# Patient Record
Sex: FEMALE | Race: WHITE | Employment: OTHER | URBAN - METROPOLITAN AREA
[De-identification: names, ages, dates, MRNs, and addresses within clinical notes are randomized per-mention and may not be internally consistent; named-entity substitution may affect disease eponyms.]

---

## 2017-05-16 ENCOUNTER — OFFICE VISIT (OUTPATIENT)
Dept: NEUROLOGY | Age: 62
End: 2017-05-16

## 2017-05-16 VITALS
DIASTOLIC BLOOD PRESSURE: 58 MMHG | BODY MASS INDEX: 35 KG/M2 | HEART RATE: 90 BPM | SYSTOLIC BLOOD PRESSURE: 102 MMHG | WEIGHT: 205 LBS | HEIGHT: 64 IN | RESPIRATION RATE: 16 BRPM

## 2017-05-16 DIAGNOSIS — M48.061 DEGENERATIVE LUMBAR SPINAL STENOSIS: ICD-10-CM

## 2017-05-16 DIAGNOSIS — I65.23 STENOSIS OF BOTH INTERNAL CAROTID ARTERIES: ICD-10-CM

## 2017-05-16 DIAGNOSIS — R55 SYNCOPE AND COLLAPSE: ICD-10-CM

## 2017-05-16 DIAGNOSIS — E53.8 B12 DEFICIENCY: ICD-10-CM

## 2017-05-16 DIAGNOSIS — E55.9 VITAMIN D DEFICIENCY: ICD-10-CM

## 2017-05-16 DIAGNOSIS — I67.89 CEREBRAL MICROVASCULAR DISEASE: ICD-10-CM

## 2017-05-16 DIAGNOSIS — G25.0 BENIGN FAMILIAL TREMOR: Primary | ICD-10-CM

## 2017-05-16 DIAGNOSIS — M96.1 CERVICAL POST-LAMINECTOMY SYNDROME: ICD-10-CM

## 2017-05-16 DIAGNOSIS — E11.42 DIABETIC PERIPHERAL NEUROPATHY ASSOCIATED WITH TYPE 2 DIABETES MELLITUS (HCC): ICD-10-CM

## 2017-05-16 DIAGNOSIS — I65.23 STENOSIS OF BOTH INTERNAL CAROTID ARTERIES: Primary | ICD-10-CM

## 2017-05-16 DIAGNOSIS — G25.0 ESSENTIAL TREMOR: ICD-10-CM

## 2017-05-16 DIAGNOSIS — M47.22 CERVICAL RADICULOPATHY DUE TO DEGENERATIVE JOINT DISEASE OF SPINE: ICD-10-CM

## 2017-05-16 DIAGNOSIS — R27.0 ATAXIA: ICD-10-CM

## 2017-05-16 RX ORDER — PRIMIDONE 50 MG/1
100 TABLET ORAL
Qty: 100 TAB | Refills: 11 | Status: SHIPPED | OUTPATIENT
Start: 2017-05-16 | End: 2018-02-21 | Stop reason: SDUPTHER

## 2017-05-16 RX ORDER — PRIMIDONE 50 MG/1
50 TABLET ORAL
COMMUNITY
Start: 2017-05-12 | End: 2017-05-16 | Stop reason: SDUPTHER

## 2017-05-16 RX ORDER — HYDROCODONE BITARTRATE AND ACETAMINOPHEN 7.5; 325 MG/1; MG/1
TABLET ORAL AS NEEDED
COMMUNITY
Start: 2017-04-21 | End: 2017-05-27

## 2017-05-16 RX ORDER — PHENOBARBITAL 60 MG/1
60 TABLET ORAL 2 TIMES DAILY
COMMUNITY
Start: 2017-05-01 | End: 2017-05-27

## 2017-05-16 RX ORDER — RISPERIDONE 0.25 MG/1
0.25 TABLET, FILM COATED ORAL 2 TIMES DAILY
COMMUNITY
Start: 2017-05-01 | End: 2017-05-27

## 2017-05-16 RX ORDER — LISINOPRIL 20 MG/1
20 TABLET ORAL DAILY
COMMUNITY
Start: 2017-04-21 | End: 2017-05-27

## 2017-05-16 RX ORDER — GABAPENTIN 600 MG/1
600 TABLET ORAL 2 TIMES DAILY
COMMUNITY
Start: 2017-04-21 | End: 2018-07-31

## 2017-05-16 RX ORDER — IBUPROFEN 800 MG/1
TABLET ORAL 3 TIMES DAILY
COMMUNITY
Start: 2017-04-21 | End: 2017-05-27

## 2017-05-16 RX ORDER — VENLAFAXINE HYDROCHLORIDE 75 MG/1
CAPSULE, EXTENDED RELEASE ORAL
Status: ON HOLD | COMMUNITY
Start: 2017-03-24 | End: 2017-05-22

## 2017-05-16 NOTE — PATIENT INSTRUCTIONS
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
## 2017-05-16 NOTE — MR AVS SNAPSHOT
Visit Information Date & Time Provider Department Dept. Phone Encounter #  
 5/16/2017 10:00 AM Maria Luisa Vasquez MD [facility] [phone] [id number] Follow-up Instructions Return in about 3 months (around 8/16/2017). Your Appointments 5/25/2017 10:00 AM  
New Patient with Maggie Wiggins, NP  
[address]) Appt Note: last seen 11/2015  
 [address] [phone]  
[address] [address] [address] [facility] Upcoming Health Maintenance Date Due Hepatitis C Screening 1955 FOOT EXAM Q1 12/23/1965 MICROALBUMIN Q1 12/23/1965 EYE EXAM RETINAL OR DILATED Q1 12/23/1965 DTaP/Tdap/Td series (1 - Tdap) 12/23/1976 PAP AKA CERVICAL CYTOLOGY 12/23/1976 FOBT Q 1 YEAR AGE 50-75 12/23/2005 LIPID PANEL Q1 1/19/2015 HEMOGLOBIN A1C Q6M 5/25/2015 ZOSTER VACCINE AGE 60> 12/23/2015 INFLUENZA AGE 9 TO ADULT 8/1/2017 BREAST CANCER SCRN MAMMOGRAM 11/20/2017 Allergies as of 5/16/2017  Review Complete On: 5/16/2017 By: Maria Luisa Vasquez MD  
  
 Severity Noted Reaction Type Reactions Latex  12/13/2010    Contact Dermatitis Dilaudid [Hydromorphone (Pf)]  10/29/2014   Systemic Other (comments) Feels like bugs are crawling all over her Lipitor [Atorvastatin]  06/21/2013    Other (comments) LIVER TROUBLE ON THIS MEDICATION Sulfa (Sulfonamide Antibiotics)  12/13/2010    Itching Current Immunizations  Reviewed on 3/19/2015 Name Date Influenza Vaccine 9/1/2014, 9/1/2013 Pneumococcal Vaccine (Unspecified Type) 1/1/2010 Not reviewed this visit You Were Diagnosed With   
  
 Codes Comments Benign familial tremor    -  Primary ICD-10-CM: G25.0 ICD-9-CM: 333.1  Diabetic peripheral neuropathy associated with type 2 diabetes mellitus ([facility]     ICD-10-CM: E11.42 
 ICD-9-CM: 250.60, 357.2 Cervical post-laminectomy syndrome     ICD-10-CM: M96.1 ICD-9-CM: 722.81 Syncope and collapse     ICD-10-CM: R55 
ICD-9-CM: 780.2 Stenosis of both internal carotid arteries     ICD-10-CM: I65.23 ICD-9-CM: 433.10, 433.30 Cervical radiculopathy due to degenerative joint disease of spine     ICD-10-CM: M47.22 
ICD-9-CM: 721.0 Degenerative lumbar spinal stenosis     ICD-10-CM: M48.06 
ICD-9-CM: 724.02 Ataxia     ICD-10-CM: R27.0 ICD-9-CM: 781.3 B12 deficiency     ICD-10-CM: E53.8 ICD-9-CM: 266.2 Vitamin D deficiency     ICD-10-CM: E55.9 ICD-9-CM: 268.9 Vitals BP Pulse Resp Height(growth percentile) Weight(growth percentile) BMI  
 102/58 90 16 5' 4\" (1.626 m) 205 lb (93 kg) 35.19 kg/m2 OB Status Smoking Status Menopause Current Every Day Smoker Vitals History BMI and BSA Data Body Mass Index Body Surface Area  
 35.19 kg/m 2 2.05 m 2 Preferred Pharmacy Pharmacy Name Phone [address] [phone] Your Updated Medication List  
  
   
This list is accurate as of: 5/16/17 11:15 AM.  Always use your most recent med list.  
  
  
  
  
 acetaminophen-codeine 300-30 mg per tablet Commonly known as:  TYLENOL #3 Take 1 Tab by mouth every eight (8) hours as needed (gets 30 per month). albuterol 90 mcg/actuation inhaler Commonly known as:  PROVENTIL HFA, VENTOLIN HFA, PROAIR HFA Take 2 Puffs by inhalation daily. Uses daily to QID ALPRAZolam 1 mg tablet Commonly known as:  Ted Felipe Take 1 Tab by mouth two (2) times daily as needed for Anxiety. aspirin 81 mg chewable tablet Take 81 mg by mouth daily. benzonatate 200 mg capsule Commonly known as:  TESSALON Take 1 Cap by mouth two (2) times daily as needed. cetirizine 10 mg tablet Commonly known as:  ZYRTEC Take 1 Tab by mouth daily. dicyclomine 10 mg capsule Commonly known as:  BENTYL DUONEB 2.5 mg-0.5 mg/3 ml Nebu Generic drug:  albuterol-ipratropium 3 mL by Nebulization route every six (6) hours as needed for Wheezing. furosemide 20 mg tablet Commonly known as:  LASIX Take 40 mg by mouth daily as needed (swelling; taking twice daily). gabapentin 600 mg tablet Commonly known as:  NEURONTIN  
600 mg two (2) times a day. glipiZIDE SR 5 mg CR tablet Commonly known as:  GLUCOTROL XL Take 5 mg by mouth two (2) times daily as needed (1-2 per day). HYDROcodone-acetaminophen 7.5-325 mg per tablet Commonly known as:  Manuelito Riddle  
as needed (Gets 15 per month). ibuprofen 800 mg tablet Commonly known as:  MOTRIN  
three (3) times daily. levothyroxine 200 mcg tablet Commonly known as:  SYNTHROID Take 200 mcg by mouth daily (before breakfast). LEVSIN/SL 0.125 mg SL tablet Generic drug:  hyoscyamine SL  
0.125 mg by SubLINGual route three (3) times daily as needed for Cramping. lisinopril 20 mg tablet Commonly known as:  PRINIVIL, ZESTRIL  
20 mg daily. MAGOX 400 mg tablet Generic drug:  magnesium oxide TAKE ONE TABLET TWICE A DAY  
  
 methocarbamol 750 mg tablet Commonly known as:  ROBAXIN Take 750-1,500 mg by mouth four (4) times daily as needed. metoprolol tartrate 25 mg tablet Commonly known as:  LOPRESSOR Take 1 Tab by mouth two (2) times a day. montelukast 10 mg tablet Commonly known as:  SINGULAIR Take 10 mg by mouth daily. NexIUM 40 mg capsule Generic drug:  esomeprazole Take 40 mg by mouth daily. niacin  mg CR capsule Take 250 mg by mouth daily. nystatin 100,000 unit/mL suspension Commonly known as:  MYCOSTATIN Take 5 mL by mouth four (4) times daily. swish and spit PHENobarbital 60 mg tablet Commonly known as:  LUMINAL  
60 mg two (2) times a day. prazosin 2 mg capsule Commonly known as:  MINIPRESS Take 1 Cap by mouth two (2) times a day. primidone 50 mg tablet Commonly known as: MYSOLINE Take 2 Tabs by mouth nightly. risperiDONE 0.25 mg tablet Commonly known as:  RisperDAL  
0.25 mg two (2) times a day. traMADol 50 mg tablet Commonly known as:  ULTRAM  
Take 50 mg by mouth every six (6) hours as needed for Pain. venlafaxine-SR 75 mg capsule Commonly known as:  EFFEXOR-XR  
  
 vit B Cmplx 3-FA-Vit C-Biotin 1- mg-mg-mcg tablet Commonly known as:  NEPHRO SHREYA RX Take 1 Tab by mouth daily. Vytorin 10/40 10-40 mg per tablet Generic drug:  ezetimibe-simvastatin Take 1 tablet by mouth nightly. Prescriptions Sent to Pharmacy Refills  
 primidone (MYSOLINE) 50 mg tablet 11 Sig: Take 2 Tabs by mouth nightly. Class: Normal  
 Pharmacy: [address] [place] [address] Ph #: [phone] Route: Oral  
  
We Performed the Following OTYA COMPREHENSIVE PLUS PANEL [[id number] Custom] ANTINEURONAL CELL AB [id number] CPT(R)] CBC WITH AUTOMATED DIFF [89075 CPT(R)] CK [id number] CPT(R)] HEMOGLOBIN A1C WITH EAG [73986 CPT(R)] SED RATE (ESR) [id number] CPT(R)]   
 T3 TOTAL [66505 CPT(R)] T4 [id number] CPT(R)] TSH 3RD GENERATION [97124 CPT(R)] VITAMIN B12 & FOLATE [04027 CPT(R)] VITAMIN D, 25 HYROXY PANEL [[id number] Custom] Follow-up Instructions Return in about 3 months (around 8/16/2017). To-Do List   
 05/16/2017 Imaging:  DUPLEX CAROTID BILATERAL AMB NEURO   
  
 05/16/2017 Imaging:  XR SPINE CERV W OBL/FLEX/EXT MIN 6 V COMP   
  
 05/16/2017 Imaging:  XR SPINE LUMB MIN 4 V   
  
 05/22/2017 Neurology:  EEG Patient Instructions A Healthy Lifestyle: Care Instructions Your Care Instructions A healthy lifestyle can help you feel good, stay at a healthy weight, and have plenty of energy for both work and play. A healthy lifestyle is something you can share with your whole family. A healthy lifestyle also can lower your risk for serious health problems, such as high blood pressure, heart disease, and diabetes. You can follow a few steps listed below to improve your health and the health of your family. Follow-up care is a key part of your treatment and safety. Be sure to make and go to all appointments, and call your doctor if you are having problems. Its also a good idea to know your test results and keep a list of the medicines you take. How can you care for yourself at home? · Do not eat too much sugar, fat, or fast foods. You can still have dessert and treats now and then. The goal is moderation. · Start small to improve your eating habits. Pay attention to portion sizes, drink less juice and soda pop, and eat more fruits and vegetables. ¨ Eat a healthy amount of food. A 3-ounce serving of meat, for example, is about the size of a deck of cards. Fill the rest of your plate with vegetables and whole grains. ¨ Limit the amount of soda and sports drinks you have every day. Drink more water when you are thirsty. ¨ Eat at least 5 servings of fruits and vegetables every day. It may seem like a lot, but it is not hard to reach this goal. A serving or helping is 1 piece of fruit, 1 cup of vegetables, or 2 cups of leafy, raw vegetables. Have an apple or some carrot sticks as an afternoon snack instead of a candy bar. Try to have fruits and/or vegetables at every meal. 
· Make exercise part of your daily routine. You may want to start with simple activities, such as walking, bicycling, or slow swimming. Try to be active 30 to 60 minutes every day. You do not need to do all 30 to 60 minutes all at once. For example, you can exercise 3 times a day for 10 or 20 minutes.  Moderate exercise is safe for most people, but it is always a good idea to talk to your doctor before starting an exercise program. 
· Keep moving. Bernice Carrasco the lawn, work in the garden, or [place]. Take the stairs instead of the elevator at work. · If you smoke, quit. People who smoke have an increased risk for heart attack, stroke, cancer, and other lung illnesses. Quitting is hard, but there are ways to boost your chance of quitting tobacco for good. ¨ Use nicotine gum, patches, or lozenges. ¨ Ask your doctor about stop-smoking programs and medicines. ¨ Keep trying. In addition to reducing your risk of diseases in the future, you will notice some benefits soon after you stop using tobacco. If you have shortness of breath or asthma symptoms, they will likely get better within a few weeks after you quit. · Limit how much alcohol you drink. Moderate amounts of alcohol (up to 2 drinks a day for men, 1 drink a day for women) are okay. But drinking too much can lead to liver problems, high blood pressure, and other health problems. Family health If you have a family, there are many things you can do together to improve your health. · Eat meals together as a family as often as possible. · Eat healthy foods. This includes fruits, vegetables, lean meats and dairy, and whole grains. · Include your family in your fitness plan. Most people think of activities such as jogging or tennis as the way to fitness, but there are many ways you and your family can be more active. Anything that makes you breathe hard and gets your heart pumping is exercise. Here are some tips: 
¨ Walk to do errands or to take your child to school or the bus. ¨ Go for a family bike ride after dinner instead of watching TV. Where can you learn more? Go to http://bhavya-omar.info/. Enter Z093 in the search box to learn more about \"A Healthy Lifestyle: Care Instructions. \" Current as of: July 26, 2016 Content Version: 11.2 © 5897-8782 [employer]. Care instructions adapted under license by Payment plugin (which disclaims liability or warranty for this information). If you have questions about a medical condition or this instruction, always ask your healthcare professional. [address] any warranty or liability for your use of this information. Introducing [facility]! Maureen Annaversushma introduces triptap patient portal. Now you can access parts of your medical record, email your doctor's office, and request medication refills online. 1. In your internet browser, go to [email or web address] 2. Click on the First Time User? Click Here link in the Sign In box. You will see the New Member Sign Up page. 3. Enter your triptap Access Code exactly as it appears below. You will not need to use this code after youve completed the sign-up process. If you do not sign up before the expiration date, you must request a new code. · triptap Access Code: [email or web address] Expires: 8/14/2017  9:48 AM 
 
4. Enter the last four digits of your Social Security Number (xxxx) and Date of Birth (mm/dd/yyyy) as indicated and click Submit. You will be taken to the next sign-up page. 5. Create a triptap ID. This will be your triptap login ID and cannot be changed, so think of one that is secure and easy to remember. 6. Create a triptap password. You can change your password at any time. 7. Enter your Password Reset Question and Answer. This can be used at a later time if you forget your password. 8. Enter your e-mail address. You will receive e-mail notification when new information is available in [address] 9. Click Sign Up. You can now view and download portions of your medical record. 10. Click the Download Summary menu link to download a portable copy of your medical information.  
 
If you have questions, please visit the Frequently Asked Questions section of the [employer]. Remember, BAUNA[employer] is NOT to be used for urgent needs. For medical emergencies, dial 911. Now available from your iPhone and Android! Please provide this summary of care documentation to your next provider. Your primary care clinician is listed as Alison Arciniega. If you have any questions after today's visit, please call [phone].

## 2017-05-17 PROBLEM — R41.82 ALTERED MENTAL STATUS, UNSPECIFIED: Status: ACTIVE | Noted: 2017-05-17

## 2017-05-17 PROBLEM — R56.9 CONVULSION DISORDER (HCC): Status: ACTIVE | Noted: 2017-05-17

## 2017-05-17 PROBLEM — I67.89 CEREBRAL MICROVASCULAR DISEASE: Status: ACTIVE | Noted: 2017-05-17
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
## 2017-05-21 ENCOUNTER — APPOINTMENT (OUTPATIENT)
Dept: GENERAL RADIOLOGY | Age: 62
DRG: 189 | End: 2017-05-21
Attending: EMERGENCY MEDICINE
Payer: MEDICARE

## 2017-05-21 ENCOUNTER — APPOINTMENT (OUTPATIENT)
Dept: CT IMAGING | Age: 62
DRG: 189 | End: 2017-05-21
Attending: INTERNAL MEDICINE
Payer: MEDICARE

## 2017-05-21 ENCOUNTER — HOSPITAL ENCOUNTER (INPATIENT)
Age: 62
LOS: 6 days | Discharge: NURSING, REHAB OR LONG-TERM CARE | DRG: 189 | End: 2017-05-27
Attending: EMERGENCY MEDICINE | Admitting: INTERNAL MEDICINE
Payer: MEDICARE

## 2017-05-21 DIAGNOSIS — M96.1 CERVICAL POST-LAMINECTOMY SYNDROME: ICD-10-CM

## 2017-05-21 DIAGNOSIS — G25.0 BENIGN FAMILIAL TREMOR: ICD-10-CM

## 2017-05-21 DIAGNOSIS — E11.42 DIABETIC PERIPHERAL NEUROPATHY ASSOCIATED WITH TYPE 2 DIABETES MELLITUS (HCC): ICD-10-CM

## 2017-05-21 DIAGNOSIS — E53.8 B12 DEFICIENCY: ICD-10-CM

## 2017-05-21 DIAGNOSIS — J96.01 ACUTE RESPIRATORY FAILURE WITH HYPOXIA (HCC): Primary | ICD-10-CM

## 2017-05-21 DIAGNOSIS — M48.061 DEGENERATIVE LUMBAR SPINAL STENOSIS: ICD-10-CM

## 2017-05-21 DIAGNOSIS — R55 SYNCOPE AND COLLAPSE: ICD-10-CM

## 2017-05-21 DIAGNOSIS — R27.0 ATAXIA: ICD-10-CM

## 2017-05-21 DIAGNOSIS — M47.22 CERVICAL RADICULOPATHY DUE TO DEGENERATIVE JOINT DISEASE OF SPINE: ICD-10-CM

## 2017-05-21 DIAGNOSIS — I65.23 STENOSIS OF BOTH INTERNAL CAROTID ARTERIES: ICD-10-CM

## 2017-05-21 DIAGNOSIS — E55.9 VITAMIN D DEFICIENCY: ICD-10-CM

## 2017-05-21 DIAGNOSIS — J44.1 COPD EXACERBATION (HCC): ICD-10-CM

## 2017-05-21 LAB
ALBUMIN SERPL BCP-MCNC: 3.9 G/DL (ref 3.5–5)
ALBUMIN/GLOB SERPL: 1 {RATIO} (ref 1.1–2.2)
ALP SERPL-CCNC: 106 U/L (ref 45–117)
ALT SERPL-CCNC: 29 U/L (ref 12–78)
ANION GAP BLD CALC-SCNC: 9 MMOL/L (ref 5–15)
ARTERIAL PATENCY WRIST A: ABNORMAL
AST SERPL W P-5'-P-CCNC: 16 U/L (ref 15–37)
BASE EXCESS BLDA CALC-SCNC: 2.6 MMOL/L
BASOPHILS # BLD AUTO: 0 K/UL (ref 0–0.1)
BASOPHILS # BLD: 0 % (ref 0–1)
BDY SITE: ABNORMAL
BILIRUB SERPL-MCNC: 0.4 MG/DL (ref 0.2–1)
BUN SERPL-MCNC: 27 MG/DL (ref 6–20)
BUN/CREAT SERPL: 27 (ref 12–20)
CALCIUM SERPL-MCNC: 9.8 MG/DL (ref 8.5–10.1)
CHLORIDE SERPL-SCNC: 87 MMOL/L (ref 97–108)
CK SERPL-CCNC: 75 U/L (ref 26–192)
CO2 SERPL-SCNC: 33 MMOL/L (ref 21–32)
CREAT SERPL-MCNC: 0.99 MG/DL (ref 0.55–1.02)
EOSINOPHIL # BLD: 0 K/UL (ref 0–0.4)
EOSINOPHIL NFR BLD: 0 % (ref 0–7)
EPAP/CPAP/PEEP, PAPEEP: 5
ERYTHROCYTE [DISTWIDTH] IN BLOOD BY AUTOMATED COUNT: 14.2 % (ref 11.5–14.5)
FIO2 ON VENT: 35 %
GAS FLOW.O2 SETTING OXYMISER: 4 L/MIN
GLOBULIN SER CALC-MCNC: 4 G/DL (ref 2–4)
GLUCOSE BLD STRIP.AUTO-MCNC: 165 MG/DL (ref 65–100)
GLUCOSE SERPL-MCNC: 148 MG/DL (ref 65–100)
HCO3 BLDA-SCNC: 30 MMOL/L (ref 22–26)
HCT VFR BLD AUTO: 47 % (ref 35–47)
HGB BLD-MCNC: 16 G/DL (ref 11.5–16)
IPAP/PIP, IPAPIP: 12
LYMPHOCYTES # BLD AUTO: 15 % (ref 12–49)
LYMPHOCYTES # BLD: 1.8 K/UL (ref 0.8–3.5)
MCH RBC QN AUTO: 33 PG (ref 26–34)
MCHC RBC AUTO-ENTMCNC: 34 G/DL (ref 30–36.5)
MCV RBC AUTO: 96.9 FL (ref 80–99)
MONOCYTES # BLD: 0.6 K/UL (ref 0–1)
MONOCYTES NFR BLD AUTO: 5 % (ref 5–13)
NEUTS SEG # BLD: 9.1 K/UL (ref 1.8–8)
NEUTS SEG NFR BLD AUTO: 80 % (ref 32–75)
PCO2 BLDA: 57 MMHG (ref 35–45)
PH BLDA: 7.34 [PH] (ref 7.35–7.45)
PLATELET # BLD AUTO: 243 K/UL (ref 150–400)
PO2 BLDA: 79 MMHG (ref 80–100)
POTASSIUM SERPL-SCNC: 4.2 MMOL/L (ref 3.5–5.1)
PROT SERPL-MCNC: 7.9 G/DL (ref 6.4–8.2)
RBC # BLD AUTO: 4.85 M/UL (ref 3.8–5.2)
SAO2 % BLD: 95 % (ref 92–97)
SAO2% DEVICE SAO2% SENSOR NAME: ABNORMAL
SERVICE CMNT-IMP: ABNORMAL
SODIUM SERPL-SCNC: 129 MMOL/L (ref 136–145)
SPECIMEN SITE: ABNORMAL
TROPONIN I SERPL-MCNC: <0.04 NG/ML
VENTILATION MODE VENT: ABNORMAL
WBC # BLD AUTO: 11.5 K/UL (ref 3.6–11)

## 2017-05-21 PROCEDURE — 93005 ELECTROCARDIOGRAM TRACING: CPT

## 2017-05-21 PROCEDURE — 84484 ASSAY OF TROPONIN QUANT: CPT | Performed by: EMERGENCY MEDICINE

## 2017-05-21 PROCEDURE — 74011000250 HC RX REV CODE- 250: Performed by: EMERGENCY MEDICINE

## 2017-05-21 PROCEDURE — 74011250636 HC RX REV CODE- 250/636: Performed by: INTERNAL MEDICINE

## 2017-05-21 PROCEDURE — 80053 COMPREHEN METABOLIC PANEL: CPT | Performed by: EMERGENCY MEDICINE

## 2017-05-21 PROCEDURE — 65660000000 HC RM CCU STEPDOWN

## 2017-05-21 PROCEDURE — 74011000258 HC RX REV CODE- 258: Performed by: INTERNAL MEDICINE

## 2017-05-21 PROCEDURE — 82803 BLOOD GASES ANY COMBINATION: CPT | Performed by: INTERNAL MEDICINE

## 2017-05-21 PROCEDURE — 36415 COLL VENOUS BLD VENIPUNCTURE: CPT | Performed by: EMERGENCY MEDICINE

## 2017-05-21 PROCEDURE — 71275 CT ANGIOGRAPHY CHEST: CPT

## 2017-05-21 PROCEDURE — 71010 XR CHEST PORT: CPT

## 2017-05-21 PROCEDURE — 94660 CPAP INITIATION&MGMT: CPT

## 2017-05-21 PROCEDURE — 74011250636 HC RX REV CODE- 250/636: Performed by: EMERGENCY MEDICINE

## 2017-05-21 PROCEDURE — 74011636320 HC RX REV CODE- 636/320: Performed by: EMERGENCY MEDICINE

## 2017-05-21 PROCEDURE — 99285 EMERGENCY DEPT VISIT HI MDM: CPT

## 2017-05-21 PROCEDURE — 36600 WITHDRAWAL OF ARTERIAL BLOOD: CPT | Performed by: INTERNAL MEDICINE

## 2017-05-21 PROCEDURE — 96374 THER/PROPH/DIAG INJ IV PUSH: CPT

## 2017-05-21 PROCEDURE — 74011250637 HC RX REV CODE- 250/637: Performed by: INTERNAL MEDICINE

## 2017-05-21 PROCEDURE — 85025 COMPLETE CBC W/AUTO DIFF WBC: CPT | Performed by: EMERGENCY MEDICINE

## 2017-05-21 PROCEDURE — 77030013140 HC MSK NEB VYRM -A

## 2017-05-21 PROCEDURE — 74011250637 HC RX REV CODE- 250/637: Performed by: HOSPITALIST

## 2017-05-21 PROCEDURE — 70450 CT HEAD/BRAIN W/O DYE: CPT

## 2017-05-21 PROCEDURE — 77030012879 HC MSK CPAP FLL FAC PHIL -B

## 2017-05-21 PROCEDURE — 94761 N-INVAS EAR/PLS OXIMETRY MLT: CPT

## 2017-05-21 PROCEDURE — 82962 GLUCOSE BLOOD TEST: CPT

## 2017-05-21 PROCEDURE — 82550 ASSAY OF CK (CPK): CPT | Performed by: EMERGENCY MEDICINE

## 2017-05-21 PROCEDURE — 74011000250 HC RX REV CODE- 250: Performed by: INTERNAL MEDICINE

## 2017-05-21 RX ORDER — SODIUM CHLORIDE 0.9 % (FLUSH) 0.9 %
5-10 SYRINGE (ML) INJECTION EVERY 8 HOURS
Status: DISCONTINUED | OUTPATIENT
Start: 2017-05-21 | End: 2017-05-27 | Stop reason: HOSPADM

## 2017-05-21 RX ORDER — GABAPENTIN 300 MG/1
600 CAPSULE ORAL 2 TIMES DAILY
Status: DISCONTINUED | OUTPATIENT
Start: 2017-05-21 | End: 2017-05-27 | Stop reason: HOSPADM

## 2017-05-21 RX ORDER — ONDANSETRON 2 MG/ML
4 INJECTION INTRAMUSCULAR; INTRAVENOUS
Status: DISCONTINUED | OUTPATIENT
Start: 2017-05-21 | End: 2017-05-27 | Stop reason: HOSPADM

## 2017-05-21 RX ORDER — LEVOTHYROXINE SODIUM 100 UG/1
200 TABLET ORAL
Status: DISCONTINUED | OUTPATIENT
Start: 2017-05-22 | End: 2017-05-27 | Stop reason: HOSPADM

## 2017-05-21 RX ORDER — BISACODYL 5 MG
5 TABLET, DELAYED RELEASE (ENTERIC COATED) ORAL DAILY PRN
Status: DISCONTINUED | OUTPATIENT
Start: 2017-05-21 | End: 2017-05-27 | Stop reason: HOSPADM

## 2017-05-21 RX ORDER — GUAIFENESIN 100 MG/5ML
81 LIQUID (ML) ORAL DAILY
Status: DISCONTINUED | OUTPATIENT
Start: 2017-05-22 | End: 2017-05-27 | Stop reason: HOSPADM

## 2017-05-21 RX ORDER — LORAZEPAM 2 MG/ML
1 INJECTION INTRAMUSCULAR
Status: COMPLETED | OUTPATIENT
Start: 2017-05-21 | End: 2017-05-21

## 2017-05-21 RX ORDER — AZITHROMYCIN 250 MG/1
250 TABLET, FILM COATED ORAL DAILY
Status: COMPLETED | OUTPATIENT
Start: 2017-05-22 | End: 2017-05-26

## 2017-05-21 RX ORDER — CETIRIZINE HCL 10 MG
10 TABLET ORAL DAILY
Status: DISCONTINUED | OUTPATIENT
Start: 2017-05-22 | End: 2017-05-27 | Stop reason: HOSPADM

## 2017-05-21 RX ORDER — MAGNESIUM SULFATE 100 %
4 CRYSTALS MISCELLANEOUS AS NEEDED
Status: DISCONTINUED | OUTPATIENT
Start: 2017-05-21 | End: 2017-05-27 | Stop reason: HOSPADM

## 2017-05-21 RX ORDER — SODIUM CHLORIDE 0.9 % (FLUSH) 0.9 %
10 SYRINGE (ML) INJECTION
Status: COMPLETED | OUTPATIENT
Start: 2017-05-21 | End: 2017-05-21

## 2017-05-21 RX ORDER — IPRATROPIUM BROMIDE AND ALBUTEROL SULFATE 2.5; .5 MG/3ML; MG/3ML
3 SOLUTION RESPIRATORY (INHALATION)
Status: DISCONTINUED | OUTPATIENT
Start: 2017-05-21 | End: 2017-05-27 | Stop reason: HOSPADM

## 2017-05-21 RX ORDER — PRIMIDONE 50 MG/1
100 TABLET ORAL
Status: DISCONTINUED | OUTPATIENT
Start: 2017-05-21 | End: 2017-05-27 | Stop reason: HOSPADM

## 2017-05-21 RX ORDER — ACETAMINOPHEN AND CODEINE PHOSPHATE 300; 30 MG/1; MG/1
1 TABLET ORAL
Status: DISCONTINUED | OUTPATIENT
Start: 2017-05-21 | End: 2017-05-27 | Stop reason: HOSPADM

## 2017-05-21 RX ORDER — DEXTROSE 50 % IN WATER (D50W) INTRAVENOUS SYRINGE
12.5-25 AS NEEDED
Status: DISCONTINUED | OUTPATIENT
Start: 2017-05-21 | End: 2017-05-21 | Stop reason: CLARIF

## 2017-05-21 RX ORDER — ENOXAPARIN SODIUM 100 MG/ML
40 INJECTION SUBCUTANEOUS DAILY
Status: DISCONTINUED | OUTPATIENT
Start: 2017-05-22 | End: 2017-05-27 | Stop reason: HOSPADM

## 2017-05-21 RX ORDER — PHENOBARBITAL 32.4 MG/1
60 TABLET ORAL 2 TIMES DAILY
Status: DISCONTINUED | OUTPATIENT
Start: 2017-05-21 | End: 2017-05-27 | Stop reason: HOSPADM

## 2017-05-21 RX ORDER — PROMETHAZINE HYDROCHLORIDE 25 MG/1
25 TABLET ORAL
Status: DISCONTINUED | OUTPATIENT
Start: 2017-05-21 | End: 2017-05-27 | Stop reason: HOSPADM

## 2017-05-21 RX ORDER — DICYCLOMINE HYDROCHLORIDE 10 MG/1
10 CAPSULE ORAL
Status: DISCONTINUED | OUTPATIENT
Start: 2017-05-21 | End: 2017-05-27 | Stop reason: HOSPADM

## 2017-05-21 RX ORDER — VENLAFAXINE HYDROCHLORIDE 37.5 MG/1
75 CAPSULE, EXTENDED RELEASE ORAL
Status: DISCONTINUED | OUTPATIENT
Start: 2017-05-22 | End: 2017-05-27 | Stop reason: HOSPADM

## 2017-05-21 RX ORDER — GUAIFENESIN 600 MG/1
600 TABLET, EXTENDED RELEASE ORAL 2 TIMES DAILY
Status: DISCONTINUED | OUTPATIENT
Start: 2017-05-21 | End: 2017-05-22

## 2017-05-21 RX ORDER — MONTELUKAST SODIUM 10 MG/1
10 TABLET ORAL DAILY
Status: DISCONTINUED | OUTPATIENT
Start: 2017-05-22 | End: 2017-05-27 | Stop reason: HOSPADM

## 2017-05-21 RX ORDER — HYOSCYAMINE SULFATE 0.12 MG/1
0.12 TABLET SUBLINGUAL
Status: DISCONTINUED | OUTPATIENT
Start: 2017-05-21 | End: 2017-05-27 | Stop reason: HOSPADM

## 2017-05-21 RX ORDER — IBUPROFEN 200 MG
1 TABLET ORAL
Status: DISCONTINUED | OUTPATIENT
Start: 2017-05-21 | End: 2017-05-27 | Stop reason: HOSPADM

## 2017-05-21 RX ORDER — SODIUM CHLORIDE 9 MG/ML
50 INJECTION, SOLUTION INTRAVENOUS
Status: COMPLETED | OUTPATIENT
Start: 2017-05-21 | End: 2017-05-22

## 2017-05-21 RX ORDER — ACETAMINOPHEN 325 MG/1
650 TABLET ORAL
Status: DISCONTINUED | OUTPATIENT
Start: 2017-05-21 | End: 2017-05-27 | Stop reason: HOSPADM

## 2017-05-21 RX ORDER — SODIUM CHLORIDE 0.9 % (FLUSH) 0.9 %
5-10 SYRINGE (ML) INJECTION AS NEEDED
Status: DISCONTINUED | OUTPATIENT
Start: 2017-05-21 | End: 2017-05-27 | Stop reason: HOSPADM

## 2017-05-21 RX ORDER — ALPRAZOLAM 0.5 MG/1
1 TABLET ORAL
Status: DISCONTINUED | OUTPATIENT
Start: 2017-05-21 | End: 2017-05-27 | Stop reason: HOSPADM

## 2017-05-21 RX ORDER — LANOLIN ALCOHOL/MO/W.PET/CERES
250 CREAM (GRAM) TOPICAL DAILY
Status: DISCONTINUED | OUTPATIENT
Start: 2017-05-22 | End: 2017-05-27 | Stop reason: HOSPADM

## 2017-05-21 RX ORDER — METOPROLOL TARTRATE 25 MG/1
12.5 TABLET, FILM COATED ORAL 2 TIMES DAILY
Status: DISCONTINUED | OUTPATIENT
Start: 2017-05-21 | End: 2017-05-27 | Stop reason: HOSPADM

## 2017-05-21 RX ORDER — SODIUM CHLORIDE 9 MG/ML
100 INJECTION, SOLUTION INTRAVENOUS CONTINUOUS
Status: DISPENSED | OUTPATIENT
Start: 2017-05-21 | End: 2017-05-22

## 2017-05-21 RX ORDER — PRAVASTATIN SODIUM 40 MG/1
40 TABLET ORAL
Status: DISCONTINUED | OUTPATIENT
Start: 2017-05-21 | End: 2017-05-27 | Stop reason: HOSPADM

## 2017-05-21 RX ORDER — HYDROCODONE BITARTRATE AND ACETAMINOPHEN 7.5; 325 MG/1; MG/1
1 TABLET ORAL
Status: DISCONTINUED | OUTPATIENT
Start: 2017-05-21 | End: 2017-05-27 | Stop reason: HOSPADM

## 2017-05-21 RX ORDER — BENZONATATE 100 MG/1
200 CAPSULE ORAL
Status: DISCONTINUED | OUTPATIENT
Start: 2017-05-21 | End: 2017-05-27 | Stop reason: HOSPADM

## 2017-05-21 RX ORDER — INSULIN LISPRO 100 [IU]/ML
INJECTION, SOLUTION INTRAVENOUS; SUBCUTANEOUS
Status: DISCONTINUED | OUTPATIENT
Start: 2017-05-21 | End: 2017-05-27 | Stop reason: HOSPADM

## 2017-05-21 RX ORDER — EZETIMIBE 10 MG/1
10 TABLET ORAL
Status: DISCONTINUED | OUTPATIENT
Start: 2017-05-21 | End: 2017-05-27 | Stop reason: HOSPADM

## 2017-05-21 RX ORDER — PANTOPRAZOLE SODIUM 40 MG/1
40 TABLET, DELAYED RELEASE ORAL
Status: DISCONTINUED | OUTPATIENT
Start: 2017-05-22 | End: 2017-05-27 | Stop reason: HOSPADM

## 2017-05-21 RX ORDER — LANOLIN ALCOHOL/MO/W.PET/CERES
400 CREAM (GRAM) TOPICAL DAILY
Status: DISCONTINUED | OUTPATIENT
Start: 2017-05-22 | End: 2017-05-27 | Stop reason: HOSPADM

## 2017-05-21 RX ORDER — ALBUTEROL SULFATE 0.83 MG/ML
2.5 SOLUTION RESPIRATORY (INHALATION)
Status: DISCONTINUED | OUTPATIENT
Start: 2017-05-21 | End: 2017-05-27 | Stop reason: HOSPADM

## 2017-05-21 RX ORDER — IPRATROPIUM BROMIDE AND ALBUTEROL SULFATE 2.5; .5 MG/3ML; MG/3ML
3 SOLUTION RESPIRATORY (INHALATION)
Status: COMPLETED | OUTPATIENT
Start: 2017-05-21 | End: 2017-05-21

## 2017-05-21 RX ORDER — RISPERIDONE 0.25 MG/1
0.25 TABLET, FILM COATED ORAL 2 TIMES DAILY
Status: DISCONTINUED | OUTPATIENT
Start: 2017-05-21 | End: 2017-05-27 | Stop reason: HOSPADM

## 2017-05-21 RX ADMIN — PRAVASTATIN SODIUM 40 MG: 40 TABLET ORAL at 22:41

## 2017-05-21 RX ADMIN — DOXYCYCLINE 100 MG: 100 INJECTION, POWDER, LYOPHILIZED, FOR SOLUTION INTRAVENOUS at 19:53

## 2017-05-21 RX ADMIN — ONDANSETRON HYDROCHLORIDE 4 MG: 2 INJECTION, SOLUTION INTRAMUSCULAR; INTRAVENOUS at 20:31

## 2017-05-21 RX ADMIN — METHYLPREDNISOLONE SODIUM SUCCINATE 125 MG: 125 INJECTION, POWDER, FOR SOLUTION INTRAMUSCULAR; INTRAVENOUS at 14:30

## 2017-05-21 RX ADMIN — SODIUM CHLORIDE 50 ML/HR: 900 INJECTION, SOLUTION INTRAVENOUS at 18:18

## 2017-05-21 RX ADMIN — LORAZEPAM 1 MG: 2 INJECTION INTRAMUSCULAR; INTRAVENOUS at 15:34

## 2017-05-21 RX ADMIN — IOPAMIDOL 100 ML: 755 INJECTION, SOLUTION INTRAVENOUS at 18:18

## 2017-05-21 RX ADMIN — GABAPENTIN 600 MG: 300 CAPSULE ORAL at 20:34

## 2017-05-21 RX ADMIN — PRIMIDONE 100 MG: 50 TABLET ORAL at 22:41

## 2017-05-21 RX ADMIN — ALPRAZOLAM 1 MG: 0.5 TABLET ORAL at 23:57

## 2017-05-21 RX ADMIN — SODIUM CHLORIDE 100 ML/HR: 900 INJECTION, SOLUTION INTRAVENOUS at 19:43

## 2017-05-21 RX ADMIN — Medication 10 ML: at 18:18

## 2017-05-21 RX ADMIN — Medication 10 ML: at 22:00

## 2017-05-21 RX ADMIN — METHYLPREDNISOLONE SODIUM SUCCINATE 40 MG: 40 INJECTION, POWDER, FOR SOLUTION INTRAMUSCULAR; INTRAVENOUS at 20:32

## 2017-05-21 RX ADMIN — IPRATROPIUM BROMIDE AND ALBUTEROL SULFATE 3 ML: .5; 3 SOLUTION RESPIRATORY (INHALATION) at 20:38

## 2017-05-21 RX ADMIN — IPRATROPIUM BROMIDE AND ALBUTEROL SULFATE 3 ML: .5; 3 SOLUTION RESPIRATORY (INHALATION) at 15:38

## 2017-05-21 RX ADMIN — ACETAMINOPHEN AND CODEINE PHOSPHATE 1 TABLET: 300; 30 TABLET ORAL at 20:34

## 2017-05-21 NOTE — ED PROVIDER NOTES
HPI Comments: Golden Chau is a 64 y.o. female, pmhx significant for COPD, tobacco abuse, PUD, CAD, GERD, fibromyalgia, hypothyroidism, and depression/anxiety, who presents via EMS to the ED c/o intermittent SOB x 1 week with worsening constant SOB x this AM. Pt also reports associated bilateral feet swelling, blurry vision, and fatigue with her SOB. Pt states that this morning she noticed her HR was elevated, and states that she felt near-syncope after sitting up. She also notes intermittent chest pain under her R breast x \"a while. \" Pt called her PCP today who referred her to the ED. Per EMS, pt was 80% on RA, and does not wear supplemental oxygen at home. Pt was given albuterol treatments en route, and was placed on BiPAP. Pt denies abdominal pain, cough, fever, chills, nausea, and vomiting. PCP: Bacilio Hayes MD    PSHx: Significant for cardiac catheterization, endoscopies, ruptured discs  Social Hx: + tobacco (1ppd), + EtOH (occasionally), - Illicit Drugs     There are no other complaints, changes, or physical findings at this time. The history is provided by the patient and the EMS personnel. No  was used.         Past Medical History:   Diagnosis Date    Anxiety     Arthritis     osteoarthritis    Bone spur     NECK    Bronchitis     CAD (coronary artery disease)     hyperlipidemia    Cellulitis     COPD     Depression     Endocrine disease     hypothyroidism    Fibromyalgia     Fusion of spine of cervical region     Gastrointestinal disorder     gerd    Hypothyroid     Other ill-defined conditions     Fibromyalgia    Psychiatric disorder     anxiety    PUD (peptic ulcer disease)        Past Surgical History:   Procedure Laterality Date    BRONCHOSCOPY-FIBER/THERAPY  4/3/2015         CARDIAC CATHETERIZATION  2013         COLONOSCOPY,DIAGNOSTIC  10/30/2014         HX CATARACT REMOVAL      bilateral    HX GYN          HX ORTHOPAEDIC Ruptured disc, knuckles on right hand    UPPER GI ENDOSCOPY,BIOPSY  10/30/2014         UPPER GI ENDOSCOPY,DILATN W GUIDE  10/30/2014              Family History:   Problem Relation Age of Onset    Heart Disease Mother     Dementia Mother     Thyroid Disease Mother     Heart Disease Father     Alcohol abuse Father     Psychiatric Disorder Father     Dementia Father     Bipolar Disorder Father     Psychiatric Disorder Sister     Suicide Sister     Psychiatric Disorder Brother     Suicide Brother     Psychiatric Disorder Other     Psychiatric Disorder Daughter     Bipolar Disorder Daughter     Coronary Artery Disease Other      multiple family members in 52's       Social History     Social History    Marital status:      Spouse name: N/A    Number of children: N/A    Years of education: N/A     Occupational History    Not on file. Social History Main Topics    Smoking status: Current Every Day Smoker     Packs/day: 1.00     Years: 30.00    Smokeless tobacco: Not on file    Alcohol use Yes      Comment: occasional    Drug use: No    Sexual activity: Not on file     Other Topics Concern    Not on file     Social History Narrative         ALLERGIES: Latex; Dilaudid [hydromorphone (pf)]; Lipitor [atorvastatin]; and Sulfa (sulfonamide antibiotics)    Review of Systems   Constitutional: Positive for fatigue. Negative for chills and fever. Eyes: Positive for visual disturbance (blurry). Respiratory: Positive for shortness of breath. Negative for cough. Cardiovascular: Positive for chest pain (intermittent) and leg swelling. Gastrointestinal: Negative for abdominal pain, constipation, diarrhea, nausea and vomiting. Neurological: Negative for syncope, weakness and numbness. All other systems reviewed and are negative.       Patient Vitals for the past 12 hrs:   Temp Pulse Resp BP SpO2   05/21/17 1730 - (!) 105 15 - 93 %   05/21/17 1713 - - - - 94 %   05/21/17 1705 - (!) 103 14 - 94 %   05/21/17 1645 - (!) 101 15 157/90 94 %   05/21/17 1530 - (!) 112 21 122/83 93 %   05/21/17 1521 - (!) 115 24 114/64 93 %   05/21/17 1433 - (!) 106 21 (!) 126/92 97 %   05/21/17 1358 98 °F (36.7 °C) - - 140/70 98 %   05/21/17 1355 - - - - 98 %   05/21/17 1351 - (!) 104 28 - 100 %        Physical Exam   Constitutional: She is oriented to person, place, and time. She appears well-developed and well-nourished. HENT:   Head: Normocephalic and atraumatic. Eyes: Conjunctivae and EOM are normal.   Neck: Normal range of motion. Neck supple. Cardiovascular: Regular rhythm. Tachycardia present. Pulmonary/Chest: Effort normal and breath sounds normal. Tachypnea noted. No respiratory distress. Coarse breath sounds diffusely  On BiPAP   Abdominal: Soft. She exhibits no distension. There is no tenderness. Musculoskeletal: Normal range of motion. Trace pitting edema bilaterally   Neurological: She is alert and oriented to person, place, and time. Skin: Skin is warm and dry. Psychiatric: She has a normal mood and affect. Nursing note and vitals reviewed. MDM  Number of Diagnoses or Management Options  Acute respiratory failure with hypoxia [facility]):   COPD exacerbation [facility]):   Diagnosis management comments: Patient presents with SOB. DDx: most likely COPD exacerbation. Asthma exacerbation, CHF exacerbation, ACS, PE, PNA, PTX, Anxiety. Will get labs and cxr and ekg.      Most likely needs admission to hospital for hypoxic respiratory failure and needing Bipap       Amount and/or Complexity of Data Reviewed  Clinical lab tests: ordered and reviewed  Tests in the radiology section of CPT®: ordered and reviewed  Tests in the medicine section of CPT®: ordered and reviewed  Review and summarize past medical records: yes  Discuss the patient with other providers: yes (Hospitalist )  Independent visualization of images, tracings, or specimens: yes    Critical Care  Total time providing critical care: 30-74 minutes    Patient Progress  Patient progress: improved    ED Course       Procedures    TOBACCO COUNSELING:  Upon evaluation, pt expressed that they are a current tobacco user. Discussed the risks of smoking and the benefits of smoking cessation as well as the long term sequelae of smoking. Pt was encouraged to quit as soon as possible in order to decrease further risks to their health. Pt has conveyed their understanding of the risks involved should they continue to use tobacco products. Written by Gallito Arellano ED Scribe, as dictated by Mac Ballard M.D    HOSPITALIST CONSULT NOTE:   3:17 PM  Mac Ballard M.D spoke with aWng Guerrero MD,   Specialty: Hospitalist  Discussed pt's hx, disposition, and available diagnostic and imaging results. Reviewed care plans. Consultant will evaluate pt for admission, and advises getting an ABG. Written by Gallito Arellano ED Scribe, as dictated by Mac Ballard M.D. CRITICAL CARE NOTE:    3:44 PM    IMPENDING DETERIORATION -Respiratory    ASSOCIATED RISK FACTORS - Shock and Hypoxia    MANAGEMENT- Bedside Assessment and Supervision of Care    INTERPRETATION -  Xrays, Blood Gases, ECG and Blood Pressure    INTERVENTIONS - vent mngmt    CASE REVIEW - Hospitalist, Nursing and Family    TREATMENT RESPONSE -Stable    PERFORMED BY - Self    NOTES   :    I have spent 35 minutes of critical care time involved in lab review, consultations with specialist, family decision- making, bedside attention and documentation. During this entire length of time I was immediately available to the patient .     Mac Ballard MD      LABORATORY TESTS:  Recent Results (from the past 12 hour(s))   EKG, 12 LEAD, INITIAL    Collection Time: 05/21/17  1:56 PM   Result Value Ref Range    Ventricular Rate 103 BPM    Atrial Rate 103 BPM    P-R Interval 134 ms    QRS Duration 92 ms    Q-T Interval 330 ms    QTC Calculation (Bezet) 432 ms    Calculated P Axis 79 degrees    Calculated R Axis 94 degrees    Calculated T Axis 40 degrees    Diagnosis       Sinus tachycardia  Incomplete right bundle branch block  Right ventricular hypertrophy with repolarization abnormality  Anterior infarct , age undetermined  Abnormal ECG  When compared with ECG of 31-MAR-2015 21:33,  Anterior infarct is now present     CBC WITH AUTOMATED DIFF    Collection Time: 05/21/17  2:25 PM   Result Value Ref Range    WBC 11.5 (H) 3.6 - 11.0 K/uL    RBC 4.85 3.80 - 5.20 M/uL    HGB 16.0 11.5 - 16.0 g/dL    HCT 47.0 35.0 - 47.0 %    MCV 96.9 80.0 - 99.0 FL    MCH 33.0 26.0 - 34.0 PG    MCHC 34.0 30.0 - 36.5 g/dL    RDW 14.2 11.5 - 14.5 %    PLATELET 396 119 - 465 K/uL    NEUTROPHILS 80 (H) 32 - 75 %    LYMPHOCYTES 15 12 - 49 %    MONOCYTES 5 5 - 13 %    EOSINOPHILS 0 0 - 7 %    BASOPHILS 0 0 - 1 %    ABS. NEUTROPHILS 9.1 (H) 1.8 - 8.0 K/UL    ABS. LYMPHOCYTES 1.8 0.8 - 3.5 K/UL    ABS. MONOCYTES 0.6 0.0 - 1.0 K/UL    ABS. EOSINOPHILS 0.0 0.0 - 0.4 K/UL    ABS. BASOPHILS 0.0 0.0 - 0.1 K/UL   METABOLIC PANEL, COMPREHENSIVE    Collection Time: 05/21/17  2:25 PM   Result Value Ref Range    Sodium 129 (L) 136 - 145 mmol/L    Potassium 4.2 3.5 - 5.1 mmol/L    Chloride 87 (L) 97 - 108 mmol/L    CO2 33 (H) 21 - 32 mmol/L    Anion gap 9 5 - 15 mmol/L    Glucose 148 (H) 65 - 100 mg/dL    BUN 27 (H) 6 - 20 MG/DL    Creatinine 0.99 0.55 - 1.02 MG/DL    BUN/Creatinine ratio 27 (H) 12 - 20      GFR est AA >60 >60 ml/min/1.73m2    GFR est non-AA 57 (L) >60 ml/min/1.73m2    Calcium 9.8 8.5 - 10.1 MG/DL    Bilirubin, total 0.4 0.2 - 1.0 MG/DL    ALT (SGPT) 29 12 - 78 U/L    AST (SGOT) 16 15 - 37 U/L    Alk.  phosphatase 106 45 - 117 U/L    Protein, total 7.9 6.4 - 8.2 g/dL    Albumin 3.9 3.5 - 5.0 g/dL    Globulin 4.0 2.0 - 4.0 g/dL    A-G Ratio 1.0 (L) 1.1 - 2.2     CK W/ REFLX CKMB    Collection Time: 05/21/17  2:25 PM   Result Value Ref Range    CK 75 26 - 192 U/L   TROPONIN I    Collection Time: 05/21/17  2:25 PM   Result Value Ref Range Troponin-I, Qt. <0.04 <0.05 ng/mL   BLOOD GAS, ARTERIAL    Collection Time: 05/21/17  4:35 PM   Result Value Ref Range    pH 7.34 (L) 7.35 - 7.45      PCO2 57 (H) 35.0 - 45.0 mmHg    PO2 79 (L) 80 - 100 mmHg    O2 SAT 95 92 - 97 %    BICARBONATE 30 (H) 22 - 26 mmol/L    BASE EXCESS 2.6 mmol/L    O2 METHOD BIPAP      FIO2 35 %    MODE BIPAP      SET RATE 4      IPAP/PIP 12      EPAP/CPAP/PEEP 5      Sample source ARTERIAL      SITE RR      JAS'S TEST YEAST         IMAGING RESULTS:  CXR Results  (Last 48 hours)               05/21/17 1406  XR CHEST PORT Final result    Impression:  IMPRESSION:       No acute process. Narrative:  EXAM:  XR CHEST PORT       INDICATION:  Shortness of breath for one week. COMPARISON: 4/3/2015       TECHNIQUE: Portable AP semierect upright chest view at 1359 hours       FINDINGS: Cardiac monitoring wires overlie the thorax. The cardiomediastinal   contours are stable. The pulmonary vasculature is within normal limits. The lungs and pleural spaces are clear. The bones and upper abdomen are stable. MEDICATIONS GIVEN:  Medications   0.9% sodium chloride infusion (not administered)   acetaminophen-codeine (TYLENOL #3) per tablet 1 Tab (not administered)   albuterol-ipratropium (DUO-NEB) 2.5 MG-0.5 MG/3 ML (not administered)   ALPRAZolam (XANAX) tablet 1 mg (not administered)   aspirin chewable tablet 81 mg (not administered)   benzonatate (TESSALON) capsule 200 mg (not administered)   cetirizine (ZYRTEC) tablet 10 mg (not administered)   dicyclomine (BENTYL) capsule 10 mg (not administered)   . PHARMACY TO SUBSTITUTE PER PROTOCOL (not administered)   . PHARMACY TO SUBSTITUTE PER PROTOCOL (not administered)   . PHARMACY TO SUBSTITUTE PER PROTOCOL (not administered)   HYDROcodone-acetaminophen (NORCO) 7.5-325 mg per tablet 1 Tab (not administered)   hyoscyamine SL (LEVSIN/SL) tablet 0.125 mg (not administered)   levothyroxine (SYNTHROID) tablet 200 mcg (not administered)   magnesium oxide (MAG-OX) tablet 400 mg (not administered)   montelukast (SINGULAIR) tablet 10 mg (not administered)   niacin SR capsule 250 mg (not administered)   PHENobarbital (LUMINAL) tablet 64.8 mg (not administered)   primidone (MYSOLINE) tablet 100 mg (not administered)   risperiDONE (RisperDAL) tablet 0.25 mg (not administered)   venlafaxine-SR (EFFEXOR-XR) capsule 75 mg (not administered)   metoprolol tartrate (LOPRESSOR) tablet 12.5 mg (not administered)   sodium chloride (NS) flush 5-10 mL (not administered)   sodium chloride (NS) flush 5-10 mL (not administered)   acetaminophen (TYLENOL) tablet 650 mg (not administered)   ondansetron (ZOFRAN) injection 4 mg (not administered)   bisacodyl (DULCOLAX) tablet 5 mg (not administered)   enoxaparin (LOVENOX) injection 40 mg (not administered)   methylPREDNISolone (PF) (SOLU-MEDROL) injection 40 mg (not administered)   albuterol (PROVENTIL VENTOLIN) nebulizer solution 2.5 mg (not administered)   guaiFENesin ER (MUCINEX) tablet 600 mg (not administered)   doxycycline (VIBRAMYCIN) 100 mg in 0.9% sodium chloride (MBP/ADV) 100 mL MBP (not administered)   insulin lispro (HUMALOG) injection (not administered)   glucose chewable tablet 16 g (not administered)   dextrose (D50W) injection syrg 12.5-25 g (not administered)   glucagon (GLUCAGEN) injection 1 mg (not administered)   0.9% sodium chloride infusion (not administered)   iopamidol (ISOVUE-370) 76 % injection 100 mL (not administered)   sodium chloride (NS) flush 10 mL (not administered)   nicotine (NICODERM CQ) 21 mg/24 hr patch 1 Patch (not administered)   methylPREDNISolone (PF) (SOLU-MEDROL) injection 125 mg (125 mg IntraVENous Given 5/21/17 1430)   albuterol-ipratropium (DUO-NEB) 2.5 MG-0.5 MG/3 ML (3 mL Nebulization Given 5/21/17 1538)   LORazepam (ATIVAN) injection 1 mg (1 mg IntraVENous Given 5/21/17 1534)       IMPRESSION:  1. Acute respiratory failure with hypoxia ([address]    2.  COPD exacerbation ([facility] [place] [address]        PLAN: Admit to hospitalist  Admission Note:  3:40 PM  Patient is being admitted to the hospital by Dr. Shanique Griffin. The results of their tests and reasons for their admission have been discussed with them and/or available family. They convey agreement and understanding for the need to be admitted and for their admission diagnosis. Written by Jory Martínez, ED Scribe, as dictated by Kellen Duong M.D. Attestation: This note is prepared by Jory Martínez, acting as Scribe for Kellen Duong M.D. Kellen Duong M.D: The scribe's documentation has been prepared under my direction and personally reviewed by me in its entirety. I confirm that the note above accurately reflects all work, treatment, procedures, and medical decision making performed by me.

## 2017-05-21 NOTE — ED NOTES
Bedside report received from Marylu Valentine, [place]. Patient sitting up in bed, BiPap on, respirations even and unlabored and vital signs stable. Respiratory call for ABG. Call bell within reach.

## 2017-05-21 NOTE — ED NOTES
Although pt. Had 2 qualifying vital signs for [employer]. Dr. Meryl Mobley did not want to treat for sepsis.

## 2017-05-21 NOTE — IP AVS SNAPSHOT
[address] [facility] 
[phone] Patient: Daniela Bridges MRN: [id number] :1955 You are allergic to the following Allergen Reactions Latex Contact Dermatitis Dilaudid (Hydromorphone (Pf)) Other (comments) Feels like bugs are crawling all over her Lipitor (Atorvastatin) Other (comments) LIVER TROUBLE ON THIS MEDICATION Sulfa (Sulfonamide Antibiotics) Itching Recent Documentation Height Weight BMI OB Status Smoking Status 1.626 m 95.6 kg 36.18 kg/m2 Menopause Current Every Day Smoker Emergency Contacts Name Discharge Info Relation Home Work Mobile [address]  Spouse [3]   [phone] [address]  Spouse [3] [phone] About your hospitalization You were admitted on:  May 21, 2017 You last received care in the:  [address] [facility] You were discharged on:  May 27, 2017 Unit phone number:  [phone] Why you were hospitalized Your primary diagnosis was:  Acute Respiratory Failure (Hcc) Your diagnoses also included:  Copd (Chronic Obstructive Pulmonary Disease) (Hcc), Copd With Acute Exacerbation (Hcc), Chronic Respiratory Failure With Hypoxia (Hcc), Diabetic Peripheral Neuropathy Associated With Type 2 Diabetes Mellitus (Hcc), Depression, Major, Recurrent (Hcc), Hypothyroidism, Htn (Hypertension), Hyperlipidemia, Ptsd (Post-Traumatic Stress Disorder), Smoking, Tobacco Use Disorder, Obesity (Bmi 35.0-39.9 Without Comorbidity) (Hcc), Fibromyalgia Providers Seen During Your Hospitalizations Provider Role Specialty Primary office phone Nia Kearns MD Attending Provider Emergency Medicine [phone] Nila Dumont MD Attending Provider Internal Medicine [phone]  
 Selvin Mata MD Attending Provider Hospitalist [phone] Your Primary Care Physician (PCP) Primary Care Physician Office Phone Office Fax Phyllis Spencer [phone] Follow-up Information Follow up With Details Comments Contact Info [address] [facility]   [address] [place] [address] 
[phone] Minna Dawkins MD  please call to schedule appointment within 1-2 weeks after you are discharged from [facility] 16 [facility] 
[phone] Mayra Freeman MD  follow up as outpatient. please schedule appointment within one week 6026 [facility] [address] [facility] 
[phone] Judy Zepeda NP Schedule an appointment as soon as possible for a visit to be seen within one week 1500 [facility] 313 [facility] 
[phone] Your Appointments Tuesday July 11, 2017  1:30 PM EDT New Patient with Judy Zepeda NP  
7509 [facility] [address]  
[phone] Current Discharge Medication List  
  
START taking these medications Dose & Instructions Dispensing Information Comments Morning Noon Evening Bedtime  
 fluticasone-vilanterol 100-25 mcg/dose inhaler Commonly known as:  BREO ELLIPTA Dose:  1 Puff Take 1 Puff by inhalation daily. Quantity:  1 Inhaler Refills:  0  
     
  
   
   
   
  
 guaiFENesin 1,200 mg Ta12 ER tablet Commonly known as:  Edinson & Edinson Replaces:  guaiFENesin 400 mg tablet Dose:  1200 mg Take 1 Tab by mouth two (2) times a day. Quantity:  14 Tab Refills:  0  
     
  
   
   
  
   
  
 nicotine 21 mg/24 hr  
Commonly known as:  Qamar Kirk Dose:  1 Patch 1 Patch by TransDERmal route daily as needed for Other (nicotine withdrawal) for up to 30 days. Quantity:  14 Patch Refills:  0  
     
  
   
   
   
  
 predniSONE 5 mg tablet Commonly known as:  Ander Goncalves  
   
 Dose:  40 mg Take 8 Tabs by mouth daily (with breakfast). 8 tab once a day x 2 days 7 tab once a day x 2 days 6 tab once a day x 2 days 5 tab once a day x 2 days 4 tab once a day x 2 days 3 tab once a day x 2 days 2 tab once a day x 2 days Then 1 tab once a day x 2 days  Indications: COPD exacerbation Quantity:  72 Tab Refills:  0  
     
  
   
   
   
  
 promethazine 25 mg tablet Commonly known as:  PHENERGAN Dose:  25 mg Take 1 Tab by mouth every six (6) hours as needed. Quantity:  4 Tab Refills:  0 CONTINUE these medications which have CHANGED Dose & Instructions Dispensing Information Comments Morning Noon Evening Bedtime  
 acetaminophen-codeine 300-30 mg per tablet Commonly known as:  TYLENOL #3 What changed:  reasons to take this Your last dose was:  5/26/2017 @ 0700 Dose:  1 Tab Take 1 Tab by mouth every eight (8) hours as needed. Max Daily Amount: 3 Tabs. Quantity:  5 Tab Refills:  0 ALPRAZolam 1 mg tablet Commonly known as:  Lawyer Alcantar What changed:   
- when to take this - Another medication with the same name was removed. Continue taking this medication, and follow the directions you see here. Your last dose was:  5/27/2017 @ 0800 Dose:  1 mg Take 1 Tab by mouth three (3) times daily as needed for Anxiety. Max Daily Amount: 3 mg. Quantity:  3 Tab Refills:  0  
     
   
   
   
  
 cetirizine 10 mg tablet Commonly known as:  ZYRTEC What changed:  Another medication with the same name was removed. Continue taking this medication, and follow the directions you see here. Dose:  10 mg Take 1 Tab by mouth daily. Quantity:  10 Tab Refills:  0  
     
  
   
   
   
  
 furosemide 40 mg tablet Commonly known as:  LASIX What changed:  when to take this Dose:  40 mg Take 1 Tab by mouth daily. Quantity:  20 Tab Refills:  0 ibuprofen 800 mg tablet Commonly known as:  MOTRIN What changed:   
- how much to take 
- how to take this - when to take this 
- reasons to take this Dose:  800 mg Take 1 Tab by mouth every eight (8) hours as needed. Quantity:  20 Tab Refills:  0  
     
   
   
   
  
 nystatin 100,000 unit/mL suspension Commonly known as:  MYCOSTATIN What changed:   
- when to take this 
- reasons to take this 
- additional instructions Dose:  [id number] Units Take 5 mL by mouth four (4) times daily. swish and spit Quantity:  180 mL Refills:  0  
     
   
   
   
  
 risperiDONE 0.25 mg tablet Commonly known as:  RisperDAL What changed:  how to take this Dose:  0.25 mg Take 1 Tab by mouth two (2) times a day. Quantity:  14 Tab Refills:  0  
     
  
   
   
  
   
  
 venlafaxine-SR 75 mg capsule Commonly known as:  EFFEXOR-XR What changed:   
- how much to take 
- how to take this - when to take this - Another medication with the same name was removed. Continue taking this medication, and follow the directions you see here. Dose:  75 mg Take 1 Cap by mouth daily (with breakfast). Quantity:  20 Cap Refills:  0 CONTINUE these medications which have NOT CHANGED Dose & Instructions Dispensing Information Comments Morning Noon Evening Bedtime  
 aspirin 81 mg chewable tablet Dose:  81 mg Take 81 mg by mouth daily. Refills:  0  
     
  
   
   
   
  
 benzonatate 200 mg capsule Commonly known as:  TESSALON Dose:  200 mg Take 1 Cap by mouth two (2) times daily as needed. Quantity:  30 Cap Refills:  0  
     
   
   
   
  
 dicyclomine 10 mg capsule Commonly known as:  BENTYL Refills:  0 DUONEB 2.5 mg-0.5 mg/3 ml Nebu Generic drug:  albuterol-ipratropium Your last dose was: She has been taking q4hrs scheduled, last dose 1130 Dose:  3 mL 3 mL by Nebulization route every six (6) hours as needed for Wheezing. Refills:  0  
     
   
   
   
  
 gabapentin 600 mg tablet Commonly known as:  NEURONTIN Dose:  600 mg  
600 mg two (2) times a day. Refills:  0  
     
  
   
   
  
   
  
 glipiZIDE SR 5 mg CR tablet Commonly known as:  GLUCOTROL XL Dose:  5 mg Take 5 mg by mouth two (2) times daily as needed (Patient monitors her BG levels and takes when she is also taking a steroid. ). Refills:  0  
     
   
   
   
  
 levothyroxine 200 mcg tablet Commonly known as:  SYNTHROID Your last dose was:  Please give 30 minutes prior to any other meds or food Dose:  200 mcg Take 200 mcg by mouth daily (before breakfast). Refills:  0 LEVSIN/SL 0.125 mg SL tablet Generic drug:  hyoscyamine SL Dose:  0.125 mg  
0.125 mg by SubLINGual route three (3) times daily as needed for Cramping. Refills:  0  
     
   
   
   
  
 MAGOX 400 mg tablet Generic drug:  magnesium oxide TAKE ONE TABLET TWICE A DAY Quantity:  60 Tab Refills:  3  
     
  
   
   
   
  
 methocarbamol 750 mg tablet Commonly known as:  ROBAXIN Dose:  750-1500 mg Take 750-1,500 mg by mouth four (4) times daily as needed. Refills:  0  
     
   
   
   
  
 metoprolol tartrate 25 mg tablet Commonly known as:  LOPRESSOR Dose:  25 mg Take 1 Tab by mouth two (2) times a day. Quantity:  60 Tab Refills:  6  
     
  
   
   
  
   
  
 montelukast 10 mg tablet Commonly known as:  SINGULAIR Dose:  10 mg Take 10 mg by mouth daily. Refills:  0 NexIUM 40 mg capsule Generic drug:  esomeprazole Dose:  40 mg Take 40 mg by mouth daily. Refills:  0  
     
  
   
   
   
  
 niacin  mg CR capsule Dose:  250 mg Take 250 mg by mouth daily. Refills:  0 PHENobarbital 60 mg tablet Commonly known as:  LUMINAL Dose:  60 mg  
60 mg two (2) times a day. Refills:  0  
     
  
   
   
  
   
  
 primidone 50 mg tablet Commonly known as: MYSOLINE Dose:  100 mg Take 2 Tabs by mouth nightly. Quantity:  100 Tab Refills:  11 VENTOLIN HFA 90 mcg/actuation inhaler Generic drug:  albuterol Dose:  2 Puff Take 2 Puffs by inhalation every four (4) hours as needed for Wheezing. Refills:  0  
     
   
   
   
  
 vit B Cmplx 3-FA-Vit C-Biotin 1- mg-mg-mcg tablet Commonly known as:  NEPHRO SHREYA RX Dose:  1 Tab Take 1 Tab by mouth daily. Refills:  0 Vytorin 10/40 10-40 mg per tablet Generic drug:  ezetimibe-simvastatin Dose:  1 Tab Take 1 tablet by mouth nightly. Refills:  0 STOP taking these medications   
 guaiFENesin 400 mg tablet Commonly known as:  Gayleen Williams Replaced by:  guaiFENesin 1,200 mg Ta12 ER tablet HYDROcodone-acetaminophen 7.5-325 mg per tablet Commonly known as:  NORCO  
   
  
 lisinopril 20 mg tablet Commonly known as:  PRINIVIL, ZESTRIL  
   
  
 prazosin 2 mg capsule Commonly known as:  MINIPRESS  
   
  
 traMADol 50 mg tablet Commonly known as:  ULTRAM  
   
  
  
  
Where to Get Your Medications Information on where to get these meds will be given to you by the nurse or doctor. ! Ask your nurse or doctor about these medications  
  acetaminophen-codeine 300-30 mg per tablet ALPRAZolam 1 mg tablet  
 cetirizine 10 mg tablet  
 fluticasone-vilanterol 100-25 mcg/dose inhaler  
 furosemide 40 mg tablet  
 guaiFENesin 1,200 mg Ta12 ER tablet  
 ibuprofen 800 mg tablet  
 nicotine 21 mg/24 hr  
 predniSONE 5 mg tablet  
 promethazine 25 mg tablet  
 risperiDONE 0.25 mg tablet  
 venlafaxine-SR 75 mg capsule Discharge Instructions Patient Discharge Instructions Pt Name  Vivienne Wallace Date of Birth 1955 Age  64 y.o. Medical Record Number  [id number] PCP Theresa Newell MD   
Admit date:  5/21/2017 @    [place] [address] Room Number  2275/01 Date of Discharge 5/27/2017 Admission Diagnoses:     Acute respiratory failure ([facility] Allergies Allergen Reactions  Latex Contact Dermatitis  Dilaudid [Hydromorphone (Pf)] Other (comments) Feels like bugs are crawling all over her  Lipitor [Atorvastatin] Other (comments) LIVER TROUBLE ON THIS MEDICATION  
 Sulfa (Sulfonamide Antibiotics) Itching You were admitted to [address] for  Acute respiratory failure ([facility] YOUR OTHER MEDICAL DIAGNOSES INCLUDE (BUT NOT LIMITED TO ): 
Present on Admission:  Acute respiratory failure ([facility]  COPD (chronic obstructive pulmonary disease) (HCC)  COPD with acute exacerbation ([facility]  Chronic respiratory failure with hypoxia ([facility]  Diabetic peripheral neuropathy associated with type 2 diabetes mellitus ([facility]  Depression, major, recurrent ([facility]  Hypothyroidism  HTN (hypertension)  Hyperlipidemia  PTSD (post-traumatic stress disorder)  Smoking  Tobacco use disorder  Obesity (BMI 35.0-39.9 without comorbidity) ([facility]  Fibromyalgia DIET:  Cardiac Diet and Clear liquids, advance as tolerated Recommended activity: Activity as tolerated Follow up : Follow-up Information Follow up With Details Comments Contact Info [address] [facility]   [address] [place] [address] 
[phone] Theresa Newell MD  please call to schedule appointment within 1-2 weeks after you are discharged from [address] 
[phone] Yogesh Ding MD  follow up as outpatient. please schedule appointment within one week [address] [facility] [address] [facility] 
[phone] Brinda Bassett NP Schedule an appointment as soon as possible for a visit to be seen within one week [place] [address] [place] 
[phone] ** Patient will be discharged with long steroid taper due to her chronic heavy smoking issue for her COPD exacerbation. ** Please check her BMP next Wednesday Rehab facility MD responsible for above upon discharge. · It is important that you take the medication exactly as they are prescribed. · Keep your medication in the bottles provided by the pharmacist and keep a list of the medication names, dosages, and times to be taken in your wallet. · Do not take other medications without consulting your doctor. ADDITIONAL INFORMATION: If you experience any of the following symptoms or have any health problem not listed below, then please call your primary care physician or return to the emergency room if you cannot get hold of your doctor: Fever, chills, nausea, vomiting, diarrhea, change in mentation, falling, bleeding, shortness of breath. I understand that if any problems occur once I am discharged, I am supposed to call my Primary care physician for further care or seek help in the Emergency Department at the nearest Healthcare facility. I have had an opportunity to discuss my clinical issues with my doctor and nursing staff. I understand and acknowledge receipt of the above instructions. Physician's or R.N.'s Signature                                                            Date/Time Patient or Representative Signature                                                 Date/Time Discharge Orders None [facility] Announcement We are excited to announce that we are making your provider's discharge notes available to you in [facility]. You will see these notes when they are completed and signed by the physician that discharged you from your recent hospital stay. If you have any questions or concerns about any information you see in [facility], please call the Health Information Department where you were seen or reach out to your Primary Care Provider for more information about your plan of care. Introducing [facility]! Sabi Warner introduces [facility] patient portal. Now you can access parts of your medical record, email your doctor's office, and request medication refills online. 1. In your internet browser, go to [email or web address]. Powered by [email or web address] 2. Click on the First Time User? Click Here link in the Sign In box. You will see the New Member Sign Up page. 3. Enter your [facility] Access Code exactly as it appears below. You will not need to use this code after youve completed the sign-up process. If you do not sign up before the expiration date, you must request a new code. · [facility] Access Code: [email or web address] Expires: 8/14/2017  9:48 AM 
 
4. Enter the last four digits of your Social Security Number (xxxx) and Date of Birth (mm/dd/yyyy) as indicated and click Submit. You will be taken to the next sign-up page. 5. Create a [facility] ID. This will be your [facility] login ID and cannot be changed, so think of one that is secure and easy to remember. 6. Create a [facility] password. You can change your password at any time. 7. Enter your Password Reset Question and Answer. This can be used at a later time if you forget your password. 8. Enter your e-mail address. You will receive e-mail notification when new information is available in [address] 9. Click Sign Up. You can now view and download portions of your medical record. 10. Click the Download Summary menu link to download a portable copy of your medical information. If you have questions, please visit the Frequently Asked Questions section of the [employer] website. Remember, MyChart is NOT to be used for urgent needs. For medical emergencies, dial 911. Now available from your iPhone and Android! General Information Please provide this summary of care documentation to your next provider. Patient Signature:  ____________________________________________________________ Date:  ____________________________________________________________  
  
[facility] Provider Signature:  ____________________________________________________________ Date:  ____________________________________________________________

## 2017-05-21 NOTE — ED TRIAGE NOTES
Pt. Was on bipap upon arrival and was placed on Ventimask.  Pt. Continues to work hard and respiratory placed back on bipap

## 2017-05-21 NOTE — H&P
Hospitalist Admission Note    NAME: David Blas   :  1955   MRN:  [id number]     Date/Time:  2017 3:19 PM    Patient PCP: Abdifatah Lozada MD  ________________________________________________________________________    My assessment of this patient's clinical condition and my plan of care is as follows. Assessment / Plan:  SIRS and acute hypoxic respiratory failure due to acute COPD exacerbation with new peripheral edema in setting of ongoing tobacco use: no h/o CHF, only \"palpitations. \"  Currently smoking close to 1ppd (which is less than prior to feeling sick)  - CXR with cardiomediastinal contours stable. The pulmonary vasculature is within normal limits. The lungs and pleural spaces are clear. - will get CT chest now - significant mucus plugging on last CT   - will get sputum culture and blood cultures  - scheduled duonebs with albuterol prn  - mucinex scheduled. Pt just completed 9 days of levaquin - will use doxycycline unless other process on CT chest  - mucinex with prn tessalon perles  - nicotine patch  Hypotension:    - holding home lisinopril, prazosin  - con't metoprolol at reduced dose with hold parameters due to h/o palpitations  Non insulin dependent DM2 controlled without complication:  - hold glipizide with poor po intake - may be able to restart as glucoses will likely increase with steroids  - sliding scale insulin ordered  Visual changes:  C/o intermittent diplopia for the last month  - CT head now  - consider more imaging when more stable. Pt just saw Dr. Renetta Rushing on , could also do outpt f/u pending clinical course  Hypovolemic hyponatremia:  - IV fluids  - recheck Na  PTSD/anxiety with depression/fibromyalgia with significant polypharmacy:  Pt on multiple controlled substances and >30 meds on her list in this Pt who is only 61yo. - con't home xanax, tylenol #3, norco for now.   Holding ultram.  Discussed with Pt and her  the importance of reducing overall medication burden. Morbid obesity    Code Status: Full  Surrogate Decision Maker:   DVT Prophylaxis: lovenox    Baseline: independent        Subjective:   CHIEF COMPLAINT: shortness of breath    HISTORY OF PRESENT ILLNESS:     Lizzy Quintero is a 64 y.o.  female who presents with above. Pt says that she has been having progressive shortness of breath for \"months. \"  Per her report, she has seen her PCP 4 times for this issue without improvement. She has not been placed on steroids but has been on levaquin x 9 days now from her last visit. Pt previously was smoking >1ppd tobacco, now smoking under 1ppd but is continuing to smoke. She says that she is unable to cough anything up. She says that she has been having fevers because \"anything over 98 is a fever for me. \"  She denies lightheadedness. No chest pain but does feel some \"tightness\" with her breathing. She denies nausea but has not been eating well because she says that it feels like she can't swallow well due to her shortness of breath. She has been constipated. She complains of new LE edema. No focal weakness. She recently saw Dr. Vj Soares last week for jerking and resting tremor. We were asked to admit for work up and evaluation of the above problems.      Past Medical History:   Diagnosis Date    Anxiety     Bone spur     NECK    COPD     Depression     Endocrine disease     hypothyroidism    Fibromyalgia     Fusion of spine of cervical region     Gastrointestinal disorder     gerd    Hyperlipidemia     Hypothyroid     Morbid obesity ([facility] [address]     Osteoarthritis     PUD (peptic ulcer disease)     Tobacco abuse         Past Surgical History:   Procedure Laterality Date    BRONCHOSCOPY-FIBER/THERAPY  4/3/2015         CARDIAC CATHETERIZATION  2013         COLONOSCOPY,DIAGNOSTIC  10/30/2014         HX CATARACT REMOVAL      bilateral    HX GYN          HX ORTHOPAEDIC      Ruptured disc, knuckles on right hand    UPPER GI ENDOSCOPY,BIOPSY  10/30/2014         UPPER GI ENDOSCOPY,DILATN W GUIDE  10/30/2014            Social History   Substance Use Topics    Smoking status: Current Every Day Smoker     Packs/day: 1.00     Years: 30.00    Smokeless tobacco: Not on file    Alcohol use Yes      Comment: occasional        Family History   Problem Relation Age of Onset    Heart Disease Mother     Dementia Mother     Thyroid Disease Mother     Heart Disease Father     Alcohol abuse Father     Psychiatric Disorder Father     Dementia Father     Bipolar Disorder Father     Psychiatric Disorder Sister     Suicide Sister     Psychiatric Disorder Brother     Suicide Brother     Psychiatric Disorder Other     Psychiatric Disorder Daughter     Bipolar Disorder Daughter     Coronary Artery Disease Other      multiple family members in 52's     Allergies   Allergen Reactions    Latex Contact Dermatitis    Dilaudid [Hydromorphone (Pf)] Other (comments)     Feels like bugs are crawling all over her    Lipitor [Atorvastatin] Other (comments)     LIVER TROUBLE ON THIS MEDICATION    Sulfa (Sulfonamide Antibiotics) Itching        Prior to Admission medications    Medication Sig Start Date End Date Taking? Authorizing Provider   albuterol-ipratropium (DUONEB) 2.5 mg-0.5 mg/3 ml nebulizer solution 3 mL by Nebulization route every six (6) hours as needed for Wheezing. Yes Historical Provider   PHENobarbital (LUMINAL) 60 mg tablet 60 mg two (2) times a day. 5/1/17   Historical Provider   venlafaxine-SR (EFFEXOR-XR) 75 mg capsule  3/24/17   Historical Provider   risperiDONE (RISPERDAL) 0.25 mg tablet 0.25 mg two (2) times a day. 5/1/17   Historical Provider   HYDROcodone-acetaminophen (NORCO) 7.5-325 mg per tablet as needed (Gets 15 per month). 4/21/17   Historical Provider   ibuprofen (MOTRIN) 800 mg tablet three (3) times daily.  4/21/17   Historical Provider   gabapentin (NEURONTIN) 600 mg tablet 600 mg two (2) times a day. 4/21/17   Historical Provider   lisinopril (PRINIVIL, ZESTRIL) 20 mg tablet 20 mg daily. 4/21/17   Historical Provider   primidone (MYSOLINE) 50 mg tablet Take 2 Tabs by mouth nightly. 5/16/17   Deanna Perry MD   prazosin (MINIPRESS) 2 mg capsule Take 1 Cap by mouth two (2) times a day. 10/29/15   Florentino Omalley NP   cetirizine (ZYRTEC) 10 mg tablet Take 1 Tab by mouth daily. 3/19/15   Ousmane Jeffery MD   acetaminophen-codeine (TYLENOL #3) 300-30 mg per tablet Take 1 Tab by mouth every eight (8) hours as needed (gets 30 per month). 2/9/15   Nico Martinez MD   dicyclomine (BENTYL) 10 mg capsule  2/13/15   Nico Martinez MD   glipiZIDE SR (GLUCOTROL) 5 mg CR tablet Take 5 mg by mouth two (2) times daily as needed (1-2 per day). 2/16/15   Nico Martinez MD   montelukast (SINGULAIR) 10 mg tablet Take 10 mg by mouth daily. 2/16/15   Nico Martinez MD   traMADol (ULTRAM) 50 mg tablet Take 50 mg by mouth every six (6) hours as needed for Pain. Historical Provider   ALPRAZolam Tara Duty) 1 mg tablet Take 1 Tab by mouth two (2) times daily as needed for Anxiety. Patient taking differently: Take 1 mg by mouth three (3) times daily. 2/24/15   Florentino Omalley NP   ezetimibe-simvastatin (VYTORIN 10/40) 10-40 mg per tablet Take 1 tablet by mouth nightly. Historical Provider   metoprolol (LOPRESSOR) 25 mg tablet Take 1 Tab by mouth two (2) times a day. 2/6/14   Irving Carmen NP   benzonatate (TESSALON) 200 mg capsule Take 1 Cap by mouth two (2) times daily as needed. 1/20/14   Maria Del Carmen Mak MD   nystatin (MYCOSTATIN) 100,000 unit/mL suspension Take 5 mL by mouth four (4) times daily. swish and spit  Patient taking differently: Take 500,000 Units by mouth four (4) times daily as needed. swish and spit 1/20/14   Maria Del Carmen Mak MD   albuterol (PROVENTIL HFA, VENTOLIN HFA) 90 mcg/actuation inhaler Take 2 Puffs by inhalation daily.  Uses daily to QID    Historical Provider   furosemide (LASIX) 20 mg tablet Take 40 mg by mouth daily as needed (swelling; taking twice daily). Historical Provider   methocarbamol (ROBAXIN) 750 mg tablet Take 750-1,500 mg by mouth four (4) times daily as needed. Historical Provider   niacin  mg CR capsule Take 250 mg by mouth daily. Historical Provider   hyoscyamine SL (LEVSIN/SL) 0.125 mg SL tablet 0.125 mg by SubLINGual route three (3) times daily as needed for Cramping. Historical Provider   esomeprazole (NEXIUM) 40 mg capsule Take 40 mg by mouth daily. Historical Provider   MAGOX 400 mg tablet TAKE ONE TABLET TWICE A DAY 11/4/13   Francois Barbosa NP   vit B Cmplx 3-FA-Vit C-Biotin (NEPHRO SHREYA RX) 1- mg-mg-mcg tablet Take 1 Tab by mouth daily. Historical Provider   aspirin 81 mg chewable tablet Take 81 mg by mouth daily. Nico Martinez MD   levothyroxine (SYNTHROID) 200 mcg tablet Take 200 mcg by mouth daily (before breakfast). Nico Martinez MD       REVIEW OF SYSTEMS:     I am not able to complete the review of systems because:    The patient is intubated and sedated    The patient has altered mental status due to his acute medical problems    The patient has baseline aphasia from prior stroke(s)    The patient has baseline dementia and is not reliable historian    The patient is in acute medical distress and unable to provide information           Total of 12 systems reviewed as follows:       POSITIVE= underlined text  Negative = text not underlined  General:  fever, chills, sweats, generalized weakness, weight loss/gain,      loss of appetite   Eyes:    blurred vision, eye pain, loss of vision, double vision  ENT:    rhinorrhea, pharyngitis   Respiratory:   cough, sputum production, SOB, RODRIGUEZ, wheezing, pleuritic pain   Cardiology:   chest pain, palpitations, orthopnea, PND, edema, syncope   Gastrointestinal:  abdominal pain , N/V, diarrhea, dysphagia, constipation, bleeding   Genitourinary:  frequency, urgency, dysuria, hematuria, incontinence Muskuloskeletal :  arthralgia, myalgia, back pain  Hematology:  easy bruising, nose or gum bleeding, lymphadenopathy   Dermatological: rash, ulceration, pruritis, color change / jaundice  Endocrine:   hot flashes or polydipsia   Neurological:  headache, dizziness, confusion, focal weakness, paresthesia,     Speech difficulties, memory loss, gait difficulty  Psychological: Feelings of anxiety, depression, agitation    Objective:   VITALS:    Visit Vitals    BP (!) 126/92 (BP 1 Location: Right arm, BP Patient Position: Sitting)    Pulse (!) 106    Temp 98 °F (36.7 °C)    Resp 21    Ht 5' 4\" (1.626 m)    Wt 93.2 kg (205 lb 7.5 oz)    SpO2 97%    BMI 35.27 kg/m2       PHYSICAL EXAM:    General:    Alert, cooperative, no distress, appears stated age. HEENT: Atraumatic, anicteric sclerae, pink conjunctivae     No oral ulcers, mucosa moist, throat clear, dentition fair  Neck:  Supple, symmetrical,  thyroid: non tender  Lungs:   Poor air movement, course upper lung air sounds. No Wheezing. No rales. Chest wall:  No tenderness  No Accessory muscle use. Heart:   Regular rhythm,  No  murmur   Pedal edema  Abdomen:   Soft, non-tender. Moderately distended. Bowel sounds normal  Extremities: No cyanosis. No clubbing,      Skin turgor normal, Capillary refill normal, Radial dial pulse 2+  Skin:     Not pale. Not Jaundiced  No rashes   Psych:  Limited insight. Not depressed. Not anxious or agitated. Neurologic: EOMs intact. No facial asymmetry. No aphasia or slurred speech. Symmetrical strength, Sensation grossly intact.  Alert and oriented X 4.     _______________________________________________________________________  Care Plan discussed with:    Comments   Patient x    Family  x    RN x    Care Manager                    Consultant:      _______________________________________________________________________  Expected  Disposition:   Home with Family x   HH/PT/OT/RN x   SNF/LTC    MARTY ________________________________________________________________________  TOTAL TIME:  60 Minutes    Critical Care Provided     Minutes non procedure based      Comments    x Reviewed previous records   >50% of visit spent in counseling and coordination of care x Discussion with patient and/or family and questions answered       ________________________________________________________________________  Signed: Florentino Reed MD    Procedures: see electronic medical records for all procedures/Xrays and details which were not copied into this note but were reviewed prior to creation of Plan. LAB DATA REVIEWED:    Recent Results (from the past 24 hour(s))   EKG, 12 LEAD, INITIAL    Collection Time: 05/21/17  1:56 PM   Result Value Ref Range    Ventricular Rate 103 BPM    Atrial Rate 103 BPM    P-R Interval 134 ms    QRS Duration 92 ms    Q-T Interval 330 ms    QTC Calculation (Bezet) 432 ms    Calculated P Axis 79 degrees    Calculated R Axis 94 degrees    Calculated T Axis 40 degrees    Diagnosis       Sinus tachycardia  Incomplete right bundle branch block  Right ventricular hypertrophy with repolarization abnormality  Anterior infarct , age undetermined  Abnormal ECG  When compared with ECG of 31-MAR-2015 21:33,  Anterior infarct is now present     CBC WITH AUTOMATED DIFF    Collection Time: 05/21/17  2:25 PM   Result Value Ref Range    WBC 11.5 (H) 3.6 - 11.0 K/uL    RBC 4.85 3.80 - 5.20 M/uL    HGB 16.0 11.5 - 16.0 g/dL    HCT 47.0 35.0 - 47.0 %    MCV 96.9 80.0 - 99.0 FL    MCH 33.0 26.0 - 34.0 PG    MCHC 34.0 30.0 - 36.5 g/dL    RDW 14.2 11.5 - 14.5 %    PLATELET 388 182 - 288 K/uL    NEUTROPHILS 80 (H) 32 - 75 %    LYMPHOCYTES 15 12 - 49 %    MONOCYTES 5 5 - 13 %    EOSINOPHILS 0 0 - 7 %    BASOPHILS 0 0 - 1 %    ABS. NEUTROPHILS 9.1 (H) 1.8 - 8.0 K/UL    ABS. LYMPHOCYTES 1.8 0.8 - 3.5 K/UL    ABS. MONOCYTES 0.6 0.0 - 1.0 K/UL    ABS. EOSINOPHILS 0.0 0.0 - 0.4 K/UL    ABS.  BASOPHILS 0.0 0.0 - 0.1 K/UL METABOLIC PANEL, COMPREHENSIVE    Collection Time: 05/21/17  2:25 PM   Result Value Ref Range    Sodium 129 (L) 136 - 145 mmol/L    Potassium 4.2 3.5 - 5.1 mmol/L    Chloride 87 (L) 97 - 108 mmol/L    CO2 33 (H) 21 - 32 mmol/L    Anion gap 9 5 - 15 mmol/L    Glucose 148 (H) 65 - 100 mg/dL    BUN 27 (H) 6 - 20 MG/DL    Creatinine 0.99 0.55 - 1.02 MG/DL    BUN/Creatinine ratio 27 (H) 12 - 20      GFR est AA >60 >60 ml/min/1.73m2    GFR est non-AA 57 (L) >60 ml/min/1.73m2    Calcium 9.8 8.5 - 10.1 MG/DL    Bilirubin, total 0.4 0.2 - 1.0 MG/DL    ALT (SGPT) 29 12 - 78 U/L    AST (SGOT) 16 15 - 37 U/L    Alk.  phosphatase 106 45 - 117 U/L    Protein, total 7.9 6.4 - 8.2 g/dL    Albumin 3.9 3.5 - 5.0 g/dL    Globulin 4.0 2.0 - 4.0 g/dL    A-G Ratio 1.0 (L) 1.1 - 2.2     CK W/ REFLX CKMB    Collection Time: 05/21/17  2:25 PM   Result Value Ref Range    CK 75 26 - 192 U/L   TROPONIN I    Collection Time: 05/21/17  2:25 PM   Result Value Ref Range    Troponin-I, Qt. <0.04 <0.05 ng/mL

## 2017-05-21 NOTE — ED NOTES
Assumed care of pt from MARIOLA Posey at bedside with verbal report consisting of Situation, Background, Assessment, and Recommendations (SBAR). Pt is A&O x 4. Pt resting comfortably on the stretcher in a position of comfort. Call bell within reach. Side rails x 2. Cardiac monitor x 3. Stretcher locked in the lowest position. Concerns and questions addressed at this time. Pt in no acute distress at this the time. Will continue to monitor.

## 2017-05-22 ENCOUNTER — TELEPHONE (OUTPATIENT)
Dept: NEUROLOGY | Age: 62
End: 2017-05-22

## 2017-05-22 LAB
ANION GAP BLD CALC-SCNC: 7 MMOL/L (ref 5–15)
ARTERIAL PATENCY WRIST A: YES
ATRIAL RATE: 103 BPM
BASE EXCESS BLDA CALC-SCNC: 5.1 MMOL/L
BDY SITE: ABNORMAL
BREATHS.SPONTANEOUS ON VENT: 14
BUN SERPL-MCNC: 20 MG/DL (ref 6–20)
BUN/CREAT SERPL: 27 (ref 12–20)
CALCIUM SERPL-MCNC: 9.5 MG/DL (ref 8.5–10.1)
CALCULATED P AXIS, ECG09: 79 DEGREES
CALCULATED R AXIS, ECG10: 94 DEGREES
CALCULATED T AXIS, ECG11: 40 DEGREES
CHLORIDE SERPL-SCNC: 94 MMOL/L (ref 97–108)
CO2 SERPL-SCNC: 34 MMOL/L (ref 21–32)
CREAT SERPL-MCNC: 0.75 MG/DL (ref 0.55–1.02)
DIAGNOSIS, 93000: NORMAL
EPAP/CPAP/PEEP, PAPEEP: 5
ERYTHROCYTE [DISTWIDTH] IN BLOOD BY AUTOMATED COUNT: 14.3 % (ref 11.5–14.5)
FIO2 ON VENT: 40 %
GAS FLOW.O2 SETTING OXYMISER: 4 L/MIN
GLUCOSE BLD STRIP.AUTO-MCNC: 101 MG/DL (ref 65–100)
GLUCOSE BLD STRIP.AUTO-MCNC: 115 MG/DL (ref 65–100)
GLUCOSE BLD STRIP.AUTO-MCNC: 124 MG/DL (ref 65–100)
GLUCOSE BLD STRIP.AUTO-MCNC: 203 MG/DL (ref 65–100)
GLUCOSE SERPL-MCNC: 117 MG/DL (ref 65–100)
HCO3 BLDA-SCNC: 34 MMOL/L (ref 22–26)
HCT VFR BLD AUTO: 47.2 % (ref 35–47)
HGB BLD-MCNC: 16 G/DL (ref 11.5–16)
IPAP/PIP, IPAPIP: 14
MCH RBC QN AUTO: 32.9 PG (ref 26–34)
MCHC RBC AUTO-ENTMCNC: 33.9 G/DL (ref 30–36.5)
MCV RBC AUTO: 97.1 FL (ref 80–99)
P-R INTERVAL, ECG05: 134 MS
PCO2 BLDA: 68 MMHG (ref 35–45)
PH BLDA: 7.32 [PH] (ref 7.35–7.45)
PLATELET # BLD AUTO: 309 K/UL (ref 150–400)
PO2 BLDA: 83 MMHG (ref 80–100)
POTASSIUM SERPL-SCNC: 3.9 MMOL/L (ref 3.5–5.1)
Q-T INTERVAL, ECG07: 330 MS
QRS DURATION, ECG06: 92 MS
QTC CALCULATION (BEZET), ECG08: 432 MS
RBC # BLD AUTO: 4.86 M/UL (ref 3.8–5.2)
SAO2 % BLD: 95 % (ref 92–97)
SAO2% DEVICE SAO2% SENSOR NAME: ABNORMAL
SERVICE CMNT-IMP: ABNORMAL
SODIUM SERPL-SCNC: 135 MMOL/L (ref 136–145)
SPECIMEN SITE: ABNORMAL
VENTRICULAR RATE, ECG03: 103 BPM
WBC # BLD AUTO: 10.4 K/UL (ref 3.6–11)

## 2017-05-22 PROCEDURE — 82962 GLUCOSE BLOOD TEST: CPT

## 2017-05-22 PROCEDURE — 80048 BASIC METABOLIC PNL TOTAL CA: CPT | Performed by: HOSPITALIST

## 2017-05-22 PROCEDURE — 94640 AIRWAY INHALATION TREATMENT: CPT

## 2017-05-22 PROCEDURE — 85027 COMPLETE CBC AUTOMATED: CPT | Performed by: INTERNAL MEDICINE

## 2017-05-22 PROCEDURE — 94660 CPAP INITIATION&MGMT: CPT

## 2017-05-22 PROCEDURE — 74011250637 HC RX REV CODE- 250/637: Performed by: HOSPITALIST

## 2017-05-22 PROCEDURE — 93306 TTE W/DOPPLER COMPLETE: CPT

## 2017-05-22 PROCEDURE — 36415 COLL VENOUS BLD VENIPUNCTURE: CPT | Performed by: HOSPITALIST

## 2017-05-22 PROCEDURE — 65660000000 HC RM CCU STEPDOWN

## 2017-05-22 PROCEDURE — 77030011256 HC DRSG MEPILEX <16IN NO BORD MOLN -A

## 2017-05-22 PROCEDURE — 77010033678 HC OXYGEN DAILY

## 2017-05-22 PROCEDURE — 74011636637 HC RX REV CODE- 636/637: Performed by: INTERNAL MEDICINE

## 2017-05-22 PROCEDURE — 74011250636 HC RX REV CODE- 250/636: Performed by: INTERNAL MEDICINE

## 2017-05-22 PROCEDURE — 82803 BLOOD GASES ANY COMBINATION: CPT | Performed by: INTERNAL MEDICINE

## 2017-05-22 PROCEDURE — 94761 N-INVAS EAR/PLS OXIMETRY MLT: CPT

## 2017-05-22 PROCEDURE — 36600 WITHDRAWAL OF ARTERIAL BLOOD: CPT | Performed by: INTERNAL MEDICINE

## 2017-05-22 PROCEDURE — 74011000250 HC RX REV CODE- 250: Performed by: INTERNAL MEDICINE

## 2017-05-22 PROCEDURE — 74011250637 HC RX REV CODE- 250/637: Performed by: INTERNAL MEDICINE

## 2017-05-22 PROCEDURE — 77030019604 HC DRSG WND CA ALG S&N -A

## 2017-05-22 RX ORDER — VENLAFAXINE HYDROCHLORIDE 150 MG/1
150 CAPSULE, EXTENDED RELEASE ORAL 2 TIMES DAILY
COMMUNITY
End: 2017-05-27

## 2017-05-22 RX ORDER — ALBUTEROL SULFATE 90 UG/1
2 AEROSOL, METERED RESPIRATORY (INHALATION)
COMMUNITY

## 2017-05-22 RX ORDER — ALPRAZOLAM 1 MG/1
1 TABLET ORAL
COMMUNITY
End: 2017-05-27

## 2017-05-22 RX ORDER — FUROSEMIDE 40 MG/1
40 TABLET ORAL 2 TIMES DAILY
Status: ON HOLD | COMMUNITY
End: 2017-05-27

## 2017-05-22 RX ORDER — GUAIFENESIN 600 MG/1
1200 TABLET, EXTENDED RELEASE ORAL 2 TIMES DAILY
Status: DISCONTINUED | OUTPATIENT
Start: 2017-05-22 | End: 2017-05-27 | Stop reason: HOSPADM

## 2017-05-22 RX ORDER — GUAIFENESIN 400 MG/1
400 TABLET ORAL
COMMUNITY
End: 2017-05-27

## 2017-05-22 RX ORDER — PRAZOSIN HYDROCHLORIDE 1 MG/1
2 CAPSULE ORAL 2 TIMES DAILY
Status: DISCONTINUED | OUTPATIENT
Start: 2017-05-22 | End: 2017-05-24

## 2017-05-22 RX ORDER — CETIRIZINE HCL 10 MG
10 TABLET ORAL DAILY
COMMUNITY
End: 2017-05-27

## 2017-05-22 RX ORDER — LISINOPRIL 20 MG/1
20 TABLET ORAL DAILY
Status: DISCONTINUED | OUTPATIENT
Start: 2017-05-23 | End: 2017-05-24

## 2017-05-22 RX ORDER — VENLAFAXINE HYDROCHLORIDE 150 MG/1
TABLET, EXTENDED RELEASE ORAL
Status: ON HOLD | COMMUNITY
End: 2017-05-22

## 2017-05-22 RX ADMIN — ASPIRIN 81 MG CHEWABLE TABLET 81 MG: 81 TABLET CHEWABLE at 08:47

## 2017-05-22 RX ADMIN — IPRATROPIUM BROMIDE AND ALBUTEROL SULFATE 3 ML: .5; 3 SOLUTION RESPIRATORY (INHALATION) at 20:30

## 2017-05-22 RX ADMIN — GABAPENTIN 600 MG: 300 CAPSULE ORAL at 08:47

## 2017-05-22 RX ADMIN — ACETAMINOPHEN AND CODEINE PHOSPHATE 1 TABLET: 300; 30 TABLET ORAL at 08:47

## 2017-05-22 RX ADMIN — PRAVASTATIN SODIUM 40 MG: 40 TABLET ORAL at 20:08

## 2017-05-22 RX ADMIN — AZITHROMYCIN 250 MG: 250 TABLET, FILM COATED ORAL at 08:47

## 2017-05-22 RX ADMIN — METHYLPREDNISOLONE SODIUM SUCCINATE 40 MG: 40 INJECTION, POWDER, FOR SOLUTION INTRAMUSCULAR; INTRAVENOUS at 20:09

## 2017-05-22 RX ADMIN — RISPERIDONE 0.25 MG: 0.25 TABLET ORAL at 17:20

## 2017-05-22 RX ADMIN — Medication 250 MG: at 08:47

## 2017-05-22 RX ADMIN — VENLAFAXINE HYDROCHLORIDE 75 MG: 37.5 CAPSULE, EXTENDED RELEASE ORAL at 08:48

## 2017-05-22 RX ADMIN — EZETIMIBE 10 MG: 10 TABLET ORAL at 20:07

## 2017-05-22 RX ADMIN — METHYLPREDNISOLONE SODIUM SUCCINATE 40 MG: 40 INJECTION, POWDER, FOR SOLUTION INTRAMUSCULAR; INTRAVENOUS at 08:49

## 2017-05-22 RX ADMIN — PHENOBARBITAL 32.4 MG: 32.4 TABLET ORAL at 08:46

## 2017-05-22 RX ADMIN — ALPRAZOLAM 1 MG: 0.5 TABLET ORAL at 20:07

## 2017-05-22 RX ADMIN — PRAZOSIN HYDROCHLORIDE 2 MG: 1 CAPSULE ORAL at 18:55

## 2017-05-22 RX ADMIN — CETIRIZINE HYDROCHLORIDE 10 MG: 10 TABLET, FILM COATED ORAL at 08:49

## 2017-05-22 RX ADMIN — GUAIFENESIN 1200 MG: 600 TABLET, EXTENDED RELEASE ORAL at 20:06

## 2017-05-22 RX ADMIN — HYDROCODONE BITARTRATE AND ACETAMINOPHEN 1 TABLET: 7.5; 325 TABLET ORAL at 20:03

## 2017-05-22 RX ADMIN — IPRATROPIUM BROMIDE AND ALBUTEROL SULFATE 3 ML: .5; 3 SOLUTION RESPIRATORY (INHALATION) at 07:17

## 2017-05-22 RX ADMIN — IPRATROPIUM BROMIDE AND ALBUTEROL SULFATE 3 ML: .5; 3 SOLUTION RESPIRATORY (INHALATION) at 15:35

## 2017-05-22 RX ADMIN — LEVOTHYROXINE SODIUM 200 MCG: 100 TABLET ORAL at 08:47

## 2017-05-22 RX ADMIN — Medication 10 ML: at 20:11

## 2017-05-22 RX ADMIN — Medication 400 MG: at 08:48

## 2017-05-22 RX ADMIN — INSULIN LISPRO 2 UNITS: 100 INJECTION, SOLUTION INTRAVENOUS; SUBCUTANEOUS at 22:03

## 2017-05-22 RX ADMIN — PHENOBARBITAL 64.8 MG: 32.4 TABLET ORAL at 17:19

## 2017-05-22 RX ADMIN — PANTOPRAZOLE SODIUM 40 MG: 40 TABLET, DELAYED RELEASE ORAL at 08:47

## 2017-05-22 RX ADMIN — EZETIMIBE 10 MG: 10 TABLET ORAL at 01:00

## 2017-05-22 RX ADMIN — MONTELUKAST SODIUM 10 MG: 10 TABLET, FILM COATED ORAL at 08:48

## 2017-05-22 RX ADMIN — GABAPENTIN 600 MG: 300 CAPSULE ORAL at 17:20

## 2017-05-22 RX ADMIN — GUAIFENESIN 600 MG: 600 TABLET, EXTENDED RELEASE ORAL at 08:47

## 2017-05-22 RX ADMIN — METOPROLOL TARTRATE 12.5 MG: 25 TABLET ORAL at 08:48

## 2017-05-22 RX ADMIN — IPRATROPIUM BROMIDE AND ALBUTEROL SULFATE 3 ML: .5; 3 SOLUTION RESPIRATORY (INHALATION) at 05:27

## 2017-05-22 RX ADMIN — ENOXAPARIN SODIUM 40 MG: 40 INJECTION SUBCUTANEOUS at 08:46

## 2017-05-22 RX ADMIN — PRIMIDONE 100 MG: 50 TABLET ORAL at 20:08

## 2017-05-22 RX ADMIN — Medication 10 ML: at 07:04

## 2017-05-22 RX ADMIN — METOPROLOL TARTRATE 12.5 MG: 25 TABLET ORAL at 20:07

## 2017-05-22 RX ADMIN — RISPERIDONE 0.25 MG: 0.25 TABLET ORAL at 08:48

## 2017-05-22 RX ADMIN — ALPRAZOLAM 1 MG: 0.5 TABLET ORAL at 11:37

## 2017-05-22 RX ADMIN — Medication 10 ML: at 03:46

## 2017-05-22 NOTE — PROGRESS NOTES
PT note:    Orders received and acknowledged. Chart reviewed and spoke with nursing. Patient currently going off the floor for ECHO. Will follow up tomorrow for PT evaluation.      Delphine Delgado, PT, DPT

## 2017-05-22 NOTE — CONSULTS
[facility] Pulmonary Consult Service Note  Pulmonary, Critical Care, and Sleep Medicine    Name: Lizeth Trujillo MRN: [id number]   : 1955 Hospital: [facility]   Date: 2017   Hospital Day: 2       Subjective/Interval History:   I have reviewed the flowsheet and previous days notes. Seen earlier today on rounds. Hospital Problems  Date Reviewed: 2017          Codes Class Noted POA    Acute respiratory failure [facility]) ICD-10-CM: J96.00  ICD-9-CM: 518.81  2014 Unknown            Patient PCP: Kristin Hand MD        IMPRESSION:   · Acute respiratory failure needed BIPAP  · COPD exacerbation  · Polycythemia? Suggests chronic hypoxemia or from smoking  · Active Tobacco use  · Hypotension:  better after home lisinopril, prazosin held  · Non insulin dependent DM2 controlled without complication:-  · Visual changes:  C/o intermittent diplopia for the last month; Pt just saw Dr. Tracey Bradley on   · Hypovolemic hyponatremia:  · PTSD/anxiety with depression/fibromyalgia with significant polypharmacy:  Pt on multiple controlled substances and >30 meds on her list in this   · Recurrent Mucous plugging by CT chest- bronch  for RML and LLL collapse; now with minimal patchy LLL infiltrate- debris in central airways on CT scan this admission  · Hypothyroidism s/p radioactive iodine ablation  · Depression  · Type II or unspecified type diabetes mellitus- worse on steroids  · Allergic rhinitis   · H/o HTN (hypertension)       RECOMMENDATIONS/PLAN:   · BIPAP prn  · Pt should have overnight and exercise oximetry prior to discharge;  Might be a safety issue if she needs O2 and continues to smoke  · If pt willing, can follow in office and consider sleep study  · H and H in AM  · Pulmonary toilet  · follow sputum culture and blood cultures  · scheduled duonebs with albuterol prn  · mucinex.   Pt just completed 9 days of levaquin - will use doxycycline unless other process on CT chest  · Smoking cessation counsleing done- nicotine patch  · Monitor for psychiatric sdie effects of steroids  · Monitor sugars     Code: Full Code          Subjective/Initial History:   I have reviewed the flowsheet and previous days notes. I was asked by Juancarlos Lujan to see David Blas in consultation for a chief complaint of respiratory failure requiring BIPAP  . David Blas is a 64 y.o. female, pmhx significant for COPD, tobacco abuse, PUD, CAD, GERD, fibromyalgia, hypothyroidism, and depression/anxiety, who presents via EMS to the ED c/o intermittent SOB x 1 week with worsening constant SOB x this AM. Pt also reports associated bilateral feet swelling, blurry vision, and fatigue with her SOB. Pt states that this morning she noticed her HR was elevated, and states that she felt near-syncope after sitting up. She also notes intermittent chest pain under her R breast x \"a while. \" Pt called her PCP today who referred her to the ED. Per EMS, pt was 80% on RA, and does not wear supplemental oxygen at home. Pt says that she has been having progressive shortness of breath for \"months. \"  Per her report, she has seen her PCP 4 times for this issue without improvement. She has not been placed on steroids but has been on levaquin x 9 days now from her last visit. Pt previously was smoking >1ppd tobacco, now smoking under 1ppd but is continuing to smoke. She says that she is unable to cough anything up. She says that she has been having fevers because \"anything over 98 is a fever for me. \"  She denies lightheadedness. No chest pain but does feel some \"tightness\" with her breathing. She denies nausea but has not been eating well because she says that it feels like she can't swallow well due to her shortness of breath. She has been constipated. She complains of new LE edema. No focal weakness. She recently saw Dr. Renetta Rushing last week for jerking and resting tremor.   Pt was given albuterol treatments en route, and was placed on BiPAP. Pt denies abdominal pain, cough, fever, chills, nausea, and vomiting.  at bedside. Pt easily upset by her daughter. She recalls needing a bronch to clear her mucous plugging two years ago.      PCP: Florian Daugherty MD     PSHx: Significant for cardiac catheterization, endoscopies, ruptured discs  Social Hx: + tobacco (1ppd), + EtOH (occasionally), - Illicit Drugs      Allergies   Allergen Reactions    Latex Contact Dermatitis    Dilaudid [Hydromorphone (Pf)] Other (comments)     Feels like bugs are crawling all over her    Lipitor [Atorvastatin] Other (comments)     LIVER TROUBLE ON THIS MEDICATION    Sulfa (Sulfonamide Antibiotics) Itching        MAR reviewed and pertinent medications noted or modified as needed     Current Facility-Administered Medications   Medication    acetaminophen-codeine (TYLENOL #3) per tablet 1 Tab    albuterol-ipratropium (DUO-NEB) 2.5 MG-0.5 MG/3 ML    ALPRAZolam (XANAX) tablet 1 mg    aspirin chewable tablet 81 mg    benzonatate (TESSALON) capsule 200 mg    cetirizine (ZYRTEC) tablet 10 mg    dicyclomine (BENTYL) capsule 10 mg    pantoprazole (PROTONIX) tablet 40 mg    gabapentin (NEURONTIN) capsule 600 mg    HYDROcodone-acetaminophen (NORCO) 7.5-325 mg per tablet 1 Tab    hyoscyamine SL (LEVSIN/SL) tablet 0.125 mg    levothyroxine (SYNTHROID) tablet 200 mcg    magnesium oxide (MAG-OX) tablet 400 mg    montelukast (SINGULAIR) tablet 10 mg    niacin SR capsule 250 mg    PHENobarbital (LUMINAL) tablet 64.8 mg    primidone (MYSOLINE) tablet 100 mg    risperiDONE (RisperDAL) tablet 0.25 mg    venlafaxine-SR (EFFEXOR-XR) capsule 75 mg    metoprolol tartrate (LOPRESSOR) tablet 12.5 mg    sodium chloride (NS) flush 5-10 mL    sodium chloride (NS) flush 5-10 mL    acetaminophen (TYLENOL) tablet 650 mg    ondansetron (ZOFRAN) injection 4 mg    bisacodyl (DULCOLAX) tablet 5 mg    enoxaparin (LOVENOX) injection 40 mg  methylPREDNISolone (PF) (SOLU-MEDROL) injection 40 mg    albuterol (PROVENTIL VENTOLIN) nebulizer solution 2.5 mg    guaiFENesin ER (MUCINEX) tablet 600 mg    insulin lispro (HUMALOG) injection    glucose chewable tablet 16 g    glucagon (GLUCAGEN) injection 1 mg    nicotine (NICODERM CQ) 21 mg/24 hr patch 1 Patch    dextrose 10 % infusion 125-250 mL    pravastatin (PRAVACHOL) tablet 40 mg    ezetimibe (ZETIA) tablet 10 mg    azithromycin (ZITHROMAX) tablet 250 mg    promethazine (PHENERGAN) tablet 25 mg        PMH:  has a past medical history of Anxiety; Bone spur; COPD; Depression; Endocrine disease; Fibromyalgia; Fusion of spine of cervical region; Gastrointestinal disorder; Hyperlipidemia; Hypothyroid; Morbid obesity ([address] Osteoarthritis; PUD (peptic ulcer disease); and Tobacco abuse. PSH:   has a past surgical history that includes gyn; orthopaedic; cardiac catheterization (7/11/2013); cataract removal; upper gi endoscopy,biopsy (10/30/2014); upper gi endoscopy,dilatn w guide (10/30/2014); colonoscopy,biopsy (10/30/2014); and bronchoscopy-fiber/therapy (4/3/2015). FHX: family history includes Alcohol abuse in her father; Bipolar Disorder in her daughter and father; Coronary Artery Disease in an other family member; Dementia in her father and mother; Heart Disease in her father and mother; Psychiatric Disorder in her brother, daughter, father, sister, and another family member; Suicide in her brother and sister; Thyroid Disease in her mother. SHX:  reports that she has been smoking. She has a 30.00 pack-year smoking history. She does not have any smokeless tobacco history on file. She reports that she drinks alcohol. She reports that she does not use illicit drugs. ROS:A comprehensive review of systems was negative except for that written in the HPI.         Objective:     Vital Signs: Intake/Output: Intake/Output:   Visit Vitals    /77 (BP 1 Location: Left arm, BP Patient Position: At rest)    Pulse 72    Temp 98.4 °F (36.9 °C)    Resp 20    Ht 5' 4\" (1.626 m)    Wt 93.2 kg (205 lb 7.5 oz)    SpO2 97%    BMI 35.27 kg/m2      O2 Device: Nasal cannula  O2 Flow Rate (L/min): 6 l/min  Temp (24hrs), Av.1 °F (36.7 °C), Min:97.8 °F (36.6 °C), Max:98.4 °F (36.9 °C)     Intake/Output Summary (Last 24 hours) at 17 1450  Last data filed at 17 0721   Gross per 24 hour   Intake           951.67 ml   Output              950 ml   Net             1.67 ml    Last shift:      701 - 1900  In: -   Out: 400 [Urine:400]  Last 3 shifts: 1901 -  07  In: 951.7 [P.O.:120; I.V.:831.7]  Out: 550 [Urine:550]           Physical Exam:    General: Obese WF alert, oriented times 3 and moderately ill   HEENT: nasal O2; dry; no Thrush;    Neck: No abnormally enlarged lymph nodes.    Chest: increased AP diameter   Lungs: scattered wheezes bilaterally   Heart: Regular rate and rhythm or S1S2 present   Abdomen: soft, protuberant   :    Extremity: negative, cyanosis, clubbing 2+ bilateral pedal edema   Neuro: alert   Psych: oriented to time, place and person   Skin: Warm; ; Normal upper and lower extremity pulses   Pulses:Bilateral, Radial, 2+   Capillary refill: abnormal:  warm, brisk capillary refill,    Data:                 Labs:  Recent Labs      17   0358  17   1425   WBC  10.4  11.5*   HGB  16.0  16.0   PLT  309  243     Recent Labs      17   0545  17   1425   NA  135*  129*   K  3.9  4.2   CL  94*  87*   CO2  34*  33*   GLU  117*  148*   BUN  20  27*   CREA  0.75  0.99   CA  9.5  9.8   ALB   --   3.9   SGOT   --   16   ALT   --   29     Recent Labs      17   0525  17   1635   PH  7.32*  7.34*   PCO2  68*  57*   PO2  83  79*   HCO3  34*  30*   FIO2  40  35       No results found for: IRON, FE, TIBC, IBCT, PSAT, FERR    Lab Results   Component Value Date/Time    Sed rate (ESR) 2 2014 03:16 PM    TSH 0.53 2015 04:23 AM Lab Results   Component Value Date/Time    CK 42 03/31/2015 09:50 PM    CK-MB Index 5.2 03/31/2015 09:50 PM    Troponin-I, Qt. <0.04 05/21/2017 02:25 PM        Lab Results   Component Value Date/Time    Culture result: RARE  NORMAL RESPIRATORY ROBIN   04/03/2015 11:52 AM    Culture result: RARE  YEAST  ISOLATED   04/03/2015 11:52 AM    Culture result: RARE  CANDIDA ALBICANS  ISOLATED   04/03/2015 11:52 AM    Culture result:  04/03/2015 11:52 AM     CULTURE WILL BE HELD FOR 4 WEEKS. IF THERE IS ADDITIONAL FUNGAL GROWTH, A NEW REPORT WILL FOLLOW. No results found for: TOXA1, RPR, HBCM, HBSAG, HAAB, HCAB, HCAB1, HAAT, G6PD, CRYAC, HIVGT, HIVR, HIV1, HIV12, HIVPC, HIVRPI    Lab Results   Component Value Date/Time    Vancomycin,trough 21.3 04/04/2015 03:15 AM    Vancomycin,trough 17.0 03/19/2015 05:20 AM    CK 42 03/31/2015 09:50 PM    CK 61 03/13/2015 04:00 PM       Lab Results   Component Value Date/Time    Color YELLOW 06/25/2013 05:40 PM    Appearance CLEAR 06/25/2013 05:40 PM    pH (UA) 6.5 06/25/2013 05:40 PM    Protein NEGATIVE  06/25/2013 05:40 PM    Glucose NEGATIVE  06/25/2013 05:40 PM    Ketone NEGATIVE  06/25/2013 05:40 PM    Bilirubin NEGATIVE  06/25/2013 05:40 PM    Blood NEGATIVE  06/25/2013 05:40 PM    Urobilinogen 0.2 06/25/2013 05:40 PM    Nitrites NEGATIVE  06/25/2013 05:40 PM    Leukocyte Esterase NEGATIVE  06/25/2013 05:40 PM    WBC 0-4 06/25/2013 05:40 PM    RBC 0-3 06/25/2013 05:40 PM    Bacteria NEGATIVE  06/25/2013 05:40 PM         Imaging:  I have personally reviewed the patients radiographs and have reviewed the reports:        Results from Hospital Encounter encounter on 05/21/17   XR CHEST PORT   Narrative EXAM:  XR CHEST PORT    INDICATION:  Shortness of breath for one week. COMPARISON: 4/3/2015    TECHNIQUE: Portable AP semierect upright chest view at 1359 hours    FINDINGS: Cardiac monitoring wires overlie the thorax. The cardiomediastinal  contours are stable.  The pulmonary vasculature is within normal limits. The lungs and pleural spaces are clear. The bones and upper abdomen are stable. Impression IMPRESSION:    No acute process. Results from [place] encounter on 05/21/17   CTA CHEST W OR W WO CONT   Narrative EXAM:  CT angiography chest    INDICATION:  Respiratory failure. COMPARISON: Chest radiograph 5/21/2017. CT chest 4/1/2015. TECHNIQUE: Helical thin section chest CT following uneventful intravenous  administration of nonionic contrast according to departmental PE protocol. Coronal and sagittal reformats were performed. 3D/MIP post processing was  performed. CT dose reduction was achieved through use of a standardized protocol  tailored for this examination and automatic exposure control for dose  modulation. FINDINGS: This is a good quality study for the evaluation of pulmonary embolism  to the first subsegmental arterial level. There is no pulmonary embolism to this  level. N/A variant right subclavian artery is again noted. The aorta and main pulmonary  artery are normal in caliber. Cardiac size is within normal limits. No  pericardial effusion. No lymphadenopathy by imaging size criteria. There is stable chronic atelectasis in the right middle lobe with stable chronic  mucous plugging of the right middle lobe bronchi. There is no new lung mass or  consolidation. No pleural effusion or pneumothorax. Limited images of the upper abdomen are within normal limits. The bony  structures are age-appropriate         Impression IMPRESSION:   There is no acute pulmonary embolism or other acute abnormality in the chest.  There is stable chronic atelectasis in the right middle lobe with stable chronic  mucous plugging of the right middle lobe bronchi.                My assessment/plan was discussed with:  nursing    respiratory therapy Dr. loza      Complex decision making was performed which includes reviewing the patient's past medical records, current laboratory results, medications, ECG and actual Xray films, immediately available at the bedside to the patient    Thank you for allowing us to participate in the care of this patient. We will be happy to follow along in his/her progress with you.     Renan Euceda MD

## 2017-05-22 NOTE — PROGRESS NOTES
Hospitalist Progress Note    NAME: Lizeth Trujillo   :  1955   MRN:  [id number]       Interim Hospital Summary: 64 y.o. female whom presented on 2017 with      Assessment / Plan:  SIRS and acute hypoxic respiratory failure due to acute COPD exacerbation with new peripheral edema in setting of ongoing tobacco use  On BIPAP overnight   Pulmonary help appreciated   Pt just completed 9 days of levaquin - will use doxycycline unless other process on CT chest  Cont solumedrol , aggressive jet nebs   ECHO: EF 60%  CT: There is no acute pulmonary embolism or other acute abnormality in the chest.  There is stable chronic atelectasis in the right middle lobe with stable chronic  mucous plugging of the right middle lobe bronchi. HTN  -BP stable   - cont metoprol  -restart  lisinopril, prazosin    Non insulin dependent DM2 controlled without complication  -BS at 348LB   - hold glipizide with poor po intake   -c/w SS     Visual changes  - C/o intermittent diplopia for the last month  - CT head: negative   - consider more imaging when more stable. Pt just saw Dr. Tracey Bradley on , could also do outpt f/u pending clinical course    Hypovolemic hyponatremia  -improving   - IV fluids    PTSD/anxiety with depression/fibromyalgia with significant polypharmacy  - con't home xanax, tylenol #3, norco for now. Holding ultram.  - Pt on multiple controlled substances and >30 meds on her list in this Pt who is only 60yo. -need to closely follow up with PCP to consider reducing overall medication burden. Morbid obesity         Code Status: Full  Surrogate Decision Maker:   DVT Prophylaxis: lovenox     Baseline: independent  Recommended Disposition: Home w/Family     Subjective:     Chief Complaint / Reason for Physician Visit: following respiratory failure / COPD / DM  On BIPAP overnight       Discussed with RN events overnight.      Review of Systems:  Symptom Y/N Comments  Symptom Y/N Comments   Fever/Chills n Chest Pain n    Poor Appetite    Edema     Cough y   Abdominal Pain n    Sputum    Joint Pain     SOB/RODRIGUEZ y   Pruritis/Rash     Nausea/vomit n   Tolerating PT/OT     Diarrhea n   Tolerating Diet     Constipation n   Other       Could NOT obtain due to:      Objective:     VITALS:   Last 24hrs VS reviewed since prior progress note. Most recent are:  Patient Vitals for the past 24 hrs:   Temp Pulse Resp BP SpO2   05/22/17 1535 - - - - 95 %   05/22/17 1531 98.2 °F (36.8 °C) 80 20 111/63 99 %   05/22/17 1202 98.4 °F (36.9 °C) 72 20 125/77 97 %   05/22/17 0850 - - 18 - 94 %   05/22/17 0737 98.2 °F (36.8 °C) 93 21 138/70 98 %   05/22/17 0715 - - - - 98 %   05/22/17 0713 - - - - 96 %   05/22/17 0523 - - - - 96 %   05/22/17 0335 97.8 °F (36.6 °C) (!) 117 20 (!) 161/94 94 %   05/22/17 0018 - - - - 94 %   05/21/17 2254 - 96 18 - 94 %   05/21/17 2245 - (!) 105 20 133/64 92 %   05/21/17 2230 - 92 12 (!) 168/101 93 %   05/21/17 2214 - 96 15 - 93 %   05/21/17 2200 - (!) 102 16 (!) 166/101 95 %   05/21/17 2145 - (!) 103 16 - 95 %   05/21/17 2130 - 97 13 (!) 164/100 92 %   05/21/17 2115 - 99 14 (!) 165/99 91 %   05/21/17 2101 - 100 14 (!) 168/93 93 %   05/21/17 2048 - (!) 103 17 99/76 92 %   05/21/17 2044 - (!) 104 15 - 93 %   05/21/17 2030 - (!) 104 15 138/86 93 %   05/21/17 2015 - (!) 108 19 (!) 157/100 93 %   05/21/17 2000 - (!) 106 18 (!) 139/93 93 %   05/21/17 1945 - (!) 110 17 (!) 138/93 93 %   05/21/17 1935 - (!) 111 19 - 92 %   05/21/17 1933 - (!) 113 22 165/88 -   05/21/17 1915 - (!) 102 14 140/85 92 %   05/21/17 1900 - (!) 106 16 133/80 92 %   05/21/17 1730 - (!) 105 15 - 93 %   05/21/17 1713 - - - - 94 %   05/21/17 1705 - (!) 103 14 - 94 %   05/21/17 1645 - (!) 101 15 157/90 94 %       Intake/Output Summary (Last 24 hours) at 05/22/17 1618  Last data filed at 05/22/17 0721   Gross per 24 hour   Intake           951.67 ml   Output              950 ml   Net             1.67 ml        PHYSICAL EXAM:  General: WD, WN.  Alert, cooperative, no acute distress, on BIPAP    EENT:  EOMI. Anicteric sclerae. MMM  Resp:  Coarse bronchial sound BL, no wheezing or rales. No accessory muscle use  CV:  Regular  rhythm,  No edema  GI:  Soft, Non distended, Non tender.  +Bowel sounds  Neurologic:  Alert and oriented X 3, normal speech,   Psych:   Good insight. Not anxious nor agitated  Skin:  No rashes. No jaundice    Reviewed most current lab test results and cultures  YES  Reviewed most current radiology test results   YES  Review and summation of old records today    NO  Reviewed patient's current orders and MAR    YES  PMH/SH reviewed - no change compared to H&P  ________________________________________________________________________  Care Plan discussed with:    Comments   Patient y    Family      RN y    Care Manager y    Consultant                       y Multidiciplinary team rounds were held today with , nursing, pharmacist and clinical coordinator. Patient's plan of care was discussed; medications were reviewed and discharge planning was addressed. ________________________________________________________________________  Total NON critical care TIME:  35  Minutes    Total CRITICAL CARE TIME Spent:   Minutes non procedure based      Comments   >50% of visit spent in counseling and coordination of care     ________________________________________________________________________  Hellen Goss MD     Procedures: see electronic medical records for all procedures/Xrays and details which were not copied into this note but were reviewed prior to creation of Plan. LABS:  I reviewed today's most current labs and imaging studies.   Pertinent labs include:  Recent Labs      05/22/17   0358  05/21/17   1425   WBC  10.4  11.5*   HGB  16.0  16.0   HCT  47.2*  47.0   PLT  309  243     Recent Labs      05/22/17   0545  05/21/17   1425   NA  135*  129*   K  3.9  4.2   CL  94*  87*   CO2  34*  33*   GLU  117*  148*   BUN  20  27* CREA  0.75  0.99   CA  9.5  9.8   ALB   --   3.9   TBILI   --   0.4   SGOT   --   16   ALT   --   29       Signed: Renay Albarran MD

## 2017-05-22 NOTE — ED NOTES
Pt reports feeling like she is having muscle spasms in substernal that pt reports she takes 25 mg of Promethezine for. Pt was about to take her promethezine. RN told pt to hold off and RN went to inform Dr. Jaleesa Beaulieu. Dr. Jaleesa Beaulieu stated that it would be okay for pt to take this medication. Pt reports she has gotten these muscle spasms sporadically since she was 23years old.

## 2017-05-22 NOTE — ED NOTES
TRANSFER - OUT REPORT:    Verbal report given to MARIOLA Fernandez on David Blas  being transferred to 2274 PCU for routine progression of care       Report consisted of patients Situation, Background, Assessment and   Recommendations(SBAR). Information from the following report(s) SBAR, ED Summary, [facility] and Recent Results was reviewed with the receiving nurse. Lines:   Peripheral IV 05/21/17 Left Wrist (Active)   Site Assessment Clean, dry, & intact 5/21/2017  2:24 PM   Phlebitis Assessment 0 5/21/2017  2:24 PM   Infiltration Assessment 0 5/21/2017  2:24 PM   Dressing Status Clean, dry, & intact 5/21/2017  2:24 PM   Dressing Type Transparent 5/21/2017  2:24 PM   Hub Color/Line Status Pink;Flushed 5/21/2017  2:24 PM   Action Taken Blood drawn 5/21/2017  2:24 PM       Peripheral IV 05/21/17 Left Antecubital (Active)   Site Assessment Clean, dry, & intact 5/21/2017  2:30 PM   Phlebitis Assessment 0 5/21/2017  2:30 PM   Infiltration Assessment 0 5/21/2017  2:30 PM   Dressing Status Clean, dry, & intact 5/21/2017  2:30 PM        Opportunity for questions and clarification was provided.       Patient transported with:   Monitor  RN  RT  BiPap

## 2017-05-22 NOTE — TELEPHONE ENCOUNTER
Patient asks for a call back re she says she's in the hospital and has had a lot of tests run. She would like Dr. Saulo Vogel to pull them up and see if she needs to continue with everything he ordered as well. She says she's scheduled for an eeg tomorrow and isn't sure if she needs to keep that appointment or if she will be able to due to her hospitalization.  Please advise asap

## 2017-05-22 NOTE — PROGRESS NOTES
Attempted to see pt for therapy services. Pt is off the floor for ECHO. Will defer and continue to follow.

## 2017-05-22 NOTE — TELEPHONE ENCOUNTER
Left detailed message that I spoke with Dr Nadja Sánchez and advised her to speak with the doctors in the hospital first. If they need a consult, they then will page for a consult with the on call

## 2017-05-22 NOTE — PROGRESS NOTES
Telephone, verbal report received from [facility] in ED. SBAR, MAR, VS and plan of care reviewed.

## 2017-05-22 NOTE — PROGRESS NOTES
Bedside verbal report given to Curry Rice RN. All questions answered. SBAR, Kardex and recent vitals were discussed.

## 2017-05-22 NOTE — PROGRESS NOTES
Pt admitted to room 2275 from ED via stretcher while on continuous bipap. Dual skin assessment completed by myself and Julio Hi. Skin is intact, savanna score of   19. Assessment completed as charted. Assisted pt to bs, voiding without difficulty. Admission data base completed. IVF infusing as ordered. 0400  Pt tolerating continuous bipap well. Labs drawn and sent.

## 2017-05-22 NOTE — PROGRESS NOTES
Spiritual Care Partner Volunteer visited patient on 5/22/17. Documented by:    Aldo Pedro M.S.   Spiritual Care Department  If needs arise please call NATE ([phone])

## 2017-05-22 NOTE — PROGRESS NOTES
PCU SHIFT NURSING NOTE      Bedside shift change report given to Milo (oncoming nurse) by Celine Ellis (offgoing nurse). Report included the following information SBAR, Kardex, Intake/Output, MAR and Recent Results. Shift Summary: 0730  Pt in bed resting, on bipap  C/o chronic pain    0850  Pt placed on 6L NC   SPO2 94%  Pt sitting on side of bed having breakfast  CO2 levels elevated, will place back on bipap after breakfast   1050  Pt refusing bipap  Continue on 6L NC and monitor    1115  Called pulmonary consult    1345  Pt left unit for echo  1445  Pt returned to unit  1530  Report given to Madelin Block RN     Admission Date 5/21/2017   Admission Diagnosis Acute respiratory failure ([facility] [address]   Consults None        Consults   []PT   []OT   []Speech   []Case Management      [] Palliative      Cardiac Monitoring Order   []Yes   []No     IV drips   []Yes    Drip:                            Dose:  Drip:                            Dose:  Drip:                            Dose:   []No     GI Prophylaxis   []Yes   []No         DVT Prophylaxis   SCDs:             Onel stockings:         [] Medication   []Contraindicated   []None      Activity Level Activity Level: Up with Assistance     Activity Assistance: Partial (one person)   Purposeful Rounding every 1-2 hour? []Yes   Zarate Score  Total Score: 3   Bed Alarm (If score 3 or >)   []Yes   [] Refused (See signed refusal form in chart)   Jsoeph Score  Joseph Score: 20   Joseph Score (if score 14 or less)   []PMT consult   []Wound Care consult      []Specialty bed   [] Nutrition consult          Needs prior to discharge:   Home O2 required:    []Yes   []No    If yes, how much O2 required?     Other:    Last Bowel Movement: Last Bowel Movement Date: 05/20/17      Influenza Vaccine Received Flu Vaccine for Current Season (usually Sept-March): Not Flu Season        Pneumonia Vaccine           Diet Active Orders   Diet    DIET DIABETIC CLEAR LIQUID      LDAs               Peripheral IV 05/21/17 Left Wrist (Active)   Site Assessment Clean, dry, & intact 5/22/2017  7:37 AM   Phlebitis Assessment 0 5/22/2017  7:37 AM   Infiltration Assessment 0 5/22/2017  7:37 AM   Dressing Status Clean, dry, & intact 5/22/2017  7:37 AM   Dressing Type Transparent 5/22/2017  7:37 AM   Hub Color/Line Status Pink;Flushed 5/22/2017  7:37 AM   Action Taken Open ports on tubing capped 5/22/2017 12:26 AM       Peripheral IV 05/21/17 Left Antecubital (Active)   Site Assessment Clean, dry, & intact 5/22/2017  7:37 AM   Phlebitis Assessment 0 5/22/2017  7:37 AM   Infiltration Assessment 0 5/22/2017  7:37 AM   Dressing Status Clean, dry, & intact 5/22/2017  7:37 AM   Dressing Type Transparent 5/22/2017  7:37 AM   Hub Color/Line Status Pink;Flushed 5/22/2017  7:37 AM   Action Taken Open ports on tubing capped;Blood drawn 5/22/2017  4:02 AM   Alcohol Cap Used Yes 5/22/2017  4:02 AM                      Urinary Catheter      Intake & Output   Date 05/21/17 0700 - 05/22/17 0659 05/22/17 0700 - 05/23/17 0659   Shift 6203-5383 8246-3765 24 Hour Total 7384-5658 8691-0639 24 Hour Total   I  N  T  A  K  E   P.O.  120 120         P. O.  120 120       I.V.  (mL/kg/hr)  831.7  (0.7) 831.7  (0.4)         Volume (0.9% sodium chloride infusion)  831.7 831.7       Shift Total  (mL/kg)  951.7  (10.2) 951.7  (10.2)      O  U  T  P  U  T   Urine  (mL/kg/hr)  550  (0.5) 550  (0.2) 400  400      Urine Voided  550 550 400  400    Shift Total  (mL/kg)  550  (5.9) 550  (5.9) 400  (4.3)  400  (4.3)   NET  401.7 401.7 -400  -400   Weight (kg) 93.2 93.2 93.2 93.2 93.2 93.2         Readmission Risk Assessment Tool Score High Risk            31       Total Score        3 Relationship with PCP    2 Patient Living Status    4 More than 1 Admission in calendar year    5 Patient Insurance is Medicare, Medicaid or Self Pay    17 Charlson Comorbidity Score        Criteria that do not apply:    Patient Length of Stay > 5       Expected Length of Stay 3d 19h Actual Length of Stay 1

## 2017-05-23 LAB
ANION GAP BLD CALC-SCNC: 5 MMOL/L (ref 5–15)
BUN SERPL-MCNC: 18 MG/DL (ref 6–20)
BUN/CREAT SERPL: 26 (ref 12–20)
CALCIUM SERPL-MCNC: 9.7 MG/DL (ref 8.5–10.1)
CHLORIDE SERPL-SCNC: 93 MMOL/L (ref 97–108)
CO2 SERPL-SCNC: 34 MMOL/L (ref 21–32)
CREAT SERPL-MCNC: 0.7 MG/DL (ref 0.55–1.02)
ERYTHROCYTE [DISTWIDTH] IN BLOOD BY AUTOMATED COUNT: 14.2 % (ref 11.5–14.5)
GLUCOSE BLD STRIP.AUTO-MCNC: 116 MG/DL (ref 65–100)
GLUCOSE BLD STRIP.AUTO-MCNC: 165 MG/DL (ref 65–100)
GLUCOSE BLD STRIP.AUTO-MCNC: 243 MG/DL (ref 65–100)
GLUCOSE BLD STRIP.AUTO-MCNC: 86 MG/DL (ref 65–100)
GLUCOSE SERPL-MCNC: 121 MG/DL (ref 65–100)
HCT VFR BLD AUTO: 45.8 % (ref 35–47)
HGB BLD-MCNC: 14.8 G/DL (ref 11.5–16)
MAGNESIUM SERPL-MCNC: 2.6 MG/DL (ref 1.6–2.4)
MCH RBC QN AUTO: 31.9 PG (ref 26–34)
MCHC RBC AUTO-ENTMCNC: 32.3 G/DL (ref 30–36.5)
MCV RBC AUTO: 98.7 FL (ref 80–99)
PHOSPHATE SERPL-MCNC: 3.4 MG/DL (ref 2.6–4.7)
PLATELET # BLD AUTO: 249 K/UL (ref 150–400)
POTASSIUM SERPL-SCNC: 4.4 MMOL/L (ref 3.5–5.1)
RBC # BLD AUTO: 4.64 M/UL (ref 3.8–5.2)
SERVICE CMNT-IMP: ABNORMAL
SERVICE CMNT-IMP: NORMAL
SODIUM SERPL-SCNC: 132 MMOL/L (ref 136–145)
WBC # BLD AUTO: 10.7 K/UL (ref 3.6–11)

## 2017-05-23 PROCEDURE — 84100 ASSAY OF PHOSPHORUS: CPT | Performed by: HOSPITALIST

## 2017-05-23 PROCEDURE — 74011250636 HC RX REV CODE- 250/636: Performed by: INTERNAL MEDICINE

## 2017-05-23 PROCEDURE — 94761 N-INVAS EAR/PLS OXIMETRY MLT: CPT

## 2017-05-23 PROCEDURE — 74011000250 HC RX REV CODE- 250: Performed by: INTERNAL MEDICINE

## 2017-05-23 PROCEDURE — 77010033678 HC OXYGEN DAILY

## 2017-05-23 PROCEDURE — 74011250637 HC RX REV CODE- 250/637: Performed by: INTERNAL MEDICINE

## 2017-05-23 PROCEDURE — 65660000000 HC RM CCU STEPDOWN

## 2017-05-23 PROCEDURE — 94640 AIRWAY INHALATION TREATMENT: CPT

## 2017-05-23 PROCEDURE — 82962 GLUCOSE BLOOD TEST: CPT

## 2017-05-23 PROCEDURE — 74011250637 HC RX REV CODE- 250/637: Performed by: HOSPITALIST

## 2017-05-23 PROCEDURE — 80048 BASIC METABOLIC PNL TOTAL CA: CPT | Performed by: HOSPITALIST

## 2017-05-23 PROCEDURE — 85027 COMPLETE CBC AUTOMATED: CPT | Performed by: HOSPITALIST

## 2017-05-23 PROCEDURE — 74011636637 HC RX REV CODE- 636/637: Performed by: INTERNAL MEDICINE

## 2017-05-23 PROCEDURE — 83735 ASSAY OF MAGNESIUM: CPT | Performed by: HOSPITALIST

## 2017-05-23 PROCEDURE — G8987 SELF CARE CURRENT STATUS: HCPCS | Performed by: OCCUPATIONAL THERAPIST

## 2017-05-23 PROCEDURE — 36415 COLL VENOUS BLD VENIPUNCTURE: CPT | Performed by: HOSPITALIST

## 2017-05-23 PROCEDURE — G8988 SELF CARE GOAL STATUS: HCPCS | Performed by: OCCUPATIONAL THERAPIST

## 2017-05-23 PROCEDURE — 94660 CPAP INITIATION&MGMT: CPT

## 2017-05-23 PROCEDURE — 97166 OT EVAL MOD COMPLEX 45 MIN: CPT | Performed by: OCCUPATIONAL THERAPIST

## 2017-05-23 RX ORDER — MAG HYDROX/ALUMINUM HYD/SIMETH 200-200-20
30 SUSPENSION, ORAL (FINAL DOSE FORM) ORAL
Status: DISCONTINUED | OUTPATIENT
Start: 2017-05-23 | End: 2017-05-27 | Stop reason: HOSPADM

## 2017-05-23 RX ORDER — SIMETHICONE 80 MG
80 TABLET,CHEWABLE ORAL
Status: DISCONTINUED | OUTPATIENT
Start: 2017-05-23 | End: 2017-05-27 | Stop reason: HOSPADM

## 2017-05-23 RX ORDER — FLUTICASONE FUROATE AND VILANTEROL 100; 25 UG/1; UG/1
1 POWDER RESPIRATORY (INHALATION) DAILY
Status: DISCONTINUED | OUTPATIENT
Start: 2017-05-23 | End: 2017-05-27 | Stop reason: HOSPADM

## 2017-05-23 RX ORDER — HYDRALAZINE HYDROCHLORIDE 20 MG/ML
20 INJECTION INTRAMUSCULAR; INTRAVENOUS
Status: DISCONTINUED | OUTPATIENT
Start: 2017-05-23 | End: 2017-05-27 | Stop reason: HOSPADM

## 2017-05-23 RX ADMIN — METOPROLOL TARTRATE 12.5 MG: 25 TABLET ORAL at 21:23

## 2017-05-23 RX ADMIN — AZITHROMYCIN 250 MG: 250 TABLET, FILM COATED ORAL at 08:36

## 2017-05-23 RX ADMIN — ONDANSETRON HYDROCHLORIDE 4 MG: 2 INJECTION, SOLUTION INTRAMUSCULAR; INTRAVENOUS at 18:09

## 2017-05-23 RX ADMIN — ENOXAPARIN SODIUM 40 MG: 40 INJECTION SUBCUTANEOUS at 08:33

## 2017-05-23 RX ADMIN — CETIRIZINE HYDROCHLORIDE 10 MG: 10 TABLET, FILM COATED ORAL at 08:36

## 2017-05-23 RX ADMIN — Medication 10 ML: at 08:38

## 2017-05-23 RX ADMIN — PHENOBARBITAL 64.8 MG: 32.4 TABLET ORAL at 17:29

## 2017-05-23 RX ADMIN — PRAZOSIN HYDROCHLORIDE 2 MG: 1 CAPSULE ORAL at 08:32

## 2017-05-23 RX ADMIN — PANTOPRAZOLE SODIUM 40 MG: 40 TABLET, DELAYED RELEASE ORAL at 08:37

## 2017-05-23 RX ADMIN — ALPRAZOLAM 1 MG: 0.5 TABLET ORAL at 17:29

## 2017-05-23 RX ADMIN — IPRATROPIUM BROMIDE AND ALBUTEROL SULFATE 3 ML: .5; 3 SOLUTION RESPIRATORY (INHALATION) at 07:00

## 2017-05-23 RX ADMIN — ACETAMINOPHEN AND CODEINE PHOSPHATE 1 TABLET: 300; 30 TABLET ORAL at 21:24

## 2017-05-23 RX ADMIN — ASPIRIN 81 MG CHEWABLE TABLET 81 MG: 81 TABLET CHEWABLE at 08:33

## 2017-05-23 RX ADMIN — IPRATROPIUM BROMIDE AND ALBUTEROL SULFATE 3 ML: .5; 3 SOLUTION RESPIRATORY (INHALATION) at 01:33

## 2017-05-23 RX ADMIN — GABAPENTIN 600 MG: 300 CAPSULE ORAL at 08:33

## 2017-05-23 RX ADMIN — Medication 250 MG: at 08:33

## 2017-05-23 RX ADMIN — RISPERIDONE 0.25 MG: 0.25 TABLET ORAL at 08:36

## 2017-05-23 RX ADMIN — METOPROLOL TARTRATE 12.5 MG: 25 TABLET ORAL at 08:36

## 2017-05-23 RX ADMIN — INSULIN LISPRO 3 UNITS: 100 INJECTION, SOLUTION INTRAVENOUS; SUBCUTANEOUS at 12:26

## 2017-05-23 RX ADMIN — SIMETHICONE CHEW TAB 80 MG 80 MG: 80 TABLET ORAL at 18:43

## 2017-05-23 RX ADMIN — ALUMINUM HYDROXIDE, MAGNESIUM HYDROXIDE, AND SIMETHICONE 30 ML: 200; 200; 20 SUSPENSION ORAL at 18:39

## 2017-05-23 RX ADMIN — Medication 10 ML: at 14:07

## 2017-05-23 RX ADMIN — MONTELUKAST SODIUM 10 MG: 10 TABLET, FILM COATED ORAL at 08:36

## 2017-05-23 RX ADMIN — LISINOPRIL 20 MG: 20 TABLET ORAL at 08:34

## 2017-05-23 RX ADMIN — PRIMIDONE 100 MG: 50 TABLET ORAL at 21:23

## 2017-05-23 RX ADMIN — GABAPENTIN 600 MG: 300 CAPSULE ORAL at 17:29

## 2017-05-23 RX ADMIN — METHYLPREDNISOLONE SODIUM SUCCINATE 40 MG: 40 INJECTION, POWDER, FOR SOLUTION INTRAMUSCULAR; INTRAVENOUS at 08:34

## 2017-05-23 RX ADMIN — RISPERIDONE 0.25 MG: 0.25 TABLET ORAL at 17:29

## 2017-05-23 RX ADMIN — METHYLPREDNISOLONE SODIUM SUCCINATE 40 MG: 40 INJECTION, POWDER, FOR SOLUTION INTRAMUSCULAR; INTRAVENOUS at 21:22

## 2017-05-23 RX ADMIN — LEVOTHYROXINE SODIUM 200 MCG: 100 TABLET ORAL at 08:32

## 2017-05-23 RX ADMIN — FLUTICASONE FUROATE AND VILANTEROL TRIFENATATE 1 PUFF: 100; 25 POWDER RESPIRATORY (INHALATION) at 17:32

## 2017-05-23 RX ADMIN — PHENOBARBITAL 64.8 MG: 32.4 TABLET ORAL at 08:33

## 2017-05-23 RX ADMIN — ALPRAZOLAM 1 MG: 0.5 TABLET ORAL at 08:57

## 2017-05-23 RX ADMIN — GUAIFENESIN 1200 MG: 600 TABLET, EXTENDED RELEASE ORAL at 21:24

## 2017-05-23 RX ADMIN — GUAIFENESIN 1200 MG: 600 TABLET, EXTENDED RELEASE ORAL at 08:35

## 2017-05-23 RX ADMIN — Medication 400 MG: at 08:36

## 2017-05-23 RX ADMIN — IPRATROPIUM BROMIDE AND ALBUTEROL SULFATE 3 ML: .5; 3 SOLUTION RESPIRATORY (INHALATION) at 20:34

## 2017-05-23 RX ADMIN — VENLAFAXINE HYDROCHLORIDE 75 MG: 37.5 CAPSULE, EXTENDED RELEASE ORAL at 08:34

## 2017-05-23 RX ADMIN — ONDANSETRON HYDROCHLORIDE 4 MG: 2 INJECTION, SOLUTION INTRAMUSCULAR; INTRAVENOUS at 14:07

## 2017-05-23 RX ADMIN — ACETAMINOPHEN AND CODEINE PHOSPHATE 1 TABLET: 300; 30 TABLET ORAL at 04:03

## 2017-05-23 RX ADMIN — IPRATROPIUM BROMIDE AND ALBUTEROL SULFATE 3 ML: .5; 3 SOLUTION RESPIRATORY (INHALATION) at 14:37

## 2017-05-23 RX ADMIN — Medication 10 ML: at 21:22

## 2017-05-23 RX ADMIN — HYDROCODONE BITARTRATE AND ACETAMINOPHEN 1 TABLET: 7.5; 325 TABLET ORAL at 09:05

## 2017-05-23 RX ADMIN — PRAZOSIN HYDROCHLORIDE 2 MG: 1 CAPSULE ORAL at 17:28

## 2017-05-23 RX ADMIN — EZETIMIBE 10 MG: 10 TABLET ORAL at 21:24

## 2017-05-23 NOTE — PROGRESS NOTES
Attempted to see pt for therapy services. Pt was sitting edge of bed independently eating. Pt was requesting for therapy to follow up at a later time to allow her to eat. Will follow up later today.

## 2017-05-23 NOTE — PROGRESS NOTES
Attempted PT evaluation; patient declines therapy at this time, stating significant fatigue. Explained role of PT vs OT, after she reported having OT evaluation earlier today, as well as benefit of OOB activity and effects of bed-rest although she continued to decline activity. Patient requests that Kelly Arenas return tomorrow, but only once she has been able to take her AM medication which she reports takes 'an hour and a half to get it all done'. RN notified. Will f/u tomorrow. Yajaira Alvarenga PT, DPT.

## 2017-05-23 NOTE — PROGRESS NOTES
Attempted to see pt for therapy services. Pt was sitting edge of bed independently eating. Pt was requesting for therapy to follow up at a later time to allow her to eat. Will follow up later today. Demetria Snellen PT, DPT.

## 2017-05-23 NOTE — PROGRESS NOTES
Problem: Self Care Deficits Care Plan (Adult)  Goal: *Acute Goals and Plan of Care (Insert Text)  Occupational Therapy Goals:  Initiated 5/23/2017  1. Patient will perform grooming standing with rest breaks with modified independence within 7 days. 2. Patient will perform bathing with modified independence within 7 days. 3. Patient will perform dressing with modified independence within 7 days. 4. Patient will be independent with energy conservation techniques within 7 days. 5. Patient will transfer from toilet with modified independence using the least restrictive device and appropriate durable medical equipment within 7 days. OCCUPATIONAL THERAPY EVALUATION  Patient: Leonela Harp (46 y.o. female)  Date: 5/23/2017  Primary Diagnosis: Acute respiratory failure (HCC)        Precautions: fall; monitor O2         ASSESSMENT :  Based on the objective data described below, the patient presents with general weakness and difficulty maintain O2 sats with activity. She reports that her nose has been broken a few times and she has a deviated septum which may be affecting pts ability to get O2 through NC. Pt also reports that her nose feels stuffy. She reports being up to bathroom on her own but needs to touch things. Pt was able to mobilize with CGA without assist devices, adjust socks seated with side sitting and perform standing task with CGA. O2 sat at rest was 95% on 4 liters NC (supine) with mobility around room 2 laps and donning of socks pts O2 sat decreased to 79%. Pt had significant wheezing and had difficulty with PLB. Asked pt to cough to clear non oxygenated air in her lungs. O2 sat slowly increased back to 90% over 3 minutes with seated rest break. Ultimately pt needed O2 increased to 5 liters NC once returned to supine due to O2 sat 89-90% at rest.  On 5 liters at rest O2 sat was 94%. Patient will benefit from skilled intervention to address the above impairments.   Patients rehabilitation potential is considered to be Fair  Factors which may influence rehabilitation potential include:   [ ]             None noted  [ ]             Mental ability/status  [X]             Medical condition  [ ]             Home/family situation and support systems  [ ]             Safety awareness  [ ]             Pain tolerance/management  [ ]             Other:        PLAN :  Recommendations and Planned Interventions:  [X]               Self Care Training                  [X]        Therapeutic Activities  [X]               Functional Mobility Training    [ ]        Cognitive Retraining  [X]               Therapeutic Exercises           [ ]        Endurance Activities  [X]               Balance Training                   [ ]        Neuromuscular Re-Education  [ ]               Visual/Perceptual Training     [X]   Home Safety Training  [X]               Patient Education                 [X]        Family Training/Education  [ ]               Other (comment):     Frequency/Duration: Patient will be followed by occupational therapy 4 times a week to address goals. Discharge Recommendations: Pulmonary Rehab  Further Equipment Recommendations for Discharge: shower chair       SUBJECTIVE:   Patient stated I just can't breathe.       OBJECTIVE DATA SUMMARY:   HISTORY:   Past Medical History:   Diagnosis Date    Anxiety      Bone spur       NECK    COPD      Depression      Endocrine disease       hypothyroidism    Fibromyalgia      Fusion of spine of cervical region      Gastrointestinal disorder       gerd    Hyperlipidemia      Hypothyroid      Morbid obesity ([facility] [place] [address]      Osteoarthritis      PUD (peptic ulcer disease)      Tobacco abuse       Past Surgical History:   Procedure Laterality Date    BRONCHOSCOPY-FIBER/THERAPY   4/3/2015          CARDIAC CATHETERIZATION   7/11/2013          COLONOSCOPY,DIAGNOSTIC   10/30/2014          HX CATARACT REMOVAL         bilateral    HX GYN     HX ORTHOPAEDIC         Ruptured disc, knuckles on right hand    UPPER GI ENDOSCOPY,BIOPSY   10/30/2014          UPPER GI ENDOSCOPY,DILATN W GUIDE   10/30/2014              Prior Level of Function/Home Situation: weaker over time recently; performed ADLs on her own and prior to onset of weakness pt was performing IADLs in the home; her  works during the day and pt is alone; no O2 at home; was sitting on commode to bathe due to fear of falling in tub and weakness  Expanded or extensive additional review of patient history:      Home Situation  Home Environment: Private residence  # Steps to Enter: 4  Rails to Enter: Yes  One/Two Story Residence: One story  Living Alone: No  Support Systems:  ( works during the day)  Patient Expects to be Discharged to[de-identified] Private residence  Current DME Used/Available at Home: Grab bars  Tub or Shower Type: Tub/Shower combination (been sponge bathing due to weakness)  [X]  Right hand dominant             [ ]  Left hand dominant     EXAMINATION OF PERFORMANCE DEFICITS:  Cognitive/Behavioral Status:           Perception: Appears intact  Perseveration: No perseveration noted  Safety/Judgement: Awareness of environment; Fall prevention; Insight into deficits;Home safety           Edema: moderate BLE     Hearing: Auditory  Auditory Impairment: None     Vision/Perceptual:                           Acuity: Impaired far vision    Corrective Lenses: Glasses     Range of Motion:     AROM: Generally decreased, functional                          Strength:     Strength: Generally decreased, functional                 Coordination:  Coordination: Within functional limits  Fine Motor Skills-Upper: Left Intact; Right Intact    Gross Motor Skills-Upper: Left Intact; Right Intact     Tone & Sensation:     Tone: Normal  Sensation: Intact                       Balance:  Sitting: Intact  Standing: Impaired  Standing - Static: Good  Standing - Dynamic : Fair     Functional Mobility and Transfers for ADLs:  Bed Mobility:  Rolling: Independent  Supine to Sit: Supervision  Scooting: Supervision     Transfers:  Sit to Stand: Contact guard assistance  Stand to Sit: Contact guard assistance  Bed to Chair: Contact guard assistance  Toilet Transfer : Contact guard assistance     ADL Assessment:  Feeding: Independent     Oral Facial Hygiene/Grooming: Contact guard assistance     Bathing: Contact guard assistance     Upper Body Dressing: Contact guard assistance     Lower Body Dressing: Contact guard assistance     Toileting: Contact guard assistance                 ADL Intervention and task modifications:     See assessment  Cognitive Retraining  Safety/Judgement: Awareness of environment; Fall prevention; Insight into deficits;Home safety        Functional Measure:  Barthel Index:      Bathin  Bladder: 10  Bowels: 10  Groomin  Dressin  Feeding: 10  Mobility: 0  Stairs: 0  Toilet Use: 5  Transfer (Bed to Chair and Back): 10  Total: 55         Barthel and G-code impairment scale:  Percentage of impairment CH  0% CI  1-19% CJ  20-39% CK  40-59% CL  60-79% CM  80-99% CN  100%   Barthel Score 0-100 100 99-80 79-60 59-40 20-39 1-19    0   Barthel Score 0-20 20 17-19 13-16 9-12 5-8 1-4 0      The Barthel ADL Index: Guidelines  1. The index should be used as a record of what a patient does, not as a record of what a patient could do. 2. The main aim is to establish degree of independence from any help, physical or verbal, however minor and for whatever reason. 3. The need for supervision renders the patient not independent. 4. A patient's performance should be established using the best available evidence. Asking the patient, friends/relatives and nurses are the usual sources, but direct observation and common sense are also important. However direct testing is not needed.   5. Usually the patient's performance over the preceding 24-48 hours is important, but occasionally longer periods will be relevant. 6. Middle categories imply that the patient supplies over 50 per cent of the effort. 7. Use of aids to be independent is allowed. Royal Degroot., Barthel, D.W. ([id number]). Functional evaluation: the Barthel Index. [address] [facility] (14)2. HUI Lux, Tay Vasquez., Jo Ann Slade., Joanna, [address] [facility] (1999). Measuring the change indisability after inpatient rehabilitation; comparison of the responsiveness of the Barthel Index and Functional [place] Measure. Journal of Neurology, Neurosurgery, and Psychiatry, 66(4), 414-327. Stella Moyer, N.J.A, JESIKA Maynard, & Irma Lane MArianaA. (2004.) Assessment of post-stroke quality of life in cost-effectiveness studies: The usefulness of the Barthel Index and the EuroQoL-5D. Quality of Life Research, 13, 323-87            G codes: In compliance with CMSs Claims Based Outcome Reporting, the following G-code set was chosen for this patient based on their primary functional limitation being treated: The outcome measure chosen to determine the severity of the functional limitation was the barthel with a score of 55/100 which was correlated with the impairment scale. · Self Care:               - CURRENT STATUS:    CK - 40%-59% impaired, limited or restricted               - GOAL STATUS:           CI - 1%-19% impaired, limited or restricted               - D/C STATUS:                       ---------------To be determined---------------      Occupational Therapy Evaluation Charge Determination   History Examination Decision-Making   MEDIUM Complexity : Expanded review of history including physical, cognitive and psychosocial  history  MEDIUM Complexity : 3-5 performance deficits relating to physical, cognitive , or psychosocial skils that result in activity limitations and / or participation restrictions MEDIUM Complexity : Patient may present with comorbidities that affect occupational performnce.  Miniml to moderate modification of tasks or assistance (eg, physical or verbal ) with assesment(s) is necessary to enable patient to complete evaluation       Based on the above components, the patient evaluation is determined to be of the following complexity level: MEDIUM  Pain:  Pain Scale 1: Numeric (0 - 10)  Pain Intensity 1: 4  Pain Location 1: Generalized  Pain Orientation 1: Other (comment) (all over)  Pain Description 1: Aching;Constant  Pain Intervention(s) 1: Medication (see MAR)  Activity Tolerance:      Please refer to the flowsheet for vital signs taken during this treatment. After treatment:   [ ] Patient left in no apparent distress sitting up in chair  [X] Patient left in no apparent distress in bed  [X] Call bell left within reach  [X] Nursing notified  [ ] Caregiver present  [ ] Bed alarm activated      COMMUNICATION/EDUCATION:   The patients plan of care was discussed with: Physical Therapist, Registered Nurse and patient. [ ] Home safety education was provided and the patient/caregiver indicated understanding. [X] Patient have participated as able in goal setting and plan of care. [X] Patient agree to work toward stated goals and plan of care. [ ] Patient understands intent and goals of therapy, but is neutral about his/her participation. [ ] Patient is unable to participate in goal setting and plan of care. This patients plan of care is appropriate for delegation to [facility].      Thank you for this referral.  Elicia Kirby OTR/L  Time Calculation: 19 mins

## 2017-05-23 NOTE — PROGRESS NOTES
PCU SHIFT NURSING NOTE      1920: Bedside shift change report given to  Dru Armijo RN (oncoming nurse) by Jenelle Hough RN (offgoing nurse). Report included the following information SBAR, Kardex, ED Summary, Procedure Summary, Intake/Output, MAR, Recent Results and Cardiac Rhythm NSR. Shift Summary:   0710: Bedside shift change report given to Jenelle Hough RN (oncoming nurse) by Calvin Crow RN (offgoing nurse). Report included the following information SBAR, Kardex, ED Summary, Intake/Output, MAR, Recent Results and Cardiac Rhythm NSR.    1200: Dr. Daria Ferreira at patient's bedside    1330: Dr. Mihaela Noe at patient bedside. Will try to keep patient off of Bipap today and over night. 1645: Patient's O2 is in the mid to lower 80's. RT called for breathing tx.     1700: Patient O2 is mid 90's. Patient is c/o of abdominal pain. Mylanta and Mylicon given to patient. Admission Date 5/21/2017   Admission Diagnosis Acute respiratory failure ([address]   Consults IP CONSULT TO PULMONOLOGY        Consults   [x]PT   [x]OT   []Speech   [x]Case Management      [] Palliative      Cardiac Monitoring Order   [x]Yes   []No     IV drips   []Yes    Drip:                            Dose:  Drip:                            Dose:  Drip:                            Dose:   [x]No     GI Prophylaxis   [x]Yes   []No         DVT Prophylaxis   SCDs:             Onel stockings:         [x] Medication   []Contraindicated   []None      Activity Level Activity Level: Up with Assistance     Activity Assistance: Partial (one person)   Purposeful Rounding every 1-2 hour? [x]Yes   Zarate Score  Total Score: 3   Bed Alarm (If score 3 or >)   []Yes   [] Refused (See signed refusal form in chart)   Joseph Score  Joseph Score: 21   Joseph Score (if score 14 or less)   []PMT consult   []Wound Care consult      []Specialty bed   [] Nutrition consult          Needs prior to discharge:   Home O2 required:    []Yes   [x]No    If yes, how much O2 required?     Other:    Last Bowel Movement: Last Bowel Movement Date: 05/20/17      Influenza Vaccine Received Flu Vaccine for Current Season (usually Sept-March): Not Flu Season        Pneumonia Vaccine           Diet Active Orders   Diet    DIET DIABETIC CONSISTENT CARB Regular      LDAs               Peripheral IV 05/21/17 Left Wrist (Active)   Site Assessment Clean, dry, & intact 5/23/2017  8:59 AM   Phlebitis Assessment 0 5/23/2017  8:59 AM   Infiltration Assessment 0 5/23/2017  8:59 AM   Dressing Status Clean, dry, & intact 5/23/2017  8:59 AM   Dressing Type Transparent;Tape 5/23/2017  8:59 AM   Hub Color/Line Status Pink;Flushed 5/23/2017  8:59 AM   Action Taken Open ports on tubing capped 5/23/2017  6:32 AM                      Urinary Catheter      Intake & Output   Date 05/22/17 0700 - 05/23/17 0659 05/23/17 0700 - 05/24/17 0659   Shift 0354-8960 5898-1566 24 Hour Total 7544-6781 6089-7377 24 Hour Total   I  N  T  A  K  E   P.O.  480 480         P. O.  480 480       Shift Total  (mL/kg)  480  (5.2) 480  (5.2)      O  U  T  P  U  T   Urine  (mL/kg/hr) 400  (0.4)  400  (0.2)         Urine Voided 400  400         Urine Occurrence(s) 1 x 2 x 3 x 1 x  1 x    Shift Total  (mL/kg) 400  (4.3)  400  (4.3)      NET -400 480 80      Weight (kg) 93.2 93.2 93.2 93.2 93.2 93.2         Readmission Risk Assessment Tool Score High Risk            31       Total Score        3 Relationship with PCP    2 Patient Living Status    4 More than 1 Admission in calendar year    5 Patient Insurance is Medicare, Medicaid or Self Pay    17 Charlson Comorbidity Score        Criteria that do not apply:    Patient Length of Stay > 5       Expected Length of Stay 3d 19h   Actual Length of Stay 2

## 2017-05-23 NOTE — PROGRESS NOTES
Hospitalist Progress Note    NAME: Nida Sy   :  1955   MRN:  [id number]         Assessment / Plan:  SIRS POA with tachycardia and tachypnea due to COPD  Acute respiratory failure with Hypoxia/hypercarbia POA  Acute COPD exacerbation POA still actively wheezing  Ongoing tobacco use  ABG 7.34//79 on 35% O2  BIPAP overnight, currently off and breathing feels a \"bit better\"  Baseline does not weak  Pulmonary help appreciated   Recent 9 days of levaquin, doxycycline for now   Steroids, nebs  ECHO: EF 60% with normal RV function  CT: There is no acute pulmonary embolism or other acute abnormality in the chest.  There is stable chronic atelectasis in the right middle lobe with stable chronic  mucous plugging of the right middle lobe bronchi. Essential Hypertension POA  BP elevated, ? Related to the steroids  Cont metoprol, lisinopril, prazosin    Non insulin dependent DM2 controlled without complication  BS at 915[facility]   hold glipizide with poor po intake   c/w SS     Visual changes  C/o intermittent diplopia for the last month  CT head: negative   consider more imaging when more stable. Pt just saw Dr. Jeyson Leon on , could also do outpt f/u pending clinical course    Hypovolemic hyponatremia  improving   IV fluids    PTSD/anxiety with depression/fibromyalgia with significant polypharmacy  Con't home xanax, tylenol #3, norco for now. Holding ultram.  Pt on multiple controlled substances and >30 meds on her list in this Pt who is only 62yo. Need to closely follow up with PCP to consider reducing overall medication burden.     Morbid obesity    Code Status: Full  Surrogate Decision Maker:   DVT Prophylaxis: lovenox     Baseline: independent  Recommended Disposition: Home w/Family     Subjective:     Chief Complaint / Reason for Physician Visit: following respiratory failure / COPD / DM  On BIPAP overnight   Now on NC O2  Breathing a bit better, but not significantly  Discussed with RN events overnight. Review of Systems:  Symptom Y/N Comments  Symptom Y/N Comments   Fever/Chills n   Chest Pain n    Poor Appetite    Edema     Cough y   Abdominal Pain n    Sputum    Joint Pain     SOB/RODRIGUEZ y   Pruritis/Rash     Nausea/vomit n   Tolerating PT/OT     Diarrhea n   Tolerating Diet     Constipation n   Other       Could NOT obtain due to:      Objective:     VITALS:   Last 24hrs VS reviewed since prior progress note. Most recent are:  Patient Vitals for the past 24 hrs:   Temp Pulse Resp BP SpO2   05/23/17 0834 98.2 °F (36.8 °C) 70 22 (!) 135/119 93 %   05/23/17 0715 - 69 - (!) 135/119 95 %   05/23/17 0659 - - - - 95 %   05/23/17 0607 - - - - 94 %   05/23/17 0344 97.8 °F (36.6 °C) 77 20 (!) 135/100 96 %   05/23/17 0133 - - - - 92 %   05/23/17 0130 - - - - 92 %   05/22/17 2356 97.9 °F (36.6 °C) 72 20 98/52 91 %   05/22/17 2031 - - - - 94 %   05/22/17 2030 - - - - 95 %   05/22/17 1930 98.2 °F (36.8 °C) (!) 109 20 165/83 92 %   05/22/17 1855 - 84 - 125/76 -   05/22/17 1535 - - - - 95 %   05/22/17 1531 98.2 °F (36.8 °C) 80 20 111/63 99 %       Intake/Output Summary (Last 24 hours) at 05/23/17 1326  Last data filed at 05/23/17 3881   Gross per 24 hour   Intake              480 ml   Output                0 ml   Net              480 ml        PHYSICAL EXAM:  General: WD, WN. Alert, cooperative, no acute distress, on BIPAP    EENT:  EOMI. Anicteric sclerae. MMM  Resp:  Coarse bronchial sound BL, no wheezing or rales. No accessory muscle use  CV:  Regular  rhythm,  No edema  GI:  Soft, Non distended, Non tender.  +Bowel sounds  Neurologic:  Alert and oriented X 3, normal speech,   Psych:   Good insight. Not anxious nor agitated  Skin:  No rashes.   No jaundice    Reviewed most current lab test results and cultures  YES  Reviewed most current radiology test results   YES  Review and summation of old records today    NO  Reviewed patient's current orders and MAR    YES  PMH/SH reviewed - no change compared to H&P  ________________________________________________________________________  Care Plan discussed with:    Comments   Patient y    Family      RN y    Care Manager y    Consultant                       y Multidiciplinary team rounds were held today with , nursing, pharmacist and clinical coordinator. Patient's plan of care was discussed; medications were reviewed and discharge planning was addressed. ________________________________________________________________________  Total NON critical care TIME:  35  Minutes    Total CRITICAL CARE TIME Spent:   Minutes non procedure based      Comments   >50% of visit spent in counseling and coordination of care     ________________________________________________________________________  Sloan Goodwin MD     Procedures: see electronic medical records for all procedures/Xrays and details which were not copied into this note but were reviewed prior to creation of Plan. LABS:  I reviewed today's most current labs and imaging studies.   Pertinent labs include:  Recent Labs      05/23/17   0359  05/22/17   0358  05/21/17   1425   WBC  10.7  10.4  11.5*   HGB  14.8  16.0  16.0   HCT  45.8  47.2*  47.0   PLT  249  309  243     Recent Labs      05/23/17   0359  05/22/17   0545  05/21/17   1425   NA  132*  135*  129*   K  4.4  3.9  4.2   CL  93*  94*  87*   CO2  34*  34*  33*   GLU  121*  117*  148*   BUN  18  20  27*   CREA  0.70  0.75  0.99   CA  9.7  9.5  9.8   MG  2.6*   --    --    PHOS  3.4   --    --    ALB   --    --   3.9   TBILI   --    --   0.4   SGOT   --    --   16   ALT   --    --   29       Signed: Sloan Goodwin MD

## 2017-05-23 NOTE — PROGRESS NOTES
[facility] Pulmonary Consult Service Note  Pulmonary, Critical Care, and Sleep Medicine    Name: Irene Rodriguez MRN: [id number]   : 1955 Hospital: [facility]   Date: 2017   Hospital Day: 3       Subjective/Interval History:   I have reviewed the flowsheet and previous days notes. Seen earlier today on rounds. Patient PCP: Lilliana Prajapati MD     better air movement on exam but still coarse with wheezes. No fever. Wore BIPAP last night but wants to see how her breathing is today when napping. She may try without tonight    IMPRESSION:   · Acute respiratory failure needed BIPAP last night but now not sure  · COPD exacerbation  · Polycythemia? Suggests chronic hypoxemia or from smoking; down a little with hydration? · Active Tobacco use  · Multiple drug allergies  · Hypotension:  better after home lisinopril, prazosin held  · Non insulin dependent DM2 controlled without complication:-  · Visual changes:  C/o intermittent diplopia for the last month;  Pt just saw Dr. Leyla Reynoso on   · Hypovolemic hyponatremia:  · PTSD/anxiety with depression/fibromyalgia with significant polypharmacy:  Pt on multiple controlled substances and >30 meds on her list in this   · Recurrent Mucous plugging by CT chest- bronch  for RML and LLL collapse; now with minimal patchy LLL infiltrate- debris in central airways on CT scan this admission  · Hypothyroidism s/p radioactive iodine ablation  · Depression  · Type II or unspecified type diabetes mellitus- worse on steroids  · Allergic rhinitis   · H/o HTN (hypertension)       RECOMMENDATIONS/PLAN:   · BIPAP prn  · Pul;monary toilet  · Pt should have overnight and exercise oximetry prior to discharge;  Might be a safety issue if she needs O2 and continues to smoke  · If pt willing, can follow in office and consider sleep study  · H and H in AM  · follow sputum culture and blood cultures  · scheduled duonebs with albuterol prn  · mucinex. Pt just completed 9 days of levaquin - will use doxycycline unless other process on CT chest  · Smoking cessation counsleing done- nicotine patch  · Monitor for psychiatric sdie effects of steroids  · Monitor sugars     Code: Full Code          Subjective/Initial History:   I have reviewed the flowsheet and previous days notes. I was asked by Nickie Feritas to see Tato Guadarrama in consultation for a chief complaint of respiratory failure requiring BIPAP  . Tato Guadarrama is a 64 y.o. female, pmhx significant for COPD, tobacco abuse, PUD, CAD, GERD, fibromyalgia, hypothyroidism, and depression/anxiety, who presents via EMS to the ED c/o intermittent SOB x 1 week with worsening constant SOB x this AM. Pt also reports associated bilateral feet swelling, blurry vision, and fatigue with her SOB. Pt states that this morning she noticed her HR was elevated, and states that she felt near-syncope after sitting up. She also notes intermittent chest pain under her R breast x \"a while. \" Pt called her PCP today who referred her to the ED. Per EMS, pt was 80% on RA, and does not wear supplemental oxygen at home. Pt says that she has been having progressive shortness of breath for \"months. \"  Per her report, she has seen her PCP 4 times for this issue without improvement. She has not been placed on steroids but has been on levaquin x 9 days now from her last visit. Pt previously was smoking >1ppd tobacco, now smoking under 1ppd but is continuing to smoke. She says that she is unable to cough anything up. She says that she has been having fevers because \"anything over 98 is a fever for me. \"  She denies lightheadedness. No chest pain but does feel some \"tightness\" with her breathing. She denies nausea but has not been eating well because she says that it feels like she can't swallow well due to her shortness of breath. She has been constipated. She complains of new LE edema. No focal weakness.   She recently saw Dr. Robbie De La Torre last week for jerking and resting tremor. Pt was given albuterol treatments en route, and was placed on BiPAP. Pt denies abdominal pain, cough, fever, chills, nausea, and vomiting.  at bedside. Pt easily upset by her daughter. She recalls needing a bronch to clear her mucous plugging two years ago.      PCP: Roxane Hills MD     PSHx: Significant for cardiac catheterization, endoscopies, ruptured discs  Social Hx: + tobacco (1ppd), + EtOH (occasionally), - Illicit Drugs      Allergies   Allergen Reactions    Latex Contact Dermatitis    Dilaudid [Hydromorphone (Pf)] Other (comments)     Feels like bugs are crawling all over her    Lipitor [Atorvastatin] Other (comments)     LIVER TROUBLE ON THIS MEDICATION    Sulfa (Sulfonamide Antibiotics) Itching        MAR reviewed and pertinent medications noted or modified as needed     Current Facility-Administered Medications   Medication    guaiFENesin ER (MUCINEX) tablet 1,200 mg    lisinopril (PRINIVIL, ZESTRIL) tablet 20 mg    prazosin (MINIPRESS) capsule 2 mg    acetaminophen-codeine (TYLENOL #3) per tablet 1 Tab    albuterol-ipratropium (DUO-NEB) 2.5 MG-0.5 MG/3 ML    ALPRAZolam (XANAX) tablet 1 mg    aspirin chewable tablet 81 mg    benzonatate (TESSALON) capsule 200 mg    cetirizine (ZYRTEC) tablet 10 mg    dicyclomine (BENTYL) capsule 10 mg    pantoprazole (PROTONIX) tablet 40 mg    gabapentin (NEURONTIN) capsule 600 mg    HYDROcodone-acetaminophen (NORCO) 7.5-325 mg per tablet 1 Tab    hyoscyamine SL (LEVSIN/SL) tablet 0.125 mg    levothyroxine (SYNTHROID) tablet 200 mcg    magnesium oxide (MAG-OX) tablet 400 mg    montelukast (SINGULAIR) tablet 10 mg    niacin SR capsule 250 mg    PHENobarbital (LUMINAL) tablet 64.8 mg    primidone (MYSOLINE) tablet 100 mg    risperiDONE (RisperDAL) tablet 0.25 mg    venlafaxine-SR (EFFEXOR-XR) capsule 75 mg    metoprolol tartrate (LOPRESSOR) tablet 12.5 mg    sodium chloride (NS) flush 5-10 mL    sodium chloride (NS) flush 5-10 mL    acetaminophen (TYLENOL) tablet 650 mg    ondansetron (ZOFRAN) injection 4 mg    bisacodyl (DULCOLAX) tablet 5 mg    enoxaparin (LOVENOX) injection 40 mg    methylPREDNISolone (PF) (SOLU-MEDROL) injection 40 mg    albuterol (PROVENTIL VENTOLIN) nebulizer solution 2.5 mg    insulin lispro (HUMALOG) injection    glucose chewable tablet 16 g    glucagon (GLUCAGEN) injection 1 mg    nicotine (NICODERM CQ) 21 mg/24 hr patch 1 Patch    dextrose 10 % infusion 125-250 mL    pravastatin (PRAVACHOL) tablet 40 mg    ezetimibe (ZETIA) tablet 10 mg    azithromycin (ZITHROMAX) tablet 250 mg    promethazine (PHENERGAN) tablet 25 mg        PMH:  has a past medical history of Anxiety; Bone spur; COPD; Depression; Endocrine disease; Fibromyalgia; Fusion of spine of cervical region; Gastrointestinal disorder; Hyperlipidemia; Hypothyroid; Morbid obesity ([address] Osteoarthritis; PUD (peptic ulcer disease); and Tobacco abuse. PSH:   has a past surgical history that includes gyn; orthopaedic; cardiac catheterization (7/11/2013); cataract removal; upper gi endoscopy,biopsy (10/30/2014); upper gi endoscopy,dilatn w guide (10/30/2014); colonoscopy,biopsy (10/30/2014); and bronchoscopy-fiber/therapy (4/3/2015). FHX: family history includes Alcohol abuse in her father; Bipolar Disorder in her daughter and father; Coronary Artery Disease in an other family member; Dementia in her father and mother; Heart Disease in her father and mother; Psychiatric Disorder in her brother, daughter, father, sister, and another family member; Suicide in her brother and sister; Thyroid Disease in her mother. SHX:  reports that she has been smoking. She has a 30.00 pack-year smoking history. She does not have any smokeless tobacco history on file. She reports that she drinks alcohol. She reports that she does not use illicit drugs.        ROS:A comprehensive review of systems was negative except for that written in the HPI. Objective:     Vital Signs: Intake/Output: Intake/Output:   Visit Vitals    BP (!) 135/119 (BP 1 Location: Left arm, BP Patient Position: At rest)    Pulse 70    Temp 98.2 °F (36.8 °C)    Resp 22    Ht 5' 4\" (1.626 m)    Wt 93.2 kg (205 lb 7.5 oz)    SpO2 93%    BMI 35.27 kg/m2      O2 Device: Nasal cannula  O2 Flow Rate (L/min): 4 l/min  Temp (24hrs), Av.1 °F (36.7 °C), Min:97.8 °F (36.6 °C), Max:98.2 °F (36.8 °C)     Intake/Output Summary (Last 24 hours) at 17 1331  Last data filed at 17 8670   Gross per 24 hour   Intake              480 ml   Output                0 ml   Net              480 ml    Last shift:         Last 3 shifts:  190 -  0700  In: 1431.7 [P.O.:600; I.V.:831.7]  Out: 950 [Urine:950]           Physical Exam:    General: Obese WF alert, oriented times 3 and moderately ill   HEENT: nasal O2; dry; no Thrush;    Neck: No abnormally enlarged lymph nodes.    Chest: increased AP diameter   Lungs: scattered wheezes bilaterally; rhonchi   Heart: Regular rate and rhythm or S1S2 present   Abdomen: soft, protuberant   :    Extremity: negative, cyanosis, clubbing 2+ bilateral pedal edema   Neuro: alert   Psych: oriented to time, place and person   Skin: Warm; ; Normal upper and lower extremity pulses   Pulses:Bilateral, Radial, 2+   Capillary refill: abnormal:  warm, brisk capillary refill,    Data:                 Labs:  Recent Labs      17   0359  17   0358  17   1425   WBC  10.7  10.4  11.5*   HGB  14.8  16.0  16.0   PLT  249  309  243     Recent Labs      17   0359  17   0545  17   1425   NA  132*  135*  129*   K  4.4  3.9  4.2   CL  93*  94*  87*   CO2  34*  34*  33*   GLU  121*  117*  148*   BUN  18  20  27*   CREA  0.70  0.75  0.99   CA  9.7  9.5  9.8   MG  2.6*   --    --    PHOS  3.4   --    --    ALB   --    --   3.9   SGOT   --    --   16   ALT   --    --   29 Recent Labs      05/22/17   0525  05/21/17   1635   PH  7.32*  7.34*   PCO2  68*  57*   PO2  83  79*   HCO3  34*  30*   FIO2  40  35       No results found for: IRON, FE, TIBC, IBCT, PSAT, FERR    Lab Results   Component Value Date/Time    Sed rate (ESR) 2 11/25/2014 03:16 PM    TSH 0.53 04/01/2015 04:23 AM        Lab Results   Component Value Date/Time    CK 42 03/31/2015 09:50 PM    CK-MB Index 5.2 03/31/2015 09:50 PM    Troponin-I, Qt. <0.04 05/21/2017 02:25 PM        Lab Results   Component Value Date/Time    Culture result: RARE  NORMAL RESPIRATORY ROBIN   04/03/2015 11:52 AM    Culture result: RARE  YEAST  ISOLATED   04/03/2015 11:52 AM    Culture result: RARE  CANDIDA ALBICANS  ISOLATED   04/03/2015 11:52 AM    Culture result:  04/03/2015 11:52 AM     CULTURE WILL BE HELD FOR 4 WEEKS. IF THERE IS ADDITIONAL FUNGAL GROWTH, A NEW REPORT WILL FOLLOW.        No results found for: TOXA1, RPR, HBCM, HBSAG, HAAB, HCAB, HCAB1, HAAT, G6PD, CRYAC, HIVGT, HIVR, HIV1, HIV12, HIVPC, HIVRPI    Lab Results   Component Value Date/Time    Vancomycin,trough 21.3 04/04/2015 03:15 AM    Vancomycin,trough 17.0 03/19/2015 05:20 AM    CK 42 03/31/2015 09:50 PM    CK 61 03/13/2015 04:00 PM       Lab Results   Component Value Date/Time    Color YELLOW 06/25/2013 05:40 PM    Appearance CLEAR 06/25/2013 05:40 PM    pH (UA) 6.5 06/25/2013 05:40 PM    Protein NEGATIVE  06/25/2013 05:40 PM    Glucose NEGATIVE  06/25/2013 05:40 PM    Ketone NEGATIVE  06/25/2013 05:40 PM    Bilirubin NEGATIVE  06/25/2013 05:40 PM    Blood NEGATIVE  06/25/2013 05:40 PM    Urobilinogen 0.2 06/25/2013 05:40 PM    Nitrites NEGATIVE  06/25/2013 05:40 PM    Leukocyte Esterase NEGATIVE  06/25/2013 05:40 PM    WBC 0-4 06/25/2013 05:40 PM    RBC 0-3 06/25/2013 05:40 PM    Bacteria NEGATIVE  06/25/2013 05:40 PM         Imaging:  I have personally reviewed the patients radiographs and have reviewed the reports:          Results from Hospital Encounter encounter on 05/21/17   XR CHEST PORT   Narrative EXAM:  XR CHEST PORT    INDICATION:  Shortness of breath for one week. COMPARISON: 4/3/2015    TECHNIQUE: Portable AP semierect upright chest view at 1359 hours    FINDINGS: Cardiac monitoring wires overlie the thorax. The cardiomediastinal  contours are stable. The pulmonary vasculature is within normal limits. The lungs and pleural spaces are clear. The bones and upper abdomen are stable. Impression IMPRESSION:    No acute process. Results from [place] encounter on 05/21/17   CTA CHEST W OR W WO CONT   Narrative EXAM:  CT angiography chest    INDICATION:  Respiratory failure. COMPARISON: Chest radiograph 5/21/2017. CT chest 4/1/2015. TECHNIQUE: Helical thin section chest CT following uneventful intravenous  administration of nonionic contrast according to departmental PE protocol. Coronal and sagittal reformats were performed. 3D/MIP post processing was  performed. CT dose reduction was achieved through use of a standardized protocol  tailored for this examination and automatic exposure control for dose  modulation. FINDINGS: This is a good quality study for the evaluation of pulmonary embolism  to the first subsegmental arterial level. There is no pulmonary embolism to this  level. N/A variant right subclavian artery is again noted. The aorta and main pulmonary  artery are normal in caliber. Cardiac size is within normal limits. No  pericardial effusion. No lymphadenopathy by imaging size criteria. There is stable chronic atelectasis in the right middle lobe with stable chronic  mucous plugging of the right middle lobe bronchi. There is no new lung mass or  consolidation. No pleural effusion or pneumothorax. Limited images of the upper abdomen are within normal limits.  The bony  structures are age-appropriate         Impression IMPRESSION:   There is no acute pulmonary embolism or other acute abnormality in the chest.  There is stable chronic atelectasis in the right middle lobe with stable chronic  mucous plugging of the right middle lobe bronchi. My assessment/plan was discussed with:  nursing    respiratory therapy Dr. loza      Complex decision making was performed which includes reviewing the patient's past medical records, current laboratory results, medications, ECG and actual Xray films, immediately available at the bedside to the patient    Thank you for allowing us to participate in the care of this patient. We will be happy to follow her progress with you.     Alexis Armenta MD

## 2017-05-23 NOTE — PROGRESS NOTES
Pharmacy Medication Reconciliation     The patient was interviewed regarding current PTA medication list, use and drug allergies;  her , Funmi Morillo, was present in room and obtained permission from patient to discuss drug regimen with visitor(s) present. The patient was questioned regarding use of any other inhalers, topical products, over the counter medications, herbal medications, vitamin products or ophthalmic/nasal/otic medication use. Allergy Update: none    Recommendations/Findings: The following amendments were made to the patient's active medication list on file at [facility]:   1) Additions:    Ventolin HFA   Guaifenesin (Mucinex)  2) Deletions: none    3) Changes:   Alprazolam increased frequency from 1 mg BID to TID   Furosemide increased from 20 mg tablets to 40 mg PO BID   Venlafaxine dose has increased to 150 mg PO BID (per Rx Query)   Patient has stopped taking lisinopril.  Patient has stopped taking niacin.      -Clarified PTA med list with the patient, her at home med list, and Rx Query. PTA medication list was corrected to the following:     Prior to Admission Medications   Prescriptions Last Dose Informant Patient Reported? Taking? ALPRAZolam (XANAX) 1 mg tablet   Yes Yes   Sig: Take 1 mg by mouth three (3) times daily as needed for Anxiety. HYDROcodone-acetaminophen (NORCO) 7.5-325 mg per tablet   Yes Yes   Sig: as needed (Gets 15 per month). MAGOX 400 mg tablet   No Yes   Sig: TAKE ONE TABLET TWICE A DAY   PHENobarbital (LUMINAL) 60 mg tablet   Yes Yes   Si mg two (2) times a day. acetaminophen-codeine (TYLENOL #3) 300-30 mg per tablet   Yes Yes   Sig: Take 1 Tab by mouth every eight (8) hours as needed (gets 30 per month). albuterol (VENTOLIN HFA) 90 mcg/actuation inhaler   Yes Yes   Sig: Take 2 Puffs by inhalation every four (4) hours as needed for Wheezing.    albuterol-ipratropium (DUONEB) 2.5 mg-0.5 mg/3 ml nebulizer solution 2017 at Unknown time  Yes Yes   Sig: 3 mL by Nebulization route every six (6) hours as needed for Wheezing. aspirin 81 mg chewable tablet  Other Yes Yes   Sig: Take 81 mg by mouth daily. benzonatate (TESSALON) 200 mg capsule   No Yes   Sig: Take 1 Cap by mouth two (2) times daily as needed. cetirizine (ZYRTEC) 10 mg tablet   Yes Yes   Sig: Take 10 mg by mouth daily. Patient is taking 1 Tab PO BID   dicyclomine (BENTYL) 10 mg capsule   Yes Yes   esomeprazole (NEXIUM) 40 mg capsule  Self Yes Yes   Sig: Take 40 mg by mouth daily. ezetimibe-simvastatin (VYTORIN 10/40) 10-40 mg per tablet   Yes Yes   Sig: Take 1 tablet by mouth nightly. furosemide (LASIX) 40 mg tablet   Yes Yes   Sig: Take 40 mg by mouth two (2) times a day.   gabapentin (NEURONTIN) 600 mg tablet   Yes Yes   Si mg two (2) times a day. glipiZIDE SR (GLUCOTROL) 5 mg CR tablet   Yes Yes   Sig: Take 5 mg by mouth two (2) times daily as needed (Patient monitors her BG levels and takes when she is also taking a steroid. ). guaiFENesin (ORGANIDIN) 400 mg tablet   Yes Yes   Sig: Take 400 mg by mouth every four (4) hours as needed for Congestion. hyoscyamine SL (LEVSIN/SL) 0.125 mg SL tablet  Self Yes Yes   Si.125 mg by SubLINGual route three (3) times daily as needed for Cramping. ibuprofen (MOTRIN) 800 mg tablet   Yes Yes   Sig: three (3) times daily. levothyroxine (SYNTHROID) 200 mcg tablet  Other Yes Yes   Sig: Take 200 mcg by mouth daily (before breakfast). lisinopril (PRINIVIL, ZESTRIL) 20 mg tablet Not Taking at Unknown time  Yes No   Si mg daily. methocarbamol (ROBAXIN) 750 mg tablet  Self Yes Yes   Sig: Take 750-1,500 mg by mouth four (4) times daily as needed. metoprolol (LOPRESSOR) 25 mg tablet   No Yes   Sig: Take 1 Tab by mouth two (2) times a day. montelukast (SINGULAIR) 10 mg tablet   Yes Yes   Sig: Take 10 mg by mouth daily. niacin  mg CR capsule Not Taking at Unknown time Self Yes No   Sig: Take 250 mg by mouth daily.    nystatin (MYCOSTATIN) 100,000 unit/mL suspension Not Taking at Unknown time  No No   Sig: Take 5 mL by mouth four (4) times daily. swish and spit   Patient taking differently: Take 500,000 Units by mouth four (4) times daily as needed. swish and spit   prazosin (MINIPRESS) 2 mg capsule   No Yes   Sig: Take 1 Cap by mouth two (2) times a day. primidone (MYSOLINE) 50 mg tablet   No Yes   Sig: Take 2 Tabs by mouth nightly. risperiDONE (RISPERDAL) 0.25 mg tablet   Yes Yes   Si.25 mg two (2) times a day. traMADol (ULTRAM) 50 mg tablet   Yes Yes   Sig: Take 50 mg by mouth every six (6) hours as needed for Pain. venlafaxine-SR (EFFEXOR-XR) 150 mg capsule   Yes Yes   Sig: Take 150 mg by mouth two (2) times a day. vit B Cmplx 3-FA-Vit C-Biotin (NEPHRO SHREYA RX) 1- mg-mg-mcg tablet   Yes Yes   Sig: Take 1 Tab by mouth daily.       Facility-Administered Medications: None          Thank you,  Debbi Quintana [facility] PharmD candidate 2018      Reviewed with Sara Cast, PALLAVID

## 2017-05-23 NOTE — PROGRESS NOTES
1930 Bedside shift change report received from [facility]. SBAR,MAR, VS and plan of care reviewed. 2000 Assisted pt up to bathroom, ambulates with steady gait. VSS. 4L nc toerated well. 2100 Placed on bipap per request, pt wants to get some sleep/rest.  Prn xanax given. 0000 VSS. No change in assessment. 0400  Labs drawn and sent.   Bipap off, 4L O2 via nc, assisted pt up to recliner chair per request.

## 2017-05-24 LAB
ANION GAP BLD CALC-SCNC: 6 MMOL/L (ref 5–15)
ARTERIAL PATENCY WRIST A: ABNORMAL
BASE EXCESS BLDA CALC-SCNC: 4.9 MMOL/L
BASOPHILS # BLD AUTO: 0 K/UL (ref 0–0.1)
BASOPHILS # BLD: 0 % (ref 0–1)
BDY SITE: ABNORMAL
BREATHS.SPONTANEOUS ON VENT: 16
BUN SERPL-MCNC: 22 MG/DL (ref 6–20)
BUN/CREAT SERPL: 27 (ref 12–20)
CALCIUM SERPL-MCNC: 9 MG/DL (ref 8.5–10.1)
CHLORIDE SERPL-SCNC: 90 MMOL/L (ref 97–108)
CO2 SERPL-SCNC: 34 MMOL/L (ref 21–32)
CREAT SERPL-MCNC: 0.81 MG/DL (ref 0.55–1.02)
EOSINOPHIL # BLD: 0 K/UL (ref 0–0.4)
EOSINOPHIL NFR BLD: 0 % (ref 0–7)
ERYTHROCYTE [DISTWIDTH] IN BLOOD BY AUTOMATED COUNT: 13.8 % (ref 11.5–14.5)
GAS FLOW.O2 O2 DELIVERY SYS: 6 L/MIN
GLUCOSE BLD STRIP.AUTO-MCNC: 132 MG/DL (ref 65–100)
GLUCOSE BLD STRIP.AUTO-MCNC: 148 MG/DL (ref 65–100)
GLUCOSE BLD STRIP.AUTO-MCNC: 168 MG/DL (ref 65–100)
GLUCOSE BLD STRIP.AUTO-MCNC: 180 MG/DL (ref 65–100)
GLUCOSE SERPL-MCNC: 116 MG/DL (ref 65–100)
HCO3 BLDA-SCNC: 32 MMOL/L (ref 22–26)
HCT VFR BLD AUTO: 43.5 % (ref 35–47)
HGB BLD-MCNC: 14.7 G/DL (ref 11.5–16)
LYMPHOCYTES # BLD AUTO: 26 % (ref 12–49)
LYMPHOCYTES # BLD: 2.6 K/UL (ref 0.8–3.5)
MCH RBC QN AUTO: 33 PG (ref 26–34)
MCHC RBC AUTO-ENTMCNC: 33.8 G/DL (ref 30–36.5)
MCV RBC AUTO: 97.8 FL (ref 80–99)
MONOCYTES # BLD: 0.7 K/UL (ref 0–1)
MONOCYTES NFR BLD AUTO: 7 % (ref 5–13)
NEUTS SEG # BLD: 6.6 K/UL (ref 1.8–8)
NEUTS SEG NFR BLD AUTO: 67 % (ref 32–75)
PCO2 BLDA: 59 MMHG (ref 35–45)
PH BLDA: 7.36 [PH] (ref 7.35–7.45)
PLATELET # BLD AUTO: 232 K/UL (ref 150–400)
PO2 BLDA: 50 MMHG (ref 80–100)
POTASSIUM SERPL-SCNC: 4.4 MMOL/L (ref 3.5–5.1)
RBC # BLD AUTO: 4.45 M/UL (ref 3.8–5.2)
SAO2 % BLD: 83 % (ref 92–97)
SAO2% DEVICE SAO2% SENSOR NAME: ABNORMAL
SERVICE CMNT-IMP: ABNORMAL
SODIUM SERPL-SCNC: 130 MMOL/L (ref 136–145)
SPECIMEN SITE: ABNORMAL
WBC # BLD AUTO: 9.9 K/UL (ref 3.6–11)

## 2017-05-24 PROCEDURE — 77030027138 HC INCENT SPIROMETER -A

## 2017-05-24 PROCEDURE — 74011250637 HC RX REV CODE- 250/637: Performed by: INTERNAL MEDICINE

## 2017-05-24 PROCEDURE — 80048 BASIC METABOLIC PNL TOTAL CA: CPT | Performed by: INTERNAL MEDICINE

## 2017-05-24 PROCEDURE — 36415 COLL VENOUS BLD VENIPUNCTURE: CPT | Performed by: INTERNAL MEDICINE

## 2017-05-24 PROCEDURE — 74011636637 HC RX REV CODE- 636/637: Performed by: INTERNAL MEDICINE

## 2017-05-24 PROCEDURE — 36600 WITHDRAWAL OF ARTERIAL BLOOD: CPT | Performed by: INTERNAL MEDICINE

## 2017-05-24 PROCEDURE — 97530 THERAPEUTIC ACTIVITIES: CPT

## 2017-05-24 PROCEDURE — 97161 PT EVAL LOW COMPLEX 20 MIN: CPT

## 2017-05-24 PROCEDURE — 65660000000 HC RM CCU STEPDOWN

## 2017-05-24 PROCEDURE — 77010033678 HC OXYGEN DAILY

## 2017-05-24 PROCEDURE — G8978 MOBILITY CURRENT STATUS: HCPCS

## 2017-05-24 PROCEDURE — G8979 MOBILITY GOAL STATUS: HCPCS

## 2017-05-24 PROCEDURE — 82803 BLOOD GASES ANY COMBINATION: CPT | Performed by: INTERNAL MEDICINE

## 2017-05-24 PROCEDURE — 74011250636 HC RX REV CODE- 250/636: Performed by: INTERNAL MEDICINE

## 2017-05-24 PROCEDURE — 74011250637 HC RX REV CODE- 250/637: Performed by: HOSPITALIST

## 2017-05-24 PROCEDURE — 85025 COMPLETE CBC W/AUTO DIFF WBC: CPT | Performed by: INTERNAL MEDICINE

## 2017-05-24 PROCEDURE — 94660 CPAP INITIATION&MGMT: CPT

## 2017-05-24 PROCEDURE — 82962 GLUCOSE BLOOD TEST: CPT

## 2017-05-24 PROCEDURE — 74011000250 HC RX REV CODE- 250: Performed by: INTERNAL MEDICINE

## 2017-05-24 PROCEDURE — 97535 SELF CARE MNGMENT TRAINING: CPT

## 2017-05-24 PROCEDURE — 94640 AIRWAY INHALATION TREATMENT: CPT

## 2017-05-24 RX ADMIN — ALUMINUM HYDROXIDE, MAGNESIUM HYDROXIDE, AND SIMETHICONE 30 ML: 200; 200; 20 SUSPENSION ORAL at 20:42

## 2017-05-24 RX ADMIN — INSULIN LISPRO 2 UNITS: 100 INJECTION, SOLUTION INTRAVENOUS; SUBCUTANEOUS at 18:06

## 2017-05-24 RX ADMIN — Medication 250 MG: at 08:27

## 2017-05-24 RX ADMIN — GABAPENTIN 600 MG: 300 CAPSULE ORAL at 18:08

## 2017-05-24 RX ADMIN — Medication 10 ML: at 05:36

## 2017-05-24 RX ADMIN — ALPRAZOLAM 1 MG: 0.5 TABLET ORAL at 18:08

## 2017-05-24 RX ADMIN — HYDROCODONE BITARTRATE AND ACETAMINOPHEN 1 TABLET: 7.5; 325 TABLET ORAL at 22:14

## 2017-05-24 RX ADMIN — VENLAFAXINE HYDROCHLORIDE 75 MG: 37.5 CAPSULE, EXTENDED RELEASE ORAL at 08:28

## 2017-05-24 RX ADMIN — ALPRAZOLAM 1 MG: 0.5 TABLET ORAL at 08:28

## 2017-05-24 RX ADMIN — AZITHROMYCIN 250 MG: 250 TABLET, FILM COATED ORAL at 08:30

## 2017-05-24 RX ADMIN — METHYLPREDNISOLONE SODIUM SUCCINATE 40 MG: 40 INJECTION, POWDER, FOR SOLUTION INTRAMUSCULAR; INTRAVENOUS at 20:29

## 2017-05-24 RX ADMIN — ACETAMINOPHEN AND CODEINE PHOSPHATE 1 TABLET: 300; 30 TABLET ORAL at 18:09

## 2017-05-24 RX ADMIN — CETIRIZINE HYDROCHLORIDE 10 MG: 10 TABLET, FILM COATED ORAL at 08:29

## 2017-05-24 RX ADMIN — FLUTICASONE FUROATE AND VILANTEROL TRIFENATATE 1 PUFF: 100; 25 POWDER RESPIRATORY (INHALATION) at 08:36

## 2017-05-24 RX ADMIN — HYDROCODONE BITARTRATE AND ACETAMINOPHEN 1 TABLET: 7.5; 325 TABLET ORAL at 14:46

## 2017-05-24 RX ADMIN — ASPIRIN 81 MG CHEWABLE TABLET 81 MG: 81 TABLET CHEWABLE at 08:28

## 2017-05-24 RX ADMIN — LEVOTHYROXINE SODIUM 200 MCG: 100 TABLET ORAL at 07:35

## 2017-05-24 RX ADMIN — IPRATROPIUM BROMIDE AND ALBUTEROL SULFATE 3 ML: .5; 3 SOLUTION RESPIRATORY (INHALATION) at 19:57

## 2017-05-24 RX ADMIN — METOPROLOL TARTRATE 12.5 MG: 25 TABLET ORAL at 08:29

## 2017-05-24 RX ADMIN — GUAIFENESIN 1200 MG: 600 TABLET, EXTENDED RELEASE ORAL at 08:28

## 2017-05-24 RX ADMIN — PANTOPRAZOLE SODIUM 40 MG: 40 TABLET, DELAYED RELEASE ORAL at 07:35

## 2017-05-24 RX ADMIN — MONTELUKAST SODIUM 10 MG: 10 TABLET, FILM COATED ORAL at 08:29

## 2017-05-24 RX ADMIN — METHYLPREDNISOLONE SODIUM SUCCINATE 40 MG: 40 INJECTION, POWDER, FOR SOLUTION INTRAMUSCULAR; INTRAVENOUS at 08:27

## 2017-05-24 RX ADMIN — IPRATROPIUM BROMIDE AND ALBUTEROL SULFATE 3 ML: .5; 3 SOLUTION RESPIRATORY (INHALATION) at 07:45

## 2017-05-24 RX ADMIN — PHENOBARBITAL 64.8 MG: 32.4 TABLET ORAL at 18:08

## 2017-05-24 RX ADMIN — Medication 10 ML: at 18:12

## 2017-05-24 RX ADMIN — PRAVASTATIN SODIUM 40 MG: 40 TABLET ORAL at 20:35

## 2017-05-24 RX ADMIN — PRAZOSIN HYDROCHLORIDE 2 MG: 1 CAPSULE ORAL at 08:27

## 2017-05-24 RX ADMIN — ALUMINUM HYDROXIDE, MAGNESIUM HYDROXIDE, AND SIMETHICONE 30 ML: 200; 200; 20 SUSPENSION ORAL at 14:47

## 2017-05-24 RX ADMIN — SIMETHICONE CHEW TAB 80 MG 80 MG: 80 TABLET ORAL at 08:27

## 2017-05-24 RX ADMIN — METOPROLOL TARTRATE 12.5 MG: 25 TABLET ORAL at 20:30

## 2017-05-24 RX ADMIN — GABAPENTIN 600 MG: 300 CAPSULE ORAL at 08:28

## 2017-05-24 RX ADMIN — INSULIN LISPRO 2 UNITS: 100 INJECTION, SOLUTION INTRAVENOUS; SUBCUTANEOUS at 12:51

## 2017-05-24 RX ADMIN — ENOXAPARIN SODIUM 40 MG: 40 INJECTION SUBCUTANEOUS at 08:27

## 2017-05-24 RX ADMIN — SIMETHICONE CHEW TAB 80 MG 80 MG: 80 TABLET ORAL at 14:47

## 2017-05-24 RX ADMIN — IPRATROPIUM BROMIDE AND ALBUTEROL SULFATE 3 ML: .5; 3 SOLUTION RESPIRATORY (INHALATION) at 13:50

## 2017-05-24 RX ADMIN — ALUMINUM HYDROXIDE, MAGNESIUM HYDROXIDE, AND SIMETHICONE 30 ML: 200; 200; 20 SUSPENSION ORAL at 08:27

## 2017-05-24 RX ADMIN — PRIMIDONE 100 MG: 50 TABLET ORAL at 20:28

## 2017-05-24 RX ADMIN — IPRATROPIUM BROMIDE AND ALBUTEROL SULFATE 3 ML: .5; 3 SOLUTION RESPIRATORY (INHALATION) at 05:45

## 2017-05-24 RX ADMIN — EZETIMIBE 10 MG: 10 TABLET ORAL at 20:29

## 2017-05-24 RX ADMIN — RISPERIDONE 0.25 MG: 0.25 TABLET ORAL at 08:29

## 2017-05-24 RX ADMIN — GUAIFENESIN 1200 MG: 600 TABLET, EXTENDED RELEASE ORAL at 20:29

## 2017-05-24 RX ADMIN — PHENOBARBITAL 64.8 MG: 32.4 TABLET ORAL at 08:28

## 2017-05-24 RX ADMIN — LISINOPRIL 20 MG: 20 TABLET ORAL at 08:30

## 2017-05-24 RX ADMIN — Medication 400 MG: at 08:30

## 2017-05-24 RX ADMIN — RISPERIDONE 0.25 MG: 0.25 TABLET ORAL at 18:08

## 2017-05-24 RX ADMIN — Medication 10 ML: at 20:31

## 2017-05-24 NOTE — PROGRESS NOTES
PCU SHIFT NURSING NOTE      1945: Bedside shift change report given to MARIOLA Fernandez (oncoming nurse) by Sarina Gutierrez RN (offgoing nurse). Report included the following information SBAR, Kardex, ED Summary, Procedure Summary, Intake/Output, MAR, Recent Results and Cardiac Rhythm NSR. Shift Summary:   0730: Bedside shift change report given to Oc Rasmussen (oncoming nurse) by Brinda Foote RN (offgoing nurse). Report included the following information SBAR, Kardex, ED Summary, Intake/Output, Accordion, Recent Results and Cardiac Rhythm nsr.      1100: Patient O2 sustaining in the mid 80's on 6 L nasal cannula. Dr Yamilex Wong paged and updated patient condition. Blood gases were out of range, patient's PO2: 50. Per Dr. Yamilex Wong will place patient on Bipap. 1245: Patient off Bipap for lunch and placed on 6L NC.     1330: Patient back on Bipap O2 sustaining in the mid to low 80's.    1745: Patient off Bipap and on 6L nasal cannula. Patient remaining the low 90's. Will continue to monitor patient's O2. Admission Date 5/21/2017   Admission Diagnosis Acute respiratory failure ([facility] [address]   Consults IP CONSULT TO PULMONOLOGY        Consults   [x]PT   [x]OT   []Speech   [x]Case Management      [] Palliative      Cardiac Monitoring Order   [x]Yes   []No     IV drips   []Yes    Drip:                            Dose:  Drip:                            Dose:  Drip:                            Dose:   [x]No     GI Prophylaxis   [x]Yes   []No         DVT Prophylaxis   SCDs:             Onel stockings:         [x] Medication   []Contraindicated   []None      Activity Level Activity Level: Up with Assistance     Activity Assistance: Partial (one person)   Purposeful Rounding every 1-2 hour?    [x]Yes   Zarate Score  Total Score: 3   Bed Alarm (If score 3 or >)   []Yes   [] Refused (See signed refusal form in chart)   Joseph Score  Joseph Score: 21   Joseph Score (if score 14 or less)   []PMT consult   []Wound Care consult      []Specialty bed   [] Nutrition consult          Needs prior to discharge:   Home O2 required:    [x]Yes   []No    If yes, how much O2 required? Other:    Last Bowel Movement: Last Bowel Movement Date: 05/21/17      Influenza Vaccine Received Flu Vaccine for Current Season (usually Sept-March): Not Flu Season        Pneumonia Vaccine           Diet Active Orders   Diet    DIET DIABETIC CONSISTENT CARB Regular      LDAs               Peripheral IV 05/21/17 Left Wrist (Active)   Site Assessment Clean, dry, & intact 5/23/2017  3:52 PM   Phlebitis Assessment 0 5/23/2017  3:52 PM   Infiltration Assessment 0 5/23/2017  3:52 PM   Dressing Status Clean, dry, & intact 5/23/2017  3:52 PM   Dressing Type Transparent;Tape 5/23/2017  3:52 PM   Hub Color/Line Status Pink 5/23/2017  3:52 PM   Action Taken Open ports on tubing capped 5/23/2017  6:32 AM                      Urinary Catheter      Intake & Output   Date 05/23/17 0700 - 05/24/17 0659 05/24/17 0700 - 05/25/17 0659   Shift 4355-5585 6958-8551 24 Hour Total 3473-6411 0839-1597 24 Hour Total   I  N  T  A  K  E   P.O. 6143 114 5139         P. O. 4186 200 6764       Shift Total  (mL/kg) 1100  (11.8) 240  (2.5) 1340  (14)      O  U  T  P  U  T   Urine  (mL/kg/hr)            Urine Occurrence(s) 1 x 1 x 2 x       Shift Total  (mL/kg)         NET 1305 143 9454      Weight (kg) 93.2 95.6 95.6 95.6 95.6 95.6         Readmission Risk Assessment Tool Score High Risk            31       Total Score        3 Relationship with PCP    2 Patient Living Status    4 More than 1 Admission in calendar year    5 Patient Insurance is Medicare, Medicaid or Self Pay    17 Charlson Comorbidity Score        Criteria that do not apply:    Patient Length of Stay > 5       Expected Length of Stay 3d 19h   Actual Length of Stay 3

## 2017-05-24 NOTE — ROUTINE PROCESS
Bedside and Verbal shift change report given to Sarabjit Crenshaw RN  (oncoming nurse) by Apoorva Clark RN (offgoing nurse). Report included the following information SBAR, Kardex, Intake/Output, MAR, Recent Results and Cardiac Rhythm Sinus Tach   Patient states that her pain is an 8. I gave her Tylenol 3 for her pain. MD wanted to see if patient can be weaned from [facility]. It is on stand by PRN. Patient on 5 liters NC 02 . 2315 Went in to assess patient . Patients sats were in upper 80's and was breathing with her mouth open. I put patient on bipap, respiratory aware. Patients b/p remains in the upper 05'T systolic and 67'A -96'R Dyslalic,. Patient taken off bipap at 0500. Put back on 5L NC. Bedside and Verbal shift change report given to Sarabjit Crenshaw RN (oncoming nurse) by Apoorva Clark RN (offgoing nurse). Report included the following information SBAR, Kardex, Procedure Summary, Intake/Output, MAR and Recent Results.
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
## 2017-05-24 NOTE — PROGRESS NOTES
Problem: Mobility Impaired (Adult and Pediatric)  Goal: *Acute Goals and Plan of Care (Insert Text)  Physical Therapy Goals  Initiated 5/24/2017  1. Patient will move from supine to sit and sit to supine , scoot up and down and roll side to side in bed with independence within 7 day(s). 2. Patient will transfer from bed to chair and chair to bed with independence using the least restrictive device within 7 day(s). 3. Patient will perform sit to stand with independence within 7 day(s). 4. Patient will ambulate with independence for 250 feet with SPO2 >90% throughout and the least restrictive device within 7 day(s). 5. Patient will ascend/descend 4 stairs with single handrail(s) with supervision/set-up within 7 day(s). PHYSICAL THERAPY EVALUATION  Patient: Jaimie Holden (30 y.o. female)  Date: 5/24/2017  Primary Diagnosis: Acute respiratory failure (HCC)        Precautions:         ASSESSMENT :  Based on the objective data described below, the patient presents with impaired activity tolerance, decreased stability that impacts functional skills. Patient I PTA although with baseline of instability and furniture walking/reliance on spouse for support. She was not on home O2 prior with smoking history. Patient received sitting EOB, on 5L NCO2, with SPO2 at 86%. Majority of session spent attempting to improve saturation, with immediate trial of various breathing techniques followed by incentive spirometer use as well as flutter device use largely ineffective. Trialed venti-mask at 12L in addition to mask with oxygen still not improving possibly 2/2 ARF as well as COPD baseline, thus placed on 6L NCO2 for ambulation noted below with several deviations noted, as well as rest periods 2/2 fatigue. She did report feeling as if LE's would 'buckle' during session, although none noted. Concluded at bedside chair with all needs met and after 5 minutes of rest O2 saturation at 90% on 6L NCO2. RT then arrived to draw ABG's. Given above deficits, she would benefit greatly from pulmonary rehabilitation at [place] if pulmonary needs and poor activity tolerance does not improve. .     Patient will benefit from skilled intervention to address the above impairments. Patients rehabilitation potential is considered to be Fair  Factors which may influence rehabilitation potential include:   [ ]         None noted  [ ]         Mental ability/status  [X]         Medical condition  [ ]         Home/family situation and support systems  [ ]         Safety awareness  [ ]         Pain tolerance/management  [ ]         Other:        PLAN :  Recommendations and Planned Interventions:  [X]           Bed Mobility Training             [ ]    Neuromuscular Re-Education  [X]           Transfer Training                   [ ]    Orthotic/Prosthetic Training  [X]           Gait Training                         [ ]    Modalities  [X]           Therapeutic Exercises           [ ]    Edema Management/Control  [X]           Therapeutic Activities            [X]    Patient and Family Training/Education  [ ]           Other (comment):     Frequency/Duration: Patient will be followed by physical therapy  5 times a week to address goals. Discharge Recommendations: Inpatient PULMONARY Rehab  Further Equipment Recommendations for Discharge: defer       SUBJECTIVE:   Patient stated Cheese and rice.       OBJECTIVE DATA SUMMARY:   HISTORY:    Past Medical History:   Diagnosis Date    Anxiety      Bone spur       NECK    COPD      Depression      Endocrine disease       hypothyroidism    Fibromyalgia      Fusion of spine of cervical region      Gastrointestinal disorder       gerd    Hyperlipidemia      Hypothyroid      Morbid obesity ([address]      Osteoarthritis      PUD (peptic ulcer disease)      Tobacco abuse       Past Surgical History:   Procedure Laterality Date    BRONCHOSCOPY-FIBER/THERAPY   4/3/2015          CARDIAC CATHETERIZATION   7/11/2013  COLONOSCOPY,DIAGNOSTIC   10/30/2014          HX CATARACT REMOVAL         bilateral    HX GYN             HX ORTHOPAEDIC         Ruptured disc, knuckles on right hand    UPPER GI ENDOSCOPY,BIOPSY   10/30/2014          UPPER GI ENDOSCOPY,GAYLE W GUIDE   10/30/2014           Prior Level of Function/Home Situation: Furniture walks or holds onto spouse for ambulation, or cart in stores. She denies falls, but reports unsteadiness at baseline for approximately 1 year. She denies prior 02 use. She drives, but reports double vision/spots/lines in her eye sight for a few weeks time on occasion. Personal factors and/or comorbidities impacting plan of care:      Home Situation  Home Environment: Private residence  # Steps to Enter: 4  Rails to Enter: Yes  One/Two Story Residence: One story  Living Alone: No  Support Systems:  ( works during the day)  Patient Expects to be Discharged to[de-identified] Private residence  Current DME Used/Available at Home: Grab bars  Tub or Shower Type: Tub/Shower combination (been sponge bathing due to weakness)     EXAMINATION/PRESENTATION/DECISION MAKING:   Critical Behavior:  Neurologic State: Appropriate for age, Alert  Orientation Level: Appropriate for age  Cognition: Appropriate decision making, Appropriate for age attention/concentration, Appropriate safety awareness, Follows commands  Safety/Judgement: Awareness of environment, Fall prevention, Insight into deficits, Home safety  Hearing:   Auditory  Auditory Impairment: None  Range Of Motion:  AROM: Within functional limits                       Strength:    Strength: Generally decreased, functional                    Tone & Sensation:   Tone: Normal              Sensation: Intact               Coordination:  Coordination: Within functional limits     Functional Mobility:  Bed Mobility:  Rolling:  (began seated EOB )           Transfers:  Sit to Stand: Contact guard assistance  Stand to Sit: Contact guard assistance Balance:   Sitting: Intact; Without support  Standing: Impaired; Without support  Standing - Static: Good  Standing - Dynamic : Fair  Ambulation/Gait Training:  Distance (ft): 100 Feet (ft)  Assistive Device: Gait belt  Ambulation - Level of Assistance: Contact guard assistance     Gait Description (WDL): Exceptions to WDL  Gait Abnormalities: Decreased step clearance; Path deviations              Speed/Awa: Pace decreased (<100 feet/min) (x2 standing rest periods 30\" )  Step Length: Left shortened;Right shortened                             VC's for avoidance of path deviations, upright gaze      Functional Measure:  Barthel Index:      Bathin  Bladder: 10  Bowels: 10  Groomin  Dressin  Feeding: 10  Mobility: 0  Stairs: 0  Toilet Use: 5  Transfer (Bed to Chair and Back): 10  Total: 55         Barthel and G-code impairment scale:  Percentage of impairment CH  0% CI  1-19% CJ  20-39% CK  40-59% CL  60-79% CM  80-99% CN  100%   Barthel Score 0-100 100 99-80 79-60 59-40 20-39 1-19    0   Barthel Score 0-20 20 17-19 13-16 9-12 5-8 1-4 0      The Barthel ADL Index: Guidelines  1. The index should be used as a record of what a patient does, not as a record of what a patient could do. 2. The main aim is to establish degree of independence from any help, physical or verbal, however minor and for whatever reason. 3. The need for supervision renders the patient not independent. 4. A patient's performance should be established using the best available evidence. Asking the patient, friends/relatives and nurses are the usual sources, but direct observation and common sense are also important. However direct testing is not needed. 5. Usually the patient's performance over the preceding 24-48 hours is important, but occasionally longer periods will be relevant. 6. Middle categories imply that the patient supplies over 50 per cent of the effort. 7. Use of aids to be independent is allowed. Xuan Brady., Barthel, D.W. (5521). Functional evaluation: the Barthel Index. [address] (14)2. HUI Harding, Femi Ordonez., Sydnee Rivera., Joanna, [address] (1999). Measuring the change indisability after inpatient rehabilitation; comparison of the responsiveness of the Barthel Index and Functional [place]cock Measure. Journal of Neurology, Neurosurgery, and Psychiatry, 66(4), 381-922. CHAUNCEY Haas, JESIKA Maynard, & Jamie Marrero M.A. (2004.) Assessment of post-stroke quality of life in cost-effectiveness studies: The usefulness of the Barthel Index and the EuroQoL-5D. Quality of Life Research, 13, 874-53            G codes: In compliance with CMSs Claims Based Outcome Reporting, the following G-code set was chosen for this patient based on their primary functional limitation being treated: The outcome measure chosen to determine the severity of the functional limitation was the barthel with a score of 55/100 which was correlated with the impairment scale. · Mobility - Walking and Moving Around:               - CURRENT STATUS:    CK - 40%-59% impaired, limited or restricted               - GOAL STATUS:           CJ - 20%-39% impaired, limited or restricted               - D/C STATUS:                       ---------------To be determined---------------      Pain:  Pain Scale 1: Numeric (0 - 10)  Pain Intensity 1: 0              Activity Tolerance:   POOR 2/2 drop in SPO2 as low as 80%, sustaining between 80-85% during all gait activity despite multiple breathing strategies, use of 6L NCO2, use of 6L NCO2 + venti-mask at 12L, use of incentive spirometer, use of flutter device. SPO2 improve to 88%, 90% with 5 minutes of rest at conclusion. Please refer to the flowsheet for vital signs taken during this treatment.   After treatment:   [X]         Patient left in no apparent distress sitting up in chair  [ ]         Patient left in no apparent distress in bed  [X]         Call bell left within reach  [X]         Nursing notified  [X]         RT present  [ ]         Bed alarm activated      COMMUNICATION/EDUCATION:   The patients plan of care was discussed with: Occupational Therapist and Registered Nurse.  [X]         Fall prevention education was provided and the patient/caregiver indicated understanding. [X]         Patient/family have participated as able in goal setting and plan of care. [X]         Patient/family agree to work toward stated goals and plan of care. [ ]         Patient understands intent and goals of therapy, but is neutral about his/her participation. [ ]         Patient is unable to participate in goal setting and plan of care.      Thank you for this referral.  Clark Alston, PT, DPT    Time Calculation: 29 mins

## 2017-05-24 NOTE — PROGRESS NOTES
Hospitalist Progress Note    NAME: Lizeth Trujillo   :  1955   MRN:  [id number]         Assessment / Plan:  SIRS POA with tachycardia and tachypnea due to COPD  Acute respiratory failure with Hypoxia/hypercarbia POA  Acute COPD exacerbation POA still actively wheezing  Ongoing tobacco use  ABG 7.34/57/79 on 35% O2  Remains hypoxic, now on BIPAP, does not wear O2 at home      ABG earlier on 6 liters 7.36/59/50  Pulmonary help appreciated   Recent 9 days of levaquin, doxycycline for now   Steroids, nebs  ECHO: EF 60% with normal RV function  CTA chest: There is no acute pulmonary embolism or other acute abnormality in the chest. There is stable chronic atelectasis in the right middle lobe with stable chronic  mucous plugging of the right middle lobe bronchi. Pulmonary rehab at discharge    Essential Hypertension POA  BP now running low  Cont metoprolol  Hold lisinopril, prazosin    Non insulin dependent DM2 controlled without complication  , 99, 167, 81, 148  hold glipizide with poor po intake   c/w SS     Visual changes  C/o intermittent diplopia for the last month  CT head: negative   consider more imaging when more stable. Pt just saw  [facility] [place] on , could also do outpt f/u pending clinical course    Hypovolemic hyponatremia  Stable, PO fluid restrict if drops further  IV fluids    PTSD/anxiety with depression/fibromyalgia with significant polypharmacy  Con't home xanax, tylenol #3, norco for now. Holding ultram.  Pt on multiple controlled substances and >30 meds on her list in this Pt who is only 60yo. Need to closely follow up with PCP to consider reducing overall medication burden.     Morbid obesity    Code Status: Full  Surrogate Decision Maker:   DVT Prophylaxis: lovenox     Baseline: independent  Recommended Disposition: Home w/Family     Subjective:     Chief Complaint / Reason for Physician Visit: following respiratory failure / COPD / DM  \"Breathing feels a bit better\"  Off BIPAP, less SOB  Very tearful, \"I just want to go home\"  Still complains of trouble cough up her phlegm  Discussed with RN events overnight. Review of Systems:  Symptom Y/N Comments  Symptom Y/N Comments   Fever/Chills n   Chest Pain n    Poor Appetite    Edema     Cough y   Abdominal Pain n    Sputum    Joint Pain     SOB/RODRIGUEZ y   Pruritis/Rash     Nausea/vomit n   Tolerating PT/OT     Diarrhea n   Tolerating Diet     Constipation n   Other       Could NOT obtain due to:      Objective:     VITALS:   Last 24hrs VS reviewed since prior progress note. Most recent are:  Patient Vitals for the past 24 hrs:   Temp Pulse Resp BP SpO2   05/24/17 1351 - - - - (!) 88 %   05/24/17 1213 - - - - 91 %   05/24/17 0746 - - - - 96 %   05/24/17 0716 98.2 °F (36.8 °C) 76 20 124/68 96 %   05/24/17 0500 97.7 °F (36.5 °C) 61 20 101/62 99 %   05/24/17 0300 - 69 - 104/58 97 %   05/24/17 0100 - 76 - 96/53 98 %   05/24/17 0046 - - - - 99 %   05/24/17 0024 - 70 20 93/56 (!) 89 %   05/24/17 0000 - 70 - (!) 87/43 95 %   05/23/17 2330 97.7 °F (36.5 °C) 81 20 92/46 90 %   05/23/17 2327 - 75 - (!) 88/49 92 %   05/23/17 2123 - (!) 103 - - -   05/23/17 2035 - - - - 93 %   05/23/17 2007 - 100 - (!) 85/51 92 %   05/23/17 2005 98.7 °F (37.1 °C) (!) 101 20 (!) 83/46 93 %   05/23/17 1733 97.8 °F (36.6 °C) 98 20 115/76 (!) 87 %   05/23/17 1728 - 97 - 111/67 -       Intake/Output Summary (Last 24 hours) at 05/24/17 1720  Last data filed at 05/24/17 0547   Gross per 24 hour   Intake              240 ml   Output                0 ml   Net              240 ml        PHYSICAL EXAM:  General: WD, WN. Alert, cooperative, no acute distress, off BIPAP  EENT:  EOMI. Anicteric sclerae. MMM  Resp:  Moving air better with mild diffuse wheezing.   No accessory muscle use  CV:  Regular  rhythm,  No edema  GI:  Soft, Non distended, Non tender.  +Bowel sounds  Neurologic:  Alert and oriented X 3, normal speech, non focal  Psych:   Good insight. Not anxious nor agitated  Skin:  No rashes. No jaundice    Reviewed most current lab test results and cultures  YES  Reviewed most current radiology test results   YES  Review and summation of old records today    NO  Reviewed patient's current orders and MAR    YES  PMH/SH reviewed - no change compared to H&P  ________________________________________________________________________  Care Plan discussed with:    Comments   Patient y    Family      RN y    Care Manager y    Consultant                       y Multidiciplinary team rounds were held today with , nursing, pharmacist and clinical coordinator. Patient's plan of care was discussed; medications were reviewed and discharge planning was addressed. ________________________________________________________________________  Total NON critical care TIME:  25  Minutes    Total CRITICAL CARE TIME Spent:   Minutes non procedure based      Comments   >50% of visit spent in counseling and coordination of care     ________________________________________________________________________  Racheal Triplett MD     Procedures: see electronic medical records for all procedures/Xrays and details which were not copied into this note but were reviewed prior to creation of Plan. LABS:  I reviewed today's most current labs and imaging studies.   Pertinent labs include:  Recent Labs      05/24/17   0615  05/23/17 0359  05/22/17   0358   WBC  9.9  10.7  10.4   HGB  14.7  14.8  16.0   HCT  43.5  45.8  47.2*   PLT  232  249  309     Recent Labs      05/24/17   0849  05/23/17 0359  05/22/17   0545   NA  130*  132*  135*   K  4.4  4.4  3.9   CL  90*  93*  94*   CO2  34*  34*  34*   GLU  116*  121*  117*   BUN  22*  18  20   CREA  0.81  0.70  0.75   CA  9.0  9.7  9.5   MG   --   2.6*   --    PHOS   --   3.4   --        Signed: Racheal Triplett MD

## 2017-05-24 NOTE — PROGRESS NOTES
[facility] Pulmonary Consult Service Note  Pulmonary, Critical Care, and Sleep Medicine    Name: Irene Rodriguez MRN: [id number]   : 1955 Hospital: [facility]   Date: 2017   Hospital Day: 4       Subjective/Interval History:   I have reviewed the flowsheet and previous days notes. Seen earlier today on rounds. Patient PCP: Lilliana Prajapati MD     better air movement on exam but still coarse with wheezes. No fever. Wore BIPAP last night but wants to see how her breathing is today when napping. She may try without tonight    IMPRESSION:   · Acute respiratory failure needed BIPAP last night but now not sure  · COPD exacerbation  · Polycythemia? Suggests chronic hypoxemia or from smoking; down a little with hydration? If it climbs, may need phlebotomy  · Active Tobacco use  · hyponatremia  · Multiple drug allergies  · Hypotension:  better after home lisinopril, prazosin held  · Non insulin dependent DM2 controlled without complication:-  · Visual changes:  C/o intermittent diplopia for the last month;  Pt just saw Dr. Leyla Reynoso on   · Hypovolemic hyponatremia:  · PTSD/anxiety with depression/fibromyalgia with significant polypharmacy:  Pt on multiple controlled substances and >30 meds on her list in this   · Recurrent Mucous plugging by CT chest- bronch  for RML and LLL collapse; now with minimal patchy LLL infiltrate- debris in central airways on CT scan this admission  · Hypothyroidism s/p radioactive iodine ablation  · Depression  · Type II or unspecified type diabetes mellitus- worse on steroids  · Allergic rhinitis   · H/o HTN (hypertension)       RECOMMENDATIONS/PLAN:   · BIPAP prn  · Pulmonary toilet  · Pt should have overnight and exercise oximetry prior to discharge;  Might be a safety issue if she needs O2 and continues to smoke  · Consider phelbotomy  · If pt willing, can follow in office and consider sleep study  · follow sputum culture and blood cultures  · scheduled duonebs with albuterol prn  · mucinex. Pt just completed 9 days of levaquin - will use doxycycline unless other process on CT chest  · Smoking cessation counsleing done- nicotine patch  · Monitor for psychiatric sdie effects of steroids  · Monitor sugars     Code: Full Code          Subjective/Initial History:   I have reviewed the flowsheet and previous days notes. I was asked by Daniel Pavon to see Ortega Kwan in consultation for a chief complaint of respiratory failure requiring BIPAP  . Ortega Kwan is a 64 y.o. female, pmhx significant for COPD, tobacco abuse, PUD, CAD, GERD, fibromyalgia, hypothyroidism, and depression/anxiety, who presents via EMS to the ED c/o intermittent SOB x 1 week with worsening constant SOB x this AM. Pt also reports associated bilateral feet swelling, blurry vision, and fatigue with her SOB. Pt states that this morning she noticed her HR was elevated, and states that she felt near-syncope after sitting up. She also notes intermittent chest pain under her R breast x \"a while. \" Pt called her PCP today who referred her to the ED. Per EMS, pt was 80% on RA, and does not wear supplemental oxygen at home. Pt says that she has been having progressive shortness of breath for \"months. \"  Per her report, she has seen her PCP 4 times for this issue without improvement. She has not been placed on steroids but has been on levaquin x 9 days now from her last visit. Pt previously was smoking >1ppd tobacco, now smoking under 1ppd but is continuing to smoke. She says that she is unable to cough anything up. She says that she has been having fevers because \"anything over 98 is a fever for me. \"  She denies lightheadedness. No chest pain but does feel some \"tightness\" with her breathing. She denies nausea but has not been eating well because she says that it feels like she can't swallow well due to her shortness of breath. She has been constipated.  She complains of new LE edema. No focal weakness. She recently saw Dr. Jeyson Leon last week for jerking and resting tremor. Pt was given albuterol treatments en route, and was placed on BiPAP. Pt denies abdominal pain, cough, fever, chills, nausea, and vomiting.  at bedside. Pt easily upset by her daughter. She recalls needing a bronch to clear her mucous plugging two years ago.      PCP: Tereza Chatman MD     PSHx: Significant for cardiac catheterization, endoscopies, ruptured discs  Social Hx: + tobacco (1ppd), + EtOH (occasionally), - Illicit Drugs      Allergies   Allergen Reactions    Latex Contact Dermatitis    Dilaudid [Hydromorphone (Pf)] Other (comments)     Feels like bugs are crawling all over her    Lipitor [Atorvastatin] Other (comments)     LIVER TROUBLE ON THIS MEDICATION    Sulfa (Sulfonamide Antibiotics) Itching        MAR reviewed and pertinent medications noted or modified as needed     Current Facility-Administered Medications   Medication    fluticasone-vilanterol (BREO ELLIPTA) 100mcg-25mcg/puff    hydrALAZINE (APRESOLINE) 20 mg/mL injection 20 mg    simethicone (MYLICON) tablet 80 mg    alum-mag hydroxide-simeth (MYLANTA) oral suspension 30 mL    guaiFENesin ER (MUCINEX) tablet 1,200 mg    lisinopril (PRINIVIL, ZESTRIL) tablet 20 mg    prazosin (MINIPRESS) capsule 2 mg    acetaminophen-codeine (TYLENOL #3) per tablet 1 Tab    albuterol-ipratropium (DUO-NEB) 2.5 MG-0.5 MG/3 ML    ALPRAZolam (XANAX) tablet 1 mg    aspirin chewable tablet 81 mg    benzonatate (TESSALON) capsule 200 mg    cetirizine (ZYRTEC) tablet 10 mg    dicyclomine (BENTYL) capsule 10 mg    pantoprazole (PROTONIX) tablet 40 mg    gabapentin (NEURONTIN) capsule 600 mg    HYDROcodone-acetaminophen (NORCO) 7.5-325 mg per tablet 1 Tab    hyoscyamine SL (LEVSIN/SL) tablet 0.125 mg    levothyroxine (SYNTHROID) tablet 200 mcg    magnesium oxide (MAG-OX) tablet 400 mg    montelukast (SINGULAIR) tablet 10 mg  niacin SR capsule 250 mg    PHENobarbital (LUMINAL) tablet 64.8 mg    primidone (MYSOLINE) tablet 100 mg    risperiDONE (RisperDAL) tablet 0.25 mg    venlafaxine-SR (EFFEXOR-XR) capsule 75 mg    metoprolol tartrate (LOPRESSOR) tablet 12.5 mg    sodium chloride (NS) flush 5-10 mL    sodium chloride (NS) flush 5-10 mL    acetaminophen (TYLENOL) tablet 650 mg    ondansetron (ZOFRAN) injection 4 mg    bisacodyl (DULCOLAX) tablet 5 mg    enoxaparin (LOVENOX) injection 40 mg    methylPREDNISolone (PF) (SOLU-MEDROL) injection 40 mg    albuterol (PROVENTIL VENTOLIN) nebulizer solution 2.5 mg    insulin lispro (HUMALOG) injection    glucose chewable tablet 16 g    glucagon (GLUCAGEN) injection 1 mg    nicotine (NICODERM CQ) 21 mg/24 hr patch 1 Patch    dextrose 10 % infusion 125-250 mL    pravastatin (PRAVACHOL) tablet 40 mg    ezetimibe (ZETIA) tablet 10 mg    azithromycin (ZITHROMAX) tablet 250 mg    promethazine (PHENERGAN) tablet 25 mg        PMH:  has a past medical history of Anxiety; Bone spur; COPD; Depression; Endocrine disease; Fibromyalgia; Fusion of spine of cervical region; Gastrointestinal disorder; Hyperlipidemia; Hypothyroid; Morbid obesity ([address] Osteoarthritis; PUD (peptic ulcer disease); and Tobacco abuse. PSH:   has a past surgical history that includes gyn; orthopaedic; cardiac catheterization (7/11/2013); cataract removal; upper gi endoscopy,biopsy (10/30/2014); upper gi endoscopy,dilatn w guide (10/30/2014); colonoscopy,biopsy (10/30/2014); and bronchoscopy-fiber/therapy (4/3/2015). FHX: family history includes Alcohol abuse in her father; Bipolar Disorder in her daughter and father; Coronary Artery Disease in an other family member; Dementia in her father and mother; Heart Disease in her father and mother; Psychiatric Disorder in her brother, daughter, father, sister, and another family member; Suicide in her brother and sister; Thyroid Disease in her mother.      SHX: reports that she has been smoking. She has a 30.00 pack-year smoking history. She does not have any smokeless tobacco history on file. She reports that she drinks alcohol. She reports that she does not use illicit drugs. ROS:A comprehensive review of systems was negative except for that written in the HPI. Objective:     Vital Signs: Intake/Output: Intake/Output:   Visit Vitals    /68 (BP 1 Location: Right arm, BP Patient Position: At rest)    Pulse 76    Temp 98.2 °F (36.8 °C)    Resp 20    Ht 5' 4\" (1.626 m)    Wt 95.6 kg (210 lb 12.2 oz)    SpO2 96%    BMI 36.18 kg/m2      O2 Device: Nasal cannula  O2 Flow Rate (L/min): 4 l/min  Temp (24hrs), Av.1 °F (36.7 °C), Min:97.7 °F (36.5 °C), Max:98.7 °F (37.1 °C)     Intake/Output Summary (Last 24 hours) at 17 0933  Last data filed at 17 0547   Gross per 24 hour   Intake             1340 ml   Output                0 ml   Net             1340 ml    Last shift:         Last 3 shifts:  1901 -  0700  In: 7018 [P.O.:1820]  Out: -            Physical Exam:    General: Obese WF alert, oriented times 3 and moderately ill   HEENT: nasal O2; dry; no Thrush;    Neck: No abnormally enlarged lymph nodes.    Chest: increased AP diameter   Lungs: scattered wheezes bilaterally; rhonchi   Heart: Regular rate and rhythm or S1S2 present   Abdomen: soft, protuberant   :    Extremity: negative, cyanosis, clubbing 2+ bilateral pedal edema   Neuro: alert   Psych: oriented to time, place and person   Skin: Warm; ; Normal upper and lower extremity pulses   Pulses:Bilateral, Radial, 2+   Capillary refill: abnormal:  warm, brisk capillary refill,    Data:                 Labs:  Recent Labs      17   0615  17   0359  17   0358   WBC  9.9  10.7  10.4   HGB  14.7  14.8  16.0   PLT  232  249  309     Recent Labs      17   0849  17   0359  17   0545  17   1425   NA  130*  132*  135*  129*   K  4.4  4.4  3.9 4.2   CL  90*  93*  94*  87*   CO2  34*  34*  34*  33*   GLU  116*  121*  117*  148*   BUN  22*  18  20  27*   CREA  0.81  0.70  0.75  0.99   CA  9.0  9.7  9.5  9.8   MG   --   2.6*   --    --    PHOS   --   3.4   --    --    ALB   --    --    --   3.9   SGOT   --    --    --   16   ALT   --    --    --   29     Recent Labs      05/22/17   0525  05/21/17   1635   PH  7.32*  7.34*   PCO2  68*  57*   PO2  83  79*   HCO3  34*  30*   FIO2  40  35       No results found for: IRON, FE, TIBC, IBCT, PSAT, FERR    Lab Results   Component Value Date/Time    Sed rate (ESR) 2 11/25/2014 03:16 PM    TSH 0.53 04/01/2015 04:23 AM        Lab Results   Component Value Date/Time    CK 42 03/31/2015 09:50 PM    CK-MB Index 5.2 03/31/2015 09:50 PM    Troponin-I, Qt. <0.04 05/21/2017 02:25 PM        Lab Results   Component Value Date/Time    Culture result: RARE  NORMAL RESPIRATORY ROBIN   04/03/2015 11:52 AM    Culture result: RARE  YEAST  ISOLATED   04/03/2015 11:52 AM    Culture result: RARE  CANDIDA ALBICANS  ISOLATED   04/03/2015 11:52 AM    Culture result:  04/03/2015 11:52 AM     CULTURE WILL BE HELD FOR 4 WEEKS. IF THERE IS ADDITIONAL FUNGAL GROWTH, A NEW REPORT WILL FOLLOW.        No results found for: TOXA1, RPR, HBCM, HBSAG, HAAB, HCAB, HCAB1, HAAT, G6PD, CRYAC, HIVGT, HIVR, HIV1, HIV12, HIVPC, HIVRPI    Lab Results   Component Value Date/Time    Vancomycin,trough 21.3 04/04/2015 03:15 AM    Vancomycin,trough 17.0 03/19/2015 05:20 AM    CK 42 03/31/2015 09:50 PM    CK 61 03/13/2015 04:00 PM       Lab Results   Component Value Date/Time    Color YELLOW 06/25/2013 05:40 PM    Appearance CLEAR 06/25/2013 05:40 PM    pH (UA) 6.5 06/25/2013 05:40 PM    Protein NEGATIVE  06/25/2013 05:40 PM    Glucose NEGATIVE  06/25/2013 05:40 PM    Ketone NEGATIVE  06/25/2013 05:40 PM    Bilirubin NEGATIVE  06/25/2013 05:40 PM    Blood NEGATIVE  06/25/2013 05:40 PM    Urobilinogen 0.2 06/25/2013 05:40 PM    Nitrites NEGATIVE  06/25/2013 05:40 PM Leukocyte Esterase NEGATIVE  06/25/2013 05:40 PM    WBC 0-4 06/25/2013 05:40 PM    RBC 0-3 06/25/2013 05:40 PM    Bacteria NEGATIVE  06/25/2013 05:40 PM         Imaging:  I have personally reviewed the patients radiographs and have reviewed the reports:          Results from Hospital Encounter encounter on 05/21/17   XR CHEST PORT   Narrative EXAM:  XR CHEST PORT    INDICATION:  Shortness of breath for one week. COMPARISON: 4/3/2015    TECHNIQUE: Portable AP semierect upright chest view at 1359 hours    FINDINGS: Cardiac monitoring wires overlie the thorax. The cardiomediastinal  contours are stable. The pulmonary vasculature is within normal limits. The lungs and pleural spaces are clear. The bones and upper abdomen are stable. Impression IMPRESSION:    No acute process. Results from [place] encounter on 05/21/17   CTA CHEST W OR W WO CONT   Narrative EXAM:  CT angiography chest    INDICATION:  Respiratory failure. COMPARISON: Chest radiograph 5/21/2017. CT chest 4/1/2015. TECHNIQUE: Helical thin section chest CT following uneventful intravenous  administration of nonionic contrast according to departmental PE protocol. Coronal and sagittal reformats were performed. 3D/MIP post processing was  performed. CT dose reduction was achieved through use of a standardized protocol  tailored for this examination and automatic exposure control for dose  modulation. FINDINGS: This is a good quality study for the evaluation of pulmonary embolism  to the first subsegmental arterial level. There is no pulmonary embolism to this  level. N/A variant right subclavian artery is again noted. The aorta and main pulmonary  artery are normal in caliber. Cardiac size is within normal limits. No  pericardial effusion. No lymphadenopathy by imaging size criteria.     There is stable chronic atelectasis in the right middle lobe with stable chronic  mucous plugging of the right middle lobe bronchi. There is no new lung mass or  consolidation. No pleural effusion or pneumothorax. Limited images of the upper abdomen are within normal limits. The bony  structures are age-appropriate         Impression IMPRESSION:   There is no acute pulmonary embolism or other acute abnormality in the chest.  There is stable chronic atelectasis in the right middle lobe with stable chronic  mucous plugging of the right middle lobe bronchi. My assessment/plan was discussed with:  nursing    respiratory therapy Dr. loza      Complex decision making was performed which includes reviewing the patient's past medical records, current laboratory results, medications, ECG and actual Xray films, immediately available at the bedside to the patient    Thank you for allowing us to participate in the care of this patient. We will be happy to follow her progress with you.     Rakesh Gonzalez MD

## 2017-05-25 LAB
ANION GAP BLD CALC-SCNC: 6 MMOL/L (ref 5–15)
BASOPHILS # BLD AUTO: 0 K/UL (ref 0–0.1)
BASOPHILS # BLD: 0 % (ref 0–1)
BNP SERPL-MCNC: 17 PG/ML (ref 0–100)
BUN SERPL-MCNC: 24 MG/DL (ref 6–20)
BUN/CREAT SERPL: 30 (ref 12–20)
CALCIUM SERPL-MCNC: 9.3 MG/DL (ref 8.5–10.1)
CHLORIDE SERPL-SCNC: 93 MMOL/L (ref 97–108)
CO2 SERPL-SCNC: 33 MMOL/L (ref 21–32)
CREAT SERPL-MCNC: 0.79 MG/DL (ref 0.55–1.02)
EOSINOPHIL # BLD: 0 K/UL (ref 0–0.4)
EOSINOPHIL NFR BLD: 0 % (ref 0–7)
ERYTHROCYTE [DISTWIDTH] IN BLOOD BY AUTOMATED COUNT: 14.1 % (ref 11.5–14.5)
GLUCOSE BLD STRIP.AUTO-MCNC: 148 MG/DL (ref 65–100)
GLUCOSE BLD STRIP.AUTO-MCNC: 167 MG/DL (ref 65–100)
GLUCOSE BLD STRIP.AUTO-MCNC: 81 MG/DL (ref 65–100)
GLUCOSE BLD STRIP.AUTO-MCNC: 99 MG/DL (ref 65–100)
GLUCOSE SERPL-MCNC: 143 MG/DL (ref 65–100)
HCT VFR BLD AUTO: 44.4 % (ref 35–47)
HGB BLD-MCNC: 14.3 G/DL (ref 11.5–16)
LYMPHOCYTES # BLD AUTO: 25 % (ref 12–49)
LYMPHOCYTES # BLD: 2.5 K/UL (ref 0.8–3.5)
MCH RBC QN AUTO: 31.4 PG (ref 26–34)
MCHC RBC AUTO-ENTMCNC: 32.2 G/DL (ref 30–36.5)
MCV RBC AUTO: 97.4 FL (ref 80–99)
MONOCYTES # BLD: 0.6 K/UL (ref 0–1)
MONOCYTES NFR BLD AUTO: 6 % (ref 5–13)
NEUTS SEG # BLD: 7.1 K/UL (ref 1.8–8)
NEUTS SEG NFR BLD AUTO: 69 % (ref 32–75)
PLATELET # BLD AUTO: 259 K/UL (ref 150–400)
POTASSIUM SERPL-SCNC: 4.9 MMOL/L (ref 3.5–5.1)
RBC # BLD AUTO: 4.56 M/UL (ref 3.8–5.2)
SERVICE CMNT-IMP: ABNORMAL
SERVICE CMNT-IMP: ABNORMAL
SERVICE CMNT-IMP: NORMAL
SERVICE CMNT-IMP: NORMAL
SODIUM SERPL-SCNC: 132 MMOL/L (ref 136–145)
WBC # BLD AUTO: 10.2 K/UL (ref 3.6–11)

## 2017-05-25 PROCEDURE — 74011250637 HC RX REV CODE- 250/637: Performed by: HOSPITALIST

## 2017-05-25 PROCEDURE — 77010033678 HC OXYGEN DAILY

## 2017-05-25 PROCEDURE — 74011250637 HC RX REV CODE- 250/637: Performed by: INTERNAL MEDICINE

## 2017-05-25 PROCEDURE — 65660000000 HC RM CCU STEPDOWN

## 2017-05-25 PROCEDURE — 36415 COLL VENOUS BLD VENIPUNCTURE: CPT | Performed by: INTERNAL MEDICINE

## 2017-05-25 PROCEDURE — 74011636637 HC RX REV CODE- 636/637: Performed by: INTERNAL MEDICINE

## 2017-05-25 PROCEDURE — 85025 COMPLETE CBC W/AUTO DIFF WBC: CPT | Performed by: INTERNAL MEDICINE

## 2017-05-25 PROCEDURE — 82962 GLUCOSE BLOOD TEST: CPT

## 2017-05-25 PROCEDURE — 83880 ASSAY OF NATRIURETIC PEPTIDE: CPT | Performed by: INTERNAL MEDICINE

## 2017-05-25 PROCEDURE — 80048 BASIC METABOLIC PNL TOTAL CA: CPT | Performed by: INTERNAL MEDICINE

## 2017-05-25 PROCEDURE — 74011000250 HC RX REV CODE- 250: Performed by: INTERNAL MEDICINE

## 2017-05-25 PROCEDURE — 94640 AIRWAY INHALATION TREATMENT: CPT

## 2017-05-25 PROCEDURE — 94660 CPAP INITIATION&MGMT: CPT

## 2017-05-25 PROCEDURE — 74011250636 HC RX REV CODE- 250/636: Performed by: INTERNAL MEDICINE

## 2017-05-25 RX ORDER — IBUPROFEN 400 MG/1
800 TABLET ORAL
Status: DISCONTINUED | OUTPATIENT
Start: 2017-05-25 | End: 2017-05-27 | Stop reason: HOSPADM

## 2017-05-25 RX ORDER — FACIAL-BODY WIPES
10 EACH TOPICAL DAILY PRN
Status: DISCONTINUED | OUTPATIENT
Start: 2017-05-25 | End: 2017-05-27 | Stop reason: HOSPADM

## 2017-05-25 RX ADMIN — IPRATROPIUM BROMIDE AND ALBUTEROL SULFATE 3 ML: .5; 3 SOLUTION RESPIRATORY (INHALATION) at 01:05

## 2017-05-25 RX ADMIN — GUAIFENESIN 1200 MG: 600 TABLET, EXTENDED RELEASE ORAL at 21:06

## 2017-05-25 RX ADMIN — PHENOBARBITAL 64.8 MG: 32.4 TABLET ORAL at 17:23

## 2017-05-25 RX ADMIN — IBUPROFEN 800 MG: 400 TABLET, FILM COATED ORAL at 15:32

## 2017-05-25 RX ADMIN — IPRATROPIUM BROMIDE AND ALBUTEROL SULFATE 3 ML: .5; 3 SOLUTION RESPIRATORY (INHALATION) at 13:56

## 2017-05-25 RX ADMIN — AZITHROMYCIN 250 MG: 250 TABLET, FILM COATED ORAL at 08:55

## 2017-05-25 RX ADMIN — ALUMINUM HYDROXIDE, MAGNESIUM HYDROXIDE, AND SIMETHICONE 30 ML: 200; 200; 20 SUSPENSION ORAL at 08:54

## 2017-05-25 RX ADMIN — INSULIN LISPRO 2 UNITS: 100 INJECTION, SOLUTION INTRAVENOUS; SUBCUTANEOUS at 13:24

## 2017-05-25 RX ADMIN — MONTELUKAST SODIUM 10 MG: 10 TABLET, FILM COATED ORAL at 08:55

## 2017-05-25 RX ADMIN — ALUMINUM HYDROXIDE, MAGNESIUM HYDROXIDE, AND SIMETHICONE 30 ML: 200; 200; 20 SUSPENSION ORAL at 17:23

## 2017-05-25 RX ADMIN — GABAPENTIN 600 MG: 300 CAPSULE ORAL at 08:54

## 2017-05-25 RX ADMIN — METHYLPREDNISOLONE SODIUM SUCCINATE 40 MG: 40 INJECTION, POWDER, FOR SOLUTION INTRAMUSCULAR; INTRAVENOUS at 21:00

## 2017-05-25 RX ADMIN — ENOXAPARIN SODIUM 40 MG: 40 INJECTION SUBCUTANEOUS at 08:54

## 2017-05-25 RX ADMIN — GUAIFENESIN 1200 MG: 600 TABLET, EXTENDED RELEASE ORAL at 08:56

## 2017-05-25 RX ADMIN — Medication 10 ML: at 21:06

## 2017-05-25 RX ADMIN — ACETAMINOPHEN AND CODEINE PHOSPHATE 1 TABLET: 300; 30 TABLET ORAL at 03:20

## 2017-05-25 RX ADMIN — ACETAMINOPHEN AND CODEINE PHOSPHATE 1 TABLET: 300; 30 TABLET ORAL at 14:50

## 2017-05-25 RX ADMIN — METOPROLOL TARTRATE 12.5 MG: 25 TABLET ORAL at 08:56

## 2017-05-25 RX ADMIN — Medication 10 ML: at 15:33

## 2017-05-25 RX ADMIN — IPRATROPIUM BROMIDE AND ALBUTEROL SULFATE 3 ML: .5; 3 SOLUTION RESPIRATORY (INHALATION) at 19:46

## 2017-05-25 RX ADMIN — RISPERIDONE 0.25 MG: 0.25 TABLET ORAL at 08:56

## 2017-05-25 RX ADMIN — PRIMIDONE 100 MG: 50 TABLET ORAL at 21:06

## 2017-05-25 RX ADMIN — HYDROCODONE BITARTRATE AND ACETAMINOPHEN 1 TABLET: 7.5; 325 TABLET ORAL at 21:05

## 2017-05-25 RX ADMIN — VENLAFAXINE HYDROCHLORIDE 75 MG: 37.5 CAPSULE, EXTENDED RELEASE ORAL at 08:54

## 2017-05-25 RX ADMIN — ALPRAZOLAM 1 MG: 0.5 TABLET ORAL at 17:24

## 2017-05-25 RX ADMIN — LEVOTHYROXINE SODIUM 200 MCG: 100 TABLET ORAL at 07:24

## 2017-05-25 RX ADMIN — SIMETHICONE CHEW TAB 80 MG 80 MG: 80 TABLET ORAL at 08:54

## 2017-05-25 RX ADMIN — FLUTICASONE FUROATE AND VILANTEROL TRIFENATATE 1 PUFF: 100; 25 POWDER RESPIRATORY (INHALATION) at 08:57

## 2017-05-25 RX ADMIN — GABAPENTIN 600 MG: 300 CAPSULE ORAL at 17:23

## 2017-05-25 RX ADMIN — Medication 10 ML: at 06:04

## 2017-05-25 RX ADMIN — ALPRAZOLAM 1 MG: 0.5 TABLET ORAL at 22:37

## 2017-05-25 RX ADMIN — ACETAMINOPHEN AND CODEINE PHOSPHATE 1 TABLET: 300; 30 TABLET ORAL at 22:37

## 2017-05-25 RX ADMIN — Medication 250 MG: at 08:54

## 2017-05-25 RX ADMIN — EZETIMIBE 10 MG: 10 TABLET ORAL at 21:06

## 2017-05-25 RX ADMIN — ASPIRIN 81 MG CHEWABLE TABLET 81 MG: 81 TABLET CHEWABLE at 08:54

## 2017-05-25 RX ADMIN — CETIRIZINE HYDROCHLORIDE 10 MG: 10 TABLET, FILM COATED ORAL at 08:56

## 2017-05-25 RX ADMIN — RISPERIDONE 0.25 MG: 0.25 TABLET ORAL at 17:23

## 2017-05-25 RX ADMIN — SIMETHICONE CHEW TAB 80 MG 80 MG: 80 TABLET ORAL at 17:24

## 2017-05-25 RX ADMIN — ALPRAZOLAM 1 MG: 0.5 TABLET ORAL at 03:20

## 2017-05-25 RX ADMIN — BISACODYL 5 MG: 5 TABLET, COATED ORAL at 17:33

## 2017-05-25 RX ADMIN — ALPRAZOLAM 1 MG: 0.5 TABLET ORAL at 08:57

## 2017-05-25 RX ADMIN — PANTOPRAZOLE SODIUM 40 MG: 40 TABLET, DELAYED RELEASE ORAL at 08:54

## 2017-05-25 RX ADMIN — HYDROCODONE BITARTRATE AND ACETAMINOPHEN 1 TABLET: 7.5; 325 TABLET ORAL at 09:02

## 2017-05-25 RX ADMIN — METHYLPREDNISOLONE SODIUM SUCCINATE 40 MG: 40 INJECTION, POWDER, FOR SOLUTION INTRAMUSCULAR; INTRAVENOUS at 08:55

## 2017-05-25 RX ADMIN — PHENOBARBITAL 64.8 MG: 32.4 TABLET ORAL at 08:55

## 2017-05-25 RX ADMIN — IPRATROPIUM BROMIDE AND ALBUTEROL SULFATE 3 ML: .5; 3 SOLUTION RESPIRATORY (INHALATION) at 07:28

## 2017-05-25 RX ADMIN — METOPROLOL TARTRATE 12.5 MG: 25 TABLET ORAL at 21:06

## 2017-05-25 RX ADMIN — PRAVASTATIN SODIUM 40 MG: 40 TABLET ORAL at 21:06

## 2017-05-25 RX ADMIN — Medication 400 MG: at 08:56

## 2017-05-25 NOTE — PROGRESS NOTES
DME orders for at home oxygen have been signed and are on chart. Oxygen challenge on chart. Rn informed of need for O2 challenge in the morning due to holiday weekend upcoming and [place] will be required. [employer].      Bety Medina RN CM  Ext [phone]

## 2017-05-25 NOTE — PROGRESS NOTES
Bedside shift change report received from [facility]. SBAR, MAR, VS and plan of care reviewed. Pt is sitting up in chair eating dinner, spouse at bedside. NSR, 4L oxygen via nc.   0000  VSS, resting quietly. Bipap on at hs. No acute change in assessment. 0400 Labs drawn and sent. 0700 Up to recliner chair. 4L oxygen via nc placed on. Bedside shift change report given to RN.

## 2017-05-25 NOTE — PROGRESS NOTES
PCU SHIFT NURSING NOTE      1930: Bedside shift change report given to MARIOLA Fernandez (oncoming nurse) by Pernell Vallecillo RN (offgoing nurse). Report included the following information SBAR, Kardex, ED Summary, Intake/Output, MAR, Recent Results and Cardiac Rhythm NSR. Shift Summary:   0730: Bedside shift change report given to Pernell Vallecillo RN (oncoming nurse) by MARIOLA Fernandez (offgoing nurse). Report included the following information SBAR, Kardex, ED Summary, Intake/Output, MAR, Recent Results and Cardiac Rhythm NSR.     1130: Per another RN, patient was found very upset, taking oxygen off, and crying. Patient was found trying to take her own medication. She is prescribed 800 mg Ibuprofen. Patient stated she was upset d/t her visit with MD. Patient was calmed down and is sitting on the side of the bed. Patient is refusing to wear her oxygen right now. 1230: Patient agreed to put oxygen back on. At home medication removed from room and locked in patient bin and per  mg Ibuprofen was order PRN as needed. 1430: Dr. Rico Wall at bedside. 1445: Patient is tearful and asking for anxiety medicine. She was informed she is unable to have it until 1700. Patient began crying again. Patient refusing to wear oxygen at this time. 1645: Case management needs a oxygen test done in the morning in order to determine if patient needs oxygen at home. Patient stated she will go to a pulmonary rehab but would prefer [facility]. [facility]. Paper work was placed on patient's chart for oxygen test and for home O2. Case management will follow up tomorrow. 1800: Patient is much more calm and resting in bed.        Admission Date 5/21/2017   Admission Diagnosis Acute respiratory failure ([address]   Consults IP CONSULT TO PULMONOLOGY        Consults   [x]PT   [x]OT   []Speech   [x]Case Management      [] Palliative      Cardiac Monitoring Order   [x]Yes   []No     IV drips   []Yes    Drip: Dose:  Drip:                            Dose:  Drip:                            Dose:   [x]No     GI Prophylaxis   [x]Yes   []No         DVT Prophylaxis   SCDs:             Onel stockings:         [x] Medication   []Contraindicated   []None      Activity Level Activity Level: Up with Assistance     Activity Assistance: Partial (one person)   Purposeful Rounding every 1-2 hour? [x]Yes   Zarate Score  Total Score: 3   Bed Alarm (If score 3 or >)   []Yes   [] Refused (See signed refusal form in chart)   Joseph Score  Joseph Score: 21   Joseph Score (if score 14 or less)   []PMT consult   []Wound Care consult      []Specialty bed   [] Nutrition consult          Needs prior to discharge:   Home O2 required:    [x]Yes   []No    If yes, how much O2 required? TBD    Other:    Last Bowel Movement: Last Bowel Movement Date: 05/21/17      Influenza Vaccine Received Flu Vaccine for Current Season (usually Sept-March): Not Flu Season        Pneumonia Vaccine           Diet Active Orders   Diet    DIET DIABETIC CONSISTENT CARB Regular      LDAs               Peripheral IV 05/21/17 Left Wrist (Active)   Site Assessment Swelling;Dry 5/25/2017  7:29 AM   Phlebitis Assessment 0 5/25/2017  7:29 AM   Infiltration Assessment 0 5/25/2017  7:29 AM   Dressing Status Old drainage; Intact 5/25/2017  7:29 AM   Dressing Type Transparent;Tape 5/25/2017  7:29 AM   Hub Color/Line Status Pink 5/25/2017  7:29 AM   Action Taken Open ports on tubing capped 5/23/2017  6:32 AM                      Urinary Catheter      Intake & Output   Date 05/24/17 0700 - 05/25/17 0659 05/25/17 0700 - 05/26/17 0659   Shift 0700-1859 1900-0659 24 Hour Total 0700-1859 1900-0659 24 Hour Total   I  N  T  A  K  E   P.O. 830   1000      P. O. 830   1000    Shift Total  (mL/kg) 830  (8.7) 120  (1.3) 950  (9.9) 1000  (10.5)  1000  (10.5)   O  U  T  P  U  T   Urine  (mL/kg/hr)            Urine Occurrence(s) 3 x 4 x 7 x       Shift Total  (mL/kg)    1000   Weight (kg) 95.6 95.6 95.6 95.6 95.6 95.6         Readmission Risk Assessment Tool Score High Risk            31       Total Score        3 Relationship with PCP    2 Patient Living Status    4 More than 1 Admission in calendar year    5 Patient Insurance is Medicare, Medicaid or Self Pay    17 Charlson Comorbidity Score        Criteria that do not apply:    Patient Length of Stay > 5       Expected Length of Stay 3d 19h   Actual Length of Stay 4

## 2017-05-25 NOTE — PROGRESS NOTES
OT Note:  Chart reviewed, spoke with RN who cleared pt for OT attempt though stated that patient was upset. Pt received sitting edge of bed eating lunch, stating \"I'm not in the mood\", when asked to participate in OT once she completed lunch. Pt tear-eyed, reporting upset with physician \"My feelings are hurt. \" \"I don't want to talk about it. I just want to be left alone. I am not in the mood. \"   Deferring OT at this time, will continue to follow acutely.

## 2017-05-25 NOTE — PROGRESS NOTES
Cm met with patient for intial assessment. Name and  confirmed as pt identifiers. Demographics confirmed. Patient admitted 2017 for SIRS, Acute Respiratory Failure, COPD, tobacco use, hyptension, DM2 and visual changes. Patient lives in a single story home with her . 4 steps at entry. Patient reports being independent with ADL's prior to admission including driving. Patient's  works and is scheduled for knee surgery in the near future. DME - none at home    Preferred [address]. No previous HH/SNF experience. Care Management Interventions  PCP Verified by CM: Yes  Mode of Transport at Discharge:  Other (see comment) (family)  Transition of Care Consult (CM Consult): Discharge Planning  Physical Therapy Consult: Yes  Occupational Therapy Consult: Yes  Current Support Network: Lives with Spouse  Confirm Follow Up Transport: Family  Plan discussed with Pt/Family/Caregiver: Yes  Discharge Location  Discharge Placement: [address] [place] [address], [address] ([facility])

## 2017-05-25 NOTE — PROGRESS NOTES
[facility] Pulmonary Consult Service Note  Pulmonary, Critical Care, and Sleep Medicine    Name: Genet Simpson MRN: [id number]   : 1955 Hospital: [facility]   Date: 2017   Hospital Day: 5       Subjective/Interval History:   I have reviewed the flowsheet and previous days notes. Seen earlier today on rounds. Patient PCP: Monique Matson MD     still wore BIPAP. Tired Of Oxygen. Up in chair. No fever. Smoking cessation counseling done   still wore BIPAP; some thick sputum   better air movement on exam but still coarse with wheezes. No fever. Wore BIPAP last night but wants to see how her breathing is today when napping. She may try without tonight    IMPRESSION:   · Acute respiratory failure needed BIPAP last night but now not sure  · COPD exacerbation  · Polycythemia? Suggests chronic hypoxemia or from smoking; down a little with hydration? If it climbs, may need phlebotomy  · Active Tobacco use  · hyponatremia  · Multiple drug allergies  · Hypotension:  better after home lisinopril, prazosin held  · Non insulin dependent DM2 controlled without complication:-  · Visual changes:  C/o intermittent diplopia for the last month;  Pt just saw Dr. César Estes on   · Hypovolemic hyponatremia:  · PTSD/anxiety with depression/fibromyalgia with significant polypharmacy:  Pt on multiple controlled substances and >30 meds on her list in this   · Recurrent Mucous plugging by CT chest- bronch  for RML and LLL collapse; now with minimal patchy LLL infiltrate- debris in central airways on CT scan this admission  · Hypothyroidism s/p radioactive iodine ablation  · Depression  · Type II or unspecified type diabetes mellitus- worse on steroids  · Allergic rhinitis   · H/o HTN (hypertension)       RECOMMENDATIONS/PLAN:   · BIPAP prn  · Pulmonary toilet  · Pt should have overnight and exercise oximetry prior to discharge;  Might be a safety issue if she needs O2 and continues to smoke  · Consider phelbotomy  · If pt willing, can follow in office and consider sleep study  · follow sputum culture and blood cultures  · scheduled duonebs with albuterol prn  · mucinex. Pt just completed 9 days of levaquin - will use doxycycline unless other process on CT chest  · Smoking cessation counsleing done- nicotine patch  · Monitor for psychiatric sdie effects of steroids  · Monitor sugars     Code: Full Code          Subjective/Initial History:   I have reviewed the flowsheet and previous days notes. I was asked by Saud Simons to see Alisha James in consultation for a chief complaint of respiratory failure requiring BIPAP  . Alisha James is a 64 y.o. female, pmhx significant for COPD, tobacco abuse, PUD, CAD, GERD, fibromyalgia, hypothyroidism, and depression/anxiety, who presents via EMS to the ED c/o intermittent SOB x 1 week with worsening constant SOB x this AM. Pt also reports associated bilateral feet swelling, blurry vision, and fatigue with her SOB. Pt states that this morning she noticed her HR was elevated, and states that she felt near-syncope after sitting up. She also notes intermittent chest pain under her R breast x \"a while. \" Pt called her PCP today who referred her to the ED. Per EMS, pt was 80% on RA, and does not wear supplemental oxygen at home. Pt says that she has been having progressive shortness of breath for \"months. \"  Per her report, she has seen her PCP 4 times for this issue without improvement. She has not been placed on steroids but has been on levaquin x 9 days now from her last visit. Pt previously was smoking >1ppd tobacco, now smoking under 1ppd but is continuing to smoke. She says that she is unable to cough anything up. She says that she has been having fevers because \"anything over 98 is a fever for me. \"  She denies lightheadedness. No chest pain but does feel some \"tightness\" with her breathing.   She denies nausea but has not been eating well because she says that it feels like she can't swallow well due to her shortness of breath. She has been constipated. She complains of new LE edema. No focal weakness. She recently saw Dr. Jose Daniel Maya last week for jerking and resting tremor. Pt was given albuterol treatments en route, and was placed on BiPAP. Pt denies abdominal pain, cough, fever, chills, nausea, and vomiting.  at bedside. Pt easily upset by her daughter. She recalls needing a bronch to clear her mucous plugging two years ago.      PCP: Katerin Snyder MD     PSHx: Significant for cardiac catheterization, endoscopies, ruptured discs  Social Hx: + tobacco (1ppd), + EtOH (occasionally), - Illicit Drugs      Allergies   Allergen Reactions    Latex Contact Dermatitis    Dilaudid [Hydromorphone (Pf)] Other (comments)     Feels like bugs are crawling all over her    Lipitor [Atorvastatin] Other (comments)     LIVER TROUBLE ON THIS MEDICATION    Sulfa (Sulfonamide Antibiotics) Itching        MAR reviewed and pertinent medications noted or modified as needed     Current Facility-Administered Medications   Medication    fluticasone-vilanterol (BREO ELLIPTA) 100mcg-25mcg/puff    hydrALAZINE (APRESOLINE) 20 mg/mL injection 20 mg    simethicone (MYLICON) tablet 80 mg    alum-mag hydroxide-simeth (MYLANTA) oral suspension 30 mL    guaiFENesin ER (MUCINEX) tablet 1,200 mg    acetaminophen-codeine (TYLENOL #3) per tablet 1 Tab    albuterol-ipratropium (DUO-NEB) 2.5 MG-0.5 MG/3 ML    ALPRAZolam (XANAX) tablet 1 mg    aspirin chewable tablet 81 mg    benzonatate (TESSALON) capsule 200 mg    cetirizine (ZYRTEC) tablet 10 mg    dicyclomine (BENTYL) capsule 10 mg    pantoprazole (PROTONIX) tablet 40 mg    gabapentin (NEURONTIN) capsule 600 mg    HYDROcodone-acetaminophen (NORCO) 7.5-325 mg per tablet 1 Tab    hyoscyamine SL (LEVSIN/SL) tablet 0.125 mg    levothyroxine (SYNTHROID) tablet 200 mcg    magnesium oxide (MAG-OX) tablet 400 mg    montelukast (SINGULAIR) tablet 10 mg    niacin SR capsule 250 mg    PHENobarbital (LUMINAL) tablet 64.8 mg    primidone (MYSOLINE) tablet 100 mg    risperiDONE (RisperDAL) tablet 0.25 mg    venlafaxine-SR (EFFEXOR-XR) capsule 75 mg    metoprolol tartrate (LOPRESSOR) tablet 12.5 mg    sodium chloride (NS) flush 5-10 mL    sodium chloride (NS) flush 5-10 mL    acetaminophen (TYLENOL) tablet 650 mg    ondansetron (ZOFRAN) injection 4 mg    bisacodyl (DULCOLAX) tablet 5 mg    enoxaparin (LOVENOX) injection 40 mg    methylPREDNISolone (PF) (SOLU-MEDROL) injection 40 mg    albuterol (PROVENTIL VENTOLIN) nebulizer solution 2.5 mg    insulin lispro (HUMALOG) injection    glucose chewable tablet 16 g    glucagon (GLUCAGEN) injection 1 mg    nicotine (NICODERM CQ) 21 mg/24 hr patch 1 Patch    dextrose 10 % infusion 125-250 mL    pravastatin (PRAVACHOL) tablet 40 mg    ezetimibe (ZETIA) tablet 10 mg    azithromycin (ZITHROMAX) tablet 250 mg    promethazine (PHENERGAN) tablet 25 mg        PMH:  has a past medical history of Anxiety; Bone spur; COPD; Depression; Endocrine disease; Fibromyalgia; Fusion of spine of cervical region; Gastrointestinal disorder; Hyperlipidemia; Hypothyroid; Morbid obesity ([address] Osteoarthritis; PUD (peptic ulcer disease); and Tobacco abuse. PSH:   has a past surgical history that includes gyn; orthopaedic; cardiac catheterization (7/11/2013); cataract removal; upper gi endoscopy,biopsy (10/30/2014); upper gi endoscopy,dilatn w guide (10/30/2014); colonoscopy,biopsy (10/30/2014); and bronchoscopy-fiber/therapy (4/3/2015).      FHX: family history includes Alcohol abuse in her father; Bipolar Disorder in her daughter and father; Coronary Artery Disease in an other family member; Dementia in her father and mother; Heart Disease in her father and mother; Psychiatric Disorder in her brother, daughter, father, sister, and another family member; Suicide in her brother and sister; Thyroid Disease in her mother. SHX:  reports that she has been smoking. She has a 30.00 pack-year smoking history. She does not have any smokeless tobacco history on file. She reports that she drinks alcohol. She reports that she does not use illicit drugs. ROS:A comprehensive review of systems was negative except for that written in the HPI. Objective:     Vital Signs: Intake/Output: Intake/Output:   Visit Vitals    /77 (BP 1 Location: Right arm, BP Patient Position: At rest)    Pulse 80    Temp 97.7 °F (36.5 °C)    Resp 20    Ht 5' 4\" (1.626 m)    Wt 95.6 kg (210 lb 12.2 oz)    SpO2 90%    BMI 36.18 kg/m2      O2 Device: Nasal cannula  O2 Flow Rate (L/min): 6 l/min  Temp (24hrs), Av.9 °F (36.6 °C), Min:97.6 °F (36.4 °C), Max:98.6 °F (37 °C)     Intake/Output Summary (Last 24 hours) at 17 0957  Last data filed at 17 0411   Gross per 24 hour   Intake              600 ml   Output                0 ml   Net              600 ml    Last shift:         Last 3 shifts:  1901 -  0700  In: 8078 [P.O.:1190]  Out: -            Physical Exam:    General: Obese WF alert, oriented times 3 and non toxic; OOB in chair   HEENT: nasal O2; dry; no Thrush;    Neck: No abnormally enlarged lymph nodes.    Chest: increased AP diameter   Lungs: scattered wheezes bilaterally; less rhonchi   Heart: Regular rate and rhythm or S1S2 present   Abdomen: soft, protuberant   :    Extremity: negative, cyanosis, clubbing 2+ bilateral pedal edema   Neuro: alert   Psych: oriented to time, place and person   Skin: Warm; ; Normal upper and lower extremity pulses   Pulses:Bilateral, Radial, 2+   Capillary refill: abnormal:  warm, brisk capillary refill,    Data:                 Labs:  Recent Labs      17   0337  17   0615  17   0359   WBC  10.2  9.9  10.7   HGB  14.3  14.7  14.8   PLT  259  232  249     Recent Labs      17   0337  17   0849 05/23/17   0359   NA  132*  130*  132*   K  4.9  4.4  4.4   CL  93*  90*  93*   CO2  33*  34*  34*   GLU  143*  116*  121*   BUN  24*  22*  18   CREA  0.79  0.81  0.70   CA  9.3  9.0  9.7   MG   --    --   2.6*   PHOS   --    --   3.4     Recent Labs      05/24/17   1050   PH  7.36   PCO2  59*   PO2  50*   HCO3  32*       No results found for: IRON, FE, TIBC, IBCT, PSAT, FERR    Lab Results   Component Value Date/Time    Sed rate (ESR) 2 11/25/2014 03:16 PM    TSH 0.53 04/01/2015 04:23 AM        Lab Results   Component Value Date/Time    CK 42 03/31/2015 09:50 PM    CK-MB Index 5.2 03/31/2015 09:50 PM    Troponin-I, Qt. <0.04 05/21/2017 02:25 PM    BNP 17 05/25/2017 03:37 AM        Lab Results   Component Value Date/Time    Culture result: RARE  NORMAL RESPIRATORY ROBIN   04/03/2015 11:52 AM    Culture result: RARE  YEAST  ISOLATED   04/03/2015 11:52 AM    Culture result: RARE  CANDIDA ALBICANS  ISOLATED   04/03/2015 11:52 AM    Culture result:  04/03/2015 11:52 AM     CULTURE WILL BE HELD FOR 4 WEEKS. IF THERE IS ADDITIONAL FUNGAL GROWTH, A NEW REPORT WILL FOLLOW.        No results found for: TOXA1, RPR, HBCM, HBSAG, HAAB, HCAB, HCAB1, HAAT, G6PD, CRYAC, HIVGT, HIVR, HIV1, HIV12, HIVPC, HIVRPI    Lab Results   Component Value Date/Time    Vancomycin,trough 21.3 04/04/2015 03:15 AM    Vancomycin,trough 17.0 03/19/2015 05:20 AM    CK 42 03/31/2015 09:50 PM    CK 61 03/13/2015 04:00 PM       Lab Results   Component Value Date/Time    Color YELLOW 06/25/2013 05:40 PM    Appearance CLEAR 06/25/2013 05:40 PM    pH (UA) 6.5 06/25/2013 05:40 PM    Protein NEGATIVE  06/25/2013 05:40 PM    Glucose NEGATIVE  06/25/2013 05:40 PM    Ketone NEGATIVE  06/25/2013 05:40 PM    Bilirubin NEGATIVE  06/25/2013 05:40 PM    Blood NEGATIVE  06/25/2013 05:40 PM    Urobilinogen 0.2 06/25/2013 05:40 PM    Nitrites NEGATIVE  06/25/2013 05:40 PM    Leukocyte Esterase NEGATIVE  06/25/2013 05:40 PM    WBC 0-4 06/25/2013 05:40 PM    RBC 0-3 06/25/2013 05:40 PM    Bacteria NEGATIVE  06/25/2013 05:40 PM         Imaging:  I have personally reviewed the patients radiographs and have reviewed the reports:          Results from Hospital Encounter encounter on 05/21/17   XR CHEST PORT   Narrative EXAM:  XR CHEST PORT    INDICATION:  Shortness of breath for one week. COMPARISON: 4/3/2015    TECHNIQUE: Portable AP semierect upright chest view at 1359 hours    FINDINGS: Cardiac monitoring wires overlie the thorax. The cardiomediastinal  contours are stable. The pulmonary vasculature is within normal limits. The lungs and pleural spaces are clear. The bones and upper abdomen are stable. Impression IMPRESSION:    No acute process. Results from [place] encounter on 05/21/17   CTA CHEST W OR W WO CONT   Narrative EXAM:  CT angiography chest    INDICATION:  Respiratory failure. COMPARISON: Chest radiograph 5/21/2017. CT chest 4/1/2015. TECHNIQUE: Helical thin section chest CT following uneventful intravenous  administration of nonionic contrast according to departmental PE protocol. Coronal and sagittal reformats were performed. 3D/MIP post processing was  performed. CT dose reduction was achieved through use of a standardized protocol  tailored for this examination and automatic exposure control for dose  modulation. FINDINGS: This is a good quality study for the evaluation of pulmonary embolism  to the first subsegmental arterial level. There is no pulmonary embolism to this  level. N/A variant right subclavian artery is again noted. The aorta and main pulmonary  artery are normal in caliber. Cardiac size is within normal limits. No  pericardial effusion. No lymphadenopathy by imaging size criteria. There is stable chronic atelectasis in the right middle lobe with stable chronic  mucous plugging of the right middle lobe bronchi. There is no new lung mass or  consolidation.  No pleural effusion or pneumothorax. Limited images of the upper abdomen are within normal limits. The bony  structures are age-appropriate         Impression IMPRESSION:   There is no acute pulmonary embolism or other acute abnormality in the chest.  There is stable chronic atelectasis in the right middle lobe with stable chronic  mucous plugging of the right middle lobe bronchi. My assessment/plan was discussed with:  nursing    respiratory therapy Dr. loza      Complex decision making was performed which includes reviewing the patient's past medical records, current laboratory results, medications, ECG and actual Xray films, immediately available at the bedside to the patient    Thank you for allowing us to participate in the care of this patient. We will be happy to follow her progress with you.     Tigist Javed MD

## 2017-05-25 NOTE — PROGRESS NOTES
Cm discussed PT recommendations for Pulmonary Rehab with Brielle Daugherty, NP and Patient. Patient is agreeable to Pulmonary Rehab and prefers [facility], [facility]. and [facility] program.    Referral sbumitted via cclink. CM called Petr Oconnell at [facility] to inform of referral for [facility] program.  Asked Petr Oconnell to start Raynell Lips - anticipate a weekend discharge. Petr Oconnell informed that patient would like to be informed of her out of pocket cost.     Cm will continue to follow for discharge plan.      Raj Panda RN CM  Ext [phone]

## 2017-05-26 LAB
ARTERIAL PATENCY WRIST A: YES
BASE EXCESS BLDA CALC-SCNC: 4.1 MMOL/L
BDY SITE: ABNORMAL
GAS FLOW.O2 O2 DELIVERY SYS: 3 L/MIN
GLUCOSE BLD STRIP.AUTO-MCNC: 112 MG/DL (ref 65–100)
GLUCOSE BLD STRIP.AUTO-MCNC: 118 MG/DL (ref 65–100)
GLUCOSE BLD STRIP.AUTO-MCNC: 130 MG/DL (ref 65–100)
GLUCOSE BLD STRIP.AUTO-MCNC: 154 MG/DL (ref 65–100)
HCO3 BLDA-SCNC: 30 MMOL/L (ref 22–26)
PCO2 BLDA: 47 MMHG (ref 35–45)
PH BLDA: 7.42 [PH] (ref 7.35–7.45)
PO2 BLDA: 55 MMHG (ref 80–100)
SAO2 % BLD: 89 % (ref 92–97)
SAO2% DEVICE SAO2% SENSOR NAME: ABNORMAL
SERVICE CMNT-IMP: ABNORMAL
SPECIMEN SITE: ABNORMAL

## 2017-05-26 PROCEDURE — 74011636637 HC RX REV CODE- 636/637: Performed by: INTERNAL MEDICINE

## 2017-05-26 PROCEDURE — 77010033678 HC OXYGEN DAILY

## 2017-05-26 PROCEDURE — 94762 N-INVAS EAR/PLS OXIMTRY CONT: CPT

## 2017-05-26 PROCEDURE — 74011250636 HC RX REV CODE- 250/636: Performed by: INTERNAL MEDICINE

## 2017-05-26 PROCEDURE — 74011250637 HC RX REV CODE- 250/637: Performed by: HOSPITALIST

## 2017-05-26 PROCEDURE — 74011250637 HC RX REV CODE- 250/637: Performed by: INTERNAL MEDICINE

## 2017-05-26 PROCEDURE — 36600 WITHDRAWAL OF ARTERIAL BLOOD: CPT | Performed by: INTERNAL MEDICINE

## 2017-05-26 PROCEDURE — 82803 BLOOD GASES ANY COMBINATION: CPT | Performed by: INTERNAL MEDICINE

## 2017-05-26 PROCEDURE — 65660000000 HC RM CCU STEPDOWN

## 2017-05-26 PROCEDURE — 74011000250 HC RX REV CODE- 250: Performed by: INTERNAL MEDICINE

## 2017-05-26 PROCEDURE — 94640 AIRWAY INHALATION TREATMENT: CPT

## 2017-05-26 PROCEDURE — 82962 GLUCOSE BLOOD TEST: CPT

## 2017-05-26 RX ORDER — ACETAZOLAMIDE 500 MG/5ML
250 INJECTION, POWDER, LYOPHILIZED, FOR SOLUTION INTRAVENOUS ONCE
Status: COMPLETED | OUTPATIENT
Start: 2017-05-26 | End: 2017-05-26

## 2017-05-26 RX ORDER — PREDNISONE 20 MG/1
40 TABLET ORAL
Status: DISCONTINUED | OUTPATIENT
Start: 2017-05-27 | End: 2017-05-27 | Stop reason: HOSPADM

## 2017-05-26 RX ADMIN — Medication 10 ML: at 09:00

## 2017-05-26 RX ADMIN — LEVOTHYROXINE SODIUM 200 MCG: 100 TABLET ORAL at 07:25

## 2017-05-26 RX ADMIN — ASPIRIN 81 MG CHEWABLE TABLET 81 MG: 81 TABLET CHEWABLE at 08:59

## 2017-05-26 RX ADMIN — Medication 10 ML: at 17:13

## 2017-05-26 RX ADMIN — IPRATROPIUM BROMIDE AND ALBUTEROL SULFATE 3 ML: .5; 3 SOLUTION RESPIRATORY (INHALATION) at 14:13

## 2017-05-26 RX ADMIN — RISPERIDONE 0.25 MG: 0.25 TABLET ORAL at 17:13

## 2017-05-26 RX ADMIN — PHENOBARBITAL 64.8 MG: 32.4 TABLET ORAL at 08:58

## 2017-05-26 RX ADMIN — PRAVASTATIN SODIUM 40 MG: 40 TABLET ORAL at 21:20

## 2017-05-26 RX ADMIN — ENOXAPARIN SODIUM 40 MG: 40 INJECTION SUBCUTANEOUS at 09:00

## 2017-05-26 RX ADMIN — METHYLPREDNISOLONE SODIUM SUCCINATE 40 MG: 40 INJECTION, POWDER, FOR SOLUTION INTRAMUSCULAR; INTRAVENOUS at 09:00

## 2017-05-26 RX ADMIN — GUAIFENESIN 1200 MG: 600 TABLET, EXTENDED RELEASE ORAL at 21:20

## 2017-05-26 RX ADMIN — IPRATROPIUM BROMIDE AND ALBUTEROL SULFATE 3 ML: .5; 3 SOLUTION RESPIRATORY (INHALATION) at 01:15

## 2017-05-26 RX ADMIN — EZETIMIBE 10 MG: 10 TABLET ORAL at 21:20

## 2017-05-26 RX ADMIN — Medication 10 ML: at 05:24

## 2017-05-26 RX ADMIN — GUAIFENESIN 1200 MG: 600 TABLET, EXTENDED RELEASE ORAL at 08:58

## 2017-05-26 RX ADMIN — ACETAMINOPHEN AND CODEINE PHOSPHATE 1 TABLET: 300; 30 TABLET ORAL at 07:25

## 2017-05-26 RX ADMIN — HYDROCODONE BITARTRATE AND ACETAMINOPHEN 1 TABLET: 7.5; 325 TABLET ORAL at 21:20

## 2017-05-26 RX ADMIN — PHENOBARBITAL 64.8 MG: 32.4 TABLET ORAL at 17:13

## 2017-05-26 RX ADMIN — INSULIN LISPRO 2 UNITS: 100 INJECTION, SOLUTION INTRAVENOUS; SUBCUTANEOUS at 12:49

## 2017-05-26 RX ADMIN — IPRATROPIUM BROMIDE AND ALBUTEROL SULFATE 3 ML: .5; 3 SOLUTION RESPIRATORY (INHALATION) at 19:28

## 2017-05-26 RX ADMIN — PANTOPRAZOLE SODIUM 40 MG: 40 TABLET, DELAYED RELEASE ORAL at 07:25

## 2017-05-26 RX ADMIN — SIMETHICONE CHEW TAB 80 MG 80 MG: 80 TABLET ORAL at 07:02

## 2017-05-26 RX ADMIN — IPRATROPIUM BROMIDE AND ALBUTEROL SULFATE 3 ML: .5; 3 SOLUTION RESPIRATORY (INHALATION) at 07:29

## 2017-05-26 RX ADMIN — ALUMINUM HYDROXIDE, MAGNESIUM HYDROXIDE, AND SIMETHICONE 30 ML: 200; 200; 20 SUSPENSION ORAL at 07:02

## 2017-05-26 RX ADMIN — ALPRAZOLAM 1 MG: 0.5 TABLET ORAL at 21:20

## 2017-05-26 RX ADMIN — METHYLPREDNISOLONE SODIUM SUCCINATE 40 MG: 40 INJECTION, POWDER, FOR SOLUTION INTRAMUSCULAR; INTRAVENOUS at 21:19

## 2017-05-26 RX ADMIN — PRIMIDONE 100 MG: 50 TABLET ORAL at 21:20

## 2017-05-26 RX ADMIN — RISPERIDONE 0.25 MG: 0.25 TABLET ORAL at 08:58

## 2017-05-26 RX ADMIN — VENLAFAXINE HYDROCHLORIDE 75 MG: 37.5 CAPSULE, EXTENDED RELEASE ORAL at 08:59

## 2017-05-26 RX ADMIN — Medication 250 MG: at 08:58

## 2017-05-26 RX ADMIN — FLUTICASONE FUROATE AND VILANTEROL TRIFENATATE 1 PUFF: 100; 25 POWDER RESPIRATORY (INHALATION) at 09:00

## 2017-05-26 RX ADMIN — ALPRAZOLAM 1 MG: 0.5 TABLET ORAL at 11:39

## 2017-05-26 RX ADMIN — Medication 400 MG: at 08:58

## 2017-05-26 RX ADMIN — ACETAZOLAMIDE 250 MG: 500 INJECTION, POWDER, LYOPHILIZED, FOR SOLUTION INTRAVENOUS at 13:22

## 2017-05-26 RX ADMIN — HYDROCODONE BITARTRATE AND ACETAMINOPHEN 1 TABLET: 7.5; 325 TABLET ORAL at 12:47

## 2017-05-26 RX ADMIN — AZITHROMYCIN 250 MG: 250 TABLET, FILM COATED ORAL at 08:59

## 2017-05-26 RX ADMIN — GABAPENTIN 600 MG: 300 CAPSULE ORAL at 08:58

## 2017-05-26 RX ADMIN — GABAPENTIN 600 MG: 300 CAPSULE ORAL at 17:13

## 2017-05-26 RX ADMIN — CETIRIZINE HYDROCHLORIDE 10 MG: 10 TABLET, FILM COATED ORAL at 08:58

## 2017-05-26 RX ADMIN — METOPROLOL TARTRATE 12.5 MG: 25 TABLET ORAL at 08:59

## 2017-05-26 RX ADMIN — MONTELUKAST SODIUM 10 MG: 10 TABLET, FILM COATED ORAL at 08:58

## 2017-05-26 RX ADMIN — Medication 10 ML: at 21:20

## 2017-05-26 RX ADMIN — IBUPROFEN 800 MG: 400 TABLET, FILM COATED ORAL at 06:51

## 2017-05-26 NOTE — PROGRESS NOTES
PCU SHIFT NURSING NOTE      Bedside shift change report given to Demetrio Gibson (oncoming nurse) by Lois Angelo (offgoing nurse). Report included the following information SBAR, Kardex, Intake/Output, MAR, Recent Results and Med Rec Status. Shift Summary:   0730 Bedside Report received from Kelly, [address] [place] [address]. SBAR, Kardex, Procedure Summary, Intake/Output, MAR, Recent Results or Cardiac Rhythm NSR were discussed. Brooklynn Baeza RN assumed care of the pt.  1400 pt c/o anxiety attack r/t upset r/t going to rehab instead  1800 pt has several times throughout the day has removed her o2 for various reasons, does not feel hypoxic when sats are consistently in the 70's. Pt stating that she does not need o2, she has a pulse ox at home and can monitor it herself. Admission Date 5/21/2017   Admission Diagnosis Acute respiratory failure ([facility] [address]   Consults IP CONSULT TO PULMONOLOGY        Consults   [x]PT   [x]OT   []Speech   []Case Management      [] Palliative      Cardiac Monitoring Order   [x]Yes   []No     IV drips   []Yes    Drip:                            Dose:  Drip:                            Dose:  Drip:                            Dose:   [x]No     GI Prophylaxis   [x]Yes   []No         DVT Prophylaxis   SCDs:             Onel stockings:         [x] Medication   []Contraindicated   []None      Activity Level Activity Level: Up with Assistance     Activity Assistance: Partial (one person)   Purposeful Rounding every 1-2 hour? [x]Yes   Zarate Score  Total Score: 2   Bed Alarm (If score 3 or >)   []Yes   [] Refused (See signed refusal form in chart)   Joseph Score  Joseph Score: 20   Joseph Score (if score 14 or less)   []PMT consult   []Wound Care consult      []Specialty bed   [] Nutrition consult          Needs prior to discharge:   Home O2 required:    [x]Yes   []No    If yes, how much O2 required?     Other:    Last Bowel Movement: Last Bowel Movement Date: 05/26/17      Influenza Vaccine Received Flu Vaccine for Current Season (usually Sept-March): Not Flu Season        Pneumonia Vaccine           Diet Active Orders   Diet    DIET DIABETIC CONSISTENT CARB Regular      LDAs               Peripheral IV 05/21/17 Left Wrist (Active)   Site Assessment Swelling;Drainage (comment) 5/25/2017  3:09 PM   Phlebitis Assessment 0 5/25/2017  3:09 PM   Infiltration Assessment 0 5/25/2017  3:09 PM   Dressing Status Old drainage 5/25/2017  3:09 PM   Dressing Type Transparent;Tape 5/25/2017  3:09 PM   Hub Color/Line Status Pink;Flushed 5/25/2017  3:09 PM   Action Taken Open ports on tubing capped 5/23/2017  6:32 AM                      Urinary Catheter      Intake & Output   Date 05/25/17 1900 - 05/26/17 0659 05/26/17 0700 - 05/27/17 0659   Shift 6958-5564 24 Hour Total 2719-6170 8200-0874 24 Hour Total   I  N  T  A  K  E   P. O. 600 2100 200  200      P. O. 600 2100 200  200    Shift Total  (mL/kg) 600  (6.3) 2100  (22) 200  (2.1)  200  (2.1)   O  U  T  P  U  T   Urine  (mL/kg/hr)           Urine Occurrence(s) 3 x 3 x       Shift Total  (mL/kg)         2100 200  200   Weight (kg) 95.6 95.6 95.6 95.6 95.6         Readmission Risk Assessment Tool Score High Risk            34       Total Score        3 Relationship with PCP    2 Patient Living Status    3 Patient Length of Stay > 5    4 More than 1 Admission in calendar year    5 Patient Insurance is Medicare, Medicaid or Self Pay    17 Charlson Comorbidity Score       Expected Length of Stay 3d 19h   Actual Length of Stay 5              Gini Sandhoff, RN

## 2017-05-26 NOTE — PROGRESS NOTES
Hospitalist Progress Note    NAME: Genet Simpson   :  1955   MRN:  [id number]         Assessment / Plan:  SIRS POA with tachycardia and tachypnea due to COPD  Acute respiratory failure with Hypoxia/hypercarbia POA  Acute COPD exacerbation POA still actively wheezing  Ongoing tobacco use  Remains hypoxic on O2, 3 L NC 7.42/47/55      ABG earlier on 6 liters 7.36/59/50  Pulmonary help appreciated   Recent 9 days of levaquin, doxycycline for now  Steroids change to Po in AM, nebs  ECHO: EF 60% with normal RV function  CTA chest: There is no acute pulmonary embolism or other acute abnormality in the chest. There is stable chronic atelectasis in the right middle lobe with stable chronic  mucous plugging of the right middle lobe bronchi. Pulmonary rehab at discharge    Essential Hypertension POA  Stable  Cont metoprolol  Hold lisinopril, prazosin    DM2 controlled without complication  BS 81, 187, 118, 154, 112  hold glipizide with poor po intake   c/w SS     Visual changes  C/o intermittent diplopia for the last month  CT head: negative   consider more imaging when more stable. Pt just saw Dr. César Estes on , could also do outpt f/u pending clinical course    Hypovolemic hyponatremia  Stable, PO fluid restrict if drops further  IV fluids    PTSD/anxiety with depression/fibromyalgia with significant polypharmacy  Con't home xanax, tylenol #3, norco for now. Holding ultram.  Pt on multiple controlled substances and >30 meds on her list in this Pt who is only 62yo. Need to closely follow up with PCP to consider reducing overall medication burden.     Morbid obesity    Code Status: Full  Surrogate Decision Maker:   DVT Prophylaxis: lovenox     Baseline: independent  Recommended Disposition: Home w/Family     Subjective:     Chief Complaint / Reason for Physician Visit: following respiratory failure / COPD / DM  \"Breathing better\"  No new complaints  Still complains of trouble cough up her phlegm  Discussed with RN events overnight. Review of Systems:  Symptom Y/N Comments  Symptom Y/N Comments   Fever/Chills n   Chest Pain n    Poor Appetite    Edema     Cough y   Abdominal Pain n    Sputum    Joint Pain     SOB/RODRIGUEZ y   Pruritis/Rash     Nausea/vomit n   Tolerating PT/OT     Diarrhea n   Tolerating Diet     Constipation n   Other       Could NOT obtain due to:      Objective:     VITALS:   Last 24hrs VS reviewed since prior progress note. Most recent are:  Patient Vitals for the past 24 hrs:   Temp Pulse Resp BP SpO2   05/26/17 1413 - - - - 95 %   05/26/17 1146 - 85 - 131/88 (!) 76 %   05/26/17 0729 - - - - 90 %   05/26/17 0305 97.9 °F (36.6 °C) 89 20 118/77 92 %   05/25/17 2004 98 °F (36.7 °C) (!) 103 20 139/62 93 %       Intake/Output Summary (Last 24 hours) at 05/26/17 1831  Last data filed at 05/26/17 0404   Gross per 24 hour   Intake              600 ml   Output                0 ml   Net              600 ml        PHYSICAL EXAM:  General: WD, WN. Alert, cooperative, no acute distress  EENT:  EOMI. Anicteric sclerae. MMM  Resp:  Moving air better with mild rhonchi and diffuse wheezing. No accessory muscle use  CV:  Regular  rhythm,  No edema  GI:  Soft, Non distended, Non tender.  +Bowel sounds  Neurologic:  Alert and oriented X 3, normal speech, non focal  Psych:   Good insight. Not anxious nor agitated  Skin:  No rashes. No jaundice    Reviewed most current lab test results and cultures  YES  Reviewed most current radiology test results   YES  Review and summation of old records today    NO  Reviewed patient's current orders and MAR    YES  PMH/SH reviewed - no change compared to H&P  ________________________________________________________________________  Care Plan discussed with:    Comments   Patient y    Family      RN y    Care Manager y    Consultant                        Multidiciplinary team rounds were held today with , nursing, pharmacist and clinical coordinator. Patient's plan of care was discussed; medications were reviewed and discharge planning was addressed. ________________________________________________________________________  Total NON critical care TIME:  25  Minutes    Total CRITICAL CARE TIME Spent:   Minutes non procedure based      Comments   >50% of visit spent in counseling and coordination of care     ________________________________________________________________________  Khanh Pedersen MD     Procedures: see electronic medical records for all procedures/Xrays and details which were not copied into this note but were reviewed prior to creation of Plan. LABS:  I reviewed today's most current labs and imaging studies.   Pertinent labs include:  Recent Labs      05/25/17   0337  05/24/17   0615   WBC  10.2  9.9   HGB  14.3  14.7   HCT  44.4  43.5   PLT  259  232     Recent Labs      05/25/17   0337  05/24/17   0849   NA  132*  130*   K  4.9  4.4   CL  93*  90*   CO2  33*  34*   GLU  143*  116*   BUN  24*  22*   CREA  0.79  0.81   CA  9.3  9.0       Signed: Khanh Pedersen MD

## 2017-05-26 NOTE — PROGRESS NOTES
Bedside shift change report received from [facility]. SBAR, MAR, VS and plan of care reviewed. Pt is resting in bed at this time. VSS. Assessment completed as charted. 2200  Pt c/o \"migraine\" headache. Not feeling well. Oxygen taken off d/t \"it makes my head hurt\"  Spo2 sats are 88-91%. 2300 Placed oxygen on via nc. Pt resting with eyes closed. 0715 Bedside shift change report given to [facility] RN.

## 2017-05-26 NOTE — PROGRESS NOTES
[facility] Consult Service Note  Pulmonary, Critical Care, and Sleep Medicine    Name: Kalpana Allen MRN: [id number]   : 1955 Hospital: [facility]   Date: 2017   Hospital Day: 6       Subjective/Interval History:   I have reviewed the flowsheet and previous days notes. Seen earlier today on rounds. Patient PCP: Sheryle Enter, MD     feels like she wants to cough up more sputum but lungs sound better on my exam. Onless O2 but still needing some. We discussed her polycythemia nd likely chronic hypoxia from COPD and smoking. If worse will need phlebotomy. If she quits smoking and wears O2 if needed, then this problem may imporove,. She also will need sleep evaluation when better and seems interested     still wore BIPAP. Tired Of Oxygen. Up in chair. No fever. Smoking cessation counseling done   still wore BIPAP; some thick sputum   better air movement on exam but still coarse with wheezes. No fever. Wore BIPAP last night but wants to see how her breathing is today when napping. She may try without tonight    IMPRESSION:   · Acute respiratory failure with hypercapnia needed BIPAP initially but better now   · metabolic alkalosis from diuretics and steroids  · COPD exacerbation- slow response  · Polycythemia? Suggests chronic hypoxemia or from smoking; down a little with hydration? If it climbs, may need phlebotomy  · Active Tobacco use  · hyponatremia  · Multiple drug allergies  · Hypotension:  better after home lisinopril, prazosin held  · Non insulin dependent DM2 controlled without complication:-  · Visual changes:  C/o intermittent diplopia for the last month;  Pt just saw Dr. Niko Whatley on   · Hypovolemic hyponatremia:  · PTSD/anxiety with depression/fibromyalgia with significant polypharmacy:  Pt on multiple controlled substances and >30 meds on her list in this   · Recurrent Mucous plugging by CT chest- bronch  for RML and LLL collapse; now with minimal patchy LLL infiltrate- debris in central airways on CT scan this admission  · Hypothyroidism s/p radioactive iodine ablation  · Depression  · Type II or unspecified type diabetes mellitus- worse on steroids  · Allergic rhinitis   · H/o HTN (hypertension)       RECOMMENDATIONS/PLAN:   · D/c BIPAP   · Pulmonary toilet  · Pt should have overnight and exercise oximetry prior to discharge;  Might be a safety issue if she needs O2 and continues to smoke  · Consider phelbotomy if Hgb climbs  · can follow in office and consider sleep study  · scheduled duonebs with albuterol prn  · Smoking cessation counsleing done- nicotine patch  · Monitor for psychiatric sdie effects of steroids  · Monitor sugars  · Mobilize  · D/c planning  · Will check back Monday or sooner prn     Code: Full Code          Subjective/Initial History:   I have reviewed the flowsheet and previous days notes. I was asked by Shady Marroquin to see Zohaib Stiles in consultation for a chief complaint of respiratory failure requiring BIPAP  . Zohaib Stiles is a 64 y.o. female, pmhx significant for COPD, tobacco abuse, PUD, CAD, GERD, fibromyalgia, hypothyroidism, and depression/anxiety, who presents via EMS to the ED c/o intermittent SOB x 1 week with worsening constant SOB x this AM. Pt also reports associated bilateral feet swelling, blurry vision, and fatigue with her SOB. Pt states that this morning she noticed her HR was elevated, and states that she felt near-syncope after sitting up. She also notes intermittent chest pain under her R breast x \"a while. \" Pt called her PCP today who referred her to the ED. Per EMS, pt was 80% on RA, and does not wear supplemental oxygen at home. Pt says that she has been having progressive shortness of breath for \"months. \"  Per her report, she has seen her PCP 4 times for this issue without improvement.   She has not been placed on steroids but has been on levaquin x 9 days now from her last visit. Pt previously was smoking >1ppd tobacco, now smoking under 1ppd but is continuing to smoke. She says that she is unable to cough anything up. She says that she has been having fevers because \"anything over 98 is a fever for me. \"  She denies lightheadedness. No chest pain but does feel some \"tightness\" with her breathing. She denies nausea but has not been eating well because she says that it feels like she can't swallow well due to her shortness of breath. She has been constipated. She complains of new LE edema. No focal weakness. She recently saw Dr. Stefanie Mock last week for jerking and resting tremor. Pt was given albuterol treatments en route, and was placed on BiPAP. Pt denies abdominal pain, cough, fever, chills, nausea, and vomiting.  at bedside. Pt easily upset by her daughter. She recalls needing a bronch to clear her mucous plugging two years ago.      PCP: Kimberley Goldberg MD     PSHx: Significant for cardiac catheterization, endoscopies, ruptured discs  Social Hx: + tobacco (1ppd), + EtOH (occasionally), - Illicit Drugs      Allergies   Allergen Reactions    Latex Contact Dermatitis    Dilaudid [Hydromorphone (Pf)] Other (comments)     Feels like bugs are crawling all over her    Lipitor [Atorvastatin] Other (comments)     LIVER TROUBLE ON THIS MEDICATION    Sulfa (Sulfonamide Antibiotics) Itching        MAR reviewed and pertinent medications noted or modified as needed     Current Facility-Administered Medications   Medication    bisacodyl (DULCOLAX) suppository 10 mg    ibuprofen (MOTRIN) tablet 800 mg    fluticasone-vilanterol (BREO ELLIPTA) 100mcg-25mcg/puff    hydrALAZINE (APRESOLINE) 20 mg/mL injection 20 mg    simethicone (MYLICON) tablet 80 mg    alum-mag hydroxide-simeth (MYLANTA) oral suspension 30 mL    guaiFENesin ER (MUCINEX) tablet 1,200 mg    acetaminophen-codeine (TYLENOL #3) per tablet 1 Tab    albuterol-ipratropium (DUO-NEB) 2.5 MG-0.5 MG/3 ML  ALPRAZolam (XANAX) tablet 1 mg    aspirin chewable tablet 81 mg    benzonatate (TESSALON) capsule 200 mg    cetirizine (ZYRTEC) tablet 10 mg    dicyclomine (BENTYL) capsule 10 mg    pantoprazole (PROTONIX) tablet 40 mg    gabapentin (NEURONTIN) capsule 600 mg    HYDROcodone-acetaminophen (NORCO) 7.5-325 mg per tablet 1 Tab    hyoscyamine SL (LEVSIN/SL) tablet 0.125 mg    levothyroxine (SYNTHROID) tablet 200 mcg    magnesium oxide (MAG-OX) tablet 400 mg    montelukast (SINGULAIR) tablet 10 mg    niacin SR capsule 250 mg    PHENobarbital (LUMINAL) tablet 64.8 mg    primidone (MYSOLINE) tablet 100 mg    risperiDONE (RisperDAL) tablet 0.25 mg    venlafaxine-SR (EFFEXOR-XR) capsule 75 mg    metoprolol tartrate (LOPRESSOR) tablet 12.5 mg    sodium chloride (NS) flush 5-10 mL    sodium chloride (NS) flush 5-10 mL    acetaminophen (TYLENOL) tablet 650 mg    ondansetron (ZOFRAN) injection 4 mg    bisacodyl (DULCOLAX) tablet 5 mg    enoxaparin (LOVENOX) injection 40 mg    methylPREDNISolone (PF) (SOLU-MEDROL) injection 40 mg    albuterol (PROVENTIL VENTOLIN) nebulizer solution 2.5 mg    insulin lispro (HUMALOG) injection    glucose chewable tablet 16 g    glucagon (GLUCAGEN) injection 1 mg    nicotine (NICODERM CQ) 21 mg/24 hr patch 1 Patch    dextrose 10 % infusion 125-250 mL    pravastatin (PRAVACHOL) tablet 40 mg    ezetimibe (ZETIA) tablet 10 mg    promethazine (PHENERGAN) tablet 25 mg        PMH:  has a past medical history of Anxiety; Bone spur; COPD; Depression; Endocrine disease; Fibromyalgia; Fusion of spine of cervical region; Gastrointestinal disorder; Hyperlipidemia; Hypothyroid; Morbid obesity ([address] Osteoarthritis; PUD (peptic ulcer disease); and Tobacco abuse.     PSH:   has a past surgical history that includes gyn; orthopaedic; cardiac catheterization (7/11/2013); cataract removal; upper gi endoscopy,biopsy (10/30/2014); upper gi endoscopy,dilatn w guide (10/30/2014); colonoscopy,biopsy (10/30/2014); and bronchoscopy-fiber/therapy (4/3/2015). FHX: family history includes Alcohol abuse in her father; Bipolar Disorder in her daughter and father; Coronary Artery Disease in an other family member; Dementia in her father and mother; Heart Disease in her father and mother; Psychiatric Disorder in her brother, daughter, father, sister, and another family member; Suicide in her brother and sister; Thyroid Disease in her mother. SHX:  reports that she has been smoking. She has a 30.00 pack-year smoking history. She does not have any smokeless tobacco history on file. She reports that she drinks alcohol. She reports that she does not use illicit drugs. ROS:A comprehensive review of systems was negative except for that written in the HPI. Objective:     Vital Signs: Intake/Output: Intake/Output:   Visit Vitals    /77 (BP 1 Location: Left arm, BP Patient Position: At rest)    Pulse 89    Temp 97.9 °F (36.6 °C)    Resp 20    Ht 5' 4\" (1.626 m)    Wt 95.6 kg (210 lb 12.2 oz)    SpO2 90%    BMI 36.18 kg/m2      O2 Device: Nasal cannula  O2 Flow Rate (L/min): 3 l/min  Temp (24hrs), Av °F (36.7 °C), Min:97.9 °F (36.6 °C), Max:98.2 °F (36.8 °C)     Intake/Output Summary (Last 24 hours) at 17 1240  Last data filed at 17 0404   Gross per 24 hour   Intake             1100 ml   Output                0 ml   Net             1100 ml    Last shift:         Last 3 shifts:  190 -  0700  In: 0543 [P.O.:2220]  Out: -            Physical Exam:    General: Obese WF alert, oriented times 3 and non toxic; OOB in chair   HEENT: nasal O2; dry; no Thrush;    Neck: No abnormally enlarged lymph nodes.    Chest: increased AP diameter   Lungs: scattered wheezes bilaterally; less rhonchi   Heart: Regular rate and rhythm or S1S2 present   Abdomen: soft, protuberant   :    Extremity: negative, cyanosis, clubbing 2+ bilateral pedal edema   Neuro: alert   Psych: oriented to time, place and person   Skin: Warm; ; Normal upper and lower extremity pulses   Pulses:Bilateral, Radial, 2+   Capillary refill: abnormal:  warm, brisk capillary refill,    Data:                 Labs:  Recent Labs      05/25/17   0337  05/24/17   0615   WBC  10.2  9.9   HGB  14.3  14.7   PLT  259  232     Recent Labs      05/25/17   0337  05/24/17   0849   NA  132*  130*   K  4.9  4.4   CL  93*  90*   CO2  33*  34*   GLU  143*  116*   BUN  24*  22*   CREA  0.79  0.81   CA  9.3  9.0     Recent Labs      05/26/17   0813  05/24/17   1050   PH  7.42  7.36   PCO2  47*  59*   PO2  55*  50*   HCO3  30*  32*       No results found for: IRON, FE, TIBC, IBCT, PSAT, FERR    Lab Results   Component Value Date/Time    Sed rate (ESR) 2 11/25/2014 03:16 PM    TSH 0.53 04/01/2015 04:23 AM        Lab Results   Component Value Date/Time    CK 42 03/31/2015 09:50 PM    CK-MB Index 5.2 03/31/2015 09:50 PM    Troponin-I, Qt. <0.04 05/21/2017 02:25 PM    BNP 17 05/25/2017 03:37 AM        Lab Results   Component Value Date/Time    Culture result: RARE  NORMAL RESPIRATORY ROBIN   04/03/2015 11:52 AM    Culture result: RARE  YEAST  ISOLATED   04/03/2015 11:52 AM    Culture result: RARE  CANDIDA ALBICANS  ISOLATED   04/03/2015 11:52 AM    Culture result:  04/03/2015 11:52 AM     CULTURE WILL BE HELD FOR 4 WEEKS. IF THERE IS ADDITIONAL FUNGAL GROWTH, A NEW REPORT WILL FOLLOW.        No results found for: TOXA1, RPR, HBCM, HBSAG, HAAB, HCAB, HCAB1, HAAT, G6PD, CRYAC, HIVGT, HIVR, HIV1, HIV12, HIVPC, HIVRPI    Lab Results   Component Value Date/Time    Vancomycin,trough 21.3 04/04/2015 03:15 AM    Vancomycin,trough 17.0 03/19/2015 05:20 AM    CK 42 03/31/2015 09:50 PM    CK 61 03/13/2015 04:00 PM       Lab Results   Component Value Date/Time    Color YELLOW 06/25/2013 05:40 PM    Appearance CLEAR 06/25/2013 05:40 PM    pH (UA) 6.5 06/25/2013 05:40 PM    Protein NEGATIVE  06/25/2013 05:40 PM    Glucose NEGATIVE  06/25/2013 05:40 PM Ketone NEGATIVE  06/25/2013 05:40 PM    Bilirubin NEGATIVE  06/25/2013 05:40 PM    Blood NEGATIVE  06/25/2013 05:40 PM    Urobilinogen 0.2 06/25/2013 05:40 PM    Nitrites NEGATIVE  06/25/2013 05:40 PM    Leukocyte Esterase NEGATIVE  06/25/2013 05:40 PM    WBC 0-4 06/25/2013 05:40 PM    RBC 0-3 06/25/2013 05:40 PM    Bacteria NEGATIVE  06/25/2013 05:40 PM         Imaging:  I have personally reviewed the patients radiographs and have reviewed the reports:          Results from Hospital Encounter encounter on 05/21/17   XR CHEST PORT   Narrative EXAM:  XR CHEST PORT    INDICATION:  Shortness of breath for one week. COMPARISON: 4/3/2015    TECHNIQUE: Portable AP semierect upright chest view at 1359 hours    FINDINGS: Cardiac monitoring wires overlie the thorax. The cardiomediastinal  contours are stable. The pulmonary vasculature is within normal limits. The lungs and pleural spaces are clear. The bones and upper abdomen are stable. Impression IMPRESSION:    No acute process. Results from [place] encounter on 05/21/17   CTA CHEST W OR W WO CONT   Narrative EXAM:  CT angiography chest    INDICATION:  Respiratory failure. COMPARISON: Chest radiograph 5/21/2017. CT chest 4/1/2015. TECHNIQUE: Helical thin section chest CT following uneventful intravenous  administration of nonionic contrast according to departmental PE protocol. Coronal and sagittal reformats were performed. 3D/MIP post processing was  performed. CT dose reduction was achieved through use of a standardized protocol  tailored for this examination and automatic exposure control for dose  modulation. FINDINGS: This is a good quality study for the evaluation of pulmonary embolism  to the first subsegmental arterial level. There is no pulmonary embolism to this  level. N/A variant right subclavian artery is again noted. The aorta and main pulmonary  artery are normal in caliber.  Cardiac size is within normal limits. No  pericardial effusion. No lymphadenopathy by imaging size criteria. There is stable chronic atelectasis in the right middle lobe with stable chronic  mucous plugging of the right middle lobe bronchi. There is no new lung mass or  consolidation. No pleural effusion or pneumothorax. Limited images of the upper abdomen are within normal limits. The bony  structures are age-appropriate         Impression IMPRESSION:   There is no acute pulmonary embolism or other acute abnormality in the chest.  There is stable chronic atelectasis in the right middle lobe with stable chronic  mucous plugging of the right middle lobe bronchi. My assessment/plan was discussed with:  nursing    respiratory therapy Dr. loza      Complex decision making was performed which includes reviewing the patient's past medical records, current laboratory results, medications, ECG and actual Xray films, immediately available at the bedside to the patient    Thank you for allowing us to participate in the care of this patient. We will be happy to follow her progress with you.     Rakesh Gonzalez MD

## 2017-05-26 NOTE — PROGRESS NOTES
Manuel has received Auth. Auth is good for 48 hours. If patient does not discharge on 5/27/2017 then Lindsey Altamirano will be resubmitted on 5/30/2017. Patient and  informed. Patient is agreeable to discharge to [place] [facility] to participate in the [facility] program.  Santos Tompkins NP informed. Mickie Johnson RN informed. Discharge pending. Patient will require medical transport. Related to 4LNC, Decreased strength and endurance. Fall Risk. Chart review - Dr. Judah Morel is recommending an overnight and exercise oximetry prior to discharge. Reviewed with Ton Gates NP. To be ordered. Will anticipate d/c 5/27/2017. CM will continue to follow.         Leona Steel RN CM  Ext [phone]

## 2017-05-26 NOTE — PROGRESS NOTES
Nutrition Services      Nutrition Screen:  Wt Readings from Last 10 Encounters:   05/23/17 95.6 kg (210 lb 12.2 oz)   05/16/17 93 kg (205 lb)   07/30/15 84.8 kg (187 lb)   06/18/15 85.7 kg (189 lb)   06/04/15 84.8 kg (187 lb)   03/31/15 86.2 kg (190 lb)   03/13/15 81.2 kg (179 lb 0.2 oz)   02/18/15 88 kg (194 lb)   01/13/15 86.2 kg (190 lb)   12/17/14 87.1 kg (192 lb)     Body mass index is 36.18 kg/(m^2). Supplements:                        _____ ordered ______  declined. __ __  Pt is nutritionally stable at this time, will rescreen in 7 days. _x __    Pt is at nutritional risk and will be rescreened in 3-6 days. __ __  Pt is at moderate or high nutritional risk, will refer to RD for assessment.        Evelyn Stanley  Dietetic Technician, Registered

## 2017-05-27 VITALS
SYSTOLIC BLOOD PRESSURE: 132 MMHG | OXYGEN SATURATION: 94 % | TEMPERATURE: 98.2 F | DIASTOLIC BLOOD PRESSURE: 62 MMHG | RESPIRATION RATE: 20 BRPM | WEIGHT: 210.76 LBS | BODY MASS INDEX: 35.98 KG/M2 | HEART RATE: 98 BPM | HEIGHT: 64 IN

## 2017-05-27 PROBLEM — E66.9 OBESITY (BMI 35.0-39.9 WITHOUT COMORBIDITY): Status: ACTIVE | Noted: 2017-05-27

## 2017-05-27 PROBLEM — M79.7 FIBROMYALGIA: Status: ACTIVE | Noted: 2017-05-27

## 2017-05-27 LAB
ANION GAP BLD CALC-SCNC: 4 MMOL/L (ref 5–15)
BASOPHILS # BLD AUTO: 0 K/UL (ref 0–0.1)
BASOPHILS # BLD: 0 % (ref 0–1)
BUN SERPL-MCNC: 14 MG/DL (ref 6–20)
BUN/CREAT SERPL: 24 (ref 12–20)
CALCIUM SERPL-MCNC: 9 MG/DL (ref 8.5–10.1)
CHLORIDE SERPL-SCNC: 104 MMOL/L (ref 97–108)
CO2 SERPL-SCNC: 30 MMOL/L (ref 21–32)
CREAT SERPL-MCNC: 0.59 MG/DL (ref 0.55–1.02)
EOSINOPHIL # BLD: 0 K/UL (ref 0–0.4)
EOSINOPHIL NFR BLD: 0 % (ref 0–7)
ERYTHROCYTE [DISTWIDTH] IN BLOOD BY AUTOMATED COUNT: 14.2 % (ref 11.5–14.5)
GLUCOSE BLD STRIP.AUTO-MCNC: 105 MG/DL (ref 65–100)
GLUCOSE SERPL-MCNC: 151 MG/DL (ref 65–100)
HCT VFR BLD AUTO: 42.2 % (ref 35–47)
HGB BLD-MCNC: 13.8 G/DL (ref 11.5–16)
LYMPHOCYTES # BLD AUTO: 24 % (ref 12–49)
LYMPHOCYTES # BLD: 2.2 K/UL (ref 0.8–3.5)
MCH RBC QN AUTO: 32.9 PG (ref 26–34)
MCHC RBC AUTO-ENTMCNC: 32.7 G/DL (ref 30–36.5)
MCV RBC AUTO: 100.5 FL (ref 80–99)
MONOCYTES # BLD: 0.5 K/UL (ref 0–1)
MONOCYTES NFR BLD AUTO: 5 % (ref 5–13)
NEUTS SEG # BLD: 6.6 K/UL (ref 1.8–8)
NEUTS SEG NFR BLD AUTO: 71 % (ref 32–75)
PLATELET # BLD AUTO: 220 K/UL (ref 150–400)
POTASSIUM SERPL-SCNC: 4.5 MMOL/L (ref 3.5–5.1)
RBC # BLD AUTO: 4.2 M/UL (ref 3.8–5.2)
SERVICE CMNT-IMP: ABNORMAL
SODIUM SERPL-SCNC: 138 MMOL/L (ref 136–145)
WBC # BLD AUTO: 9.3 K/UL (ref 3.6–11)

## 2017-05-27 PROCEDURE — 36415 COLL VENOUS BLD VENIPUNCTURE: CPT | Performed by: INTERNAL MEDICINE

## 2017-05-27 PROCEDURE — 77010033678 HC OXYGEN DAILY

## 2017-05-27 PROCEDURE — 74011000250 HC RX REV CODE- 250: Performed by: INTERNAL MEDICINE

## 2017-05-27 PROCEDURE — 94640 AIRWAY INHALATION TREATMENT: CPT

## 2017-05-27 PROCEDURE — 80048 BASIC METABOLIC PNL TOTAL CA: CPT | Performed by: INTERNAL MEDICINE

## 2017-05-27 PROCEDURE — 74011250636 HC RX REV CODE- 250/636: Performed by: INTERNAL MEDICINE

## 2017-05-27 PROCEDURE — 74011636637 HC RX REV CODE- 636/637: Performed by: INTERNAL MEDICINE

## 2017-05-27 PROCEDURE — 85025 COMPLETE CBC W/AUTO DIFF WBC: CPT | Performed by: INTERNAL MEDICINE

## 2017-05-27 PROCEDURE — 82962 GLUCOSE BLOOD TEST: CPT

## 2017-05-27 PROCEDURE — 74011250637 HC RX REV CODE- 250/637: Performed by: INTERNAL MEDICINE

## 2017-05-27 RX ORDER — VENLAFAXINE HYDROCHLORIDE 75 MG/1
75 CAPSULE, EXTENDED RELEASE ORAL
Qty: 20 CAP | Refills: 0 | Status: SHIPPED | OUTPATIENT
Start: 2017-05-27 | End: 2017-07-11 | Stop reason: SDUPTHER

## 2017-05-27 RX ORDER — PREDNISONE 5 MG/1
40 TABLET ORAL
Qty: 72 TAB | Refills: 0 | Status: SHIPPED | OUTPATIENT
Start: 2017-05-27 | End: 2017-08-15

## 2017-05-27 RX ORDER — IBUPROFEN 200 MG
1 TABLET ORAL
Qty: 14 PATCH | Refills: 0 | Status: SHIPPED | OUTPATIENT
Start: 2017-05-27 | End: 2017-06-26

## 2017-05-27 RX ORDER — ALPRAZOLAM 1 MG/1
1 TABLET ORAL
Qty: 3 TAB | Refills: 0 | Status: SHIPPED | OUTPATIENT
Start: 2017-05-27

## 2017-05-27 RX ORDER — IBUPROFEN 800 MG/1
800 TABLET ORAL
Qty: 20 TAB | Refills: 0 | Status: SHIPPED
Start: 2017-05-27

## 2017-05-27 RX ORDER — ACETAMINOPHEN AND CODEINE PHOSPHATE 300; 30 MG/1; MG/1
1 TABLET ORAL
Qty: 5 TAB | Refills: 0 | Status: SHIPPED | OUTPATIENT
Start: 2017-05-27

## 2017-05-27 RX ORDER — CETIRIZINE HCL 10 MG
10 TABLET ORAL DAILY
Qty: 10 TAB | Refills: 0 | Status: SHIPPED | OUTPATIENT
Start: 2017-05-27

## 2017-05-27 RX ORDER — RISPERIDONE 0.25 MG/1
0.25 TABLET, FILM COATED ORAL 2 TIMES DAILY
Qty: 14 TAB | Refills: 0 | Status: SHIPPED | OUTPATIENT
Start: 2017-05-27 | End: 2017-08-14 | Stop reason: SINTOL

## 2017-05-27 RX ORDER — FLUTICASONE FUROATE AND VILANTEROL 100; 25 UG/1; UG/1
1 POWDER RESPIRATORY (INHALATION) DAILY
Qty: 1 INHALER | Refills: 0 | Status: SHIPPED | OUTPATIENT
Start: 2017-05-27 | End: 2018-07-31

## 2017-05-27 RX ORDER — PROMETHAZINE HYDROCHLORIDE 25 MG/1
25 TABLET ORAL
Qty: 4 TAB | Refills: 0 | Status: SHIPPED | OUTPATIENT
Start: 2017-05-27

## 2017-05-27 RX ORDER — FUROSEMIDE 40 MG/1
40 TABLET ORAL DAILY
Qty: 20 TAB | Refills: 0 | Status: ON HOLD | OUTPATIENT
Start: 2017-05-27 | End: 2021-11-26

## 2017-05-27 RX ORDER — PHENOBARBITAL 60 MG/1
60 TABLET ORAL 2 TIMES DAILY
Qty: 60 TAB | Refills: 1 | Status: SHIPPED | OUTPATIENT
Start: 2017-05-27 | End: 2020-06-29 | Stop reason: CLARIF

## 2017-05-27 RX ADMIN — LEVOTHYROXINE SODIUM 200 MCG: 100 TABLET ORAL at 08:15

## 2017-05-27 RX ADMIN — Medication 10 ML: at 08:16

## 2017-05-27 RX ADMIN — ASPIRIN 81 MG CHEWABLE TABLET 81 MG: 81 TABLET CHEWABLE at 08:15

## 2017-05-27 RX ADMIN — HYDROCODONE BITARTRATE AND ACETAMINOPHEN 1 TABLET: 7.5; 325 TABLET ORAL at 08:16

## 2017-05-27 RX ADMIN — Medication 400 MG: at 08:16

## 2017-05-27 RX ADMIN — IPRATROPIUM BROMIDE AND ALBUTEROL SULFATE 3 ML: .5; 3 SOLUTION RESPIRATORY (INHALATION) at 01:20

## 2017-05-27 RX ADMIN — IPRATROPIUM BROMIDE AND ALBUTEROL SULFATE 3 ML: .5; 3 SOLUTION RESPIRATORY (INHALATION) at 08:07

## 2017-05-27 RX ADMIN — GUAIFENESIN 1200 MG: 600 TABLET, EXTENDED RELEASE ORAL at 08:15

## 2017-05-27 RX ADMIN — MONTELUKAST SODIUM 10 MG: 10 TABLET, FILM COATED ORAL at 08:15

## 2017-05-27 RX ADMIN — VENLAFAXINE HYDROCHLORIDE 75 MG: 37.5 CAPSULE, EXTENDED RELEASE ORAL at 08:15

## 2017-05-27 RX ADMIN — GABAPENTIN 600 MG: 300 CAPSULE ORAL at 08:15

## 2017-05-27 RX ADMIN — METOPROLOL TARTRATE 12.5 MG: 25 TABLET ORAL at 08:15

## 2017-05-27 RX ADMIN — ENOXAPARIN SODIUM 40 MG: 40 INJECTION SUBCUTANEOUS at 09:56

## 2017-05-27 RX ADMIN — PANTOPRAZOLE SODIUM 40 MG: 40 TABLET, DELAYED RELEASE ORAL at 08:15

## 2017-05-27 RX ADMIN — CETIRIZINE HYDROCHLORIDE 10 MG: 10 TABLET, FILM COATED ORAL at 08:15

## 2017-05-27 RX ADMIN — FLUTICASONE FUROATE AND VILANTEROL TRIFENATATE 1 PUFF: 100; 25 POWDER RESPIRATORY (INHALATION) at 09:56

## 2017-05-27 RX ADMIN — Medication 250 MG: at 08:15

## 2017-05-27 RX ADMIN — RISPERIDONE 0.25 MG: 0.25 TABLET ORAL at 08:16

## 2017-05-27 RX ADMIN — Medication 10 ML: at 05:59

## 2017-05-27 RX ADMIN — PREDNISONE 40 MG: 20 TABLET ORAL at 08:15

## 2017-05-27 RX ADMIN — ALPRAZOLAM 1 MG: 0.5 TABLET ORAL at 08:15

## 2017-05-27 RX ADMIN — PHENOBARBITAL 64.8 MG: 32.4 TABLET ORAL at 08:15

## 2017-05-27 NOTE — PROGRESS NOTES
PCU SHIFT NURSING NOTE      Bedside shift change report given to Sherryle Best, RN (oncoming nurse) by Santana Zepeda RN (offgoing nurse). Report included the following information SBAR, Kardex, Intake/Output, MAR, Recent Results and Cardiac Rhythm NSR. Shift Summary:     9:20 PM  Patient complaining of pain and anxiety. PRN meds given. 10:30 PM  Sleep study started with patient on room air. Patient quickly desated to 80% and had to be put back on 4L NC.    1:38 AM  Patient pulled off oxygen trying to get up to go to the bathroom. Quickly desated to 78%. O2 hung out in the low 80's while up to bedside commode. Admission Date 5/21/2017   Admission Diagnosis Acute respiratory failure ([facility] [address]   Consults IP CONSULT TO PULMONOLOGY        Consults   []PT   []OT   []Speech   []Case Management      [] Palliative      Cardiac Monitoring Order   []Yes   []No     IV drips   []Yes    Drip:                            Dose:  Drip:                            Dose:  Drip:                            Dose:   []No     GI Prophylaxis   []Yes   []No         DVT Prophylaxis   SCDs:             Onel stockings:         [] Medication   []Contraindicated   []None      Activity Level Activity Level: Up with Assistance     Activity Assistance: Partial (one person)   Purposeful Rounding every 1-2 hour? []Yes   Zarate Score  Total Score: 2   Bed Alarm (If score 3 or >)   []Yes   [] Refused (See signed refusal form in chart)   Joseph Score  Joseph Score: 20   Joseph Score (if score 14 or less)   []PMT consult   []Wound Care consult      []Specialty bed   [] Nutrition consult          Needs prior to discharge:   Home O2 required:    []Yes   []No    If yes, how much O2 required?     Other:    Last Bowel Movement: Last Bowel Movement Date: 05/26/17      Influenza Vaccine Received Flu Vaccine for Current Season (usually Sept-March): Not Flu Season        Pneumonia Vaccine           Diet Active Orders   Diet    DIET DIABETIC CONSISTENT CARB Regular LDAs               Peripheral IV 05/21/17 Left Wrist (Active)   Site Assessment Swelling;Drainage (comment) 5/25/2017  3:09 PM   Phlebitis Assessment 0 5/25/2017  3:09 PM   Infiltration Assessment 0 5/25/2017  3:09 PM   Dressing Status Old drainage 5/25/2017  3:09 PM   Dressing Type Transparent;Tape 5/25/2017  3:09 PM   Hub Color/Line Status Pink;Flushed 5/25/2017  3:09 PM   Action Taken Open ports on tubing capped 5/23/2017  6:32 AM                      Urinary Catheter      Intake & Output   Date 05/25/17 1900 - 05/26/17 0659 05/26/17 0700 - 05/27/17 0659   Shift 1204-1684 24 Hour Total 9156-3051 2097-2127 24 Hour Total   I  N  T  A  K  E   P. O. 600 2100 200  200      P. O. 600 2100 200  200    Shift Total  (mL/kg) 600  (6.3) 2100  (22) 200  (2.1)  200  (2.1)   O  U  T  P  U  T   Urine  (mL/kg/hr)    375 375      Urine Voided    375 375      Urine Occurrence(s) 3 x 3 x  1 x 1 x    Shift Total  (mL/kg)    375  (3.9) 375  (3.9)    2100 200 -375 -175   Weight (kg) 95.6 95.6 95.6 95.6 95.6         Readmission Risk Assessment Tool Score High Risk            34       Total Score        3 Relationship with PCP    2 Patient Living Status    3 Patient Length of Stay > 5    4 More than 1 Admission in calendar year    5 Patient Insurance is Medicare, Medicaid or Self Pay    17 Charlson Comorbidity Score       Expected Length of Stay 3d 19h   Actual Length of Stay 5

## 2017-05-27 NOTE — PROGRESS NOTES
0710 Bedside Report received from [facility]. SBAR, Kardex, Intake/Output, MAR, Recent Results or Cardiac Rhythm NSR were discussed. Hollie Kehr RN assumed care of the pt.  0830 pt very anxious and tearful, requesting her am meds. Upon providing am meds, pt even more tearful about not getting her synthroid at 6am. Encouragement and support provided. Negotiated with times of meds for pt until she was pleased. 1000 pt happy- per pt, laughing with . Pt aware of d/c to [facility] for the [facility] program, amb scheduled for noon  1230 pt d/c'd to [place] [address] via amb on 4 l/m via nc.  Tele and piv d/c'd  Hospital to [facility] [place] [address]                                                                        64 y.o.   female    [address]   Room: [address] [place] [address] 2 PROGRESSIVE CARE  Unit Phone# :  [place]  [facility] [address] [facility]  [address] [address]  Dept: [phone]  Loc: [phone]                    SITUATION     Admitted:  5/21/2017         Attending Provider:  Rosa Dickson MD       Consultations:  IP CONSULT TO PULMONOLOGY    PCP:  Theresa Newell MD   [phone]    Treatment Team: Attending Provider: Rosa Dickson MD; Utilization Review: Kaya Caputo RN; Consulting Provider: Yogesh Ding MD; Care Manager: Raj Panda RN    Admitting Dx:  Acute respiratory failure [facility])       Principal Problem: Acute respiratory failure ([facility] [address]    * No surgery found * of      BY: * Surgery not found *             ON: * No surgery found *                  Code Status: Full Code                Advance Directives:   Advance Care Planning 5/21/2017   Patient's Healthcare Decision Maker is: Named in scanned ACP document   Primary Decision Maker Name Samantha Cornejo   Primary Decision Maker Phone Number [phone]   Primary Decision Maker Relationship to Patient Spouse   Confirm Advance Directive None   Patient Would Like to Complete Advance Directive -    (Send w/patient)   Yes Not W Pt       Isolation:  There are currently no Active Isolations       MDRO: No current active infections    Pain Medications given:  yes    Last dose: 2017 at  [facility] needed: no  Type of equipment:       BACKGROUND     Allergies: Allergies   Allergen Reactions    Latex Contact Dermatitis    Dilaudid [Hydromorphone (Pf)] Other (comments)     Feels like bugs are crawling all over her    Lipitor [Atorvastatin] Other (comments)     LIVER TROUBLE ON THIS MEDICATION    Sulfa (Sulfonamide Antibiotics) Itching       Past Medical History:   Diagnosis Date    Anxiety     Bone spur     NECK    COPD     Depression     Endocrine disease     hypothyroidism    Fibromyalgia     Fusion of spine of cervical region     Gastrointestinal disorder     gerd    Hyperlipidemia     Hypothyroid     Morbid obesity ([facility] [address]     Osteoarthritis     PUD (peptic ulcer disease)     Tobacco abuse        Past Surgical History:   Procedure Laterality Date    BRONCHOSCOPY-FIBER/THERAPY  4/3/2015         CARDIAC CATHETERIZATION  2013         COLONOSCOPY,DIAGNOSTIC  10/30/2014         HX CATARACT REMOVAL      bilateral    HX GYN          HX ORTHOPAEDIC      Ruptured disc, knuckles on right hand    UPPER GI ENDOSCOPY,BIOPSY  10/30/2014         UPPER GI ENDOSCOPY,DILATN W GUIDE  10/30/2014            Prescriptions Prior to Admission   Medication Sig    ALPRAZolam (XANAX) 1 mg tablet Take 1 mg by mouth three (3) times daily as needed for Anxiety.  cetirizine (ZYRTEC) 10 mg tablet Take 10 mg by mouth daily. Patient is taking 1 Tab PO BID    venlafaxine-SR (EFFEXOR-XR) 150 mg capsule Take 150 mg by mouth two (2) times a day.  albuterol (VENTOLIN HFA) 90 mcg/actuation inhaler Take 2 Puffs by inhalation every four (4) hours as needed for Wheezing.     guaiFENesin (ORGANIDIN) 400 mg tablet Take 400 mg by mouth every four (4) hours as needed for Congestion.  PHENobarbital (LUMINAL) 60 mg tablet 60 mg two (2) times a day.  risperiDONE (RISPERDAL) 0.25 mg tablet 0.25 mg two (2) times a day.  HYDROcodone-acetaminophen (NORCO) 7.5-325 mg per tablet as needed (Gets 15 per month).  ibuprofen (MOTRIN) 800 mg tablet three (3) times daily.  gabapentin (NEURONTIN) 600 mg tablet 600 mg two (2) times a day.  primidone (MYSOLINE) 50 mg tablet Take 2 Tabs by mouth nightly.  prazosin (MINIPRESS) 2 mg capsule Take 1 Cap by mouth two (2) times a day.  dicyclomine (BENTYL) 10 mg capsule     glipiZIDE SR (GLUCOTROL) 5 mg CR tablet Take 5 mg by mouth two (2) times daily as needed (Patient monitors her BG levels and takes when she is also taking a steroid. ).  montelukast (SINGULAIR) 10 mg tablet Take 10 mg by mouth daily.  traMADol (ULTRAM) 50 mg tablet Take 50 mg by mouth every six (6) hours as needed for Pain.  ezetimibe-simvastatin (VYTORIN 10/40) 10-40 mg per tablet Take 1 tablet by mouth nightly.  metoprolol (LOPRESSOR) 25 mg tablet Take 1 Tab by mouth two (2) times a day.  benzonatate (TESSALON) 200 mg capsule Take 1 Cap by mouth two (2) times daily as needed.  methocarbamol (ROBAXIN) 750 mg tablet Take 750-1,500 mg by mouth four (4) times daily as needed.  albuterol-ipratropium (DUONEB) 2.5 mg-0.5 mg/3 ml nebulizer solution 3 mL by Nebulization route every six (6) hours as needed for Wheezing.  hyoscyamine SL (LEVSIN/SL) 0.125 mg SL tablet 0.125 mg by SubLINGual route three (3) times daily as needed for Cramping.  esomeprazole (NEXIUM) 40 mg capsule Take 40 mg by mouth daily.  MAGOX 400 mg tablet TAKE ONE TABLET TWICE A DAY    vit B Cmplx 3-FA-Vit C-Biotin (NEPHRO SHREYA RX) 1- mg-mg-mcg tablet Take 1 Tab by mouth daily.  aspirin 81 mg chewable tablet Take 81 mg by mouth daily.  levothyroxine (SYNTHROID) 200 mcg tablet Take 200 mcg by mouth daily (before breakfast).     lisinopril (PRINIVIL, ZESTRIL) 20 mg tablet 20 mg daily.  nystatin (MYCOSTATIN) 100,000 unit/mL suspension Take 5 mL by mouth four (4) times daily. swish and spit (Patient taking differently: Take 500,000 Units by mouth four (4) times daily as needed. swish and spit)    niacin  mg CR capsule Take 250 mg by mouth daily. Hard scripts included in transfer packet yes    Vaccinations:    Immunization History   Administered Date(s) Administered    Influenza Vaccine 09/01/2013, 09/01/2014    Pneumococcal Vaccine (Unspecified Type) 01/01/2010       Readmission Risks:    Known Risks: non compliance with oxygen, COPD        The Charlson CoMorbitiy Index tool is an evidenced based tool that has more automatic generated information. The tool looks at many different items such as the age of the patient, how many times they were admitted in the last calendar year, current length of stay in the hospital and their diagnosis. All of these items are pulled automatically from information documented in the chart from various places and will generate a score that predicts whether a patient is at low (less than 13), medium (13-20) or high (21 or greater) risk of being readmitted.         ASSESSMENT                Temp: 97.7 °F (36.5 °C) (05/27/17 0321) Pulse (Heart Rate): 77 (05/27/17 0321)     Resp Rate: 20 (05/27/17 0321)           BP: 121/54 (05/27/17 0321)     O2 Sat (%): 95 % (05/27/17 0807)     Weight: 95.6 kg (210 lb 12.2 oz)    Height: 5' 4\" (162.6 cm) (05/21/17 1358)       If above not within 1 hour of discharge:    BP:_____  P:____  R:____ T:_____ O2 Sat: ___%  O2: ______    Active Orders   Diet    DIET DIABETIC CONSISTENT CARB Regular         Orientation: oriented to time, place, person and situation     Active Behaviors: None                                   Active Lines/Drains:  (Peg Tube / Ly / CL or S/L?): no    Urinary Status: Voiding     Last BM: Last Bowel Movement Date: 05/27/17                   Mobility: Slightly limited   Weight Bearing Status: WBAT (Weight Bearing as Tolerated)      Gait Training  Assistive Device: Gait belt  Ambulation - Level of Assistance: Contact guard assistance  Distance (ft): 100 Feet (ft)         Lab Results   Component Value Date/Time    Glucose 151 05/27/2017 03:39 AM    Hemoglobin A1c 6.1 11/25/2014 03:16 PM    INR 0.9 07/05/2013 10:01 AM    INR 0.9 06/24/2013 12:14 PM    HGB 13.8 05/27/2017 03:39 AM    HGB 14.3 05/25/2017 03:37 AM        RECOMMENDATION     See After Visit Summary (AVS) for:  · Discharge instructions  · After [address]   · Special equipment needed (entered pre-discharge by Care Management)  · Medication Reconciliation    · Follow up Appointment(s)         Report given/sent by:  Yumi Whitt RN                    Verbal report given to: Valarie Eastman        Estimated discharge time:  5/27/2017 at [address] for Phenobarb Rx faxed to [facility] care    Yumi Whitt RN

## 2017-05-27 NOTE — DISCHARGE INSTRUCTIONS
Patient Discharge Instructions     Pt Name  Cruzito Keita   Date of Birth 1955   Age  64 y.o. Medical Record Number  [id number]   PCP Kimberley Goldberg MD    Admit date:  5/21/2017 @    [address] [place] [address]    Room Number  2275/01   Date of Discharge 5/27/2017     Admission Diagnoses:     Acute respiratory failure (HCC)          Allergies   Allergen Reactions    Latex Contact Dermatitis    Dilaudid [Hydromorphone (Pf)] Other (comments)     Feels like bugs are crawling all over her    Lipitor [Atorvastatin] Other (comments)     LIVER TROUBLE ON THIS MEDICATION    Sulfa (Sulfonamide Antibiotics) Itching        You were admitted to [address] [place] [address] for  Acute respiratory failure ([facility] [address]    YOUR OTHER MEDICAL DIAGNOSES INCLUDE (BUT NOT LIMITED TO ):  Present on Admission:   Acute respiratory failure ([facility] [address]   COPD (chronic obstructive pulmonary disease) ([facility] [address]   COPD with acute exacerbation (HCC)   Chronic respiratory failure with hypoxia (HCC)   Diabetic peripheral neuropathy associated with type 2 diabetes mellitus (HCC)   Depression, major, recurrent (HCC)   Hypothyroidism   HTN (hypertension)   Hyperlipidemia   PTSD (post-traumatic stress disorder)   Smoking   Tobacco use disorder   Obesity (BMI 35.0-39.9 without comorbidity) (HCC)   Fibromyalgia      DIET:  Cardiac Diet and Clear liquids, advance as tolerated   Recommended activity: Activity as tolerated  Follow up : Follow-up Information     Follow up With Details Comments Contact Info    [address] [facility]   [address] [place] [address].  [phone]      Kimberley Goldberg MD  please call to schedule appointment within 1-2 weeks after you are discharged from [address] [place] [address]      Tray Wright MD  follow up as outpatient.   please schedule appointment within one week [address] [facility] [address] [facility] Schedule an appointment as soon as possible for a visit to be seen within one week [address] [place] [address] [address]  [phone]. [phone]          ** Patient will be discharged with long steroid taper due to her chronic heavy smoking issue for her COPD exacerbation. ** Please check her BMP next Wednesday    Rehab facility MD responsible for above upon discharge. · It is important that you take the medication exactly as they are prescribed. · Keep your medication in the bottles provided by the pharmacist and keep a list of the medication names, dosages, and times to be taken in your wallet. · Do not take other medications without consulting your doctor. ADDITIONAL INFORMATION: If you experience any of the following symptoms or have any health problem not listed below, then please call your primary care physician or return to the emergency room if you cannot get hold of your doctor: Fever, chills, nausea, vomiting, diarrhea, change in mentation, falling, bleeding, shortness of breath. I understand that if any problems occur once I am discharged, I am supposed to call my Primary care physician for further care or seek help in the Emergency Department at the nearest Healthcare facility. I have had an opportunity to discuss my clinical issues with my doctor and nursing staff. I understand and acknowledge receipt of the above instructions.                                                                                                                                            Physician's or R.N.'s Signature                                                            Date/Time                                                                                                                                              Patient or Representative Signature                                                 Date/Time

## 2017-05-27 NOTE — PROGRESS NOTES
Care Management:    Patient discharging to [facility] today. I have spoken with Marshall Barber at [facility] and they can accept. I have set up transport with [facility] to transport. Nurse has number to call report.      Michele Hope [address] [place] [id number]

## 2017-05-27 NOTE — DISCHARGE SUMMARY
Hospitalist Discharge Summary     Patient ID:  Lenka Santiago  [id number]  64 y.o.  1955    PCP on record: Lillian Mirza MD    Admit date: 5/21/2017  Discharge date and time: 5/27/2017      DISCHARGE DIAGNOSIS:  SIRS POA with tachycardia and tachypnea due to COPD  Acute respiratory failure with Hypoxia/hypercarbia POA  Acute COPD exacerbation POA still actively wheezing  Ongoing tobacco use  Essential Hypertension POA  DM2 controlled without complication  Visual changes  Hypovolemic hyponatremia  PTSD/anxiety with depression/fibromyalgia with significant polypharmacy  Morbid obesity         CONSULTATIONS:  IP CONSULT TO PULMONOLOGY    Excerpted HPI from H&P of Kristopher Vaughan MD:  Nela Mullins is a 64 y.o.  female who presents with above. Pt says that she has been having progressive shortness of breath for \"months. \" Per her report, she has seen her PCP 4 times for this issue without improvement. She has not been placed on steroids but has been on levaquin x 9 days now from her last visit. Pt previously was smoking >1ppd tobacco, now smoking under 1ppd but is continuing to smoke. She says that she is unable to cough anything up. She says that she has been having fevers because \"anything over 98 is a fever for me. \" She denies lightheadedness. No chest pain but does feel some \"tightness\" with her breathing. She denies nausea but has not been eating well because she says that it feels like she can't swallow well due to her shortness of breath. She has been constipated. She complains of new LE edema. No focal weakness. She recently saw Dr. Deshaun Patel last week for jerking and resting tremor. ______________________________________________________________________  DISCHARGE SUMMARY/HOSPITAL COURSE:  for full details see H&P, daily progress notes, labs, consult notes.      SIRS POA with tachycardia and tachypnea due to COPD  Acute respiratory failure with Hypoxia/hypercarbia POA  Acute COPD exacerbation POA still actively wheezing  Ongoing tobacco use  - Pt will requiring O2 @ 4L via n/c, O2 sat stays greater than 95% when she wears O2 supplement. However, she usually takes off despite of frequent reminder. We would be able to wean off O2 if she wears O2 as directed. - Overnight O2 study within normal range with 4L of O2 supplement. - Completed 9 day ABT (levaquin, doxycycline). No further ABT needed. - Long steroid taper dose as directed  - ECHO: EF 60% with normal RV function  - CTA chest: There is no acute pulmonary embolism or other acute abnormality in the chest. There is stable chronic atelectasis in the right middle lobe with stable chronicmucous plugging of the right middle lobe bronchi.  - Pulmonary rehab at discharge     Essential Hypertension POA  - Stable  - Cont metoprolol  - Hold lisinopril, prazosin    Hx of CHF  - we held Lasix during this hospitalization. Recommend to resume lasix in decreased dose. BMP to be checked next week.     DM2 controlled without complication  -resume glipizide  - check blood glucose per Rehab protocol      Visual changes  - C/o intermittent diplopia for the last month; wears glasses  - CT head: negative   - pt seen Dr. Kari Owen (neurologist) on 5/16); recommend outpatient follow up. - no further visual disturbances today     Hypovolemic hyponatremia  - resolved     PTSD/anxiety with depression/fibromyalgia with significant polypharmacy  - Con't home xanax, tylenol #3  - Pt on multiple controlled substances and >30 meds on her list in this Pt who is only 62yo. - Need to closely follow up with PCP to consider reducing overall medication burden.     Morbid obesity  - 210lbs BMI 36.1kg/m2   - discussed about life style change; pt became very tearful    Tobacco abuse/smoking  - Nicotine patch  - heavily discussed about importance of smoking cessation.   Pt acknowledged but she did not express her desire to quit        _______________________________________________________________________  Patient seen and examined by me on discharge day. Pertinent Findings:  Gen:    Not in distress  Chest: a few exp wheezing in bases  CVS:   Regular rhythm. No edema  Abd:  Soft, not distended, not tender  Neuro:  Alert, orient x 4  _______________________________________________________________________  DISCHARGE MEDICATIONS:   Current Discharge Medication List      START taking these medications    Details   promethazine (PHENERGAN) 25 mg tablet Take 1 Tab by mouth every six (6) hours as needed. Qty: 4 Tab, Refills: 0      predniSONE (DELTASONE) 5 mg tablet Take 8 Tabs by mouth daily (with breakfast). 8 tab once a day x 2 days  7 tab once a day x 2 days  6 tab once a day x 2 days  5 tab once a day x 2 days  4 tab once a day x 2 days  3 tab once a day x 2 days  2 tab once a day x 2 days  Then 1 tab once a day x 2 days  Indications: COPD exacerbation  Qty: 72 Tab, Refills: 0      nicotine (NICODERM CQ) 21 mg/24 hr 1 Patch by TransDERmal route daily as needed for Other (nicotine withdrawal) for up to 30 days. Qty: 14 Patch, Refills: 0      guaiFENesin ER (MUCINEX) 1,200 mg Ta12 ER tablet Take 1 Tab by mouth two (2) times a day. Qty: 14 Tab, Refills: 0      fluticasone-vilanterol (BREO ELLIPTA) 100-25 mcg/dose inhaler Take 1 Puff by inhalation daily. Qty: 1 Inhaler, Refills: 0         CONTINUE these medications which have CHANGED    Details   acetaminophen-codeine (TYLENOL #3) 300-30 mg per tablet Take 1 Tab by mouth every eight (8) hours as needed. Max Daily Amount: 3 Tabs. Qty: 5 Tab, Refills: 0      ALPRAZolam (XANAX) 1 mg tablet Take 1 Tab by mouth three (3) times daily as needed for Anxiety. Max Daily Amount: 3 mg. Qty: 3 Tab, Refills: 0      cetirizine (ZYRTEC) 10 mg tablet Take 1 Tab by mouth daily. Qty: 10 Tab, Refills: 0      ibuprofen (MOTRIN) 800 mg tablet Take 1 Tab by mouth every eight (8) hours as needed.   Qty: 20 Tab, Refills: 0      venlafaxine-SR (EFFEXOR-XR) 75 mg capsule Take 1 Cap by mouth daily (with breakfast). Qty: 20 Cap, Refills: 0      risperiDONE (RISPERDAL) 0.25 mg tablet Take 1 Tab by mouth two (2) times a day. Qty: 14 Tab, Refills: 0      furosemide (LASIX) 40 mg tablet Take 1 Tab by mouth daily. Qty: 20 Tab, Refills: 0         CONTINUE these medications which have NOT CHANGED    Details   albuterol (VENTOLIN HFA) 90 mcg/actuation inhaler Take 2 Puffs by inhalation every four (4) hours as needed for Wheezing. PHENobarbital (LUMINAL) 60 mg tablet 60 mg two (2) times a day. Associated Diagnoses: Benign familial tremor; Diabetic peripheral neuropathy associated with type 2 diabetes mellitus ([address] Cervical post-laminectomy syndrome; Syncope and collapse; Stenosis of both internal carotid arteries; Cervical radiculopathy due to degenerative joint disease of spine; Degenerative lumbar spinal stenosis; Ataxia; B12 deficiency; Vitamin D deficiency      gabapentin (NEURONTIN) 600 mg tablet 600 mg two (2) times a day. Associated Diagnoses: Benign familial tremor; Diabetic peripheral neuropathy associated with type 2 diabetes mellitus ([address] Cervical post-laminectomy syndrome; Syncope and collapse; Stenosis of both internal carotid arteries; Cervical radiculopathy due to degenerative joint disease of spine; Degenerative lumbar spinal stenosis; Ataxia; B12 deficiency; Vitamin D deficiency      primidone (MYSOLINE) 50 mg tablet Take 2 Tabs by mouth nightly. Qty: 100 Tab, Refills: 11    Associated Diagnoses: Benign familial tremor; Diabetic peripheral neuropathy associated with type 2 diabetes mellitus ([facility] [address] Cervical post-laminectomy syndrome; Syncope and collapse; Stenosis of both internal carotid arteries; Cervical radiculopathy due to degenerative joint disease of spine; Degenerative lumbar spinal stenosis;  Ataxia; B12 deficiency; Vitamin D deficiency      dicyclomine (BENTYL) 10 mg capsule glipiZIDE SR (GLUCOTROL) 5 mg CR tablet Take 5 mg by mouth two (2) times daily as needed (Patient monitors her BG levels and takes when she is also taking a steroid. ).      montelukast (SINGULAIR) 10 mg tablet Take 10 mg by mouth daily. ezetimibe-simvastatin (VYTORIN 10/40) 10-40 mg per tablet Take 1 tablet by mouth nightly. metoprolol (LOPRESSOR) 25 mg tablet Take 1 Tab by mouth two (2) times a day. Qty: 60 Tab, Refills: 6      benzonatate (TESSALON) 200 mg capsule Take 1 Cap by mouth two (2) times daily as needed. Qty: 30 Cap, Refills: 0      methocarbamol (ROBAXIN) 750 mg tablet Take 750-1,500 mg by mouth four (4) times daily as needed. albuterol-ipratropium (DUONEB) 2.5 mg-0.5 mg/3 ml nebulizer solution 3 mL by Nebulization route every six (6) hours as needed for Wheezing. hyoscyamine SL (LEVSIN/SL) 0.125 mg SL tablet 0.125 mg by SubLINGual route three (3) times daily as needed for Cramping.      esomeprazole (NEXIUM) 40 mg capsule Take 40 mg by mouth daily. MAGOX 400 mg tablet TAKE ONE TABLET TWICE A DAY  Qty: 60 Tab, Refills: 3      vit B Cmplx 3-FA-Vit C-Biotin (NEPHRO SHREYA RX) 1- mg-mg-mcg tablet Take 1 Tab by mouth daily. aspirin 81 mg chewable tablet Take 81 mg by mouth daily. levothyroxine (SYNTHROID) 200 mcg tablet Take 200 mcg by mouth daily (before breakfast). nystatin (MYCOSTATIN) 100,000 unit/mL suspension Take 5 mL by mouth four (4) times daily. swish and spit  Qty: 180 mL, Refills: 0      niacin  mg CR capsule Take 250 mg by mouth daily.          STOP taking these medications       guaiFENesin (ORGANIDIN) 400 mg tablet Comments:   Reason for Stopping:         HYDROcodone-acetaminophen (NORCO) 7.5-325 mg per tablet Comments:   Reason for Stopping:         prazosin (MINIPRESS) 2 mg capsule Comments:   Reason for Stopping:         traMADol (ULTRAM) 50 mg tablet Comments:   Reason for Stopping:         lisinopril (PRINIVIL, ZESTRIL) 20 mg tablet Comments:   Reason for Stopping:               My Recommended Diet, Activity, Wound Care, and follow-up labs are listed in the patient's Discharge Insturctions which I have personally completed and reviewed. _______________________________________________________________________  DISPOSITION:    Home with Family:    Home with HH/PT/OT/RN:    SNF/LTC:    MARTY:    OTHER: y       Condition at Discharge:  Stable to discharge Rehab  _______________________________________________________________________  Follow up with:   PCP : Kimberley Goldberg MD  Follow-up Information     Follow up With Details Comments Contact Info    [id number] [facility]   [address] [place] [address]  [phone]      Kimberley Goldberg MD  please call to schedule appointment within 1-2 weeks after you are discharged from [address] [place] [address]      Tray Wright MD  follow up as outpatient.   please schedule appointment within one week [address]  [address] [place] [address],  Schedule an appointment as soon as possible for a visit to be seen within one week [facility]  [address]  [facility]  [phone]                Total time in minutes spent coordinating this discharge (includes going over instructions, follow-up, prescriptions, and preparing report for sign off to her PCP) :  35 minutes    Signed:  Chichi Bunch NP

## 2017-07-11 ENCOUNTER — OFFICE VISIT (OUTPATIENT)
Dept: BEHAVIORAL/MENTAL HEALTH CLINIC | Age: 62
End: 2017-07-11

## 2017-07-11 VITALS
DIASTOLIC BLOOD PRESSURE: 69 MMHG | BODY MASS INDEX: 34.31 KG/M2 | SYSTOLIC BLOOD PRESSURE: 101 MMHG | HEART RATE: 69 BPM | WEIGHT: 201 LBS | HEIGHT: 64 IN

## 2017-07-11 DIAGNOSIS — Z13.31 SCREENING FOR DEPRESSION: ICD-10-CM

## 2017-07-11 DIAGNOSIS — F43.10 PTSD (POST-TRAUMATIC STRESS DISORDER): ICD-10-CM

## 2017-07-11 DIAGNOSIS — F33.1 MAJOR DEPRESSIVE DISORDER, RECURRENT EPISODE, MODERATE (HCC): Primary | ICD-10-CM

## 2017-07-11 RX ORDER — VENLAFAXINE HYDROCHLORIDE 37.5 MG/1
37.5 CAPSULE, EXTENDED RELEASE ORAL
Qty: 14 CAP | Refills: 0 | Status: SHIPPED | OUTPATIENT
Start: 2017-07-11 | End: 2017-07-25

## 2017-07-11 RX ORDER — MIRTAZAPINE 15 MG/1
15 TABLET, FILM COATED ORAL
Qty: 30 TAB | Refills: 1 | Status: SHIPPED | OUTPATIENT
Start: 2017-07-25 | End: 2017-08-08 | Stop reason: SDUPTHER
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
## 2017-07-11 NOTE — MR AVS SNAPSHOT
Visit Information Date & Time Provider Department Dept. Phone Encounter #  
 7/11/2017  1:30 PM Sara Fitzpatrick NP [address] [facility] [phone] [id number] Follow-up Instructions Return in about 4 weeks (around 8/8/2017). Your Appointments 8/15/2017 10:30 AM  
Follow Up with Miguel Rice NP Neurology Clinic at [facility] [address]) Appt Note: f/u tremors, jrb 5/16/17  
 [facility], 
[address] [place] [address], [address] [address]  
[address] [facility], [address] [place] [address], [address] [address] Upcoming Health Maintenance Date Due Hepatitis C Screening 1955 FOOT EXAM Q1 12/23/1965 MICROALBUMIN Q1 12/23/1965 EYE EXAM RETINAL OR DILATED Q1 12/23/1965 DTaP/Tdap/Td series (1 - Tdap) 12/23/1976 PAP AKA CERVICAL CYTOLOGY 12/23/1976 FOBT Q 1 YEAR AGE 50-75 12/23/2005 LIPID PANEL Q1 1/19/2015 HEMOGLOBIN A1C Q6M 5/25/2015 ZOSTER VACCINE AGE 60> 12/23/2015 INFLUENZA AGE 9 TO ADULT 8/1/2017 BREAST CANCER SCRN MAMMOGRAM 11/20/2017 Allergies as of 7/11/2017  Review Complete On: 7/11/2017 By: Sara Fitzpatrick NP Severity Noted Reaction Type Reactions Latex  12/13/2010    Contact Dermatitis Dilaudid [Hydromorphone (Pf)]  10/29/2014   Systemic Other (comments) Feels like bugs are crawling all over her Lipitor [Atorvastatin]  06/21/2013    Other (comments) LIVER TROUBLE ON THIS MEDICATION Sulfa (Sulfonamide Antibiotics)  12/13/2010    Itching Current Immunizations  Reviewed on 3/19/2015 Name Date Influenza Vaccine 9/1/2014, 9/1/2013 Pneumococcal Vaccine (Unspecified Type) 1/1/2010 Not reviewed this visit You Were Diagnosed With   
  
 Codes Comments Major depressive disorder, recurrent episode, moderate (HCC)    -  Primary ICD-10-CM: F33.1 ICD-9-CM: 296.32   
 PTSD (post-traumatic stress disorder)     ICD-10-CM: F43.10 ICD-9-CM: 309.81 Screening for depression     ICD-10-CM: Z13.89 ICD-9-CM: V79.0 Vitals BP Pulse Height(growth percentile) Weight(growth percentile) BMI OB Status 101/69 69 5' 4\" (1.626 m) 201 lb (91.2 kg) 34.5 kg/m2 Menopause Smoking Status Current Every Day Smoker BMI and BSA Data Body Mass Index Body Surface Area 34.5 kg/m 2 2.03 m 2 Preferred Pharmacy Pharmacy Name Phone [facility], [address] [place] [address] [place] [phone] Your Updated Medication List  
  
   
This list is accurate as of: 7/11/17  2:22 PM.  Always use your most recent med list.  
  
  
  
  
 acetaminophen-codeine 300-30 mg per tablet Commonly known as:  TYLENOL #3 Take 1 Tab by mouth every eight (8) hours as needed. Max Daily Amount: 3 Tabs. ALPRAZolam 1 mg tablet Commonly known as:  Larrie Sark Take 1 Tab by mouth three (3) times daily as needed for Anxiety. Max Daily Amount: 3 mg.  
  
 aspirin 81 mg chewable tablet Take 81 mg by mouth daily. benzonatate 200 mg capsule Commonly known as:  TESSALON Take 1 Cap by mouth two (2) times daily as needed. cetirizine 10 mg tablet Commonly known as:  ZYRTEC Take 1 Tab by mouth daily. dicyclomine 10 mg capsule Commonly known as:  BENTYL DUONEB 2.5 mg-0.5 mg/3 ml Nebu Generic drug:  albuterol-ipratropium 3 mL by Nebulization route every six (6) hours as needed for Wheezing. fluticasone-vilanterol 100-25 mcg/dose inhaler Commonly known as:  BREO ELLIPTA Take 1 Puff by inhalation daily. furosemide 40 mg tablet Commonly known as:  LASIX Take 1 Tab by mouth daily. gabapentin 600 mg tablet Commonly known as:  NEURONTIN  
600 mg two (2) times a day. glipiZIDE SR 5 mg CR tablet Commonly known as:  GLUCOTROL XL Take 5 mg by mouth two (2) times daily as needed (Patient monitors her BG levels and takes when she is also taking a steroid. ). guaiFENesin 1,200 mg Ta12 ER tablet Commonly known as:  Edinson [employer] Edinson Take 1 Tab by mouth two (2) times a day. ibuprofen 800 mg tablet Commonly known as:  MOTRIN Take 1 Tab by mouth every eight (8) hours as needed. levothyroxine 200 mcg tablet Commonly known as:  SYNTHROID Take 200 mcg by mouth daily (before breakfast). LEVSIN/SL 0.125 mg SL tablet Generic drug:  hyoscyamine SL  
0.125 mg by SubLINGual route three (3) times daily as needed for Cramping. MAGOX 400 mg tablet Generic drug:  magnesium oxide TAKE ONE TABLET TWICE A DAY  
  
 methocarbamol 750 mg tablet Commonly known as:  ROBAXIN Take 750-1,500 mg by mouth four (4) times daily as needed. metoprolol tartrate 25 mg tablet Commonly known as:  LOPRESSOR Take 1 Tab by mouth two (2) times a day. montelukast 10 mg tablet Commonly known as:  SINGULAIR Take 10 mg by mouth daily. NexIUM 40 mg capsule Generic drug:  esomeprazole Take 40 mg by mouth daily. niacin  mg CR capsule Take 250 mg by mouth daily. nystatin 100,000 unit/mL suspension Commonly known as:  MYCOSTATIN Take 5 mL by mouth four (4) times daily. swish and spit PHENobarbital 60 mg tablet Commonly known as:  LUMINAL Take 1 Tab by mouth two (2) times a day. Max Daily Amount: 120 mg.  
  
 predniSONE 5 mg tablet Commonly known as:  Andrea Omer Take 8 Tabs by mouth daily (with breakfast). 8 tab once a day x 2 days 7 tab once a day x 2 days 6 tab once a day x 2 days 5 tab once a day x 2 days 4 tab once a day x 2 days 3 tab once a day x 2 days 2 tab once a day x 2 days Then 1 tab once a day x 2 days  Indications: COPD exacerbation  
  
 primidone 50 mg tablet Commonly known as: MYSOLINE Take 2 Tabs by mouth nightly. promethazine 25 mg tablet Commonly known as:  PHENERGAN Take 1 Tab by mouth every six (6) hours as needed. risperiDONE 0.25 mg tablet Commonly known as:  RisperDAL Take 1 Tab by mouth two (2) times a day. venlafaxine-SR 75 mg capsule Commonly known as:  EFFEXOR-XR Take 1 Cap by mouth daily (with breakfast). VENTOLIN HFA 90 mcg/actuation inhaler Generic drug:  albuterol Take 2 Puffs by inhalation every four (4) hours as needed for Wheezing. vit B Cmplx 3-FA-Vit C-Biotin 1- mg-mg-mcg tablet Commonly known as:  NEPHRO SHREYA RX Take 1 Tab by mouth daily. Vytorin 10/40 10-40 mg per tablet Generic drug:  ezetimibe-simvastatin Take 1 tablet by mouth nightly. We Performed the Following BEHAV ASSMT W/SCORE & DOCD/STAND INSTRUMENT [id number] CPT(R)] Follow-up Instructions Return in about 4 weeks (around 8/8/2017). Introducing [facility] & HEALTH SERVICES! [facility] introduces Kap[employer] patient portal. Now you can access parts of your medical record, email your doctor's office, and request medication refills online. 1. In your internet browser, go to [email or web address] 2. Click on the First Time User? Click Here link in the Sign In box. You will see the New Member Sign Up page. 3. Enter your Kapitall Access Code exactly as it appears below. You will not need to use this code after youve completed the sign-up process. If you do not sign up before the expiration date, you must request a new code. · Kapitall Access Code: [email or web address] Expires: 8/14/2017  9:48 AM 
 
4. Enter the last four digits of your Social Security Number (xxxx) and Date of Birth (mm/dd/yyyy) as indicated and click Submit. You will be taken to the next sign-up page. 5. Create a CMEt ID. This will be your Kapitall login ID and cannot be changed, so think of one that is secure and easy to remember. 6. Create a Kapitall password. You can change your password at any time. 7. Enter your Password Reset Question and Answer.  This can be used at a later time if you forget your password. 8. Enter your e-mail address. You will receive e-mail notification when new information is available in [address] 9. Click Sign Up. You can now view and download portions of your medical record. 10. Click the Download Summary menu link to download a portable copy of your medical information. If you have questions, please visit the Frequently Asked Questions section of the [employer] website. Remember, [employer] is NOT to be used for urgent needs. For medical emergencies, dial 911. Now available from your iPhone and Android! Please provide this summary of care documentation to your next provider. Your primary care clinician is listed as Juliane Triplett. If you have any questions after today's visit, please call [phone].

## 2017-08-08 ENCOUNTER — OFFICE VISIT (OUTPATIENT)
Dept: BEHAVIORAL/MENTAL HEALTH CLINIC | Age: 62
End: 2017-08-08

## 2017-08-08 VITALS
BODY MASS INDEX: 34.15 KG/M2 | HEIGHT: 64 IN | DIASTOLIC BLOOD PRESSURE: 78 MMHG | SYSTOLIC BLOOD PRESSURE: 110 MMHG | HEART RATE: 85 BPM | WEIGHT: 200 LBS

## 2017-08-08 DIAGNOSIS — F43.10 PTSD (POST-TRAUMATIC STRESS DISORDER): ICD-10-CM

## 2017-08-08 DIAGNOSIS — F33.1 MAJOR DEPRESSIVE DISORDER, RECURRENT EPISODE, MODERATE (HCC): Primary | ICD-10-CM

## 2017-08-08 RX ORDER — MIRTAZAPINE 30 MG/1
30 TABLET, FILM COATED ORAL
Qty: 30 TAB | Refills: 1 | Status: SHIPPED | OUTPATIENT
Start: 2017-08-08 | End: 2017-08-14

## 2017-08-08 NOTE — MR AVS SNAPSHOT
Visit Information Date & Time Provider Department Dept. Phone Encounter #  
 8/8/2017  1:30 PM Jenny Manuel NP [facility] [facility] [phone] [id number] Your Appointments 8/15/2017 10:30 AM  
Follow Up with Jesus Alonzo NP Neurology Clinic at [facility]) Appt Note: f/u tremors, jrb 5/16/17  
 [address] [place] [address], 
[address], [address] [address]  
[address], [address], [address] [address] Upcoming Health Maintenance Date Due Hepatitis C Screening 1955 FOOT EXAM Q1 12/23/1965 MICROALBUMIN Q1 12/23/1965 EYE EXAM RETINAL OR DILATED Q1 12/23/1965 DTaP/Tdap/Td series (1 - Tdap) 12/23/1976 PAP AKA CERVICAL CYTOLOGY 12/23/1976 FOBT Q 1 YEAR AGE 50-75 12/23/2005 LIPID PANEL Q1 1/19/2015 HEMOGLOBIN A1C Q6M 5/25/2015 ZOSTER VACCINE AGE 60> 10/23/2015 INFLUENZA AGE 9 TO ADULT 8/1/2017 BREAST CANCER SCRN MAMMOGRAM 11/20/2017 Allergies as of 8/8/2017  Review Complete On: 8/8/2017 By: Jenny Manuel NP Severity Noted Reaction Type Reactions Latex  12/13/2010    Contact Dermatitis Dilaudid [Hydromorphone (Pf)]  10/29/2014   Systemic Other (comments) Feels like bugs are crawling all over her Lipitor [Atorvastatin]  06/21/2013    Other (comments) LIVER TROUBLE ON THIS MEDICATION Sulfa (Sulfonamide Antibiotics)  12/13/2010    Itching Current Immunizations  Reviewed on 3/19/2015 Name Date Influenza Vaccine 9/1/2014, 9/1/2013 Pneumococcal Vaccine (Unspecified Type) 1/1/2010 Not reviewed this visit You Were Diagnosed With   
  
 Codes Comments Major depressive disorder, recurrent episode, moderate (HCC)    -  Primary ICD-10-CM: F33.1 ICD-9-CM: 296.32   
 PTSD (post-traumatic stress disorder)     ICD-10-CM: F43.10 ICD-9-CM: 309.81 Vitals BP Pulse Height(growth percentile) Weight(growth percentile) BMI OB Status 110/78 85 5' 4\" (1.626 m) 200 lb (90.7 kg) 34.33 kg/m2 Menopause Smoking Status Current Every Day Smoker BMI and BSA Data Body Mass Index Body Surface Area  
 34.33 kg/m 2 2.02 m 2 Preferred Pharmacy Pharmacy Name Phone [facility] [facility], [address] [facility] Delmy Gentle [phone] Your Updated Medication List  
  
   
This list is accurate as of: 8/8/17  2:02 PM.  Always use your most recent med list.  
  
  
  
  
 acetaminophen-codeine 300-30 mg per tablet Commonly known as:  TYLENOL #3 Take 1 Tab by mouth every eight (8) hours as needed. Max Daily Amount: 3 Tabs. ALPRAZolam 1 mg tablet Commonly known as:  Tammy Hopes Take 1 Tab by mouth three (3) times daily as needed for Anxiety. Max Daily Amount: 3 mg.  
  
 aspirin 81 mg chewable tablet Take 81 mg by mouth daily. benzonatate 200 mg capsule Commonly known as:  TESSALON Take 1 Cap by mouth two (2) times daily as needed. cetirizine 10 mg tablet Commonly known as:  ZYRTEC Take 1 Tab by mouth daily. dicyclomine 10 mg capsule Commonly known as:  BENTYL DUONEB 2.5 mg-0.5 mg/3 ml Nebu Generic drug:  albuterol-ipratropium 3 mL by Nebulization route every six (6) hours as needed for Wheezing. fluticasone-vilanterol 100-25 mcg/dose inhaler Commonly known as:  BREO ELLIPTA Take 1 Puff by inhalation daily. furosemide 40 mg tablet Commonly known as:  LASIX Take 1 Tab by mouth daily. gabapentin 600 mg tablet Commonly known as:  NEURONTIN  
600 mg two (2) times a day. glipiZIDE SR 5 mg CR tablet Commonly known as:  GLUCOTROL XL Take 5 mg by mouth two (2) times daily as needed (Patient monitors her BG levels and takes when she is also taking a steroid. ). guaiFENesin 1,200 mg Ta12 ER tablet Commonly known as:  [employer] Take 1 Tab by mouth two (2) times a day. ibuprofen 800 mg tablet Commonly known as:  MOTRIN Take 1 Tab by mouth every eight (8) hours as needed. levothyroxine 200 mcg tablet Commonly known as:  SYNTHROID Take 200 mcg by mouth daily (before breakfast). LEVSIN/SL 0.125 mg SL tablet Generic drug:  hyoscyamine SL  
0.125 mg by SubLINGual route three (3) times daily as needed for Cramping. MAGOX 400 mg tablet Generic drug:  magnesium oxide TAKE ONE TABLET TWICE A DAY  
  
 methocarbamol 750 mg tablet Commonly known as:  ROBAXIN Take 750-1,500 mg by mouth four (4) times daily as needed. metoprolol tartrate 25 mg tablet Commonly known as:  LOPRESSOR Take 1 Tab by mouth two (2) times a day. mirtazapine 30 mg tablet Commonly known as:  Carmen Jacks Take 1 Tab by mouth nightly. montelukast 10 mg tablet Commonly known as:  SINGULAIR Take 10 mg by mouth daily. NexIUM 40 mg capsule Generic drug:  esomeprazole Take 40 mg by mouth daily. niacin  mg CR capsule Take 250 mg by mouth daily. nystatin 100,000 unit/mL suspension Commonly known as:  MYCOSTATIN Take 5 mL by mouth four (4) times daily. swish and spit PHENobarbital 60 mg tablet Commonly known as:  LUMINAL Take 1 Tab by mouth two (2) times a day. Max Daily Amount: 120 mg.  
  
 predniSONE 5 mg tablet Commonly known as:  Neo Luong Take 8 Tabs by mouth daily (with breakfast). 8 tab once a day x 2 days 7 tab once a day x 2 days 6 tab once a day x 2 days 5 tab once a day x 2 days 4 tab once a day x 2 days 3 tab once a day x 2 days 2 tab once a day x 2 days Then 1 tab once a day x 2 days  Indications: COPD exacerbation  
  
 primidone 50 mg tablet Commonly known as: MYSOLINE Take 2 Tabs by mouth nightly. promethazine 25 mg tablet Commonly known as:  PHENERGAN Take 1 Tab by mouth every six (6) hours as needed. risperiDONE 0.25 mg tablet Commonly known as:  RisperDAL Take 1 Tab by mouth two (2) times a day. VENTOLIN HFA 90 mcg/actuation inhaler Generic drug:  albuterol Take 2 Puffs by inhalation every four (4) hours as needed for Wheezing. vit B Cmplx 3-FA-Vit C-Biotin 1- mg-mg-mcg tablet Commonly known as:  NEPHRO SHREYA RX Take 1 Tab by mouth daily. Vytorin 10/40 10-40 mg per tablet Generic drug:  ezetimibe-simvastatin Take 1 tablet by mouth nightly. Prescriptions Sent to Pharmacy Refills  
 mirtazapine (REMERON) 30 mg tablet 1 Sig: Take 1 Tab by mouth nightly. Class: Normal  
 Pharmacy: [address] [facility], [address] [place] [address] #: [phone] Route: Oral  
  
Introducing [facility] & HEALTH SERVICES! [facility] introduces modu patient portal. Now you can access parts of your medical record, email your doctor's office, and request medication refills online. 1. In your internet browser, go to [email or web address] 2. Click on the First Time User? Click Here link in the Sign In box. You will see the New Member Sign Up page. 3. Enter your modu Access Code exactly as it appears below. You will not need to use this code after youve completed the sign-up process. If you do not sign up before the expiration date, you must request a new code. · modu Access Code: [email or web address] Expires: 8/14/2017  9:48 AM 
 
4. Enter the last four digits of your Social Security Number (xxxx) and Date of Birth (mm/dd/yyyy) as indicated and click Submit. You will be taken to the next sign-up page. 5. Create a Brain Paradet ID. This will be your modu login ID and cannot be changed, so think of one that is secure and easy to remember. 6. Create a modu password. You can change your password at any time. 7. Enter your Password Reset Question and Answer. This can be used at a later time if you forget your password. 8. Enter your e-mail address. You will receive e-mail notification when new information is available in [address] 9. Click Sign Up. You can now view and download portions of your medical record. 10. Click the Download Summary menu link to download a portable copy of your medical information. If you have questions, please visit the Frequently Asked Questions section of the [employer] website. Remember, [employer] is NOT to be used for urgent needs. For medical emergencies, dial 911. Now available from your iPhone and Android! Please provide this summary of care documentation to your next provider. Your primary care clinician is listed as Silviano Santiago. If you have any questions after today's visit, please call [phone].

## 2017-08-11 ENCOUNTER — TELEPHONE (OUTPATIENT)
Dept: BEHAVIORAL/MENTAL HEALTH CLINIC | Age: 62
End: 2017-08-11

## 2017-08-14 RX ORDER — ESCITALOPRAM OXALATE 5 MG/1
5 TABLET ORAL DAILY
Qty: 30 TAB | Refills: 1 | Status: SHIPPED | OUTPATIENT
Start: 2017-08-14 | End: 2017-09-19 | Stop reason: SDUPTHER

## 2017-08-15 ENCOUNTER — OFFICE VISIT (OUTPATIENT)
Dept: NEUROLOGY | Age: 62
End: 2017-08-15

## 2017-08-15 ENCOUNTER — HOSPITAL ENCOUNTER (OUTPATIENT)
Dept: GENERAL RADIOLOGY | Age: 62
Discharge: HOME | End: 2017-08-15
Payer: MEDICARE

## 2017-08-15 VITALS
HEIGHT: 64 IN | DIASTOLIC BLOOD PRESSURE: 70 MMHG | WEIGHT: 200 LBS | HEART RATE: 97 BPM | SYSTOLIC BLOOD PRESSURE: 110 MMHG | BODY MASS INDEX: 34.15 KG/M2 | OXYGEN SATURATION: 89 %

## 2017-08-15 DIAGNOSIS — E53.8 B12 DEFICIENCY: ICD-10-CM

## 2017-08-15 DIAGNOSIS — R27.0 ATAXIA: ICD-10-CM

## 2017-08-15 DIAGNOSIS — G25.0 ESSENTIAL TREMOR: Primary | ICD-10-CM

## 2017-08-15 DIAGNOSIS — G25.0 BENIGN FAMILIAL TREMOR: ICD-10-CM

## 2017-08-15 DIAGNOSIS — E11.42 DIABETIC PERIPHERAL NEUROPATHY ASSOCIATED WITH TYPE 2 DIABETES MELLITUS (HCC): ICD-10-CM

## 2017-08-15 DIAGNOSIS — G60.8 IDIOPATHIC SMALL AND LARGE FIBER SENSORY NEUROPATHY: ICD-10-CM

## 2017-08-15 DIAGNOSIS — E55.9 VITAMIN D DEFICIENCY: ICD-10-CM

## 2017-08-15 DIAGNOSIS — I65.23 STENOSIS OF BOTH INTERNAL CAROTID ARTERIES: ICD-10-CM

## 2017-08-15 DIAGNOSIS — M96.1 CERVICAL POST-LAMINECTOMY SYNDROME: ICD-10-CM

## 2017-08-15 DIAGNOSIS — M48.061 DEGENERATIVE LUMBAR SPINAL STENOSIS: ICD-10-CM

## 2017-08-15 DIAGNOSIS — M47.22 CERVICAL RADICULOPATHY DUE TO DEGENERATIVE JOINT DISEASE OF SPINE: ICD-10-CM

## 2017-08-15 DIAGNOSIS — R55 SYNCOPE AND COLLAPSE: ICD-10-CM

## 2017-08-15 PROCEDURE — 72100 X-RAY EXAM L-S SPINE 2/3 VWS: CPT

## 2017-08-15 PROCEDURE — 72052 X-RAY EXAM NECK SPINE 6/>VWS: CPT

## 2017-08-15 NOTE — PATIENT INSTRUCTIONS
10 [facility]   Statement to Patients  April 1, 2014      In an effort to ensure the large volume of patient prescription refills is processed in the most efficient and expeditious manner, we are asking our patients to assist us by calling your Pharmacy for all prescription refills, this will include also your  Mail Order Pharmacy. The pharmacy will contact our office electronically to continue the refill process. Please do not wait until the last minute to call your pharmacy. We need at least 48 hours (2days) to fill prescriptions. We also encourage you to call your pharmacy before going to  your prescription to make sure it is ready. With regard to controlled substance prescription refill requests (narcotic refills) that need to be picked up at our office, we ask your cooperation by providing us with at least 72 hours (3days) notice that you will need a refill. We will not refill narcotic prescription refill requests after 4:00pm on any weekday, Monday through Thursday, or after 2:00pm on Fridays, or on the weekends. We encourage everyone to explore another way of getting your prescription refill request processed using Spotigo, our patient web portal through our electronic medical record system. Spotigo is an efficient and effective way to communicate your medication request directly to the office and  downloadable as an garo on your smart phone . Spotigo also features a review functionality that allows you to view your medication list as well as leave messages for your physician. Are you ready to get connected? If so please review the attatched instructions or speak to any of our staff to get you set up right away! Thank you so much for your cooperation. Should you have any questions please contact our Practice Administrator.     The Physicians and Staff,  [facility]          A Healthy Lifestyle: Care Instructions  Your Care Instructions  A healthy lifestyle can help you feel good, stay at a healthy weight, and have plenty of energy for both work and play. A healthy lifestyle is something you can share with your whole family. A healthy lifestyle also can lower your risk for serious health problems, such as high blood pressure, heart disease, and diabetes. You can follow a few steps listed below to improve your health and the health of your family. Follow-up care is a key part of your treatment and safety. Be sure to make and go to all appointments, and call your doctor if you are having problems. Its also a good idea to know your test results and keep a list of the medicines you take. How can you care for yourself at home? · Do not eat too much sugar, fat, or fast foods. You can still have dessert and treats now and then. The goal is moderation. · Start small to improve your eating habits. Pay attention to portion sizes, drink less juice and soda pop, and eat more fruits and vegetables. ¨ Eat a healthy amount of food. A 3-ounce serving of meat, for example, is about the size of a deck of cards. Fill the rest of your plate with vegetables and whole grains. ¨ Limit the amount of soda and sports drinks you have every day. Drink more water when you are thirsty. ¨ Eat at least 5 servings of fruits and vegetables every day. It may seem like a lot, but it is not hard to reach this goal. A serving or helping is 1 piece of fruit, 1 cup of vegetables, or 2 cups of leafy, raw vegetables. Have an apple or some carrot sticks as an afternoon snack instead of a candy bar. Try to have fruits and/or vegetables at every meal.  · Make exercise part of your daily routine. You may want to start with simple activities, such as walking, bicycling, or slow swimming. Try to be active 30 to 60 minutes every day. You do not need to do all 30 to 60 minutes all at once. For example, you can exercise 3 times a day for 10 or 20 minutes.  Moderate exercise is safe for most people, but it is always a good idea to talk to your doctor before starting an exercise program.  · Keep moving. Scottymyron Feeler the lawn, work in the garden, or [place]. Take the stairs instead of the elevator at work. · If you smoke, quit. People who smoke have an increased risk for heart attack, stroke, cancer, and other lung illnesses. Quitting is hard, but there are ways to boost your chance of quitting tobacco for good. ¨ Use nicotine gum, patches, or lozenges. ¨ Ask your doctor about stop-smoking programs and medicines. ¨ Keep trying. In addition to reducing your risk of diseases in the future, you will notice some benefits soon after you stop using tobacco. If you have shortness of breath or asthma symptoms, they will likely get better within a few weeks after you quit. · Limit how much alcohol you drink. Moderate amounts of alcohol (up to 2 drinks a day for men, 1 drink a day for women) are okay. But drinking too much can lead to liver problems, high blood pressure, and other health problems. Family health  If you have a family, there are many things you can do together to improve your health. · Eat meals together as a family as often as possible. · Eat healthy foods. This includes fruits, vegetables, lean meats and dairy, and whole grains. · Include your family in your fitness plan. Most people think of activities such as jogging or tennis as the way to fitness, but there are many ways you and your family can be more active. Anything that makes you breathe hard and gets your heart pumping is exercise. Here are some tips:  ¨ Walk to do errands or to take your child to school or the bus. ¨ Go for a family bike ride after dinner instead of watching TV. Where can you learn more? Go to http://bhavya-omar.info/. Enter A468 in the search box to learn more about \"A Healthy Lifestyle: Care Instructions. \"  Current as of: July 26, 2016  Content Version: 11.3  © 7070-6284 [employer]. Care instructions adapted under license by [employer] (which disclaims liability or warranty for this information). If you have questions about a medical condition or this instruction, always ask your healthcare professional. [address] any warranty or liability for your use of this information.

## 2017-08-15 NOTE — MR AVS SNAPSHOT
Visit Information Date & Time Provider Department Dept. Phone Encounter #  
 8/15/2017 10:30 AM Joe Hardin NP [facility] [phone] [id number] Your Appointments 9/19/2017  1:30 PM  
ESTABLISHED PATIENT with Michael Freitas NP  
[address] [facility] [address] [place] [address]) Appt Note: 6 week f/u, $40 cp cc  
 [facility] [address] [id number] [place] [address] [address] [facility] Upcoming Health Maintenance Date Due Hepatitis C Screening 1955 FOOT EXAM Q1 12/23/1965 MICROALBUMIN Q1 12/23/1965 EYE EXAM RETINAL OR DILATED Q1 12/23/1965 DTaP/Tdap/Td series (1 - Tdap) 12/23/1976 PAP AKA CERVICAL CYTOLOGY 12/23/1976 FOBT Q 1 YEAR AGE 50-75 12/23/2005 LIPID PANEL Q1 1/19/2015 HEMOGLOBIN A1C Q6M 5/25/2015 ZOSTER VACCINE AGE 60> 10/23/2015 INFLUENZA AGE 9 TO ADULT 8/1/2017 BREAST CANCER SCRN MAMMOGRAM 11/20/2017 Allergies as of 8/15/2017  Review Complete On: 8/8/2017 By: Michale Freitas NP Severity Noted Reaction Type Reactions Latex  12/13/2010    Contact Dermatitis Dilaudid [Hydromorphone (Pf)]  10/29/2014   Systemic Other (comments) Feels like bugs are crawling all over her Lipitor [Atorvastatin]  06/21/2013    Other (comments) LIVER TROUBLE ON THIS MEDICATION Remeron [Mirtazapine]  08/15/2017    Other (comments) Tremors Sulfa (Sulfonamide Antibiotics)  12/13/2010    Itching Current Immunizations  Reviewed on 3/19/2015 Name Date Influenza Vaccine 9/1/2014, 9/1/2013 Pneumococcal Vaccine (Unspecified Type) 1/1/2010 Not reviewed this visit You Were Diagnosed With   
  
 Codes Comments Essential tremor    -  Primary ICD-10-CM: G25.0 ICD-9-CM: 333.1 Vitals BP Pulse Height(growth percentile) Weight(growth percentile) SpO2 BMI 110/70 97 5' 4\" (1.626 m) 200 lb (90.7 kg) (!) 89% 34.33 kg/m2 OB Status Smoking Status Menopause Current Every Day Smoker Vitals History BMI and BSA Data Body Mass Index Body Surface Area  
 34.33 kg/m 2 2.02 m 2 Preferred Pharmacy Pharmacy Name Phone [id number], [address] [place] [address] [address] [facility] [phone] Your Updated Medication List  
  
   
This list is accurate as of: 8/15/17 11:06 AM.  Always use your most recent med list.  
  
  
  
  
 acetaminophen-codeine 300-30 mg per tablet Commonly known as:  TYLENOL #3 Take 1 Tab by mouth every eight (8) hours as needed. Max Daily Amount: 3 Tabs. ALPRAZolam 1 mg tablet Commonly known as:  Kemal Chatman Take 1 Tab by mouth three (3) times daily as needed for Anxiety. Max Daily Amount: 3 mg.  
  
 aspirin 81 mg chewable tablet Take 81 mg by mouth daily. benzonatate 200 mg capsule Commonly known as:  TESSALON Take 1 Cap by mouth two (2) times daily as needed. cetirizine 10 mg tablet Commonly known as:  ZYRTEC Take 1 Tab by mouth daily. dicyclomine 10 mg capsule Commonly known as:  BENTYL DUONEB 2.5 mg-0.5 mg/3 ml Nebu Generic drug:  albuterol-ipratropium 3 mL by Nebulization route every six (6) hours as needed for Wheezing.  
  
 escitalopram oxalate 5 mg tablet Commonly known as:  Selene Bread Take 1 Tab by mouth daily. fluticasone-vilanterol 100-25 mcg/dose inhaler Commonly known as:  BREO ELLIPTA Take 1 Puff by inhalation daily. furosemide 40 mg tablet Commonly known as:  LASIX Take 1 Tab by mouth daily. gabapentin 600 mg tablet Commonly known as:  NEURONTIN  
600 mg two (2) times a day. glipiZIDE SR 5 mg CR tablet Commonly known as:  GLUCOTROL XL Take 5 mg by mouth two (2) times daily as needed (Patient monitors her BG levels and takes when she is also taking a steroid. ). guaiFENesin 1,200 mg Ta12 ER tablet Commonly known as:  Edinson & Edinson Take 1 Tab by mouth two (2) times a day. ibuprofen 800 mg tablet Commonly known as:  MOTRIN Take 1 Tab by mouth every eight (8) hours as needed. levothyroxine 200 mcg tablet Commonly known as:  SYNTHROID Take 200 mcg by mouth daily (before breakfast). LEVSIN/SL 0.125 mg SL tablet Generic drug:  hyoscyamine SL  
0.125 mg by SubLINGual route three (3) times daily as needed for Cramping. MAGOX 400 mg tablet Generic drug:  magnesium oxide TAKE ONE TABLET TWICE A DAY  
  
 methocarbamol 750 mg tablet Commonly known as:  ROBAXIN Take 750-1,500 mg by mouth four (4) times daily as needed. metoprolol tartrate 25 mg tablet Commonly known as:  LOPRESSOR Take 1 Tab by mouth two (2) times a day. montelukast 10 mg tablet Commonly known as:  SINGULAIR Take 10 mg by mouth daily. NexIUM 40 mg capsule Generic drug:  esomeprazole Take 40 mg by mouth daily. niacin  mg CR capsule Take 250 mg by mouth daily. nystatin 100,000 unit/mL suspension Commonly known as:  MYCOSTATIN Take 5 mL by mouth four (4) times daily. swish and spit PHENobarbital 60 mg tablet Commonly known as:  LUMINAL Take 1 Tab by mouth two (2) times a day. Max Daily Amount: 120 mg.  
  
 primidone 50 mg tablet Commonly known as: MYSOLINE Take 2 Tabs by mouth nightly. promethazine 25 mg tablet Commonly known as:  PHENERGAN Take 1 Tab by mouth every six (6) hours as needed. VENTOLIN HFA 90 mcg/actuation inhaler Generic drug:  albuterol Take 2 Puffs by inhalation every four (4) hours as needed for Wheezing. vit B Cmplx 3-FA-Vit C-Biotin 1- mg-mg-mcg tablet Commonly known as:  NEPHRO SHREYA RX Take 1 Tab by mouth daily. Vytorin 10/40 10-40 mg per tablet Generic drug:  ezetimibe-simvastatin Take 1 tablet by mouth nightly. Patient Instructions PRESCRIPTION REFILL POLICY [facility] Statement to Patients April 1, 2014 In an effort to ensure the large volume of patient prescription refills is processed in the most efficient and expeditious manner, we are asking our patients to assist us by calling your Pharmacy for all prescription refills, this will include also your  Mail Order Pharmacy. The pharmacy will contact our office electronically to continue the refill process. Please do not wait until the last minute to call your pharmacy. We need at least 48 hours (2days) to fill prescriptions. We also encourage you to call your pharmacy before going to  your prescription to make sure it is ready. With regard to controlled substance prescription refill requests (narcotic refills) that need to be picked up at our office, we ask your cooperation by providing us with at least 72 hours (3days) notice that you will need a refill. We will not refill narcotic prescription refill requests after 4:00pm on any weekday, Monday through Thursday, or after 2:00pm on Fridays, or on the weekends. We encourage everyone to explore another way of getting your prescription refill request processed using [employer], our patient web portal through our electronic medical record system. [employer] is an efficient and effective way to communicate your medication request directly to the office and  downloadable as an garo on your smart phone . [employer] also features a review functionality that allows you to view your medication list as well as leave messages for your physician. Are you ready to get connected? If so please review the attatched instructions or speak to any of our staff to get you set up right away! Thank you so much for your cooperation. Should you have any questions please contact our Practice Administrator. The Physicians and Staff,  [facility] A Healthy Lifestyle: Care Instructions Your Care Instructions A healthy lifestyle can help you feel good, stay at a healthy weight, and have plenty of energy for both work and play. A healthy lifestyle is something you can share with your whole family. A healthy lifestyle also can lower your risk for serious health problems, such as high blood pressure, heart disease, and diabetes. You can follow a few steps listed below to improve your health and the health of your family. Follow-up care is a key part of your treatment and safety. Be sure to make and go to all appointments, and call your doctor if you are having problems. Its also a good idea to know your test results and keep a list of the medicines you take. How can you care for yourself at home? · Do not eat too much sugar, fat, or fast foods. You can still have dessert and treats now and then. The goal is moderation. · Start small to improve your eating habits. Pay attention to portion sizes, drink less juice and soda pop, and eat more fruits and vegetables. ¨ Eat a healthy amount of food. A 3-ounce serving of meat, for example, is about the size of a deck of cards. Fill the rest of your plate with vegetables and whole grains. ¨ Limit the amount of soda and sports drinks you have every day. Drink more water when you are thirsty. ¨ Eat at least 5 servings of fruits and vegetables every day. It may seem like a lot, but it is not hard to reach this goal. A serving or helping is 1 piece of fruit, 1 cup of vegetables, or 2 cups of leafy, raw vegetables. Have an apple or some carrot sticks as an afternoon snack instead of a candy bar. Try to have fruits and/or vegetables at every meal. 
· Make exercise part of your daily routine. You may want to start with simple activities, such as walking, bicycling, or slow swimming. Try to be active 30 to 60 minutes every day.  You do not need to do all 30 to 60 minutes all at once. For example, you can exercise 3 times a day for 10 or 20 minutes. Moderate exercise is safe for most people, but it is always a good idea to talk to your doctor before starting an exercise program. 
· Keep moving. Kandis Simental the lawn, work in the garden, or [employer]. Take the stairs instead of the elevator at work. · If you smoke, quit. People who smoke have an increased risk for heart attack, stroke, cancer, and other lung illnesses. Quitting is hard, but there are ways to boost your chance of quitting tobacco for good. ¨ Use nicotine gum, patches, or lozenges. ¨ Ask your doctor about stop-smoking programs and medicines. ¨ Keep trying. In addition to reducing your risk of diseases in the future, you will notice some benefits soon after you stop using tobacco. If you have shortness of breath or asthma symptoms, they will likely get better within a few weeks after you quit. · Limit how much alcohol you drink. Moderate amounts of alcohol (up to 2 drinks a day for men, 1 drink a day for women) are okay. But drinking too much can lead to liver problems, high blood pressure, and other health problems. Family health If you have a family, there are many things you can do together to improve your health. · Eat meals together as a family as often as possible. · Eat healthy foods. This includes fruits, vegetables, lean meats and dairy, and whole grains. · Include your family in your fitness plan. Most people think of activities such as jogging or tennis as the way to fitness, but there are many ways you and your family can be more active. Anything that makes you breathe hard and gets your heart pumping is exercise. Here are some tips: 
¨ Walk to do errands or to take your child to school or the bus. ¨ Go for a family bike ride after dinner instead of watching TV. Where can you learn more? Go to http://bhavya-omar.info/. Enter T800 in the search box to learn more about \"A Healthy Lifestyle: Care Instructions. \" Current as of: July 26, 2016 Content Version: 11.3 © 5934-4059 [employer]. Care instructions adapted under license by [employer] (which disclaims liability or warranty for this information). If you have questions about a medical condition or this instruction, always ask your healthcare professional. [address] any warranty or liability for your use of this information. Introducing [facility]! [facility] introduces [employer] patient portal. Now you can access parts of your medical record, email your doctor's office, and request medication refills online. 1. In your internet browser, go to [email or web address] 2. Click on the First Time User? Click Here link in the Sign In box. You will see the New Member Sign Up page. 3. Enter your [employer] Access Code exactly as it appears below. You will not need to use this code after youve completed the sign-up process. If you do not sign up before the expiration date, you must request a new code. · [employer] Access Code: [email or web address] Expires: 11/13/2017 11:06 AM 
 
4. Enter the last four digits of your Social Security Number (xxxx) and Date of Birth (mm/dd/yyyy) as indicated and click Submit. You will be taken to the next sign-up page. 5. Create a [employer] ID. This will be your [employer] login ID and cannot be changed, so think of one that is secure and easy to remember. 6. Create a [employer] password. You can change your password at any time. 7. Enter your Password Reset Question and Answer. This can be used at a later time if you forget your password. 8. Enter your e-mail address. You will receive e-mail notification when new information is available in [address] 9. Click Sign Up. You can now view and download portions of your medical record. 10. Click the Download Summary menu link to download a portable copy of your medical information. If you have questions, please visit the Frequently Asked Questions section of the [employer] website. Remember, [employer] is NOT to be used for urgent needs. For medical emergencies, dial 911. Now available from your iPhone and Android! Please provide this summary of care documentation to your next provider. Your primary care clinician is listed as Adina Ricardo. If you have any questions after today's visit, please call [phone].
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
## 2017-09-06 ENCOUNTER — TELEPHONE (OUTPATIENT)
Dept: NEUROLOGY | Age: 62
End: 2017-09-06

## 2017-09-06 NOTE — TELEPHONE ENCOUNTER
I called the patient, no answer, left message she has mild to moderate arthritis in the cervical spine and mild arthritis in the lumbar spine, but nothing major, no recent lab work done recently.   If lab work was done she is to call Juana Rush the nurse practitioner who ordered the lab tests for results

## 2017-09-06 NOTE — TELEPHONE ENCOUNTER
Received call from pt, she stated that she had x-rays done and would like a call back with the results. She also stated that there was supposed to be extra blood work drawn, but she says that she never heard anything back.     [phone]

## 2017-09-14 ENCOUNTER — TELEPHONE (OUTPATIENT)
Dept: NEUROLOGY | Age: 62
End: 2017-09-14

## 2017-09-14 NOTE — TELEPHONE ENCOUNTER
----- Message from Kristie Root sent at 9/14/2017  1:15 PM EDT -----  Regarding: Lisette Mike, NP/telephone  Pt would like a call back from the nurse to discuss the xray results. Pt left a message 2 weeks ago and has not gotten a call back. Pt can be reached at [phone] or [phone].

## 2017-09-19 ENCOUNTER — OFFICE VISIT (OUTPATIENT)
Dept: BEHAVIORAL/MENTAL HEALTH CLINIC | Age: 62
End: 2017-09-19

## 2017-09-19 VITALS
DIASTOLIC BLOOD PRESSURE: 76 MMHG | HEART RATE: 75 BPM | BODY MASS INDEX: 34.15 KG/M2 | HEIGHT: 64 IN | SYSTOLIC BLOOD PRESSURE: 117 MMHG | WEIGHT: 200 LBS

## 2017-09-19 DIAGNOSIS — F33.1 MAJOR DEPRESSIVE DISORDER, RECURRENT EPISODE, MODERATE (HCC): Primary | ICD-10-CM

## 2017-09-19 DIAGNOSIS — F43.10 PTSD (POST-TRAUMATIC STRESS DISORDER): ICD-10-CM

## 2017-09-19 RX ORDER — PRAZOSIN HYDROCHLORIDE 2 MG/1
2 CAPSULE ORAL 2 TIMES DAILY
Qty: 60 CAP | Refills: 2 | Status: SHIPPED | OUTPATIENT
Start: 2017-09-19 | End: 2017-12-20 | Stop reason: SDUPTHER

## 2017-09-19 RX ORDER — ESCITALOPRAM OXALATE 10 MG/1
10 TABLET ORAL DAILY
Qty: 30 TAB | Refills: 2 | Status: SHIPPED | OUTPATIENT
Start: 2017-09-19 | End: 2017-11-09

## 2017-09-19 RX ORDER — RISPERIDONE 0.25 MG/1
0.25 TABLET, FILM COATED ORAL
Qty: 30 TAB | Refills: 2 | Status: SHIPPED | OUTPATIENT
Start: 2017-09-19 | End: 2017-11-09 | Stop reason: SINTOL

## 2017-09-19 NOTE — PROGRESS NOTES
CHIEF COMPLAINT:  Simon Guerrero is a 64 y.o. female and was seen today for follow-up of psychiatric condition and psychotropic medication management. HPI:    Shane Lopez reports the following psychiatric symptoms:  depression and anxiety. The symptoms have been present for months and are of moderate/high severity. The depressive symptoms occur somewhat less severely. Pt still with sig anxiety/PTSD. Pt reports benefit from current medications. Pt here today to review current treatment plan. FAMILY/SOCIAL HX: recently went out in public with her     REVIEW OF SYSTEMS:  Psychiatric:  depression, anxiety  Appetite:improving somewhat   Sleep: poor with DMS (maintaining sleep)   Neuro: chronic pain    Visit Vitals    /76    Pulse 75    Ht 5' 4\" (1.626 m)    Wt 90.7 kg (200 lb)    BMI 34.33 kg/m2       Side Effects:  none with current antidepressant    MENTAL STATUS EXAM:   Sensorium  oriented to time, place and person   Relations cooperative   Appearance:  age appropriate and casually dressed   Motor Behavior:  gait stable and within normal limits   Speech:  normal volume   Thought Process: goal directed   Thought Content free of delusions and free of hallucinations   Suicidal ideations no intention   Homicidal ideations no intention   Mood:  anxious and depressed   Affect:  anxious, depressed and tearful   Memory recent  impaired   Memory remote:  adequate   Concentration:  adequate and impaired   Abstraction:  abstract   Insight:  fair   Reliability fair   Judgment:  fair     MEDICAL DECISION MAKING:  Problems addressed today:    ICD-10-CM ICD-9-CM    1. Major depressive disorder, recurrent episode, moderate (HCC) F33.1 296.32    2. PTSD (post-traumatic stress disorder) F43.10 309.81        Assessment:   Shane Lopez is responding to treatment somewhat. Depressive symptoms are currently somewhat improved but tearful throughout much of session today.  Reviewed med changes and she was not able to tolerate mirtazapine so switched to lexapro. She is still having sig depression and anxiety symptoms so will increase to 10 mg today. Pt also has been using risperdal prn so will restart as nightly. Pt continues to benefit from use of prazosin for ptsd nightmares. Discussed impact of ptsd on daily life and benefit of mindfulness. Reinforced importance of ind therapy and pt plans to re-start. Provided support and encouragement. Plan:   1. Current Outpatient Prescriptions   Medication Sig Dispense Refill    escitalopram oxalate (LEXAPRO) 10 mg tablet Take 1 Tab by mouth daily. 30 Tab 2    risperiDONE (RISPERDAL) 0.25 mg tablet Take 1 Tab by mouth nightly. 30 Tab 2    prazosin (MINIPRESS) 2 mg capsule Take 1 Cap by mouth two (2) times a day. Indications: CHRONIC PTSD WITH TRAUMA NIGHTMARES 60 Cap 2    acetaminophen-codeine (TYLENOL #3) 300-30 mg per tablet Take 1 Tab by mouth every eight (8) hours as needed. Max Daily Amount: 3 Tabs. 5 Tab 0    ALPRAZolam (XANAX) 1 mg tablet Take 1 Tab by mouth three (3) times daily as needed for Anxiety. Max Daily Amount: 3 mg. 3 Tab 0    cetirizine (ZYRTEC) 10 mg tablet Take 1 Tab by mouth daily. 10 Tab 0    promethazine (PHENERGAN) 25 mg tablet Take 1 Tab by mouth every six (6) hours as needed. 4 Tab 0    ibuprofen (MOTRIN) 800 mg tablet Take 1 Tab by mouth every eight (8) hours as needed. 20 Tab 0    guaiFENesin ER (MUCINEX) 1,200 mg Ta12 ER tablet Take 1 Tab by mouth two (2) times a day. 14 Tab 0    fluticasone-vilanterol (BREO ELLIPTA) 100-25 mcg/dose inhaler Take 1 Puff by inhalation daily. 1 Inhaler 0    furosemide (LASIX) 40 mg tablet Take 1 Tab by mouth daily. 20 Tab 0    PHENobarbital (LUMINAL) 60 mg tablet Take 1 Tab by mouth two (2) times a day. Max Daily Amount: 120 mg. 60 Tab 1    albuterol (VENTOLIN HFA) 90 mcg/actuation inhaler Take 2 Puffs by inhalation every four (4) hours as needed for Wheezing.       gabapentin (NEURONTIN) 600 mg tablet 600 mg two (2) times a day.  primidone (MYSOLINE) 50 mg tablet Take 2 Tabs by mouth nightly. 100 Tab 11    dicyclomine (BENTYL) 10 mg capsule       glipiZIDE SR (GLUCOTROL) 5 mg CR tablet Take 5 mg by mouth two (2) times daily as needed (Patient monitors her BG levels and takes when she is also taking a steroid. ).  montelukast (SINGULAIR) 10 mg tablet Take 10 mg by mouth daily.  ezetimibe-simvastatin (VYTORIN 10/40) 10-40 mg per tablet Take 1 tablet by mouth nightly.  metoprolol (LOPRESSOR) 25 mg tablet Take 1 Tab by mouth two (2) times a day. 60 Tab 6    benzonatate (TESSALON) 200 mg capsule Take 1 Cap by mouth two (2) times daily as needed. 30 Cap 0    nystatin (MYCOSTATIN) 100,000 unit/mL suspension Take 5 mL by mouth four (4) times daily. swish and spit (Patient taking differently: Take 500,000 Units by mouth four (4) times daily as needed. swish and spit) 180 mL 0    methocarbamol (ROBAXIN) 750 mg tablet Take 750-1,500 mg by mouth four (4) times daily as needed.  niacin  mg CR capsule Take 250 mg by mouth daily.  albuterol-ipratropium (DUONEB) 2.5 mg-0.5 mg/3 ml nebulizer solution 3 mL by Nebulization route every six (6) hours as needed for Wheezing.  hyoscyamine SL (LEVSIN/SL) 0.125 mg SL tablet 0.125 mg by SubLINGual route three (3) times daily as needed for Cramping.  esomeprazole (NEXIUM) 40 mg capsule Take 40 mg by mouth daily.  MAGOX 400 mg tablet TAKE ONE TABLET TWICE A DAY 60 Tab 3    vit B Cmplx 3-FA-Vit C-Biotin (NEPHRO SHREYA RX) 1- mg-mg-mcg tablet Take 1 Tab by mouth daily.  aspirin 81 mg chewable tablet Take 81 mg by mouth daily.  levothyroxine (SYNTHROID) 200 mcg tablet Take 200 mcg by mouth daily (before breakfast). medication changes made today: increase lexapro 10 mg for dep/anx, cont risperdal 0.25 mg nightly for ptsd, cont prazosin 2 mg bid, dc mirtazapine due to SE's     2.   Counseling and coordination of care including instructions for treatment, risks/benefits, risk factor reduction and patient/family education. She agrees with the plan. Patient instructed to call with any side effects, questions or issues. 3.  Follow-up Disposition:  Return in about 8 weeks (around 11/14/2017).      4. Mindfulness training/Ind therapy    9/19/2017  Michael Freitas NP

## 2017-09-19 NOTE — MR AVS SNAPSHOT
Visit Information Date & Time Provider Department Dept. Phone Encounter #  
 9/19/2017  1:30 PM Socorroflorina Richter, ELVI [address] [facility] [phone] [id number] Follow-up Instructions Return in about 8 weeks (around 11/14/2017). Your Appointments 2/21/2018  1:00 PM  
Follow Up with Mary Lennon MD  
Neurology Clinic at [facility] [address]) Appt Note: f/u tremors,lsw,08/15/17  
 200 [facility], 
[address], [address] [address]  
[address], [address], [address] [address] Upcoming Health Maintenance Date Due Hepatitis C Screening 1955 FOOT EXAM Q1 12/23/1965 MICROALBUMIN Q1 12/23/1965 EYE EXAM RETINAL OR DILATED Q1 12/23/1965 DTaP/Tdap/Td series (1 - Tdap) 12/23/1976 PAP AKA CERVICAL CYTOLOGY 12/23/1976 FOBT Q 1 YEAR AGE 50-75 12/23/2005 ZOSTER VACCINE AGE 60> 10/23/2015 INFLUENZA AGE 9 TO ADULT 8/1/2017 HEMOGLOBIN A1C Q6M 9/25/2017 BREAST CANCER SCRN MAMMOGRAM 11/20/2017 LIPID PANEL Q1 3/25/2018 Allergies as of 9/19/2017  Review Complete On: 9/19/2017 By: Osito Kay Severity Noted Reaction Type Reactions Latex  12/13/2010    Contact Dermatitis Dilaudid [Hydromorphone (Pf)]  10/29/2014   Systemic Other (comments) Feels like bugs are crawling all over her Lipitor [Atorvastatin]  06/21/2013    Other (comments) LIVER TROUBLE ON THIS MEDICATION Remeron [Mirtazapine]  08/15/2017    Other (comments) Tremors Sulfa (Sulfonamide Antibiotics)  12/13/2010    Itching Current Immunizations  Reviewed on 3/19/2015 Name Date Influenza Vaccine 9/1/2014, 9/1/2013 Pneumococcal Vaccine (Unspecified Type) 1/1/2010 Not reviewed this visit You Were Diagnosed With   
  
 Codes Comments Major depressive disorder, recurrent episode, moderate (HCC)    -  Primary ICD-10-CM: F33.1 ICD-9-CM: 296.32   
 PTSD (post-traumatic stress disorder)     ICD-10-CM: F43.10 ICD-9-CM: 309.81 Vitals BP Pulse Height(growth percentile) Weight(growth percentile) BMI OB Status 117/76 75 5' 4\" (1.626 m) 200 lb (90.7 kg) 34.33 kg/m2 Menopause Smoking Status Current Every Day Smoker BMI and BSA Data Body Mass Index Body Surface Area  
 34.33 kg/m 2 2.02 m 2 Preferred Pharmacy Pharmacy Name Phone [address], [address] [place] [address] [place] [phone] Your Updated Medication List  
  
   
This list is accurate as of: 9/19/17  1:59 PM.  Always use your most recent med list.  
  
  
  
  
 acetaminophen-codeine 300-30 mg per tablet Commonly known as:  TYLENOL #3 Take 1 Tab by mouth every eight (8) hours as needed. Max Daily Amount: 3 Tabs. ALPRAZolam 1 mg tablet Commonly known as:  Cristina Ann Take 1 Tab by mouth three (3) times daily as needed for Anxiety. Max Daily Amount: 3 mg.  
  
 aspirin 81 mg chewable tablet Take 81 mg by mouth daily. benzonatate 200 mg capsule Commonly known as:  TESSALON Take 1 Cap by mouth two (2) times daily as needed. cetirizine 10 mg tablet Commonly known as:  ZYRTEC Take 1 Tab by mouth daily. dicyclomine 10 mg capsule Commonly known as:  BENTYL DUONEB 2.5 mg-0.5 mg/3 ml Nebu Generic drug:  albuterol-ipratropium 3 mL by Nebulization route every six (6) hours as needed for Wheezing.  
  
 escitalopram oxalate 10 mg tablet Commonly known as:  Camille Salt Take 1 Tab by mouth daily. fluticasone-vilanterol 100-25 mcg/dose inhaler Commonly known as:  BREO ELLIPTA Take 1 Puff by inhalation daily. furosemide 40 mg tablet Commonly known as:  LASIX Take 1 Tab by mouth daily. gabapentin 600 mg tablet Commonly known as:  NEURONTIN  
600 mg two (2) times a day. glipiZIDE SR 5 mg CR tablet Commonly known as:  GLUCOTROL XL  
 Take 5 mg by mouth two (2) times daily as needed (Patient monitors her BG levels and takes when she is also taking a steroid. ). guaiFENesin 1,200 mg Ta12 ER tablet Commonly known as:  Edinson & Edinson Take 1 Tab by mouth two (2) times a day. ibuprofen 800 mg tablet Commonly known as:  MOTRIN Take 1 Tab by mouth every eight (8) hours as needed. levothyroxine 200 mcg tablet Commonly known as:  SYNTHROID Take 200 mcg by mouth daily (before breakfast). LEVSIN/SL 0.125 mg SL tablet Generic drug:  hyoscyamine SL  
0.125 mg by SubLINGual route three (3) times daily as needed for Cramping. MAGOX 400 mg tablet Generic drug:  magnesium oxide TAKE ONE TABLET TWICE A DAY  
  
 methocarbamol 750 mg tablet Commonly known as:  ROBAXIN Take 750-1,500 mg by mouth four (4) times daily as needed. metoprolol tartrate 25 mg tablet Commonly known as:  LOPRESSOR Take 1 Tab by mouth two (2) times a day. montelukast 10 mg tablet Commonly known as:  SINGULAIR Take 10 mg by mouth daily. NexIUM 40 mg capsule Generic drug:  esomeprazole Take 40 mg by mouth daily. niacin  mg CR capsule Take 250 mg by mouth daily. nystatin 100,000 unit/mL suspension Commonly known as:  MYCOSTATIN Take 5 mL by mouth four (4) times daily. swish and spit PHENobarbital 60 mg tablet Commonly known as:  LUMINAL Take 1 Tab by mouth two (2) times a day. Max Daily Amount: 120 mg.  
  
 prazosin 2 mg capsule Commonly known as:  MINIPRESS Take 1 Cap by mouth two (2) times a day. Indications: CHRONIC PTSD WITH TRAUMA NIGHTMARES  
  
 primidone 50 mg tablet Commonly known as: MYSOLINE Take 2 Tabs by mouth nightly. promethazine 25 mg tablet Commonly known as:  PHENERGAN Take 1 Tab by mouth every six (6) hours as needed. risperiDONE 0.25 mg tablet Commonly known as:  RisperDAL Take 1 Tab by mouth nightly. VENTOLIN HFA 90 mcg/actuation inhaler Generic drug:  albuterol Take 2 Puffs by inhalation every four (4) hours as needed for Wheezing. vit B Cmplx 3-FA-Vit C-Biotin 1- mg-mg-mcg tablet Commonly known as:  NEPHRO SHREYA RX Take 1 Tab by mouth daily. Vytorin 10/40 10-40 mg per tablet Generic drug:  ezetimibe-simvastatin Take 1 tablet by mouth nightly. Prescriptions Sent to Pharmacy Refills  
 escitalopram oxalate (LEXAPRO) 10 mg tablet 2 Sig: Take 1 Tab by mouth daily. Class: Normal  
 Pharmacy: [address] [place] [address] Notice Ph #: [phone] Route: Oral  
 risperiDONE (RISPERDAL) 0.25 mg tablet 2 Sig: Take 1 Tab by mouth nightly. Class: Normal  
 Pharmacy: [address] [place] [address] Notice Ph #: [phone] Route: Oral  
 prazosin (MINIPRESS) 2 mg capsule 2 Sig: Take 1 Cap by mouth two (2) times a day. Indications: CHRONIC PTSD WITH TRAUMA NIGHTMARES Class: Normal  
 Pharmacy: [address] [place] [address] Notice Ph #: [phone] Route: Oral  
  
Follow-up Instructions Return in about 8 weeks (around 11/14/2017). Introducing [facility] & HEALTH SERVICES! Mundo Jacobs introduces [email or web address] patient portal. Now you can access parts of your medical record, email your doctor's office, and request medication refills online. 1. In your internet browser, go to [email or web address] 2. Click on the First Time User? Click Here link in the Sign In box. You will see the New Member Sign Up page. 3. Enter your [email or web address] Access Code exactly as it appears below. You will not need to use this code after youve completed the sign-up process. If you do not sign up before the expiration date, you must request a new code. · [email or web address] Access Code: [email or web address] Expires: 11/13/2017 11:06 AM 
 
 4. Enter the last four digits of your Social Security Number (xxxx) and Date of Birth (mm/dd/yyyy) as indicated and click Submit. You will be taken to the next sign-up page. 5. Create a Local.com ID. This will be your Local.com login ID and cannot be changed, so think of one that is secure and easy to remember. 6. Create a Local.com password. You can change your password at any time. 7. Enter your Password Reset Question and Answer. This can be used at a later time if you forget your password. 8. Enter your e-mail address. You will receive e-mail notification when new information is available in [address] 9. Click Sign Up. You can now view and download portions of your medical record. 10. Click the Download Summary menu link to download a portable copy of your medical information. If you have questions, please visit the Frequently Asked Questions section of the Local.com website. Remember, Local.com is NOT to be used for urgent needs. For medical emergencies, dial 911. Now available from your iPhone and Android! Please provide this summary of care documentation to your next provider. Your primary care clinician is listed as [place] [facility]. If you have any questions after today's visit, please call [phone].

## 2017-10-19 ENCOUNTER — TELEPHONE (OUTPATIENT)
Dept: BEHAVIORAL/MENTAL HEALTH CLINIC | Age: 62
End: 2017-10-19

## 2017-10-19 NOTE — TELEPHONE ENCOUNTER
Pt called and asked for a call back from [facility]. She said her meds are not working. I let her know [facility] is out until next Wednesday but that I could send a message to her covering provider. She said no that's fine and that she will wait for [facility] to be back.

## 2017-11-09 ENCOUNTER — OFFICE VISIT (OUTPATIENT)
Dept: BEHAVIORAL/MENTAL HEALTH CLINIC | Age: 62
End: 2017-11-09

## 2017-11-09 VITALS
HEIGHT: 64 IN | BODY MASS INDEX: 34.66 KG/M2 | SYSTOLIC BLOOD PRESSURE: 138 MMHG | DIASTOLIC BLOOD PRESSURE: 104 MMHG | HEART RATE: 83 BPM | WEIGHT: 203 LBS

## 2017-11-09 DIAGNOSIS — F33.1 MAJOR DEPRESSIVE DISORDER, RECURRENT EPISODE, MODERATE (HCC): Primary | ICD-10-CM

## 2017-11-09 DIAGNOSIS — F43.10 PTSD (POST-TRAUMATIC STRESS DISORDER): ICD-10-CM
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
## 2017-11-09 NOTE — MR AVS SNAPSHOT
Visit Information Date & Time Provider Department Dept. Phone Encounter #  
 11/9/2017  2:00 PM [facility] [phone] [id number] Follow-up Instructions Return in about 5 weeks (around 12/14/2017). Your Appointments 2/21/2018  1:00 PM  
Follow Up with Deya Bates MD  
Neurology Clinic at [facility]) Appt Note: f/u tremors,lsw,08/15/17  
 200 [facility], 
[address], [address] [address]  
[address] [facility], [address], [address] [address] Upcoming Health Maintenance Date Due Hepatitis C Screening 1955 FOOT EXAM Q1 12/23/1965 EYE EXAM RETINAL OR DILATED Q1 12/23/1965 DTaP/Tdap/Td series (1 - Tdap) 12/23/1976 PAP AKA CERVICAL CYTOLOGY 12/23/1976 FOBT Q 1 YEAR AGE 50-75 12/23/2005 ZOSTER VACCINE AGE 60> 10/23/2015 Influenza Age 5 to Adult 8/1/2017 BREAST CANCER SCRN MAMMOGRAM 11/20/2017 HEMOGLOBIN A1C Q6M 2/22/2018 MICROALBUMIN Q1 8/22/2018 LIPID PANEL Q1 8/22/2018 Allergies as of 11/9/2017  Review Complete On: 11/9/2017 By: Dawit Rioajs Severity Noted Reaction Type Reactions Latex  12/13/2010    Contact Dermatitis Dilaudid [Hydromorphone (Pf)]  10/29/2014   Systemic Other (comments) Feels like bugs are crawling all over her Lipitor [Atorvastatin]  06/21/2013    Other (comments) LIVER TROUBLE ON THIS MEDICATION Remeron [Mirtazapine]  08/15/2017    Other (comments) Tremors Sulfa (Sulfonamide Antibiotics)  12/13/2010    Itching Current Immunizations  Reviewed on 3/19/2015 Name Date Influenza Vaccine 9/1/2014, 9/1/2013 Pneumococcal Vaccine (Unspecified Type) 1/1/2010 Not reviewed this visit You Were Diagnosed With   
  
 Codes Comments Major depressive disorder, recurrent episode, moderate (HCC)    -  Primary ICD-10-CM: F33.1 ICD-9-CM: 296.32   
 PTSD (post-traumatic stress disorder)     ICD-10-CM: F43.10 ICD-9-CM: 309.81 Vitals BP Pulse Height(growth percentile) Weight(growth percentile) BMI OB Status (!) 138/104 83 5' 4\" (1.626 m) 203 lb (92.1 kg) 34.84 kg/m2 Menopause Smoking Status Current Every Day Smoker BMI and BSA Data Body Mass Index Body Surface Area 34.84 kg/m 2 2.04 m 2 Preferred Pharmacy Pharmacy Name Phone [address], [address] [facility] [phone] Your Updated Medication List  
  
   
This list is accurate as of: 11/9/17  2:35 PM.  Always use your most recent med list.  
  
  
  
  
 acetaminophen-codeine 300-30 mg per tablet Commonly known as:  TYLENOL #3 Take 1 Tab by mouth every eight (8) hours as needed. Max Daily Amount: 3 Tabs. ALPRAZolam 1 mg tablet Commonly known as:  [place] Curio Take 1 Tab by mouth three (3) times daily as needed for Anxiety. Max Daily Amount: 3 mg.  
  
 aspirin 81 mg chewable tablet Take 81 mg by mouth daily. benzonatate 200 mg capsule Commonly known as:  TESSALON Take 1 Cap by mouth two (2) times daily as needed. cetirizine 10 mg tablet Commonly known as:  ZYRTEC Take 1 Tab by mouth daily. dicyclomine 10 mg capsule Commonly known as:  BENTYL DUONEB 2.5 mg-0.5 mg/3 ml Nebu Generic drug:  albuterol-ipratropium 3 mL by Nebulization route every six (6) hours as needed for Wheezing. fluticasone-vilanterol 100-25 mcg/dose inhaler Commonly known as:  BREO ELLIPTA Take 1 Puff by inhalation daily. furosemide 40 mg tablet Commonly known as:  LASIX Take 1 Tab by mouth daily. gabapentin 600 mg tablet Commonly known as:  NEURONTIN  
600 mg two (2) times a day. glipiZIDE SR 5 mg CR tablet Commonly known as:  GLUCOTROL XL Take 5 mg by mouth two (2) times daily as needed (Patient monitors her BG levels and takes when she is also taking a steroid. ). guaiFENesin 1,200 mg Ta12 ER tablet Commonly known as:  [place] [address] Take 1 Tab by mouth two (2) times a day. ibuprofen 800 mg tablet Commonly known as:  MOTRIN Take 1 Tab by mouth every eight (8) hours as needed. levothyroxine 200 mcg tablet Commonly known as:  SYNTHROID Take 200 mcg by mouth daily (before breakfast). LEVSIN/SL 0.125 mg SL tablet Generic drug:  hyoscyamine SL  
0.125 mg by SubLINGual route three (3) times daily as needed for Cramping. MAGOX 400 mg tablet Generic drug:  magnesium oxide TAKE ONE TABLET TWICE A DAY  
  
 methocarbamol 750 mg tablet Commonly known as:  ROBAXIN Take 750-1,500 mg by mouth four (4) times daily as needed. metoprolol tartrate 25 mg tablet Commonly known as:  LOPRESSOR Take 1 Tab by mouth two (2) times a day. montelukast 10 mg tablet Commonly known as:  SINGULAIR Take 10 mg by mouth daily. NexIUM 40 mg capsule Generic drug:  esomeprazole Take 40 mg by mouth daily. niacin  mg CR capsule Take 250 mg by mouth daily. nystatin 100,000 unit/mL suspension Commonly known as:  MYCOSTATIN Take 5 mL by mouth four (4) times daily. swish and spit PHENobarbital 60 mg tablet Commonly known as:  LUMINAL Take 1 Tab by mouth two (2) times a day. Max Daily Amount: 120 mg.  
  
 prazosin 2 mg capsule Commonly known as:  MINIPRESS Take 1 Cap by mouth two (2) times a day. Indications: CHRONIC PTSD WITH TRAUMA NIGHTMARES  
  
 primidone 50 mg tablet Commonly known as: MYSOLINE Take 2 Tabs by mouth nightly. promethazine 25 mg tablet Commonly known as:  PHENERGAN Take 1 Tab by mouth every six (6) hours as needed. VENTOLIN HFA 90 mcg/actuation inhaler Generic drug:  albuterol Take 2 Puffs by inhalation every four (4) hours as needed for Wheezing. vit B Cmplx 3-FA-Vit C-Biotin 1- mg-mg-mcg tablet Commonly known as:  NEPHRO SHREYA RX Take 1 Tab by mouth daily. Vytorin 10/40 10-40 mg per tablet Generic drug:  ezetimibe-simvastatin Take 1 tablet by mouth nightly. Follow-up Instructions Return in about 5 weeks (around 12/14/2017). Introducing \[facility]\"" & HEALTH SERVICES! Radha Robles introduces [employer] patient portal. Now you can access parts of your medical record, email your doctor's office, and request medication refills online. 1. In your internet browser, go to [email or web address] 2. Click on the First Time User? Click Here link in the Sign In box. You will see the New Member Sign Up page. 3. Enter your Streem Access Code exactly as it appears below. You will not need to use this code after youve completed the sign-up process. If you do not sign up before the expiration date, you must request a new code. · Streem Access Code: [email or web address] Expires: 11/13/2017 10:06 AM 
 
4. Enter the last four digits of your Social Security Number (xxxx) and Date of Birth (mm/dd/yyyy) as indicated and click Submit. You will be taken to the next sign-up page. 5. Create a Streem ID. This will be your Streem login ID and cannot be changed, so think of one that is secure and easy to remember. 6. Create a Streem password. You can change your password at any time. 7. Enter your Password Reset Question and Answer. This can be used at a later time if you forget your password. 8. Enter your e-mail address. You will receive e-mail notification when new information is available in [address] 9. Click Sign Up. You can now view and download portions of your medical record. 10. Click the Download Summary menu link to download a portable copy of your medical information. If you have questions, please visit the Frequently Asked Questions section of the Streem website. Remember, Streem is NOT to be used for urgent needs. For medical emergencies, dial 911. Now available from your iPhone and Android! Please provide this summary of care documentation to your next provider. Your primary care clinician is listed as Milo Montero. If you have any questions after today's visit, please call [phone].

## 2017-12-12 ENCOUNTER — TELEPHONE (OUTPATIENT)
Dept: BEHAVIORAL/MENTAL HEALTH CLINIC | Age: 62
End: 2017-12-12

## 2017-12-12 NOTE — TELEPHONE ENCOUNTER
Patient has had to cancel 12/13 appt due to not being able to breathe well enough to make it to the office. Requesting a call from provider.

## 2017-12-12 NOTE — TELEPHONE ENCOUNTER
Returned call. Pt provided explanation for why she needs to reschedule. Briefly discussed results from psychogenomic test results. Will review and update tx plan and next OV.

## 2017-12-20 RX ORDER — PRAZOSIN HYDROCHLORIDE 2 MG/1
CAPSULE ORAL
Qty: 60 CAP | Refills: 0 | Status: SHIPPED | OUTPATIENT
Start: 2017-12-20 | End: 2018-01-17 | Stop reason: SDUPTHER

## 2018-01-22 ENCOUNTER — OFFICE VISIT (OUTPATIENT)
Dept: BEHAVIORAL/MENTAL HEALTH CLINIC | Age: 63
End: 2018-01-22

## 2018-01-22 VITALS
WEIGHT: 203 LBS | DIASTOLIC BLOOD PRESSURE: 76 MMHG | HEIGHT: 64 IN | SYSTOLIC BLOOD PRESSURE: 115 MMHG | HEART RATE: 111 BPM | BODY MASS INDEX: 34.66 KG/M2

## 2018-01-22 DIAGNOSIS — F33.1 MAJOR DEPRESSIVE DISORDER, RECURRENT EPISODE, MODERATE (HCC): Primary | ICD-10-CM

## 2018-01-22 DIAGNOSIS — F43.10 PTSD (POST-TRAUMATIC STRESS DISORDER): ICD-10-CM

## 2018-01-22 RX ORDER — FLUOXETINE HYDROCHLORIDE 20 MG/1
20 CAPSULE ORAL DAILY
Qty: 30 CAP | Refills: 1 | Status: SHIPPED | OUTPATIENT
Start: 2018-01-22 | End: 2018-03-20 | Stop reason: SDUPTHER

## 2018-01-22 NOTE — MR AVS SNAPSHOT
[address] [facility] 
[phone] Patient: Jadiel Rae MRN: [id number] :1955 Visit Information Date & Time Provider Department Dept. Phone Encounter #  
 2018  2:00 PM Elise Lunsford NP [address] [facility] [phone] [id number] Follow-up Instructions Return in about 2 weeks (around 2018). Your Appointments 2018  1:00 PM  
Follow Up with Fidelina Salmon MD  
[facility] [address]) Appt Note: f/u tremors,lsw,08/15/17  
 [facility], 
[address] [facility], [address] [id number]  
[address] [facility], [address] [place] [address], [address] [facility] [address] [address] Upcoming Health Maintenance Date Due Hepatitis C Screening 1955 FOOT EXAM Q1 1965 EYE EXAM RETINAL OR DILATED Q1 1965 DTaP/Tdap/Td series (1 - Tdap) 1976 PAP AKA CERVICAL CYTOLOGY 1976 FOBT Q 1 YEAR AGE 50-75 2005 ZOSTER VACCINE AGE 60> 10/23/2015 Influenza Age 5 to Adult 2017 BREAST CANCER SCRN MAMMOGRAM 2017 HEMOGLOBIN A1C Q6M 2018 MICROALBUMIN Q1 2018 LIPID PANEL Q1 2018 Allergies as of 2018  Review Complete On: 2018 By: Elise Lunsford NP Severity Noted Reaction Type Reactions Latex  2010    Contact Dermatitis Dilaudid [Hydromorphone (Pf)]  10/29/2014   Systemic Other (comments) Feels like bugs are crawling all over her Lipitor [Atorvastatin]  2013    Other (comments) LIVER TROUBLE ON THIS MEDICATION Remeron [Mirtazapine]  08/15/2017    Other (comments) Tremors Sulfa (Sulfonamide Antibiotics)  2010    Itching Current Immunizations  Reviewed on 3/19/2015 Name Date Influenza Vaccine 2014, 2013 Pneumococcal Vaccine (Unspecified Type) 2010 Not reviewed this visit You Were Diagnosed With   
  
 Codes Comments Major depressive disorder, recurrent episode, moderate (HCC)    -  Primary ICD-10-CM: F33.1 ICD-9-CM: 296.32   
 PTSD (post-traumatic stress disorder)     ICD-10-CM: F43.10 ICD-9-CM: 309.81 Vitals BP Pulse Height(growth percentile) Weight(growth percentile) BMI OB Status 115/76 (!) 111 5' 4\" (1.626 m) 203 lb (92.1 kg) 34.84 kg/m2 Menopause Smoking Status Current Every Day Smoker BMI and BSA Data Body Mass Index Body Surface Area 34.84 kg/m 2 2.04 m 2 Preferred Pharmacy Pharmacy Name Phone [id number], [address] [place] [address] [phone] Your Updated Medication List  
  
   
This list is accurate as of: 1/22/18  3:00 PM.  Always use your most recent med list.  
  
  
  
  
 acetaminophen-codeine 300-30 mg per tablet Commonly known as:  TYLENOL #3 Take 1 Tab by mouth every eight (8) hours as needed. Max Daily Amount: 3 Tabs. ALPRAZolam 1 mg tablet Commonly known as:  Alvin Dirk Take 1 Tab by mouth three (3) times daily as needed for Anxiety. Max Daily Amount: 3 mg.  
  
 aspirin 81 mg chewable tablet Take 81 mg by mouth daily. benzonatate 200 mg capsule Commonly known as:  TESSALON Take 1 Cap by mouth two (2) times daily as needed. cetirizine 10 mg tablet Commonly known as:  ZYRTEC Take 1 Tab by mouth daily. dicyclomine 10 mg capsule Commonly known as:  BENTYL DUONEB 2.5 mg-0.5 mg/3 ml Nebu Generic drug:  albuterol-ipratropium 3 mL by Nebulization route every six (6) hours as needed for Wheezing. FLUoxetine 20 mg capsule Commonly known as:  PROzac Take 1 Cap by mouth daily. fluticasone-vilanterol 100-25 mcg/dose inhaler Commonly known as:  BREO ELLIPTA Take 1 Puff by inhalation daily. furosemide 40 mg tablet Commonly known as:  LASIX Take 1 Tab by mouth daily. gabapentin 600 mg tablet Commonly known as:  NEURONTIN  
600 mg two (2) times a day. glipiZIDE SR 5 mg CR tablet Commonly known as:  GLUCOTROL XL Take 5 mg by mouth two (2) times daily as needed (Patient monitors her BG levels and takes when she is also taking a steroid. ). guaiFENesin 1,200 mg Ta12 ER tablet Commonly known as:  Edinson & Edinson Take 1 Tab by mouth two (2) times a day. ibuprofen 800 mg tablet Commonly known as:  MOTRIN Take 1 Tab by mouth every eight (8) hours as needed. levothyroxine 200 mcg tablet Commonly known as:  SYNTHROID Take 200 mcg by mouth daily (before breakfast). LEVSIN/SL 0.125 mg SL tablet Generic drug:  hyoscyamine SL  
0.125 mg by SubLINGual route three (3) times daily as needed for Cramping. MAGOX 400 mg tablet Generic drug:  magnesium oxide TAKE ONE TABLET TWICE A DAY  
  
 methocarbamol 750 mg tablet Commonly known as:  ROBAXIN Take 750-1,500 mg by mouth four (4) times daily as needed. metoprolol tartrate 25 mg tablet Commonly known as:  LOPRESSOR Take 1 Tab by mouth two (2) times a day. montelukast 10 mg tablet Commonly known as:  SINGULAIR Take 10 mg by mouth daily. NexIUM 40 mg capsule Generic drug:  esomeprazole Take 40 mg by mouth daily. niacin  mg CR capsule Take 250 mg by mouth daily. nystatin 100,000 unit/mL suspension Commonly known as:  MYCOSTATIN Take 5 mL by mouth four (4) times daily. swish and spit PHENobarbital 60 mg tablet Commonly known as:  LUMINAL Take 1 Tab by mouth two (2) times a day. Max Daily Amount: 120 mg.  
  
 prazosin 2 mg capsule Commonly known as:  MINIPRESS  
TAKE 1 CAPSULE BY MOUTH TWICE A DAY. primidone 50 mg tablet Commonly known as: MYSOLINE Take 2 Tabs by mouth nightly. promethazine 25 mg tablet Commonly known as:  PHENERGAN  
 Take 1 Tab by mouth every six (6) hours as needed. VENTOLIN HFA 90 mcg/actuation inhaler Generic drug:  albuterol Take 2 Puffs by inhalation every four (4) hours as needed for Wheezing. vit B Cmplx 3-FA-Vit C-Biotin 1- mg-mg-mcg tablet Commonly known as:  NEPHRO SHREYA RX Take 1 Tab by mouth daily. Vytorin 10/40 10-40 mg per tablet Generic drug:  ezetimibe-simvastatin Take 1 tablet by mouth nightly. Prescriptions Sent to Pharmacy Refills FLUoxetine (PROZAC) 20 mg capsule 1 Sig: Take 1 Cap by mouth daily. Class: Normal  
 Pharmacy: [address] [place] [address] #: [phone] Route: Oral  
  
Follow-up Instructions Return in about 2 weeks (around 2/5/2018). Introducing [facility]! [place] [employer] introduces 9+ patient portal. Now you can access parts of your medical record, email your doctor's office, and request medication refills online. 1. In your internet browser, go to [email or web address] 2. Click on the First Time User? Click Here link in the Sign In box. You will see the New Member Sign Up page. 3. Enter your 9+ Access Code exactly as it appears below. You will not need to use this code after youve completed the sign-up process. If you do not sign up before the expiration date, you must request a new code. · 9+ Access Code: [email or web address] Expires: 2/27/2018 12:59 PM 
 
4. Enter the last four digits of your Social Security Number (xxxx) and Date of Birth (mm/dd/yyyy) as indicated and click Submit. You will be taken to the next sign-up page. 5. Create a [employer] ID. This will be your 9+ login ID and cannot be changed, so think of one that is secure and easy to remember. 6. Create a 9+ password. You can change your password at any time. 7. Enter your Password Reset Question and Answer.  This can be used at a later time if you forget your password. 8. Enter your e-mail address. You will receive e-mail notification when new information is available in [address] 9. Click Sign Up. You can now view and download portions of your medical record. 10. Click the Download Summary menu link to download a portable copy of your medical information. If you have questions, please visit the Frequently Asked Questions section of the [employer] website. Remember, [employer] is NOT to be used for urgent needs. For medical emergencies, dial 911. Now available from your iPhone and Android! Please provide this summary of care documentation to your next provider. Your primary care clinician is listed as Venecia James. If you have any questions after today's visit, please call [phone].

## 2018-01-22 NOTE — PROGRESS NOTES
CHIEF COMPLAINT:  Liz Goodwin is a 58 y.o. female and was seen today for follow-up of psychiatric condition and psychotropic medication management. HPI:    August Garcia reports the following psychiatric symptoms:  depression and anxiety. The symptoms have been present for months and are of moderate/high severity. The symptoms occur more frequently and more severely. Pt states she is not doing well without having an antidepressant prescribed. Pt here today to review current treatment plan. FAMILY/SOCIAL HX: no changes or updates    REVIEW OF SYSTEMS:  Psychiatric:  depression, anxiety  Appetite:no change   Sleep: poor with DIMS (difficulty initiating & maintaining sleep), having more dreams   Neuro: tremors, chronic pain    Visit Vitals    /76    Pulse (!) 111    Ht 5' 4\" (1.626 m)    Wt 92.1 kg (203 lb)    BMI 34.84 kg/m2       Side Effects:  none from current meds    MENTAL STATUS EXAM:   Sensorium  oriented to time, place and person   Relations cooperative   Appearance:  age appropriate and casually dressed   Motor Behavior:  Gait unsteady, uses a cane   Speech:  \"tremors in my voice\"   Thought Process: goal directed   Thought Content free of delusions and free of hallucinations   Suicidal ideations no intention   Homicidal ideations no intention   Mood:  anxious and depressed   Affect:  anxious and depressed   Memory recent  adequate   Memory remote:  adequate   Concentration:  adequate   Abstraction:  abstract   Insight:  fair   Reliability fair   Judgment:  fair     MEDICAL DECISION MAKING:  Problems addressed today:    ICD-10-CM ICD-9-CM    1. Major depressive disorder, recurrent episode, moderate (HCC) F33.1 296.32    2. PTSD (post-traumatic stress disorder) F43.10 309.81        Assessment:   August Garcia is not responding to treatment, symptoms are currently exacerbated. Pt is currently not on an antidepressant. Reviewed recent results from psychogenomic testing and possible med options.  Will use a trial of prozac at this time. Pt concerned that it may contribute to tremors. She will monitor and if tremors worsen she will stop the medication and contact the office. Reviewed additional possible trials if she is not able to take prozac (luvox, pristiq, trintillex). Reviewed use of prazosin which has been helpful. Pt thinks she needs a higher dose but agreed to get antidepressant established first. Reviewed tx plan changes and options in full. Provided support and answered pt's questions. Plan:   1. Current Outpatient Prescriptions   Medication Sig Dispense Refill    FLUoxetine (PROZAC) 20 mg capsule Take 1 Cap by mouth daily. 30 Cap 1    prazosin (MINIPRESS) 2 mg capsule TAKE 1 CAPSULE BY MOUTH TWICE A DAY. 60 Cap 0    acetaminophen-codeine (TYLENOL #3) 300-30 mg per tablet Take 1 Tab by mouth every eight (8) hours as needed. Max Daily Amount: 3 Tabs. 5 Tab 0    ALPRAZolam (XANAX) 1 mg tablet Take 1 Tab by mouth three (3) times daily as needed for Anxiety. Max Daily Amount: 3 mg. 3 Tab 0    cetirizine (ZYRTEC) 10 mg tablet Take 1 Tab by mouth daily. 10 Tab 0    promethazine (PHENERGAN) 25 mg tablet Take 1 Tab by mouth every six (6) hours as needed. 4 Tab 0    ibuprofen (MOTRIN) 800 mg tablet Take 1 Tab by mouth every eight (8) hours as needed. 20 Tab 0    guaiFENesin ER (MUCINEX) 1,200 mg Ta12 ER tablet Take 1 Tab by mouth two (2) times a day. 14 Tab 0    fluticasone-vilanterol (BREO ELLIPTA) 100-25 mcg/dose inhaler Take 1 Puff by inhalation daily. 1 Inhaler 0    furosemide (LASIX) 40 mg tablet Take 1 Tab by mouth daily. 20 Tab 0    PHENobarbital (LUMINAL) 60 mg tablet Take 1 Tab by mouth two (2) times a day. Max Daily Amount: 120 mg. 60 Tab 1    albuterol (VENTOLIN HFA) 90 mcg/actuation inhaler Take 2 Puffs by inhalation every four (4) hours as needed for Wheezing.  gabapentin (NEURONTIN) 600 mg tablet 600 mg two (2) times a day.       primidone (MYSOLINE) 50 mg tablet Take 2 Tabs by mouth nightly. 100 Tab 11    dicyclomine (BENTYL) 10 mg capsule       glipiZIDE SR (GLUCOTROL) 5 mg CR tablet Take 5 mg by mouth two (2) times daily as needed (Patient monitors her BG levels and takes when she is also taking a steroid. ).  montelukast (SINGULAIR) 10 mg tablet Take 10 mg by mouth daily.  ezetimibe-simvastatin (VYTORIN 10/40) 10-40 mg per tablet Take 1 tablet by mouth nightly.  metoprolol (LOPRESSOR) 25 mg tablet Take 1 Tab by mouth two (2) times a day. 60 Tab 6    benzonatate (TESSALON) 200 mg capsule Take 1 Cap by mouth two (2) times daily as needed. 30 Cap 0    nystatin (MYCOSTATIN) 100,000 unit/mL suspension Take 5 mL by mouth four (4) times daily. swish and spit (Patient taking differently: Take 500,000 Units by mouth four (4) times daily as needed. swish and spit) 180 mL 0    methocarbamol (ROBAXIN) 750 mg tablet Take 750-1,500 mg by mouth four (4) times daily as needed.  niacin  mg CR capsule Take 250 mg by mouth daily.  albuterol-ipratropium (DUONEB) 2.5 mg-0.5 mg/3 ml nebulizer solution 3 mL by Nebulization route every six (6) hours as needed for Wheezing.  hyoscyamine SL (LEVSIN/SL) 0.125 mg SL tablet 0.125 mg by SubLINGual route three (3) times daily as needed for Cramping.  esomeprazole (NEXIUM) 40 mg capsule Take 40 mg by mouth daily.  MAGOX 400 mg tablet TAKE ONE TABLET TWICE A DAY 60 Tab 3    vit B Cmplx 3-FA-Vit C-Biotin (NEPHRO SHREYA RX) 1- mg-mg-mcg tablet Take 1 Tab by mouth daily.  aspirin 81 mg chewable tablet Take 81 mg by mouth daily.  levothyroxine (SYNTHROID) 200 mcg tablet Take 200 mcg by mouth daily (before breakfast). medication changes made today: start prozac 20 mg for dep/anx, cont prazosin 2 mg bid for PTSD, pt also on alprazolam 1 mg TID PRN (rx'd by PCP)    2.   Counseling and coordination of care including instructions for treatment, risks/benefits, risk factor reduction and patient/family education. She agrees with the plan. Patient instructed to call with any side effects, questions or issues. 3.  Follow-up Disposition:  Return in about 2 weeks (around 2/5/2018).     1/22/2018  Xavi Green NP

## 2018-02-05 ENCOUNTER — OFFICE VISIT (OUTPATIENT)
Dept: BEHAVIORAL/MENTAL HEALTH CLINIC | Age: 63
End: 2018-02-05

## 2018-02-05 VITALS
BODY MASS INDEX: 35.96 KG/M2 | SYSTOLIC BLOOD PRESSURE: 156 MMHG | DIASTOLIC BLOOD PRESSURE: 86 MMHG | HEART RATE: 90 BPM | WEIGHT: 210.6 LBS | HEIGHT: 64 IN

## 2018-02-05 DIAGNOSIS — F43.10 PTSD (POST-TRAUMATIC STRESS DISORDER): ICD-10-CM

## 2018-02-05 DIAGNOSIS — F33.1 MAJOR DEPRESSIVE DISORDER, RECURRENT EPISODE, MODERATE (HCC): Primary | ICD-10-CM

## 2018-02-05 RX ORDER — PRAZOSIN HYDROCHLORIDE 2 MG/1
CAPSULE ORAL
Qty: 60 CAP | Refills: 0 | Status: SHIPPED | OUTPATIENT
Start: 2018-02-14 | End: 2018-03-20 | Stop reason: SDUPTHER

## 2018-02-05 NOTE — PROGRESS NOTES
CHIEF COMPLAINT:  Jordyn Park is a 58 y.o. female and was seen today for follow-up of psychiatric condition and psychotropic medication management. HPI:    Araceli Akhtar reports the following psychiatric symptoms:  depression and anxiety. The symptoms have been present for months and are of moderate/high severity. The symptoms occur somewhat less severely since starting prozac. Pt reports noticing a slight improvement. She is here today to review current treatment plan. FAMILY/SOCIAL HX: denies    REVIEW OF SYSTEMS:  Psychiatric:  depression, anxiety  Appetite:decreased   Sleep: no change, fitful    Neuro: tremors, chronic pain    Visit Vitals    /86    Pulse 90    Ht 5' 4\" (1.626 m)    Wt 95.5 kg (210 lb 9.6 oz)    BMI 36.15 kg/m2       Side Effects:  tremor's increased    MENTAL STATUS EXAM:   Sensorium  oriented to time, place and person   Relations cooperative   Appearance:  age appropriate and casually dressed   Motor Behavior:  gait unsteady and using a cane   Speech:  normal volume, tremor in voice at times   Thought Process: goal directed   Thought Content free of delusions and free of hallucinations   Suicidal ideations no intention   Homicidal ideations no intention   Mood:  anxious and depressed   Affect:  anxious and depressed   Memory recent  adequate   Memory remote:  adequate   Concentration:  adequate   Abstraction:  abstract   Insight:  fair   Reliability fair   Judgment:  fair     MEDICAL DECISION MAKING:  Problems addressed today:    ICD-10-CM ICD-9-CM    1. Major depressive disorder, recurrent episode, moderate (HCC) F33.1 296.32    2. PTSD (post-traumatic stress disorder) F43.10 309.81        Assessment:   Araceli Akhtar is responding to treatment somewhat. Pt recently started prozac for depression and anxiety/PTSD. She reports she finally feels like \"i'm coming out of it\". Pt stated unfortunately she has noticed an increase in tremors.  Unclear if these will clear or not as pt adjusts to use of medication. Pt is doing well with prazosin and no dose changes required. Reviewed short term and long term goals for med management. Will assess need for dose increase at next OV. Pt discussed frustration r/t difficulty in finding a medication that is beneficial. Will check in with Dr Eder Kilgore who is treating pt for tremors. Provided support and answered questions. Plan:   1. Current Outpatient Prescriptions   Medication Sig Dispense Refill    [START ON 2/14/2018] prazosin (MINIPRESS) 2 mg capsule TAKE 1 CAPSULE BY MOUTH TWICE A DAY. 60 Cap 0    FLUoxetine (PROZAC) 20 mg capsule Take 1 Cap by mouth daily. 30 Cap 1    acetaminophen-codeine (TYLENOL #3) 300-30 mg per tablet Take 1 Tab by mouth every eight (8) hours as needed. Max Daily Amount: 3 Tabs. 5 Tab 0    ALPRAZolam (XANAX) 1 mg tablet Take 1 Tab by mouth three (3) times daily as needed for Anxiety. Max Daily Amount: 3 mg. 3 Tab 0    cetirizine (ZYRTEC) 10 mg tablet Take 1 Tab by mouth daily. 10 Tab 0    promethazine (PHENERGAN) 25 mg tablet Take 1 Tab by mouth every six (6) hours as needed. 4 Tab 0    ibuprofen (MOTRIN) 800 mg tablet Take 1 Tab by mouth every eight (8) hours as needed. 20 Tab 0    guaiFENesin ER (MUCINEX) 1,200 mg Ta12 ER tablet Take 1 Tab by mouth two (2) times a day. 14 Tab 0    fluticasone-vilanterol (BREO ELLIPTA) 100-25 mcg/dose inhaler Take 1 Puff by inhalation daily. 1 Inhaler 0    furosemide (LASIX) 40 mg tablet Take 1 Tab by mouth daily. 20 Tab 0    PHENobarbital (LUMINAL) 60 mg tablet Take 1 Tab by mouth two (2) times a day. Max Daily Amount: 120 mg. 60 Tab 1    albuterol (VENTOLIN HFA) 90 mcg/actuation inhaler Take 2 Puffs by inhalation every four (4) hours as needed for Wheezing.  gabapentin (NEURONTIN) 600 mg tablet 600 mg two (2) times a day.  primidone (MYSOLINE) 50 mg tablet Take 2 Tabs by mouth nightly.  100 Tab 11    dicyclomine (BENTYL) 10 mg capsule       glipiZIDE SR (GLUCOTROL) 5 mg CR tablet Take 5 mg by mouth two (2) times daily as needed (Patient monitors her BG levels and takes when she is also taking a steroid. ).  montelukast (SINGULAIR) 10 mg tablet Take 10 mg by mouth daily.  ezetimibe-simvastatin (VYTORIN 10/40) 10-40 mg per tablet Take 1 tablet by mouth nightly.  metoprolol (LOPRESSOR) 25 mg tablet Take 1 Tab by mouth two (2) times a day. 60 Tab 6    benzonatate (TESSALON) 200 mg capsule Take 1 Cap by mouth two (2) times daily as needed. 30 Cap 0    nystatin (MYCOSTATIN) 100,000 unit/mL suspension Take 5 mL by mouth four (4) times daily. swish and spit (Patient taking differently: Take 500,000 Units by mouth four (4) times daily as needed. swish and spit) 180 mL 0    methocarbamol (ROBAXIN) 750 mg tablet Take 750-1,500 mg by mouth four (4) times daily as needed.  niacin  mg CR capsule Take 250 mg by mouth daily.  albuterol-ipratropium (DUONEB) 2.5 mg-0.5 mg/3 ml nebulizer solution 3 mL by Nebulization route every six (6) hours as needed for Wheezing.  hyoscyamine SL (LEVSIN/SL) 0.125 mg SL tablet 0.125 mg by SubLINGual route three (3) times daily as needed for Cramping.  esomeprazole (NEXIUM) 40 mg capsule Take 40 mg by mouth daily.  MAGOX 400 mg tablet TAKE ONE TABLET TWICE A DAY 60 Tab 3    vit B Cmplx 3-FA-Vit C-Biotin (NEPHRO SHREYA RX) 1- mg-mg-mcg tablet Take 1 Tab by mouth daily.  aspirin 81 mg chewable tablet Take 81 mg by mouth daily.  levothyroxine (SYNTHROID) 200 mcg tablet Take 200 mcg by mouth daily (before breakfast). medication changes made today: cont prozac 20 mg for dep/anxiety/ptsd, cont prazosin 2 mg bid for ptsd    2. Counseling and coordination of care including instructions for treatment, risks/benefits, risk factor reduction and patient/family education. She agrees with the plan. Patient instructed to call with any side effects, questions or issues.      3.  Follow-up Disposition:  Return in about 8 weeks (around 4/2/2018).      4. Ind therapy prn    2/5/2018  Morris Unger, NP

## 2018-02-05 NOTE — MR AVS SNAPSHOT
[address] [facility] 
[phone] Patient: Lila Rushing MRN: [id number] :1955 Visit Information Date & Time Provider Department Dept. Phone Encounter #  
 2018  3:00 PM Ignacia Hubbard NP [address] [facility] [phone] [id number] Follow-up Instructions Return in about 8 weeks (around 2018). Your Appointments 2018  1:00 PM  
Follow Up with Mechelle Nicholson MD  
[facility] [address]) Appt Note: f/u tremors,lsw,08/15/17  
 [address], 
[address] [place] [address], [phone]  
[address] [facility], [address] [place] [address], [address] [place] [address] [phone] Upcoming Health Maintenance Date Due Hepatitis C Screening 1955 FOOT EXAM Q1 1965 EYE EXAM RETINAL OR DILATED Q1 1965 DTaP/Tdap/Td series (1 - Tdap) 1976 PAP AKA CERVICAL CYTOLOGY 1976 FOBT Q 1 YEAR AGE 50-75 2005 ZOSTER VACCINE AGE 60> 10/23/2015 Influenza Age 5 to Adult 2017 BREAST CANCER SCRN MAMMOGRAM 2017 HEMOGLOBIN A1C Q6M 2018 MICROALBUMIN Q1 2018 LIPID PANEL Q1 2018 Allergies as of 2018  Review Complete On: 2018 By: Ignacia Hubbard NP Severity Noted Reaction Type Reactions Latex  2010    Contact Dermatitis Dilaudid [Hydromorphone (Pf)]  10/29/2014   Systemic Other (comments) Feels like bugs are crawling all over her Lipitor [Atorvastatin]  2013    Other (comments) LIVER TROUBLE ON THIS MEDICATION Remeron [Mirtazapine]  08/15/2017    Other (comments) Tremors Sulfa (Sulfonamide Antibiotics)  2010    Itching Current Immunizations  Reviewed on 3/19/2015 Name Date Influenza Vaccine 2014, 2013 Pneumococcal Vaccine (Unspecified Type) 2010 Not reviewed this visit You Were Diagnosed With   
  
 Codes Comments Major depressive disorder, recurrent episode, moderate (HCC)    -  Primary ICD-10-CM: F33.1 ICD-9-CM: 296.32   
 PTSD (post-traumatic stress disorder)     ICD-10-CM: F43.10 ICD-9-CM: 309.81 Vitals BP Pulse Height(growth percentile) Weight(growth percentile) BMI OB Status 156/86 90 5' 4\" (1.626 m) 210 lb 9.6 oz (95.5 kg) 36.15 kg/m2 Menopause Smoking Status Current Every Day Smoker BMI and BSA Data Body Mass Index Body Surface Area  
 36.15 kg/m 2 2.08 m 2 Preferred Pharmacy Pharmacy Name Phone [id number], [address] [place] [address] [phone] Your Updated Medication List  
  
   
This list is accurate as of: 2/5/18  3:34 PM.  Always use your most recent med list.  
  
  
  
  
 acetaminophen-codeine 300-30 mg per tablet Commonly known as:  TYLENOL #3 Take 1 Tab by mouth every eight (8) hours as needed. Max Daily Amount: 3 Tabs. ALPRAZolam 1 mg tablet Commonly known as:  Brar Mettle Take 1 Tab by mouth three (3) times daily as needed for Anxiety. Max Daily Amount: 3 mg.  
  
 aspirin 81 mg chewable tablet Take 81 mg by mouth daily. benzonatate 200 mg capsule Commonly known as:  TESSALON Take 1 Cap by mouth two (2) times daily as needed. cetirizine 10 mg tablet Commonly known as:  ZYRTEC Take 1 Tab by mouth daily. dicyclomine 10 mg capsule Commonly known as:  BENTYL DUONEB 2.5 mg-0.5 mg/3 ml Nebu Generic drug:  albuterol-ipratropium 3 mL by Nebulization route every six (6) hours as needed for Wheezing. FLUoxetine 20 mg capsule Commonly known as:  PROzac Take 1 Cap by mouth daily. fluticasone-vilanterol 100-25 mcg/dose inhaler Commonly known as:  BREO ELLIPTA Take 1 Puff by inhalation daily. furosemide 40 mg tablet Commonly known as:  LASIX Take 1 Tab by mouth daily. gabapentin 600 mg tablet Commonly known as:  NEURONTIN  
600 mg two (2) times a day. glipiZIDE SR 5 mg CR tablet Commonly known as:  GLUCOTROL XL Take 5 mg by mouth two (2) times daily as needed (Patient monitors her BG levels and takes when she is also taking a steroid. ). guaiFENesin 1,200 mg Ta12 ER tablet Commonly known as:  Edinson & Edinson Take 1 Tab by mouth two (2) times a day. ibuprofen 800 mg tablet Commonly known as:  MOTRIN Take 1 Tab by mouth every eight (8) hours as needed. levothyroxine 200 mcg tablet Commonly known as:  SYNTHROID Take 200 mcg by mouth daily (before breakfast). LEVSIN/SL 0.125 mg SL tablet Generic drug:  hyoscyamine SL  
0.125 mg by SubLINGual route three (3) times daily as needed for Cramping. MAGOX 400 mg tablet Generic drug:  magnesium oxide TAKE ONE TABLET TWICE A DAY  
  
 methocarbamol 750 mg tablet Commonly known as:  ROBAXIN Take 750-1,500 mg by mouth four (4) times daily as needed. metoprolol tartrate 25 mg tablet Commonly known as:  LOPRESSOR Take 1 Tab by mouth two (2) times a day. montelukast 10 mg tablet Commonly known as:  SINGULAIR Take 10 mg by mouth daily. NexIUM 40 mg capsule Generic drug:  esomeprazole Take 40 mg by mouth daily. niacin  mg CR capsule Take 250 mg by mouth daily. nystatin 100,000 unit/mL suspension Commonly known as:  MYCOSTATIN Take 5 mL by mouth four (4) times daily. swish and spit PHENobarbital 60 mg tablet Commonly known as:  LUMINAL Take 1 Tab by mouth two (2) times a day. Max Daily Amount: 120 mg.  
  
 prazosin 2 mg capsule Commonly known as:  MINIPRESS  
TAKE 1 CAPSULE BY MOUTH TWICE A DAY. Start taking on:  2/14/2018  
  
 primidone 50 mg tablet Commonly known as: MYSOLINE Take 2 Tabs by mouth nightly. promethazine 25 mg tablet Commonly known as:  PHENERGAN  
 Take 1 Tab by mouth every six (6) hours as needed. VENTOLIN HFA 90 mcg/actuation inhaler Generic drug:  albuterol Take 2 Puffs by inhalation every four (4) hours as needed for Wheezing. vit B Cmplx 3-FA-Vit C-Biotin 1- mg-mg-mcg tablet Commonly known as:  NEPHRO SHREYA RX Take 1 Tab by mouth daily. Vytorin 10/40 10-40 mg per tablet Generic drug:  ezetimibe-simvastatin Take 1 tablet by mouth nightly. Prescriptions Sent to Pharmacy Refills  
 prazosin (MINIPRESS) 2 mg capsule 0 Starting on: 2/14/2018 Sig: TAKE 1 CAPSULE BY MOUTH TWICE A DAY. Class: Normal  
 Pharmacy: [address] [place] [address], [address] Lazarus Fuchs Ph #: [phone] Follow-up Instructions Return in about 8 weeks (around 4/2/2018). Patient Instructions Call office to schedule a follow up visit: [phone] Introducing [facility] [place] [facility]! [facility] introduces [employer] patient portal. Now you can access parts of your medical record, email your doctor's office, and request medication refills online. 1. In your internet browser, go to [email or web address] 2. Click on the First Time User? Click Here link in the Sign In box. You will see the New Member Sign Up page. 3. Enter your [employer] Access Code exactly as it appears below. You will not need to use this code after youve completed the sign-up process. If you do not sign up before the expiration date, you must request a new code. · [employer] Access Code: [email or web address] Expires: 2/27/2018 12:59 PM 
 
4. Enter the last four digits of your Social Security Number (xxxx) and Date of Birth (mm/dd/yyyy) as indicated and click Submit. You will be taken to the next sign-up page. 5. Create a [employer] ID. This will be your [employer] login ID and cannot be changed, so think of one that is secure and easy to remember. 6. Create a [employer] password. You can change your password at any time. 7. Enter your Password Reset Question and Answer. This can be used at a later time if you forget your password. 8. Enter your e-mail address. You will receive e-mail notification when new information is available in [address] 9. Click Sign Up. You can now view and download portions of your medical record. 10. Click the Download Summary menu link to download a portable copy of your medical information. If you have questions, please visit the Frequently Asked Questions section of the [employer] website. Remember, [employer] is NOT to be used for urgent needs. For medical emergencies, dial 911. Now available from your iPhone and Android! Please provide this summary of care documentation to your next provider. Your primary care clinician is listed as Mortimer Dickinson. If you have any questions after today's visit, please call [phone].

## 2018-02-08 ENCOUNTER — TELEPHONE (OUTPATIENT)
Dept: BEHAVIORAL/MENTAL HEALTH CLINIC | Age: 63
End: 2018-02-08

## 2018-02-08 NOTE — TELEPHONE ENCOUNTER
Called pt to inform her Dr Ce Saldaña had provided feedback that she could try higher dose of primidone 50 mg due to c/o tremors. Pt will try 3 tabs at bedtime and d/w Dr Ce Saldaña at her upcoming appt.

## 2018-02-21 ENCOUNTER — OFFICE VISIT (OUTPATIENT)
Dept: NEUROLOGY | Age: 63
End: 2018-02-21

## 2018-02-21 VITALS
BODY MASS INDEX: 34.83 KG/M2 | WEIGHT: 204 LBS | OXYGEN SATURATION: 88 % | HEIGHT: 64 IN | DIASTOLIC BLOOD PRESSURE: 70 MMHG | HEART RATE: 100 BPM | SYSTOLIC BLOOD PRESSURE: 134 MMHG

## 2018-02-21 DIAGNOSIS — G25.0 BENIGN FAMILIAL TREMOR: ICD-10-CM

## 2018-02-21 DIAGNOSIS — M48.061 DEGENERATIVE LUMBAR SPINAL STENOSIS: ICD-10-CM

## 2018-02-21 DIAGNOSIS — R27.0 ATAXIA: ICD-10-CM

## 2018-02-21 DIAGNOSIS — M96.1 CERVICAL POST-LAMINECTOMY SYNDROME: ICD-10-CM

## 2018-02-21 DIAGNOSIS — E55.9 VITAMIN D DEFICIENCY: ICD-10-CM

## 2018-02-21 DIAGNOSIS — R55 SYNCOPE AND COLLAPSE: ICD-10-CM

## 2018-02-21 DIAGNOSIS — E53.8 B12 DEFICIENCY: ICD-10-CM

## 2018-02-21 DIAGNOSIS — M47.22 CERVICAL RADICULOPATHY DUE TO DEGENERATIVE JOINT DISEASE OF SPINE: ICD-10-CM

## 2018-02-21 DIAGNOSIS — G25.0 BENIGN ESSENTIAL TREMOR SYNDROME: Primary | ICD-10-CM

## 2018-02-21 DIAGNOSIS — E11.42 DIABETIC PERIPHERAL NEUROPATHY ASSOCIATED WITH TYPE 2 DIABETES MELLITUS (HCC): ICD-10-CM

## 2018-02-21 DIAGNOSIS — I65.23 STENOSIS OF BOTH INTERNAL CAROTID ARTERIES: ICD-10-CM

## 2018-02-21 RX ORDER — PRIMIDONE 50 MG/1
TABLET ORAL
Qty: 120 TAB | Refills: 11 | Status: SHIPPED | OUTPATIENT
Start: 2018-02-21 | End: 2018-10-03 | Stop reason: SDUPTHER
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
## 2018-02-21 NOTE — MR AVS SNAPSHOT
[address] [address], 
[id number], [address] [facility] 
[phone] Patient: Kyle Siddiqi MRN: [id number] :1955 Visit Information Date & Time Provider Department Dept. Phone Encounter #  
 2018  1:00 PM Tristan Tafoya MD [facility] [phone] [id number] Follow-up Instructions Return in about 6 months (around 2018). Upcoming Health Maintenance Date Due Hepatitis C Screening 1955 FOOT EXAM Q1 1965 EYE EXAM RETINAL OR DILATED Q1 1965 DTaP/Tdap/Td series (1 - Tdap) 1976 PAP AKA CERVICAL CYTOLOGY 1976 FOBT Q 1 YEAR AGE 50-75 2005 ZOSTER VACCINE AGE 60> 10/23/2015 Influenza Age 5 to Adult 2017 BREAST CANCER SCRN MAMMOGRAM 2017 HEMOGLOBIN A1C Q6M 2018 MICROALBUMIN Q1 2018 LIPID PANEL Q1 2018 Allergies as of 2018  Review Complete On: 2018 By: Tristan Tafoya MD  
  
 Severity Noted Reaction Type Reactions Latex  2010    Contact Dermatitis Dilaudid [Hydromorphone (Pf)]  10/29/2014   Systemic Other (comments) Feels like bugs are crawling all over her Lipitor [Atorvastatin]  2013    Other (comments) LIVER TROUBLE ON THIS MEDICATION Remeron [Mirtazapine]  08/15/2017    Other (comments) Tremors Sulfa (Sulfonamide Antibiotics)  2010    Itching Current Immunizations  Reviewed on 3/19/2015 Name Date Influenza Vaccine 2014, 2013 Pneumococcal Vaccine (Unspecified Type) 2010 Not reviewed this visit You Were Diagnosed With   
  
 Codes Comments Benign essential tremor syndrome    -  Primary ICD-10-CM: G25.0 ICD-9-CM: 333.1 Diabetic peripheral neuropathy associated with type 2 diabetes mellitus (HCC)     ICD-10-CM: E11.42 
ICD-9-CM: 250.60, 357.2 Stenosis of both internal carotid arteries     ICD-10-CM: I65.23 ICD-9-CM: 433.10, 433.30 Cervical radiculopathy due to degenerative joint disease of spine     ICD-10-CM: M47.22 
ICD-9-CM: 721.0 Benign familial tremor     ICD-10-CM: G25.0 ICD-9-CM: 333.1 Cervical post-laminectomy syndrome     ICD-10-CM: M96.1 ICD-9-CM: 722.81 Syncope and collapse     ICD-10-CM: R55 
ICD-9-CM: 780.2 Degenerative lumbar spinal stenosis     ICD-10-CM: M48.061 
ICD-9-CM: 724.02 Ataxia     ICD-10-CM: R27.0 ICD-9-CM: 781.3 B12 deficiency     ICD-10-CM: E53.8 ICD-9-CM: 266.2 Vitamin D deficiency     ICD-10-CM: E55.9 ICD-9-CM: 268.9 Vitals BP Pulse Height(growth percentile) Weight(growth percentile) SpO2 BMI  
 134/70 100 5' 4\" (1.626 m) 204 lb (92.5 kg) (!) 88% 35.02 kg/m2 OB Status Smoking Status Menopause Current Every Day Smoker BMI and BSA Data Body Mass Index Body Surface Area 35.02 kg/m 2 2.04 m 2 Preferred Pharmacy Pharmacy Name Phone [phone], [address] [place] [address] [phone] Your Updated Medication List  
  
   
This list is accurate as of 2/21/18  1:55 PM.  Always use your most recent med list.  
  
  
  
  
 acetaminophen-codeine 300-30 mg per tablet Commonly known as:  TYLENOL #3 Take 1 Tab by mouth every eight (8) hours as needed. Max Daily Amount: 3 Tabs. ALPRAZolam 1 mg tablet Commonly known as:  Shelly Feather Take 1 Tab by mouth three (3) times daily as needed for Anxiety. Max Daily Amount: 3 mg.  
  
 aspirin 81 mg chewable tablet Take 81 mg by mouth daily. benzonatate 200 mg capsule Commonly known as:  TESSALON Take 1 Cap by mouth two (2) times daily as needed. cetirizine 10 mg tablet Commonly known as:  ZYRTEC Take 1 Tab by mouth daily. dicyclomine 10 mg capsule Commonly known as:  BENTYL DUONEB 2.5 mg-0.5 mg/3 ml Nebu Generic drug:  albuterol-ipratropium 3 mL by Nebulization route every six (6) hours as needed for Wheezing. FLUoxetine 20 mg capsule Commonly known as:  PROzac Take 1 Cap by mouth daily. fluticasone-vilanterol 100-25 mcg/dose inhaler Commonly known as:  BREO ELLIPTA Take 1 Puff by inhalation daily. furosemide 40 mg tablet Commonly known as:  LASIX Take 1 Tab by mouth daily. gabapentin 600 mg tablet Commonly known as:  NEURONTIN  
600 mg two (2) times a day. glipiZIDE SR 5 mg CR tablet Commonly known as:  GLUCOTROL XL Take 5 mg by mouth two (2) times daily as needed (Patient monitors her BG levels and takes when she is also taking a steroid. ). guaiFENesin 1,200 mg Ta12 ER tablet Commonly known as:  Edinson & Edinson Take 1 Tab by mouth two (2) times a day. ibuprofen 800 mg tablet Commonly known as:  MOTRIN Take 1 Tab by mouth every eight (8) hours as needed. levothyroxine 200 mcg tablet Commonly known as:  SYNTHROID Take 200 mcg by mouth daily (before breakfast). LEVSIN/SL 0.125 mg SL tablet Generic drug:  hyoscyamine SL  
0.125 mg by SubLINGual route three (3) times daily as needed for Cramping. MAGOX 400 mg tablet Generic drug:  magnesium oxide TAKE ONE TABLET TWICE A DAY  
  
 methocarbamol 750 mg tablet Commonly known as:  ROBAXIN Take 750-1,500 mg by mouth four (4) times daily as needed. metoprolol tartrate 25 mg tablet Commonly known as:  LOPRESSOR Take 1 Tab by mouth two (2) times a day. montelukast 10 mg tablet Commonly known as:  SINGULAIR Take 10 mg by mouth daily. NexIUM 40 mg capsule Generic drug:  esomeprazole Take 40 mg by mouth daily. niacin  mg CR capsule Take 250 mg by mouth daily. nystatin 100,000 unit/mL suspension Commonly known as:  MYCOSTATIN Take 5 mL by mouth four (4) times daily. swish and spit PHENobarbital 60 mg tablet Commonly known as:  LUMINAL Take 1 Tab by mouth two (2) times a day. Max Daily Amount: 120 mg.  
  
 prazosin 2 mg capsule Commonly known as:  MINIPRESS  
TAKE 1 CAPSULE BY MOUTH TWICE A DAY. primidone 50 mg tablet Commonly known as: MYSOLINE  
1 po qam and 3 po qhs  
  
 promethazine 25 mg tablet Commonly known as:  PHENERGAN Take 1 Tab by mouth every six (6) hours as needed. VENTOLIN HFA 90 mcg/actuation inhaler Generic drug:  albuterol Take 2 Puffs by inhalation every four (4) hours as needed for Wheezing. vit B Cmplx 3-FA-Vit C-Biotin 1- mg-mg-mcg tablet Commonly known as:  NEPHRO SHREYA RX Take 1 Tab by mouth daily. Vytorin 10/40 10-40 mg per tablet Generic drug:  ezetimibe-simvastatin Take 1 tablet by mouth nightly. Prescriptions Sent to Pharmacy Refills  
 primidone (MYSOLINE) 50 mg tablet 11 Si po qam and 3 po qhs  
 Class: Normal  
 Pharmacy: [address] [place] [address] Ph #: [phone] We Performed the Following PHENOBARBITAL [id number] CPT(R)] PRIMIDONE (MYSOLINE) [id number] CPT(R)] Follow-up Instructions Return in about 6 months (around 2018). Introducing [facility]! Tracey Sánchez introduces [employer] patient portal. Now you can access parts of your medical record, email your doctor's office, and request medication refills online. 1. In your internet browser, go to [email or web address] 2. Click on the First Time User? Click Here link in the Sign In box. You will see the New Member Sign Up page. 3. Enter your Page[employer] Access Code exactly as it appears below. You will not need to use this code after youve completed the sign-up process. If you do not sign up before the expiration date, you must request a new code. · [employer] Access Code: [email or web address] Expires: 2018 12:59 PM 
 
 4. Enter the last four digits of your Social Security Number (xxxx) and Date of Birth (mm/dd/yyyy) as indicated and click Submit. You will be taken to the next sign-up page. 5. Create a [employer] ID. This will be your [employer] login ID and cannot be changed, so think of one that is secure and easy to remember. 6. Create a [employer] password. You can change your password at any time. 7. Enter your Password Reset Question and Answer. This can be used at a later time if you forget your password. 8. Enter your e-mail address. You will receive e-mail notification when new information is available in [address] 9. Click Sign Up. You can now view and download portions of your medical record. 10. Click the Download Summary menu link to download a portable copy of your medical information. If you have questions, please visit the Frequently Asked Questions section of the [employer] website. Remember, [employer] is NOT to be used for urgent needs. For medical emergencies, dial 911. Now available from your iPhone and Android! Please provide this summary of care documentation to your next provider. Your primary care clinician is listed as Adilson Dao. If you have any questions after today's visit, please call [phone].
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
## 2018-02-22 LAB
PHENOBARB SERPL-MCNC: 28 UG/ML (ref 15–40)
PHENOBARB SERPL-MCNC: 31 UG/ML (ref 15–40)
PRIMIDONE SERPL-MCNC: 2 UG/ML (ref 5–12)

## 2018-03-20 ENCOUNTER — OFFICE VISIT (OUTPATIENT)
Dept: BEHAVIORAL/MENTAL HEALTH CLINIC | Age: 63
End: 2018-03-20

## 2018-03-20 VITALS
HEIGHT: 64 IN | DIASTOLIC BLOOD PRESSURE: 99 MMHG | BODY MASS INDEX: 34.79 KG/M2 | WEIGHT: 203.8 LBS | HEART RATE: 115 BPM | SYSTOLIC BLOOD PRESSURE: 146 MMHG

## 2018-03-20 DIAGNOSIS — F33.1 MAJOR DEPRESSIVE DISORDER, RECURRENT EPISODE, MODERATE (HCC): Primary | ICD-10-CM

## 2018-03-20 DIAGNOSIS — F43.10 PTSD (POST-TRAUMATIC STRESS DISORDER): ICD-10-CM

## 2018-03-20 RX ORDER — PRAZOSIN HYDROCHLORIDE 2 MG/1
CAPSULE ORAL
Qty: 60 CAP | Refills: 1 | Status: SHIPPED | OUTPATIENT
Start: 2018-03-20 | End: 2018-05-21 | Stop reason: SDUPTHER

## 2018-03-20 RX ORDER — FLUOXETINE HYDROCHLORIDE 20 MG/1
20 CAPSULE ORAL DAILY
Qty: 30 CAP | Refills: 1 | Status: SHIPPED | OUTPATIENT
Start: 2018-03-20 | End: 2018-03-29 | Stop reason: SINTOL

## 2018-03-20 NOTE — MR AVS SNAPSHOT
[address] [facility] 
[phone] Patient: Carine Garcia MRN: [id number] :1955 Visit Information Date & Time Provider Department Dept. Phone Encounter #  
 3/20/2018  2:00 PM Clari Porter NP [address] [facility] [phone] [id number] Your Appointments 10/3/2018  2:00 PM  
Follow Up with Maira Oleary MD  
[facility]) Appt Note: f/u tremors, jrb 18  
 200 [facility], 
[address], [address] [address]  
[address] [facility], [address], [address] [facility] [address] [address] Upcoming Health Maintenance Date Due Hepatitis C Screening 1955 FOOT EXAM Q1 1965 EYE EXAM RETINAL OR DILATED Q1 1965 DTaP/Tdap/Td series (1 - Tdap) 1976 PAP AKA CERVICAL CYTOLOGY 1976 FOBT Q 1 YEAR AGE 50-75 2005 ZOSTER VACCINE AGE 60> 10/23/2015 Influenza Age 5 to Adult 2017 BREAST CANCER SCRN MAMMOGRAM 2017 HEMOGLOBIN A1C Q6M 2018 MEDICARE YEARLY EXAM 3/14/2018 MICROALBUMIN Q1 2018 LIPID PANEL Q1 2018 Allergies as of 3/20/2018  Review Complete On: 3/20/2018 By: Clari Porter NP Severity Noted Reaction Type Reactions Latex  2010    Contact Dermatitis Dilaudid [Hydromorphone (Pf)]  10/29/2014   Systemic Other (comments) Feels like bugs are crawling all over her Lipitor [Atorvastatin]  2013    Other (comments) LIVER TROUBLE ON THIS MEDICATION Remeron [Mirtazapine]  08/15/2017    Other (comments) Tremors Sulfa (Sulfonamide Antibiotics)  2010    Itching Current Immunizations  Reviewed on 3/19/2015 Name Date Influenza Vaccine 2014, 2013 Pneumococcal Vaccine (Unspecified Type) 2010 Not reviewed this visit You Were Diagnosed With   
  
 Codes Comments Major depressive disorder, recurrent episode, moderate (HCC)    -  Primary ICD-10-CM: F33.1 ICD-9-CM: 296.32   
 PTSD (post-traumatic stress disorder)     ICD-10-CM: F43.10 ICD-9-CM: 309.81 Vitals BP Pulse Height(growth percentile) Weight(growth percentile) BMI OB Status (!) 146/99 (!) 115 5' 4\" (1.626 m) 203 lb 12.8 oz (92.4 kg) 34.98 kg/m2 Menopause Smoking Status Current Every Day Smoker BMI and BSA Data Body Mass Index Body Surface Area 34.98 kg/m 2 2.04 m 2 Preferred Pharmacy Pharmacy Name Phone [address], [address] [place] [address] [phone] Your Updated Medication List  
  
   
This list is accurate as of 3/20/18  2:43 PM.  Always use your most recent med list.  
  
  
  
  
 acetaminophen-codeine 300-30 mg per tablet Commonly known as:  TYLENOL #3 Take 1 Tab by mouth every eight (8) hours as needed. Max Daily Amount: 3 Tabs. ALPRAZolam 1 mg tablet Commonly known as:  Aloma Gaudy Take 1 Tab by mouth three (3) times daily as needed for Anxiety. Max Daily Amount: 3 mg.  
  
 aspirin 81 mg chewable tablet Take 81 mg by mouth daily. benzonatate 200 mg capsule Commonly known as:  TESSALON Take 1 Cap by mouth two (2) times daily as needed. cetirizine 10 mg tablet Commonly known as:  ZYRTEC Take 1 Tab by mouth daily. dicyclomine 10 mg capsule Commonly known as:  BENTYL DUONEB 2.5 mg-0.5 mg/3 ml Nebu Generic drug:  albuterol-ipratropium 3 mL by Nebulization route every six (6) hours as needed for Wheezing. FLUoxetine 20 mg capsule Commonly known as:  PROzac Take 1 Cap by mouth daily. fluticasone-vilanterol 100-25 mcg/dose inhaler Commonly known as:  BREO ELLIPTA Take 1 Puff by inhalation daily. furosemide 40 mg tablet Commonly known as:  LASIX Take 1 Tab by mouth daily. gabapentin 600 mg tablet Commonly known as:  NEURONTIN  
600 mg two (2) times a day. glipiZIDE SR 5 mg CR tablet Commonly known as:  GLUCOTROL XL Take 5 mg by mouth two (2) times daily as needed (Patient monitors her BG levels and takes when she is also taking a steroid. ). guaiFENesin 1,200 mg Ta12 ER tablet Commonly known as:  Edinson & Edinson Take 1 Tab by mouth two (2) times a day. ibuprofen 800 mg tablet Commonly known as:  MOTRIN Take 1 Tab by mouth every eight (8) hours as needed. levothyroxine 200 mcg tablet Commonly known as:  SYNTHROID Take 200 mcg by mouth daily (before breakfast). LEVSIN/SL 0.125 mg SL tablet Generic drug:  hyoscyamine SL  
0.125 mg by SubLINGual route three (3) times daily as needed for Cramping. MAGOX 400 mg tablet Generic drug:  magnesium oxide TAKE ONE TABLET TWICE A DAY  
  
 methocarbamol 750 mg tablet Commonly known as:  ROBAXIN Take 750-1,500 mg by mouth four (4) times daily as needed. metoprolol tartrate 25 mg tablet Commonly known as:  LOPRESSOR Take 1 Tab by mouth two (2) times a day. montelukast 10 mg tablet Commonly known as:  SINGULAIR Take 10 mg by mouth daily. NexIUM 40 mg capsule Generic drug:  esomeprazole Take 40 mg by mouth daily. niacin  mg CR capsule Take 250 mg by mouth daily. nystatin 100,000 unit/mL suspension Commonly known as:  MYCOSTATIN Take 5 mL by mouth four (4) times daily. swish and spit PHENobarbital 60 mg tablet Commonly known as:  LUMINAL Take 1 Tab by mouth two (2) times a day. Max Daily Amount: 120 mg.  
  
 prazosin 2 mg capsule Commonly known as:  MINIPRESS  
TAKE 1 CAPSULE BY MOUTH TWICE A DAY. primidone 50 mg tablet Commonly known as: MYSOLINE  
1 po qam and 3 po qhs  
  
 promethazine 25 mg tablet Commonly known as:  PHENERGAN Take 1 Tab by mouth every six (6) hours as needed. VENTOLIN HFA 90 mcg/actuation inhaler Generic drug:  albuterol Take 2 Puffs by inhalation every four (4) hours as needed for Wheezing. vit B Cmplx 3-FA-Vit C-Biotin 1- mg-mg-mcg tablet Commonly known as:  NEPHRO SHREYA RX Take 1 Tab by mouth daily. Vytorin 10/40 10-40 mg per tablet Generic drug:  ezetimibe-simvastatin Take 1 tablet by mouth nightly. Prescriptions Sent to Pharmacy Refills  
 prazosin (MINIPRESS) 2 mg capsule 1 Sig: TAKE 1 CAPSULE BY MOUTH TWICE A DAY. Class: Normal  
 Pharmacy: [facility], [address] [place] [address] Will Cipro Ph #: [phone] FLUoxetine (PROZAC) 20 mg capsule 1 Sig: Take 1 Cap by mouth daily. Class: Normal  
 Pharmacy: [facility], [address] [place] [address] Will Cipro Ph #: [phone] Route: Oral  
  
Introducing [facility]! Pe[facility] Glover introduces [email or web address] patient portal. Now you can access parts of your medical record, email your doctor's office, and request medication refills online. 1. In your internet browser, go to [email or web address] 2. Click on the First Time User? Click Here link in the Sign In box. You will see the New Member Sign Up page. 3. Enter your [email or web address] Access Code exactly as it appears below. You will not need to use this code after youve completed the sign-up process. If you do not sign up before the expiration date, you must request a new code. · [email or web address] Access Code: [email or web address] Expires: 6/4/2018  6:30 AM 
 
4. Enter the last four digits of your Social Security Number (xxxx) and Date of Birth (mm/dd/yyyy) as indicated and click Submit. You will be taken to the next sign-up page. 5. Create a Gradalist ID. This will be your Gradalist login ID and cannot be changed, so think of one that is secure and easy to remember. 6. Create a Gradalist password. You can change your password at any time. 7. Enter your Password Reset Question and Answer. This can be used at a later time if you forget your password. 8. Enter your e-mail address. You will receive e-mail notification when new information is available in [address] 9. Click Sign Up. You can now view and download portions of your medical record. 10. Click the Download Summary menu link to download a portable copy of your medical information. If you have questions, please visit the Frequently Asked Questions section of the [employer] website. Remember, [employer] is NOT to be used for urgent needs. For medical emergencies, dial 911. Now available from your iPhone and Android! Please provide this summary of care documentation to your next provider. Your primary care clinician is listed as Monica Roberson. If you have any questions after today's visit, please call [phone].

## 2018-03-20 NOTE — PROGRESS NOTES
CHIEF COMPLAINT:  Dany Padron is a 58 y.o. female and was seen today for follow-up of psychiatric condition and psychotropic medication management. HPI:    Sandy Ronrick reports the following psychiatric symptoms:  depression and anxiety. The symptoms have been present for months and are of moderate/high severity. The symptoms occur daily right now and pt reports she has been experiencing increased anxiety, ptsd reactivation and depression. Pt reports she had a recent stressor that activated PTSD. Since PTSD has been so severe she has been experiencing increased depression as well. Pt states meds from pharmacy were switched (maufacturers) as well. Pt here today to review current treatment plan. FAMILY/SOCIAL HX: conflict with sister in law    REVIEW OF SYSTEMS:  Psychiatric:  depression, anxiety  Appetite:decreased   Sleep: poor with DIMS (difficulty initiating & maintaining sleep)   Neuro: tremors, chronic pain    Visit Vitals    BP (!) 146/99    Pulse (!) 115    Ht 5' 4\" (1.626 m)    Wt 92.4 kg (203 lb 12.8 oz)    BMI 34.98 kg/m2       Side Effects:  none    MENTAL STATUS EXAM:   Sensorium  oriented to time, place and person   Relations cooperative   Appearance:  age appropriate, casually dressed and distressed, crying   Motor Behavior:  gait stable and walks slowly due to pain issues   Speech:  normal volume and pressured from anxiety   Thought Process: goal directed   Thought Content free of delusions and free of hallucinations   Suicidal ideations no intention   Homicidal ideations no intention   Mood:  anxious and depressed   Affect:  anxious and depressed   Memory recent  adequate/impaired   Memory remote:  adequate   Concentration:  adequate/impaired   Abstraction:  abstract   Insight:  fair and limited   Reliability fair   Judgment:  fair     MEDICAL DECISION MAKING:  Problems addressed today:    ICD-10-CM ICD-9-CM    1.  Major depressive disorder, recurrent episode, moderate ([facility]) F33.1 296.32 2. PTSD (post-traumatic stress disorder) F43.10 309.81        Assessment:   An Garcia is responding to treatment overall. She started to demonstrate some improvement but due to several stressors she experienced PTSD re-activation. Provided psychoeducation re: impact of sympathetic dominance. Discussed challenge of using medication only and reinforced importance of ind therapy. Reviewed current meds and pt will continue on prozac and prazosin at this time. She is on alprazolam rx'd by PCP. Will re-start risperdal for PTSD. Provided support and encouragement. Worked to help decrease \"threat response\" and sympathetic activation. Pt states she will try to get back into therapy with previous therapists. Plan:   1. Current Outpatient Prescriptions   Medication Sig Dispense Refill    prazosin (MINIPRESS) 2 mg capsule TAKE 1 CAPSULE BY MOUTH TWICE A DAY. 60 Cap 1    FLUoxetine (PROZAC) 20 mg capsule Take 1 Cap by mouth daily. 30 Cap 1    primidone (MYSOLINE) 50 mg tablet 1 po qam and 3 po qhs 120 Tab 11    acetaminophen-codeine (TYLENOL #3) 300-30 mg per tablet Take 1 Tab by mouth every eight (8) hours as needed. Max Daily Amount: 3 Tabs. 5 Tab 0    ALPRAZolam (XANAX) 1 mg tablet Take 1 Tab by mouth three (3) times daily as needed for Anxiety. Max Daily Amount: 3 mg. 3 Tab 0    cetirizine (ZYRTEC) 10 mg tablet Take 1 Tab by mouth daily. 10 Tab 0    promethazine (PHENERGAN) 25 mg tablet Take 1 Tab by mouth every six (6) hours as needed. 4 Tab 0    ibuprofen (MOTRIN) 800 mg tablet Take 1 Tab by mouth every eight (8) hours as needed. 20 Tab 0    guaiFENesin ER (MUCINEX) 1,200 mg Ta12 ER tablet Take 1 Tab by mouth two (2) times a day. 14 Tab 0    fluticasone-vilanterol (BREO ELLIPTA) 100-25 mcg/dose inhaler Take 1 Puff by inhalation daily. 1 Inhaler 0    furosemide (LASIX) 40 mg tablet Take 1 Tab by mouth daily. 20 Tab 0    PHENobarbital (LUMINAL) 60 mg tablet Take 1 Tab by mouth two (2) times a day.  Max Daily Amount: 120 mg. 60 Tab 1    albuterol (VENTOLIN HFA) 90 mcg/actuation inhaler Take 2 Puffs by inhalation every four (4) hours as needed for Wheezing.  gabapentin (NEURONTIN) 600 mg tablet 600 mg two (2) times a day.  dicyclomine (BENTYL) 10 mg capsule       glipiZIDE SR (GLUCOTROL) 5 mg CR tablet Take 5 mg by mouth two (2) times daily as needed (Patient monitors her BG levels and takes when she is also taking a steroid. ).  montelukast (SINGULAIR) 10 mg tablet Take 10 mg by mouth daily.  ezetimibe-simvastatin (VYTORIN 10/40) 10-40 mg per tablet Take 1 tablet by mouth nightly.  metoprolol (LOPRESSOR) 25 mg tablet Take 1 Tab by mouth two (2) times a day. 60 Tab 6    benzonatate (TESSALON) 200 mg capsule Take 1 Cap by mouth two (2) times daily as needed. 30 Cap 0    nystatin (MYCOSTATIN) 100,000 unit/mL suspension Take 5 mL by mouth four (4) times daily. swish and spit (Patient taking differently: Take 500,000 Units by mouth four (4) times daily as needed. swish and spit) 180 mL 0    methocarbamol (ROBAXIN) 750 mg tablet Take 750-1,500 mg by mouth four (4) times daily as needed.  niacin  mg CR capsule Take 250 mg by mouth daily.  albuterol-ipratropium (DUONEB) 2.5 mg-0.5 mg/3 ml nebulizer solution 3 mL by Nebulization route every six (6) hours as needed for Wheezing.  hyoscyamine SL (LEVSIN/SL) 0.125 mg SL tablet 0.125 mg by SubLINGual route three (3) times daily as needed for Cramping.  esomeprazole (NEXIUM) 40 mg capsule Take 40 mg by mouth daily.  MAGOX 400 mg tablet TAKE ONE TABLET TWICE A DAY 60 Tab 3    vit B Cmplx 3-FA-Vit C-Biotin (NEPHRO SHREYA RX) 1- mg-mg-mcg tablet Take 1 Tab by mouth daily.  aspirin 81 mg chewable tablet Take 81 mg by mouth daily.  levothyroxine (SYNTHROID) 200 mcg tablet Take 200 mcg by mouth daily (before breakfast).             medication changes made today: cont prozac 20 mg for dep/anx, cont prazosin 2 mg bid for ptsd, re-start risperdal 0.25 mg nightly    2. Counseling and coordination of care including instructions for treatment, risks/benefits, risk factor reduction and patient/family education. She agrees with the plan. Patient instructed to call with any side effects, questions or issues. 3.  Follow-up Disposition:  Return in about 1 week (around 3/27/2018).      4. Ind therapy    3/20/2018  Judge Saulo NP

## 2018-03-28 ENCOUNTER — OFFICE VISIT (OUTPATIENT)
Dept: BEHAVIORAL/MENTAL HEALTH CLINIC | Age: 63
End: 2018-03-28

## 2018-03-28 VITALS
WEIGHT: 203 LBS | SYSTOLIC BLOOD PRESSURE: 122 MMHG | BODY MASS INDEX: 34.66 KG/M2 | HEART RATE: 90 BPM | DIASTOLIC BLOOD PRESSURE: 77 MMHG | HEIGHT: 64 IN

## 2018-03-28 DIAGNOSIS — F43.10 PTSD (POST-TRAUMATIC STRESS DISORDER): ICD-10-CM

## 2018-03-28 DIAGNOSIS — M79.7 FIBROMYALGIA: ICD-10-CM

## 2018-03-28 DIAGNOSIS — F33.1 MODERATE EPISODE OF RECURRENT MAJOR DEPRESSIVE DISORDER (HCC): Primary | ICD-10-CM

## 2018-03-28 NOTE — MR AVS SNAPSHOT
[address] [facility] 
 
 
 [address] [facility] [address] [place] [address] 
[phone] Patient: Gracie Yip MRN: [id number] :1955 Visit Information Date & Time Provider Department Dept. Phone Encounter #  
 3/28/2018  1:30 PM Author Backgoldie, [address] [place] [address] Medicine Group [phone] [id number] Your Appointments 3/29/2018  2:30 PM  
ESTABLISHED PATIENT with Rico Comer, ELVI  
75[address] [facility] [address] [place] [address]) Appt Note: 1 week f/u  
 [facility] [address] [address]  
1310 [facility] [address] [place] [address] Drive [address] [address]  
  
    
 10/3/2018  2:00 PM  
Follow Up with Sachi Curiel MD  
[facility] at [facility]) Appt Note: f/u tremors, jrb 18  
 200 [facility], 
300 [facility], [address] [address]  
[address], [address] [facility] [place], [address] [address] Upcoming Health Maintenance Date Due Hepatitis C Screening 1955 FOOT EXAM Q1 1965 EYE EXAM RETINAL OR DILATED Q1 1965 DTaP/Tdap/Td series (1 - Tdap) 1976 PAP AKA CERVICAL CYTOLOGY 1976 FOBT Q 1 YEAR AGE 50-75 2005 ZOSTER VACCINE AGE 60> 10/23/2015 Influenza Age 5 to Adult 2017 BREAST CANCER SCRN MAMMOGRAM 2017 HEMOGLOBIN A1C Q6M 2018 MEDICARE YEARLY EXAM 3/14/2018 MICROALBUMIN Q1 2018 LIPID PANEL Q1 2018 Allergies as of 3/28/2018  Review Complete On: 3/28/2018 By: Adrián Diop Severity Noted Reaction Type Reactions Latex  2010    Contact Dermatitis Dilaudid [Hydromorphone (Pf)]  10/29/2014   Systemic Other (comments) Feels like bugs are crawling all over her Lipitor [Atorvastatin]  2013    Other (comments) LIVER TROUBLE ON THIS MEDICATION Remeron [Mirtazapine]  08/15/2017    Other (comments) Tremors Sulfa (Sulfonamide Antibiotics)  12/13/2010    Itching Current Immunizations  Reviewed on 3/19/2015 Name Date Influenza Vaccine 9/1/2014, 9/1/2013 Pneumococcal Vaccine (Unspecified Type) 1/1/2010 Not reviewed this visit Vitals BP Pulse Height(growth percentile) Weight(growth percentile) BMI OB Status 122/77 90 5' 4\" (1.626 m) 203 lb (92.1 kg) 34.84 kg/m2 Menopause Smoking Status Current Every Day Smoker Vitals History BMI and BSA Data Body Mass Index Body Surface Area 34.84 kg/m 2 2.04 m 2 Preferred Pharmacy Pharmacy Name Phone [address], [address] [facility] [phone] Your Updated Medication List  
  
   
This list is accurate as of 3/28/18  2:38 PM.  Always use your most recent med list.  
  
  
  
  
 acetaminophen-codeine 300-30 mg per tablet Commonly known as:  TYLENOL #3 Take 1 Tab by mouth every eight (8) hours as needed. Max Daily Amount: 3 Tabs. ALPRAZolam 1 mg tablet Commonly known as:  Alvin Dirk Take 1 Tab by mouth three (3) times daily as needed for Anxiety. Max Daily Amount: 3 mg.  
  
 aspirin 81 mg chewable tablet Take 81 mg by mouth daily. benzonatate 200 mg capsule Commonly known as:  TESSALON Take 1 Cap by mouth two (2) times daily as needed. cetirizine 10 mg tablet Commonly known as:  ZYRTEC Take 1 Tab by mouth daily. dicyclomine 10 mg capsule Commonly known as:  BENTYL DUONEB 2.5 mg-0.5 mg/3 ml Nebu Generic drug:  albuterol-ipratropium 3 mL by Nebulization route every six (6) hours as needed for Wheezing. FLUoxetine 20 mg capsule Commonly known as:  PROzac Take 1 Cap by mouth daily. fluticasone-vilanterol 100-25 mcg/dose inhaler Commonly known as:  BREO ELLIPTA Take 1 Puff by inhalation daily. furosemide 40 mg tablet Commonly known as:  LASIX Take 1 Tab by mouth daily. gabapentin 600 mg tablet Commonly known as:  NEURONTIN  
600 mg two (2) times a day. glipiZIDE SR 5 mg CR tablet Commonly known as:  GLUCOTROL XL Take 5 mg by mouth two (2) times daily as needed (Patient monitors her BG levels and takes when she is also taking a steroid. ). guaiFENesin 1,200 mg Ta12 ER tablet Commonly known as:  Edinson & Edinson Take 1 Tab by mouth two (2) times a day. ibuprofen 800 mg tablet Commonly known as:  MOTRIN Take 1 Tab by mouth every eight (8) hours as needed. levothyroxine 200 mcg tablet Commonly known as:  SYNTHROID Take 200 mcg by mouth daily (before breakfast). LEVSIN/SL 0.125 mg SL tablet Generic drug:  hyoscyamine SL  
0.125 mg by SubLINGual route three (3) times daily as needed for Cramping. MAGOX 400 mg tablet Generic drug:  magnesium oxide TAKE ONE TABLET TWICE A DAY  
  
 methocarbamol 750 mg tablet Commonly known as:  ROBAXIN Take 750-1,500 mg by mouth four (4) times daily as needed. metoprolol tartrate 25 mg tablet Commonly known as:  LOPRESSOR Take 1 Tab by mouth two (2) times a day. montelukast 10 mg tablet Commonly known as:  SINGULAIR Take 10 mg by mouth daily. NexIUM 40 mg capsule Generic drug:  esomeprazole Take 40 mg by mouth daily. niacin  mg CR capsule Take 250 mg by mouth daily. nystatin 100,000 unit/mL suspension Commonly known as:  MYCOSTATIN Take 5 mL by mouth four (4) times daily. swish and spit PHENobarbital 60 mg tablet Commonly known as:  LUMINAL Take 1 Tab by mouth two (2) times a day. Max Daily Amount: 120 mg.  
  
 prazosin 2 mg capsule Commonly known as:  MINIPRESS  
TAKE 1 CAPSULE BY MOUTH TWICE A DAY. primidone 50 mg tablet Commonly known as: MYSOLINE  
1 po qam and 3 po qhs  
  
 promethazine 25 mg tablet Commonly known as:  PHENERGAN Take 1 Tab by mouth every six (6) hours as needed. VENTOLIN HFA 90 mcg/actuation inhaler Generic drug:  albuterol Take 2 Puffs by inhalation every four (4) hours as needed for Wheezing. vit B Cmplx 3-FA-Vit C-Biotin 1- mg-mg-mcg tablet Commonly known as:  NEPHRO SHREYA RX Take 1 Tab by mouth daily. Vytorin 10/40 10-40 mg per tablet Generic drug:  ezetimibe-simvastatin Take 1 tablet by mouth nightly. Introducing [facility] & [facility] SERVICES! [facility] introduces [employer] patient portal. Now you can access parts of your medical record, email your doctor's office, and request medication refills online. 1. In your internet browser, go to [email or web address] 2. Click on the First Time User? Click Here link in the Sign In box. You will see the New Member Sign Up page. 3. Enter your [employer] Access Code exactly as it appears below. You will not need to use this code after youve completed the sign-up process. If you do not sign up before the expiration date, you must request a new code. · [employer] Access Code: [email or web address] Expires: 6/4/2018  6:30 AM 
 
4. Enter the last four digits of your Social Security Number (xxxx) and Date of Birth (mm/dd/yyyy) as indicated and click Submit. You will be taken to the next sign-up page. 5. Create a [employer]Kings ID. This will be your [employer]Kings login ID and cannot be changed, so think of one that is secure and easy to remember. 6. Create a [employer] password. You can change your password at any time. 7. Enter your Password Reset Question and Answer. This can be used at a later time if you forget your password. 8. Enter your e-mail address. You will receive e-mail notification when new information is available in [address] 9. Click Sign Up. You can now view and download portions of your medical record. 10. Click the Download Summary menu link to download a portable copy of your medical information.  
 
If you have questions, please visit the Frequently Asked Questions section of the [employer]. Remember, [email or web address] is NOT to be used for urgent needs. For medical emergencies, dial 911. Now available from your iPhone and Android! Please provide this summary of care documentation to your next provider. Your primary care clinician is listed as Nicolas Sheehan. If you have any questions after today's visit, please call [phone].
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
## 2018-03-29 ENCOUNTER — OFFICE VISIT (OUTPATIENT)
Dept: BEHAVIORAL/MENTAL HEALTH CLINIC | Age: 63
End: 2018-03-29

## 2018-03-29 VITALS
HEART RATE: 110 BPM | HEIGHT: 64 IN | SYSTOLIC BLOOD PRESSURE: 135 MMHG | BODY MASS INDEX: 34.56 KG/M2 | WEIGHT: 202.4 LBS | DIASTOLIC BLOOD PRESSURE: 88 MMHG

## 2018-03-29 DIAGNOSIS — F43.10 PTSD (POST-TRAUMATIC STRESS DISORDER): Primary | ICD-10-CM

## 2018-03-29 DIAGNOSIS — F33.1 MODERATE EPISODE OF RECURRENT MAJOR DEPRESSIVE DISORDER (HCC): ICD-10-CM

## 2018-03-29 RX ORDER — DULOXETIN HYDROCHLORIDE 30 MG/1
30 CAPSULE, DELAYED RELEASE ORAL DAILY
Qty: 30 CAP | Refills: 1 | Status: SHIPPED | OUTPATIENT
Start: 2018-03-29 | End: 2018-05-21 | Stop reason: SDUPTHER

## 2018-03-29 NOTE — PROGRESS NOTES
CHIEF COMPLAINT:  Anel Avilez is a 58 y.o. female and was seen today for follow-up of psychiatric condition and psychotropic medication management. HPI:    Avery Whyte reports the following psychiatric symptoms:  depression and anxiety. The symptoms have been present for months and are of moderate/high severity. The symptoms occur daily. Pt reports she has been experiencing sig side effects and does not want to stay on the medication. Pt reports limited to no benefit from meds at this time. Pt here today to review current treatment plan. FAMILY/SOCIAL HX: no changes or updates    REVIEW OF SYSTEMS:  Psychiatric:  depression, anxiety  Appetite:no change from normal   Sleep: decreased more than normal and poor with DMS (maintaining sleep), sleeping approx 6 hours   Neuro: tremors, chronic pain    Visit Vitals    /88    Pulse (!) 110    Ht 5' 4\" (1.626 m)    Wt 91.8 kg (202 lb 6.4 oz)    BMI 34.74 kg/m2       Side Effects:  Nausea, tremors, increased BP, increased blood sugar    MENTAL STATUS EXAM:   Sensorium  oriented to time, place and person   Relations cooperative   Appearance:  age appropriate and casually dressed   Motor Behavior:  gait stable and within normal limits, moves slowly due to pain   Speech:  normal volume, gravely   Thought Process: goal directed   Thought Content free of delusions and free of hallucinations   Suicidal ideations no intention   Homicidal ideations no intention   Mood:  anxious and depressed   Affect:  anxious and depressed   Memory recent  adequate   Memory remote:  adequate   Concentration:  adequate   Abstraction:  abstract   Insight:  fair   Reliability fair   Judgment:  fair     MEDICAL DECISION MAKING:  Problems addressed today:    ICD-10-CM ICD-9-CM    1. PTSD (post-traumatic stress disorder) F43.10 309.81    2. Moderate episode of recurrent major depressive disorder (HCC) F33.1 296.32        Assessment:   Avery Whyte is not responding to treatment.  She is unable to tolerate SE's of prozac. Will stop medication at this time. Reviewed pharmacogenomic results and past trials. Will re-start cymbalta at this time as pt responded the best to cymbalta. Pt to stop risperidone (may be increasing her BS levels). Will re-assess in 6 weeks. Pt discussed frustration with recent PTSD exacerbation. Reviewed impact of hyperarousal on MH and physical well-being. Provided support and encouragement. Reinforced value of ind therapy and exercise/nature walks. Plan:   1. Current Outpatient Prescriptions   Medication Sig Dispense Refill    DULoxetine (CYMBALTA) 30 mg capsule Take 1 Cap by mouth daily. 30 Cap 1    prazosin (MINIPRESS) 2 mg capsule TAKE 1 CAPSULE BY MOUTH TWICE A DAY. 60 Cap 1    primidone (MYSOLINE) 50 mg tablet 1 po qam and 3 po qhs 120 Tab 11    acetaminophen-codeine (TYLENOL #3) 300-30 mg per tablet Take 1 Tab by mouth every eight (8) hours as needed. Max Daily Amount: 3 Tabs. 5 Tab 0    ALPRAZolam (XANAX) 1 mg tablet Take 1 Tab by mouth three (3) times daily as needed for Anxiety. Max Daily Amount: 3 mg. 3 Tab 0    cetirizine (ZYRTEC) 10 mg tablet Take 1 Tab by mouth daily. 10 Tab 0    promethazine (PHENERGAN) 25 mg tablet Take 1 Tab by mouth every six (6) hours as needed. 4 Tab 0    ibuprofen (MOTRIN) 800 mg tablet Take 1 Tab by mouth every eight (8) hours as needed. 20 Tab 0    guaiFENesin ER (MUCINEX) 1,200 mg Ta12 ER tablet Take 1 Tab by mouth two (2) times a day. 14 Tab 0    fluticasone-vilanterol (BREO ELLIPTA) 100-25 mcg/dose inhaler Take 1 Puff by inhalation daily. 1 Inhaler 0    furosemide (LASIX) 40 mg tablet Take 1 Tab by mouth daily. 20 Tab 0    PHENobarbital (LUMINAL) 60 mg tablet Take 1 Tab by mouth two (2) times a day. Max Daily Amount: 120 mg. 60 Tab 1    albuterol (VENTOLIN HFA) 90 mcg/actuation inhaler Take 2 Puffs by inhalation every four (4) hours as needed for Wheezing.       gabapentin (NEURONTIN) 600 mg tablet 600 mg two (2) times a day.      dicyclomine (BENTYL) 10 mg capsule       glipiZIDE SR (GLUCOTROL) 5 mg CR tablet Take 5 mg by mouth two (2) times daily as needed (Patient monitors her BG levels and takes when she is also taking a steroid. ).  montelukast (SINGULAIR) 10 mg tablet Take 10 mg by mouth daily.  ezetimibe-simvastatin (VYTORIN 10/40) 10-40 mg per tablet Take 1 tablet by mouth nightly.  metoprolol (LOPRESSOR) 25 mg tablet Take 1 Tab by mouth two (2) times a day. 60 Tab 6    benzonatate (TESSALON) 200 mg capsule Take 1 Cap by mouth two (2) times daily as needed. 30 Cap 0    nystatin (MYCOSTATIN) 100,000 unit/mL suspension Take 5 mL by mouth four (4) times daily. swish and spit (Patient taking differently: Take 500,000 Units by mouth four (4) times daily as needed. swish and spit) 180 mL 0    methocarbamol (ROBAXIN) 750 mg tablet Take 750-1,500 mg by mouth four (4) times daily as needed.  niacin  mg CR capsule Take 250 mg by mouth daily.  albuterol-ipratropium (DUONEB) 2.5 mg-0.5 mg/3 ml nebulizer solution 3 mL by Nebulization route every six (6) hours as needed for Wheezing.  hyoscyamine SL (LEVSIN/SL) 0.125 mg SL tablet 0.125 mg by SubLINGual route three (3) times daily as needed for Cramping.  esomeprazole (NEXIUM) 40 mg capsule Take 40 mg by mouth daily.  MAGOX 400 mg tablet TAKE ONE TABLET TWICE A DAY 60 Tab 3    vit B Cmplx 3-FA-Vit C-Biotin (NEPHRO SHREYA RX) 1- mg-mg-mcg tablet Take 1 Tab by mouth daily.  aspirin 81 mg chewable tablet Take 81 mg by mouth daily.  levothyroxine (SYNTHROID) 200 mcg tablet Take 200 mcg by mouth daily (before breakfast). medication changes made today: dc prozac, start cymbalta 30 mg for dep/anx, cont prazosin 2 mg bid    2. Counseling and coordination of care including instructions for treatment, risks/benefits, risk factor reduction and patient/family education. She agrees with the plan.  Patient instructed to call with any side effects, questions or issues. 3.  Follow-up Disposition:  Return in about 6 weeks (around 5/10/2018).      4. Ind therapy    3/29/2018  Xavi Green NP

## 2018-03-29 NOTE — MR AVS SNAPSHOT
[address] [facility]. 
[phone] Patient: Dagoberto Price MRN: [id number] :1955 Visit Information Date & Time Provider Department Dept. Phone Encounter #  
 3/29/2018  2:30 PM Kain Sanders NP [facility] [facility] [phone] [id number] Follow-up Instructions Return in about 6 weeks (around 5/10/2018). Your Appointments 5/10/2018  1:30 PM  
ESTABLISHED PATIENT with Kain Sanders NP  
[address]) Appt Note: 6 WEEK F/U  
 [facility] [address] [phone]  
[address] [facility] [address] [address]  
  
    
 10/3/2018  2:00 PM  
Follow Up with Alonso Harley MD  
[facility] [address] [place] [address]) Appt Note: f/u tremors, jrb 18  
 [address] [facility] [place], 
[address] [place] [address], [address] [phone]  
[address] [facility], [address] [place] [address], [address] [facility] [address] [address] Upcoming Health Maintenance Date Due Hepatitis C Screening 1955 FOOT EXAM Q1 1965 EYE EXAM RETINAL OR DILATED Q1 1965 DTaP/Tdap/Td series (1 - Tdap) 1976 PAP AKA CERVICAL CYTOLOGY 1976 FOBT Q 1 YEAR AGE 50-75 2005 ZOSTER VACCINE AGE 60> 10/23/2015 Influenza Age 5 to Adult 2017 BREAST CANCER SCRN MAMMOGRAM 2017 HEMOGLOBIN A1C Q6M 2018 MEDICARE YEARLY EXAM 3/14/2018 MICROALBUMIN Q1 2018 LIPID PANEL Q1 2018 Allergies as of 3/29/2018  Review Complete On: 3/29/2018 By: Kain Sanders NP Severity Noted Reaction Type Reactions Latex  2010    Contact Dermatitis Dilaudid [Hydromorphone (Pf)]  10/29/2014   Systemic Other (comments) Feels like bugs are crawling all over her Lipitor [Atorvastatin]  2013    Other (comments) LIVER TROUBLE ON THIS MEDICATION Remeron [Mirtazapine]  08/15/2017    Other (comments) Tremors Sulfa (Sulfonamide Antibiotics)  12/13/2010    Itching Current Immunizations  Reviewed on 3/19/2015 Name Date Influenza Vaccine 9/1/2014, 9/1/2013 Pneumococcal Vaccine (Unspecified Type) 1/1/2010 Not reviewed this visit You Were Diagnosed With   
  
 Codes Comments PTSD (post-traumatic stress disorder)    -  Primary ICD-10-CM: F43.10 ICD-9-CM: 309.81 Moderate episode of recurrent major depressive disorder (HCC)     ICD-10-CM: F33.1 ICD-9-CM: 296.32 Vitals BP Pulse Height(growth percentile) Weight(growth percentile) BMI OB Status 135/88 (!) 110 5' 4\" (1.626 m) 202 lb 6.4 oz (91.8 kg) 34.74 kg/m2 Menopause Smoking Status Current Every Day Smoker BMI and BSA Data Body Mass Index Body Surface Area 34.74 kg/m 2 2.04 m 2 Preferred Pharmacy Pharmacy Name Phone [address], [address] [place] [address] [facility] [phone] Your Updated Medication List  
  
   
This list is accurate as of 3/29/18  3:09 PM.  Always use your most recent med list.  
  
  
  
  
 acetaminophen-codeine 300-30 mg per tablet Commonly known as:  TYLENOL #3 Take 1 Tab by mouth every eight (8) hours as needed. Max Daily Amount: 3 Tabs. ALPRAZolam 1 mg tablet Commonly known as:  Ana Budge Take 1 Tab by mouth three (3) times daily as needed for Anxiety. Max Daily Amount: 3 mg.  
  
 aspirin 81 mg chewable tablet Take 81 mg by mouth daily. benzonatate 200 mg capsule Commonly known as:  TESSALON Take 1 Cap by mouth two (2) times daily as needed. cetirizine 10 mg tablet Commonly known as:  ZYRTEC Take 1 Tab by mouth daily. dicyclomine 10 mg capsule Commonly known as:  BENTYL DULoxetine 30 mg capsule Commonly known as:  CYMBALTA Take 1 Cap by mouth daily. DUONEB 2.5 mg-0.5 mg/3 ml Nebu Generic drug:  albuterol-ipratropium 3 mL by Nebulization route every six (6) hours as needed for Wheezing. fluticasone-vilanterol 100-25 mcg/dose inhaler Commonly known as:  BREO ELLIPTA Take 1 Puff by inhalation daily. furosemide 40 mg tablet Commonly known as:  LASIX Take 1 Tab by mouth daily. gabapentin 600 mg tablet Commonly known as:  NEURONTIN  
600 mg two (2) times a day. glipiZIDE SR 5 mg CR tablet Commonly known as:  GLUCOTROL XL Take 5 mg by mouth two (2) times daily as needed (Patient monitors her BG levels and takes when she is also taking a steroid. ). guaiFENesin 1,200 mg Ta12 ER tablet Commonly known as:  Edinson & Edinson Take 1 Tab by mouth two (2) times a day. ibuprofen 800 mg tablet Commonly known as:  MOTRIN Take 1 Tab by mouth every eight (8) hours as needed. levothyroxine 200 mcg tablet Commonly known as:  SYNTHROID Take 200 mcg by mouth daily (before breakfast). LEVSIN/SL 0.125 mg SL tablet Generic drug:  hyoscyamine SL  
0.125 mg by SubLINGual route three (3) times daily as needed for Cramping. MAGOX 400 mg tablet Generic drug:  magnesium oxide TAKE ONE TABLET TWICE A DAY  
  
 methocarbamol 750 mg tablet Commonly known as:  ROBAXIN Take 750-1,500 mg by mouth four (4) times daily as needed. metoprolol tartrate 25 mg tablet Commonly known as:  LOPRESSOR Take 1 Tab by mouth two (2) times a day. montelukast 10 mg tablet Commonly known as:  SINGULAIR Take 10 mg by mouth daily. NexIUM 40 mg capsule Generic drug:  esomeprazole Take 40 mg by mouth daily. niacin  mg CR capsule Take 250 mg by mouth daily. nystatin 100,000 unit/mL suspension Commonly known as:  MYCOSTATIN Take 5 mL by mouth four (4) times daily. swish and spit PHENobarbital 60 mg tablet Commonly known as:  LUMINAL  
 Take 1 Tab by mouth two (2) times a day. Max Daily Amount: 120 mg.  
  
 prazosin 2 mg capsule Commonly known as:  MINIPRESS  
TAKE 1 CAPSULE BY MOUTH TWICE A DAY. primidone 50 mg tablet Commonly known as: MYSOLINE  
1 po qam and 3 po qhs  
  
 promethazine 25 mg tablet Commonly known as:  PHENERGAN Take 1 Tab by mouth every six (6) hours as needed. VENTOLIN HFA 90 mcg/actuation inhaler Generic drug:  albuterol Take 2 Puffs by inhalation every four (4) hours as needed for Wheezing. vit B Cmplx 3-FA-Vit C-Biotin 1- mg-mg-mcg tablet Commonly known as:  NEPHRO SHREYA RX Take 1 Tab by mouth daily. Vytorin 10/40 10-40 mg per tablet Generic drug:  ezetimibe-simvastatin Take 1 tablet by mouth nightly. Prescriptions Sent to Pharmacy Refills DULoxetine (CYMBALTA) 30 mg capsule 1 Sig: Take 1 Cap by mouth daily. Class: Normal  
 Pharmacy: [address] [place] [address] Ph #: [phone] Route: Oral  
  
Follow-up Instructions Return in about 6 weeks (around 5/10/2018). Introducing [facility]! Rickey Burris introduces [employer] patient portal. Now you can access parts of your medical record, email your doctor's office, and request medication refills online. 1. In your internet browser, go to [email or web address] 2. Click on the First Time User? Click Here link in the Sign In box. You will see the New Member Sign Up page. 3. Enter your [employer] Access Code exactly as it appears below. You will not need to use this code after youve completed the sign-up process. If you do not sign up before the expiration date, you must request a new code. · [employer] Access Code: [email or web address] Expires: 6/4/2018  6:30 AM 
 
4. Enter the last four digits of your Social Security Number (xxxx) and Date of Birth (mm/dd/yyyy) as indicated and click Submit.  You will be taken to the next sign-up page. 5. Create a [employer] ID. This will be your [employer] login ID and cannot be changed, so think of one that is secure and easy to remember. 6. Create a [employer] password. You can change your password at any time. 7. Enter your Password Reset Question and Answer. This can be used at a later time if you forget your password. 8. Enter your e-mail address. You will receive e-mail notification when new information is available in [address] 9. Click Sign Up. You can now view and download portions of your medical record. 10. Click the Download Summary menu link to download a portable copy of your medical information. If you have questions, please visit the Frequently Asked Questions section of the [employer] website. Remember, [employer] is NOT to be used for urgent needs. For medical emergencies, dial 911. Now available from your iPhone and Android! Please provide this summary of care documentation to your next provider. Your primary care clinician is listed as Kelli Holbrook. If you have any questions after today's visit, please call [phone].

## 2018-04-12 ENCOUNTER — OFFICE VISIT (OUTPATIENT)
Dept: BEHAVIORAL/MENTAL HEALTH CLINIC | Age: 63
End: 2018-04-12

## 2018-04-12 DIAGNOSIS — F33.1 MODERATE EPISODE OF RECURRENT MAJOR DEPRESSIVE DISORDER (HCC): Primary | ICD-10-CM

## 2018-04-12 DIAGNOSIS — M79.7 FIBROMYALGIA: ICD-10-CM

## 2018-04-12 DIAGNOSIS — F43.10 PTSD (POST-TRAUMATIC STRESS DISORDER): ICD-10-CM
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
## 2018-04-19 ENCOUNTER — OFFICE VISIT (OUTPATIENT)
Dept: BEHAVIORAL/MENTAL HEALTH CLINIC | Age: 63
End: 2018-04-19

## 2018-04-19 DIAGNOSIS — F33.1 MODERATE EPISODE OF RECURRENT MAJOR DEPRESSIVE DISORDER (HCC): Primary | ICD-10-CM

## 2018-04-19 DIAGNOSIS — F43.10 PTSD (POST-TRAUMATIC STRESS DISORDER): ICD-10-CM

## 2018-04-19 DIAGNOSIS — M79.7 FIBROMYALGIA: ICD-10-CM
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
## 2018-04-19 NOTE — MR AVS SNAPSHOT
[address] 
 
 
 [address] [facility] [address] [facility] [address] 
[phone] Patient: Baron Velasco MRN: [id number] :1955 Visit Information Date & Time Provider Department Dept. Phone Encounter #  
 2018  2:30 PM Natacha Avila, 1007 [facility] [phone] [id number] Your Appointments 5/10/2018  1:30 PM  
ESTABLISHED PATIENT with Crispin Mars, NP  
7501 [facility] [address] [place] [address]) Appt Note: 6 WEEK F/U  
 [address] [facility] [address] [address]  
1310 [facility] [id number] [place] [address] Drive [address] [address]  
  
    
 10/3/2018  2:00 PM  
Follow Up with Severo Frederic, MD  
Neurology Clinic at [facility] [address]) Appt Note: f/u tremors, jrb 18  
 [address], 
[address], [address] [address]  
[address], [address], [address] [address] Upcoming Health Maintenance Date Due Hepatitis C Screening 1955 FOOT EXAM Q1 1965 EYE EXAM RETINAL OR DILATED Q1 1965 DTaP/Tdap/Td series (1 - Tdap) 1976 PAP AKA CERVICAL CYTOLOGY 1976 FOBT Q 1 YEAR AGE 50-75 2005 ZOSTER VACCINE AGE 60> 10/23/2015 Influenza Age 5 to Adult 2017 BREAST CANCER SCRN MAMMOGRAM 2017 HEMOGLOBIN A1C Q6M 2018 MEDICARE YEARLY EXAM 3/14/2018 MICROALBUMIN Q1 2018 LIPID PANEL Q1 2018 Allergies as of 2018  Review Complete On: 2018 By: Maciej Martin Severity Noted Reaction Type Reactions Latex  2010    Contact Dermatitis Dilaudid [Hydromorphone (Pf)]  10/29/2014   Systemic Other (comments) Feels like bugs are crawling all over her Lipitor [Atorvastatin]  2013    Other (comments)  LIVER TROUBLE ON THIS MEDICATION  
 Remeron [Mirtazapine]  08/15/2017    Other (comments) Tremors Sulfa (Sulfonamide Antibiotics)  12/13/2010    Itching Current Immunizations  Reviewed on 3/19/2015 Name Date Influenza Vaccine 9/1/2014, 9/1/2013 Pneumococcal Vaccine (Unspecified Type) 1/1/2010 Not reviewed this visit Vitals OB Status Smoking Status Menopause Current Every Day Smoker Your Updated Medication List  
  
   
This list is accurate as of 4/19/18  3:29 PM.  Always use your most recent med list.  
  
  
  
  
 acetaminophen-codeine 300-30 mg per tablet Commonly known as:  TYLENOL #3 Take 1 Tab by mouth every eight (8) hours as needed. Max Daily Amount: 3 Tabs. ALPRAZolam 1 mg tablet Commonly known as:  Darus Breeding Take 1 Tab by mouth three (3) times daily as needed for Anxiety. Max Daily Amount: 3 mg.  
  
 aspirin 81 mg chewable tablet Take 81 mg by mouth daily. benzonatate 200 mg capsule Commonly known as:  TESSALON Take 1 Cap by mouth two (2) times daily as needed. cetirizine 10 mg tablet Commonly known as:  ZYRTEC Take 1 Tab by mouth daily. dicyclomine 10 mg capsule Commonly known as:  BENTYL DULoxetine 30 mg capsule Commonly known as:  CYMBALTA Take 1 Cap by mouth daily. DUONEB 2.5 mg-0.5 mg/3 ml Nebu Generic drug:  albuterol-ipratropium 3 mL by Nebulization route every six (6) hours as needed for Wheezing. fluticasone-vilanterol 100-25 mcg/dose inhaler Commonly known as:  BREO ELLIPTA Take 1 Puff by inhalation daily. furosemide 40 mg tablet Commonly known as:  LASIX Take 1 Tab by mouth daily. gabapentin 600 mg tablet Commonly known as:  NEURONTIN  
600 mg two (2) times a day. glipiZIDE SR 5 mg CR tablet Commonly known as:  GLUCOTROL XL Take 5 mg by mouth two (2) times daily as needed (Patient monitors her BG levels and takes when she is also taking a steroid. ). guaiFENesin 1,200 mg Ta12 ER tablet Commonly known as:  Edinson & Edinson Take 1 Tab by mouth two (2) times a day. ibuprofen 800 mg tablet Commonly known as:  MOTRIN Take 1 Tab by mouth every eight (8) hours as needed. levothyroxine 200 mcg tablet Commonly known as:  SYNTHROID Take 200 mcg by mouth daily (before breakfast). LEVSIN/SL 0.125 mg SL tablet Generic drug:  hyoscyamine SL  
0.125 mg by SubLINGual route three (3) times daily as needed for Cramping. MAGOX 400 mg tablet Generic drug:  magnesium oxide TAKE ONE TABLET TWICE A DAY  
  
 methocarbamol 750 mg tablet Commonly known as:  ROBAXIN Take 750-1,500 mg by mouth four (4) times daily as needed. metoprolol tartrate 25 mg tablet Commonly known as:  LOPRESSOR Take 1 Tab by mouth two (2) times a day. montelukast 10 mg tablet Commonly known as:  SINGULAIR Take 10 mg by mouth daily. NexIUM 40 mg capsule Generic drug:  esomeprazole Take 40 mg by mouth daily. niacin  mg CR capsule Take 250 mg by mouth daily. nystatin 100,000 unit/mL suspension Commonly known as:  MYCOSTATIN Take 5 mL by mouth four (4) times daily. swish and spit PHENobarbital 60 mg tablet Commonly known as:  LUMINAL Take 1 Tab by mouth two (2) times a day. Max Daily Amount: 120 mg.  
  
 prazosin 2 mg capsule Commonly known as:  MINIPRESS  
TAKE 1 CAPSULE BY MOUTH TWICE A DAY. primidone 50 mg tablet Commonly known as: MYSOLINE  
1 po qam and 3 po qhs  
  
 promethazine 25 mg tablet Commonly known as:  PHENERGAN Take 1 Tab by mouth every six (6) hours as needed. VENTOLIN HFA 90 mcg/actuation inhaler Generic drug:  albuterol Take 2 Puffs by inhalation every four (4) hours as needed for Wheezing. vit B Cmplx 3-FA-Vit C-Biotin 1- mg-mg-mcg tablet Commonly known as:  NEPHRO SHREYA RX Take 1 Tab by mouth daily. Vytorin 10/40 10-40 mg per tablet Generic drug:  ezetimibe-simvastatin Take 1 tablet by mouth nightly. Introducing [facility]! [facility] introduces Codota patient portal. Now you can access parts of your medical record, email your doctor's office, and request medication refills online. 1. In your internet browser, go to [email or web address] 2. Click on the First Time User? Click Here link in the Sign In box. You will see the New Member Sign Up page. 3. Enter your Codota Access Code exactly as it appears below. You will not need to use this code after youve completed the sign-up process. If you do not sign up before the expiration date, you must request a new code. · Codota Access Code: [email or web address] Expires: 6/4/2018  6:30 AM 
 
4. Enter the last four digits of your Social Security Number (xxxx) and Date of Birth (mm/dd/yyyy) as indicated and click Submit. You will be taken to the next sign-up page. 5. Create a Codota ID. This will be your Codota login ID and cannot be changed, so think of one that is secure and easy to remember. 6. Create a Codota password. You can change your password at any time. 7. Enter your Password Reset Question and Answer. This can be used at a later time if you forget your password. 8. Enter your e-mail address. You will receive e-mail notification when new information is available in [address] 9. Click Sign Up. You can now view and download portions of your medical record. 10. Click the Download Summary menu link to download a portable copy of your medical information. If you have questions, please visit the Frequently Asked Questions section of the Codota website. Remember, Codota is NOT to be used for urgent needs. For medical emergencies, dial 911. Now available from your iPhone and Android! Please provide this summary of care documentation to your next provider. Your primary care clinician is listed as Karlee Doan. If you have any questions after today's visit, please call [phone].

## 2018-05-03 ENCOUNTER — OFFICE VISIT (OUTPATIENT)
Dept: BEHAVIORAL/MENTAL HEALTH CLINIC | Age: 63
End: 2018-05-03

## 2018-05-03 VITALS
DIASTOLIC BLOOD PRESSURE: 81 MMHG | OXYGEN SATURATION: 93 % | HEIGHT: 64 IN | SYSTOLIC BLOOD PRESSURE: 124 MMHG | BODY MASS INDEX: 35.17 KG/M2 | HEART RATE: 109 BPM | WEIGHT: 206 LBS

## 2018-05-03 DIAGNOSIS — F33.1 MODERATE EPISODE OF RECURRENT MAJOR DEPRESSIVE DISORDER (HCC): ICD-10-CM

## 2018-05-03 DIAGNOSIS — M79.7 FIBROMYALGIA: ICD-10-CM

## 2018-05-03 DIAGNOSIS — F43.10 PTSD (POST-TRAUMATIC STRESS DISORDER): Primary | ICD-10-CM

## 2018-05-03 NOTE — MR AVS SNAPSHOT
[address] [facility] [place] 
 
 
 [address] [facility] [address] [place] [address] 
[phone] Patient: Myra Perez MRN: [id number] :1955 Visit Information Date & Time Provider Department Dept. Phone Encounter #  
 5/3/2018  2:30 PM [facility] [id number] [phone] [id number] Your Appointments 5/10/2018  1:30 PM  
ESTABLISHED PATIENT with Favio Lin NP  
7501 [facility] Appt Note: 6 WEEK F/U  
 [address] [phone]  
1310 [facility] [address] [address]  
  
    
 10/3/2018  2:00 PM  
Follow Up with Blaire Roberts MD  
Neurology Clinic at [facility]) Appt Note: f/u tremors, jrb 18  
 [address] [facility], 
[address] [facility], [address] [address]  
[address], [address], [address] [address] Upcoming Health Maintenance Date Due Hepatitis C Screening 1955 FOOT EXAM Q1 1965 EYE EXAM RETINAL OR DILATED Q1 1965 DTaP/Tdap/Td series (1 - Tdap) 1976 PAP AKA CERVICAL CYTOLOGY 1976 FOBT Q 1 YEAR AGE 50-75 2005 ZOSTER VACCINE AGE 60> 10/23/2015 BREAST CANCER SCRN MAMMOGRAM 2017 HEMOGLOBIN A1C Q6M 2018 MEDICARE YEARLY EXAM 3/14/2018 Influenza Age 5 to Adult 2018 MICROALBUMIN Q1 2018 LIPID PANEL Q1 2018 Allergies as of 5/3/2018  Review Complete On: 5/3/2018 By: Lenoria Holstein Severity Noted Reaction Type Reactions Latex  2010    Contact Dermatitis Dilaudid [Hydromorphone (Pf)]  10/29/2014   Systemic Other (comments) Feels like bugs are crawling all over her Lipitor [Atorvastatin]  2013    Other (comments) LIVER TROUBLE ON THIS MEDICATION Remeron [Mirtazapine]  08/15/2017    Other (comments) Tremors Sulfa (Sulfonamide Antibiotics)  12/13/2010    Itching Current Immunizations  Reviewed on 3/19/2015 Name Date Influenza Vaccine 9/1/2014, 9/1/2013 Pneumococcal Vaccine (Unspecified Type) 1/1/2010 Not reviewed this visit Vitals BP Pulse Height(growth percentile) Weight(growth percentile) SpO2 BMI  
 124/81 (!) 109 5' 4\" (1.626 m) 206 lb (93.4 kg) 93% 35.36 kg/m2 OB Status Smoking Status Menopause Current Every Day Smoker Vitals History BMI and BSA Data Body Mass Index Body Surface Area  
 35.36 kg/m 2 2.05 m 2 Preferred Pharmacy Pharmacy Name Phone [address], [address] [facility] [address] [phone] Your Updated Medication List  
  
   
This list is accurate as of 5/3/18  3:22 PM.  Always use your most recent med list.  
  
  
  
  
 acetaminophen-codeine 300-30 mg per tablet Commonly known as:  TYLENOL #3 Take 1 Tab by mouth every eight (8) hours as needed. Max Daily Amount: 3 Tabs. ALPRAZolam 1 mg tablet Commonly known as:  Engel Kirk Take 1 Tab by mouth three (3) times daily as needed for Anxiety. Max Daily Amount: 3 mg.  
  
 aspirin 81 mg chewable tablet Take 81 mg by mouth daily. benzonatate 200 mg capsule Commonly known as:  TESSALON Take 1 Cap by mouth two (2) times daily as needed. cetirizine 10 mg tablet Commonly known as:  ZYRTEC Take 1 Tab by mouth daily. dicyclomine 10 mg capsule Commonly known as:  BENTYL DULoxetine 30 mg capsule Commonly known as:  CYMBALTA Take 1 Cap by mouth daily. DUONEB 2.5 mg-0.5 mg/3 ml Nebu Generic drug:  albuterol-ipratropium 3 mL by Nebulization route every six (6) hours as needed for Wheezing. fluticasone-vilanterol 100-25 mcg/dose inhaler Commonly known as:  BREO ELLIPTA Take 1 Puff by inhalation daily. furosemide 40 mg tablet Commonly known as:  LASIX Take 1 Tab by mouth daily. gabapentin 600 mg tablet Commonly known as:  NEURONTIN  
600 mg two (2) times a day. glipiZIDE SR 5 mg CR tablet Commonly known as:  GLUCOTROL XL Take 5 mg by mouth two (2) times daily as needed (Patient monitors her BG levels and takes when she is also taking a steroid. ). guaiFENesin 1,200 mg Ta12 ER tablet Commonly known as:  [place] [address] Take 1 Tab by mouth two (2) times a day. ibuprofen 800 mg tablet Commonly known as:  MOTRIN Take 1 Tab by mouth every eight (8) hours as needed. levothyroxine 200 mcg tablet Commonly known as:  SYNTHROID Take 200 mcg by mouth daily (before breakfast). LEVSIN/SL 0.125 mg SL tablet Generic drug:  hyoscyamine SL  
0.125 mg by SubLINGual route three (3) times daily as needed for Cramping. MAGOX 400 mg tablet Generic drug:  magnesium oxide TAKE ONE TABLET TWICE A DAY  
  
 methocarbamol 750 mg tablet Commonly known as:  ROBAXIN Take 750-1,500 mg by mouth four (4) times daily as needed. metoprolol tartrate 25 mg tablet Commonly known as:  LOPRESSOR Take 1 Tab by mouth two (2) times a day. montelukast 10 mg tablet Commonly known as:  SINGULAIR Take 10 mg by mouth daily. NexIUM 40 mg capsule Generic drug:  esomeprazole Take 40 mg by mouth daily. niacin  mg CR capsule Take 250 mg by mouth daily. nystatin 100,000 unit/mL suspension Commonly known as:  MYCOSTATIN Take 5 mL by mouth four (4) times daily. swish and spit PHENobarbital 60 mg tablet Commonly known as:  LUMINAL Take 1 Tab by mouth two (2) times a day. Max Daily Amount: 120 mg.  
  
 prazosin 2 mg capsule Commonly known as:  MINIPRESS  
TAKE 1 CAPSULE BY MOUTH TWICE A DAY. primidone 50 mg tablet Commonly known as: MYSOLINE  
1 po qam and 3 po qhs  
  
 promethazine 25 mg tablet Commonly known as:  PHENERGAN Take 1 Tab by mouth every six (6) hours as needed. VENTOLIN HFA 90 mcg/actuation inhaler Generic drug:  albuterol Take 2 Puffs by inhalation every four (4) hours as needed for Wheezing. vit B Cmplx 3-FA-Vit C-Biotin 1- mg-mg-mcg tablet Commonly known as:  NEPHRO SHREYA RX Take 1 Tab by mouth daily. Vytorin 10/40 10-40 mg per tablet Generic drug:  ezetimibe-simvastatin Take 1 tablet by mouth nightly. Introducing [facility]! [facility] introduces [employer] Apps patient portal. Now you can access parts of your medical record, email your doctor's office, and request medication refills online. 1. In your internet browser, go to [email or web address] 2. Click on the First Time User? Click Here link in the Sign In box. You will see the New Member Sign Up page. 3. Enter your Popps Apps Access Code exactly as it appears below. You will not need to use this code after youve completed the sign-up process. If you do not sign up before the expiration date, you must request a new code. · Po[employer] Apps Access Code: [email or web address] Expires: 6/4/2018  6:30 AM 
 
4. Enter the last four digits of your Social Security Number (xxxx) and Date of Birth (mm/dd/yyyy) as indicated and click Submit. You will be taken to the next sign-up page. 5. Create a Popps Apps ID. This will be your Popps Apps login ID and cannot be changed, so think of one that is secure and easy to remember. 6. Create a Popps Apps password. You can change your password at any time. 7. Enter your Password Reset Question and Answer. This can be used at a later time if you forget your password. 8. Enter your e-mail address. You will receive e-mail notification when new information is available in [address] 9. Click Sign Up. You can now view and download portions of your medical record. 10. Click the Download Summary menu link to download a portable copy of your medical information.  
 
If you have questions, please visit the Frequently Asked Questions section of the [employer]. Remember, [employer] Your Campaign[employer] is NOT to be used for urgent needs. For medical emergencies, dial 911. Now available from your iPhone and Android! Please provide this summary of care documentation to your next provider. Your primary care clinician is listed as Josephine Garrison. If you have any questions after today's visit, please call [phone].
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
## 2018-05-10 ENCOUNTER — OFFICE VISIT (OUTPATIENT)
Dept: BEHAVIORAL/MENTAL HEALTH CLINIC | Age: 63
End: 2018-05-10

## 2018-05-10 VITALS
DIASTOLIC BLOOD PRESSURE: 66 MMHG | WEIGHT: 206 LBS | HEIGHT: 64 IN | HEART RATE: 112 BPM | BODY MASS INDEX: 35.17 KG/M2 | SYSTOLIC BLOOD PRESSURE: 109 MMHG

## 2018-05-10 DIAGNOSIS — F33.1 MODERATE EPISODE OF RECURRENT MAJOR DEPRESSIVE DISORDER (HCC): ICD-10-CM

## 2018-05-10 DIAGNOSIS — F43.10 PTSD (POST-TRAUMATIC STRESS DISORDER): Primary | ICD-10-CM

## 2018-05-10 NOTE — MR AVS SNAPSHOT
[address] [facility] 
[phone] Patient: Randolph Ngo MRN: [id number] :1955 Visit Information Date & Time Provider Department Dept. Phone Encounter #  
 5/10/2018  1:30 PM Radha Almonte NP [facility] [phone] [id number] Follow-up Instructions Return in about 6 weeks (around 2018). Your Appointments 2018  2:30 PM  
COUNSELING with Francisco Alicea LCSW [facility] Appt Note: Counseling [address] [facility] [address] [place] [address]  
[phone]  
  
   
 [address] [place] [address]  
  
    
 10/3/2018  2:00 PM  
Follow Up with Nba Wolf MD  
[facility]) Appt Note: f/u tremors, jrb 18  
 [address] [facility], 
[address] [place] [address], [address] [phone]  
[address] [facility], [address] [place] [address], [address] [place] [address] [address] [phone] Upcoming Health Maintenance Date Due Hepatitis C Screening 1955 FOOT EXAM Q1 1965 EYE EXAM RETINAL OR DILATED Q1 1965 DTaP/Tdap/Td series (1 - Tdap) 1976 PAP AKA CERVICAL CYTOLOGY 1976 FOBT Q 1 YEAR AGE 50-75 2005 ZOSTER VACCINE AGE 60> 10/23/2015 BREAST CANCER SCRN MAMMOGRAM 2017 HEMOGLOBIN A1C Q6M 2018 MEDICARE YEARLY EXAM 3/14/2018 Influenza Age 5 to Adult 2018 MICROALBUMIN Q1 2018 LIPID PANEL Q1 2018 Allergies as of 5/10/2018  Review Complete On: 5/10/2018 By: Radha Almonte NP Severity Noted Reaction Type Reactions Latex  2010    Contact Dermatitis Dilaudid [Hydromorphone (Pf)]  10/29/2014   Systemic Other (comments) Feels like bugs are crawling all over her Lipitor [Atorvastatin]  2013    Other (comments) LIVER TROUBLE ON THIS MEDICATION Remeron [Mirtazapine]  08/15/2017    Other (comments) Tremors Sulfa (Sulfonamide Antibiotics)  12/13/2010    Itching Current Immunizations  Reviewed on 3/19/2015 Name Date Influenza Vaccine 9/1/2014, 9/1/2013 Pneumococcal Vaccine (Unspecified Type) 1/1/2010 Not reviewed this visit You Were Diagnosed With   
  
 Codes Comments PTSD (post-traumatic stress disorder)    -  Primary ICD-10-CM: F43.10 ICD-9-CM: 309.81 Moderate episode of recurrent major depressive disorder (HCC)     ICD-10-CM: F33.1 ICD-9-CM: 296.32 Vitals BP Pulse Height(growth percentile) Weight(growth percentile) BMI OB Status 109/66 (!) 112 5' 4\" (1.626 m) 206 lb (93.4 kg) 35.36 kg/m2 Menopause Smoking Status Current Every Day Smoker BMI and BSA Data Body Mass Index Body Surface Area  
 35.36 kg/m 2 2.05 m 2 Preferred Pharmacy Pharmacy Name Phone [address] [facility], [address] [place] [address] [phone] Your Updated Medication List  
  
   
This list is accurate as of 5/10/18  1:57 PM.  Always use your most recent med list.  
  
  
  
  
 acetaminophen-codeine 300-30 mg per tablet Commonly known as:  TYLENOL #3 Take 1 Tab by mouth every eight (8) hours as needed. Max Daily Amount: 3 Tabs. ALPRAZolam 1 mg tablet Commonly known as:  Will Steven Take 1 Tab by mouth three (3) times daily as needed for Anxiety. Max Daily Amount: 3 mg.  
  
 aspirin 81 mg chewable tablet Take 81 mg by mouth daily. benzonatate 200 mg capsule Commonly known as:  TESSALON Take 1 Cap by mouth two (2) times daily as needed. cetirizine 10 mg tablet Commonly known as:  ZYRTEC Take 1 Tab by mouth daily. dicyclomine 10 mg capsule Commonly known as:  BENTYL DULoxetine 30 mg capsule Commonly known as:  CYMBALTA Take 1 Cap by mouth daily. DUONEB 2.5 mg-0.5 mg/3 ml Nebu Generic drug:  albuterol-ipratropium 3 mL by Nebulization route every six (6) hours as needed for Wheezing. fluticasone-vilanterol 100-25 mcg/dose inhaler Commonly known as:  BREO ELLIPTA Take 1 Puff by inhalation daily. furosemide 40 mg tablet Commonly known as:  LASIX Take 1 Tab by mouth daily. gabapentin 600 mg tablet Commonly known as:  NEURONTIN  
600 mg two (2) times a day. glipiZIDE SR 5 mg CR tablet Commonly known as:  GLUCOTROL XL Take 5 mg by mouth two (2) times daily as needed (Patient monitors her BG levels and takes when she is also taking a steroid. ). guaiFENesin 1,200 mg Ta12 ER tablet Commonly known as:  Edinson & Edinson Take 1 Tab by mouth two (2) times a day. ibuprofen 800 mg tablet Commonly known as:  MOTRIN Take 1 Tab by mouth every eight (8) hours as needed. levothyroxine 200 mcg tablet Commonly known as:  SYNTHROID Take 200 mcg by mouth daily (before breakfast). LEVSIN/SL 0.125 mg SL tablet Generic drug:  hyoscyamine SL  
0.125 mg by SubLINGual route three (3) times daily as needed for Cramping. MAGOX 400 mg tablet Generic drug:  magnesium oxide TAKE ONE TABLET TWICE A DAY  
  
 methocarbamol 750 mg tablet Commonly known as:  ROBAXIN Take 750-1,500 mg by mouth four (4) times daily as needed. metoprolol tartrate 25 mg tablet Commonly known as:  LOPRESSOR Take 1 Tab by mouth two (2) times a day. montelukast 10 mg tablet Commonly known as:  SINGULAIR Take 10 mg by mouth daily. NexIUM 40 mg capsule Generic drug:  esomeprazole Take 40 mg by mouth daily. niacin  mg CR capsule Take 250 mg by mouth daily. nystatin 100,000 unit/mL suspension Commonly known as:  MYCOSTATIN Take 5 mL by mouth four (4) times daily. swish and spit PHENobarbital 60 mg tablet Commonly known as:  LUMINAL  
 Take 1 Tab by mouth two (2) times a day. Max Daily Amount: 120 mg.  
  
 prazosin 2 mg capsule Commonly known as:  MINIPRESS  
TAKE 1 CAPSULE BY MOUTH TWICE A DAY. primidone 50 mg tablet Commonly known as: MYSOLINE  
1 po qam and 3 po qhs  
  
 promethazine 25 mg tablet Commonly known as:  PHENERGAN Take 1 Tab by mouth every six (6) hours as needed. VENTOLIN HFA 90 mcg/actuation inhaler Generic drug:  albuterol Take 2 Puffs by inhalation every four (4) hours as needed for Wheezing. vit B Cmplx 3-FA-Vit C-Biotin 1- mg-mg-mcg tablet Commonly known as:  NEPHRO SHREYA RX Take 1 Tab by mouth daily. Vytorin 10/40 10-40 mg per tablet Generic drug:  ezetimibe-simvastatin Take 1 tablet by mouth nightly. Follow-up Instructions Return in about 6 weeks (around 6/21/2018). Introducing [facility]! [facility] introduces [email or web address] patient portal. Now you can access parts of your medical record, email your doctor's office, and request medication refills online. 1. In your internet browser, go to [email or web address]. [employer] 2. Click on the First Time User? Click Here link in the Sign In box. You will see the New Member Sign Up page. 3. Enter your [email or web address] Access Code exactly as it appears below. You will not need to use this code after youve completed the sign-up process. If you do not sign up before the expiration date, you must request a new code. · [email or web address] Access Code: [email or web address] Expires: 6/4/2018  6:30 AM 
 
4. Enter the last four digits of your Social Security Number (xxxx) and Date of Birth (mm/dd/yyyy) as indicated and click Submit. You will be taken to the next sign-up page. 5. Create a FieldAware[employer] ID. This will be your [employer] login ID and cannot be changed, so think of one that is secure and easy to remember. 6. Create a [employer] password. You can change your password at any time. 7. Enter your Password Reset Question and Answer. This can be used at a later time if you forget your password. 8. Enter your e-mail address. You will receive e-mail notification when new information is available in [address] 9. Click Sign Up. You can now view and download portions of your medical record. 10. Click the Download Summary menu link to download a portable copy of your medical information. If you have questions, please visit the Frequently Asked Questions section of the [employer] website. Remember, [employer] is NOT to be used for urgent needs. For medical emergencies, dial 911. Now available from your iPhone and Android! Please provide this summary of care documentation to your next provider. Your primary care clinician is listed as Willem Arguello. If you have any questions after today's visit, please call [phone].

## 2018-05-10 NOTE — PROGRESS NOTES
CHIEF COMPLAINT:  Amairani Cueva is a 58 y.o. female and was seen today for follow-up of psychiatric condition and psychotropic medication management. HPI:    Alek Acosta reports the following psychiatric symptoms:  depression and anxiety. The symptoms have been present for months and are of moderate/high severity. The symptoms occur less frequently and slightly less severely per Alek Acosta. She reports she is not sleeping well and that she thinks she is doing better with current medication. Pt here today to review current treatment plan. PHQ-9: 14, positive screen, moderate depression    FAMILY/SOCIAL HX: niece will be staying with her for a while    REVIEW OF SYSTEMS:  Psychiatric:  depression, anxiety  Appetite:improved, eating somewhat more but not regularly  Sleep: poor with DMS (maintaining sleep)   Neuro: tremors, chronic pain    Visit Vitals    /66    Pulse (!) 112    Ht 5' 4\" (1.626 m)    Wt 93.4 kg (206 lb)    BMI 35.36 kg/m2       Side Effects:  none    MENTAL STATUS EXAM:   Sensorium  oriented to time, place and person   Relations cooperative   Appearance:  age appropriate and casually dressed   Motor Behavior:  gait stable and within normal limits   Speech:  normal volume   Thought Process: goal directed   Thought Content free of delusions and free of hallucinations   Suicidal ideations no intention   Homicidal ideations no intention   Mood:  anxious and depressed   Affect:  anxious and depressed   Memory recent  adequate   Memory remote:  adequate   Concentration:  adequate   Abstraction:  abstract   Insight:  fair and good   Reliability good and fair   Judgment:  fair and good     MEDICAL DECISION MAKING:  Problems addressed today:    ICD-10-CM ICD-9-CM    1. PTSD (post-traumatic stress disorder) F43.10 309.81    2. Moderate episode of recurrent major depressive disorder (HCC) F33.1 296.32        Assessment:   Alek Acosta is responding to treatment. Symptoms are beginning to stabilize.  Pt reports anxiety is less severe and consequently mood is less depressed. Reviewed current antidepressant medication and dosing and no changes required. Pt to call if she experiences ceiling effect before follow up visit. Pt continues to benefit from use of prazosin and BP is stable. Pt discussed recent stressors. Provided support and reinforced her return to ind therapy. Also reinforced re-starting a hobby. Plan:   1. Current Outpatient Prescriptions   Medication Sig Dispense Refill    DULoxetine (CYMBALTA) 30 mg capsule Take 1 Cap by mouth daily. 30 Cap 1    prazosin (MINIPRESS) 2 mg capsule TAKE 1 CAPSULE BY MOUTH TWICE A DAY. 60 Cap 1    primidone (MYSOLINE) 50 mg tablet 1 po qam and 3 po qhs 120 Tab 11    acetaminophen-codeine (TYLENOL #3) 300-30 mg per tablet Take 1 Tab by mouth every eight (8) hours as needed. Max Daily Amount: 3 Tabs. 5 Tab 0    ALPRAZolam (XANAX) 1 mg tablet Take 1 Tab by mouth three (3) times daily as needed for Anxiety. Max Daily Amount: 3 mg. 3 Tab 0    cetirizine (ZYRTEC) 10 mg tablet Take 1 Tab by mouth daily. 10 Tab 0    promethazine (PHENERGAN) 25 mg tablet Take 1 Tab by mouth every six (6) hours as needed. 4 Tab 0    ibuprofen (MOTRIN) 800 mg tablet Take 1 Tab by mouth every eight (8) hours as needed. 20 Tab 0    guaiFENesin ER (MUCINEX) 1,200 mg Ta12 ER tablet Take 1 Tab by mouth two (2) times a day. 14 Tab 0    fluticasone-vilanterol (BREO ELLIPTA) 100-25 mcg/dose inhaler Take 1 Puff by inhalation daily. 1 Inhaler 0    furosemide (LASIX) 40 mg tablet Take 1 Tab by mouth daily. 20 Tab 0    PHENobarbital (LUMINAL) 60 mg tablet Take 1 Tab by mouth two (2) times a day. Max Daily Amount: 120 mg. 60 Tab 1    albuterol (VENTOLIN HFA) 90 mcg/actuation inhaler Take 2 Puffs by inhalation every four (4) hours as needed for Wheezing.  gabapentin (NEURONTIN) 600 mg tablet 600 mg two (2) times a day.       dicyclomine (BENTYL) 10 mg capsule       glipiZIDE SR (GLUCOTROL) 5 mg CR tablet Take 5 mg by mouth two (2) times daily as needed (Patient monitors her BG levels and takes when she is also taking a steroid. ).  montelukast (SINGULAIR) 10 mg tablet Take 10 mg by mouth daily.  ezetimibe-simvastatin (VYTORIN 10/40) 10-40 mg per tablet Take 1 tablet by mouth nightly.  metoprolol (LOPRESSOR) 25 mg tablet Take 1 Tab by mouth two (2) times a day. 60 Tab 6    benzonatate (TESSALON) 200 mg capsule Take 1 Cap by mouth two (2) times daily as needed. 30 Cap 0    nystatin (MYCOSTATIN) 100,000 unit/mL suspension Take 5 mL by mouth four (4) times daily. swish and spit (Patient taking differently: Take 500,000 Units by mouth four (4) times daily as needed. swish and spit) 180 mL 0    methocarbamol (ROBAXIN) 750 mg tablet Take 750-1,500 mg by mouth four (4) times daily as needed.  niacin  mg CR capsule Take 250 mg by mouth daily.  albuterol-ipratropium (DUONEB) 2.5 mg-0.5 mg/3 ml nebulizer solution 3 mL by Nebulization route every six (6) hours as needed for Wheezing.  hyoscyamine SL (LEVSIN/SL) 0.125 mg SL tablet 0.125 mg by SubLINGual route three (3) times daily as needed for Cramping.  esomeprazole (NEXIUM) 40 mg capsule Take 40 mg by mouth daily.  MAGOX 400 mg tablet TAKE ONE TABLET TWICE A DAY 60 Tab 3    vit B Cmplx 3-FA-Vit C-Biotin (NEPHRO SHREYA RX) 1- mg-mg-mcg tablet Take 1 Tab by mouth daily.  aspirin 81 mg chewable tablet Take 81 mg by mouth daily.  levothyroxine (SYNTHROID) 200 mcg tablet Take 200 mcg by mouth daily (before breakfast). medication changes made today: cont cymbalta 30 mg for dep/anx, cont prazosin 2 mg bid    2. Counseling and coordination of care including instructions for treatment, risks/benefits, risk factor reduction and patient/family education. She agrees with the plan. Patient instructed to call with any side effects, questions or issues. 3.  Follow-up Disposition: Not on File     4. Ind therapy    5/10/2018  Crispin Mars, NP

## 2018-05-16 ENCOUNTER — OFFICE VISIT (OUTPATIENT)
Dept: BEHAVIORAL/MENTAL HEALTH CLINIC | Age: 63
End: 2018-05-16

## 2018-05-16 DIAGNOSIS — F33.1 MODERATE EPISODE OF RECURRENT MAJOR DEPRESSIVE DISORDER (HCC): ICD-10-CM

## 2018-05-16 DIAGNOSIS — F43.10 PTSD (POST-TRAUMATIC STRESS DISORDER): Primary | ICD-10-CM

## 2018-05-16 DIAGNOSIS — M79.7 FIBROMYALGIA: ICD-10-CM
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
## 2018-05-21 RX ORDER — PRAZOSIN HYDROCHLORIDE 2 MG/1
CAPSULE ORAL
Qty: 60 CAP | Refills: 0 | Status: SHIPPED | OUTPATIENT
Start: 2018-05-21 | End: 2018-06-18 | Stop reason: SDUPTHER

## 2018-05-21 RX ORDER — DULOXETIN HYDROCHLORIDE 30 MG/1
CAPSULE, DELAYED RELEASE ORAL
Qty: 30 CAP | Refills: 0 | Status: SHIPPED | OUTPATIENT
Start: 2018-05-21 | End: 2018-07-31

## 2018-06-18 ENCOUNTER — OFFICE VISIT (OUTPATIENT)
Dept: BEHAVIORAL/MENTAL HEALTH CLINIC | Age: 63
End: 2018-06-18

## 2018-06-18 VITALS
HEIGHT: 64 IN | BODY MASS INDEX: 34.15 KG/M2 | HEART RATE: 100 BPM | WEIGHT: 200 LBS | DIASTOLIC BLOOD PRESSURE: 88 MMHG | SYSTOLIC BLOOD PRESSURE: 153 MMHG

## 2018-06-18 DIAGNOSIS — F43.10 PTSD (POST-TRAUMATIC STRESS DISORDER): Primary | ICD-10-CM

## 2018-06-18 DIAGNOSIS — F33.1 MODERATE EPISODE OF RECURRENT MAJOR DEPRESSIVE DISORDER (HCC): ICD-10-CM

## 2018-06-18 RX ORDER — DULOXETINE 40 MG/1
40 CAPSULE, DELAYED RELEASE ORAL DAILY
Qty: 30 CAP | Refills: 2 | Status: SHIPPED | OUTPATIENT
Start: 2018-06-18 | End: 2018-10-08 | Stop reason: SDUPTHER

## 2018-06-18 RX ORDER — PRAZOSIN HYDROCHLORIDE 2 MG/1
CAPSULE ORAL
Qty: 60 CAP | Refills: 0 | Status: CANCELLED | OUTPATIENT
Start: 2018-06-18

## 2018-06-18 RX ORDER — PRAZOSIN HYDROCHLORIDE 2 MG/1
CAPSULE ORAL
Qty: 60 CAP | Refills: 2 | Status: SHIPPED | OUTPATIENT
Start: 2018-06-18 | End: 2018-09-24 | Stop reason: SDUPTHER

## 2018-06-18 NOTE — PROGRESS NOTES
CHIEF COMPLAINT:  Janina Meraz is a 58 y.o. female and was seen today for follow-up of psychiatric condition and psychotropic medication management. HPI:    Zara Sultana reports the following psychiatric symptoms:  depression and anxiety. The symptoms have been present for months and are of moderate/high severity. The symptoms occur less frequently and less severely since restarting medications but she still has symptoms of depression and anxiety several times per week. Pt here today to review current treatment plan. FAMILY/SOCIAL HX: daughter with stressors, niece and her daughter not living with pt    REVIEW OF SYSTEMS:  Psychiatric:  depression, anxiety  Appetite:good   Sleep: good   Neuro: fibromyalgia, tremors    Visit Vitals    /88    Pulse 100    Ht 5' 4\" (1.626 m)    Wt 90.7 kg (200 lb)    BMI 34.33 kg/m2       Side Effects:  none    MENTAL STATUS EXAM:   Sensorium  oriented to time, place and person   Relations cooperative   Appearance:  age appropriate and casually dressed   Motor Behavior:  gait unsteady and uses a cane   Speech:  normal volume   Thought Process: goal directed   Thought Content free of delusions and free of hallucinations   Suicidal ideations no intention   Homicidal ideations no intention   Mood:  anxious and depressed   Affect:  anxious and depressed   Memory recent  adequate   Memory remote:  adequate   Concentration:  adequate   Abstraction:  abstract   Insight:  fair   Reliability good and fair   Judgment:  fair and good     MEDICAL DECISION MAKING:  Problems addressed today:    ICD-10-CM ICD-9-CM    1. PTSD (post-traumatic stress disorder) F43.10 309.81    2. Moderate episode of recurrent major depressive disorder (HCC) F33.1 296.32        Assessment:   Zara Sultana is responding to treatment overall. Symptoms are currently exacerbated and pt interested in possibility of increasing dose of cymbalta. Reviewed dosing and will increase at this time.  She continues to report benefit from prazosin and no changes required. BP is stable. Pt discussed current stressors. Provided support and reinforced importance of self care. She has continued to work on mindfulness activities to reduce anxiety. Pt has not been able to go to ind therapy as much due to cost but continues to benefit when she is able to go. Plan:   1. Current Outpatient Prescriptions   Medication Sig Dispense Refill    prazosin (MINIPRESS) 2 mg capsule TAKE 1 CAPSULE BY MOUTH TWICE A DAY. 60 Cap 2    DULoxetine 40 mg cpDR Take 40 mg by mouth daily. 30 Cap 2    DULoxetine (CYMBALTA) 30 mg capsule Take 1 Cap by mouth daily. 30 Cap 0    primidone (MYSOLINE) 50 mg tablet 1 po qam and 3 po qhs 120 Tab 11    acetaminophen-codeine (TYLENOL #3) 300-30 mg per tablet Take 1 Tab by mouth every eight (8) hours as needed. Max Daily Amount: 3 Tabs. 5 Tab 0    ALPRAZolam (XANAX) 1 mg tablet Take 1 Tab by mouth three (3) times daily as needed for Anxiety. Max Daily Amount: 3 mg. 3 Tab 0    cetirizine (ZYRTEC) 10 mg tablet Take 1 Tab by mouth daily. 10 Tab 0    promethazine (PHENERGAN) 25 mg tablet Take 1 Tab by mouth every six (6) hours as needed. 4 Tab 0    ibuprofen (MOTRIN) 800 mg tablet Take 1 Tab by mouth every eight (8) hours as needed. 20 Tab 0    guaiFENesin ER (MUCINEX) 1,200 mg Ta12 ER tablet Take 1 Tab by mouth two (2) times a day. 14 Tab 0    fluticasone-vilanterol (BREO ELLIPTA) 100-25 mcg/dose inhaler Take 1 Puff by inhalation daily. 1 Inhaler 0    furosemide (LASIX) 40 mg tablet Take 1 Tab by mouth daily. 20 Tab 0    PHENobarbital (LUMINAL) 60 mg tablet Take 1 Tab by mouth two (2) times a day. Max Daily Amount: 120 mg. 60 Tab 1    albuterol (VENTOLIN HFA) 90 mcg/actuation inhaler Take 2 Puffs by inhalation every four (4) hours as needed for Wheezing.  gabapentin (NEURONTIN) 600 mg tablet 600 mg two (2) times a day.       dicyclomine (BENTYL) 10 mg capsule       glipiZIDE SR (GLUCOTROL) 5 mg CR tablet Take 5 mg by mouth two (2) times daily as needed (Patient monitors her BG levels and takes when she is also taking a steroid. ).  montelukast (SINGULAIR) 10 mg tablet Take 10 mg by mouth daily.  ezetimibe-simvastatin (VYTORIN 10/40) 10-40 mg per tablet Take 1 tablet by mouth nightly.  metoprolol (LOPRESSOR) 25 mg tablet Take 1 Tab by mouth two (2) times a day. 60 Tab 6    benzonatate (TESSALON) 200 mg capsule Take 1 Cap by mouth two (2) times daily as needed. 30 Cap 0    nystatin (MYCOSTATIN) 100,000 unit/mL suspension Take 5 mL by mouth four (4) times daily. swish and spit (Patient taking differently: Take 500,000 Units by mouth four (4) times daily as needed. swish and spit) 180 mL 0    methocarbamol (ROBAXIN) 750 mg tablet Take 750-1,500 mg by mouth four (4) times daily as needed.  niacin  mg CR capsule Take 250 mg by mouth daily.  albuterol-ipratropium (DUONEB) 2.5 mg-0.5 mg/3 ml nebulizer solution 3 mL by Nebulization route every six (6) hours as needed for Wheezing.  hyoscyamine SL (LEVSIN/SL) 0.125 mg SL tablet 0.125 mg by SubLINGual route three (3) times daily as needed for Cramping.  esomeprazole (NEXIUM) 40 mg capsule Take 40 mg by mouth daily.  MAGOX 400 mg tablet TAKE ONE TABLET TWICE A DAY 60 Tab 3    vit B Cmplx 3-FA-Vit C-Biotin (NEPHRO SHREYA RX) 1- mg-mg-mcg tablet Take 1 Tab by mouth daily.  aspirin 81 mg chewable tablet Take 81 mg by mouth daily.  levothyroxine (SYNTHROID) 200 mcg tablet Take 200 mcg by mouth daily (before breakfast). medication changes made today: inc cymbalta 40 mg for dep/anx, cont prazosin 2 mg bid    2. Counseling and coordination of care including instructions for treatment, risks/benefits, risk factor reduction and patient/family education. She agrees with the plan. Patient instructed to call with any side effects, questions or issues.      3.  Follow-up Disposition:  Return in about 8 weeks (around 8/13/2018).      4. Ind therapy    6/18/2018  Darrin Pepe NP

## 2018-06-18 NOTE — MR AVS SNAPSHOT
[address] [facility] 
[phone] Patient: Bhavani Sumner MRN: [id number] :1955 Visit Information Date & Time Provider Department Dept. Phone Encounter #  
 2018  1:30 PM Neetu Villegas NP [facility] [phone] [id number] Follow-up Instructions Return in about 8 weeks (around 2018). Your Appointments 10/3/2018  2:00 PM  
Follow Up with Darian García MD  
[facility]) Appt Note: f/u tremors, jrb 18  
 [facility], 
[address], [address] [phone]  
[address] [facility], [address] [place] [address], 45 [facility] [address] [address] Upcoming Health Maintenance Date Due Hepatitis C Screening 1955 FOOT EXAM Q1 1965 EYE EXAM RETINAL OR DILATED Q1 1965 DTaP/Tdap/Td series (1 - Tdap) 1976 PAP AKA CERVICAL CYTOLOGY 1976 FOBT Q 1 YEAR AGE 50-75 2005 ZOSTER VACCINE AGE 60> 10/23/2015 BREAST CANCER SCRN MAMMOGRAM 2017 HEMOGLOBIN A1C Q6M 2018 MEDICARE YEARLY EXAM 3/14/2018 Influenza Age 5 to Adult 2018 MICROALBUMIN Q1 2018 LIPID PANEL Q1 2018 Allergies as of 2018  Review Complete On: 2018 By: Neetu Villegas NP Severity Noted Reaction Type Reactions Latex  2010    Contact Dermatitis Dilaudid [Hydromorphone (Pf)]  10/29/2014   Systemic Other (comments) Feels like bugs are crawling all over her Lipitor [Atorvastatin]  2013    Other (comments) LIVER TROUBLE ON THIS MEDICATION Remeron [Mirtazapine]  08/15/2017    Other (comments) Tremors Sulfa (Sulfonamide Antibiotics)  2010    Itching Current Immunizations  Reviewed on 3/19/2015 Name Date Influenza Vaccine 2014, 2013 Pneumococcal Vaccine (Unspecified Type) 1/1/2010 Not reviewed this visit You Were Diagnosed With   
  
 Codes Comments PTSD (post-traumatic stress disorder)    -  Primary ICD-10-CM: F43.10 ICD-9-CM: 309.81 Moderate episode of recurrent major depressive disorder (HCC)     ICD-10-CM: F33.1 ICD-9-CM: 296.32 Vitals BP Pulse Height(growth percentile) Weight(growth percentile) BMI OB Status 153/88 100 5' 4\" (1.626 m) 200 lb (90.7 kg) 34.33 kg/m2 Menopause Smoking Status Current Every Day Smoker BMI and BSA Data Body Mass Index Body Surface Area  
 34.33 kg/m 2 2.02 m 2 Preferred Pharmacy Pharmacy Name Phone [address], [address] [place] [address] [place] [phone] Your Updated Medication List  
  
   
This list is accurate as of 6/18/18  2:00 PM.  Always use your most recent med list.  
  
  
  
  
 acetaminophen-codeine 300-30 mg per tablet Commonly known as:  TYLENOL #3 Take 1 Tab by mouth every eight (8) hours as needed. Max Daily Amount: 3 Tabs. ALPRAZolam 1 mg tablet Commonly known as:  Logeno Tulio Take 1 Tab by mouth three (3) times daily as needed for Anxiety. Max Daily Amount: 3 mg.  
  
 aspirin 81 mg chewable tablet Take 81 mg by mouth daily. benzonatate 200 mg capsule Commonly known as:  TESSALON Take 1 Cap by mouth two (2) times daily as needed. cetirizine 10 mg tablet Commonly known as:  ZYRTEC Take 1 Tab by mouth daily. dicyclomine 10 mg capsule Commonly known as:  BENTYL * DULoxetine 30 mg capsule Commonly known as:  CYMBALTA Take 1 Cap by mouth daily. * DULoxetine 40 mg Cpdr  
Take 40 mg by mouth daily. DUONEB 2.5 mg-0.5 mg/3 ml Nebu Generic drug:  albuterol-ipratropium 3 mL by Nebulization route every six (6) hours as needed for Wheezing. fluticasone-vilanterol 100-25 mcg/dose inhaler Commonly known as:  BREO ELLIPTA Take 1 Puff by inhalation daily. furosemide 40 mg tablet Commonly known as:  LASIX Take 1 Tab by mouth daily. gabapentin 600 mg tablet Commonly known as:  NEURONTIN  
600 mg two (2) times a day. glipiZIDE SR 5 mg CR tablet Commonly known as:  GLUCOTROL XL Take 5 mg by mouth two (2) times daily as needed (Patient monitors her BG levels and takes when she is also taking a steroid. ). guaiFENesin 1,200 mg Ta12 ER tablet Commonly known as:  Edinson & Edinson Take 1 Tab by mouth two (2) times a day. ibuprofen 800 mg tablet Commonly known as:  MOTRIN Take 1 Tab by mouth every eight (8) hours as needed. levothyroxine 200 mcg tablet Commonly known as:  SYNTHROID Take 200 mcg by mouth daily (before breakfast). LEVSIN/SL 0.125 mg SL tablet Generic drug:  hyoscyamine SL  
0.125 mg by SubLINGual route three (3) times daily as needed for Cramping. MAGOX 400 mg tablet Generic drug:  magnesium oxide TAKE ONE TABLET TWICE A DAY  
  
 methocarbamol 750 mg tablet Commonly known as:  ROBAXIN Take 750-1,500 mg by mouth four (4) times daily as needed. metoprolol tartrate 25 mg tablet Commonly known as:  LOPRESSOR Take 1 Tab by mouth two (2) times a day. montelukast 10 mg tablet Commonly known as:  SINGULAIR Take 10 mg by mouth daily. NexIUM 40 mg capsule Generic drug:  esomeprazole Take 40 mg by mouth daily. niacin  mg CR capsule Take 250 mg by mouth daily. nystatin 100,000 unit/mL suspension Commonly known as:  MYCOSTATIN Take 5 mL by mouth four (4) times daily. swish and spit PHENobarbital 60 mg tablet Commonly known as:  LUMINAL Take 1 Tab by mouth two (2) times a day. Max Daily Amount: 120 mg.  
  
 prazosin 2 mg capsule Commonly known as:  MINIPRESS  
TAKE 1 CAPSULE BY MOUTH TWICE A DAY. primidone 50 mg tablet Commonly known as:   MYSOLINE  
1 po qam and 3 po qhs  
  
 promethazine 25 mg tablet Commonly known as:  PHENERGAN Take 1 Tab by mouth every six (6) hours as needed. VENTOLIN HFA 90 mcg/actuation inhaler Generic drug:  albuterol Take 2 Puffs by inhalation every four (4) hours as needed for Wheezing. vit B Cmplx 3-FA-Vit C-Biotin 1- mg-mg-mcg tablet Commonly known as:  NEPHRO SHREYA RX Take 1 Tab by mouth daily. Vytorin 10/40 10-40 mg per tablet Generic drug:  ezetimibe-simvastatin Take 1 tablet by mouth nightly. * Notice: This list has 2 medication(s) that are the same as other medications prescribed for you. Read the directions carefully, and ask your doctor or other care provider to review them with you. Prescriptions Sent to Pharmacy Refills  
 prazosin (MINIPRESS) 2 mg capsule 2 Sig: TAKE 1 CAPSULE BY MOUTH TWICE A DAY. Class: Normal  
 Pharmacy: [address] #: [phone] DULoxetine 40 mg cpDR 2 Sig: Take 40 mg by mouth daily. Class: Normal  
 Pharmacy: [address] #: [phone] Route: Oral  
  
Follow-up Instructions Return in about 8 weeks (around 8/13/2018). [facility]! [facility] introduces [email or web address] patient portal. Now you can access parts of your medical record, email your doctor's office, and request medication refills online. 1. In your internet browser, go to [email or web address] 2. Click on the First Time User? Click Here link in the Sign In box. You will see the New Member Sign Up page. 3. Enter your [email or web address] Access Code exactly as it appears below. You will not need to use this code after youve completed the sign-up process. If you do not sign up before the expiration date, you must request a new code. · [email or web address] Access Code: [email or web address] Expires: 9/16/2018  2:00 PM 
 
 4. Enter the last four digits of your Social Security Number (xxxx) and Date of Birth (mm/dd/yyyy) as indicated and click Submit. You will be taken to the next sign-up page. 5. Create a crossvertise ID. This will be your crossvertise login ID and cannot be changed, so think of one that is secure and easy to remember. 6. Create a crossvertise password. You can change your password at any time. 7. Enter your Password Reset Question and Answer. This can be used at a later time if you forget your password. 8. Enter your e-mail address. You will receive e-mail notification when new information is available in [address] 9. Click Sign Up. You can now view and download portions of your medical record. 10. Click the Download Summary menu link to download a portable copy of your medical information. If you have questions, please visit the Frequently Asked Questions section of the crossvertise website. Remember, crossvertise is NOT to be used for urgent needs. For medical emergencies, dial 911. Now available from your iPhone and Android! Please provide this summary of care documentation to your next provider. Your primary care clinician is listed as Dimitris Sotckton. If you have any questions after today's visit, please call [phone].

## 2018-07-18 ENCOUNTER — TELEPHONE (OUTPATIENT)
Dept: BEHAVIORAL/MENTAL HEALTH CLINIC | Age: 63
End: 2018-07-18

## 2018-07-18 NOTE — TELEPHONE ENCOUNTER
Patient called having a hard time with pharmacist giving her the right color to her Cymbalta. She needs you call her so, she can explain how sensitive she is to the right color medication. Please return her call, thanks.

## 2018-07-18 NOTE — TELEPHONE ENCOUNTER
Returned call. Pt upset about the different color in cymbalta capsule dose. It is possible she has food coloring sensitivities.  Will touch base with pharmacist.

## 2018-07-31 ENCOUNTER — APPOINTMENT (OUTPATIENT)
Dept: CT IMAGING | Age: 63
DRG: 871 | End: 2018-07-31
Attending: HOSPITALIST
Payer: MEDICARE

## 2018-07-31 ENCOUNTER — APPOINTMENT (OUTPATIENT)
Dept: GENERAL RADIOLOGY | Age: 63
DRG: 871 | End: 2018-07-31
Attending: EMERGENCY MEDICINE
Payer: MEDICARE

## 2018-07-31 ENCOUNTER — HOSPITAL ENCOUNTER (INPATIENT)
Age: 63
LOS: 12 days | Discharge: ANOTHER HOSPITAL | DRG: 871 | End: 2018-08-12
Attending: EMERGENCY MEDICINE | Admitting: HOSPITALIST
Payer: MEDICARE

## 2018-07-31 DIAGNOSIS — F41.9 ANXIETY AND DEPRESSION: ICD-10-CM

## 2018-07-31 DIAGNOSIS — R05.8 PRODUCTIVE COUGH: ICD-10-CM

## 2018-07-31 DIAGNOSIS — R07.1 CHEST PAIN ON BREATHING: ICD-10-CM

## 2018-07-31 DIAGNOSIS — F32.A ANXIETY AND DEPRESSION: ICD-10-CM

## 2018-07-31 DIAGNOSIS — R06.02 SHORTNESS OF BREATH: ICD-10-CM

## 2018-07-31 DIAGNOSIS — Z79.899 POLYPHARMACY: ICD-10-CM

## 2018-07-31 DIAGNOSIS — M79.7 FIBROMYALGIA: ICD-10-CM

## 2018-07-31 DIAGNOSIS — J96.00 ACUTE RESPIRATORY FAILURE, UNSPECIFIED WHETHER WITH HYPOXIA OR HYPERCAPNIA (HCC): ICD-10-CM

## 2018-07-31 DIAGNOSIS — J18.9 COMMUNITY ACQUIRED PNEUMONIA, UNSPECIFIED LATERALITY: Primary | ICD-10-CM

## 2018-07-31 DIAGNOSIS — F33.1 MODERATE EPISODE OF RECURRENT MAJOR DEPRESSIVE DISORDER (HCC): ICD-10-CM

## 2018-07-31 DIAGNOSIS — A41.9 SEPSIS, DUE TO UNSPECIFIED ORGANISM: ICD-10-CM

## 2018-07-31 DIAGNOSIS — K59.03 DRUG-INDUCED CONSTIPATION: ICD-10-CM

## 2018-07-31 LAB
ALBUMIN SERPL-MCNC: 2.7 G/DL (ref 3.5–5)
ALBUMIN/GLOB SERPL: 0.5 {RATIO} (ref 1.1–2.2)
ALP SERPL-CCNC: 159 U/L (ref 45–117)
ALT SERPL-CCNC: 98 U/L (ref 12–78)
ANION GAP SERPL CALC-SCNC: 8 MMOL/L (ref 5–15)
APPEARANCE UR: CLEAR
AST SERPL-CCNC: 25 U/L (ref 15–37)
BACTERIA URNS QL MICRO: NEGATIVE /HPF
BASOPHILS # BLD: 0 K/UL (ref 0–0.1)
BASOPHILS NFR BLD: 0 % (ref 0–1)
BILIRUB SERPL-MCNC: 0.3 MG/DL (ref 0.2–1)
BILIRUB UR QL: NEGATIVE
BUN SERPL-MCNC: 10 MG/DL (ref 6–20)
BUN/CREAT SERPL: 11 (ref 12–20)
CALCIUM SERPL-MCNC: 10 MG/DL (ref 8.5–10.1)
CHLORIDE SERPL-SCNC: 104 MMOL/L (ref 97–108)
CO2 SERPL-SCNC: 25 MMOL/L (ref 21–32)
COLOR UR: NORMAL
CREAT SERPL-MCNC: 0.87 MG/DL (ref 0.55–1.02)
DIFFERENTIAL METHOD BLD: ABNORMAL
EOSINOPHIL # BLD: 0 K/UL (ref 0–0.4)
EOSINOPHIL NFR BLD: 0 % (ref 0–7)
EPITH CASTS URNS QL MICRO: NORMAL /LPF
ERYTHROCYTE [DISTWIDTH] IN BLOOD BY AUTOMATED COUNT: 13.6 % (ref 11.5–14.5)
GLOBULIN SER CALC-MCNC: 5 G/DL (ref 2–4)
GLUCOSE BLD STRIP.AUTO-MCNC: 105 MG/DL (ref 65–100)
GLUCOSE BLD STRIP.AUTO-MCNC: 169 MG/DL (ref 65–100)
GLUCOSE SERPL-MCNC: 102 MG/DL (ref 65–100)
GLUCOSE UR STRIP.AUTO-MCNC: NEGATIVE MG/DL
HCT VFR BLD AUTO: 40.5 % (ref 35–47)
HGB BLD-MCNC: 13.2 G/DL (ref 11.5–16)
HGB UR QL STRIP: NEGATIVE
HYALINE CASTS URNS QL MICRO: NORMAL /LPF (ref 0–5)
IMM GRANULOCYTES # BLD: 0.2 K/UL (ref 0–0.04)
IMM GRANULOCYTES NFR BLD AUTO: 1 % (ref 0–0.5)
KETONES UR QL STRIP.AUTO: NEGATIVE MG/DL
LACTATE SERPL-SCNC: 2.5 MMOL/L (ref 0.4–2)
LEUKOCYTE ESTERASE UR QL STRIP.AUTO: NEGATIVE
LYMPHOCYTES # BLD: 2.4 K/UL (ref 0.8–3.5)
LYMPHOCYTES NFR BLD: 11 % (ref 12–49)
MCH RBC QN AUTO: 31 PG (ref 26–34)
MCHC RBC AUTO-ENTMCNC: 32.6 G/DL (ref 30–36.5)
MCV RBC AUTO: 95.1 FL (ref 80–99)
MONOCYTES # BLD: 1.5 K/UL (ref 0–1)
MONOCYTES NFR BLD: 7 % (ref 5–13)
NEUTS SEG # BLD: 18 K/UL (ref 1.8–8)
NEUTS SEG NFR BLD: 82 % (ref 32–75)
NITRITE UR QL STRIP.AUTO: NEGATIVE
NRBC # BLD: 0 K/UL (ref 0–0.01)
NRBC BLD-RTO: 0 PER 100 WBC
PH UR STRIP: 7 [PH] (ref 5–8)
PLATELET # BLD AUTO: 438 K/UL (ref 150–400)
PMV BLD AUTO: 9 FL (ref 8.9–12.9)
POTASSIUM SERPL-SCNC: 4.2 MMOL/L (ref 3.5–5.1)
PROT SERPL-MCNC: 7.7 G/DL (ref 6.4–8.2)
PROT UR STRIP-MCNC: NEGATIVE MG/DL
RBC # BLD AUTO: 4.26 M/UL (ref 3.8–5.2)
RBC #/AREA URNS HPF: NORMAL /HPF (ref 0–5)
SERVICE CMNT-IMP: ABNORMAL
SERVICE CMNT-IMP: ABNORMAL
SODIUM SERPL-SCNC: 137 MMOL/L (ref 136–145)
SP GR UR REFRACTOMETRY: 1.01 (ref 1–1.03)
TROPONIN I SERPL-MCNC: <0.05 NG/ML
UA: UC IF INDICATED,UAUC: NORMAL
UROBILINOGEN UR QL STRIP.AUTO: 0.2 EU/DL (ref 0.2–1)
WBC # BLD AUTO: 22 K/UL (ref 3.6–11)
WBC URNS QL MICRO: NORMAL /HPF (ref 0–4)

## 2018-07-31 PROCEDURE — 99283 EMERGENCY DEPT VISIT LOW MDM: CPT

## 2018-07-31 PROCEDURE — 74011250636 HC RX REV CODE- 250/636: Performed by: INTERNAL MEDICINE

## 2018-07-31 PROCEDURE — 96375 TX/PRO/DX INJ NEW DRUG ADDON: CPT

## 2018-07-31 PROCEDURE — 87077 CULTURE AEROBIC IDENTIFY: CPT | Performed by: HOSPITALIST

## 2018-07-31 PROCEDURE — 71046 X-RAY EXAM CHEST 2 VIEWS: CPT

## 2018-07-31 PROCEDURE — 74011250636 HC RX REV CODE- 250/636: Performed by: EMERGENCY MEDICINE

## 2018-07-31 PROCEDURE — 96374 THER/PROPH/DIAG INJ IV PUSH: CPT

## 2018-07-31 PROCEDURE — 81001 URINALYSIS AUTO W/SCOPE: CPT | Performed by: EMERGENCY MEDICINE

## 2018-07-31 PROCEDURE — 74011636320 HC RX REV CODE- 636/320: Performed by: INTERNAL MEDICINE

## 2018-07-31 PROCEDURE — 71260 CT THORAX DX C+: CPT

## 2018-07-31 PROCEDURE — 80053 COMPREHEN METABOLIC PANEL: CPT | Performed by: EMERGENCY MEDICINE

## 2018-07-31 PROCEDURE — 87040 BLOOD CULTURE FOR BACTERIA: CPT | Performed by: EMERGENCY MEDICINE

## 2018-07-31 PROCEDURE — 74011000250 HC RX REV CODE- 250: Performed by: EMERGENCY MEDICINE

## 2018-07-31 PROCEDURE — 83605 ASSAY OF LACTIC ACID: CPT | Performed by: EMERGENCY MEDICINE

## 2018-07-31 PROCEDURE — 74011250637 HC RX REV CODE- 250/637: Performed by: EMERGENCY MEDICINE

## 2018-07-31 PROCEDURE — 74011636637 HC RX REV CODE- 636/637: Performed by: HOSPITALIST

## 2018-07-31 PROCEDURE — 87147 CULTURE TYPE IMMUNOLOGIC: CPT | Performed by: HOSPITALIST

## 2018-07-31 PROCEDURE — 36415 COLL VENOUS BLD VENIPUNCTURE: CPT | Performed by: EMERGENCY MEDICINE

## 2018-07-31 PROCEDURE — 96361 HYDRATE IV INFUSION ADD-ON: CPT

## 2018-07-31 PROCEDURE — 84484 ASSAY OF TROPONIN QUANT: CPT | Performed by: EMERGENCY MEDICINE

## 2018-07-31 PROCEDURE — 85025 COMPLETE CBC W/AUTO DIFF WBC: CPT | Performed by: EMERGENCY MEDICINE

## 2018-07-31 PROCEDURE — 87185 SC STD ENZYME DETCJ PER NZM: CPT | Performed by: HOSPITALIST

## 2018-07-31 PROCEDURE — 87070 CULTURE OTHR SPECIMN AEROBIC: CPT | Performed by: HOSPITALIST

## 2018-07-31 PROCEDURE — 74011250637 HC RX REV CODE- 250/637: Performed by: HOSPITALIST

## 2018-07-31 PROCEDURE — 65660000000 HC RM CCU STEPDOWN

## 2018-07-31 PROCEDURE — 94640 AIRWAY INHALATION TREATMENT: CPT

## 2018-07-31 PROCEDURE — 93005 ELECTROCARDIOGRAM TRACING: CPT

## 2018-07-31 PROCEDURE — 82962 GLUCOSE BLOOD TEST: CPT

## 2018-07-31 RX ORDER — PRIMIDONE 50 MG/1
150 TABLET ORAL
Status: COMPLETED | OUTPATIENT
Start: 2018-07-31 | End: 2018-07-31

## 2018-07-31 RX ORDER — INSULIN LISPRO 100 [IU]/ML
INJECTION, SOLUTION INTRAVENOUS; SUBCUTANEOUS
Status: DISCONTINUED | OUTPATIENT
Start: 2018-07-31 | End: 2018-08-12 | Stop reason: HOSPADM

## 2018-07-31 RX ORDER — SODIUM CHLORIDE 0.9 % (FLUSH) 0.9 %
5-10 SYRINGE (ML) INJECTION AS NEEDED
Status: DISCONTINUED | OUTPATIENT
Start: 2018-07-31 | End: 2018-08-12 | Stop reason: HOSPADM

## 2018-07-31 RX ORDER — DEXTROSE 50 % IN WATER (D50W) INTRAVENOUS SYRINGE
12.5-25 AS NEEDED
Status: DISCONTINUED | OUTPATIENT
Start: 2018-07-31 | End: 2018-08-12 | Stop reason: HOSPADM

## 2018-07-31 RX ORDER — ACETAMINOPHEN 325 MG/1
650 TABLET ORAL
Status: DISCONTINUED | OUTPATIENT
Start: 2018-07-31 | End: 2018-08-12 | Stop reason: HOSPADM

## 2018-07-31 RX ORDER — LEVOTHYROXINE SODIUM 100 UG/1
200 TABLET ORAL
Status: DISCONTINUED | OUTPATIENT
Start: 2018-08-01 | End: 2018-08-12 | Stop reason: HOSPADM

## 2018-07-31 RX ORDER — OXYCODONE HYDROCHLORIDE 5 MG/1
5 TABLET ORAL
Status: DISCONTINUED | OUTPATIENT
Start: 2018-07-31 | End: 2018-08-01

## 2018-07-31 RX ORDER — ONDANSETRON 2 MG/ML
4 INJECTION INTRAMUSCULAR; INTRAVENOUS
Status: DISCONTINUED | OUTPATIENT
Start: 2018-07-31 | End: 2018-08-12 | Stop reason: HOSPADM

## 2018-07-31 RX ORDER — GUAIFENESIN 100 MG/5ML
81 LIQUID (ML) ORAL DAILY
Status: DISCONTINUED | OUTPATIENT
Start: 2018-08-01 | End: 2018-08-12 | Stop reason: HOSPADM

## 2018-07-31 RX ORDER — PRIMIDONE 50 MG/1
50 TABLET ORAL DAILY
Status: DISCONTINUED | OUTPATIENT
Start: 2018-08-01 | End: 2018-08-12 | Stop reason: HOSPADM

## 2018-07-31 RX ORDER — IPRATROPIUM BROMIDE AND ALBUTEROL SULFATE 2.5; .5 MG/3ML; MG/3ML
3 SOLUTION RESPIRATORY (INHALATION) ONCE
Status: COMPLETED | OUTPATIENT
Start: 2018-07-31 | End: 2018-07-31

## 2018-07-31 RX ORDER — NALOXONE HYDROCHLORIDE 0.4 MG/ML
0.4 INJECTION, SOLUTION INTRAMUSCULAR; INTRAVENOUS; SUBCUTANEOUS AS NEEDED
Status: DISCONTINUED | OUTPATIENT
Start: 2018-07-31 | End: 2018-08-12 | Stop reason: HOSPADM

## 2018-07-31 RX ORDER — ONDANSETRON 2 MG/ML
4 INJECTION INTRAMUSCULAR; INTRAVENOUS
Status: COMPLETED | OUTPATIENT
Start: 2018-07-31 | End: 2018-07-31

## 2018-07-31 RX ORDER — SODIUM CHLORIDE 9 MG/ML
50 INJECTION, SOLUTION INTRAVENOUS
Status: COMPLETED | OUTPATIENT
Start: 2018-07-31 | End: 2018-08-10

## 2018-07-31 RX ORDER — PANTOPRAZOLE SODIUM 40 MG/1
40 TABLET, DELAYED RELEASE ORAL
Status: DISCONTINUED | OUTPATIENT
Start: 2018-08-01 | End: 2018-08-03

## 2018-07-31 RX ORDER — DULOXETIN HYDROCHLORIDE 20 MG/1
40 CAPSULE, DELAYED RELEASE ORAL DAILY
Status: DISCONTINUED | OUTPATIENT
Start: 2018-08-01 | End: 2018-08-12 | Stop reason: HOSPADM

## 2018-07-31 RX ORDER — LEVOTHYROXINE SODIUM 200 UG/1
200 TABLET ORAL
Status: DISCONTINUED | OUTPATIENT
Start: 2018-08-01 | End: 2018-07-31

## 2018-07-31 RX ORDER — LEVOFLOXACIN 5 MG/ML
750 INJECTION, SOLUTION INTRAVENOUS
Status: COMPLETED | OUTPATIENT
Start: 2018-07-31 | End: 2018-07-31

## 2018-07-31 RX ORDER — PREDNISONE 10 MG/1
40 TABLET ORAL
Status: COMPLETED | OUTPATIENT
Start: 2018-07-31 | End: 2018-07-31

## 2018-07-31 RX ORDER — LANOLIN ALCOHOL/MO/W.PET/CERES
250 CREAM (GRAM) TOPICAL DAILY
Status: DISCONTINUED | OUTPATIENT
Start: 2018-08-01 | End: 2018-08-08

## 2018-07-31 RX ORDER — MONTELUKAST SODIUM 10 MG/1
10 TABLET ORAL DAILY
Status: DISCONTINUED | OUTPATIENT
Start: 2018-08-01 | End: 2018-08-01

## 2018-07-31 RX ORDER — PHENOBARBITAL 32.4 MG/1
60 TABLET ORAL
Status: COMPLETED | OUTPATIENT
Start: 2018-07-31 | End: 2018-07-31

## 2018-07-31 RX ORDER — LANOLIN ALCOHOL/MO/W.PET/CERES
400 CREAM (GRAM) TOPICAL 2 TIMES DAILY
Status: DISCONTINUED | OUTPATIENT
Start: 2018-08-01 | End: 2018-08-12 | Stop reason: HOSPADM

## 2018-07-31 RX ORDER — BENZONATATE 100 MG/1
200 CAPSULE ORAL
Status: DISCONTINUED | OUTPATIENT
Start: 2018-07-31 | End: 2018-08-12 | Stop reason: HOSPADM

## 2018-07-31 RX ORDER — METHOCARBAMOL 500 MG/1
750 TABLET, FILM COATED ORAL
Status: DISCONTINUED | OUTPATIENT
Start: 2018-07-31 | End: 2018-08-12 | Stop reason: HOSPADM

## 2018-07-31 RX ORDER — PRAZOSIN HYDROCHLORIDE 1 MG/1
2 CAPSULE ORAL 2 TIMES DAILY
Status: DISCONTINUED | OUTPATIENT
Start: 2018-08-01 | End: 2018-08-01

## 2018-07-31 RX ORDER — ALPRAZOLAM 0.5 MG/1
1 TABLET ORAL
Status: DISCONTINUED | OUTPATIENT
Start: 2018-07-31 | End: 2018-08-12 | Stop reason: HOSPADM

## 2018-07-31 RX ORDER — GUAIFENESIN 600 MG/1
1200 TABLET, EXTENDED RELEASE ORAL EVERY 12 HOURS
Status: DISCONTINUED | OUTPATIENT
Start: 2018-07-31 | End: 2018-08-12 | Stop reason: HOSPADM

## 2018-07-31 RX ORDER — ENOXAPARIN SODIUM 100 MG/ML
40 INJECTION SUBCUTANEOUS DAILY
Status: DISCONTINUED | OUTPATIENT
Start: 2018-08-01 | End: 2018-08-04

## 2018-07-31 RX ORDER — NYSTATIN 100000 [USP'U]/ML
500000 SUSPENSION ORAL
Status: DISCONTINUED | OUTPATIENT
Start: 2018-07-31 | End: 2018-08-12 | Stop reason: HOSPADM

## 2018-07-31 RX ORDER — IPRATROPIUM BROMIDE AND ALBUTEROL SULFATE 2.5; .5 MG/3ML; MG/3ML
3 SOLUTION RESPIRATORY (INHALATION)
Status: DISCONTINUED | OUTPATIENT
Start: 2018-07-31 | End: 2018-08-01

## 2018-07-31 RX ORDER — METOPROLOL TARTRATE 25 MG/1
25 TABLET, FILM COATED ORAL 2 TIMES DAILY
Status: DISCONTINUED | OUTPATIENT
Start: 2018-08-01 | End: 2018-08-01 | Stop reason: SDUPTHER

## 2018-07-31 RX ORDER — LEVOFLOXACIN 5 MG/ML
750 INJECTION, SOLUTION INTRAVENOUS EVERY 24 HOURS
Status: DISCONTINUED | OUTPATIENT
Start: 2018-08-01 | End: 2018-08-01

## 2018-07-31 RX ORDER — PRIMIDONE 50 MG/1
150 TABLET ORAL
Status: DISCONTINUED | OUTPATIENT
Start: 2018-08-01 | End: 2018-08-12 | Stop reason: HOSPADM

## 2018-07-31 RX ORDER — MAGNESIUM SULFATE 100 %
4 CRYSTALS MISCELLANEOUS AS NEEDED
Status: DISCONTINUED | OUTPATIENT
Start: 2018-07-31 | End: 2018-08-12 | Stop reason: HOSPADM

## 2018-07-31 RX ORDER — IPRATROPIUM BROMIDE AND ALBUTEROL SULFATE 2.5; .5 MG/3ML; MG/3ML
3 SOLUTION RESPIRATORY (INHALATION)
Status: DISCONTINUED | OUTPATIENT
Start: 2018-08-01 | End: 2018-08-01

## 2018-07-31 RX ORDER — SODIUM CHLORIDE 0.9 % (FLUSH) 0.9 %
5-10 SYRINGE (ML) INJECTION AS NEEDED
Status: DISCONTINUED | OUTPATIENT
Start: 2018-07-31 | End: 2018-07-31

## 2018-07-31 RX ORDER — SODIUM CHLORIDE 0.9 % (FLUSH) 0.9 %
10 SYRINGE (ML) INJECTION
Status: COMPLETED | OUTPATIENT
Start: 2018-07-31 | End: 2018-07-31

## 2018-07-31 RX ORDER — MORPHINE SULFATE 2 MG/ML
4 INJECTION, SOLUTION INTRAMUSCULAR; INTRAVENOUS
Status: COMPLETED | OUTPATIENT
Start: 2018-07-31 | End: 2018-07-31

## 2018-07-31 RX ORDER — SODIUM CHLORIDE 9 MG/ML
50 INJECTION, SOLUTION INTRAVENOUS CONTINUOUS
Status: DISCONTINUED | OUTPATIENT
Start: 2018-07-31 | End: 2018-08-03

## 2018-07-31 RX ORDER — SODIUM CHLORIDE 0.9 % (FLUSH) 0.9 %
5-10 SYRINGE (ML) INJECTION EVERY 8 HOURS
Status: DISCONTINUED | OUTPATIENT
Start: 2018-07-31 | End: 2018-08-12 | Stop reason: HOSPADM

## 2018-07-31 RX ORDER — PHENOBARBITAL 32.4 MG/1
60 TABLET ORAL 2 TIMES DAILY
Status: DISCONTINUED | OUTPATIENT
Start: 2018-08-01 | End: 2018-08-12 | Stop reason: HOSPADM

## 2018-07-31 RX ORDER — CETIRIZINE HCL 10 MG
10 TABLET ORAL DAILY
Status: DISCONTINUED | OUTPATIENT
Start: 2018-08-01 | End: 2018-08-01

## 2018-07-31 RX ADMIN — ONDANSETRON 4 MG: 2 INJECTION, SOLUTION INTRAMUSCULAR; INTRAVENOUS at 20:07

## 2018-07-31 RX ADMIN — PHENOBARBITAL 64.8 MG: 32.4 TABLET ORAL at 21:40

## 2018-07-31 RX ADMIN — IPRATROPIUM BROMIDE AND ALBUTEROL SULFATE 3 ML: .5; 3 SOLUTION RESPIRATORY (INHALATION) at 19:58

## 2018-07-31 RX ADMIN — Medication 10 ML: at 22:54

## 2018-07-31 RX ADMIN — GUAIFENESIN 1200 MG: 600 TABLET, EXTENDED RELEASE ORAL at 22:36

## 2018-07-31 RX ADMIN — IOPAMIDOL 80 ML: 755 INJECTION, SOLUTION INTRAVENOUS at 22:53

## 2018-07-31 RX ADMIN — PREDNISONE 40 MG: 10 TABLET ORAL at 22:36

## 2018-07-31 RX ADMIN — PRIMIDONE 150 MG: 50 TABLET ORAL at 21:40

## 2018-07-31 RX ADMIN — METHYLPREDNISOLONE SODIUM SUCCINATE 125 MG: 125 INJECTION, POWDER, FOR SOLUTION INTRAMUSCULAR; INTRAVENOUS at 20:05

## 2018-07-31 RX ADMIN — SODIUM CHLORIDE 1000 ML: 900 INJECTION, SOLUTION INTRAVENOUS at 21:37

## 2018-07-31 RX ADMIN — SODIUM CHLORIDE 1000 ML: 900 INJECTION, SOLUTION INTRAVENOUS at 20:05

## 2018-07-31 RX ADMIN — MORPHINE SULFATE 4 MG: 2 INJECTION, SOLUTION INTRAMUSCULAR; INTRAVENOUS at 20:12

## 2018-07-31 RX ADMIN — SODIUM CHLORIDE 50 ML/HR: 900 INJECTION, SOLUTION INTRAVENOUS at 22:54

## 2018-07-31 RX ADMIN — OXYCODONE HYDROCHLORIDE 5 MG: 5 TABLET ORAL at 22:36

## 2018-07-31 RX ADMIN — LEVOFLOXACIN 750 MG: 5 INJECTION, SOLUTION INTRAVENOUS at 21:35

## 2018-07-31 NOTE — IP AVS SNAPSHOT
[address] [facility] 
[phone] Patient: Shelli Go MRN: [id number] :1955 A check jennifer indicates which time of day the medication should be taken. My Medications ASK your doctor about these medications Instructions Each Dose to Equal  
 Morning Noon Evening Bedtime  
 acetaminophen-codeine 300-30 mg per tablet Commonly known as:  TYLENOL #3 Your last dose was: Your next dose is: Take 1 Tab by mouth every eight (8) hours as needed. Max Daily Amount: 3 Tabs. 1 Tab ALPRAZolam 1 mg tablet Commonly known as:  Dayo Amend Your last dose was: Your next dose is: Take 1 Tab by mouth three (3) times daily as needed for Anxiety. Max Daily Amount: 3 mg.  
 1 mg  
    
   
   
   
  
 aspirin 81 mg chewable tablet Your last dose was: Your next dose is: Take 81 mg by mouth daily. 81 mg  
    
   
   
   
  
 benzonatate 200 mg capsule Commonly known as:  TESSALON Your last dose was: Your next dose is: Take 1 Cap by mouth two (2) times daily as needed. 200 mg  
    
   
   
   
  
 cetirizine 10 mg tablet Commonly known as:  ZYRTEC Your last dose was: Your next dose is: Take 1 Tab by mouth daily. 10 mg  
    
   
   
   
  
 dicyclomine 10 mg capsule Commonly known as:  BENTYL Your last dose was: Your next dose is: DULoxetine 40 mg Cpdr  
   
Your last dose was: Your next dose is: Take 40 mg by mouth daily. 40 mg  
    
   
   
   
  
 DUONEB 2.5 mg-0.5 mg/3 ml Nebu Generic drug:  albuterol-ipratropium Your last dose was: Your next dose is:    
   
   
 3 mL by Nebulization route every six (6) hours as needed for Wheezing. 3 mL furosemide 40 mg tablet Commonly known as:  LASIX Your last dose was: Your next dose is: Take 1 Tab by mouth daily. 40 mg  
    
   
   
   
  
 glipiZIDE SR 5 mg CR tablet Commonly known as:  GLUCOTROL XL Your last dose was: Your next dose is: Take 5 mg by mouth two (2) times daily as needed (Patient monitors her BG levels and takes when she is also taking a steroid. ). 5 mg  
    
   
   
   
  
 guaiFENesin 1,200 mg Ta12 ER tablet Commonly known as:  Edinson & Edinson Your last dose was: Your next dose is: Take 1 Tab by mouth two (2) times a day. 1200 mg  
    
   
   
   
  
 ibuprofen 800 mg tablet Commonly known as:  MOTRIN Your last dose was: Your next dose is: Take 1 Tab by mouth every eight (8) hours as needed. 800 mg  
    
   
   
   
  
 levothyroxine 200 mcg tablet Commonly known as:  SYNTHROID Your last dose was: Your next dose is: Take 200 mcg by mouth daily (before breakfast). 200 mcg LEVSIN/SL 0.125 mg SL tablet Generic drug:  hyoscyamine SL Your last dose was: Your next dose is:    
   
   
 0.125 mg by SubLINGual route three (3) times daily as needed for Cramping.  
 0.125 mg  
    
   
   
   
  
 MAGOX 400 mg tablet Generic drug:  magnesium oxide Your last dose was: Your next dose is: TAKE ONE TABLET TWICE A DAY  
     
   
   
   
  
 methocarbamol 750 mg tablet Commonly known as:  ROBAXIN Your last dose was: Your next dose is: Take 750-1,500 mg by mouth four (4) times daily as needed. 750-1500 mg  
    
   
   
   
  
 metoprolol tartrate 25 mg tablet Commonly known as:  LOPRESSOR Your last dose was: Your next dose is: Take 1 Tab by mouth two (2) times a day. 25 mg  
    
   
   
   
  
 montelukast 10 mg tablet Commonly known as:  SINGULAIR Your last dose was: Your next dose is: Take 10 mg by mouth daily. 10 mg NexIUM 40 mg capsule Generic drug:  esomeprazole Your last dose was: Your next dose is: Take 40 mg by mouth daily. 40 mg  
    
   
   
   
  
 nystatin 100,000 unit/mL suspension Commonly known as:  MYCOSTATIN Your last dose was: Your next dose is: Take 5 mL by mouth four (4) times daily. swish and spit [id number] Units PHENobarbital 60 mg tablet Commonly known as:  LUMINAL Your last dose was: Your next dose is: Take 1 Tab by mouth two (2) times a day. Max Daily Amount: 120 mg.  
 60 mg  
    
   
   
   
  
 prazosin 2 mg capsule Commonly known as:  MINIPRESS Your last dose was: Your next dose is: TAKE 1 CAPSULE BY MOUTH TWICE A DAY. primidone 50 mg tablet Commonly known as: MYSOLINE Your last dose was: Your next dose is:    
   
   
 1 po qam and 3 po qhs  
     
   
   
   
  
 promethazine 25 mg tablet Commonly known as:  PHENERGAN Your last dose was: Your next dose is: Take 1 Tab by mouth every six (6) hours as needed. 25 mg  
    
   
   
   
  
 SYMBICORT 160-4.5 mcg/actuation Hfaa Generic drug:  budesonide-formoterol Your last dose was: Your next dose is: Take 2 Puffs by inhalation two (2) times a day. 2 Puff VENTOLIN HFA 90 mcg/actuation inhaler Generic drug:  albuterol Your last dose was: Your next dose is: Take 2 Puffs by inhalation every four (4) hours as needed for Wheezing. 2 Puff  
    
   
   
   
  
 vit B Cmplx 3-FA-Vit C-Biotin 1- mg-mg-mcg tablet Commonly known as:  NEPHRO SHREYA RX Your last dose was: Your next dose is: Take 1 Tab by mouth daily. 1 Tab Vytorin 10/40 10-40 mg per tablet Generic drug:  ezetimibe-simvastatin Your last dose was: Your next dose is: Take 1 tablet by mouth nightly. 1 Tab

## 2018-07-31 NOTE — ED PROVIDER NOTES
EMERGENCY DEPARTMENT HISTORY AND PHYSICAL EXAM 
 
 
Date: 7/31/2018 Patient Name: Dominga Mota History of Presenting Illness Chief Complaint Patient presents with  Cough  
  non productive wet cough  Chest Pain L side chest pain that woke her out of her sleep this morning History Provided By: Patient HPI: Dominga Mota, 58 y.o. female with PMHx significant for COPD, tobacco abuse, OA, PUD, hypothyroidism, GERD, fibromyalgia, depression, anxiety, hyperlipidemia, presents ambulatory to the ED with cc of worsening productive cough with bright yellow sputum, bll rib pain, and intermittent fever and chills s/p long-standing bronchitis since 6/18. Pt reports that she saw her PCP on 6/18 and was dx with bronchitis. She was d/c with Doxycycline and Prednisone, however, she had to discontinue Doxycycline due to diarrhea side effects. She states that she was aroused from sleep at 0200 this AM due to chest tightness, bll rib pain, and coughing. She reports that symptoms are similar to prior pneumonia/bronchitis. She denies using O2 at home. She has nebulizer at home and has used it x3 today. Last nebulizer treatment was before 1500 today. She denies n/v. She denies being followed by pulmonologist for COPD. There are no other complaints, changes, or physical findings at this time. Social Hx: +Tobacco, Occasional EtOH, -Illicit Drug Use PCP: Kaila Miller MD 
 
Current Facility-Administered Medications Medication Dose Route Frequency Provider Last Rate Last Dose  levoFLOXacin (LEVAQUIN) 750 mg in D5W IVPB  750 mg IntraVENous NOW Maira King MD      
 PHENobarbital (LUMINAL) tablet 64.8 mg  64.8 mg Oral NOW Maira King MD      
 primidone (MYSOLINE) tablet 150 mg  150 mg Oral NOW Maira King MD      
 sodium chloride (NS) flush 5-10 mL  5-10 mL IntraVENous PRN Maira King MD      
 sodium chloride 0.9 % bolus infusion 1,000 mL  1,000 mL IntraVENous ONCE Augusto Sanches Prudencsharon Amador MD      
 Followed by  
[address] [place] [address] sodium chloride 0.9 % bolus infusion 1,000 mL  1,000 mL IntraVENous ONCE Denis Lord MD      
 Followed by  
[address] [place] [address] sodium chloride 0.9 % bolus infusion 721 mL  721 mL IntraVENous ONCE Denis Lord MD      
 
Current Outpatient Prescriptions Medication Sig Dispense Refill  prazosin (MINIPRESS) 2 mg capsule TAKE 1 CAPSULE BY MOUTH TWICE A DAY. 60 Cap 2  
 DULoxetine 40 mg cpDR Take 40 mg by mouth daily. 30 Cap 2  primidone (MYSOLINE) 50 mg tablet 1 po qam and 3 po qhs 120 Tab 11  
 acetaminophen-codeine (TYLENOL #3) 300-30 mg per tablet Take 1 Tab by mouth every eight (8) hours as needed. Max Daily Amount: 3 Tabs. 5 Tab 0  ALPRAZolam (XANAX) 1 mg tablet Take 1 Tab by mouth three (3) times daily as needed for Anxiety. Max Daily Amount: 3 mg. 3 Tab 0  
 cetirizine (ZYRTEC) 10 mg tablet Take 1 Tab by mouth daily. 10 Tab 0  promethazine (PHENERGAN) 25 mg tablet Take 1 Tab by mouth every six (6) hours as needed. 4 Tab 0  ibuprofen (MOTRIN) 800 mg tablet Take 1 Tab by mouth every eight (8) hours as needed. 20 Tab 0  
 guaiFENesin ER (MUCINEX) 1,200 mg Ta12 ER tablet Take 1 Tab by mouth two (2) times a day. 14 Tab 0  
 furosemide (LASIX) 40 mg tablet Take 1 Tab by mouth daily. 20 Tab 0  
 PHENobarbital (LUMINAL) 60 mg tablet Take 1 Tab by mouth two (2) times a day. Max Daily Amount: 120 mg. 60 Tab 1  
 dicyclomine (BENTYL) 10 mg capsule  glipiZIDE SR (GLUCOTROL) 5 mg CR tablet Take 5 mg by mouth two (2) times daily as needed (Patient monitors her BG levels and takes when she is also taking a steroid. ).  montelukast (SINGULAIR) 10 mg tablet Take 10 mg by mouth daily.  ezetimibe-simvastatin (VYTORIN 10/40) 10-40 mg per tablet Take 1 tablet by mouth nightly.  metoprolol (LOPRESSOR) 25 mg tablet Take 1 Tab by mouth two (2) times a day. 60 Tab 6  
 benzonatate (TESSALON) 200 mg capsule Take 1 Cap by mouth two (2) times daily as needed. 30 Cap 0  nystatin (MYCOSTATIN) 100,000 unit/mL suspension Take 5 mL by mouth four (4) times daily. swish and spit (Patient taking differently: Take 500,000 Units by mouth four (4) times daily as needed. swish and spit) 180 mL 0  
 methocarbamol (ROBAXIN) 750 mg tablet Take 750-1,500 mg by mouth four (4) times daily as needed.  niacin  mg CR capsule Take 250 mg by mouth daily.  hyoscyamine SL (LEVSIN/SL) 0.125 mg SL tablet 0.125 mg by SubLINGual route three (3) times daily as needed for Cramping.  esomeprazole (NEXIUM) 40 mg capsule Take 40 mg by mouth daily.  MAGOX 400 mg tablet TAKE ONE TABLET TWICE A DAY 60 Tab 3  
 vit B Cmplx 3-FA-Vit C-Biotin (NEPHRO SHREYA RX) 1- mg-mg-mcg tablet Take 1 Tab by mouth daily.  aspirin 81 mg chewable tablet Take 81 mg by mouth daily.  levothyroxine (SYNTHROID) 200 mcg tablet Take 200 mcg by mouth daily (before breakfast).  albuterol (VENTOLIN HFA) 90 mcg/actuation inhaler Take 2 Puffs by inhalation every four (4) hours as needed for Wheezing.  albuterol-ipratropium (DUONEB) 2.5 mg-0.5 mg/3 ml nebulizer solution 3 mL by Nebulization route every six (6) hours as needed for Wheezing. Past History Past Medical History: 
Past Medical History:  
Diagnosis Date  Anxiety  Bone spur NECK  COPD  Depression  Endocrine disease   
 hypothyroidism  Fibromyalgia  Fusion of spine of cervical region  Gastrointestinal disorder   
 gerd  Hyperlipidemia  Hypothyroid  Morbid obesity ([facility] [address]  Osteoarthritis  PUD (peptic ulcer disease)  Tobacco abuse Past Surgical History: 
Past Surgical History:  
Procedure Laterality Date  BRONCHOSCOPY-FIBER/THERAPY  4/3/2015  CARDIAC CATHETERIZATION  2013  COLONOSCOPY,DIAGNOSTIC  10/30/2014  HX CATARACT REMOVAL    
 bilateral  
 HX GYN    
   HX ORTHOPAEDIC Ruptured disc, knuckles on right hand  UPPER GI ENDOSCOPY,BIOPSY  10/30/2014  UPPER GI ENDOSCOPY,DILATN W GUIDE  10/30/2014 Family History: 
Family History Problem Relation Age of Onset  Heart Disease Mother  Dementia Mother  Thyroid Disease Mother  Heart Disease Father  Alcohol abuse Father  Psychiatric Disorder Father  Dementia Father  Bipolar Disorder Father  Psychiatric Disorder Sister  Suicide Sister  Psychiatric Disorder Brother  Suicide Brother  Psychiatric Disorder Other  Psychiatric Disorder Daughter  Bipolar Disorder Daughter  Coronary Artery Disease Other   
  multiple family members in 52's Social History: 
Social History Substance Use Topics  Smoking status: Former Smoker Packs/day: 1.00 Years: 30.00  Smokeless tobacco: Never Used  Alcohol use Yes Comment: occasional  
 
 
Allergies: Allergies Allergen Reactions  Latex Contact Dermatitis  Dilaudid [Hydromorphone (Pf)] Other (comments) Feels like bugs are crawling all over her  Lipitor [Atorvastatin] Other (comments) LIVER TROUBLE ON THIS MEDICATION  
 Remeron [Mirtazapine] Other (comments) Tremors  Sulfa (Sulfonamide Antibiotics) Itching Review of Systems Review of Systems Constitutional: Positive for chills and fever. Negative for activity change. HENT: Negative for congestion and sore throat. Eyes: Negative for pain and redness. Respiratory: Positive for cough (productive) and chest tightness. Negative for shortness of breath. Cardiovascular: Negative for chest pain and palpitations. Gastrointestinal: Negative for abdominal pain, diarrhea, nausea and vomiting. Genitourinary: Negative for dysuria, frequency and urgency. Musculoskeletal: Negative for back pain and neck pain. +Bll rib pain Skin: Negative for rash. Neurological: Negative for syncope, light-headedness and headaches.   
Psychiatric/Behavioral: Negative for confusion. All other systems reviewed and are negative. Physical Exam  
Physical Exam  
Constitutional: She is oriented to person, place, and time. She appears well-developed and well-nourished. No distress. HENT:  
Head: Normocephalic. Nose: Nose normal.  
Mouth/Throat: Oropharynx is clear and moist. No oropharyngeal exudate. Eyes: Conjunctivae are normal. Pupils are equal, round, and reactive to light. No scleral icterus. Neck: Normal range of motion. Neck supple. No JVD present. No tracheal deviation present. No thyromegaly present. Cardiovascular: Normal rate, regular rhythm and intact distal pulses. Exam reveals no gallop and no friction rub. No murmur heard. Pulmonary/Chest: Effort normal. No stridor. No respiratory distress. She has no rales. Coarse rhonchi. Bll expiratory wheezes Abdominal: Soft. Bowel sounds are normal. She exhibits no distension. There is no tenderness. There is no rebound and no guarding. Musculoskeletal: Normal range of motion. She exhibits no edema. Lymphadenopathy:  
  She has no cervical adenopathy. Neurological: She is alert and oriented to person, place, and time. No cranial nerve deficit. She exhibits normal muscle tone. Coordination normal.  
Skin: Skin is warm and dry. No rash noted. She is not diaphoretic. No erythema. Psychiatric: She has a normal mood and affect. Her behavior is normal.  
Nursing note and vitals reviewed. Diagnostic Study Results Labs - Recent Results (from the past 12 hour(s)) EKG, 12 LEAD, INITIAL Collection Time: 07/31/18  6:28 PM  
Result Value Ref Range Ventricular Rate 105 BPM  
 Atrial Rate 105 BPM  
 P-R Interval 150 ms QRS Duration 96 ms  
 Q-T Interval 346 ms  
 QTC Calculation (Bezet) 457 ms Calculated P Axis 83 degrees Calculated R Axis 69 degrees Calculated T Axis 37 degrees Diagnosis Sinus tachycardia Incomplete right bundle branch block Nonspecific T wave abnormality When compared with ECG of 21-MAY-2017 13:56, 
Criteria for Anterior infarct are no longer present CBC WITH AUTOMATED DIFF Collection Time: 07/31/18  8:04 PM  
Result Value Ref Range WBC 22.0 (H) 3.6 - 11.0 K/uL  
 RBC 4.26 3.80 - 5.20 M/uL  
 HGB 13.2 11.5 - 16.0 g/dL HCT 40.5 35.0 - 47.0 % MCV 95.1 80.0 - 99.0 FL  
 MCH 31.0 26.0 - 34.0 PG  
 MCHC 32.6 30.0 - 36.5 g/dL  
 RDW 13.6 11.5 - 14.5 % PLATELET 184 (H) 308 - 400 K/uL MPV 9.0 8.9 - 12.9 FL  
 NRBC 0.0 0  WBC ABSOLUTE NRBC 0.00 0.00 - 0.01 K/uL NEUTROPHILS 82 (H) 32 - 75 % LYMPHOCYTES 11 (L) 12 - 49 % MONOCYTES 7 5 - 13 % EOSINOPHILS 0 0 - 7 % BASOPHILS 0 0 - 1 % IMMATURE GRANULOCYTES 1 (H) 0.0 - 0.5 % ABS. NEUTROPHILS 18.0 (H) 1.8 - 8.0 K/UL  
 ABS. LYMPHOCYTES 2.4 0.8 - 3.5 K/UL  
 ABS. MONOCYTES 1.5 (H) 0.0 - 1.0 K/UL  
 ABS. EOSINOPHILS 0.0 0.0 - 0.4 K/UL  
 ABS. BASOPHILS 0.0 0.0 - 0.1 K/UL  
 ABS. IMM. GRANS. 0.2 (H) 0.00 - 0.04 K/UL  
 DF AUTOMATED METABOLIC PANEL, COMPREHENSIVE Collection Time: 07/31/18  8:04 PM  
Result Value Ref Range Sodium 137 136 - 145 mmol/L Potassium 4.2 3.5 - 5.1 mmol/L Chloride 104 97 - 108 mmol/L  
 CO2 25 21 - 32 mmol/L Anion gap 8 5 - 15 mmol/L Glucose 102 (H) 65 - 100 mg/dL BUN 10 6 - 20 MG/DL Creatinine 0.87 0.55 - 1.02 MG/DL  
 BUN/Creatinine ratio 11 (L) 12 - 20 GFR est AA >60 >60 ml/min/1.73m2 GFR est non-AA >60 >60 ml/min/1.73m2 Calcium 10.0 8.5 - 10.1 MG/DL Bilirubin, total 0.3 0.2 - 1.0 MG/DL  
 ALT (SGPT) 98 (H) 12 - 78 U/L  
 AST (SGOT) 25 15 - 37 U/L Alk. phosphatase 159 (H) 45 - 117 U/L Protein, total 7.7 6.4 - 8.2 g/dL Albumin 2.7 (L) 3.5 - 5.0 g/dL Globulin 5.0 (H) 2.0 - 4.0 g/dL A-G Ratio 0.5 (L) 1.1 - 2.2    
TROPONIN I Collection Time: 07/31/18  8:04 PM  
Result Value Ref Range Troponin-I, Qt. <0.05 <0.05 ng/mL LACTIC ACID  Collection Time: 07/31/18  8:04 PM  
Result Value Ref Range Lactic acid 2.5 (HH) 0.4 - 2.0 MMOL/L  
GLUCOSE, POC Collection Time: 07/31/18  8:34 PM  
Result Value Ref Range Glucose (POC) 105 (H) 65 - 100 mg/dL Performed by Valentina Evangelista URINALYSIS W/ REFLEX CULTURE Collection Time: 07/31/18  9:10 PM  
Result Value Ref Range Color YELLOW/STRAW Appearance CLEAR CLEAR Specific gravity 1.012 1.003 - 1.030    
 pH (UA) 7.0 5.0 - 8.0 Protein NEGATIVE  NEG mg/dL Glucose NEGATIVE  NEG mg/dL Ketone NEGATIVE  NEG mg/dL Bilirubin NEGATIVE  NEG Blood NEGATIVE  NEG Urobilinogen 0.2 0.2 - 1.0 EU/dL Nitrites NEGATIVE  NEG Leukocyte Esterase NEGATIVE  NEG    
 WBC 0-4 0 - 4 /hpf  
 RBC 0-5 0 - 5 /hpf Epithelial cells FEW FEW /lpf Bacteria NEGATIVE  NEG /hpf  
 UA:UC IF INDICATED PENDING Hyaline cast 0-2 0 - 5 /lpf Radiologic Studies -  
XR CHEST PA LAT Final Result CT Results  (Last 48 hours) None CXR Results  (Last 48 hours) 07/1955  XR CHEST PA LAT Final result Impression:  IMPRESSION: There is opacification/consolidation in the right middle lobe and  
left lung base posterior medially may represent pneumonia and follow-up to  
clearing is necessary. Narrative:  EXAM:  XR CHEST PA LAT INDICATION:   cough COMPARISON: 2017. FINDINGS: PA and lateral radiographs of the chest demonstrate area of  
consolidation/airspace disease right middle lobe and left base. . The cardiac and  
mediastinal contours and pulmonary vascularity are normal.  The bones and soft  
tissues are within normal limits. Medical Decision Making I am the first provider for this patient. I reviewed the vital signs, available nursing notes, past medical history, past surgical history, family history and social history. Vital Signs-Reviewed the patient's vital signs.  
Patient Vitals for the past 12 hrs: 
 Temp Pulse Resp BP SpO2 07/31/18 1814 98.2 °F (36.8 °C) (!) 109 22 123/85 93 % Pulse Oximetry Analysis - 93% on RA Records Reviewed: Nursing Notes, Old Medical Records, Previous Radiology Studies and Previous Laboratory Studies Provider Notes (Medical Decision Making): DDx: Pneumonia, COPD, ACS. Written by Barby Baker ED Scribe, as dictated by Enma Frankel MD 
 
ED Course:  
Initial assessment performed. The patients presenting problems have been discussed, and they are in agreement with the care plan formulated and outlined with them. I have encouraged them to ask questions as they arise throughout their visit. ED EKG interpretation: 18:28 Rhythm: sinus tachycardia; and regular . Rate (approx.): 105; Axis: normal; IN Interval: normal; QRS interval: incomplete RBBB; ST/T wave: non-specific changes; This EKG was interpreted by Enma Frankel MD,ED Provider. Progress Notes: 
CONSULT NOTE:  
9:16 PM 
Enma Frankel MD spoke with Dr. Judith Landon, Specialty: Hospitalist 
Discussed pt's hx, disposition, and available diagnostic and imaging results. Reviewed care plans. Consultant will evaluate pt for admission. Written by NORI Arechigaibe, as dictated by Enma Frankel MD. Critical Care Time:  
0 minutes. Disposition: PLAN FOR ADMISSION: 
9:16 PM 
The patient is being admitted to the hospital. The results of their tests and reasons for their admission have been discussed with the patient and/or available family. The patient/family has conveyed agreement and understanding for the need to be admitted and for their admission diagnosis. Consultation has been made with the inpatient physician specialist for hospitalization.   
Written by Babry Baker ED Scribe, as dictated by Enma Frankel MD 
 
 
Will admit to hospitalist for CAP and sepsis; wbc elevated at 22 and lactate of 2.5; negative troponin; started iv levaquin and fluid resuscitation; Enma Frankel MD 
 
 
LABS COMPLETED AND REVIEWED: 
Recent Results (from the past 12 hour(s)) EKG, 12 LEAD, INITIAL Collection Time: 07/31/18  6:28 PM  
Result Value Ref Range Ventricular Rate 105 BPM  
 Atrial Rate 105 BPM  
 P-R Interval 150 ms QRS Duration 96 ms  
 Q-T Interval 346 ms  
 QTC Calculation (Bezet) 457 ms Calculated P Axis 83 degrees Calculated R Axis 69 degrees Calculated T Axis 37 degrees Diagnosis Sinus tachycardia Incomplete right bundle branch block Nonspecific T wave abnormality When compared with ECG of 21-MAY-2017 13:56, 
Criteria for Anterior infarct are no longer present CBC WITH AUTOMATED DIFF Collection Time: 07/31/18  8:04 PM  
Result Value Ref Range WBC 22.0 (H) 3.6 - 11.0 K/uL  
 RBC 4.26 3.80 - 5.20 M/uL  
 HGB 13.2 11.5 - 16.0 g/dL HCT 40.5 35.0 - 47.0 % MCV 95.1 80.0 - 99.0 FL  
 MCH 31.0 26.0 - 34.0 PG  
 MCHC 32.6 30.0 - 36.5 g/dL  
 RDW 13.6 11.5 - 14.5 % PLATELET 067 (H) 827 - 400 K/uL MPV 9.0 8.9 - 12.9 FL  
 NRBC 0.0 0  WBC ABSOLUTE NRBC 0.00 0.00 - 0.01 K/uL NEUTROPHILS 82 (H) 32 - 75 % LYMPHOCYTES 11 (L) 12 - 49 % MONOCYTES 7 5 - 13 % EOSINOPHILS 0 0 - 7 % BASOPHILS 0 0 - 1 % IMMATURE GRANULOCYTES 1 (H) 0.0 - 0.5 % ABS. NEUTROPHILS 18.0 (H) 1.8 - 8.0 K/UL  
 ABS. LYMPHOCYTES 2.4 0.8 - 3.5 K/UL  
 ABS. MONOCYTES 1.5 (H) 0.0 - 1.0 K/UL  
 ABS. EOSINOPHILS 0.0 0.0 - 0.4 K/UL  
 ABS. BASOPHILS 0.0 0.0 - 0.1 K/UL  
 ABS. IMM. GRANS. 0.2 (H) 0.00 - 0.04 K/UL  
 DF AUTOMATED METABOLIC PANEL, COMPREHENSIVE Collection Time: 07/31/18  8:04 PM  
Result Value Ref Range Sodium 137 136 - 145 mmol/L Potassium 4.2 3.5 - 5.1 mmol/L Chloride 104 97 - 108 mmol/L  
 CO2 25 21 - 32 mmol/L Anion gap 8 5 - 15 mmol/L Glucose 102 (H) 65 - 100 mg/dL BUN 10 6 - 20 MG/DL Creatinine 0.87 0.55 - 1.02 MG/DL  
 BUN/Creatinine ratio 11 (L) 12 - 20 GFR est AA >60 >60 ml/min/1.73m2 GFR est non-AA >60 >60 ml/min/1.73m2  Calcium 10.0 8.5 - 10.1 MG/DL  
 Bilirubin, total 0.3 0.2 - 1.0 MG/DL  
 ALT (SGPT) 98 (H) 12 - 78 U/L  
 AST (SGOT) 25 15 - 37 U/L Alk. phosphatase 159 (H) 45 - 117 U/L Protein, total 7.7 6.4 - 8.2 g/dL Albumin 2.7 (L) 3.5 - 5.0 g/dL Globulin 5.0 (H) 2.0 - 4.0 g/dL A-G Ratio 0.5 (L) 1.1 - 2.2    
TROPONIN I Collection Time: 07/31/18  8:04 PM  
Result Value Ref Range Troponin-I, Qt. <0.05 <0.05 ng/mL LACTIC ACID Collection Time: 07/31/18  8:04 PM  
Result Value Ref Range Lactic acid 2.5 (HH) 0.4 - 2.0 MMOL/L  
GLUCOSE, POC Collection Time: 07/31/18  8:34 PM  
Result Value Ref Range Glucose (POC) 105 (H) 65 - 100 mg/dL Performed by Nadeem Lyons URINALYSIS W/ REFLEX CULTURE Collection Time: 07/31/18  9:10 PM  
Result Value Ref Range Color YELLOW/STRAW Appearance CLEAR CLEAR Specific gravity 1.012 1.003 - 1.030    
 pH (UA) 7.0 5.0 - 8.0 Protein NEGATIVE  NEG mg/dL Glucose NEGATIVE  NEG mg/dL Ketone NEGATIVE  NEG mg/dL Bilirubin NEGATIVE  NEG Blood NEGATIVE  NEG Urobilinogen 0.2 0.2 - 1.0 EU/dL Nitrites NEGATIVE  NEG Leukocyte Esterase NEGATIVE  NEG    
 WBC 0-4 0 - 4 /hpf  
 RBC 0-5 0 - 5 /hpf Epithelial cells FEW FEW /lpf Bacteria NEGATIVE  NEG /hpf  
 UA:UC IF INDICATED PENDING Hyaline cast 0-2 0 - 5 /lpf IMAGING COMPLETED AND REVIEWED: 
XR CHEST PA LAT Final Result CLINICAL IMPRESSION: 
1. Community acquired pneumonia, unspecified laterality 2. Sepsis, due to unspecified organism ([address] Plan: 1. Admit Current Discharge Medication List  
  
 
2. Follow-up Information None Return to ED if worse Diagnosis Clinical Impression: 1. Community acquired pneumonia, unspecified laterality 2. Sepsis, due to unspecified organism ([address] Attestations:  
 
This note is prepared by Kia Sawyer, acting as scribe for MD Kelley Marquez MD: The scribe's documentation has been prepared under my direction and personally reviewed by me in its entirety. I confirm that the note above accurately reflects all work, treatment, procedures, and medical decision making performed by me.

## 2018-08-01 LAB
ALBUMIN SERPL-MCNC: 2.4 G/DL (ref 3.5–5)
ALBUMIN/GLOB SERPL: 0.5 {RATIO} (ref 1.1–2.2)
ALP SERPL-CCNC: 138 U/L (ref 45–117)
ALT SERPL-CCNC: 79 U/L (ref 12–78)
ANION GAP SERPL CALC-SCNC: 7 MMOL/L (ref 5–15)
ARTERIAL PATENCY WRIST A: ABNORMAL
AST SERPL-CCNC: 15 U/L (ref 15–37)
ATRIAL RATE: 105 BPM
BASE DEFICIT BLDA-SCNC: 4.1 MMOL/L
BDY SITE: ABNORMAL
BILIRUB SERPL-MCNC: 0.2 MG/DL (ref 0.2–1)
BUN SERPL-MCNC: 9 MG/DL (ref 6–20)
BUN/CREAT SERPL: 12 (ref 12–20)
CALCIUM SERPL-MCNC: 9.9 MG/DL (ref 8.5–10.1)
CALCULATED P AXIS, ECG09: 83 DEGREES
CALCULATED R AXIS, ECG10: 69 DEGREES
CALCULATED T AXIS, ECG11: 37 DEGREES
CHLORIDE SERPL-SCNC: 106 MMOL/L (ref 97–108)
CO2 SERPL-SCNC: 25 MMOL/L (ref 21–32)
CREAT SERPL-MCNC: 0.76 MG/DL (ref 0.55–1.02)
DIAGNOSIS, 93000: NORMAL
ERYTHROCYTE [DISTWIDTH] IN BLOOD BY AUTOMATED COUNT: 13.4 % (ref 11.5–14.5)
GAS FLOW.O2 O2 DELIVERY SYS: 6 L/MIN
GLOBULIN SER CALC-MCNC: 4.9 G/DL (ref 2–4)
GLUCOSE BLD STRIP.AUTO-MCNC: 170 MG/DL (ref 65–100)
GLUCOSE BLD STRIP.AUTO-MCNC: 175 MG/DL (ref 65–100)
GLUCOSE BLD STRIP.AUTO-MCNC: 194 MG/DL (ref 65–100)
GLUCOSE BLD STRIP.AUTO-MCNC: 211 MG/DL (ref 65–100)
GLUCOSE SERPL-MCNC: 157 MG/DL (ref 65–100)
HCO3 BLDA-SCNC: 21 MMOL/L (ref 22–26)
HCT VFR BLD AUTO: 38.6 % (ref 35–47)
HGB BLD-MCNC: 12.2 G/DL (ref 11.5–16)
LACTATE SERPL-SCNC: 0.8 MMOL/L (ref 0.4–2)
LACTATE SERPL-SCNC: 2 MMOL/L (ref 0.4–2)
MAGNESIUM SERPL-MCNC: 2.5 MG/DL (ref 1.6–2.4)
MCH RBC QN AUTO: 30.8 PG (ref 26–34)
MCHC RBC AUTO-ENTMCNC: 31.6 G/DL (ref 30–36.5)
MCV RBC AUTO: 97.5 FL (ref 80–99)
NRBC # BLD: 0 K/UL (ref 0–0.01)
NRBC BLD-RTO: 0 PER 100 WBC
P-R INTERVAL, ECG05: 150 MS
PCO2 BLDA: 41 MMHG (ref 35–45)
PH BLDA: 7.34 [PH] (ref 7.35–7.45)
PHOSPHATE SERPL-MCNC: 3.4 MG/DL (ref 2.6–4.7)
PLATELET # BLD AUTO: 379 K/UL (ref 150–400)
PMV BLD AUTO: 9.2 FL (ref 8.9–12.9)
PO2 BLDA: 82 MMHG (ref 80–100)
POTASSIUM SERPL-SCNC: 4.9 MMOL/L (ref 3.5–5.1)
PROT SERPL-MCNC: 7.3 G/DL (ref 6.4–8.2)
Q-T INTERVAL, ECG07: 346 MS
QRS DURATION, ECG06: 96 MS
QTC CALCULATION (BEZET), ECG08: 457 MS
RBC # BLD AUTO: 3.96 M/UL (ref 3.8–5.2)
SAO2 % BLD: 95 % (ref 92–97)
SAO2% DEVICE SAO2% SENSOR NAME: ABNORMAL
SERVICE CMNT-IMP: ABNORMAL
SODIUM SERPL-SCNC: 138 MMOL/L (ref 136–145)
SPECIMEN SITE: ABNORMAL
VENTRICULAR RATE, ECG03: 105 BPM
WBC # BLD AUTO: 24.8 K/UL (ref 3.6–11)

## 2018-08-01 PROCEDURE — 77010033678 HC OXYGEN DAILY

## 2018-08-01 PROCEDURE — 74011636637 HC RX REV CODE- 636/637: Performed by: HOSPITALIST

## 2018-08-01 PROCEDURE — 74011000258 HC RX REV CODE- 258: Performed by: INTERNAL MEDICINE

## 2018-08-01 PROCEDURE — 74011000250 HC RX REV CODE- 250: Performed by: HOSPITALIST

## 2018-08-01 PROCEDURE — 84100 ASSAY OF PHOSPHORUS: CPT | Performed by: HOSPITALIST

## 2018-08-01 PROCEDURE — 74011250637 HC RX REV CODE- 250/637: Performed by: INTERNAL MEDICINE

## 2018-08-01 PROCEDURE — 74011250636 HC RX REV CODE- 250/636: Performed by: EMERGENCY MEDICINE

## 2018-08-01 PROCEDURE — 74011250636 HC RX REV CODE- 250/636: Performed by: HOSPITALIST

## 2018-08-01 PROCEDURE — 74011250636 HC RX REV CODE- 250/636: Performed by: INTERNAL MEDICINE

## 2018-08-01 PROCEDURE — 82962 GLUCOSE BLOOD TEST: CPT

## 2018-08-01 PROCEDURE — 74011000250 HC RX REV CODE- 250: Performed by: INTERNAL MEDICINE

## 2018-08-01 PROCEDURE — 83605 ASSAY OF LACTIC ACID: CPT | Performed by: HOSPITALIST

## 2018-08-01 PROCEDURE — 65660000000 HC RM CCU STEPDOWN

## 2018-08-01 PROCEDURE — 94760 N-INVAS EAR/PLS OXIMETRY 1: CPT

## 2018-08-01 PROCEDURE — 36415 COLL VENOUS BLD VENIPUNCTURE: CPT | Performed by: HOSPITALIST

## 2018-08-01 PROCEDURE — 83735 ASSAY OF MAGNESIUM: CPT | Performed by: HOSPITALIST

## 2018-08-01 PROCEDURE — 85027 COMPLETE CBC AUTOMATED: CPT | Performed by: HOSPITALIST

## 2018-08-01 PROCEDURE — 94640 AIRWAY INHALATION TREATMENT: CPT

## 2018-08-01 PROCEDURE — 82803 BLOOD GASES ANY COMBINATION: CPT | Performed by: INTERNAL MEDICINE

## 2018-08-01 PROCEDURE — 80053 COMPREHEN METABOLIC PANEL: CPT | Performed by: HOSPITALIST

## 2018-08-01 PROCEDURE — 74011250637 HC RX REV CODE- 250/637: Performed by: HOSPITALIST

## 2018-08-01 PROCEDURE — 36600 WITHDRAWAL OF ARTERIAL BLOOD: CPT | Performed by: INTERNAL MEDICINE

## 2018-08-01 PROCEDURE — 83605 ASSAY OF LACTIC ACID: CPT | Performed by: INTERNAL MEDICINE

## 2018-08-01 RX ORDER — VANCOMYCIN HYDROCHLORIDE
1250 EVERY 12 HOURS
Status: DISCONTINUED | OUTPATIENT
Start: 2018-08-02 | End: 2018-08-03

## 2018-08-01 RX ORDER — METOPROLOL TARTRATE 25 MG/1
25 TABLET, FILM COATED ORAL 2 TIMES DAILY
Status: DISCONTINUED | OUTPATIENT
Start: 2018-08-01 | End: 2018-08-12 | Stop reason: HOSPADM

## 2018-08-01 RX ORDER — IPRATROPIUM BROMIDE AND ALBUTEROL SULFATE 2.5; .5 MG/3ML; MG/3ML
3 SOLUTION RESPIRATORY (INHALATION)
Status: DISCONTINUED | OUTPATIENT
Start: 2018-08-01 | End: 2018-08-01

## 2018-08-01 RX ORDER — OXYCODONE HYDROCHLORIDE 5 MG/1
10 TABLET ORAL
Status: DISCONTINUED | OUTPATIENT
Start: 2018-08-01 | End: 2018-08-02

## 2018-08-01 RX ORDER — KETOROLAC TROMETHAMINE 30 MG/ML
30 INJECTION, SOLUTION INTRAMUSCULAR; INTRAVENOUS
Status: COMPLETED | OUTPATIENT
Start: 2018-08-01 | End: 2018-08-01

## 2018-08-01 RX ORDER — LIDOCAINE 50 MG/G
1 PATCH TOPICAL EVERY 24 HOURS
Status: DISCONTINUED | OUTPATIENT
Start: 2018-08-01 | End: 2018-08-12 | Stop reason: HOSPADM

## 2018-08-01 RX ORDER — ALBUTEROL SULFATE 0.83 MG/ML
2.5 SOLUTION RESPIRATORY (INHALATION)
Status: DISCONTINUED | OUTPATIENT
Start: 2018-08-01 | End: 2018-08-12 | Stop reason: HOSPADM

## 2018-08-01 RX ORDER — ALBUTEROL SULFATE 0.83 MG/ML
2.5 SOLUTION RESPIRATORY (INHALATION)
Status: DISCONTINUED | OUTPATIENT
Start: 2018-08-01 | End: 2018-08-07

## 2018-08-01 RX ORDER — PRAZOSIN HYDROCHLORIDE 1 MG/1
2 CAPSULE ORAL 2 TIMES DAILY
Status: DISCONTINUED | OUTPATIENT
Start: 2018-08-01 | End: 2018-08-07

## 2018-08-01 RX ADMIN — ALPRAZOLAM 1 MG: 0.5 TABLET ORAL at 01:40

## 2018-08-01 RX ADMIN — PIPERACILLIN SODIUM,TAZOBACTAM SODIUM 3.38 G: 3; .375 INJECTION, POWDER, FOR SOLUTION INTRAVENOUS at 10:59

## 2018-08-01 RX ADMIN — Medication 10 ML: at 01:42

## 2018-08-01 RX ADMIN — METHYLPREDNISOLONE SODIUM SUCCINATE 40 MG: 40 INJECTION, POWDER, FOR SOLUTION INTRAMUSCULAR; INTRAVENOUS at 21:28

## 2018-08-01 RX ADMIN — METHYLPREDNISOLONE SODIUM SUCCINATE 40 MG: 40 INJECTION, POWDER, FOR SOLUTION INTRAMUSCULAR; INTRAVENOUS at 13:24

## 2018-08-01 RX ADMIN — METOPROLOL TARTRATE 25 MG: 25 TABLET ORAL at 02:06

## 2018-08-01 RX ADMIN — PIPERACILLIN SODIUM,TAZOBACTAM SODIUM 3.38 G: 3; .375 INJECTION, POWDER, FOR SOLUTION INTRAVENOUS at 16:55

## 2018-08-01 RX ADMIN — PHENOBARBITAL 64.8 MG: 32.4 TABLET ORAL at 09:45

## 2018-08-01 RX ADMIN — ASPIRIN 81 MG 81 MG: 81 TABLET ORAL at 09:48

## 2018-08-01 RX ADMIN — METOPROLOL TARTRATE 25 MG: 25 TABLET ORAL at 09:48

## 2018-08-01 RX ADMIN — METOPROLOL TARTRATE 25 MG: 25 TABLET ORAL at 18:23

## 2018-08-01 RX ADMIN — PRIMIDONE 150 MG: 50 TABLET ORAL at 21:28

## 2018-08-01 RX ADMIN — METHOCARBAMOL 750 MG: 500 TABLET ORAL at 23:18

## 2018-08-01 RX ADMIN — INSULIN LISPRO 2 UNITS: 100 INJECTION, SOLUTION INTRAVENOUS; SUBCUTANEOUS at 08:33

## 2018-08-01 RX ADMIN — VANCOMYCIN HYDROCHLORIDE 2500 MG: 10 INJECTION, POWDER, LYOPHILIZED, FOR SOLUTION INTRAVENOUS at 16:53

## 2018-08-01 RX ADMIN — EZETIMIBE: 10 TABLET ORAL at 10:55

## 2018-08-01 RX ADMIN — PHENOBARBITAL 64.8 MG: 32.4 TABLET ORAL at 18:22

## 2018-08-01 RX ADMIN — OXYCODONE HYDROCHLORIDE 10 MG: 5 TABLET ORAL at 12:43

## 2018-08-01 RX ADMIN — KETOROLAC TROMETHAMINE 30 MG: 30 INJECTION, SOLUTION INTRAMUSCULAR at 10:48

## 2018-08-01 RX ADMIN — CETIRIZINE HYDROCHLORIDE 10 MG: 10 TABLET, FILM COATED ORAL at 02:06

## 2018-08-01 RX ADMIN — DULOXETINE HYDROCHLORIDE 40 MG: 20 CAPSULE, DELAYED RELEASE ORAL at 09:43

## 2018-08-01 RX ADMIN — OXYCODONE HYDROCHLORIDE 5 MG: 5 TABLET ORAL at 09:43

## 2018-08-01 RX ADMIN — INSULIN LISPRO 2 UNITS: 100 INJECTION, SOLUTION INTRAVENOUS; SUBCUTANEOUS at 18:23

## 2018-08-01 RX ADMIN — OXYCODONE HYDROCHLORIDE 5 MG: 5 TABLET ORAL at 02:06

## 2018-08-01 RX ADMIN — IPRATROPIUM BROMIDE AND ALBUTEROL SULFATE 3 ML: .5; 3 SOLUTION RESPIRATORY (INHALATION) at 02:05

## 2018-08-01 RX ADMIN — ALBUTEROL SULFATE 2.5 MG: 2.5 SOLUTION RESPIRATORY (INHALATION) at 15:14

## 2018-08-01 RX ADMIN — Medication 1 CAPSULE: at 09:49

## 2018-08-01 RX ADMIN — Medication 400 MG: at 09:44

## 2018-08-01 RX ADMIN — ALBUTEROL SULFATE 2.5 MG: 2.5 SOLUTION RESPIRATORY (INHALATION) at 23:05

## 2018-08-01 RX ADMIN — OXYCODONE HYDROCHLORIDE 5 MG: 5 TABLET ORAL at 05:52

## 2018-08-01 RX ADMIN — GUAIFENESIN 1200 MG: 600 TABLET, EXTENDED RELEASE ORAL at 20:00

## 2018-08-01 RX ADMIN — GUAIFENESIN 1200 MG: 600 TABLET, EXTENDED RELEASE ORAL at 09:43

## 2018-08-01 RX ADMIN — ALBUTEROL SULFATE 2.5 MG: 2.5 SOLUTION RESPIRATORY (INHALATION) at 19:17

## 2018-08-01 RX ADMIN — OXYCODONE HYDROCHLORIDE 10 MG: 5 TABLET ORAL at 16:35

## 2018-08-01 RX ADMIN — SODIUM CHLORIDE 100 ML/HR: 900 INJECTION, SOLUTION INTRAVENOUS at 01:41

## 2018-08-01 RX ADMIN — PRAZOSIN HYDROCHLORIDE 2 MG: 1 CAPSULE ORAL at 18:22

## 2018-08-01 RX ADMIN — BENZONATATE 200 MG: 100 CAPSULE ORAL at 19:56

## 2018-08-01 RX ADMIN — METHOCARBAMOL 750 MG: 500 TABLET ORAL at 19:56

## 2018-08-01 RX ADMIN — SODIUM CHLORIDE 1000 ML: 900 INJECTION, SOLUTION INTRAVENOUS at 00:09

## 2018-08-01 RX ADMIN — ENOXAPARIN SODIUM 40 MG: 100 INJECTION SUBCUTANEOUS at 09:51

## 2018-08-01 RX ADMIN — Medication 10 ML: at 05:49

## 2018-08-01 RX ADMIN — LEVOTHYROXINE SODIUM 200 MCG: 100 TABLET ORAL at 05:47

## 2018-08-01 RX ADMIN — IPRATROPIUM BROMIDE AND ALBUTEROL SULFATE 3 ML: .5; 3 SOLUTION RESPIRATORY (INHALATION) at 08:39

## 2018-08-01 RX ADMIN — Medication 10 ML: at 21:28

## 2018-08-01 RX ADMIN — Medication 250 MG: at 10:48

## 2018-08-01 RX ADMIN — PRIMIDONE 50 MG: 50 TABLET ORAL at 09:48

## 2018-08-01 RX ADMIN — MONTELUKAST SODIUM 10 MG: 10 TABLET, FILM COATED ORAL at 02:06

## 2018-08-01 RX ADMIN — OXYCODONE HYDROCHLORIDE 10 MG: 5 TABLET ORAL at 20:18

## 2018-08-01 RX ADMIN — PREDNISONE 30 MG: 20 TABLET ORAL at 09:45

## 2018-08-01 RX ADMIN — IPRATROPIUM BROMIDE AND ALBUTEROL SULFATE 3 ML: .5; 3 SOLUTION RESPIRATORY (INHALATION) at 10:08

## 2018-08-01 RX ADMIN — PANTOPRAZOLE SODIUM 40 MG: 40 TABLET, DELAYED RELEASE ORAL at 09:43

## 2018-08-01 RX ADMIN — IPRATROPIUM BROMIDE AND ALBUTEROL SULFATE 3 ML: .5; 3 SOLUTION RESPIRATORY (INHALATION) at 11:30

## 2018-08-01 RX ADMIN — ALPRAZOLAM 1 MG: 0.5 TABLET ORAL at 19:30

## 2018-08-01 RX ADMIN — Medication 400 MG: at 18:23

## 2018-08-01 RX ADMIN — ALPRAZOLAM 1 MG: 0.5 TABLET ORAL at 09:43

## 2018-08-01 RX ADMIN — BENZONATATE 200 MG: 100 CAPSULE ORAL at 10:13

## 2018-08-01 RX ADMIN — PRAZOSIN HYDROCHLORIDE 2 MG: 1 CAPSULE ORAL at 10:57

## 2018-08-01 NOTE — ED NOTES
TRANSFER - OUT REPORT: 
 
Verbal report given to MARIOLA Torres(name) on Kayla Mars  being transferred to Ortho room 3242(unit) for routine progression of care Report consisted of patients Situation, Background, Assessment and  
Recommendations(SBAR). Information from the following report(s) SBAR, Kardex, ED Summary, Intake/Output, MAR and Recent Results was reviewed with the receiving nurse. Lines:  
Peripheral IV 07/31/18 Left Antecubital (Active) Site Assessment Clean, dry, & intact 7/31/2018 10:28 PM  
Phlebitis Assessment 0 7/31/2018 10:28 PM  
Infiltration Assessment 0 7/31/2018 10:28 PM  
Dressing Status Clean, dry, & intact 7/31/2018 10:28 PM  
  
 
Opportunity for questions and clarification was provided. Patient transported with: 
 [facility]

## 2018-08-01 NOTE — PROGRESS NOTES
Pt is not appropriate for OT due to medical status. Pt may transfer off floor for higher level of care. Will defer and continue to follow.

## 2018-08-01 NOTE — H&P
Hospitalist Admission Note NAME: Tony Schroeder :  1955 MRN:  [id number] Date/Time:  2018 9:16 PM 
 
Patient PCP: Suleiman Griggs MD 
______________________________________________________________________ Given the patient's current clinical presentation, I have a high level of concern for decompensation if discharged from the emergency department. Complex decision making was performed, which includes reviewing the patient's available past medical records, laboratory results, and x-ray films. My assessment of this patient's clinical condition and my plan of care is as follows. Assessment / Plan: 
Acute hypoxic respiratory failure due to CAP with mild COPD exacerbation Sepsis due to above ( leukocytosis/tachycardia) Lactic acidosis  
-admit to tele for close monitoring 
-aggressive hydration 
- monitor lactic acid /leukocytosis -c/w LVQ started in ED, follow BC. Ordering sputum cultures 
-prednisone PO for mild COPD exacerbation + jet nebs scheduled  
-will check CT to better review underlying infection  
-O2 to keep taty >90%, wean as tolerated, assess for home O2 on DC Diarrhea  
-per pt started last month s/p doxycycline  
-follow C.diff  
-c/w FloraQ  
 
NIDDM type II 
-BP low side at 102 
-holding PO glipizide with poor po intake 
-monitor closely, BS may go high wit humberto of steroids -c/w SS  
 
HTN  
-BP stable 
-cont home metoprolol  
-lasix prn at home for le edema, will be holding Fibromyalgia / depression/ anxiety/ PTSD Polypharmacy Essential tremor  
-continue multiple home meds Ex smoker, quit 1 year ago Hyperlipidemia, cont statin Hypothyroidism, cont synthroid Obesity. Body mass index is 34.33 kg/(m^2). Code Status: Full code Surrogate Decision Maker:  DVT Prophylaxis: Lovenox GI Prophylaxis: not indicated Baseline: independent; no home O2; lives with  Subjective: CHIEF COMPLAINT: chest pain HISTORY OF PRESENT ILLNESS:    
Ariana Rashid is a 58 y.o.  female who presents with above complaint. Pt woke up today at 2 am with severe sharp L sided chest pain. She continued with intermittent chest pain all day. Pain is worsening with taking a deep breath. Pt called PCP this am and was advised to be seen in ED. Pt has to wait for her  to come back from work. She admits to productive cough with brownish sputum. Pt also admits to fever and chills. Pt stated she was sick with bronchitis in . She was treated with Doxycyline and prednisone at that time. She stated she never really recovered from that episode. She was not able to complete doxycycline due to diarrhea which she still have. She c/o worsening SOB and wheezing since this am. She was using jet nebs at home with some relieve. Pt is hypoxic in ED. Her O2 at 88% on RA at rest.  
Pt quit smoking 1 year ago. Vs: 98.2 °F (36.8 °C) - 109 - 123/85 - 22 - 88% RA at rest  
 
We were asked to admit for work up and evaluation of the above problems. Past Medical History:  
Diagnosis Date  Anxiety  Bone spur NECK  COPD  Depression  Endocrine disease   
 hypothyroidism  Fibromyalgia  Fusion of spine of cervical region  Gastrointestinal disorder   
 gerd  Hyperlipidemia  Hypothyroid  Morbid obesity ([address]  Osteoarthritis  PUD (peptic ulcer disease)  Tobacco abuse Past Surgical History:  
Procedure Laterality Date  BRONCHOSCOPY-FIBER/THERAPY  4/3/2015  CARDIAC CATHETERIZATION  2013  COLONOSCOPY,DIAGNOSTIC  10/30/2014  HX CATARACT REMOVAL    
 bilateral  
 HX GYN    
   HX ORTHOPAEDIC Ruptured disc, knuckles on right hand  UPPER GI ENDOSCOPY,BIOPSY  10/30/2014  UPPER GI ENDOSCOPY,DILATN W GUIDE  10/30/2014 Social History Substance Use Topics  Smoking status: Former Smoker   Packs/day: 1.00  
 Years: 30.00  Smokeless tobacco: Never Used  Alcohol use Yes Comment: occasional  
  
 
Family History Problem Relation Age of Onset  Heart Disease Mother  Dementia Mother  Thyroid Disease Mother  Heart Disease Father  Alcohol abuse Father  Psychiatric Disorder Father  Dementia Father  Bipolar Disorder Father  Psychiatric Disorder Sister  Suicide Sister  Psychiatric Disorder Brother  Suicide Brother  Psychiatric Disorder Other  Psychiatric Disorder Daughter  Bipolar Disorder Daughter  Coronary Artery Disease Other   
  multiple family members in 52's Allergies Allergen Reactions  Latex Contact Dermatitis  Dilaudid [Hydromorphone (Pf)] Other (comments) Feels like bugs are crawling all over her  Lipitor [Atorvastatin] Other (comments) LIVER TROUBLE ON THIS MEDICATION  
 Remeron [Mirtazapine] Other (comments) Tremors  Sulfa (Sulfonamide Antibiotics) Itching Prior to Admission medications Medication Sig Start Date End Date Taking? Authorizing Provider  
prazosin (MINIPRESS) 2 mg capsule TAKE 1 CAPSULE BY MOUTH TWICE A DAY. 6/18/18  Yes Donald Lanza NP  
DULoxetine 40 mg cpDR Take 40 mg by mouth daily. 6/18/18  Yes Donald Lanza NP  
primidone (MYSOLINE) 50 mg tablet 1 po qam and 3 po qhs 2/21/18  Yes Cecil Cheney MD  
acetaminophen-codeine (TYLENOL #3) 300-30 mg per tablet Take 1 Tab by mouth every eight (8) hours as needed. Max Daily Amount: 3 Tabs. 5/27/17  Yes Chichi Simons NP  
ALPRAZolam (XANAX) 1 mg tablet Take 1 Tab by mouth three (3) times daily as needed for Anxiety. Max Daily Amount: 3 mg. 5/27/17  Yes Chichi Simons NP  
cetirizine (ZYRTEC) 10 mg tablet Take 1 Tab by mouth daily. 5/27/17  Yes Chichi Simons NP  
promethazine (PHENERGAN) 25 mg tablet Take 1 Tab by mouth every six (6) hours as needed.  5/27/17  Yes Chichi Simons NP  
ibuprofen (MOTRIN) 800 mg tablet Take 1 Tab by mouth every eight (8) hours as needed. 5/27/17  Yes Chichi Simons NP  
guaiFENesin ER (MUCINEX) 1,200 mg Ta12 ER tablet Take 1 Tab by mouth two (2) times a day. 5/27/17  Yes Chichi Simons NP  
furosemide (LASIX) 40 mg tablet Take 1 Tab by mouth daily. 5/27/17  Yes Chichi Simons NP  
PHENobarbital (LUMINAL) 60 mg tablet Take 1 Tab by mouth two (2) times a day. Max Daily Amount: 120 mg. 5/27/17  Yes Leon Hall MD  
dicyclomine (BENTYL) 10 mg capsule  2/13/15  Yes Nico Martinez MD  
glipiZIDE SR (GLUCOTROL) 5 mg CR tablet Take 5 mg by mouth two (2) times daily as needed (Patient monitors her BG levels and takes when she is also taking a steroid. ). 2/16/15  Yes Nico Martinez MD  
montelukast (SINGULAIR) 10 mg tablet Take 10 mg by mouth daily. 2/16/15  Yes Nico Martinez MD  
ezetimibe-simvastatin (VYTORIN 10/40) 10-40 mg per tablet Take 1 tablet by mouth nightly. Yes Historical Provider  
metoprolol (LOPRESSOR) 25 mg tablet Take 1 Tab by mouth two (2) times a day. 2/6/14  Yes Melissa Ordaz NP  
benzonatate (TESSALON) 200 mg capsule Take 1 Cap by mouth two (2) times daily as needed. 1/20/14  Yes Leon Hall MD  
nystatin (MYCOSTATIN) 100,000 unit/mL suspension Take 5 mL by mouth four (4) times daily. swish and spit Patient taking differently: Take 500,000 Units by mouth four (4) times daily as needed. swish and spit 1/20/14  Yes Leon Hall MD  
methocarbamol (ROBAXIN) 750 mg tablet Take 750-1,500 mg by mouth four (4) times daily as needed. Yes Historical Provider  
niacin  mg CR capsule Take 250 mg by mouth daily. Yes Historical Provider  
hyoscyamine SL (LEVSIN/SL) 0.125 mg SL tablet 0.125 mg by SubLINGual route three (3) times daily as needed for Cramping. Yes Historical Provider  
esomeprazole (NEXIUM) 40 mg capsule Take 40 mg by mouth daily. Yes Historical Provider MAGOX 400 mg tablet TAKE ONE TABLET TWICE A DAY 11/4/13  Yes Craven Cogan, ELVI  
vit B Cmplx 3-FA-Vit C-Biotin (NEPHRO SHREYA RX) 1- mg-mg-mcg tablet Take 1 Tab by mouth daily. Yes Historical Provider  
aspirin 81 mg chewable tablet Take 81 mg by mouth daily. Yes Nico Other, MD  
levothyroxine (SYNTHROID) 200 mcg tablet Take 200 mcg by mouth daily (before breakfast). Yes Nico Martinez, MD  
albuterol (VENTOLIN HFA) 90 mcg/actuation inhaler Take 2 Puffs by inhalation every four (4) hours as needed for Wheezing. Historical Provider  
albuterol-ipratropium (DUONEB) 2.5 mg-0.5 mg/3 ml nebulizer solution 3 mL by Nebulization route every six (6) hours as needed for Wheezing. Historical Provider REVIEW OF SYSTEMS:    
I am not able to complete the review of systems because: The patient is intubated and sedated The patient has altered mental status due to his acute medical problems The patient has baseline aphasia from prior stroke(s) The patient has baseline dementia and is not reliable historian The patient is in acute medical distress and unable to provide information Total of 12 systems reviewed as follows:   
   POSITIVE= underlined text  Negative = text not underlined General:  fever, chills, sweats, generalized weakness, weight loss/gain,  
   loss of appetite Eyes:    blurred vision, eye pain, loss of vision, double vision ENT:    rhinorrhea, pharyngitis Respiratory:   cough, sputum production, SOB, RODRIGUEZ, wheezing, pleuritic pain  
Cardiology:   chest pain, palpitations, orthopnea, PND, edema, syncope Gastrointestinal:  abdominal pain , N/V, diarrhea, dysphagia, constipation, bleeding Genitourinary:  frequency, urgency, dysuria, hematuria, incontinence Muskuloskeletal :  arthralgia, myalgia, back pain Hematology:  easy bruising, nose or gum bleeding, lymphadenopathy Dermatological: rash, ulceration, pruritis, color change / jaundice Endocrine:   hot flashes or polydipsia Neurological:  headache, dizziness, confusion, focal weakness, paresthesia,    Speech difficulties, memory loss, gait difficulty Psychological: Feelings of anxiety, depression, agitation Objective: VITALS:   
Visit Vitals  /85 (BP 1 Location: Right arm, BP Patient Position: Sitting)  Pulse (!) 109  Temp 98.2 °F (36.8 °C)  Resp 22  
 Ht 5' 4\" (1.626 m)  Wt 90.7 kg (200 lb)  SpO2 93%  BMI 34.33 kg/m2 PHYSICAL EXAM: 
 
General:    Alert, cooperative, no distress, appears stated age. HEENT: Atraumatic, anicteric sclerae, pink conjunctivae No oral ulcers, mucosa moist, throat clear, dentition fair Neck:  Supple, symmetrical,  thyroid: non tender Lungs:   Coarse rhonchi bilaterally. + Wheezing. No rales. Chest wall:  No tenderness  No Accessory muscle use. Heart:   Regular  rhythm,  No  murmur   No edema Abdomen:   Soft, non-tender. Not distended. Bowel sounds normal 
Extremities: No cyanosis. No clubbing,   
  Skin turgor normal, Capillary refill normal, Radial dial pulse 2+ Skin:     Not pale. Not Jaundiced  No rashes Psych:  Good insight. Not depressed. Not anxious or agitated. Neurologic: EOMs intact. No facial asymmetry. No aphasia or slurred speech. Symmetrical strength, Sensation grossly intact. Alert and oriented X 4.  
 
_______________________________________________________________________ Care Plan discussed with: 
  Comments Patient y Family RN y   
Care Manager Consultant:  shaheed ED provider   
_______________________________________________________________________ Expected  Disposition:  
Home with Family y HH/PT/OT/RN   
SNF/LTC   
[facility]   
________________________________________________________________________ TOTAL TIME:  65  Minutes Critical Care Provided     Minutes non procedure based Comments Reviewed previous records  
>50% of visit spent in counseling and coordination of care  Discussion with patient and/or family and questions answered ________________________________________________________________________ Signed: Memo Barclay MD 
 
Procedures: see electronic medical records for all procedures/Xrays and details which were not copied into this note but were reviewed prior to creation of Plan. LAB DATA REVIEWED:   
Recent Results (from the past 24 hour(s)) EKG, 12 LEAD, INITIAL Collection Time: 07/31/18  6:28 PM  
Result Value Ref Range Ventricular Rate 105 BPM  
 Atrial Rate 105 BPM  
 P-R Interval 150 ms QRS Duration 96 ms  
 Q-T Interval 346 ms  
 QTC Calculation (Bezet) 457 ms Calculated P Axis 83 degrees Calculated R Axis 69 degrees Calculated T Axis 37 degrees Diagnosis Sinus tachycardia Incomplete right bundle branch block Nonspecific T wave abnormality When compared with ECG of 21-MAY-2017 13:56, 
Criteria for Anterior infarct are no longer present CBC WITH AUTOMATED DIFF Collection Time: 07/31/18  8:04 PM  
Result Value Ref Range WBC 22.0 (H) 3.6 - 11.0 K/uL  
 RBC 4.26 3.80 - 5.20 M/uL  
 HGB 13.2 11.5 - 16.0 g/dL HCT 40.5 35.0 - 47.0 % MCV 95.1 80.0 - 99.0 FL  
 MCH 31.0 26.0 - 34.0 PG  
 MCHC 32.6 30.0 - 36.5 g/dL  
 RDW 13.6 11.5 - 14.5 % PLATELET 914 (H) 536 - 400 K/uL MPV 9.0 8.9 - 12.9 FL  
 NRBC 0.0 0  WBC ABSOLUTE NRBC 0.00 0.00 - 0.01 K/uL NEUTROPHILS 82 (H) 32 - 75 % LYMPHOCYTES 11 (L) 12 - 49 % MONOCYTES 7 5 - 13 % EOSINOPHILS 0 0 - 7 % BASOPHILS 0 0 - 1 % IMMATURE GRANULOCYTES 1 (H) 0.0 - 0.5 % ABS. NEUTROPHILS 18.0 (H) 1.8 - 8.0 K/UL  
 ABS. LYMPHOCYTES 2.4 0.8 - 3.5 K/UL  
 ABS. MONOCYTES 1.5 (H) 0.0 - 1.0 K/UL  
 ABS. EOSINOPHILS 0.0 0.0 - 0.4 K/UL  
 ABS. BASOPHILS 0.0 0.0 - 0.1 K/UL  
 ABS. IMM. GRANS. 0.2 (H) 0.00 - 0.04 K/UL  
 DF AUTOMATED METABOLIC PANEL, COMPREHENSIVE Collection Time: 07/31/18  8:04 PM  
Result Value Ref Range Sodium 137 136 - 145 mmol/L Potassium 4.2 3.5 - 5.1 mmol/L  Chloride 104 97 - 108 mmol/L  
 CO2 25 21 - 32 mmol/L Anion gap 8 5 - 15 mmol/L Glucose 102 (H) 65 - 100 mg/dL BUN 10 6 - 20 MG/DL Creatinine 0.87 0.55 - 1.02 MG/DL  
 BUN/Creatinine ratio 11 (L) 12 - 20 GFR est AA >60 >60 ml/min/1.73m2 GFR est non-AA >60 >60 ml/min/1.73m2 Calcium 10.0 8.5 - 10.1 MG/DL Bilirubin, total 0.3 0.2 - 1.0 MG/DL  
 ALT (SGPT) 98 (H) 12 - 78 U/L  
 AST (SGOT) 25 15 - 37 U/L Alk. phosphatase 159 (H) 45 - 117 U/L Protein, total 7.7 6.4 - 8.2 g/dL Albumin 2.7 (L) 3.5 - 5.0 g/dL Globulin 5.0 (H) 2.0 - 4.0 g/dL A-G Ratio 0.5 (L) 1.1 - 2.2    
TROPONIN I Collection Time: 07/31/18  8:04 PM  
Result Value Ref Range Troponin-I, Qt. <0.05 <0.05 ng/mL LACTIC ACID Collection Time: 07/31/18  8:04 PM  
Result Value Ref Range Lactic acid 2.5 (HH) 0.4 - 2.0 MMOL/L  
GLUCOSE, POC Collection Time: 07/31/18  8:34 PM  
Result Value Ref Range Glucose (POC) 105 (H) 65 - 100 mg/dL Performed by [facility]

## 2018-08-01 NOTE — PROGRESS NOTES
11:45 AM pt to come to PCU for increased monitoring per Dr. Opal Ortiz, Dr. Olinda Mckoy made aware and order changed to stepdown.

## 2018-08-01 NOTE — PROGRESS NOTES
Pt is in the process of transferring to PCU for higher level of care due to respiratory status. Will hold OT eval today and have OT follow up tomorrow if pt is stable to participate.

## 2018-08-01 NOTE — PROGRESS NOTES
Sbar report called to PCU to nurse Simran Malagon at. [phone]. 
 
12:22 pt. Transfer to Room 2275, v/s stable pt . A&0 x4,pt c/o pain in both ribs 7/10 pain.

## 2018-08-01 NOTE — PROGRESS NOTES
TRANSFER - IN REPORT: 
 
1203 -Verbal report received from Cal[place]oun(name) on [employer]  being received from Garciamae Smith) for urgent transfer Report consisted of patients Situation, Background, Assessment and  
Recommendations(SBAR). Information from the following report(s) SBAR, Kardex and MAR was reviewed with the receiving nurse. Opportunity for questions and clarification was provided.

## 2018-08-01 NOTE — CONSULTS
[facility]  Pulmonary, Critical Care, and Sleep Medicine    Name: Dominga Mota MRN: [id number]   : 1955 Hospital: [facility]   Date: 2018        Critical Care Initial Patient Consult         Pt was seen earlier this am with RT. IMPRESSION:   · Right lower lobe pneumonia with dense consolidation and possible areas of necrosis. Also noted to have consolidation of the left lower lobe. · Acute chest pain  · Suspected underlying COPD- acute exacerbation  · Leukocytosis  · Sepsis  · Was noted to have loose stools on presentation. · NIDDM  · Anemia  · Has hypertension at baseline. · Fibromyalgia/Depression/Anxiety, PTSD. · Essential tremor. · Low albumin  · Lactic Acidosis on presentation. · Ex Smoker, quit 1 year ago. · Hypothyroidism. · Obese      RECOMMENDATIONS:   · Pain control, on narcotics, Will add lidocaine patch  · Will change to IV steroids  · Hold on bipap for right now, at risk of nausea, emesis and want to avoid aspiration  · Hydration  · Will hold drying agents such as atrovent, singulair, anti-histamines. Subjective/History:     Pt was seen about 1145am and then again about 2pm.        Cc: chest pain, dry cough. This patient has been seen and evaluated at the request of Dr. Lorie Gilford for above. Patient is a 58 y.o. female who presents for above. She has moderate chest pain which limits her ability to take a deep breath. Has some productive coughing but again limited by her chest pain. She reports that her pain got severe and that it has affected her breathing. Pt is very senstive to her medications and has intolerance to a lot of the generic meds. She has been pretty well controlled with her meds prior to admission. On second evaluation pt was much more comfortable and conversant. Was still have intermittent sharp stabbing pain along her right lower to mid chest wall.    Not able to get Complete ROS due to her acute illness, severe pain. Limited ability to give HPI and ROS. The patient is critically ill and can not provide additional history due to Unable to speak. Past Medical History:   Diagnosis Date    Anxiety     Bone spur     NECK    COPD     Depression     Endocrine disease     hypothyroidism    Fibromyalgia     Fusion of spine of cervical region     Gastrointestinal disorder     gerd    Hyperlipidemia     Hypothyroid     Morbid obesity ([address]     Osteoarthritis     PUD (peptic ulcer disease)     Tobacco abuse       Past Surgical History:   Procedure Laterality Date    BRONCHOSCOPY-FIBER/THERAPY  4/3/2015         CARDIAC CATHETERIZATION  2013         COLONOSCOPY,DIAGNOSTIC  10/30/2014         HX CATARACT REMOVAL      bilateral    HX GYN          HX ORTHOPAEDIC      Ruptured disc, knuckles on right hand    UPPER GI ENDOSCOPY,BIOPSY  10/30/2014         UPPER GI ENDOSCOPY,DILATN W GUIDE  10/30/2014           Prior to Admission medications    Medication Sig Start Date End Date Taking? Authorizing Provider   prazosin (MINIPRESS) 2 mg capsule TAKE 1 CAPSULE BY MOUTH TWICE A DAY. 18  Yes Isis Mead NP   DULoxetine 40 mg cpDR Take 40 mg by mouth daily. 18  Yes Isis Mead NP   primidone (MYSOLINE) 50 mg tablet 1 po qam and 3 po qhs 18  Yes Bacilio Hutton MD   acetaminophen-codeine (TYLENOL #3) 300-30 mg per tablet Take 1 Tab by mouth every eight (8) hours as needed. Max Daily Amount: 3 Tabs. 17  Yes Chichi Simons NP   ALPRAZolam (XANAX) 1 mg tablet Take 1 Tab by mouth three (3) times daily as needed for Anxiety. Max Daily Amount: 3 mg. 17  Yes Chichi Simons NP   cetirizine (ZYRTEC) 10 mg tablet Take 1 Tab by mouth daily. 17  Yes Chichi Simons NP   promethazine (PHENERGAN) 25 mg tablet Take 1 Tab by mouth every six (6) hours as needed.  17  Yes Chichi Simons NP   ibuprofen (MOTRIN) 800 mg tablet Take 1 Tab by mouth every eight (8) hours as needed. 5/27/17  Yes Chichi Simons NP   guaiFENesin ER (MUCINEX) 1,200 mg Ta12 ER tablet Take 1 Tab by mouth two (2) times a day. 5/27/17  Yes Chichi Simons NP   furosemide (LASIX) 40 mg tablet Take 1 Tab by mouth daily. Patient taking differently: Take 40 mg by mouth daily as needed for Other (LE edema). 5/27/17  Yes Chichi Simons NP   PHENobarbital (LUMINAL) 60 mg tablet Take 1 Tab by mouth two (2) times a day. Max Daily Amount: 120 mg. 5/27/17  Yes Warden Andres MD   dicyclomine (BENTYL) 10 mg capsule  2/13/15  Yes Nico Martinez MD   glipiZIDE SR (GLUCOTROL) 5 mg CR tablet Take 5 mg by mouth two (2) times daily as needed (Patient monitors her BG levels and takes when she is also taking a steroid. ). 2/16/15  Yes Nico Martinez MD   montelukast (SINGULAIR) 10 mg tablet Take 10 mg by mouth daily. 2/16/15  Yes Nico Martinez MD   ezetimibe-simvastatin (VYTORIN 10/40) 10-40 mg per tablet Take 1 tablet by mouth nightly. Yes Historical Provider   metoprolol (LOPRESSOR) 25 mg tablet Take 1 Tab by mouth two (2) times a day. 2/6/14  Yes Zuleika Alcaraz NP   benzonatate (TESSALON) 200 mg capsule Take 1 Cap by mouth two (2) times daily as needed. 1/20/14  Yes Warden Andres MD   nystatin (MYCOSTATIN) 100,000 unit/mL suspension Take 5 mL by mouth four (4) times daily. swish and spit  Patient taking differently: Take 500,000 Units by mouth four (4) times daily as needed. swish and spit 1/20/14  Yes Warden Andres MD   methocarbamol (ROBAXIN) 750 mg tablet Take 750-1,500 mg by mouth four (4) times daily as needed. Yes Historical Provider   niacin  mg CR capsule Take 250 mg by mouth daily. Yes Historical Provider   hyoscyamine SL (LEVSIN/SL) 0.125 mg SL tablet 0.125 mg by SubLINGual route three (3) times daily as needed for Cramping. Yes Historical Provider   esomeprazole (NEXIUM) 40 mg capsule Take 40 mg by mouth daily.    Yes Historical Provider   MAGOX 400 mg tablet TAKE ONE TABLET TWICE A DAY 11/4/13  Yes Chan Heaton Minnie Houston NP   vit B Cmplx 3-FA-Vit C-Biotin (NEPHRO SHREYA RX) 1- mg-mg-mcg tablet Take 1 Tab by mouth daily. Yes Historical Provider   aspirin 81 mg chewable tablet Take 81 mg by mouth daily. Yes Phys Other, MD   levothyroxine (SYNTHROID) 200 mcg tablet Take 200 mcg by mouth daily (before breakfast). Yes Phys Other, MD   albuterol (VENTOLIN HFA) 90 mcg/actuation inhaler Take 2 Puffs by inhalation every four (4) hours as needed for Wheezing. Historical Provider   albuterol-ipratropium (DUONEB) 2.5 mg-0.5 mg/3 ml nebulizer solution 3 mL by Nebulization route every six (6) hours as needed for Wheezing.     Historical Provider     Current Facility-Administered Medications   Medication Dose Route Frequency    metoprolol tartrate (LOPRESSOR) tablet 25 mg  25 mg Oral BID    prazosin (MINIPRESS) capsule 2 mg  2 mg Oral BID    piperacillin-tazobactam (ZOSYN) 3.375 g in 0.9% sodium chloride (MBP/ADV) 100 mL  3.375 g IntraVENous Q8H    vancomycin (VANCOCIN) 2,500 mg in 0.9% sodium chloride 500 mL IVPB  2,500 mg IntraVENous ONCE    [START ON 8/2/2018] vancomycin (VANCOCIN) 1250 mg in  ml infusion  1,250 mg IntraVENous Q12H    lidocaine (LIDODERM) 5 % patch 1 Patch  1 Patch TransDERmal Q24H    albuterol (PROVENTIL VENTOLIN) nebulizer solution 2.5 mg  2.5 mg Nebulization Q4H RT    methylPREDNISolone (PF) (SOLU-MEDROL) injection 40 mg  40 mg IntraVENous Q8H    0.9% sodium chloride infusion  100 mL/hr IntraVENous CONTINUOUS    aspirin chewable tablet 81 mg  81 mg Oral DAILY    DULoxetine (CYMBALTA) capsule 40 mg  40 mg Oral DAILY    pantoprazole (PROTONIX) tablet 40 mg  40 mg Oral ACB    ezetimibe (ZETIA) 10 mg, pravastatin (PRAVACHOL) 80 mg   Oral DAILY    guaiFENesin ER (MUCINEX) tablet 1,200 mg  1,200 mg Oral Q12H    magnesium oxide (MAG-OX) tablet 400 mg  400 mg Oral BID    niacin SR capsule 250 mg  250 mg Oral DAILY    PHENobarbital (LUMINAL) tablet 64.8 mg  64.8 mg Oral BID    primidone (MYSOLINE) tablet 50 mg  50 mg Oral DAILY    primidone (MYSOLINE) tablet 150 mg  150 mg Oral QHS    sodium chloride (NS) flush 5-10 mL  5-10 mL IntraVENous Q8H    enoxaparin (LOVENOX) injection 40 mg  40 mg SubCUTAneous DAILY    insulin lispro (HUMALOG) injection   SubCUTAneous AC&HS    lactobac ac& pc-s.therm-b.anim (ROBIN Q/RISAQUAD)  1 Cap Oral DAILY    levothyroxine (SYNTHROID) tablet 200 mcg  200 mcg Oral ACB     Allergies   Allergen Reactions    Latex Contact Dermatitis    Dilaudid [Hydromorphone (Pf)] Other (comments)     Feels like bugs are crawling all over her    Lipitor [Atorvastatin] Other (comments)     LIVER TROUBLE ON THIS MEDICATION    Remeron [Mirtazapine] Other (comments)     Tremors    Sulfa (Sulfonamide Antibiotics) Itching      Social History   Substance Use Topics    Smoking status: Former Smoker     Packs/day: 1.00     Years: 30.00    Smokeless tobacco: Never Used    Alcohol use Yes      Comment: occasional      Family History   Problem Relation Age of Onset    Heart Disease Mother     Dementia Mother     Thyroid Disease Mother     Heart Disease Father     Alcohol abuse Father     Psychiatric Disorder Father     Dementia Father     Bipolar Disorder Father     Psychiatric Disorder Sister     Suicide Sister     Psychiatric Disorder Brother     Suicide Brother     Psychiatric Disorder Other     Psychiatric Disorder Daughter     Bipolar Disorder Daughter     Coronary Artery Disease Other      multiple family members in 52's        Review of Systems:  Review of systems not obtained due to patient factors. Has moderate anxiety, chronic pains. Acute pain of the right mid to lower chest wall, pleuritic in nature. Has productive cough. No Aspiration. Was note to have some loose stools. NO skins issues. No muscle aches, no joint aches. She does have a resting tremor seems to be worse on the left upper extremity versus RUE. Some also noted in legs.   Has moderate to severe anxiety, depression, PTSD. Has a tremendous sensitivity to her meds and is intolerant of several generic meds for her anxiety. Objective:   Vital Signs:    Visit Vitals    /89    Pulse 85    Temp 98.7 °F (37.1 °C)    Resp 18    Ht 5' 4\" (1.626 m)    Wt 90.7 kg (200 lb)    SpO2 98%    BMI 34.33 kg/m2       O2 Device: Nasal cannula   O2 Flow Rate (L/min): 6 l/min   Temp (24hrs), Av.5 °F (36.9 °C), Min:98.2 °F (36.8 °C), Max:98.7 °F (37.1 °C)       Intake/Output:   Last shift:         Last 3 shifts: 1901 -  07  In: 350 [P.O.:350]  Out: -     Intake/Output Summary (Last 24 hours) at 18 1233  Last data filed at 18 0206   Gross per 24 hour   Intake              350 ml   Output                0 ml   Net              350 ml     Hemodynamics:   PAP:   CO:     Wedge:   CI:     CVP:    SVR:       PVR:       Ventilator Settings:  Mode Rate Tidal Volume Pressure FiO2 PEEP                    Peak airway pressure:      Minute ventilation:        Physical Exam:    General:  Alert, cooperative, Mild to moderate respiratory distress due to chest pain, appears stated age. Head:  Normocephalic, without obvious abnormality, atraumatic. Eyes:  Conjunctivae/corneas clear. PERRL, EOMs intact. Nose: Nares normal. Septum midline. Mucosa normal. No drainage or sinus tenderness. Throat: Lips, mucosa, and tongue normal. Teeth and gums normal.   Neck: Supple, symmetrical, trachea midline, no adenopathy, thyroid: no enlargment/tenderness/nodules, no carotid bruit and no JVD. Back:   Symmetric, no curvature. ROM normal.   Lungs: On auscultation bilaterally has rhonchi. Shallow depth of    Chest wall:  No tenderness or deformity. Heart:  Regular rate and rhythm, S1, S2 normal, no murmur, click, rub or gallop. Abdomen:   Soft, non-tender. Bowel sounds normal. No masses,  No organomegaly. Extremities: Extremities normal, atraumatic, no cyanosis or edema.    Pulses: 2+ and symmetric all extremities. Skin: Skin color, texture, turgor normal. No rashes or lesions   Lymph nodes: Cervical, supraclavicular, and axillary nodes normal.   Neurologic: Grossly nonfocal, has tremors of the LUE and RUE. Motor is grossly intact. Psych: Seems to have moderate anxiety and discomfort due to right sided chest pain. Data:     Recent Results (from the past 24 hour(s))   EKG, 12 LEAD, INITIAL    Collection Time: 07/31/18  6:28 PM   Result Value Ref Range    Ventricular Rate 105 BPM    Atrial Rate 105 BPM    P-R Interval 150 ms    QRS Duration 96 ms    Q-T Interval 346 ms    QTC Calculation (Bezet) 457 ms    Calculated P Axis 83 degrees    Calculated R Axis 69 degrees    Calculated T Axis 37 degrees    Diagnosis       Sinus tachycardia  Incomplete right bundle branch block  Nonspecific T wave abnormality  When compared with ECG of 21-MAY-2017 13:56,  Criteria for Anterior infarct are no longer present     CBC WITH AUTOMATED DIFF    Collection Time: 07/31/18  8:04 PM   Result Value Ref Range    WBC 22.0 (H) 3.6 - 11.0 K/uL    RBC 4.26 3.80 - 5.20 M/uL    HGB 13.2 11.5 - 16.0 g/dL    HCT 40.5 35.0 - 47.0 %    MCV 95.1 80.0 - 99.0 FL    MCH 31.0 26.0 - 34.0 PG    MCHC 32.6 30.0 - 36.5 g/dL    RDW 13.6 11.5 - 14.5 %    PLATELET 447 (H) 313 - 400 K/uL    MPV 9.0 8.9 - 12.9 FL    NRBC 0.0 0  WBC    ABSOLUTE NRBC 0.00 0.00 - 0.01 K/uL    NEUTROPHILS 82 (H) 32 - 75 %    LYMPHOCYTES 11 (L) 12 - 49 %    MONOCYTES 7 5 - 13 %    EOSINOPHILS 0 0 - 7 %    BASOPHILS 0 0 - 1 %    IMMATURE GRANULOCYTES 1 (H) 0.0 - 0.5 %    ABS. NEUTROPHILS 18.0 (H) 1.8 - 8.0 K/UL    ABS. LYMPHOCYTES 2.4 0.8 - 3.5 K/UL    ABS. MONOCYTES 1.5 (H) 0.0 - 1.0 K/UL    ABS. EOSINOPHILS 0.0 0.0 - 0.4 K/UL    ABS. BASOPHILS 0.0 0.0 - 0.1 K/UL    ABS. IMM.  GRANS. 0.2 (H) 0.00 - 0.04 K/UL    DF AUTOMATED     METABOLIC PANEL, COMPREHENSIVE    Collection Time: 07/31/18  8:04 PM   Result Value Ref Range    Sodium 137 136 - 145 mmol/L Potassium 4.2 3.5 - 5.1 mmol/L    Chloride 104 97 - 108 mmol/L    CO2 25 21 - 32 mmol/L    Anion gap 8 5 - 15 mmol/L    Glucose 102 (H) 65 - 100 mg/dL    BUN 10 6 - 20 MG/DL    Creatinine 0.87 0.55 - 1.02 MG/DL    BUN/Creatinine ratio 11 (L) 12 - 20      GFR est AA >60 >60 ml/min/1.73m2    GFR est non-AA >60 >60 ml/min/1.73m2    Calcium 10.0 8.5 - 10.1 MG/DL    Bilirubin, total 0.3 0.2 - 1.0 MG/DL    ALT (SGPT) 98 (H) 12 - 78 U/L    AST (SGOT) 25 15 - 37 U/L    Alk.  phosphatase 159 (H) 45 - 117 U/L    Protein, total 7.7 6.4 - 8.2 g/dL    Albumin 2.7 (L) 3.5 - 5.0 g/dL    Globulin 5.0 (H) 2.0 - 4.0 g/dL    A-G Ratio 0.5 (L) 1.1 - 2.2     TROPONIN I    Collection Time: 07/31/18  8:04 PM   Result Value Ref Range    Troponin-I, Qt. <0.05 <0.05 ng/mL   CULTURE, BLOOD, PAIRED    Collection Time: 07/31/18  8:04 PM   Result Value Ref Range    Special Requests: NO SPECIAL REQUESTS      Culture result: NO GROWTH AFTER 13 HOURS     LACTIC ACID    Collection Time: 07/31/18  8:04 PM   Result Value Ref Range    Lactic acid 2.5 (HH) 0.4 - 2.0 MMOL/L   GLUCOSE, POC    Collection Time: 07/31/18  8:34 PM   Result Value Ref Range    Glucose (POC) 105 (H) 65 - 100 mg/dL    Performed by Rayma Perry    URINALYSIS W/ REFLEX CULTURE    Collection Time: 07/31/18  9:10 PM   Result Value Ref Range    Color YELLOW/STRAW      Appearance CLEAR CLEAR      Specific gravity 1.012 1.003 - 1.030      pH (UA) 7.0 5.0 - 8.0      Protein NEGATIVE  NEG mg/dL    Glucose NEGATIVE  NEG mg/dL    Ketone NEGATIVE  NEG mg/dL    Bilirubin NEGATIVE  NEG      Blood NEGATIVE  NEG      Urobilinogen 0.2 0.2 - 1.0 EU/dL    Nitrites NEGATIVE  NEG      Leukocyte Esterase NEGATIVE  NEG      WBC 0-4 0 - 4 /hpf    RBC 0-5 0 - 5 /hpf    Epithelial cells FEW FEW /lpf    Bacteria NEGATIVE  NEG /hpf    UA:UC IF INDICATED CULTURE NOT INDICATED BY UA RESULT CNI      Hyaline cast 0-2 0 - 5 /lpf   GLUCOSE, POC    Collection Time: 07/31/18 10:28 PM   Result Value Ref Range Glucose (POC) 169 (H) 65 - 100 mg/dL    Performed by Too Lovell    CULTURE, RESPIRATORY/SPUTUM/BRONCH Mily Jock STAIN    Collection Time: 07/31/18 11:34 PM   Result Value Ref Range    Special Requests: NO SPECIAL REQUESTS      GRAM STAIN NO EPITHELIAL CELLS SEEN      GRAM STAIN 3+ WBCS SEEN      GRAM STAIN OCCASIONAL GRAM POSITIVE COCCI IN PAIRS AND CHAINS      GRAM STAIN OCCASIONAL GRAM POSITIVE COCCI IN CLUSTERS      GRAM STAIN OCCASIONAL APPARENT GRAM NEGATIVE RODS      Culture result: PENDING    LACTIC ACID    Collection Time: 08/01/18 12:11 AM   Result Value Ref Range    Lactic acid 0.8 0.4 - 2.0 MMOL/L   LACTIC ACID    Collection Time: 08/01/18  5:05 AM   Result Value Ref Range    Lactic acid 2.0 0.4 - 2.0 MMOL/L   CBC W/O DIFF    Collection Time: 08/01/18  5:37 AM   Result Value Ref Range    WBC 24.8 (H) 3.6 - 11.0 K/uL    RBC 3.96 3.80 - 5.20 M/uL    HGB 12.2 11.5 - 16.0 g/dL    HCT 38.6 35.0 - 47.0 %    MCV 97.5 80.0 - 99.0 FL    MCH 30.8 26.0 - 34.0 PG    MCHC 31.6 30.0 - 36.5 g/dL    RDW 13.4 11.5 - 14.5 %    PLATELET 027 221 - 331 K/uL    MPV 9.2 8.9 - 12.9 FL    NRBC 0.0 0  WBC    ABSOLUTE NRBC 0.00 0.00 - 0.01 K/uL   MAGNESIUM    Collection Time: 08/01/18  5:37 AM   Result Value Ref Range    Magnesium 2.5 (H) 1.6 - 2.4 mg/dL   PHOSPHORUS    Collection Time: 08/01/18  5:37 AM   Result Value Ref Range    Phosphorus 3.4 2.6 - 4.7 MG/DL   METABOLIC PANEL, COMPREHENSIVE    Collection Time: 08/01/18  5:37 AM   Result Value Ref Range    Sodium 138 136 - 145 mmol/L    Potassium 4.9 3.5 - 5.1 mmol/L    Chloride 106 97 - 108 mmol/L    CO2 25 21 - 32 mmol/L    Anion gap 7 5 - 15 mmol/L    Glucose 157 (H) 65 - 100 mg/dL    BUN 9 6 - 20 MG/DL    Creatinine 0.76 0.55 - 1.02 MG/DL    BUN/Creatinine ratio 12 12 - 20      GFR est AA >60 >60 ml/min/1.73m2    GFR est non-AA >60 >60 ml/min/1.73m2    Calcium 9.9 8.5 - 10.1 MG/DL    Bilirubin, total 0.2 0.2 - 1.0 MG/DL    ALT (SGPT) 79 (H) 12 - 78 U/L    AST (SGOT) 15 15 - 37 U/L    Alk. phosphatase 138 (H) 45 - 117 U/L    Protein, total 7.3 6.4 - 8.2 g/dL    Albumin 2.4 (L) 3.5 - 5.0 g/dL    Globulin 4.9 (H) 2.0 - 4.0 g/dL    A-G Ratio 0.5 (L) 1.1 - 2.2     BLOOD GAS, ARTERIAL    Collection Time: 08/01/18 11:12 AM   Result Value Ref Range    pH 7.34 (L) 7.35 - 7.45      PCO2 41 35.0 - 45.0 mmHg    PO2 82 80 - 100 mmHg    O2 SAT 95 92 - 97 %    BICARBONATE 21 (L) 22 - 26 mmol/L    BASE DEFICIT 4.1 mmol/L    O2 METHOD NASAL O2      O2 FLOW RATE 6.00 L/min    Sample source ARTERIAL      SITE RIGHT BRACHIAL      JAS'S TEST N/A               Telemetry:normal sinus rhythm    Imaging:  I have personally reviewed the patients radiographs and have reviewed the reports:  7-31-18: Ct of chest: 7-31-18: Ct of chest: IMPRESSION:  Significant consolidation in the right lower lobe with areas of necrosis. Small  abscesses cannot be excluded in this setting. Consolidation left lower lobe. Trace right effusion with underlying atelectasis. New mediastinal  lymphadenopathy may be reactive in nature, however follow-up is recommended.        Daisy Pascal MD

## 2018-08-01 NOTE — PROGRESS NOTES
Pharmacy Automatic Renal Dosing Protocol - Antimicrobials Indication for Antimicrobials: CAP, COPD exacerbation, Sepsis Current Regimen of Each Antimicrobial: 
Vancomycin x7 days; start ; Day #1 of  Zosyn 3.375mg IV q8h x7 days; Day #1 of  Previous Abx: 
Levaquin 750 mg IV every 24 hr (Start Date ; Day # 1) Goal Level: VANCOMYCIN TROUGH GOAL RANGE Vancomycin Trough: 15 - 20 mcg/mL Date Dose & Interval Measured (mcg/mL) Extrapolated (mcg/mL) Significant Cultures:  
: Blood = (pending) : Respiratory = (pending) Radiology / Imaging results: (X-ray, CT scan or MRI):  
: CXR: Opacification/consolidation RML &  left lung base posterior medially may represent PNA Paralysis, amputations, malnutrition:  
 
Labs: 
Recent Labs 18 
 0537  18 CREA  0.76  0.87 BUN  9  10 WBC  24.8*  22.0* Temp (24hrs), Av.5 °F (36.9 °C), Min:98.2 °F (36.8 °C), Max:98.7 °F (37.1 °C) Creatinine Clearance (mL/min) or Dialysis: 66 ml/min Impression/Plan:  
· Levofloxacin changed to Vancomycin & Zosyn · Vancomycin 2.5gm IV x1, then 1.25gm IV q12h for 7 days tx with Trough Range 15-20 · Change Zosyn to 3.375gm IV x1 (30min), then 3.375gm IV q8h (240min) x7 days · Daily BMP 
· LOT = 7 days (ordered) Last doses on  Pharmacy will follow daily and adjust medications as appropriate for renal function and/or serum levels. Thank you, 
Romana Sale, [facility] Recommended duration of therapy 
[email or web address]/Pharmacy/Clinical%20Companion/Duration%[email or web address]. docx Renal Dosing 
[email or web address]/[facility]/Pharmacy/Clinical%20Companion/Renal%[id number]. pdf

## 2018-08-01 NOTE — PROGRESS NOTES
Received  Pt alert oriented x4 no c/o pain at this time pt was oriented to room ,call bell at bedside skin clear pt ambulates to bathroom.

## 2018-08-01 NOTE — CDMP QUERY
There is noted documentation of \"Sepsis\" and acute hypoxic respiratory failure\" on this record. Could this be further clarified as: 
 
               
=>Severe Sepsis with associated acute organ dysfunction acute hypoxic respiratory failure in the setting of necrotic PNA, COPD AE 
=>Other explanation of clinical findings =>Unable to determine (no explanation for clinical findings) Presentation: H&P documented \"Acute hypoxic respiratory failure due to CAP with mild COPD exacerbation Sepsis due to above ( leukocytosis/tachycardia)\" Please clarify and document your clinical opinion in the progress notes and discharge summary including the definitive and/or presumptive diagnosis, (suspected or probable), related to the above clinical findings. Please include clinical findings supporting your diagnosis. Thank You Dariusz Gordon, [address] 
   [phone]

## 2018-08-01 NOTE — PROGRESS NOTES
Physical Therapy Consult received. Chart reviewed. Per RN patient is not appropriate for PT at this time due to medical status. Will check on patient this p.m.

## 2018-08-01 NOTE — PROGRESS NOTES
Hospitalist Progress Note NAME: Kayla Mars :  1955 MRN:  [id number] Assessment / Plan: 
Acute hypoxic respiratory failure POA 
due to Bilateral Pneumonia ( RLL necrotic) with reactive Lymphadenopathy on CT chest POA Acute COPD exacerbation POA Sepsis due to above ( leukocytosis/tachycardia) Lactic acidosis POA Cont hydration S/p IV levaquin x 1 in ER, change to IV Vanco + zosyn for the CT results of necrotic PNA Follow BCx, sputum Cx Cont PO Prednisone for mild COPD exacerbation + jet nebs scheduled CT  Chest noted for-  Significant consolidation in the right lower lobe with areas of necrosis. Small abscesses cannot be excluded in this setting. Consolidation left lower lobe. Trace right effusion with underlying atelectasis. New mediastinal lymphadenopathy may be reactive in nature. Cont oxygen to keep sats >90%, BIPAP prn, check ABG 
IP pulm consult for abnormal CT chest & further recommendations on Resp failure 
  
Diarrhea POA 
-per pt started last month s/p doxycycline  
-follow C.diff stool test if sample available in first 24 hrs 
-c/w FloraQ  
  
NIDDM type II Cont holding PO glipizide with poor po intake 
-monitor closely, BS may go high wit humberto of steroids -c/w SSI for now 
  
HTN  
-BP stable 
-cont home metoprolol  
-lasix prn at home for le edema, keep on hold for now 
  
Fibromyalgia / depression/ anxiety/ PTSD Polypharmacy Essential tremor  
-continue multiple home meds  
  
Ex smoker, quit 1 year ago Hyperlipidemia, cont statin Hypothyroidism, cont synthroid Obesity. Body mass index is 34.33 kg/(m^2). 
 weight l;oss recommended 
  
  
Code Status: Full code Surrogate Decision Maker:   
  
DVT Prophylaxis: Lovenox GI Prophylaxis: not indicated 
  
Baseline: independent; no home O2; lives with  Recommended Disposition: Home w/Familysoon Subjective: Chief Complaint / Reason for Physician Visit: F/U Bilateral PNA (necrotic), Sepsis, resp failure, COPD, pleuritic chest pain \"My R side hurts more than L, needs BIPAP\". Discussed with RN events overnight. Review of Systems: 
Symptom Y/N Comments  Symptom Y/N Comments Fever/Chills n   Chest Pain y Pleuritic R>L Poor Appetite n   Edema Cough y   Abdominal Pain Sputum y   Joint Pain SOB/RODRIGUEZ y   Pruritis/Rash Nausea/vomit    Tolerating PT/OT y Diarrhea    Tolerating Diet y Constipation    Other Could NOT obtain due to:   
 
Objective: VITALS:  
Last 24hrs VS reviewed since prior progress note. Most recent are: 
Patient Vitals for the past 24 hrs: 
 Temp Pulse Resp BP SpO2  
08/01/18 1008 - - - - 94 % 08/01/18 0839 - - - - 94 % 08/01/18 0740 98.7 °F (37.1 °C) 86 18 111/62 93 % 08/01/18 0430 98.4 °F (36.9 °C) (!) 103 18 129/67 93 % 08/01/18 0206 - - - - 92 % 08/01/18 0117 98.6 °F (37 °C) (!) 110 20 137/78 91 %  
07/31/18 2345 - - - 121/59 90 % 07/31/18 2230 - (!) 101 22 150/82 94 % 07/31/18 2216 - (!) 101 19 150/75 93 % 07/31/18 1814 98.2 °F (36.8 °C) (!) 109 22 123/85 93 % Intake/Output Summary (Last 24 hours) at 08/01/18 1027 Last data filed at 08/01/18 0206 Gross per 24 hour Intake              350 ml Output                0 ml Net              350 ml PHYSICAL EXAM: 
General: WD, WN. Alert, cooperative, no acute distress, obese +   
EENT:  EOMI. Anicteric sclerae. MMM Resp:   wheezing + some basal crackles + CV:  Regular  rhythm,  No edema GI:  Soft, Non distended, Non tender.  +Bowel sounds Neurologic:  Alert and oriented X 3, normal speech, Psych:   Good insight. Not anxious nor agitated Skin:  No rashes. No jaundice Reviewed most current lab test results and cultures  YES Reviewed most current radiology test results   YES Review and summation of old records today    NO Reviewed patient's current orders and MAR    YES 
PMH/SH reviewed - no change compared to H&P 
________________________________________________________________________ Care Plan discussed with: 
  Comments Patient x Family RN x Care Manager x Consultant Multidiciplinary team rounds were held today with , nursing, pharmacist and clinical coordinator. Patient's plan of care was discussed; medications were reviewed and discharge planning was addressed. ________________________________________________________________________ Total NON critical care TIME:  36   Minutes Total CRITICAL CARE TIME Spent:   Minutes non procedure based Comments >50% of visit spent in counseling and coordination of care    
________________________________________________________________________ Lorna Verde MD  
 
Procedures: see electronic medical records for all procedures/Xrays and details which were not copied into this note but were reviewed prior to creation of Plan. LABS: 
I reviewed today's most current labs and imaging studies. Pertinent labs include: 
Recent Labs 08/01/18 
 0537  07/31/18 2004 WBC  24.8*  22.0*  
HGB  12.2  13.2 HCT  38.6  40.5 PLT  379  438* Recent Labs 08/01/18 
 0537  07/31/18 2004 NA  138  137  
K  4.9  4.2 CL  106  104 CO2  25  25 GLU  157*  102* BUN  9  10 CREA  0.76  0.87 CA  9.9  10.0 MG  2.5*   --   
PHOS  3.4   --   
ALB  2.4*  2.7* TBILI  0.2  0.3 SGOT  15  25 ALT  79*  98* Signed: Lorna Verde MD

## 2018-08-01 NOTE — PROGRESS NOTES
Pharmacy Automatic Renal Dosing Protocol - Antimicrobials Indication for Antimicrobials: cap Current Regimen of Each Antimicrobial: 
Levaquin 750 mg IV every 24 hr (Start Date ; Day # 1) Significant Cultures:  
Blood  - pending Radiology / Imaging results: (X-ray, CT scan or MRI):  
 
Paralysis, amputations, malnutrition:  
 
Labs: 
Recent Labs  
   18 CREA  0.87 BUN  10 WBC  22.0* Temp (24hrs), Av.2 °F (36.8 °C), Min:98.2 °F (36.8 °C), Max:98.2 °F (36.8 °C) Creatinine Clearance (mL/min) or Dialysis: 57 Impression/Plan:  
· Levaquin appropriate for indication and renal function · Antimicrobial stop date  · Cbc and bmp morning of  Pharmacy will follow daily and adjust medications as appropriate for renal function and/or serum levels. Thank you, Sydnee Newell, PHARMD 
 
Recommended duration of therapy 
http://[facility]/[place]/[facility]/Pharmacy/Clinical%20Companion/Duration%20of%20ABX%20therapy. docx Renal Dosing 
http://[facility]/[place]/[facility]/Pharmacy/Clinical%20Companion/Renal%[id number]. pdf

## 2018-08-02 PROBLEM — Z79.899 POLYPHARMACY: Status: ACTIVE | Noted: 2018-08-02

## 2018-08-02 PROBLEM — F32.A ANXIETY AND DEPRESSION: Status: ACTIVE | Noted: 2018-08-02

## 2018-08-02 PROBLEM — R07.1 CHEST PAIN ON BREATHING: Status: ACTIVE | Noted: 2018-08-02

## 2018-08-02 PROBLEM — F41.9 ANXIETY AND DEPRESSION: Status: ACTIVE | Noted: 2018-08-02

## 2018-08-02 PROBLEM — R05.8 PRODUCTIVE COUGH: Status: ACTIVE | Noted: 2018-08-02

## 2018-08-02 LAB
ANION GAP SERPL CALC-SCNC: 8 MMOL/L (ref 5–15)
BUN SERPL-MCNC: 8 MG/DL (ref 6–20)
BUN/CREAT SERPL: 11 (ref 12–20)
CALCIUM SERPL-MCNC: 9.4 MG/DL (ref 8.5–10.1)
CHLORIDE SERPL-SCNC: 102 MMOL/L (ref 97–108)
CO2 SERPL-SCNC: 22 MMOL/L (ref 21–32)
CREAT SERPL-MCNC: 0.7 MG/DL (ref 0.55–1.02)
GLUCOSE BLD STRIP.AUTO-MCNC: 130 MG/DL (ref 65–100)
GLUCOSE BLD STRIP.AUTO-MCNC: 153 MG/DL (ref 65–100)
GLUCOSE BLD STRIP.AUTO-MCNC: 170 MG/DL (ref 65–100)
GLUCOSE BLD STRIP.AUTO-MCNC: 231 MG/DL (ref 65–100)
GLUCOSE SERPL-MCNC: 152 MG/DL (ref 65–100)
POTASSIUM SERPL-SCNC: 4.9 MMOL/L (ref 3.5–5.1)
SERVICE CMNT-IMP: ABNORMAL
SODIUM SERPL-SCNC: 132 MMOL/L (ref 136–145)

## 2018-08-02 PROCEDURE — 77010033678 HC OXYGEN DAILY

## 2018-08-02 PROCEDURE — 80048 BASIC METABOLIC PNL TOTAL CA: CPT | Performed by: INTERNAL MEDICINE

## 2018-08-02 PROCEDURE — 74011636637 HC RX REV CODE- 636/637: Performed by: HOSPITALIST

## 2018-08-02 PROCEDURE — 74011250636 HC RX REV CODE- 250/636: Performed by: INTERNAL MEDICINE

## 2018-08-02 PROCEDURE — 36415 COLL VENOUS BLD VENIPUNCTURE: CPT | Performed by: INTERNAL MEDICINE

## 2018-08-02 PROCEDURE — 74011000250 HC RX REV CODE- 250: Performed by: INTERNAL MEDICINE

## 2018-08-02 PROCEDURE — 94640 AIRWAY INHALATION TREATMENT: CPT

## 2018-08-02 PROCEDURE — 74011250636 HC RX REV CODE- 250/636: Performed by: HOSPITALIST

## 2018-08-02 PROCEDURE — 65660000000 HC RM CCU STEPDOWN

## 2018-08-02 PROCEDURE — 82962 GLUCOSE BLOOD TEST: CPT

## 2018-08-02 PROCEDURE — 74011250637 HC RX REV CODE- 250/637: Performed by: INTERNAL MEDICINE

## 2018-08-02 PROCEDURE — 74011250637 HC RX REV CODE- 250/637: Performed by: HOSPITALIST

## 2018-08-02 PROCEDURE — 74011000258 HC RX REV CODE- 258: Performed by: INTERNAL MEDICINE

## 2018-08-02 RX ORDER — PROMETHAZINE HYDROCHLORIDE 25 MG/1
25 TABLET ORAL
Status: DISCONTINUED | OUTPATIENT
Start: 2018-08-02 | End: 2018-08-12 | Stop reason: HOSPADM

## 2018-08-02 RX ORDER — KETOROLAC TROMETHAMINE 30 MG/ML
15 INJECTION, SOLUTION INTRAMUSCULAR; INTRAVENOUS EVERY 6 HOURS
Status: DISPENSED | OUTPATIENT
Start: 2018-08-02 | End: 2018-08-07

## 2018-08-02 RX ORDER — OXYCODONE HYDROCHLORIDE 5 MG/1
15 TABLET ORAL
Status: DISCONTINUED | OUTPATIENT
Start: 2018-08-02 | End: 2018-08-12 | Stop reason: HOSPADM

## 2018-08-02 RX ADMIN — ALBUTEROL SULFATE 2.5 MG: 2.5 SOLUTION RESPIRATORY (INHALATION) at 23:00

## 2018-08-02 RX ADMIN — LEVOTHYROXINE SODIUM 200 MCG: 100 TABLET ORAL at 05:48

## 2018-08-02 RX ADMIN — DULOXETINE HYDROCHLORIDE 40 MG: 20 CAPSULE, DELAYED RELEASE ORAL at 09:08

## 2018-08-02 RX ADMIN — PIPERACILLIN SODIUM,TAZOBACTAM SODIUM 3.38 G: 3; .375 INJECTION, POWDER, FOR SOLUTION INTRAVENOUS at 00:24

## 2018-08-02 RX ADMIN — ASPIRIN 81 MG 81 MG: 81 TABLET ORAL at 09:08

## 2018-08-02 RX ADMIN — ENOXAPARIN SODIUM 40 MG: 100 INJECTION SUBCUTANEOUS at 09:10

## 2018-08-02 RX ADMIN — INSULIN LISPRO 2 UNITS: 100 INJECTION, SOLUTION INTRAVENOUS; SUBCUTANEOUS at 12:33

## 2018-08-02 RX ADMIN — ALBUTEROL SULFATE 2.5 MG: 2.5 SOLUTION RESPIRATORY (INHALATION) at 15:08

## 2018-08-02 RX ADMIN — PRAZOSIN HYDROCHLORIDE 2 MG: 1 CAPSULE ORAL at 09:09

## 2018-08-02 RX ADMIN — ALBUTEROL SULFATE 2.5 MG: 2.5 SOLUTION RESPIRATORY (INHALATION) at 11:43

## 2018-08-02 RX ADMIN — METHOCARBAMOL 750 MG: 500 TABLET ORAL at 04:38

## 2018-08-02 RX ADMIN — KETOROLAC TROMETHAMINE 15 MG: 30 INJECTION, SOLUTION INTRAMUSCULAR at 12:35

## 2018-08-02 RX ADMIN — PIPERACILLIN SODIUM,TAZOBACTAM SODIUM 3.38 G: 3; .375 INJECTION, POWDER, FOR SOLUTION INTRAVENOUS at 23:00

## 2018-08-02 RX ADMIN — Medication 10 ML: at 05:47

## 2018-08-02 RX ADMIN — ALPRAZOLAM 1 MG: 0.5 TABLET ORAL at 03:17

## 2018-08-02 RX ADMIN — PRIMIDONE 50 MG: 50 TABLET ORAL at 09:10

## 2018-08-02 RX ADMIN — METHYLPREDNISOLONE SODIUM SUCCINATE 40 MG: 40 INJECTION, POWDER, FOR SOLUTION INTRAMUSCULAR; INTRAVENOUS at 05:47

## 2018-08-02 RX ADMIN — Medication 400 MG: at 09:08

## 2018-08-02 RX ADMIN — PRAZOSIN HYDROCHLORIDE 2 MG: 1 CAPSULE ORAL at 17:59

## 2018-08-02 RX ADMIN — METHOCARBAMOL 750 MG: 500 TABLET ORAL at 23:00

## 2018-08-02 RX ADMIN — PHENOBARBITAL 64.8 MG: 32.4 TABLET ORAL at 09:08

## 2018-08-02 RX ADMIN — OXYCODONE HYDROCHLORIDE 10 MG: 5 TABLET ORAL at 04:35

## 2018-08-02 RX ADMIN — METHOCARBAMOL 750 MG: 500 TABLET ORAL at 17:59

## 2018-08-02 RX ADMIN — BENZONATATE 200 MG: 100 CAPSULE ORAL at 17:59

## 2018-08-02 RX ADMIN — ALBUTEROL SULFATE 2.5 MG: 2.5 SOLUTION RESPIRATORY (INHALATION) at 07:44

## 2018-08-02 RX ADMIN — ALBUTEROL SULFATE 2.5 MG: 2.5 SOLUTION RESPIRATORY (INHALATION) at 19:59

## 2018-08-02 RX ADMIN — INSULIN LISPRO 2 UNITS: 100 INJECTION, SOLUTION INTRAVENOUS; SUBCUTANEOUS at 21:41

## 2018-08-02 RX ADMIN — METHOCARBAMOL 750 MG: 500 TABLET ORAL at 12:33

## 2018-08-02 RX ADMIN — EZETIMIBE: 10 TABLET ORAL at 09:09

## 2018-08-02 RX ADMIN — OXYCODONE HYDROCHLORIDE 10 MG: 5 TABLET ORAL at 10:43

## 2018-08-02 RX ADMIN — Medication 10 ML: at 21:41

## 2018-08-02 RX ADMIN — Medication 5 ML: at 18:01

## 2018-08-02 RX ADMIN — VANCOMYCIN HYDROCHLORIDE 1250 MG: 10 INJECTION, POWDER, LYOPHILIZED, FOR SOLUTION INTRAVENOUS at 05:51

## 2018-08-02 RX ADMIN — ALPRAZOLAM 1 MG: 0.5 TABLET ORAL at 21:46

## 2018-08-02 RX ADMIN — PHENOBARBITAL 64.8 MG: 32.4 TABLET ORAL at 17:59

## 2018-08-02 RX ADMIN — PIPERACILLIN SODIUM,TAZOBACTAM SODIUM 3.38 G: 3; .375 INJECTION, POWDER, FOR SOLUTION INTRAVENOUS at 17:51

## 2018-08-02 RX ADMIN — METHYLPREDNISOLONE SODIUM SUCCINATE 40 MG: 40 INJECTION, POWDER, FOR SOLUTION INTRAMUSCULAR; INTRAVENOUS at 17:57

## 2018-08-02 RX ADMIN — PROMETHAZINE HYDROCHLORIDE 25 MG: 25 TABLET ORAL at 20:11

## 2018-08-02 RX ADMIN — PIPERACILLIN SODIUM,TAZOBACTAM SODIUM 3.38 G: 3; .375 INJECTION, POWDER, FOR SOLUTION INTRAVENOUS at 09:10

## 2018-08-02 RX ADMIN — ALBUTEROL SULFATE 2.5 MG: 2.5 SOLUTION RESPIRATORY (INHALATION) at 03:13

## 2018-08-02 RX ADMIN — METOPROLOL TARTRATE 25 MG: 25 TABLET ORAL at 09:16

## 2018-08-02 RX ADMIN — GUAIFENESIN 1200 MG: 600 TABLET, EXTENDED RELEASE ORAL at 09:09

## 2018-08-02 RX ADMIN — Medication 400 MG: at 17:59

## 2018-08-02 RX ADMIN — BENZONATATE 200 MG: 100 CAPSULE ORAL at 03:19

## 2018-08-02 RX ADMIN — INSULIN LISPRO 2 UNITS: 100 INJECTION, SOLUTION INTRAVENOUS; SUBCUTANEOUS at 08:55

## 2018-08-02 RX ADMIN — GUAIFENESIN 1200 MG: 600 TABLET, EXTENDED RELEASE ORAL at 20:11

## 2018-08-02 RX ADMIN — METOPROLOL TARTRATE 25 MG: 25 TABLET ORAL at 18:00

## 2018-08-02 RX ADMIN — KETOROLAC TROMETHAMINE 15 MG: 30 INJECTION, SOLUTION INTRAMUSCULAR at 23:00

## 2018-08-02 RX ADMIN — PANTOPRAZOLE SODIUM 40 MG: 40 TABLET, DELAYED RELEASE ORAL at 09:08

## 2018-08-02 RX ADMIN — VANCOMYCIN HYDROCHLORIDE 1250 MG: 10 INJECTION, POWDER, LYOPHILIZED, FOR SOLUTION INTRAVENOUS at 14:58

## 2018-08-02 RX ADMIN — Medication 1 CAPSULE: at 09:09

## 2018-08-02 RX ADMIN — OXYCODONE HYDROCHLORIDE 10 MG: 5 TABLET ORAL at 00:24

## 2018-08-02 RX ADMIN — KETOROLAC TROMETHAMINE 15 MG: 30 INJECTION, SOLUTION INTRAMUSCULAR at 17:58

## 2018-08-02 RX ADMIN — PRIMIDONE 150 MG: 50 TABLET ORAL at 20:11

## 2018-08-02 NOTE — PROGRESS NOTES
[facility] Name: Kayla Mars MRN: [id number] : 1955 Hospital: [facility] Date: 2018 Critical Care Patient Consult 
 
  
 
Pt was seen earlier this am with RT. IMPRESSION:  
· Right lower lobe pneumonia with dense consolidation and possible areas of necrosis. Also noted to have consolidation of the left lower lobe. Sputum Cx is still prelim. · Acute chest pain due to severe pneumonia, more difficult to treat due to her chronic use of narcotics and Benzos. · Suspected underlying COPD- acute exacerbation · Leukocytosis and Sepsis · Was noted to have loose stools on presentation. · NIDDM · Anemia · Has hypertension at baseline · Fibromyalgia/Depression/Anxiety, PTSD/ chronic back pain on chronic nacotics and Benzos. · Essential tremor. · Low albumin · Lactic Acidosis on presentation. · Ex Smoker, quit 1 year ago. · Hypothyroidism. · Obese · Discussed with nurse this am and with Ms. Madhavi Trujillo from palliative care. RECOMMENDATIONS:  
· Pain control, on narcotics, Was on lidocaine patch yesterday,  I can  Not figure out why it was removed. Will increase the dose of narcotic because she is still having severe pain. · Asked Palliative care to eval and make further recs about pain control. · Will change to IV steroids · Hold on bipap for right now, at risk of nausea, emesis and want to avoid aspiration · Hydration, decrease the IV rate. · Will hold drying agents such as atrovent, singulair, anti-histamines. · Will continue the current abx regimen. · Pt at moderate risk of decompensation but seem stable at current time and will improve once we get pain better controlled. Subjective/History:  
 
Last 24 hrs: Has been with moderate to severe ongoing right sided chest pain, with any deep inspiration, coughing, movement has severe pain.  Seems much better than yesterday but still pretty severe. Has ongoing chest congestion. Some productive cough. No headaches. Has no sinus pain or pressure. NO Aspiration noted per pt. She does describe sharp stabbing pain of her right lower/mid chest  Eva worse with deep inspiration. Tolerates eating pretty well. Denies any constipation today. NO skin issues. She still feels that she has mild to moderate anxiety and did not sleep well last pm.  
 
Rest of 10 point ROS is negative. From 8/1/18: Pt was seen about 1145am and then again about 2pm. 
 
Cc: chest pain, dry cough. This patient has been seen and evaluated at the request of Dr. Mike Weaver for above. Patient is a 58 y.o. female who presents for above. She has moderate chest pain which limits her ability to take a deep breath. Has some productive coughing but again limited by her chest pain. She reports that her pain got severe and that it has affected her breathing. Pt is very senstive to her medications and has intolerance to a lot of the generic meds. She has been pretty well controlled with her meds prior to admission. On second evaluation pt was much more comfortable and conversant. Was still have intermittent sharp stabbing pain along her right lower to mid chest wall. Not able to get Complete ROS due to her acute illness, severe pain. Limited ability to give HPI and ROS. The patient is critically ill and can not provide additional history due to Unable to speak. Past Medical History:  
Diagnosis Date  Anxiety  Bone spur NECK  COPD  Depression  Endocrine disease   
 hypothyroidism  Fibromyalgia  Fusion of spine of cervical region  Gastrointestinal disorder   
 gerd  Hyperlipidemia  Hypothyroid  Morbid obesity ([facility] [address]  Osteoarthritis  PUD (peptic ulcer disease)  Tobacco abuse Past Surgical History:  
Procedure Laterality Date  BRONCHOSCOPY-FIBER/THERAPY  4/3/2015 Blayne.Mow CARDIAC CATHETERIZATION  2013  COLONOSCOPY,DIAGNOSTIC  10/30/2014  HX CATARACT REMOVAL    
 bilateral  
 HX GYN    
   HX ORTHOPAEDIC Ruptured disc, knuckles on right hand  UPPER GI ENDOSCOPY,BIOPSY  10/30/2014  UPPER GI ENDOSCOPY,DILATN W GUIDE  10/30/2014 Prior to Admission medications Medication Sig Start Date End Date Taking? Authorizing Provider  
prazosin (MINIPRESS) 2 mg capsule TAKE 1 CAPSULE BY MOUTH TWICE A DAY. 18  Yes Ever ELVI Busby  
DULoxetine 40 mg cpDR Take 40 mg by mouth daily. 18  Yes Ever Kehinde, ELVI  
primidone (MYSOLINE) 50 mg tablet 1 po qam and 3 po qhs 18  Yes Neri Owens MD  
acetaminophen-codeine (TYLENOL #3) 300-30 mg per tablet Take 1 Tab by mouth every eight (8) hours as needed. Max Daily Amount: 3 Tabs. 17  Yes Chichi Simons NP  
ALPRAZolam (XANAX) 1 mg tablet Take 1 Tab by mouth three (3) times daily as needed for Anxiety. Max Daily Amount: 3 mg. 17  Yes Chichi Simons NP  
cetirizine (ZYRTEC) 10 mg tablet Take 1 Tab by mouth daily. 17  Yes Chichi Simons NP  
promethazine (PHENERGAN) 25 mg tablet Take 1 Tab by mouth every six (6) hours as needed. 17  Yes Chichi Simons NP  
ibuprofen (MOTRIN) 800 mg tablet Take 1 Tab by mouth every eight (8) hours as needed. 17  Yes Chichi Simons NP  
guaiFENesin ER (MUCINEX) 1,200 mg Ta12 ER tablet Take 1 Tab by mouth two (2) times a day. 17  Yes Chichi Simons NP  
furosemide (LASIX) 40 mg tablet Take 1 Tab by mouth daily. Patient taking differently: Take 40 mg by mouth daily as needed for Other (LE edema). 17  Yes Chichi Simons NP  
PHENobarbital (LUMINAL) 60 mg tablet Take 1 Tab by mouth two (2) times a day.  Max Daily Amount: 120 mg. 17  Yes Kelly Parker MD  
dicyclomine (BENTYL) 10 mg capsule  2/13/15  Yes Phys Other, MD  
glipiZIDE SR (GLUCOTROL) 5 mg CR tablet Take 5 mg by mouth two (2) times daily as needed (Patient monitors her BG levels and takes when she is also taking a steroid. ). 2/16/15  Yes Nico Martinez MD  
montelukast (SINGULAIR) 10 mg tablet Take 10 mg by mouth daily. 2/16/15  Yes Nico Martinez MD  
ezetimibe-simvastatin (VYTORIN 10/40) 10-40 mg per tablet Take 1 tablet by mouth nightly. Yes Historical Provider  
metoprolol (LOPRESSOR) 25 mg tablet Take 1 Tab by mouth two (2) times a day. 2/6/14  Yes Nick Beasley NP  
benzonatate (TESSALON) 200 mg capsule Take 1 Cap by mouth two (2) times daily as needed. 1/20/14  Yes Messi Richardson MD  
nystatin (MYCOSTATIN) 100,000 unit/mL suspension Take 5 mL by mouth four (4) times daily. swish and spit Patient taking differently: Take 500,000 Units by mouth four (4) times daily as needed. swish and spit 1/20/14  Yes Messi Richardson MD  
methocarbamol (ROBAXIN) 750 mg tablet Take 750-1,500 mg by mouth four (4) times daily as needed. Yes Historical Provider  
niacin  mg CR capsule Take 250 mg by mouth daily. Yes Historical Provider  
hyoscyamine SL (LEVSIN/SL) 0.125 mg SL tablet 0.125 mg by SubLINGual route three (3) times daily as needed for Cramping. Yes Historical Provider  
esomeprazole (NEXIUM) 40 mg capsule Take 40 mg by mouth daily. Yes Historical Provider MAGOX 400 mg tablet TAKE ONE TABLET TWICE A DAY 11/4/13  Yes Lenin Lee NP  
vit B Cmplx 3-FA-Vit C-Biotin (NEPHRO SHREYA RX) 1- mg-mg-mcg tablet Take 1 Tab by mouth daily. Yes Historical Provider  
aspirin 81 mg chewable tablet Take 81 mg by mouth daily. Yes Nico Martinez MD  
levothyroxine (SYNTHROID) 200 mcg tablet Take 200 mcg by mouth daily (before breakfast). Yes Nico Martinez MD  
albuterol (VENTOLIN HFA) 90 mcg/actuation inhaler Take 2 Puffs by inhalation every four (4) hours as needed for Wheezing. Historical Provider  
albuterol-ipratropium (DUONEB) 2.5 mg-0.5 mg/3 ml nebulizer solution 3 mL by Nebulization route every six (6) hours as needed for Wheezing.     Historical Provider Current Facility-Administered Medications Medication Dose Route Frequency  [START ON 8/3/2018] VANCOMYCIN INFORMATION NOTE   Other ONCE  
 methylPREDNISolone (PF) (SOLU-MEDROL) injection 40 mg  40 mg IntraVENous Q12H  
 metoprolol tartrate (LOPRESSOR) tablet 25 mg  25 mg Oral BID  prazosin (MINIPRESS) capsule 2 mg  2 mg Oral BID  piperacillin-tazobactam (ZOSYN) 3.375 g in 0.9% sodium chloride (MBP/ADV) 100 mL  3.375 g IntraVENous Q8H  
 vancomycin (VANCOCIN) 1250 mg in  ml infusion  1,250 mg IntraVENous Q12H  
 lidocaine (LIDODERM) 5 % patch 1 Patch  1 Patch TransDERmal Q24H  
 albuterol (PROVENTIL VENTOLIN) nebulizer solution 2.5 mg  2.5 mg Nebulization Q4H RT  
 0.9% sodium chloride infusion  50 mL/hr IntraVENous CONTINUOUS  
 aspirin chewable tablet 81 mg  81 mg Oral DAILY  DULoxetine (CYMBALTA) capsule 40 mg  40 mg Oral DAILY  pantoprazole (PROTONIX) tablet 40 mg  40 mg Oral ACB  ezetimibe (ZETIA) 10 mg, pravastatin (PRAVACHOL) 80 mg   Oral DAILY  guaiFENesin ER (MUCINEX) tablet 1,200 mg  1,200 mg Oral Q12H  
 magnesium oxide (MAG-OX) tablet 400 mg  400 mg Oral BID  niacin SR capsule 250 mg  250 mg Oral DAILY  PHENobarbital (LUMINAL) tablet 64.8 mg  64.8 mg Oral BID  primidone (MYSOLINE) tablet 50 mg  50 mg Oral DAILY  primidone (MYSOLINE) tablet 150 mg  150 mg Oral QHS  sodium chloride (NS) flush 5-10 mL  5-10 mL IntraVENous Q8H  
 enoxaparin (LOVENOX) injection 40 mg  40 mg SubCUTAneous DAILY  insulin lispro (HUMALOG) injection   SubCUTAneous AC&HS  
 lactobac ac& pc-s.therm-b.anim (ROBIN Q/RISAQUAD)  1 Cap Oral DAILY  levothyroxine (SYNTHROID) tablet 200 mcg  200 mcg Oral ACB Allergies Allergen Reactions  Latex Contact Dermatitis  Dilaudid [Hydromorphone (Pf)] Other (comments) Feels like bugs are crawling all over her  Lipitor [Atorvastatin] Other (comments)   LIVER TROUBLE ON THIS MEDICATION  
 Remeron [Mirtazapine] Other (comments) Tremors  Sulfa (Sulfonamide Antibiotics) Itching Social History Substance Use Topics  Smoking status: Former Smoker Packs/day: 1.00 Years: 30.00  Smokeless tobacco: Never Used  Alcohol use Yes Comment: occasional  
  
Family History Problem Relation Age of Onset  Heart Disease Mother  Dementia Mother  Thyroid Disease Mother  Heart Disease Father  Alcohol abuse Father  Psychiatric Disorder Father  Dementia Father  Bipolar Disorder Father  Psychiatric Disorder Sister  Suicide Sister  Psychiatric Disorder Brother  Suicide Brother  Psychiatric Disorder Other  Psychiatric Disorder Daughter  Bipolar Disorder Daughter  Coronary Artery Disease Other   
  multiple family members in 52's Review of Systems:Updated on 18 Review of systems not obtained due to patient factors. Has moderate anxiety, chronic pains. Acute pain of the right mid to lower chest wall, pleuritic in nature. Has productive cough. No Aspiration. Was note to have some loose stools. NO skins issues. No muscle aches, no joint aches. She does have a resting tremor seems to be worse on the left upper extremity versus RUE. Some also noted in legs. Has moderate to severe anxiety, depression, PTSD. Has a tremendous sensitivity to her meds and is intolerant of several generic meds for her anxiety. Objective:  
Vital Signs:   
Visit Vitals  /45  Pulse (!) 121  Temp 98.7 °F (37.1 °C)  Resp 24  
 Ht 5' 4\" (1.626 m)  Wt 90.7 kg (200 lb)  SpO2 95%  BMI 34.33 kg/m2 O2 Device: Nasal cannula O2 Flow Rate (L/min): 6 l/min Temp (24hrs), Av.5 °F (36.9 °C), Min:98.1 °F (36.7 °C), Max:98.9 °F (37.2 °C) Intake/Output:  
Last shift:      701 - 1900 In: 3250 [P.O.:25; I.V.:3225] Out: 300 [Urine:300] Last 3 shifts: 1901 -  0700 In: 9560 [P.O.:1190; I.V.:475] Out: 1000 [OHUCO:7560] Intake/Output Summary (Last 24 hours) at 08/02/18 1118 Last data filed at 08/02/18 2040 Gross per 24 hour Intake             4565 ml Output             1300 ml Net             3265 ml Hemodynamics:  
PAP:   CO:    
Wedge:   CI:    
CVP:    SVR:    
  PVR:    
 
Ventilator Settings: 
Mode Rate Tidal Volume Pressure FiO2 PEEP  
         50 % Peak airway pressure:     
Minute ventilation:     
 
Physical Exam: 
 
General:  Alert, cooperative, Mild  respiratory distress due to chest pain, appears stated age. She appears more comfortable today compared to yesterday. On NC Head:  Normocephalic, without obvious abnormality, atraumatic. Eyes:  Conjunctivae/corneas clear. PERRL, EOMs intact. Nose: Nares normal. Septum midline. Mucosa normal. No drainage or sinus tenderness. Throat: Lips, mucosa, and tongue normal. Teeth and gums normal.  
Neck: Supple, symmetrical, trachea midline, no adenopathy, thyroid: no enlargment/tenderness/nodules, no carotid bruit and no JVD. Back:   Symmetric, no curvature. ROM normal.  
Lungs: On auscultation bilaterally has rhonchi. Shallow depth of inspiration. Has poor airway clearance because she is splinting due to chest pain. Chest wall:  No tenderness or deformity. Heart:  Regular rate and rhythm, S1, S2 normal, no murmur, click, rub or gallop. Abdomen:   Soft, non-tender. Bowel sounds normal. No masses,  No organomegaly. Extremities: Extremities normal, atraumatic, no cyanosis or edema. Pulses: 2+ and symmetric all extremities. Skin: Skin color, texture, turgor normal. No rashes or lesions Lymph nodes: Cervical, supraclavicular, and axillary nodes normal.  
Neurologic: Grossly nonfocal, has tremors of the LUE and RUE. Motor is grossly intact. Psych: Seems to have moderate anxiety and discomfort due to right sided chest pain. Data:  
 
Recent Results (from the past 24 hour(s)) GLUCOSE, POC  Collection Time: 08/01/18 1:09 PM  
Result Value Ref Range Glucose (POC) 175 (H) 65 - 100 mg/dL Performed by Shanda Hall GLUCOSE, POC Collection Time: 08/01/18  4:44 PM  
Result Value Ref Range Glucose (POC) 194 (H) 65 - 100 mg/dL Performed by Gigi Diaz GLUCOSE, POC Collection Time: 08/01/18  6:06 PM  
Result Value Ref Range Glucose (POC) 211 (H) 65 - 100 mg/dL Performed by Gigi Diaz GLUCOSE, POC Collection Time: 08/01/18  9:04 PM  
Result Value Ref Range Glucose (POC) 170 (H) 65 - 100 mg/dL Performed by Georgie Briones (PCT) METABOLIC PANEL, BASIC Collection Time: 08/02/18  4:15 AM  
Result Value Ref Range Sodium 132 (L) 136 - 145 mmol/L Potassium 4.9 3.5 - 5.1 mmol/L Chloride 102 97 - 108 mmol/L  
 CO2 22 21 - 32 mmol/L Anion gap 8 5 - 15 mmol/L Glucose 152 (H) 65 - 100 mg/dL BUN 8 6 - 20 MG/DL Creatinine 0.70 0.55 - 1.02 MG/DL  
 BUN/Creatinine ratio 11 (L) 12 - 20 GFR est AA >60 >60 ml/min/1.73m2 GFR est non-AA >60 >60 ml/min/1.73m2 Calcium 9.4 8.5 - 10.1 MG/DL  
GLUCOSE, POC Collection Time: 08/02/18  7:42 AM  
Result Value Ref Range Glucose (POC) 170 (H) 65 - 100 mg/dL Performed by Rimma Hopper (PCT) Telemetry:normal sinus rhythm Imaging: 
I have personally reviewed the patients radiographs and have reviewed the reports: 
7-31-18: Ct of chest: 7-31-18: Ct of chest: IMPRESSION: 
Significant consolidation in the right lower lobe with areas of necrosis. Small 
abscesses cannot be excluded in this setting. Consolidation left lower lobe. Trace right effusion with underlying atelectasis. New mediastinal 
lymphadenopathy may be reactive in nature, however follow-up is recommended.   
 
 
Ceferino Herzog MD

## 2018-08-02 NOTE — PROGRESS NOTES
11: 58AM 
Initial Assessment: 
CM met with pt to complete assessment and discuss d/c planning. Pt is a 57 yo female admitted for CAP. Pt lives with her  in a 1 story home. At baseline, pt is independent with ADLs and IADLs, including driving. Pt does not use any DME other than [place] Pt has an alert fob to wear in case of a fall. Pt has been to [address]  in the past and had a terrible experience. Pt reports no previous HH experiences. Pt's  will be the one to transport her home at d/c. No PT/OT ordered IP. CM will continue to follow and assist with d/c planning. Care Management Interventions PCP Verified by CM: Yes Susana Morris MD) Mode of Transport at Discharge: Other (see comment) (Pt's spouse ) Transition of Care Consult (CM Consult): Discharge Planning Discharge Durable Medical Equipment: No 
Physical Therapy Consult: No 
Occupational Therapy Consult: No 
Speech Therapy Consult: No 
Current Support Network: Lives with Spouse, Own Home Confirm Follow Up Transport: Family Plan discussed with Pt/Family/Caregiver: Yes Discharge Location Discharge Placement: Home with family assistance ISRAEL Murphy Care Manager 
 Reason for Admission:   CAP 
            
RRAT Score:     27 Resources/supports as identified by patient/family:   Family support Top Challenges facing patient (as identified by patient/family and CM): Finances/Medication cost?      No needs identified Transportation? No needs identified Support system or lack thereof? Family support Living arrangements? With spouse Self-care/ADLs/Cognition? Independent/Oriented Current Advanced Directive/Advance Care Plan:  None/Full code Plan for utilizing home health:    None Likelihood of readmission: High  
              
Transition of Care Plan:              Home with family

## 2018-08-02 NOTE — PROGRESS NOTES
PCU SHIFT NURSING NOTE 
 
 
1100 :Bedside shift change report given to Doreen Budrick (oncoming nurse) by Mark Martin (offgoing nurse). Report included the following information SBAR, Kardex, Intake/Output, MAR and Recent Results. Shift Summary: 
 
 1930 : Report given to Adele. Spoke with pharmacy, will bring home meds to . Admission Date 7/31/2018 Admission Diagnosis CAP (community acquired pneumonia) Acute respiratory failure ([facility] [address] Consults IP CONSULT TO PULMONOLOGY 
IP CONSULT TO PALLIATIVE CARE - PROVIDER Consults []PT []OT []Speech [x]Case Management  
  
[] Palliative Cardiac Monitoring Order  
[x]Yes []No  
 
IV drips []Yes Drip:                            Dose: 
Drip:                            Dose: 
Drip:                            Dose:  
[x]No  
 
GI Prophylaxis [x]Yes []No  
 
 
 
DVT Prophylaxis SCDs:     
     
 Onel stockings:     
  
[] Medication []Contraindicated []None Activity Level Activity Level: Up with Assistance Activity Assistance: Partial (one person) Purposeful Rounding every 1-2 hour? []Yes Zarate Score  Total Score: 3 Bed Alarm (If score 3 or >) []Yes  
[x] Refused (See signed refusal form in chart) Joseph Score  Joseph Score: 15 Joseph Score (if score 14 or less) []PMT consult  
[]Wound Care consult [x]Specialty bed  
[] Nutrition consult Needs prior to discharge:  
Home O2 required:   
[x]Yes []No  
 If yes, how much O2 required? Other:  
 Last Bowel Movement: Last Bowel Movement Date: 08/02/18 Influenza Vaccine Pneumonia Vaccine Diet Active Orders Diet DIET DIABETIC CONSISTENT CARB Regular LDAs Peripheral IV 08/02/18 Right Antecubital (Active) Site Assessment Clean, dry, & intact 8/2/2018 11:00 AM  
Phlebitis Assessment 0 8/2/2018 11:00 AM  
Infiltration Assessment 0 8/2/2018 11:00 AM  
Dressing Status Clean, dry, & intact 8/2/2018 11:00 AM Dressing Type Transparent 8/2/2018 11:00 AM  
Hub Color/Line Status Blue; Infusing 8/2/2018 11:00 AM  
Action Taken Open ports on tubing capped 8/2/2018  7:16 AM  
                  
Urinary Catheter Intake & Output Date 08/01/18 1900 - 08/02/18 5219 08/02/18 0700 - 08/03/18 6290 Shift 2787-2158 24 Hour Total 8822-9090 9828-4618 24 Hour Total  
I 
N 
T 
A 
K 
E 
 P.O.  840 505  505  
   P. O.  840 505  505 I.V. 
(mL/kg/hr) .8 
(3.9)  4255.8 I.V.  275 Volume (0.9% sodium chloride infusion)   3805.8  3805.8 Volume (piperacillin-tazobactam (ZOSYN) 3.375 g in 0.9% sodium chloride (MBP/ADV) 100 mL) 100 200 200  200 Volume (vancomycin (VANCOCIN) 1250 mg in  ml infusion)   250  250 Shift Total 
(mL/kg) 100 
(1.1) 1315 
(14.5) 4760.8 
(52.5)  4760.8 
(52.5) O 
U T 
P 
U Marie Stains Urine (mL/kg/hr) 450 1000 875 
(0.8)  875 Urine Voided 450 1000 875  875 Shift Total 
(mL/kg) 450 
(5) 1000 
(11) 875 
(9.6)  875 
(9.6) NET -.8  3885.8 Weight (kg) 90.7 90.7 90.7 90.7 90.7 Readmission Risk Assessment Tool Score High Risk   
      
 27 Total Score 3 Has Seen PCP in Last 6 Months (Yes=3, No=0) 2 . Living with Significant Other. Assisted Living. LTAC. SNF. or  
Rehab  
 5 Pt. Coverage (Medicare=5 , Medicaid, or Self-Pay=4) 17 Charlson Comorbidity Score (Age + Comorbid Conditions) Criteria that do not apply:  
 Patient Length of Stay (>5 days = 3) IP Visits Last 12 Months (1-3=4, 4=9, >4=11) Expected Length of Stay 4d 21h Actual Length of Stay 2
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
## 2018-08-02 NOTE — PROGRESS NOTES
Hospitalist Progress Note NAME: David Royal :  1955 MRN:  [id number] Assessment / Plan: 
Acute hypoxic respiratory failure POA 
due to Bilateral Pneumonia ( RLL necrotic) with reactive Lymphadenopathy on CT chest POA Acute COPD exacerbation POA SevereSepsis due to above ( leukocytosis/tachycardia + resp failure & lact 2.0) Lactic acidosis POA Cont hydration S/p IV levaquin x 1 in ER, changed to IV Vanco + zosyn for the CT results of necrotic PNA- cont for now Follow BCx, sputum Cx Changed to IV solumedrol Cont jet nebs scheduled CT  Chest noted for-  Significant consolidation in the right lower lobe with areas of necrosis. Small abscesses cannot be excluded in this setting. Consolidation left lower lobe. Trace right effusion with underlying atelectasis. New mediastinal lymphadenopathy may be reactive in nature. Cont oxygen to keep sats >90%, BIPAP prn, check ABG 
IP pulm consult appreciated- increased to IV solumedrol IV toradol  for pleuritic chest pain Palliative pain consulted 
  
Diarrhea POA 
-per pt started last month s/p doxycycline  
-follow C.diff stool test if sample available in first 24 hrs 
-c/w FloraQ  
  
NIDDM type II Cont holding PO glipizide with poor po intake 
-monitor closely, BS may go high wit humberto of steroids -c/w SSI for now 
  
HTN  
-BP stable 
-cont home metoprolol  
-lasix prn at home for le edema, keep on hold for now 
  
Fibromyalgia / depression/ anxiety/ PTSD /Chronic pain Polypharmacy Essential tremor  
-continue multiple home meds  
  
Ex smoker, quit 1 year ago Hyperlipidemia, cont statin Hypothyroidism, cont synthroid Obesity. Body mass index is 34.33 kg/(m^2). 
 weight loss recommended 
  
  
Code Status: Full code Surrogate Decision Maker:   
  
DVT Prophylaxis: Lovenox GI Prophylaxis: not indicated 
  
Baseline: independent; no home O2; lives with  Recommended Disposition: Home w/Familysoon Subjective: Chief Complaint / Reason for Physician Visit: F/U Bilateral PNA (necrotic), Sepsis, resp failure, COPD, pleuritic chest pain \"My R side hurts more than L, needs BIPAP\". Discussed with RN events overnight. Review of Systems: 
Symptom Y/N Comments  Symptom Y/N Comments Fever/Chills n   Chest Pain y Pleuritic R>L Poor Appetite n   Edema Cough y   Abdominal Pain Sputum y   Joint Pain SOB/RODRIGUEZ y   Pruritis/Rash Nausea/vomit    Tolerating PT/OT y Diarrhea    Tolerating Diet y Constipation    Other Could NOT obtain due to:   
 
Objective: VITALS:  
Last 24hrs VS reviewed since prior progress note. Most recent are: 
Patient Vitals for the past 24 hrs: 
 Temp Pulse Resp BP SpO2  
08/02/18 1145 - - - - 93 % 08/02/18 1120 98.9 °F (37.2 °C) 94 28 108/64 92 % 08/02/18 0908 - (!) 121 - 142/45 -  
08/02/18 0745 - - - - 95 % 08/02/18 0716 98.7 °F (37.1 °C) (!) 105 24 151/85 100 % 08/02/18 0330 98.4 °F (36.9 °C) (!) 102 24 135/84 97 % 08/02/18 0313 - - - - 97 % 08/01/18 2319 98.6 °F (37 °C) 95 24 104/63 94 % 08/01/18 2306 - - - - 96 % 08/01/18 2000 98.1 °F (36.7 °C) 92 26 94/60 91 % 08/01/18 1917 - - - - 93 % 08/01/18 1642 98.4 °F (36.9 °C) (!) 115 26 117/60 (!) 88 % 08/01/18 1515 98.3 °F (36.8 °C) - 30 117/60 91 % Intake/Output Summary (Last 24 hours) at 08/02/18 1313 Last data filed at 08/02/18 8655 Gross per 24 hour Intake             3810 ml Output              900 ml Net             2910 ml PHYSICAL EXAM: 
General: WD, WN. Alert, cooperative, no acute distress, obese +   
EENT:  EOMI. Anicteric sclerae. MMM Resp:   wheezing + some basal crackles + CV:  Regular  rhythm,  No edema GI:  Soft, Non distended, Non tender.  +Bowel sounds Neurologic:  Alert and oriented X 3, normal speech, Psych:   Good insight. Not anxious nor agitated Skin:  No rashes. No jaundice Reviewed most current lab test results and cultures YES 
Reviewed most current radiology test results   YES Review and summation of old records today    NO Reviewed patient's current orders and MAR    YES 
PMH/SH reviewed - no change compared to H&P 
________________________________________________________________________ Care Plan discussed with: 
  Comments Patient x Family RN x Care Manager x Consultant  x Dr Lauren Chambers (Pulm) Multidiciplinary team rounds were held today with , nursing, pharmacist and clinical coordinator. Patient's plan of care was discussed; medications were reviewed and discharge planning was addressed. ________________________________________________________________________ Total NON critical care TIME:  26   Minutes Total CRITICAL CARE TIME Spent:   Minutes non procedure based Comments >50% of visit spent in counseling and coordination of care    
________________________________________________________________________ Priyanka Gates MD  
 
Procedures: see electronic medical records for all procedures/Xrays and details which were not copied into this note but were reviewed prior to creation of Plan. LABS: 
I reviewed today's most current labs and imaging studies. Pertinent labs include: 
Recent Labs 08/01/18 
 0537  07/31/18 2004 WBC  24.8*  22.0*  
HGB  12.2  13.2 HCT  38.6  40.5 PLT  379  438* Recent Labs 08/02/18 
 0415  08/01/18 
 0537  07/31/18 2004 NA  132*  138  137  
K  4.9  4.9  4.2 CL  102  106  104 CO2  22  25  25 GLU  152*  157*  102* BUN  8  9  10 CREA  0.70  0.76  0.87 CA  9.4  9.9  10.0 MG   --   2.5*   --   
PHOS   --   3.4   --   
ALB   --   2.4*  2.7* TBILI   --   0.2  0.3 SGOT   --   15  25 ALT   --   79*  98* Signed: Priyanka Gates MD

## 2018-08-02 NOTE — PROGRESS NOTES
PCU SHIFT NURSING NOTE Bedside shift change report given to MARIOLA Cain (oncoming nurse) by Mary Byers (offgoing nurse). Report included the following information SBAR and Cardiac Rhythm Sinus Tach. Shift Summary:  
0700 patient sitting up in bed moaning; alert and oriented. Vitals are stable Admission Date 7/31/2018 Admission Diagnosis CAP (community acquired pneumonia) Acute respiratory failure ([address] Consults IP CONSULT TO PULMONOLOGY Consults []PT []OT []Speech  
[]Case Management  
  
[] Palliative Cardiac Monitoring Order []Yes []No  
 
IV drips []Yes Drip:                            Dose: 
Drip:                            Dose: 
Drip:                            Dose:  
[]No  
 
GI Prophylaxis []Yes []No  
 
 
 
DVT Prophylaxis SCDs:     
     
 Onel stockings:     
  
[] Medication []Contraindicated []None Activity Level Activity Level: Up with Assistance Activity Assistance: Partial (one person) Purposeful Rounding every 1-2 hour? []Yes Zarate Score  Total Score: 3 Bed Alarm (If score 3 or >) []Yes  
[] Refused (See signed refusal form in chart) Joseph Score  Joseph Score: 15 Joseph Score (if score 14 or less) []PMT consult  
[]Wound Care consult []Specialty bed  
[] Nutrition consult Needs prior to discharge:  
Home O2 required:   
[]Yes []No  
 If yes, how much O2 required? Other:  
 Last Bowel Movement: Last Bowel Movement Date: 07/31/18 Influenza Vaccine Pneumonia Vaccine Diet Active Orders Diet DIET DIABETIC CONSISTENT CARB Regular LDAs Peripheral IV 08/02/18 Right Antecubital (Active) Site Assessment Clean, dry, & intact 8/2/2018  5:44 AM  
Phlebitis Assessment 0 8/2/2018  5:44 AM  
Infiltration Assessment 0 8/2/2018  5:44 AM  
Dressing Status Clean, dry, & intact 8/2/2018  5:44 AM  
Dressing Type Transparent 8/2/2018  5:44 AM  
Hub Color/Line Status Blue;Patent; Infusing 8/2/2018  5:44 AM  
Action Taken Open ports on tubing capped 8/2/2018  5:44 AM  
                  
Urinary Catheter Intake & Output Date 08/01/18 0700 - 08/02/18 0386 08/02/18 0700 - 08/03/18 8774 Shift 0700-1859 1900-0659 24 Hour Total 6984-3701 5925-5965 24 Hour Total  
I 
N 
T 
A 
K 
E 
 [address]  840     
   P[address] O[address] 840  840 I.V. 
(mL/kg/hr) 375 
(0.3) 100 
(0.1) 475 
(0.2) 3225  3225 I.V. 275  275 Volume (0.9% sodium chloride infusion)    2975  2975 Volume (piperacillin-tazobactam (ZOSYN) 3.375 g in 0.9% sodium chloride (MBP/ADV) 100 mL) 100 100 200 Volume (vancomycin (VANCOCIN) 1250 mg in  ml infusion)    250  250 Shift Total 
(mL/kg) 1215 
(13.4) 100 
(1.1) 1315 
(14.5) 3225 
(35.5)  3225 
(35.5) O 
U T 
P 
U Mert Rad Urine (mL/kg/hr) 550 
(0.5) 450 
(0.4) 1000 
(0.5) 300  300 Urine Voided  300  300 Shift Total 
(mL/kg) 550 
(6.1) 450 
(5) 1000 
(11) 300 
(3.3)  300 
(3.3)  [phone]  8174 Weight (kg) 90.7 90.7 90.7 90.7 90.7 90.7 Readmission Risk Assessment Tool Score High Risk   
      
 27 Total Score 3 Has Seen PCP in Last 6 Months (Yes=3, No=0) 2 . Living with Significant Other. Assisted Living. LTAC. SNF. or  
Rehab  
 5 Pt. Coverage (Medicare=5 , Medicaid, or Self-Pay=4) 17 Charlson Comorbidity Score (Age + Comorbid Conditions) Criteria that do not apply:  
 Patient Length of Stay (>5 days = 3) IP Visits Last 12 Months (1-3=4, 4=9, >4=11) Expected Length of Stay 4d 21h Actual Length of Stay 2

## 2018-08-02 NOTE — PROGRESS NOTES
ADULT PROTOCOL: JET AEROSOL ASSESSMENT Patient  Eduard Campos     58 y.o.   female     8/2/2018  10:34 AM 
 
Breath Sounds Pre Procedure: Right Breath Sounds: Rhonchi Left Breath Sounds: Rhonchi Breath Sounds Post Procedure: Right Breath Sounds: Rales, Rhonchi Left Breath Sounds: Rales, Rhonchi Breathing pattern: Pre procedure  Regular Post procedure   Regular Heart Rate: Pre procedure Pulse: 109 Post procedure Pulse: 92 Resp Rate: Pre procedure Respirations: 24 
         Post procedure Respirations: 22 
 
     
 
Cough: Pre procedure Cough: Congested Post procedure Cough: Congested Oxygen: O2 Device: Nasal cannula   6L SpO2: Pre procedure SpO2: 95 % Post procedure SpO2: 95 % Nebulizer Therapy: Current medications Aerosolized Medications: Albuterol Smoking History: Former Problem List:  
Patient Active Problem List  
Diagnosis Code  COPD (chronic obstructive pulmonary disease) ([facility]) J44.9  PTSD (post-traumatic stress disorder) F43.10  PVC (premature ventricular contraction) I49.3  Other and unspecified hyperlipidemia E78.5  Tobacco use disorder F17.200  Family history of ischemic heart disease Z82.49  Chest pain with normal coronary angiography R07.9  Abnormal nuclear stress test R94.39  
 Acute respiratory failure ([facility]) J96.00  
 Pneumonia, organism unspecified(486) J18.9  
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  
 Chronic respiratory failure with hypoxia ([facility]) J96.11  Benign familial tremor G25.0  Atelectasis J98.11  
 Allergic rhinitis J30.9  Sepsis ([facility] A41.9  Acute exacerbation of COPD with asthma ([facility] J44.1, J45.901  
 HTN (hypertension) I10  
 Hyperlipidemia E78.5  Hypothyroidism E03.9  Smoking F17.200  Diabetic peripheral neuropathy associated with type 2 diabetes mellitus (HCC) E11.42  Stenosis of both internal carotid arteries I65.23  
 Cervical radiculopathy due to degenerative joint disease of spine M47.22  
 Degenerative lumbar spinal stenosis M48.061  Vitamin D deficiency E55.9  Altered mental status, unspecified R41.82  
 Cerebral microvascular disease I67.9  Obesity (BMI 35.0-39.9 without comorbidity) E66.9  Fibromyalgia M79.7  Benign essential tremor syndrome G25.0  
 CAP (community acquired pneumonia) J18.9 Respiratory Therapist: Carina Uribe V RT

## 2018-08-02 NOTE — PROGRESS NOTES
Spiritual Care Assessment/Progress Note [address] 
 
 
NAME: Cuauhtemoc Bill      MRN: [id number] AGE: 58 y.o. SEX: female Church Affiliation: No Islam Language: English  
 
8/2/2018     Total Time (in minutes): 8 Spiritual Assessment begun in [facility] PROGRESSIVE CARE through conversation with: 
  
    [x]Patient        [] Family    [] Friend(s) Reason for Consult: Palliative Care, Initial/Spiritual Assessment Spiritual beliefs: (Please include comment if needed) 
   [] Identifies with a narinder tradition:     
   [] Supported by a narinder community:        
   [] Claims no spiritual orientation:       
   [] Seeking spiritual identity:            
   [] Adheres to an individual form of spirituality:       
   [x] Not able to assess:                   
 
    
Identified resources for coping:  
   [] Prayer                           
   [] Music                  [] Guided Imagery 
   [] Family/friends                 [] Pet visits [] Devotional reading                         [x] Unknown 
   [] Other:                                          
 
 
Interventions offered during this visit: (See comments for more details) Patient Interventions: Affirmation of emotions/emotional suffering, Initial/Spiritual assessment, patient floor, Normalization of emotional/spiritual concerns Plan of Care: 
 
 [] Support spiritual and/or cultural needs  
 [] Support AMD and/or advance care planning process    
 [] Support grieving process 
 [] Coordinate Rites and/or Rituals  
 [] Coordination with community clergy [] No spiritual needs identified at this time 
 [] Detailed Plan of Care below (See Comments)  [] Make referral to Music Therapy 
[] Make referral to Pet Therapy    
[] Make referral to Addiction services 
[] Make referral to [facility] 
[] Make referral to Spiritual Care Partner 
[] No future visits requested       
[x] Follow up visits as needed Comments:  visit per palliative consult. Pt was receiving a breathing treatment. Pt was thankful for visit. Let her know of  availability.  follow up as needed. Letty Espinoza, [facility] 
 [phone])

## 2018-08-02 NOTE — PROGRESS NOTES
Bedside shift report received from [facility]. SBAR, MAR,VS, KARDEX reviewed. Pt is very anxious, c/o severe rib pain. PRN xanax given per orders. 2030 SBP stable, prn pain meds given. Pt placed on Venti-mask 50% d/t  SPO2 90-91%. Audible coarse rhonci and moist productive cough, thick greenish-yellow sputum noted. 2330 Assisted to bsc to void. 0430 Pt's PIV infiltrated. Multiple attempts to regain access. Pt states she has a hx of requiring central-venous lines during hospitalizations. 0530 ER nurses at bedside to attempt IV access.

## 2018-08-03 ENCOUNTER — APPOINTMENT (OUTPATIENT)
Dept: GENERAL RADIOLOGY | Age: 63
DRG: 871 | End: 2018-08-03
Attending: HOSPITALIST
Payer: MEDICARE

## 2018-08-03 LAB
ANION GAP SERPL CALC-SCNC: 9 MMOL/L (ref 5–15)
BACTERIA SPEC CULT: ABNORMAL
BUN SERPL-MCNC: 11 MG/DL (ref 6–20)
BUN/CREAT SERPL: 14 (ref 12–20)
CALCIUM SERPL-MCNC: 9.9 MG/DL (ref 8.5–10.1)
CHLORIDE SERPL-SCNC: 105 MMOL/L (ref 97–108)
CO2 SERPL-SCNC: 18 MMOL/L (ref 21–32)
CREAT SERPL-MCNC: 0.79 MG/DL (ref 0.55–1.02)
DATE LAST DOSE: ABNORMAL
ERYTHROCYTE [DISTWIDTH] IN BLOOD BY AUTOMATED COUNT: 14 % (ref 11.5–14.5)
GLUCOSE BLD STRIP.AUTO-MCNC: 135 MG/DL (ref 65–100)
GLUCOSE BLD STRIP.AUTO-MCNC: 146 MG/DL (ref 65–100)
GLUCOSE BLD STRIP.AUTO-MCNC: 210 MG/DL (ref 65–100)
GLUCOSE BLD STRIP.AUTO-MCNC: 250 MG/DL (ref 65–100)
GLUCOSE SERPL-MCNC: 109 MG/DL (ref 65–100)
GRAM STN SPEC: ABNORMAL
HCT VFR BLD AUTO: 41.9 % (ref 35–47)
HGB BLD-MCNC: 13.2 G/DL (ref 11.5–16)
MCH RBC QN AUTO: 31.5 PG (ref 26–34)
MCHC RBC AUTO-ENTMCNC: 31.5 G/DL (ref 30–36.5)
MCV RBC AUTO: 100 FL (ref 80–99)
NRBC # BLD: 0 K/UL (ref 0–0.01)
NRBC BLD-RTO: 0 PER 100 WBC
PLATELET # BLD AUTO: 219 K/UL (ref 150–400)
PMV BLD AUTO: 9.8 FL (ref 8.9–12.9)
POTASSIUM SERPL-SCNC: 4.7 MMOL/L (ref 3.5–5.1)
RBC # BLD AUTO: 4.19 M/UL (ref 3.8–5.2)
REPORTED DOSE,DOSE: ABNORMAL UNITS
REPORTED DOSE/TIME,TMG: ABNORMAL
SERVICE CMNT-IMP: ABNORMAL
SODIUM SERPL-SCNC: 132 MMOL/L (ref 136–145)
VANCOMYCIN TROUGH SERPL-MCNC: 12.7 UG/ML (ref 5–10)
WBC # BLD AUTO: 33.3 K/UL (ref 3.6–11)

## 2018-08-03 PROCEDURE — 76450000000

## 2018-08-03 PROCEDURE — 74011000250 HC RX REV CODE- 250: Performed by: INTERNAL MEDICINE

## 2018-08-03 PROCEDURE — 71045 X-RAY EXAM CHEST 1 VIEW: CPT

## 2018-08-03 PROCEDURE — 74011250637 HC RX REV CODE- 250/637: Performed by: INTERNAL MEDICINE

## 2018-08-03 PROCEDURE — 74011636637 HC RX REV CODE- 636/637: Performed by: HOSPITALIST

## 2018-08-03 PROCEDURE — 94640 AIRWAY INHALATION TREATMENT: CPT

## 2018-08-03 PROCEDURE — 80202 ASSAY OF VANCOMYCIN: CPT | Performed by: INTERNAL MEDICINE

## 2018-08-03 PROCEDURE — 74011250636 HC RX REV CODE- 250/636: Performed by: INTERNAL MEDICINE

## 2018-08-03 PROCEDURE — 77010033678 HC OXYGEN DAILY

## 2018-08-03 PROCEDURE — 36415 COLL VENOUS BLD VENIPUNCTURE: CPT | Performed by: INTERNAL MEDICINE

## 2018-08-03 PROCEDURE — 65660000000 HC RM CCU STEPDOWN

## 2018-08-03 PROCEDURE — 74011250636 HC RX REV CODE- 250/636: Performed by: HOSPITALIST

## 2018-08-03 PROCEDURE — 74011250637 HC RX REV CODE- 250/637: Performed by: HOSPITALIST

## 2018-08-03 PROCEDURE — 82962 GLUCOSE BLOOD TEST: CPT

## 2018-08-03 PROCEDURE — 74011000258 HC RX REV CODE- 258: Performed by: INTERNAL MEDICINE

## 2018-08-03 PROCEDURE — 80048 BASIC METABOLIC PNL TOTAL CA: CPT | Performed by: INTERNAL MEDICINE

## 2018-08-03 PROCEDURE — 85027 COMPLETE CBC AUTOMATED: CPT | Performed by: INTERNAL MEDICINE

## 2018-08-03 RX ORDER — ESOMEPRAZOLE MAGNESIUM 40 MG/1
40 CAPSULE, DELAYED RELEASE ORAL DAILY
Status: DISCONTINUED | OUTPATIENT
Start: 2018-08-04 | End: 2018-08-12 | Stop reason: HOSPADM

## 2018-08-03 RX ORDER — FUROSEMIDE 10 MG/ML
40 INJECTION INTRAMUSCULAR; INTRAVENOUS ONCE
Status: COMPLETED | OUTPATIENT
Start: 2018-08-03 | End: 2018-08-03

## 2018-08-03 RX ORDER — VANCOMYCIN/0.9 % SOD CHLORIDE 1.5G/250ML
1500 PLASTIC BAG, INJECTION (ML) INTRAVENOUS EVERY 12 HOURS
Status: DISCONTINUED | OUTPATIENT
Start: 2018-08-03 | End: 2018-08-06

## 2018-08-03 RX ADMIN — PRIMIDONE 50 MG: 50 TABLET ORAL at 08:17

## 2018-08-03 RX ADMIN — METHYLPREDNISOLONE SODIUM SUCCINATE 40 MG: 40 INJECTION, POWDER, FOR SOLUTION INTRAMUSCULAR; INTRAVENOUS at 05:30

## 2018-08-03 RX ADMIN — PHENOBARBITAL 64.8 MG: 32.4 TABLET ORAL at 08:14

## 2018-08-03 RX ADMIN — PIPERACILLIN SODIUM,TAZOBACTAM SODIUM 3.38 G: 3; .375 INJECTION, POWDER, FOR SOLUTION INTRAVENOUS at 08:22

## 2018-08-03 RX ADMIN — GUAIFENESIN 1200 MG: 600 TABLET, EXTENDED RELEASE ORAL at 08:18

## 2018-08-03 RX ADMIN — SODIUM CHLORIDE 50 ML/HR: 900 INJECTION, SOLUTION INTRAVENOUS at 05:29

## 2018-08-03 RX ADMIN — METHYLPREDNISOLONE SODIUM SUCCINATE 40 MG: 40 INJECTION, POWDER, FOR SOLUTION INTRAMUSCULAR; INTRAVENOUS at 17:13

## 2018-08-03 RX ADMIN — INSULIN LISPRO 2 UNITS: 100 INJECTION, SOLUTION INTRAVENOUS; SUBCUTANEOUS at 21:14

## 2018-08-03 RX ADMIN — EZETIMIBE: 10 TABLET ORAL at 08:17

## 2018-08-03 RX ADMIN — METHOCARBAMOL 750 MG: 500 TABLET ORAL at 19:58

## 2018-08-03 RX ADMIN — Medication 400 MG: at 08:15

## 2018-08-03 RX ADMIN — OXYCODONE HYDROCHLORIDE 15 MG: 5 TABLET ORAL at 04:17

## 2018-08-03 RX ADMIN — GUAIFENESIN 1200 MG: 600 TABLET, EXTENDED RELEASE ORAL at 21:07

## 2018-08-03 RX ADMIN — PIPERACILLIN SODIUM,TAZOBACTAM SODIUM 3.38 G: 3; .375 INJECTION, POWDER, FOR SOLUTION INTRAVENOUS at 15:11

## 2018-08-03 RX ADMIN — VANCOMYCIN HYDROCHLORIDE 1500 MG: 10 INJECTION, POWDER, LYOPHILIZED, FOR SOLUTION INTRAVENOUS at 18:44

## 2018-08-03 RX ADMIN — ASPIRIN 81 MG 81 MG: 81 TABLET ORAL at 08:15

## 2018-08-03 RX ADMIN — VANCOMYCIN HYDROCHLORIDE 1250 MG: 10 INJECTION, POWDER, LYOPHILIZED, FOR SOLUTION INTRAVENOUS at 05:33

## 2018-08-03 RX ADMIN — Medication 10 ML: at 05:29

## 2018-08-03 RX ADMIN — Medication 10 ML: at 04:17

## 2018-08-03 RX ADMIN — Medication 1 CAPSULE: at 08:14

## 2018-08-03 RX ADMIN — ALBUTEROL SULFATE 2.5 MG: 2.5 SOLUTION RESPIRATORY (INHALATION) at 08:20

## 2018-08-03 RX ADMIN — KETOROLAC TROMETHAMINE 15 MG: 30 INJECTION, SOLUTION INTRAMUSCULAR at 11:57

## 2018-08-03 RX ADMIN — PHENOBARBITAL 64.8 MG: 32.4 TABLET ORAL at 17:13

## 2018-08-03 RX ADMIN — PROMETHAZINE HYDROCHLORIDE 25 MG: 25 TABLET ORAL at 08:24

## 2018-08-03 RX ADMIN — LEVOTHYROXINE SODIUM 200 MCG: 100 TABLET ORAL at 05:30

## 2018-08-03 RX ADMIN — FUROSEMIDE 40 MG: 10 INJECTION, SOLUTION INTRAMUSCULAR; INTRAVENOUS at 06:32

## 2018-08-03 RX ADMIN — ALBUTEROL SULFATE 2.5 MG: 2.5 SOLUTION RESPIRATORY (INHALATION) at 15:17

## 2018-08-03 RX ADMIN — KETOROLAC TROMETHAMINE 15 MG: 30 INJECTION, SOLUTION INTRAMUSCULAR at 06:00

## 2018-08-03 RX ADMIN — METHOCARBAMOL 750 MG: 500 TABLET ORAL at 12:04

## 2018-08-03 RX ADMIN — ALBUTEROL SULFATE 2.5 MG: 2.5 SOLUTION RESPIRATORY (INHALATION) at 23:06

## 2018-08-03 RX ADMIN — ALBUTEROL SULFATE 2.5 MG: 2.5 SOLUTION RESPIRATORY (INHALATION) at 11:16

## 2018-08-03 RX ADMIN — ALBUTEROL SULFATE 2.5 MG: 2.5 SOLUTION RESPIRATORY (INHALATION) at 19:19

## 2018-08-03 RX ADMIN — INSULIN LISPRO 5 UNITS: 100 INJECTION, SOLUTION INTRAVENOUS; SUBCUTANEOUS at 11:57

## 2018-08-03 RX ADMIN — Medication 400 MG: at 17:12

## 2018-08-03 RX ADMIN — METOPROLOL TARTRATE 25 MG: 25 TABLET ORAL at 17:13

## 2018-08-03 RX ADMIN — KETOROLAC TROMETHAMINE 15 MG: 30 INJECTION, SOLUTION INTRAMUSCULAR at 17:57

## 2018-08-03 RX ADMIN — Medication 10 ML: at 21:07

## 2018-08-03 RX ADMIN — PROMETHAZINE HYDROCHLORIDE 25 MG: 25 TABLET ORAL at 17:16

## 2018-08-03 RX ADMIN — ENOXAPARIN SODIUM 40 MG: 100 INJECTION SUBCUTANEOUS at 08:18

## 2018-08-03 RX ADMIN — PRIMIDONE 150 MG: 50 TABLET ORAL at 21:07

## 2018-08-03 RX ADMIN — OXYCODONE HYDROCHLORIDE 15 MG: 5 TABLET ORAL at 15:26

## 2018-08-03 RX ADMIN — METHOCARBAMOL 750 MG: 500 TABLET ORAL at 05:29

## 2018-08-03 RX ADMIN — INSULIN LISPRO 2 UNITS: 100 INJECTION, SOLUTION INTRAVENOUS; SUBCUTANEOUS at 08:18

## 2018-08-03 RX ADMIN — METOPROLOL TARTRATE 25 MG: 25 TABLET ORAL at 08:16

## 2018-08-03 RX ADMIN — DULOXETINE HYDROCHLORIDE 40 MG: 20 CAPSULE, DELAYED RELEASE ORAL at 08:14

## 2018-08-03 RX ADMIN — ALBUTEROL SULFATE 2.5 MG: 2.5 SOLUTION RESPIRATORY (INHALATION) at 03:01

## 2018-08-03 RX ADMIN — Medication 250 MG: at 08:17

## 2018-08-03 RX ADMIN — PANTOPRAZOLE SODIUM 40 MG: 40 TABLET, DELAYED RELEASE ORAL at 08:15

## 2018-08-03 RX ADMIN — ALPRAZOLAM 1 MG: 0.5 TABLET ORAL at 19:58

## 2018-08-03 RX ADMIN — PRAZOSIN HYDROCHLORIDE 2 MG: 1 CAPSULE ORAL at 08:14

## 2018-08-03 RX ADMIN — ALPRAZOLAM 1 MG: 0.5 TABLET ORAL at 08:41

## 2018-08-03 RX ADMIN — Medication 10 ML: at 14:01

## 2018-08-03 NOTE — PROGRESS NOTES
PCU SHIFT NURSING NOTE Bedside and Verbal shift change report given to [address] [facility] (oncoming nurse) by Umu Jernigan RN (offgoing nurse). Report included the following information SBAR, Kardex, ED Summary, Intake/Output, MAR, Accordion, Recent Results and Cardiac Rhythm NSR/ST. Shift Summary:  
1912: Pt resting quietly in bed. VSS. HR NSR. Pt on 4L NC. No complaints at this time. Call bell within reach. Will continue to monitor. 1958: Pt requesting anxiety medication & muscle relaxer. PRN xanax and robaxin given. Bedside and Verbal shift change report given to [facility] (oncoming nurse) by Forylan Montiel RN (offgoing nurse). Report included the following information SBAR, Kardex, ED Summary, Intake/Output, MAR, Accordion, Recent Results and Cardiac Rhythm NSR. Admission Date 7/31/2018 Admission Diagnosis CAP (community acquired pneumonia) Acute respiratory failure ([facility] Consults IP CONSULT TO PULMONOLOGY 
IP CONSULT TO PALLIATIVE CARE - PROVIDER Consults [x]PT [x]OT []Speech  
[]Case Management  
  
[x] Palliative Cardiac Monitoring Order  
[x]Yes []No  
 
IV drips []Yes Drip:                            Dose: 
Drip:                            Dose: 
Drip:                            Dose:  
[x]No  
 
GI Prophylaxis []Yes []No  
 
 
 
DVT Prophylaxis SCDs:     
     
 Onel stockings:     
  
[x] Medication []Contraindicated []None Activity Level Activity Level: Up with Assistance Activity Assistance: Partial (one person) Purposeful Rounding every 1-2 hour? [x]Yes Zarate Score  Total Score: 3 Bed Alarm (If score 3 or >) [x]Yes  
[] Refused (See signed refusal form in chart) Joseph Score  Joseph Score: 16 Joseph Score (if score 14 or less) []PMT consult  
[]Wound Care consult []Specialty bed  
[] Nutrition consult Needs prior to discharge:  
Home O2 required:   
[]Yes  
[x]No  
 If yes, how much O2 required?   
 Other:  
 Last Bowel Movement: Last Bowel Movement Date: 08/02/18 Influenza Vaccine Received Flu Vaccine for Current Season (usually Sept-March): Not Flu Season Pneumonia Vaccine Diet Active Orders Diet DIET DIABETIC CONSISTENT CARB Regular LDAs Peripheral IV 08/03/18 Right Arm (Active) Site Assessment Clean, dry, & intact 8/3/2018  6:07 PM  
Phlebitis Assessment 0 8/3/2018  6:07 PM  
Infiltration Assessment 0 8/3/2018  6:07 PM  
Dressing Status Clean, dry, & intact 8/3/2018  6:07 PM  
Dressing Type Tape;Transparent 8/3/2018  6:07 PM  
Hub Color/Line Status Blue;Flushed; Infusing 8/3/2018  6:07 PM  
Action Taken Open ports on tubing capped 8/3/2018  6:07 PM  
Alcohol Cap Used Yes 8/3/2018  6:07 PM  
                  
Urinary Catheter Intake & Output Date 08/02/18 0700 - 08/03/18 6540 08/03/18 0700 - 08/04/18 1978 Shift [id number] 1900-0659 24 Hour Total 3588-0781 1028-5971 24 Hour Total  
I 
N 
T 
A 
K 
E 
 P. O. 505 120 625 500  500  
   P. O. 505 120 625 500  500 I.V. 
(mL/kg/hr) 4255.8 
(3.9) 840 
(0.8) 5095.8 
(2.3) Volume (0.9% sodium chloride infusion) 3805.8 490 4295.8 Volume (piperacillin-tazobactam (ZOSYN) 3.375 g in 0.9% sodium chloride (MBP/ADV) 100 mL) 200 100 300 Volume (vancomycin (VANCOCIN) 1250 mg in  ml infusion) 250 250 500 Shift Total 
(mL/kg) 4760.8 
(52.5) 960 
(10.6) 5720.8 
(63.1) 500 
(5.5)  500 
(5.5) O 
U T 
P 
U Joselyn Quan Urine (mL/kg/hr) 875 
(0.8) 700 
(0.6) 1575 
(0.7) 2650  2650 Urine Voided  2650  2650 Shift Total 
(mL/kg) 875 
(9.6) 700 
(7.7) 1575 
(17.4) 2650 
(29.2)  2650 
(29.2) NET 3885.8 260 4145.8 -2150  -2150 Weight (kg) 90.7 90.7 90.7 90.7 90.7 90.7 Readmission Risk Assessment Tool Score High Risk   
      
 25 Total Score 3 Has Seen PCP in Last 6 Months (Yes=3, No=0)  
 5 Pt. Coverage (Medicare=5 , Medicaid, or Self-Pay=4)  17 Charlson Comorbidity Score (Age + Comorbid Conditions) Criteria that do not apply:  
 . Living with Significant Other. Assisted Living. LTAC. SNF. or  
Rehab Patient Length of Stay (>5 days = 3) IP Visits Last 12 Months (1-3=4, 4=9, >4=11) Expected Length of Stay 4d 21h Actual Length of Stay 3 \

## 2018-08-03 NOTE — PROGRESS NOTES
5425 Pt c/o SOB, audible coarse rhonchi with crackles noted. BLE edema 1-2+ edema noted. -114. PRN albuterol tx given. Pt has not taken her usual home regimen of Lasix 40 mg daily. Hospitalist paged. 6515 Dr Tony Bridges notified new orders received.

## 2018-08-03 NOTE — PROGRESS NOTES
[facility], Critical Care, and Sleep Medicine Name: Tony Schroeder MRN: [id number] : 1955 Hospital: [facility] Date: 8/3/2018 Critical Care Patient Consult 
 
  
 
Pt was seen earlier this am with RT. IMPRESSION:  
· Right lower lobe pneumonia with dense consolidation and possible areas of necrosis. Also noted to have consolidation of the left lower lobe. Sputum Cx is still prelim. · Acute chest pain due to severe pneumonia, more difficult to treat due to her chronic use of narcotics and Benzos. She seems like th chest pain is a little better today. Has been able to have a much more productive cough of thick yellow sputum. · Suspected underlying COPD- acute exacerbation-improving today. · Leukocytosis and Sepsis · Was noted to have loose stools on presentation. · NIDDM · Anemia · Has hypertension at baseline · Fibromyalgia/Depression/Anxiety, PTSD/ chronic back pain on chronic nacotics and Benzos. · Essential tremor. · Low albumin · Lactic Acidosis on presentation. · Ex Smoker, quit 1 year ago. · Hypothyroidism. · Obese · Discussed with nurse this am and with Ms. Cleveland Frazier from palliative care. RECOMMENDATIONS:  
· Appreciate Palliative care help. · Will change to IV steroids will continue for now. · Hydration, decrease the IV rate. · Will hold drying agents such as atrovent, singulair, anti-histamines. · Will continue the current abx regimen. · Pt at moderate risk of decompensation but seem stable at current time and will improve once we get pain better controlled. · Will see as needed over the weekend. Subjective/History:  
 
Last 24 hrs: Today pain is still present but seems better. Noted to have  A thick sputum produced with coughing. No fevers noted. Tolerating po intake better.   
Has been with moderate to severe ongoing right sided chest pain, with any deep inspiration, coughing, movement has severe pain. Has ongoing chest congestion. ome productive cough. No headaches. Has no sinus pain or pressure. NO Aspiration noted per pt. She does describe sharp stabbing pain of her right lower/mid chest  Eva worse with deep inspiration. Tolerates eating pretty well. Denies any constipation today. NO skin issues. She still feels that she has mild to moderate anxiety and did not sleep well last pm.  
 
Rest of 10 point ROS is negative. From 8/1/18: Pt was seen about 1145am and then again about 2pm. 
 
Cc: chest pain, dry cough. This patient has been seen and evaluated at the request of Dr. Olinda Mckoy for above. Patient is a 58 y.o. female who presents for above. She has moderate chest pain which limits her ability to take a deep breath. Has some productive coughing but again limited by her chest pain. She reports that her pain got severe and that it has affected her breathing. Pt is very senstive to her medications and has intolerance to a lot of the generic meds. She has been pretty well controlled with her meds prior to admission. On second evaluation pt was much more comfortable and conversant. Was still have intermittent sharp stabbing pain along her right lower to mid chest wall. Not able to get Complete ROS due to her acute illness, severe pain. Limited ability to give HPI and ROS. The patient is critically ill and can not provide additional history due to Unable to speak. Past Medical History:  
Diagnosis Date  Anxiety  Bone spur NECK  COPD  Depression  Endocrine disease   
 hypothyroidism  Fibromyalgia  Fusion of spine of cervical region  Gastrointestinal disorder   
 gerd  Hyperlipidemia  Hypothyroid  Morbid obesity ([facility] [address]  Osteoarthritis  PUD (peptic ulcer disease)  Tobacco abuse Past Surgical History:  
Procedure Laterality Date  BRONCHOSCOPY-FIBER/THERAPY  4/3/2015  CARDIAC CATHETERIZATION 2013  COLONOSCOPY,DIAGNOSTIC  10/30/2014  HX CATARACT REMOVAL    
 bilateral  
 HX GYN    
   HX ORTHOPAEDIC Ruptured disc, knuckles on right hand  UPPER GI ENDOSCOPY,BIOPSY  10/30/2014  UPPER GI ENDOSCOPY,DILATN W GUIDE  10/30/2014 Prior to Admission medications Medication Sig Start Date End Date Taking? Authorizing Provider  
prazosin (MINIPRESS) 2 mg capsule TAKE 1 CAPSULE BY MOUTH TWICE A DAY. 18  Yes Jarred Koo NP  
DULoxetine 40 mg cpDR Take 40 mg by mouth daily. 18  Yes Jarred Koo NP  
primidone (MYSOLINE) 50 mg tablet 1 po qam and 3 po qhs 18  Yes My Wiggins MD  
acetaminophen-codeine (TYLENOL #3) 300-30 mg per tablet Take 1 Tab by mouth every eight (8) hours as needed. Max Daily Amount: 3 Tabs. 17  Yes Chichi Simons NP  
ALPRAZolam (XANAX) 1 mg tablet Take 1 Tab by mouth three (3) times daily as needed for Anxiety. Max Daily Amount: 3 mg. 17  Yes Chichi Simons NP  
cetirizine (ZYRTEC) 10 mg tablet Take 1 Tab by mouth daily. 17  Yes Chichi Simons NP  
promethazine (PHENERGAN) 25 mg tablet Take 1 Tab by mouth every six (6) hours as needed. 17  Yes Chichi Simons NP  
ibuprofen (MOTRIN) 800 mg tablet Take 1 Tab by mouth every eight (8) hours as needed. 17  Yes Chichi Simons NP  
guaiFENesin ER (MUCINEX) 1,200 mg Ta12 ER tablet Take 1 Tab by mouth two (2) times a day. 17  Yes Chichi Simons NP  
furosemide (LASIX) 40 mg tablet Take 1 Tab by mouth daily. Patient taking differently: Take 40 mg by mouth daily as needed for Other (LE edema). 17  Yes Chichi Simons NP  
PHENobarbital (LUMINAL) 60 mg tablet Take 1 Tab by mouth two (2) times a day.  Max Daily Amount: 120 mg. 17  Yes Pretty Patiño MD  
dicyclomine (BENTYL) 10 mg capsule  2/13/15  Yes Phys Other, MD  
glipiZIDE SR (GLUCOTROL) 5 mg CR tablet Take 5 mg by mouth two (2) times daily as needed (Patient monitors her BG levels and takes when she is also taking a steroid. ). 2/16/15  Yes Nico Martinez MD  
montelukast (SINGULAIR) 10 mg tablet Take 10 mg by mouth daily. 2/16/15  Yes Nico Martinez MD  
ezetimibe-simvastatin (VYTORIN 10/40) 10-40 mg per tablet Take 1 tablet by mouth nightly. Yes Historical Provider  
metoprolol (LOPRESSOR) 25 mg tablet Take 1 Tab by mouth two (2) times a day. 2/6/14  Yes Krzysztof Reddy NP  
benzonatate (TESSALON) 200 mg capsule Take 1 Cap by mouth two (2) times daily as needed. 1/20/14  Yes Adan Mauricio MD  
nystatin (MYCOSTATIN) 100,000 unit/mL suspension Take 5 mL by mouth four (4) times daily. swish and spit Patient taking differently: Take 500,000 Units by mouth four (4) times daily as needed. swish and spit 1/20/14  Yes Adan Mauricio MD  
methocarbamol (ROBAXIN) 750 mg tablet Take 750-1,500 mg by mouth four (4) times daily as needed. Yes Historical Provider  
niacin  mg CR capsule Take 250 mg by mouth daily. Yes Historical Provider  
hyoscyamine SL (LEVSIN/SL) 0.125 mg SL tablet 0.125 mg by SubLINGual route three (3) times daily as needed for Cramping. Yes Historical Provider  
esomeprazole (NEXIUM) 40 mg capsule Take 40 mg by mouth daily. Yes Historical Provider MAGOX 400 mg tablet TAKE ONE TABLET TWICE A DAY 11/4/13  Yes Olvin Tobias NP  
vit B Cmplx 3-FA-Vit C-Biotin (NEPHRO SHREYA RX) 1- mg-mg-mcg tablet Take 1 Tab by mouth daily. Yes Historical Provider  
aspirin 81 mg chewable tablet Take 81 mg by mouth daily. Yes Nico Martinez MD  
levothyroxine (SYNTHROID) 200 mcg tablet Take 200 mcg by mouth daily (before breakfast). Yes Nico Martinez MD  
albuterol (VENTOLIN HFA) 90 mcg/actuation inhaler Take 2 Puffs by inhalation every four (4) hours as needed for Wheezing. Historical Provider  
albuterol-ipratropium (DUONEB) 2.5 mg-0.5 mg/3 ml nebulizer solution 3 mL by Nebulization route every six (6) hours as needed for Wheezing. Historical Provider Current Facility-Administered Medications Medication Dose Route Frequency  methylPREDNISolone (PF) (SOLU-MEDROL) injection 40 mg  40 mg IntraVENous Q12H  
 ketorolac (TORADOL) injection 15 mg  15 mg IntraVENous Q6H  
 metoprolol tartrate (LOPRESSOR) tablet 25 mg  25 mg Oral BID  prazosin (MINIPRESS) capsule 2 mg  2 mg Oral BID  piperacillin-tazobactam (ZOSYN) 3.375 g in 0.9% sodium chloride (MBP/ADV) 100 mL  3.375 g IntraVENous Q8H  
 vancomycin (VANCOCIN) 1250 mg in  ml infusion  1,250 mg IntraVENous Q12H  
 lidocaine (LIDODERM) 5 % patch 1 Patch  1 Patch TransDERmal Q24H  
 albuterol (PROVENTIL VENTOLIN) nebulizer solution 2.5 mg  2.5 mg Nebulization Q4H RT  
 0.9% sodium chloride infusion  50 mL/hr IntraVENous CONTINUOUS  
 aspirin chewable tablet 81 mg  81 mg Oral DAILY  DULoxetine (CYMBALTA) capsule 40 mg  40 mg Oral DAILY  pantoprazole (PROTONIX) tablet 40 mg  40 mg Oral ACB  ezetimibe (ZETIA) 10 mg, pravastatin (PRAVACHOL) 80 mg   Oral DAILY  guaiFENesin ER (MUCINEX) tablet 1,200 mg  1,200 mg Oral Q12H  
 magnesium oxide (MAG-OX) tablet 400 mg  400 mg Oral BID  niacin SR capsule 250 mg  250 mg Oral DAILY  PHENobarbital (LUMINAL) tablet 64.8 mg  64.8 mg Oral BID  primidone (MYSOLINE) tablet 50 mg  50 mg Oral DAILY  primidone (MYSOLINE) tablet 150 mg  150 mg Oral QHS  sodium chloride (NS) flush 5-10 mL  5-10 mL IntraVENous Q8H  
 enoxaparin (LOVENOX) injection 40 mg  40 mg SubCUTAneous DAILY  insulin lispro (HUMALOG) injection   SubCUTAneous AC&HS  
 lactobac ac& pc-s.therm-b.anim (ROBIN Q/RISAQUAD)  1 Cap Oral DAILY  levothyroxine (SYNTHROID) tablet 200 mcg  200 mcg Oral ACB Allergies Allergen Reactions  Latex Contact Dermatitis  Dilaudid [Hydromorphone (Pf)] Other (comments) Feels like bugs are crawling all over her  Lipitor [Atorvastatin] Other (comments) LIVER TROUBLE ON THIS MEDICATION  
 Remeron [Mirtazapine] Other (comments) Tremors  Sulfa (Sulfonamide Antibiotics) Itching Social History Substance Use Topics  Smoking status: Former Smoker Packs/day: 1.00 Years: 30.00  Smokeless tobacco: Never Used  Alcohol use Yes Comment: occasional  
  
Family History Problem Relation Age of Onset  Heart Disease Mother  Dementia Mother  Thyroid Disease Mother  Heart Disease Father  Alcohol abuse Father  Psychiatric Disorder Father  Dementia Father  Bipolar Disorder Father  Psychiatric Disorder Sister  Suicide Sister  Psychiatric Disorder Brother  Suicide Brother  Psychiatric Disorder Other  Psychiatric Disorder Daughter  Bipolar Disorder Daughter  Coronary Artery Disease Other   
  multiple family members in 52's Review of Systems:Updated on 18 Review of systems not obtained due to patient factors. Has moderate anxiety, chronic pains. Acute pain of the right mid to lower chest wall, pleuritic in nature. Has productive cough. No Aspiration. Was note to have some loose stools. NO skins issues. No muscle aches, no joint aches. She does have a resting tremor seems to be worse on the left upper extremity versus RUE. Some also noted in legs. Has moderate to severe anxiety, depression, PTSD. Has a tremendous sensitivity to her meds and is intolerant of several generic meds for her anxiety. Objective:  
Vital Signs:   
Visit Vitals  /72 (BP 1 Location: Left arm, BP Patient Position: At rest)  Pulse 92  Temp 98.1 °F (36.7 °C)  Resp 20  
 Ht 5' 4\" (1.626 m)  Wt 90.7 kg (200 lb)  SpO2 98%  BMI 34.33 kg/m2 O2 Device: Nasal cannula O2 Flow Rate (L/min): 4 l/min Temp (24hrs), Av °F (36.7 °C), Min:97.4 °F (36.3 °C), Max:98.9 °F (37.2 °C) Intake/Output:  
Last shift:      701 - 1900 In: 26 [P.O.:220] Out:  [Urine:] Last 3 shifts: 1901 - 700 In: 5820.8 [P.O.:625; I.V.:5195.8] Out: 2025 [Urine:2025] Intake/Output Summary (Last 24 hours) at 08/03/18 1105 Last data filed at 08/03/18 7755 Gross per 24 hour Intake          1994.17 ml Output             3325 ml Net         -1330.83 ml Hemodynamics:  
PAP:   CO:    
Wedge:   CI:    
CVP:    SVR:    
  PVR:    
 
Ventilator Settings: 
Mode Rate Tidal Volume Pressure FiO2 PEEP  
         50 % Peak airway pressure:     
Minute ventilation:     
 
Physical Exam: 
 
General:  Alert, cooperative, NO  respiratory distress. She appears more comfortable today compared to yesterday. On NC oxygen. Head:  Normocephalic, without obvious abnormality, atraumatic. Eyes:  Conjunctivae/corneas clear. PERRL, EOMs intact. Nose: Nares normal. Septum midline. Mucosa normal. No drainage or sinus tenderness. Throat: Lips, mucosa, and tongue normal. Teeth and gums normal.  
Neck: Supple, symmetrical, trachea midline, no adenopathy, thyroid: no enlargment/tenderness/nodules, no carotid bruit and no JVD. Back:   Symmetric, no curvature. ROM normal.  
Lungs: On auscultation bilaterally has rhonchi. Productive cough of thick yellow sputum. Breathing more deeply. Chest wall:  No tenderness or deformity. Heart:  Regular rate and rhythm, S1, S2 normal, no murmur, click, rub or gallop. Abdomen:   Soft, non-tender. Bowel sounds normal. No masses,  No organomegaly. Extremities: Extremities normal, atraumatic, no cyanosis or edema. Pulses: 2+ and symmetric all extremities. Skin: Skin color, texture, turgor normal. No rashes or lesions Lymph nodes: Cervical, supraclavicular, and axillary nodes normal.  
Neurologic: Grossly nonfocal, has tremors of the LUE and RUE. Motor is grossly intact. Psych: NO anxiety or depression. Data:  
 
Recent Results (from the past 24 hour(s)) GLUCOSE, POC Collection Time: 08/02/18 11:18 AM  
Result Value Ref Range Glucose (POC) 153 (H) 65 - 100 mg/dL Performed by Valerie Lucas (PCT) GLUCOSE, POC Collection Time: 08/02/18  4:52 PM  
Result Value Ref Range Glucose (POC) 130 (H) 65 - 100 mg/dL Performed by Marti Vasquez (PCT) GLUCOSE, POC Collection Time: 08/02/18  9:28 PM  
Result Value Ref Range Glucose (POC) 231 (H) 65 - 100 mg/dL Performed by Acosta Alcala METABOLIC PANEL, BASIC Collection Time: 08/03/18  4:31 AM  
Result Value Ref Range Sodium 132 (L) 136 - 145 mmol/L Potassium 4.7 3.5 - 5.1 mmol/L Chloride 105 97 - 108 mmol/L  
 CO2 18 (L) 21 - 32 mmol/L Anion gap 9 5 - 15 mmol/L Glucose 109 (H) 65 - 100 mg/dL BUN 11 6 - 20 MG/DL Creatinine 0.79 0.55 - 1.02 MG/DL  
 BUN/Creatinine ratio 14 12 - 20 GFR est AA >60 >60 ml/min/1.73m2 GFR est non-AA >60 >60 ml/min/1.73m2 Calcium 9.9 8.5 - 10.1 MG/DL  
CBC W/O DIFF Collection Time: 08/03/18  4:31 AM  
Result Value Ref Range WBC 33.3 (H) 3.6 - 11.0 K/uL  
 RBC 4.19 3.80 - 5.20 M/uL  
 HGB 13.2 11.5 - 16.0 g/dL HCT 41.9 35.0 - 47.0 % .0 (H) 80.0 - 99.0 FL  
 MCH 31.5 26.0 - 34.0 PG  
 MCHC 31.5 30.0 - 36.5 g/dL  
 RDW 14.0 11.5 - 14.5 % PLATELET 922 484 - 318 K/uL MPV 9.8 8.9 - 12.9 FL  
 NRBC 0.0 0  WBC ABSOLUTE NRBC 0.00 0.00 - 0.01 K/uL Cass Eubanks Collection Time: 08/03/18  5:28 AM  
Result Value Ref Range Vancomycin,trough 12.7 (H) 5.0 - 10.0 ug/mL Reported dose date: NOT PROVIDED Reported dose time: NOT PROVIDED Reported dose: NOT PROVIDED UNITS  
GLUCOSE, POC Collection Time: 08/03/18  7:41 AM  
Result Value Ref Range Glucose (POC) 146 (H) 65 - 100 mg/dL Performed by Valerie Lucas (PCT) Telemetry:normal sinus rhythm Imaging: 
I have personally reviewed the patients radiographs and have reviewed the reports: 
7-31-18: Ct of chest: 7-31-18: Ct of chest: IMPRESSION: 
Significant consolidation in the right lower lobe with areas of necrosis.  Small 
abscesses cannot be excluded in this setting. Consolidation left lower lobe. Trace right effusion with underlying atelectasis. New mediastinal 
lymphadenopathy may be reactive in nature, however follow-up is recommended.   
 
 
Radha Sargent MD

## 2018-08-03 NOTE — PROGRESS NOTES
Hospitalist Progress Note NAME: Karen Mendoza :  1955 MRN:  [id number] Assessment / Plan: 
Acute hypoxic respiratory failure POA 
due to Bilateral Pneumonia ( RLL necrotic) with reactive Lymphadenopathy on CT chest POA- WBC up today to 33k Acute COPD exacerbation POA SevereSepsis due to above ( leukocytosis/tachycardia + resp failure & lact 2.0) Lactic acidosis POA CXR= Large right pleural effusion with right basilar consolidation DC IVF S/p IV levaquin x 1 in ER, changed to IV Vanco + zosyn for the CT results of necrotic PNA- cont for now Follow BCx, sputum Cx Changed to IV solumedrol, taper to Q 12 now Cont jet nebs scheduled CT  Chest noted for-  Significant consolidation in the right lower lobe with areas of necrosis. Small abscesses cannot be excluded in this setting. Consolidation left lower lobe. Trace right effusion with underlying atelectasis. New mediastinal lymphadenopathy may be reactive in nature. Cont oxygen to keep sats >90%, BIPAP prn, check ABG 
IP pulm consult appreciated- following Taper solumedrol IV toradol  for pleuritic chest pain- seems to be helping her Palliative pain consulted- following 
  
Diarrhea POA - C Diff ruled out 
-per pt started last month s/p doxycycline DC C.diff stool test as no sample in 24 hrs 
-c/w FloraQ  
  
NIDDM type II Cont holding PO glipizide with poor po intake 
-monitor closely, BS may go high wit humberto of steroids -c/w SSI for now 
  
HTN  
-BP stable 
-cont home metoprolol  
-lasix prn at home for le edema, keep on hold for now 
  
Fibromyalgia / depression/ anxiety/ PTSD /Chronic pain Polypharmacy Essential tremor  
-continue multiple home meds  
   
Ex smoker, quit 1 year ago Hyperlipidemia, cont statin Hypothyroidism, cont synthroid Obesity. Body mass index is 34.33 kg/(m^2). 
 weight loss recommended 
  
  
Code Status: Full code Surrogate Decision Maker:   
  
DVT Prophylaxis: Lovenox GI Prophylaxis: not indicated 
  
Baseline: independent; no home O2; lives with  Recommended Disposition: Home w/Familysoon Subjective: Chief Complaint / Reason for Physician Visit: F/U Bilateral PNA (necrotic), Sepsis, resp failure, COPD, pleuritic chest pain \"My R side hurts more than L, needs BIPAP\". Discussed with RN events overnight. Review of Systems: 
Symptom Y/N Comments  Symptom Y/N Comments Fever/Chills n   Chest Pain y Pleuritic R>L Poor Appetite n   Edema Cough y   Abdominal Pain Sputum y   Joint Pain SOB/RODRIGUEZ y   Pruritis/Rash Nausea/vomit    Tolerating PT/OT y Diarrhea    Tolerating Diet y Constipation    Other Could NOT obtain due to:   
 
Objective: VITALS:  
Last 24hrs VS reviewed since prior progress note. Most recent are: 
Patient Vitals for the past 24 hrs: 
 Temp Pulse Resp BP SpO2  
08/03/18 1517 - - - - 93 % 08/03/18 1504 98.2 °F (36.8 °C) (!) 104 18 136/66 93 % 08/03/18 1116 - - - - 96 % 08/03/18 1104 98.1 °F (36.7 °C) 92 20 124/72 98 % 08/03/18 0820 - - - - 97 % 08/03/18 0708 98.2 °F (36.8 °C) (!) 111 20 126/67 98 % 08/03/18 0553 97.6 °F (36.4 °C) (!) 114 22 117/62 99 % 08/03/18 0301 - - - - 98 % 08/03/18 0254 97.4 °F (36.3 °C) 93 18 117/68 100 % 08/02/18 2300 97.6 °F (36.4 °C) 96 18 123/67 98 % 08/02/18 2249 97.8 °F (36.6 °C) 88 18 109/59 99 % 08/02/18 2005 98 °F (36.7 °C) 93 18 101/60 96 % 08/02/18 1959 - - - - 96 % Intake/Output Summary (Last 24 hours) at 08/03/18 1549 Last data filed at 08/03/18 1200 Gross per 24 hour Intake          2114.17 ml Output             3625 ml Net         -1510.83 ml PHYSICAL EXAM: 
General: WD, WN. Alert, cooperative, no acute distress, obese +   
EENT:  EOMI. Anicteric sclerae. MMM Resp:   wheezing + some basal crackles + CV:  Regular  rhythm,  No edema GI:  Soft, Non distended, Non tender.  +Bowel sounds Neurologic:  Alert and oriented X 3, normal speech, Psych:   Good insight. Not anxious nor agitated Skin:  No rashes. No jaundice Reviewed most current lab test results and cultures  YES Reviewed most current radiology test results   YES Review and summation of old records today    NO Reviewed patient's current orders and MAR    YES 
PMH/SH reviewed - no change compared to H&P 
________________________________________________________________________ Care Plan discussed with: 
  Comments Patient x Family RN x Care Manager x Consultant  x Dr Niko Ballard ([facility]) Multidiciplinary team rounds were held today with , nursing, pharmacist and clinical coordinator. Patient's plan of care was discussed; medications were reviewed and discharge planning was addressed. ________________________________________________________________________ Total NON critical care TIME:  16   Minutes Total CRITICAL CARE TIME Spent:   Minutes non procedure based Comments >50% of visit spent in counseling and coordination of care    
________________________________________________________________________ Florencia Ayoub MD  
 
Procedures: see electronic medical records for all procedures/Xrays and details which were not copied into this note but were reviewed prior to creation of Plan. LABS: 
I reviewed today's most current labs and imaging studies. Pertinent labs include: 
Recent Labs 08/03/18 
 0431  08/01/18 
 0537  07/31/18 2004 WBC  33.3*  24.8*  22.0*  
HGB  13.2  12.2  13.2 HCT  41.9  38.6  40.5 PLT  219  379  438* Recent Labs 08/03/18 
 9173  08/02/18 
 7227  08/01/18 
 0537  07/31/18 2004 NA  132*  132*  138  137  
K  4.7  4.9  4.9  4.2 CL  105  102  106  104 CO2  18*  22  25  25 GLU  109*  152*  157*  102* BUN  11  8  9  10 CREA  0.79  0.70  0.76  0.87 CA  9.9  9.4  9.9  10.0 MG   --    --   2.5*   --   
PHOS   --    --   3.4   --   
ALB   --    --   2.4*  2.7* TBILI   --    --   0.2  0.3 SGOT   --    --   15  25 ALT   --    --   79*  98* Signed: Mayra Nj MD

## 2018-08-03 NOTE — PROGRESS NOTES
Pharmacy Automatic Renal Dosing Protocol - Antimicrobials Indication for Antimicrobials: CAP, COPD exacerbation, Sepsis Current Regimen of Each Antimicrobial: 
Vancomycin x7 days; start ; Day #2 of  Zosyn 3.375mg IV q8h x7 days; Day #2 of  Previous Abx: 
Levaquin 750 mg IV every 24 hr (Start Date ; Day # 1) Goal Level: VANCOMYCIN TROUGH GOAL RANGE Vancomycin Trough: 15 - 20 mcg/mL Date Dose & Interval Measured (mcg/mL) Extrapolated (mcg/mL) 8/3 5:28 1250 mg Q12H 12.7 13.8 Significant Cultures:  
: Blood = (pending) : Respiratory = (pending) Radiology / Imaging results: (X-ray, CT scan or MRI):  
: CXR: Opacification/consolidation RML &  left lung base posterior medially may represent PNA Paralysis, amputations, malnutrition:  
 
Labs: 
Recent Labs 18 
 5462  18 
 5072  18 
 0537  18 CREA  0.79  0.70  0.76  0.87 BUN  11  8  9  10 WBC  33.3*   --   24.8*  22.0* Temp (24hrs), Av.8 °F (36.6 °C), Min:97.4 °F (36.3 °C), Max:98.2 °F (36.8 °C) Creatinine Clearance (mL/min) or Dialysis: 66 ml/min Impression/Plan: · Vancomycin trough 12.7 mcg/ml on 1250 mg Q12H, extrapolated to 13.8 mcg/ml. Dose adjusted to 1500 mg Q12H with anticipated trough of 17 mcg/ml · Daily BMP 
· LOT = 7 days (ordered) Last doses on  Pharmacy will follow daily and adjust medications as appropriate for renal function and/or serum levels. Thank you, 
Kathy Mondragon, [facility] Recommended duration of therapy 
[email or web address]/[facility]/Pharmacy/Clinical%20Companion/[email or web address]. docx Renal Dosing 
http://[facility]/[place]/[facility]/Pharmacy/Clinical%20Companion/Renal%[id number]. pdf

## 2018-08-03 NOTE — PROGRESS NOTES
Problem: Pressure Injury - Risk of 
Goal: *Prevention of pressure injury Document Joseph Scale and appropriate interventions in the flowsheet. Outcome: Progressing Towards Goal 
Pressure Injury Interventions: 
Sensory Interventions: Assess changes in LOC, Keep linens dry and wrinkle-free, Float heels Moisture Interventions: Absorbent underpads, Apply protective barrier, creams and emollients Activity Interventions: Increase time out of bed Mobility Interventions: Float heels, HOB 30 degrees or less Nutrition Interventions: Document food/fluid/supplement intake

## 2018-08-03 NOTE — PROGRESS NOTES
Problem: Falls - Risk of 
Goal: *Absence of Falls Document Isaac Miles Fall Risk and appropriate interventions in the flowsheet. Outcome: Progressing Towards Goal 
Fall Risk Interventions: 
Mobility Interventions: Assess mobility with egress test, Patient to call before getting OOB Medication Interventions: Patient to call before getting OOB Elimination Interventions: Call light in reach, Toilet paper/wipes in reach History of Falls Interventions: Evaluate medications/consider consulting pharmacy, Room close to nurse's station

## 2018-08-03 NOTE — ROUTINE PROCESS
PCU SHIFT NURSING NOTE Bedside shift change report given to Chip Riojas (oncoming nurse) by Loretta Mock (offgoing nurse). Report included the following information SBAR, Kardex, Intake/Output, MAR, Accordion and Recent Results. 1800: Pt has large bag full of home medications. RN told pt to not take these medications and sent two to pharm for verification per MD Zacarias Cruz. 
1900: Bedside shift change report given to Tiffany Griffith (oncoming nurse) by Chip Riojas (offgoing nurse). Report included the following information SBAR, Kardex, Intake/Output, MAR, Accordion and Recent Results. Shift Summary: 8/3/2018 Admission Date 7/31/2018 Admission Diagnosis CAP (community acquired pneumonia) Acute respiratory failure ([facility] [address] Consults IP CONSULT TO PULMONOLOGY 
IP CONSULT TO PALLIATIVE CARE - PROVIDER Consults [x]PT [x]OT []Speech  
[]Case Management  
  
[] Palliative Cardiac Monitoring Order  
[x]Yes []No  
 
IV drips []Yes Drip:                            Dose: 
Drip:                            Dose: 
Drip:                            Dose:  
[x]No  
 
GI Prophylaxis []Yes []No  
 
 
 
DVT Prophylaxis SCDs:     
     
 Onel stockings:     
  
[x] Medication []Contraindicated []None Activity Level Activity Level: Up with Assistance Activity Assistance: Partial (one person) Purposeful Rounding every 1-2 hour? [x]Yes Zarate Score  Total Score: 3 Bed Alarm (If score 3 or >) [x]Yes  
[] Refused (See signed refusal form in chart) Joseph Score  Joseph Score: 15 Joseph Score (if score 14 or less) []PMT consult  
[]Wound Care consult []Specialty bed  
[x] Nutrition consult Needs prior to discharge:  
Home O2 required:   
[]Yes []No  
 If yes, how much O2 required? Other:  
 Last Bowel Movement: Last Bowel Movement Date: 08/02/18 Influenza Vaccine Pneumonia Vaccine Diet Active Orders Diet DIET DIABETIC CONSISTENT CARB Regular LDAs              
Peripheral IV 08/02/18 Right Antecubital (Active) Site Assessment Ecchymotic (bruised);Dry; Intact 8/3/2018 12:00 AM  
Phlebitis Assessment 0 8/3/2018 12:00 AM  
Infiltration Assessment 0 8/3/2018 12:00 AM  
Dressing Status Clean, dry, & intact 8/3/2018 12:00 AM  
Dressing Type Transparent;Tape 8/3/2018 12:00 AM  
Hub Color/Line Status Blue; Infusing;Patent 8/3/2018 12:00 AM  
Action Taken Open ports on tubing capped 8/3/2018 12:00 AM  
                  
Urinary Catheter Intake & Output Date 08/02/18 0700 - 08/03/18 6663 08/03/18 0700 - 08/04/18 2153 Shift [id number] 1900-0659 24 Hour Total 2853-0822 7106-5719 24 Hour Total  
I 
N 
T 
A 
K 
E 
 P. O. 505 120 625     
   P. O. 505 120 625 I.V. 
(mL/kg/hr) 4255.8 
(3.9) 840 
(0.8) 5095.8 
(2.3) Volume (0.9% sodium chloride infusion) 3805.8 490 4295.8 Volume (piperacillin-tazobactam (ZOSYN) 3.375 g in 0.9% sodium chloride (MBP/ADV) 100 mL) 200 100 300 Volume (vancomycin (VANCOCIN) 1250 mg in  ml infusion) 250 250 500 Shift Total 
(mL/kg) 4760.8 
(52.5) 960 
(10.6) 5720.8 
(63.1) O 
U T 
P 
U Shama Porsche Urine (mL/kg/hr) 875 
(0.8) 700 
(0.6) 1575 
(0.7) 500  500 Urine Voided  500  500 Shift Total 
(mL/kg) 875 
(9.6) 700 
(7.7) 1575 
(17.4) 500 
(5.5)  500 
(5.5) NET 3885.8 260 4145.8 -500  -500 Weight (kg) 90.7 90.7 90.7 90.7 90.7 90.7 Readmission Risk Assessment Tool Score High Risk   
      
 27 Total Score 3 Has Seen PCP in Last 6 Months (Yes=3, No=0) 2 . Living with Significant Other. Assisted Living. LTAC. SNF. or  
Rehab  
 5 Pt. Coverage (Medicare=5 , Medicaid, or Self-Pay=4) 17 Charlson Comorbidity Score (Age + Comorbid Conditions) Criteria that do not apply:  
 Patient Length of Stay (>5 days = 3) IP Visits Last 12 Months (1-3=4, 4=9, >4=11) Expected Length of Stay 4d 21h Actual Length of Stay 3

## 2018-08-04 LAB
ANION GAP SERPL CALC-SCNC: 7 MMOL/L (ref 5–15)
BUN SERPL-MCNC: 11 MG/DL (ref 6–20)
BUN/CREAT SERPL: 14 (ref 12–20)
CALCIUM SERPL-MCNC: 9.5 MG/DL (ref 8.5–10.1)
CHLORIDE SERPL-SCNC: 103 MMOL/L (ref 97–108)
CO2 SERPL-SCNC: 27 MMOL/L (ref 21–32)
CREAT SERPL-MCNC: 0.8 MG/DL (ref 0.55–1.02)
ERYTHROCYTE [DISTWIDTH] IN BLOOD BY AUTOMATED COUNT: 13.8 % (ref 11.5–14.5)
GLUCOSE BLD STRIP.AUTO-MCNC: 140 MG/DL (ref 65–100)
GLUCOSE BLD STRIP.AUTO-MCNC: 181 MG/DL (ref 65–100)
GLUCOSE BLD STRIP.AUTO-MCNC: 193 MG/DL (ref 65–100)
GLUCOSE BLD STRIP.AUTO-MCNC: 258 MG/DL (ref 65–100)
GLUCOSE SERPL-MCNC: 121 MG/DL (ref 65–100)
HCT VFR BLD AUTO: 33.6 % (ref 35–47)
HGB BLD-MCNC: 10.9 G/DL (ref 11.5–16)
MCH RBC QN AUTO: 31 PG (ref 26–34)
MCHC RBC AUTO-ENTMCNC: 32.4 G/DL (ref 30–36.5)
MCV RBC AUTO: 95.5 FL (ref 80–99)
NRBC # BLD: 0 K/UL (ref 0–0.01)
NRBC BLD-RTO: 0 PER 100 WBC
PLATELET # BLD AUTO: 394 K/UL (ref 150–400)
PMV BLD AUTO: 9.3 FL (ref 8.9–12.9)
POTASSIUM SERPL-SCNC: 4.1 MMOL/L (ref 3.5–5.1)
RBC # BLD AUTO: 3.52 M/UL (ref 3.8–5.2)
SERVICE CMNT-IMP: ABNORMAL
SODIUM SERPL-SCNC: 137 MMOL/L (ref 136–145)
WBC # BLD AUTO: 24.4 K/UL (ref 3.6–11)

## 2018-08-04 PROCEDURE — 82962 GLUCOSE BLOOD TEST: CPT

## 2018-08-04 PROCEDURE — 77010033678 HC OXYGEN DAILY

## 2018-08-04 PROCEDURE — 74011250637 HC RX REV CODE- 250/637: Performed by: INTERNAL MEDICINE

## 2018-08-04 PROCEDURE — 74011000250 HC RX REV CODE- 250: Performed by: INTERNAL MEDICINE

## 2018-08-04 PROCEDURE — 74011250636 HC RX REV CODE- 250/636: Performed by: INTERNAL MEDICINE

## 2018-08-04 PROCEDURE — 74011636637 HC RX REV CODE- 636/637: Performed by: HOSPITALIST

## 2018-08-04 PROCEDURE — 65660000000 HC RM CCU STEPDOWN

## 2018-08-04 PROCEDURE — 80048 BASIC METABOLIC PNL TOTAL CA: CPT | Performed by: INTERNAL MEDICINE

## 2018-08-04 PROCEDURE — 36415 COLL VENOUS BLD VENIPUNCTURE: CPT | Performed by: INTERNAL MEDICINE

## 2018-08-04 PROCEDURE — 74011000258 HC RX REV CODE- 258: Performed by: INTERNAL MEDICINE

## 2018-08-04 PROCEDURE — 74011250636 HC RX REV CODE- 250/636: Performed by: HOSPITALIST

## 2018-08-04 PROCEDURE — 94640 AIRWAY INHALATION TREATMENT: CPT

## 2018-08-04 PROCEDURE — 88305 TISSUE EXAM BY PATHOLOGIST: CPT | Performed by: HOSPITALIST

## 2018-08-04 PROCEDURE — 87116 MYCOBACTERIA CULTURE: CPT | Performed by: INTERNAL MEDICINE

## 2018-08-04 PROCEDURE — 87205 SMEAR GRAM STAIN: CPT | Performed by: INTERNAL MEDICINE

## 2018-08-04 PROCEDURE — 85027 COMPLETE CBC AUTOMATED: CPT | Performed by: INTERNAL MEDICINE

## 2018-08-04 PROCEDURE — 89050 BODY FLUID CELL COUNT: CPT | Performed by: INTERNAL MEDICINE

## 2018-08-04 PROCEDURE — 88112 CYTOPATH CELL ENHANCE TECH: CPT | Performed by: HOSPITALIST

## 2018-08-04 PROCEDURE — 74011250637 HC RX REV CODE- 250/637: Performed by: HOSPITALIST

## 2018-08-04 PROCEDURE — 83615 LACTATE (LD) (LDH) ENZYME: CPT | Performed by: INTERNAL MEDICINE

## 2018-08-04 RX ADMIN — ALPRAZOLAM 1 MG: 0.5 TABLET ORAL at 08:06

## 2018-08-04 RX ADMIN — Medication 400 MG: at 08:10

## 2018-08-04 RX ADMIN — PHENOBARBITAL 64.8 MG: 32.4 TABLET ORAL at 08:09

## 2018-08-04 RX ADMIN — ESOMEPRAZOLE MAGNESIUM 40 MG: 40 CAPSULE, DELAYED RELEASE ORAL at 08:05

## 2018-08-04 RX ADMIN — EZETIMIBE: 10 TABLET ORAL at 08:06

## 2018-08-04 RX ADMIN — GUAIFENESIN 1200 MG: 600 TABLET, EXTENDED RELEASE ORAL at 08:10

## 2018-08-04 RX ADMIN — METOPROLOL TARTRATE 25 MG: 25 TABLET ORAL at 17:15

## 2018-08-04 RX ADMIN — VANCOMYCIN HYDROCHLORIDE 1500 MG: 10 INJECTION, POWDER, LYOPHILIZED, FOR SOLUTION INTRAVENOUS at 17:21

## 2018-08-04 RX ADMIN — DULOXETINE HYDROCHLORIDE 40 MG: 20 CAPSULE, DELAYED RELEASE ORAL at 08:09

## 2018-08-04 RX ADMIN — PIPERACILLIN SODIUM,TAZOBACTAM SODIUM 3.38 G: 3; .375 INJECTION, POWDER, FOR SOLUTION INTRAVENOUS at 23:32

## 2018-08-04 RX ADMIN — PRIMIDONE 50 MG: 50 TABLET ORAL at 08:09

## 2018-08-04 RX ADMIN — KETOROLAC TROMETHAMINE 15 MG: 30 INJECTION, SOLUTION INTRAMUSCULAR at 05:52

## 2018-08-04 RX ADMIN — OXYCODONE HYDROCHLORIDE 15 MG: 5 TABLET ORAL at 19:47

## 2018-08-04 RX ADMIN — PIPERACILLIN SODIUM,TAZOBACTAM SODIUM 3.38 G: 3; .375 INJECTION, POWDER, FOR SOLUTION INTRAVENOUS at 15:04

## 2018-08-04 RX ADMIN — KETOROLAC TROMETHAMINE 15 MG: 30 INJECTION, SOLUTION INTRAMUSCULAR at 23:32

## 2018-08-04 RX ADMIN — METHOCARBAMOL 750 MG: 500 TABLET ORAL at 20:46

## 2018-08-04 RX ADMIN — KETOROLAC TROMETHAMINE 15 MG: 30 INJECTION, SOLUTION INTRAMUSCULAR at 12:22

## 2018-08-04 RX ADMIN — PRIMIDONE 150 MG: 50 TABLET ORAL at 21:20

## 2018-08-04 RX ADMIN — PIPERACILLIN SODIUM,TAZOBACTAM SODIUM 3.38 G: 3; .375 INJECTION, POWDER, FOR SOLUTION INTRAVENOUS at 08:29

## 2018-08-04 RX ADMIN — ALPRAZOLAM 1 MG: 0.5 TABLET ORAL at 13:20

## 2018-08-04 RX ADMIN — PIPERACILLIN SODIUM,TAZOBACTAM SODIUM 3.38 G: 3; .375 INJECTION, POWDER, FOR SOLUTION INTRAVENOUS at 00:41

## 2018-08-04 RX ADMIN — OXYCODONE HYDROCHLORIDE 15 MG: 5 TABLET ORAL at 23:44

## 2018-08-04 RX ADMIN — INSULIN LISPRO 2 UNITS: 100 INJECTION, SOLUTION INTRAVENOUS; SUBCUTANEOUS at 08:10

## 2018-08-04 RX ADMIN — ALBUTEROL SULFATE 2.5 MG: 2.5 SOLUTION RESPIRATORY (INHALATION) at 20:40

## 2018-08-04 RX ADMIN — OXYCODONE HYDROCHLORIDE 15 MG: 5 TABLET ORAL at 03:59

## 2018-08-04 RX ADMIN — ASPIRIN 81 MG 81 MG: 81 TABLET ORAL at 08:10

## 2018-08-04 RX ADMIN — LEVOTHYROXINE SODIUM 200 MCG: 100 TABLET ORAL at 05:52

## 2018-08-04 RX ADMIN — METOPROLOL TARTRATE 25 MG: 25 TABLET ORAL at 08:08

## 2018-08-04 RX ADMIN — METHOCARBAMOL 750 MG: 500 TABLET ORAL at 05:53

## 2018-08-04 RX ADMIN — INSULIN LISPRO 2 UNITS: 100 INJECTION, SOLUTION INTRAVENOUS; SUBCUTANEOUS at 17:14

## 2018-08-04 RX ADMIN — KETOROLAC TROMETHAMINE 15 MG: 30 INJECTION, SOLUTION INTRAMUSCULAR at 00:39

## 2018-08-04 RX ADMIN — PRAZOSIN HYDROCHLORIDE 2 MG: 1 CAPSULE ORAL at 18:00

## 2018-08-04 RX ADMIN — PHENOBARBITAL 64.8 MG: 32.4 TABLET ORAL at 17:14

## 2018-08-04 RX ADMIN — VANCOMYCIN HYDROCHLORIDE 1500 MG: 10 INJECTION, POWDER, LYOPHILIZED, FOR SOLUTION INTRAVENOUS at 06:01

## 2018-08-04 RX ADMIN — METHYLPREDNISOLONE SODIUM SUCCINATE 40 MG: 40 INJECTION, POWDER, FOR SOLUTION INTRAMUSCULAR; INTRAVENOUS at 17:14

## 2018-08-04 RX ADMIN — ALBUTEROL SULFATE 2.5 MG: 2.5 SOLUTION RESPIRATORY (INHALATION) at 03:22

## 2018-08-04 RX ADMIN — Medication 1 CAPSULE: at 08:10

## 2018-08-04 RX ADMIN — ENOXAPARIN SODIUM 40 MG: 100 INJECTION SUBCUTANEOUS at 08:11

## 2018-08-04 RX ADMIN — OXYCODONE HYDROCHLORIDE 15 MG: 5 TABLET ORAL at 08:28

## 2018-08-04 RX ADMIN — Medication 10 ML: at 05:57

## 2018-08-04 RX ADMIN — Medication 400 MG: at 17:14

## 2018-08-04 RX ADMIN — PRAZOSIN HYDROCHLORIDE 2 MG: 1 CAPSULE ORAL at 08:05

## 2018-08-04 RX ADMIN — Medication 250 MG: at 08:06

## 2018-08-04 RX ADMIN — METHYLPREDNISOLONE SODIUM SUCCINATE 40 MG: 40 INJECTION, POWDER, FOR SOLUTION INTRAMUSCULAR; INTRAVENOUS at 05:52

## 2018-08-04 RX ADMIN — KETOROLAC TROMETHAMINE 15 MG: 30 INJECTION, SOLUTION INTRAMUSCULAR at 17:15

## 2018-08-04 RX ADMIN — ALBUTEROL SULFATE 2.5 MG: 2.5 SOLUTION RESPIRATORY (INHALATION) at 15:29

## 2018-08-04 RX ADMIN — PROMETHAZINE HYDROCHLORIDE 25 MG: 25 TABLET ORAL at 08:04

## 2018-08-04 RX ADMIN — ALBUTEROL SULFATE 2.5 MG: 2.5 SOLUTION RESPIRATORY (INHALATION) at 23:45

## 2018-08-04 RX ADMIN — GUAIFENESIN 1200 MG: 600 TABLET, EXTENDED RELEASE ORAL at 20:47

## 2018-08-04 RX ADMIN — Medication 10 ML: at 21:21

## 2018-08-04 RX ADMIN — Medication 10 ML: at 13:20

## 2018-08-04 RX ADMIN — ALBUTEROL SULFATE 2.5 MG: 2.5 SOLUTION RESPIRATORY (INHALATION) at 07:52

## 2018-08-04 RX ADMIN — ALBUTEROL SULFATE 2.5 MG: 2.5 SOLUTION RESPIRATORY (INHALATION) at 13:24

## 2018-08-04 RX ADMIN — ALBUTEROL SULFATE 2.5 MG: 2.5 SOLUTION RESPIRATORY (INHALATION) at 11:16

## 2018-08-04 RX ADMIN — INSULIN LISPRO 5 UNITS: 100 INJECTION, SOLUTION INTRAVENOUS; SUBCUTANEOUS at 12:22

## 2018-08-04 NOTE — PROGRESS NOTES
Problem: Pressure Injury - Risk of 
Goal: *Prevention of pressure injury Document Joseph Scale and appropriate interventions in the flowsheet. Outcome: Progressing Towards Goal 
Pressure Injury Interventions: 
Sensory Interventions: Assess changes in LOC, Minimize linen layers, Float heels, Keep linens dry and wrinkle-free, Discuss PT/OT consult with provider, Turn and reposition approx. every two hours (pillows and wedges if needed), Pressure redistribution bed/mattress (bed type) Moisture Interventions: Absorbent underpads, Limit adult briefs, Minimize layers, Moisture barrier, Offer toileting Q_hr, Apply protective barrier, creams and emollients Activity Interventions: Increase time out of bed, Pressure redistribution bed/mattress(bed type), PT/OT evaluation Mobility Interventions: Float heels, HOB 30 degrees or less, Pressure redistribution bed/mattress (bed type), PT/OT evaluation, Turn and reposition approx. every two hours(pillow and wedges) Nutrition Interventions: Document food/fluid/supplement intake, Offer support with meals,snacks and hydration

## 2018-08-04 NOTE — ROUTINE PROCESS
PCU SHIFT NURSING NOTE Bedside shift change report given to [address] [place] [address] Fili (oncoming nurse) by Jose Luis Robertson (offgoing nurse). Report included the following information SBAR, Kardex, Intake/Output, MAR, Accordion and Recent Results. 1318: pt stats she is very anxious and feels restless. Asked for PRN xanax and RT treatment. 1535: Bedside shift change report given to Sierra Rios (oncoming nurse) by [address] [place] [address] (offgoing nurse). Report included the following information SBAR, Kardex, Intake/Output, MAR and Accordion. Shift Summary: 8/4/2018 Admission Date 7/31/2018 Admission Diagnosis CAP (community acquired pneumonia) Acute respiratory failure ([facility] [address] Consults IP CONSULT TO PULMONOLOGY 
IP CONSULT TO PALLIATIVE CARE - PROVIDER Consults [x]PT [x]OT []Speech  
[]Case Management  
  
[] Palliative Cardiac Monitoring Order  
[x]Yes []No  
 
IV drips []Yes Drip:                            Dose: 
Drip:                            Dose: 
Drip:                            Dose:  
[x]No  
 
GI Prophylaxis []Yes []No  
 
 
 
DVT Prophylaxis SCDs:     
     
 Onel stockings:     
  
[x] Medication []Contraindicated []None Activity Level Activity Level: Up with Assistance Activity Assistance: Partial (one person) Purposeful Rounding every 1-2 hour? [x]Yes Zarate Score  Total Score: 3 Bed Alarm (If score 3 or >) [x]Yes  
[] Refused (See signed refusal form in chart) Joseph Score  Joseph Score: 16 Joseph Score (if score 14 or less) []PMT consult  
[]Wound Care consult []Specialty bed  
[] Nutrition consult Needs prior to discharge:  
Home O2 required:   
[x]Yes []No  
 If yes, how much O2 required? Other:  
 Last Bowel Movement: Last Bowel Movement Date: 08/02/18 Influenza Vaccine Received Flu Vaccine for Current Season (usually Sept-March): Not Flu Season Pneumonia Vaccine Diet Active Orders Diet  DIET DIABETIC CONSISTENT CARB Regular LDAs Peripheral IV 08/03/18 Right Arm (Active) Site Assessment Clean, dry, & intact 8/4/2018  7:37 AM  
Phlebitis Assessment 0 8/4/2018  7:37 AM  
Infiltration Assessment 0 8/4/2018  7:37 AM  
Dressing Status Clean, dry, & intact 8/4/2018  7:37 AM  
Dressing Type Tape;Transparent 8/4/2018  7:37 AM  
Hub Color/Line Status Blue; Infusing 8/4/2018  7:37 AM  
Action Taken Open ports on tubing capped 8/4/2018  7:37 AM  
Alcohol Cap Used Yes 8/4/2018  7:37 AM  
                  
Urinary Catheter Intake & Output Date 08/03/18 0700 - 08/04/18 6386 08/04/18 0700 - 08/05/18 6449 Shift 0415-1950 9275-7239 24 Hour Total 3165-0434 9806-3570 24 Hour Total  
I 
N 
T 
A 
K 
E 
 P. O. 500  500     
   P. O. 500  500 Shift Total 
(mL/kg) 500 
(5.5)  500 
(5.5) O 
U T 
P 
U [place]lachlan Urine (mL/kg/hr) 2650 
(2.4) 750 
(0.7) 3400 
(1.6) Urine Voided 2650 750 3400 Urine Occurrence(s)  2 x 2 x Shift Total 
(mL/kg) 2650 
(29.2) 750 
(8.3) 3400 
(37.5) NET -2150 -750 -2900 Weight (kg) 90.7 90.7 90.7 90.7 90.7 90.7 Readmission Risk Assessment Tool Score High Risk   
      
 25 Total Score 3 Has Seen PCP in Last 6 Months (Yes=3, No=0)  
 5 Pt. Coverage (Medicare=5 , Medicaid, or Self-Pay=4) 17 Charlson Comorbidity Score (Age + Comorbid Conditions) Criteria that do not apply:  
 . Living with Significant Other. Assisted Living. LTAC. SNF. or  
Rehab Patient Length of Stay (>5 days = 3) IP Visits Last 12 Months (1-3=4, 4=9, >4=11) Expected Length of Stay 4d 21h Actual Length of Stay 4

## 2018-08-04 NOTE — PROGRESS NOTES
Problem: Falls - Risk of 
Goal: *Absence of Falls Document Grzegorz Epps Fall Risk and appropriate interventions in the flowsheet. Outcome: Progressing Towards Goal 
Fall Risk Interventions: 
Mobility Interventions: Patient to call before getting OOB Medication Interventions: Patient to call before getting OOB Elimination Interventions: Call light in reach, Patient to call for help with toileting needs History of Falls Interventions: Bed/chair exit alarm, Room close to nurse's station

## 2018-08-04 NOTE — PROGRESS NOTES
Problem: Pressure Injury - Risk of 
Goal: *Prevention of pressure injury Document Joseph Scale and appropriate interventions in the flowsheet. Outcome: Progressing Towards Goal 
Pressure Injury Interventions: 
Sensory Interventions: Chair cushion, Float heels Moisture Interventions: Absorbent underpads, Apply protective barrier, creams and emollients Activity Interventions: Increase time out of bed Mobility Interventions: Float heels, Chair cushion Nutrition Interventions: Document food/fluid/supplement intake

## 2018-08-04 NOTE — PROGRESS NOTES
Hospitalist Progress Note NAME: Arnav Rios :  1955 MRN:  [id number] Assessment / Plan: 
Acute hypoxic respiratory failure POA 
due to Bilateral Pneumonia ( RLL necrotic) with reactive Lymphadenopathy on CT chest POA- WBC up today to 33k Acute COPD exacerbation POA SevereSepsis due to above ( leukocytosis/tachycardia + resp failure & lact 2.0) Lactic acidosis POA 
CT  Chest noted for-  Significant consolidation in the right lower lobe with areas of necrosis. Small abscesses cannot be excluded in this setting. Consolidation left lower lobe. Trace right effusion with underlying atelectasis. New mediastinal lymphadenopathy may be reactive in nature. Follow up CXR (8/3) Large right pleural effusion with right basilar consolidation Off IVF S/p IV levaquin x 1 in ER, changed to IV Vanco + zosyn for the CT results of necrotic PNA- cont for now IR consult for R thoracentesis - labs ordered along with fluid cytology Follow BCx, sputum Cx Changed to IV solumedrol, tapered to Q 12 now Cont jet nebs scheduled Cont oxygen to keep sats >90%, BIPAP prn, check ABG 
IP pulm consult appreciated- following Cont tapered solumedrol IV toradol  for pleuritic chest pain- seems to be helping her Palliative pain consulted- following 
  
Diarrhea POA - C Diff ruled out with no diarrhea in 24 hrs 
-per pt started last month s/p doxycycline DC C.diff stool test as no sample in 24 hrs 
-c/w FloraQ  
  
NIDDM type II Cont holding PO glipizide with poor po intake 
-monitor closely, BS may go high wit humberto of steroids -c/w SSI for now 
  
HTN  
-BP stable 
-cont home metoprolol  
-lasix prn at home for le edema, keep on hold for now 
  
Fibromyalgia / depression/ anxiety/ PTSD /Chronic pain Polypharmacy Essential tremor  
-continue multiple home meds  
   
Ex smoker, quit 1 year ago Hyperlipidemia, cont statin Hypothyroidism, cont synthroid Obesity.  Body mass index is 34.33 kg/(m^2). 
 weight loss recommended 
  
  
Code Status: Full code Surrogate Decision Maker:   
  
DVT Prophylaxis: Lovenox GI Prophylaxis: not indicated 
  
Baseline: independent; no home O2; lives with  Recommended Disposition: Home w/Familysoon Subjective: Chief Complaint / Reason for Physician Visit: F/U Bilateral PNA (necrotic), Sepsis, resp failure, COPD, pleuritic chest pain \"My R side hurts more than L, needs BIPAP\". Discussed with RN events overnight. Review of Systems: 
Symptom Y/N Comments  Symptom Y/N Comments Fever/Chills n   Chest Pain y Pleuritic R>L Poor Appetite n   Edema Cough y   Abdominal Pain Sputum y   Joint Pain SOB/RODRIGUEZ y   Pruritis/Rash Nausea/vomit    Tolerating PT/OT y Diarrhea    Tolerating Diet y Constipation    Other Could NOT obtain due to:   
 
Objective: VITALS:  
Last 24hrs VS reviewed since prior progress note. Most recent are: 
Patient Vitals for the past 24 hrs: 
 Temp Pulse Resp BP SpO2  
08/04/18 0804 - (!) 108 - - -  
08/04/18 0752 - - - - 91 % 08/04/18 0726 97.9 °F (36.6 °C) 100 20 123/73 94 % 08/04/18 0321 - - - - 96 % 08/04/18 0249 97.9 °F (36.6 °C) 92 20 118/66 97 % 08/03/18 2343 97.6 °F (36.4 °C) (!) 103 22 146/72 97 % 08/03/18 2305 - - - - 97 % 08/03/18 1918 - - - - 93 % 08/03/18 1912 98.4 °F (36.9 °C) 93 18 118/68 90 % 08/03/18 1517 - - - - 93 % 08/03/18 1504 98.2 °F (36.8 °C) (!) 104 18 136/66 93 % 08/03/18 1116 - - - - 96 % 08/03/18 1104 98.1 °F (36.7 °C) 92 20 124/72 98 % Intake/Output Summary (Last 24 hours) at 08/04/18 0099 Last data filed at 08/04/18 4005 Gross per 24 hour Intake              280 ml Output             2025 ml Net            -1745 ml PHYSICAL EXAM: 
General: WD, WN. Alert, cooperative, no acute distress, obese +   
EENT:  EOMI. Anicteric sclerae. MMM Resp:   wheezing + some basal crackles + , dullness on percussion on R side 
CV:  Regular  rhythm,  No edema GI:  Soft, Non distended, Non tender.  +Bowel sounds Neurologic:  Alert and oriented X 3, normal speech, Psych:   Good insight. Not anxious nor agitated Skin:  No rashes. No jaundice Reviewed most current lab test results and cultures  YES Reviewed most current radiology test results   YES Review and summation of old records today    NO Reviewed patient's current orders and MAR    YES 
PMH/SH reviewed - no change compared to H&P 
________________________________________________________________________ Care Plan discussed with: 
  Comments Patient x Family RN x Care Manager x Consultant Multidiciplinary team rounds were held today with , nursing, pharmacist and clinical coordinator. Patient's plan of care was discussed; medications were reviewed and discharge planning was addressed. ________________________________________________________________________ Total NON critical care TIME:  16   Minutes Total CRITICAL CARE TIME Spent:   Minutes non procedure based Comments >50% of visit spent in counseling and coordination of care    
________________________________________________________________________ Isaias Flynn MD  
 
Procedures: see electronic medical records for all procedures/Xrays and details which were not copied into this note but were reviewed prior to creation of Plan. LABS: 
I reviewed today's most current labs and imaging studies. Pertinent labs include: 
Recent Labs 08/03/18 
 0431 WBC  33.3* HGB  13.2 HCT  41.9 PLT  219 Recent Labs 08/04/18 
 6972  08/03/18 
 8642  08/02/18 
 6919 NA  137  132*  132* K  4.1  4.7  4.9 CL  103  105  102 CO2  27  18*  22 GLU  121*  109*  152* BUN  11  11  8 CREA  0.80  0.79  0.70 CA  9.5  9.9  9.4 Signed: Isaias Flynn MD

## 2018-08-05 ENCOUNTER — APPOINTMENT (OUTPATIENT)
Dept: GENERAL RADIOLOGY | Age: 63
DRG: 871 | End: 2018-08-05
Attending: RADIOLOGY
Payer: MEDICARE

## 2018-08-05 ENCOUNTER — APPOINTMENT (OUTPATIENT)
Dept: ULTRASOUND IMAGING | Age: 63
DRG: 871 | End: 2018-08-05
Attending: INTERNAL MEDICINE
Payer: MEDICARE

## 2018-08-05 LAB
ANION GAP SERPL CALC-SCNC: 7 MMOL/L (ref 5–15)
APPEARANCE FLD: ABNORMAL
BACTERIA SPEC CULT: NORMAL
BUN SERPL-MCNC: 10 MG/DL (ref 6–20)
BUN/CREAT SERPL: 15 (ref 12–20)
CALCIUM SERPL-MCNC: 9.9 MG/DL (ref 8.5–10.1)
CHLORIDE SERPL-SCNC: 105 MMOL/L (ref 97–108)
CO2 SERPL-SCNC: 27 MMOL/L (ref 21–32)
COLOR FLD: ABNORMAL
CREAT SERPL-MCNC: 0.65 MG/DL (ref 0.55–1.02)
ERYTHROCYTE [DISTWIDTH] IN BLOOD BY AUTOMATED COUNT: 13.9 % (ref 11.5–14.5)
GLUCOSE BLD STRIP.AUTO-MCNC: 148 MG/DL (ref 65–100)
GLUCOSE BLD STRIP.AUTO-MCNC: 161 MG/DL (ref 65–100)
GLUCOSE BLD STRIP.AUTO-MCNC: 176 MG/DL (ref 65–100)
GLUCOSE SERPL-MCNC: 142 MG/DL (ref 65–100)
HCT VFR BLD AUTO: 35.4 % (ref 35–47)
HGB BLD-MCNC: 11.3 G/DL (ref 11.5–16)
LDH FLD L TO P-CCNC: >1200 U/L
MCH RBC QN AUTO: 31 PG (ref 26–34)
MCHC RBC AUTO-ENTMCNC: 31.9 G/DL (ref 30–36.5)
MCV RBC AUTO: 97.3 FL (ref 80–99)
NEUTROPHILS NFR FLD: 100 %
NRBC # BLD: 0 K/UL (ref 0–0.01)
NRBC BLD-RTO: 0 PER 100 WBC
NUC CELL # FLD: ABNORMAL /CU MM
PLATELET # BLD AUTO: 454 K/UL (ref 150–400)
PMV BLD AUTO: 9.1 FL (ref 8.9–12.9)
POTASSIUM SERPL-SCNC: 4 MMOL/L (ref 3.5–5.1)
RBC # BLD AUTO: 3.64 M/UL (ref 3.8–5.2)
RBC # FLD: >100 /CU MM
SERVICE CMNT-IMP: ABNORMAL
SERVICE CMNT-IMP: NORMAL
SODIUM SERPL-SCNC: 139 MMOL/L (ref 136–145)
SPECIMEN SOURCE FLD: ABNORMAL
SPECIMEN SOURCE FLD: NORMAL
WBC # BLD AUTO: 24.2 K/UL (ref 3.6–11)

## 2018-08-05 PROCEDURE — 74011250636 HC RX REV CODE- 250/636: Performed by: INTERNAL MEDICINE

## 2018-08-05 PROCEDURE — 94640 AIRWAY INHALATION TREATMENT: CPT

## 2018-08-05 PROCEDURE — C1769 GUIDE WIRE: HCPCS

## 2018-08-05 PROCEDURE — 36415 COLL VENOUS BLD VENIPUNCTURE: CPT | Performed by: INTERNAL MEDICINE

## 2018-08-05 PROCEDURE — 65660000000 HC RM CCU STEPDOWN

## 2018-08-05 PROCEDURE — 74011250637 HC RX REV CODE- 250/637: Performed by: HOSPITALIST

## 2018-08-05 PROCEDURE — 74011000250 HC RX REV CODE- 250: Performed by: INTERNAL MEDICINE

## 2018-08-05 PROCEDURE — 85027 COMPLETE CBC AUTOMATED: CPT | Performed by: INTERNAL MEDICINE

## 2018-08-05 PROCEDURE — C1729 CATH, DRAINAGE: HCPCS

## 2018-08-05 PROCEDURE — 82962 GLUCOSE BLOOD TEST: CPT

## 2018-08-05 PROCEDURE — 77010033678 HC OXYGEN DAILY

## 2018-08-05 PROCEDURE — 0W993ZZ DRAINAGE OF RIGHT PLEURAL CAVITY, PERCUTANEOUS APPROACH: ICD-10-PCS | Performed by: RADIOLOGY

## 2018-08-05 PROCEDURE — 80048 BASIC METABOLIC PNL TOTAL CA: CPT | Performed by: INTERNAL MEDICINE

## 2018-08-05 PROCEDURE — 74011250637 HC RX REV CODE- 250/637: Performed by: INTERNAL MEDICINE

## 2018-08-05 PROCEDURE — 71045 X-RAY EXAM CHEST 1 VIEW: CPT

## 2018-08-05 PROCEDURE — 32555 ASPIRATE PLEURA W/ IMAGING: CPT

## 2018-08-05 PROCEDURE — 74011000258 HC RX REV CODE- 258: Performed by: INTERNAL MEDICINE

## 2018-08-05 PROCEDURE — 74011250636 HC RX REV CODE- 250/636: Performed by: RADIOLOGY

## 2018-08-05 RX ORDER — LIDOCAINE HYDROCHLORIDE 10 MG/ML
10 INJECTION INFILTRATION; PERINEURAL ONCE
Status: DISCONTINUED | OUTPATIENT
Start: 2018-08-05 | End: 2018-08-05 | Stop reason: ALTCHOICE

## 2018-08-05 RX ORDER — LIDOCAINE HYDROCHLORIDE 20 MG/ML
10 INJECTION, SOLUTION INFILTRATION; PERINEURAL ONCE
Status: DISCONTINUED | OUTPATIENT
Start: 2018-08-05 | End: 2018-08-05

## 2018-08-05 RX ADMIN — Medication 10 ML: at 22:10

## 2018-08-05 RX ADMIN — PRAZOSIN HYDROCHLORIDE 2 MG: 1 CAPSULE ORAL at 09:00

## 2018-08-05 RX ADMIN — VANCOMYCIN HYDROCHLORIDE 1500 MG: 10 INJECTION, POWDER, LYOPHILIZED, FOR SOLUTION INTRAVENOUS at 18:00

## 2018-08-05 RX ADMIN — METOPROLOL TARTRATE 25 MG: 25 TABLET ORAL at 09:21

## 2018-08-05 RX ADMIN — METHYLPREDNISOLONE SODIUM SUCCINATE 40 MG: 40 INJECTION, POWDER, FOR SOLUTION INTRAMUSCULAR; INTRAVENOUS at 05:14

## 2018-08-05 RX ADMIN — Medication 10 ML: at 05:14

## 2018-08-05 RX ADMIN — ALBUTEROL SULFATE 2.5 MG: 2.5 SOLUTION RESPIRATORY (INHALATION) at 14:43

## 2018-08-05 RX ADMIN — Medication 5 ML: at 14:00

## 2018-08-05 RX ADMIN — ESOMEPRAZOLE MAGNESIUM 40 MG: 40 CAPSULE, DELAYED RELEASE ORAL at 09:00

## 2018-08-05 RX ADMIN — ALBUTEROL SULFATE 2.5 MG: 2.5 SOLUTION RESPIRATORY (INHALATION) at 23:41

## 2018-08-05 RX ADMIN — OXYCODONE HYDROCHLORIDE 15 MG: 5 TABLET ORAL at 09:19

## 2018-08-05 RX ADMIN — ALBUTEROL SULFATE 2.5 MG: 2.5 SOLUTION RESPIRATORY (INHALATION) at 07:48

## 2018-08-05 RX ADMIN — PRIMIDONE 150 MG: 50 TABLET ORAL at 22:11

## 2018-08-05 RX ADMIN — OXYCODONE HYDROCHLORIDE 15 MG: 5 TABLET ORAL at 17:54

## 2018-08-05 RX ADMIN — KETOROLAC TROMETHAMINE 15 MG: 30 INJECTION, SOLUTION INTRAMUSCULAR at 17:49

## 2018-08-05 RX ADMIN — Medication 1 CAPSULE: at 09:20

## 2018-08-05 RX ADMIN — KETOROLAC TROMETHAMINE 15 MG: 30 INJECTION, SOLUTION INTRAMUSCULAR at 05:14

## 2018-08-05 RX ADMIN — PIPERACILLIN SODIUM,TAZOBACTAM SODIUM 3.38 G: 3; .375 INJECTION, POWDER, FOR SOLUTION INTRAVENOUS at 16:00

## 2018-08-05 RX ADMIN — PRAZOSIN HYDROCHLORIDE 2 MG: 1 CAPSULE ORAL at 17:50

## 2018-08-05 RX ADMIN — PHENOBARBITAL 64.8 MG: 32.4 TABLET ORAL at 17:48

## 2018-08-05 RX ADMIN — VANCOMYCIN HYDROCHLORIDE 1500 MG: 10 INJECTION, POWDER, LYOPHILIZED, FOR SOLUTION INTRAVENOUS at 05:13

## 2018-08-05 RX ADMIN — Medication 400 MG: at 17:49

## 2018-08-05 RX ADMIN — Medication 250 MG: at 09:26

## 2018-08-05 RX ADMIN — KETOROLAC TROMETHAMINE 15 MG: 30 INJECTION, SOLUTION INTRAMUSCULAR at 23:19

## 2018-08-05 RX ADMIN — ALPRAZOLAM 1 MG: 0.5 TABLET ORAL at 17:48

## 2018-08-05 RX ADMIN — PROMETHAZINE HYDROCHLORIDE 25 MG: 25 TABLET ORAL at 17:51

## 2018-08-05 RX ADMIN — OXYCODONE HYDROCHLORIDE 15 MG: 5 TABLET ORAL at 22:10

## 2018-08-05 RX ADMIN — LEVOTHYROXINE SODIUM 200 MCG: 100 TABLET ORAL at 05:14

## 2018-08-05 RX ADMIN — Medication 400 MG: at 09:21

## 2018-08-05 RX ADMIN — METHYLPREDNISOLONE SODIUM SUCCINATE 40 MG: 40 INJECTION, POWDER, FOR SOLUTION INTRAMUSCULAR; INTRAVENOUS at 17:51

## 2018-08-05 RX ADMIN — METOPROLOL TARTRATE 25 MG: 25 TABLET ORAL at 17:48

## 2018-08-05 RX ADMIN — LIDOCAINE HYDROCHLORIDE 1 ML: 10 INJECTION, SOLUTION INFILTRATION; PERINEURAL at 12:00

## 2018-08-05 RX ADMIN — EZETIMIBE: 10 TABLET ORAL at 09:20

## 2018-08-05 RX ADMIN — PROMETHAZINE HYDROCHLORIDE 25 MG: 25 TABLET ORAL at 09:31

## 2018-08-05 RX ADMIN — ALBUTEROL SULFATE 2.5 MG: 2.5 SOLUTION RESPIRATORY (INHALATION) at 04:34

## 2018-08-05 RX ADMIN — ALPRAZOLAM 1 MG: 0.5 TABLET ORAL at 09:21

## 2018-08-05 RX ADMIN — PHENOBARBITAL 64.8 MG: 32.4 TABLET ORAL at 09:22

## 2018-08-05 RX ADMIN — PIPERACILLIN SODIUM,TAZOBACTAM SODIUM 3.38 G: 3; .375 INJECTION, POWDER, FOR SOLUTION INTRAVENOUS at 09:18

## 2018-08-05 RX ADMIN — PRIMIDONE 50 MG: 50 TABLET ORAL at 09:19

## 2018-08-05 RX ADMIN — ALBUTEROL SULFATE 2.5 MG: 2.5 SOLUTION RESPIRATORY (INHALATION) at 19:41

## 2018-08-05 RX ADMIN — GUAIFENESIN 1200 MG: 600 TABLET, EXTENDED RELEASE ORAL at 20:30

## 2018-08-05 RX ADMIN — PIPERACILLIN SODIUM,TAZOBACTAM SODIUM 3.38 G: 3; .375 INJECTION, POWDER, FOR SOLUTION INTRAVENOUS at 23:19

## 2018-08-05 RX ADMIN — GUAIFENESIN 1200 MG: 600 TABLET, EXTENDED RELEASE ORAL at 09:21

## 2018-08-05 RX ADMIN — ASPIRIN 81 MG 81 MG: 81 TABLET ORAL at 09:21

## 2018-08-05 RX ADMIN — DULOXETINE HYDROCHLORIDE 40 MG: 20 CAPSULE, DELAYED RELEASE ORAL at 09:19

## 2018-08-05 NOTE — PROGRESS NOTES
PCU SHIFT NURSING NOTE Bedside shift change report given to Hosea Sanz (oncoming nurse) by Johnella Opitz (offgoing nurse). Report included the following information SBAR, Kardex, Intake/Output, Recent Results and Cardiac Rhythm NSR. 
 
 
1942: Patient assisted to [facility] x1 with 6L NC on. Once back in bed, patient unable to recover oxygen saturations above 85%. Patient labored. Venti-mask 50% 12 L applied and oxygen levels increased to 94% with decrease in respiratory rate. Sitting on side of bed.  present. Admission Date 7/31/2018 Admission Diagnosis CAP (community acquired pneumonia) Acute respiratory failure ([facility] [address] Consults IP CONSULT TO PULMONOLOGY 
IP CONSULT TO PALLIATIVE CARE - PROVIDER Consults []PT []OT []Speech  
[]Case Management  
  
[] Palliative Cardiac Monitoring Order []Yes []No  
 
IV drips []Yes Drip:                            Dose: 
Drip:                            Dose: 
Drip:                            Dose:  
[]No  
 
GI Prophylaxis []Yes []No  
 
 
 
DVT Prophylaxis SCDs:     
     
 Onel stockings:     
  
[] Medication []Contraindicated []None Activity Level Activity Level: Up with Assistance Activity Assistance: Partial (one person) Purposeful Rounding every 1-2 hour? []Yes Zarate Score  Total Score: 3 Bed Alarm (If score 3 or >) []Yes  
[] Refused (See signed refusal form in chart) Joseph Score  Joseph Score: 17 Joseph Score (if score 14 or less) []PMT consult  
[]Wound Care consult []Specialty bed  
[] Nutrition consult Needs prior to discharge:  
Home O2 required:   
[]Yes []No  
 If yes, how much O2 required? Other:  
 Last Bowel Movement: Last Bowel Movement Date: 08/02/18 Influenza Vaccine Received Flu Vaccine for Current Season (usually Sept-March): Not Flu Season Pneumonia Vaccine Diet Active Orders Diet DIET DIABETIC CONSISTENT CARB Regular LDAs Peripheral IV 08/03/18 Right Arm (Active) Site Assessment Clean, dry, & intact 8/4/2018  7:43 PM  
Phlebitis Assessment 0 8/4/2018  7:43 PM  
Infiltration Assessment 0 8/4/2018  7:43 PM  
Dressing Status Clean, dry, & intact 8/4/2018  7:43 PM  
Dressing Type Tape;Transparent 8/4/2018  7:43 PM  
Hub Color/Line Status Blue; Infusing 8/4/2018  7:43 PM  
Action Taken Open ports on tubing capped 8/4/2018  7:37 AM  
Alcohol Cap Used Yes 8/4/2018  7:37 AM  
                  
Urinary Catheter Intake & Output Date 08/03/18 1900 - 08/04/18 6257 08/04/18 0700 - 08/05/18 4497 Shift 7937-1888 24 Hour Total 7764-9062 2475-3111 24 Hour Total  
I 
N 
T 
A 
K 
E 
 P. O.  500 200  200  
   P. O.  500 200  200 Shift Total 
(mL/kg)  500 
(5.5) 200 
(2.2)  200 
(2.2) O 
U T 
P 
U [place] Urine (mL/kg/hr) 750 3400 1125 
(1) 200 1325 Urine Voided 750 3400 8971 214 9628 Urine Occurrence(s) 2 x 2 x Shift Total 
(mL/kg) 750 
(8.3) 3400 
(37.5) 1125 
(12.4) 200 
(2.2) 1325 
(14.6) NET -750 -2900 -925 -200 -1125 Weight (kg) 90.7 90.7 90.7 90.7 90.7 Readmission Risk Assessment Tool Score High Risk   
      
 25 Total Score 3 Has Seen PCP in Last 6 Months (Yes=3, No=0)  
 5 Pt. Coverage (Medicare=5 , Medicaid, or Self-Pay=4) 17 Charlson Comorbidity Score (Age + Comorbid Conditions) Criteria that do not apply:  
 . Living with Significant Other. Assisted Living. LTAC. SNF. or  
Rehab Patient Length of Stay (>5 days = 3) IP Visits Last 12 Months (1-3=4, 4=9, >4=11) Expected Length of Stay 4d 21h Actual Length of Stay 4

## 2018-08-05 NOTE — PROGRESS NOTES
Hospitalist Progress Note    NAME: Shannon Ramires   :  1955   MRN:  [id number]       Assessment / Plan:  Acute hypoxic respiratory failure POA- stable on oxygen 4L/m via NC  due to Bilateral Pneumonia ( RLL necrotic) with reactive Lymphadenopathy on CT chest POA- WBC down to 24 k  R Pleural effusion - large on repeat CXR  Acute COPD exacerbation POA  SevereSepsis due to above ( leukocytosis/tachycardia + resp failure & lact 2.0)  Lactic acidosis POA  CT  Chest noted for-  Significant consolidation in the right lower lobe with areas of necrosis. Small abscesses cannot be excluded in this setting. Consolidation left lower lobe. Trace right effusion with underlying atelectasis. New mediastinal lymphadenopathy may be reactive in nature.   Follow up CXR (8/3) Large right pleural effusion with right basilar consolidation  Sputum Cx- moderate beta strep, Hemophilus influenzae noted   BCx neg in 5 days    Off IVF  S/p IV levaquin x 1 in ER, changed to IV Vanco + zosyn for the CT results of necrotic PNA- cont for now  IR consult for R thoracentesis - labs ordered along with fluid cytology- likely today off lovenox  Changed to IV solumedrol, tapered to Q 12 now  Cont jet nebs scheduled   Cont oxygen to keep sats >90%, BIPAP prn, check ABG  IP pulm consult appreciated- following  Cont tapered solumedrol  IV toradol  for pleuritic chest pain- seems to be helping her  Palliative pain consulted- following     Diarrhea POA - C Diff ruled out with no diarrhea in 24 hrs  -per pt started last month s/p doxycycline   DC C.diff stool test as no sample in 24 hrs  -c/w FloraQ      NIDDM type II    Cont holding PO glipizide with poor po intake  -monitor closely, BS may go high wit humberto of steroids  -c/w SSI for now     HTN   -BP stable  -cont home metoprolol   -lasix prn at home for le edema, keep on hold for now     Fibromyalgia / depression/ anxiety/ PTSD /Chronic pain  Polypharmacy    Essential tremor   -continue multiple home meds       Ex smoker, quit 1 year ago   Hyperlipidemia, cont statin  Hypothyroidism, cont synthroid   Obesity. Body mass index is 34.33 kg/(m^2).   weight loss recommended        Code Status: Full code   Surrogate Decision Maker:       DVT Prophylaxis: Lovenox   GI Prophylaxis: not indicated     Baseline: independent; no home O2; lives with      Recommended Disposition: Home w/Familysoon     Subjective:     Chief Complaint / Reason for Physician Visit: F/U Bilateral PNA (necrotic), Sepsis, resp failure, COPD, pleuritic chest pain  \"My R side hurts more than L, needs BIPAP\". Discussed with RN events overnight. Review of Systems:  Symptom Y/N Comments  Symptom Y/N Comments   Fever/Chills n   Chest Pain y Pleuritic R>L   Poor Appetite n   Edema     Cough y   Abdominal Pain     Sputum y   Joint Pain     SOB/RODRIGUEZ y   Pruritis/Rash     Nausea/vomit    Tolerating PT/OT y    Diarrhea    Tolerating Diet y    Constipation    Other       Could NOT obtain due to:      Objective:     VITALS:   Last 24hrs VS reviewed since prior progress note. Most recent are:  Patient Vitals for the past 24 hrs:   Temp Pulse Resp BP SpO2   08/05/18 0921 - 94 - 139/84 -   08/05/18 0900 - 94 - 139/84 -   08/05/18 0749 - - - - 93 %   08/05/18 0433 - - - - 96 %   08/05/18 0311 98.2 °F (36.8 °C) 94 24 139/84 96 %   08/04/18 2344 98.1 °F (36.7 °C) 90 24 135/68 95 %   08/04/18 2343 - - - - 96 %   08/04/18 2038 - - - - 97 %   08/04/18 1943 98 °F (36.7 °C) 92 24 136/68 95 %   08/04/18 1940 - - - - (!) 81 %   08/04/18 1530 - - - - 93 %   08/04/18 1502 97.7 °F (36.5 °C) 96 20 114/56 96 %   08/04/18 1324 - - - - 94 %       Intake/Output Summary (Last 24 hours) at 08/05/18 1130  Last data filed at 08/04/18 1943   Gross per 24 hour   Intake                0 ml   Output              450 ml   Net             -450 ml        PHYSICAL EXAM:  General: WD, WN. Alert, cooperative, no acute distress, obese +    EENT:  EOMI. Anicteric sclerae. MMM  Resp:   wheezing + some basal crackles + , dullness on percussion on R side  CV:  Regular  rhythm,  No edema  GI:  Soft, Non distended, Non tender.  +Bowel sounds  Neurologic:  Alert and oriented X 3, normal speech,   Psych:   Good insight. Not anxious nor agitated  Skin:  No rashes. No jaundice    Reviewed most current lab test results and cultures  YES  Reviewed most current radiology test results   YES  Review and summation of old records today    NO  Reviewed patient's current orders and MAR    YES  PMH/SH reviewed - no change compared to H&P  ________________________________________________________________________  Care Plan discussed with:    Comments   Patient x    Family      RN x    Care Manager     Consultant                        Multidiciplinary team rounds were held today with , nursing, pharmacist and clinical coordinator. Patient's plan of care was discussed; medications were reviewed and discharge planning was addressed. ________________________________________________________________________  Total NON critical care TIME:  26   Minutes    Total CRITICAL CARE TIME Spent:   Minutes non procedure based      Comments   >50% of visit spent in counseling and coordination of care     ________________________________________________________________________  Renay Gonzalez MD     Procedures: see electronic medical records for all procedures/Xrays and details which were not copied into this note but were reviewed prior to creation of Plan. LABS:  I reviewed today's most current labs and imaging studies.   Pertinent labs include:  Recent Labs      08/05/18   0524  08/04/18   1004  08/03/18   0431   WBC  24.2*  24.4*  33.3*   HGB  11.3*  10.9*  13.2   HCT  35.4  33.6*  41.9   PLT  454*  394  219     Recent Labs      08/05/18   0524  08/04/18   0245  08/03/18   0431   NA  139  137  132*   K  4.0  4.1  4.7   CL  105  103  105   CO2  27  27  18*   GLU  142*  121*  109*   BUN  10  11 11   CREA  0.65  0.80  0.79   CA  9.9  9.5  9.9       Signed: Trisha Cardona MD

## 2018-08-05 NOTE — PROGRESS NOTES
Patient arrived from room, on 4L NC, alert 7 oriented X3. Consent obtained after MD Nolan Cruz spoke with patient.

## 2018-08-06 ENCOUNTER — APPOINTMENT (OUTPATIENT)
Dept: CT IMAGING | Age: 63
DRG: 871 | End: 2018-08-06
Attending: INTERNAL MEDICINE
Payer: MEDICARE

## 2018-08-06 PROBLEM — K59.03 DRUG-INDUCED CONSTIPATION: Status: ACTIVE | Noted: 2018-08-06

## 2018-08-06 PROBLEM — R06.02 SHORTNESS OF BREATH: Status: ACTIVE | Noted: 2018-08-06

## 2018-08-06 LAB
ANION GAP SERPL CALC-SCNC: 10 MMOL/L (ref 5–15)
BUN SERPL-MCNC: 12 MG/DL (ref 6–20)
BUN/CREAT SERPL: 13 (ref 12–20)
CALCIUM SERPL-MCNC: 9.9 MG/DL (ref 8.5–10.1)
CHLORIDE SERPL-SCNC: 107 MMOL/L (ref 97–108)
CO2 SERPL-SCNC: 25 MMOL/L (ref 21–32)
CREAT SERPL-MCNC: 0.9 MG/DL (ref 0.55–1.02)
DATE LAST DOSE: ABNORMAL
ERYTHROCYTE [DISTWIDTH] IN BLOOD BY AUTOMATED COUNT: 13.9 % (ref 11.5–14.5)
GLUCOSE BLD STRIP.AUTO-MCNC: 117 MG/DL (ref 65–100)
GLUCOSE BLD STRIP.AUTO-MCNC: 117 MG/DL (ref 65–100)
GLUCOSE BLD STRIP.AUTO-MCNC: 148 MG/DL (ref 65–100)
GLUCOSE BLD STRIP.AUTO-MCNC: 178 MG/DL (ref 65–100)
GLUCOSE SERPL-MCNC: 115 MG/DL (ref 65–100)
HCT VFR BLD AUTO: 38 % (ref 35–47)
HGB BLD-MCNC: 12.3 G/DL (ref 11.5–16)
MCH RBC QN AUTO: 30.9 PG (ref 26–34)
MCHC RBC AUTO-ENTMCNC: 32.4 G/DL (ref 30–36.5)
MCV RBC AUTO: 95.5 FL (ref 80–99)
NRBC # BLD: 0 K/UL (ref 0–0.01)
NRBC BLD-RTO: 0 PER 100 WBC
PLATELET # BLD AUTO: 480 K/UL (ref 150–400)
PMV BLD AUTO: 9.1 FL (ref 8.9–12.9)
POTASSIUM SERPL-SCNC: 3.8 MMOL/L (ref 3.5–5.1)
RBC # BLD AUTO: 3.98 M/UL (ref 3.8–5.2)
REPORTED DOSE,DOSE: ABNORMAL UNITS
REPORTED DOSE/TIME,TMG: 1800
SERVICE CMNT-IMP: ABNORMAL
SODIUM SERPL-SCNC: 142 MMOL/L (ref 136–145)
VANCOMYCIN TROUGH SERPL-MCNC: 22.7 UG/ML (ref 5–10)
WBC # BLD AUTO: 19.9 K/UL (ref 3.6–11)

## 2018-08-06 PROCEDURE — 80202 ASSAY OF VANCOMYCIN: CPT | Performed by: INTERNAL MEDICINE

## 2018-08-06 PROCEDURE — 74011250636 HC RX REV CODE- 250/636: Performed by: INTERNAL MEDICINE

## 2018-08-06 PROCEDURE — 74011250637 HC RX REV CODE- 250/637: Performed by: INTERNAL MEDICINE

## 2018-08-06 PROCEDURE — 74011250637 HC RX REV CODE- 250/637: Performed by: HOSPITALIST

## 2018-08-06 PROCEDURE — 74011000258 HC RX REV CODE- 258: Performed by: INTERNAL MEDICINE

## 2018-08-06 PROCEDURE — 77010033678 HC OXYGEN DAILY

## 2018-08-06 PROCEDURE — 36415 COLL VENOUS BLD VENIPUNCTURE: CPT | Performed by: INTERNAL MEDICINE

## 2018-08-06 PROCEDURE — 82962 GLUCOSE BLOOD TEST: CPT

## 2018-08-06 PROCEDURE — 74011636320 HC RX REV CODE- 636/320: Performed by: INTERNAL MEDICINE

## 2018-08-06 PROCEDURE — 94640 AIRWAY INHALATION TREATMENT: CPT

## 2018-08-06 PROCEDURE — 65660000000 HC RM CCU STEPDOWN

## 2018-08-06 PROCEDURE — 74011636637 HC RX REV CODE- 636/637: Performed by: HOSPITALIST

## 2018-08-06 PROCEDURE — 74011000250 HC RX REV CODE- 250: Performed by: INTERNAL MEDICINE

## 2018-08-06 PROCEDURE — 85027 COMPLETE CBC AUTOMATED: CPT | Performed by: INTERNAL MEDICINE

## 2018-08-06 PROCEDURE — 80048 BASIC METABOLIC PNL TOTAL CA: CPT | Performed by: INTERNAL MEDICINE

## 2018-08-06 PROCEDURE — 74011250637 HC RX REV CODE- 250/637: Performed by: NURSE PRACTITIONER

## 2018-08-06 PROCEDURE — 71260 CT THORAX DX C+: CPT

## 2018-08-06 RX ORDER — SODIUM CHLORIDE 9 MG/ML
50 INJECTION, SOLUTION INTRAVENOUS
Status: COMPLETED | OUTPATIENT
Start: 2018-08-06 | End: 2018-08-10

## 2018-08-06 RX ORDER — POLYETHYLENE GLYCOL 3350 17 G/17G
17 POWDER, FOR SOLUTION ORAL DAILY
Status: DISCONTINUED | OUTPATIENT
Start: 2018-08-07 | End: 2018-08-12 | Stop reason: HOSPADM

## 2018-08-06 RX ORDER — SODIUM CHLORIDE 0.9 % (FLUSH) 0.9 %
10 SYRINGE (ML) INJECTION
Status: COMPLETED | OUTPATIENT
Start: 2018-08-06 | End: 2018-08-06

## 2018-08-06 RX ORDER — BISACODYL 5 MG
10 TABLET, DELAYED RELEASE (ENTERIC COATED) ORAL
Status: COMPLETED | OUTPATIENT
Start: 2018-08-06 | End: 2018-08-06

## 2018-08-06 RX ORDER — VANCOMYCIN HYDROCHLORIDE
1250 EVERY 12 HOURS
Status: DISCONTINUED | OUTPATIENT
Start: 2018-08-06 | End: 2018-08-07

## 2018-08-06 RX ADMIN — PIPERACILLIN SODIUM,TAZOBACTAM SODIUM 3.38 G: 3; .375 INJECTION, POWDER, FOR SOLUTION INTRAVENOUS at 09:37

## 2018-08-06 RX ADMIN — KETOROLAC TROMETHAMINE 15 MG: 30 INJECTION, SOLUTION INTRAMUSCULAR at 05:14

## 2018-08-06 RX ADMIN — SODIUM CHLORIDE 50 ML/HR: 900 INJECTION, SOLUTION INTRAVENOUS at 16:40

## 2018-08-06 RX ADMIN — PIPERACILLIN SODIUM,TAZOBACTAM SODIUM 3.38 G: 3; .375 INJECTION, POWDER, FOR SOLUTION INTRAVENOUS at 23:22

## 2018-08-06 RX ADMIN — PRIMIDONE 150 MG: 50 TABLET ORAL at 21:50

## 2018-08-06 RX ADMIN — Medication 10 ML: at 21:49

## 2018-08-06 RX ADMIN — ALBUTEROL SULFATE 2.5 MG: 2.5 SOLUTION RESPIRATORY (INHALATION) at 03:56

## 2018-08-06 RX ADMIN — OXYCODONE HYDROCHLORIDE 15 MG: 5 TABLET ORAL at 05:13

## 2018-08-06 RX ADMIN — ALBUTEROL SULFATE 2.5 MG: 2.5 SOLUTION RESPIRATORY (INHALATION) at 23:08

## 2018-08-06 RX ADMIN — PRIMIDONE 50 MG: 50 TABLET ORAL at 09:46

## 2018-08-06 RX ADMIN — BISACODYL 10 MG: 5 TABLET, COATED ORAL at 20:14

## 2018-08-06 RX ADMIN — KETOROLAC TROMETHAMINE 15 MG: 30 INJECTION, SOLUTION INTRAMUSCULAR at 23:22

## 2018-08-06 RX ADMIN — OXYCODONE HYDROCHLORIDE 15 MG: 5 TABLET ORAL at 17:21

## 2018-08-06 RX ADMIN — LEVOTHYROXINE SODIUM 200 MCG: 100 TABLET ORAL at 05:16

## 2018-08-06 RX ADMIN — PIPERACILLIN SODIUM,TAZOBACTAM SODIUM 3.38 G: 3; .375 INJECTION, POWDER, FOR SOLUTION INTRAVENOUS at 17:07

## 2018-08-06 RX ADMIN — PRAZOSIN HYDROCHLORIDE 2 MG: 1 CAPSULE ORAL at 17:28

## 2018-08-06 RX ADMIN — METOPROLOL TARTRATE 25 MG: 25 TABLET ORAL at 17:21

## 2018-08-06 RX ADMIN — INSULIN LISPRO 2 UNITS: 100 INJECTION, SOLUTION INTRAVENOUS; SUBCUTANEOUS at 13:00

## 2018-08-06 RX ADMIN — Medication 400 MG: at 17:21

## 2018-08-06 RX ADMIN — PRAZOSIN HYDROCHLORIDE 2 MG: 1 CAPSULE ORAL at 10:03

## 2018-08-06 RX ADMIN — ESOMEPRAZOLE MAGNESIUM 40 MG: 40 CAPSULE, DELAYED RELEASE ORAL at 10:03

## 2018-08-06 RX ADMIN — Medication 250 MG: at 09:48

## 2018-08-06 RX ADMIN — METOPROLOL TARTRATE 25 MG: 25 TABLET ORAL at 09:47

## 2018-08-06 RX ADMIN — Medication 10 ML: at 13:10

## 2018-08-06 RX ADMIN — VANCOMYCIN HYDROCHLORIDE 1500 MG: 10 INJECTION, POWDER, LYOPHILIZED, FOR SOLUTION INTRAVENOUS at 05:19

## 2018-08-06 RX ADMIN — ALBUTEROL SULFATE 2.5 MG: 2.5 SOLUTION RESPIRATORY (INHALATION) at 08:34

## 2018-08-06 RX ADMIN — OXYCODONE HYDROCHLORIDE 15 MG: 5 TABLET ORAL at 10:03

## 2018-08-06 RX ADMIN — Medication 400 MG: at 09:46

## 2018-08-06 RX ADMIN — KETOROLAC TROMETHAMINE 15 MG: 30 INJECTION, SOLUTION INTRAMUSCULAR at 12:59

## 2018-08-06 RX ADMIN — PHENOBARBITAL 64.8 MG: 32.4 TABLET ORAL at 09:46

## 2018-08-06 RX ADMIN — ALBUTEROL SULFATE 2.5 MG: 2.5 SOLUTION RESPIRATORY (INHALATION) at 13:35

## 2018-08-06 RX ADMIN — Medication 10 ML: at 05:14

## 2018-08-06 RX ADMIN — KETOROLAC TROMETHAMINE 15 MG: 30 INJECTION, SOLUTION INTRAMUSCULAR at 17:22

## 2018-08-06 RX ADMIN — IOPAMIDOL 100 ML: 612 INJECTION, SOLUTION INTRAVENOUS at 13:00

## 2018-08-06 RX ADMIN — GUAIFENESIN 1200 MG: 600 TABLET, EXTENDED RELEASE ORAL at 09:44

## 2018-08-06 RX ADMIN — ALBUTEROL SULFATE 2.5 MG: 2.5 SOLUTION RESPIRATORY (INHALATION) at 19:27

## 2018-08-06 RX ADMIN — EZETIMIBE: 10 TABLET ORAL at 09:44

## 2018-08-06 RX ADMIN — ASPIRIN 81 MG 81 MG: 81 TABLET ORAL at 09:46

## 2018-08-06 RX ADMIN — VANCOMYCIN HYDROCHLORIDE 1250 MG: 10 INJECTION, POWDER, LYOPHILIZED, FOR SOLUTION INTRAVENOUS at 19:13

## 2018-08-06 RX ADMIN — Medication 1 CAPSULE: at 09:47

## 2018-08-06 RX ADMIN — OXYCODONE HYDROCHLORIDE 15 MG: 5 TABLET ORAL at 21:50

## 2018-08-06 RX ADMIN — Medication 10 ML: at 16:40

## 2018-08-06 RX ADMIN — METHYLPREDNISOLONE SODIUM SUCCINATE 40 MG: 40 INJECTION, POWDER, FOR SOLUTION INTRAMUSCULAR; INTRAVENOUS at 05:13

## 2018-08-06 RX ADMIN — DULOXETINE HYDROCHLORIDE 40 MG: 20 CAPSULE, DELAYED RELEASE ORAL at 09:46

## 2018-08-06 RX ADMIN — PROMETHAZINE HYDROCHLORIDE 25 MG: 25 TABLET ORAL at 10:04

## 2018-08-06 RX ADMIN — GUAIFENESIN 1200 MG: 600 TABLET, EXTENDED RELEASE ORAL at 20:14

## 2018-08-06 RX ADMIN — PHENOBARBITAL 64.8 MG: 32.4 TABLET ORAL at 17:21

## 2018-08-06 NOTE — CONSULTS
Palliative Medicine Consult  [place]: [phone] ([phone])    Patient Name: Cheryle Balls  YOB: 1955    Date of Initial Consult: 8/2/18  Reason for Consult: Pt has chronic psych illness, chronic pain, presented with severe pleuritic chest pain due to severe pneumonia. Please assist in her management. On chronic Tyl #3 and benzos. Requesting Provider: Francesco Nevarez MD   Primary Care Physician: Mylene Bennett MD     SUMMARY:   Cheryle Balls is a 58 y.o. with a past history of COPD, tobacco abuse, OA, PUD, hypothyroidism, GERD, fibromyalgia, depression, anxiety, hyperlipidemia,, who was admitted on 7/31/2018 from home with a diagnosis of PNA. Current medical issues leading to Palliative Medicine involvement include: Pt has chronic psych illness, chronic pain, presented with severe pleuritic chest pain due to severe pneumonia. Please assist in her management. On chronic Tyl #3 and benzos.  shows pt gets the following monthly prescriptions from [facility], no signs of aberrant behavior. Tramadol 50mg #90/30 days  Tylenol #3 #30/10 days  Phenobarbital 60mg #60/30 days  Norco 7.5/325mg #30/10 days  Alprazolam 1mg #90/30 days    Psychosocial: lives with  in 1 story home, independent with all ADLs and IADLs. PALLIATIVE DIAGNOSES:   1. Chills  2. Fever   3. Cough (productive)  4. Chest tightness  5. Dyspnea with speech  6. Acute chest pain: bilat rib pain  7. PNA : right lower lobe PNA with dense consolidation and possible areas of necrosis, also consolidation of LLL. 8. Sepsis   9. Diarrhea  10. Leukocytosis   11. Fibromyalgia   12. Anxiety/depression/PTSD  13. Polypharmacy   14. Constipation opioid induced     PLAN:   1. Pain is currently tolerable, better than last week but still limiting performance. 2. No BM since admission: dulcolax 10mg tonight, then Miralax 17gms daily. Extra opioids probably contributing. 3. Extensive chart review.    4. Initial consult note routed to primary continuity provider  5. Communicated plan of care with: Palliative IDT, RN       GOALS OF CARE / TREATMENT PREFERENCES:     GOALS OF CARE:  Patient/Health Care Proxy Stated Goals: Prolong life      TREATMENT PREFERENCES:   Code Status: Full Code    Advance Care Planning:  Advance Care Planning 8/2/2018   Patient's Healthcare Decision Maker is: Legal Next of Kin   Primary Decision Maker Name Nurys Richter   Primary Decision Maker Phone Number [phone]   Primary Decision Maker Relationship to Patient Spouse   Confirm Advance Directive None   Patient Would Like to Complete Advance Directive -       Medical Interventions: Full interventions   Other Instructions: Other:    As far as possible, the palliative care team has discussed with patient / health care proxy about goals of care / treatment preferences for patient. HISTORY:     History obtained from: chart, patient    CHIEF COMPLAINT: worsening cough    HPI/SUBJECTIVE:    The patient is:   [x] Verbal and participatory  [] Non-participatory due to:     7/31:  Presented to ED with c/o worsening productive cough, bright yellow sputum, bilateral rib pain, intermittent fever and chills s/p long-standing bronchitis since 6/18. Pt saw her PCP 6/18 and was dx with bronchitis, treated with Doxycycline and prednisone, however, pt stopped taking the Doxycycline due to diarrhea s/e. This am, she woke up at 0200 due to chest tightness, bilateral rib pain and coughing. She used her nebulizer x3 today, last tx was before 3pm.     ED W/U:  EXAM:  ECG: sinus tachycardia  LAB:  Wbc 22.0, plt 438, ALT 98, alk phos 159, albumin 2.7, lactic acid 2.5  IMAGING:  CXR opacification/consolidation in right middle lobe and left lung base posterior medially may represent PNA. HOSPITAL COURSE:  8/1: transferred to ortho for medical care. Pt requesting bipap. Transferred to PCU for increased monitoring. 8/6: still SOB, no BM since admission.  Still with pleuritic chest pain on right side. Clinical Pain Assessment (nonverbal scale for severity on nonverbal patients):   Clinical Pain Assessment  Severity: 6  Location: bilat chest wall  Character: sharp pain  pleuritic  Duration: days  Factors: coughing hurts  Frequency: continuous     Activity (Movement): Restless, excessive activity and/or withdrawal reflexes    Duration: for how long has pt been experiencing pain (e.g., 2 days, 1 month, years)  Frequency: how often pain is an issue (e.g., several times per day, once every few days, constant)     FUNCTIONAL ASSESSMENT:     Palliative Performance Scale (PPS):  PPS: 40       PSYCHOSOCIAL/SPIRITUAL SCREENING:     Palliative IDT has assessed this patient for cultural preferences / practices and a referral made as appropriate to needs (Cultural Services, Patient Advocacy, Ethics, etc.)    Advance Care Planning:  Advance Care Planning 8/2/2018   Patient's Healthcare Decision Maker is: Legal Next of Santhosh 69   Primary Decision Maker Name Santana Clark   Primary Decision Maker Phone Number [phone]   Primary Decision Maker Relationship to Patient Spouse   Confirm Advance Directive None   Patient Would Like to Complete Advance Directive -       Any spiritual / Pentecostalism concerns:  [] Yes /  [x] No    Caregiver Burnout:  [] Yes /  [x] No /  [] No Caregiver Present      Anticipatory grief assessment:   [x] Normal  / [] Maladaptive       ESAS Anxiety: Anxiety: 2  +ESAS Depression: Depression: 4        REVIEW OF SYSTEMS:     Positive and pertinent negative findings in ROS are noted above in HPI. The following systems were [x] reviewed / [] unable to be reviewed as noted in HPI  Other findings are noted below. Systems: constitutional, ears/nose/mouth/throat, respiratory, gastrointestinal, genitourinary, musculoskeletal, integumentary, neurologic, psychiatric, endocrine. Positive findings noted below.   Modified ESAS Completed by: provider   Fatigue: 5 Drowsiness: 2   Depression: 4 Pain: 6   Anxiety: 2 Nausea: 2   Anorexia: 4 Dyspnea: 2                    PHYSICAL EXAM:     From RN flowsheet:  Wt Readings from Last 3 Encounters:   07/31/18 200 lb (90.7 kg)   06/18/18 200 lb (90.7 kg)   05/10/18 206 lb (93.4 kg)     Blood pressure 114/60, pulse 76, temperature 98 °F (36.7 °C), resp. rate 20, height 5' 4\" (1.626 m), weight 200 lb (90.7 kg), SpO2 96 %.     Pain Scale 1: Numeric (0 - 10)  Pain Intensity 1: 6  Pain Onset 1: chronic  Pain Location 1: Rib cage  Pain Orientation 1: Right, Left  Pain Description 1: Sharp  Pain Intervention(s) 1: Medication (see MAR)  Last bowel movement, if known:     Constitutional: ill appearing, awake, alert, NAD  Eyes: pupils equal, anicteric  ENMT: no nasal discharge, moist mucous membranes  Cardiovascular: regular rhythm, distal pulses intact  Respiratory: breathing labored with speech, symmetric, crackles bilat  Gastrointestinal: soft non-tender, +bowel sounds  Musculoskeletal: no deformity, no tenderness to palpation  Skin: warm, dry  Neurologic: following commands, moving all extremities  Psychiatric: full affect, no hallucinations          HISTORY:     Principal Problem:    CAP (community acquired pneumonia) (7/31/2018)    Active Problems:    COPD (chronic obstructive pulmonary disease) ([facility] [address] (3/13/2015)      Acute respiratory failure (HCC) (1/16/2014)      Acute respiratory failure with hypoxia (HCC) (3/13/2015)      Sepsis ([facility] [address] (4/1/2015)      Diabetic peripheral neuropathy associated with type 2 diabetes mellitus ([facility] [address] (5/16/2017)      Productive cough (8/2/2018)      Chest pain on breathing (8/2/2018)      Anxiety and depression (8/2/2018)      Polypharmacy (8/2/2018)      Past Medical History:   Diagnosis Date    Anxiety     Bone spur     NECK    COPD     Depression     Endocrine disease     hypothyroidism    Fibromyalgia     Fusion of spine of cervical region     Gastrointestinal disorder     gerd    Hyperlipidemia     Hypothyroid     Morbid obesity ([facility] [address]     Osteoarthritis     PUD (peptic ulcer disease)     Tobacco abuse       Past Surgical History:   Procedure Laterality Date    BRONCHOSCOPY-FIBER/THERAPY  4/3/2015         CARDIAC CATHETERIZATION  2013         COLONOSCOPY,DIAGNOSTIC  10/30/2014         HX CATARACT REMOVAL      bilateral    HX GYN          HX ORTHOPAEDIC      Ruptured disc, knuckles on right hand    UPPER GI ENDOSCOPY,BIOPSY  10/30/2014         UPPER GI ENDOSCOPY,DILATN W GUIDE  10/30/2014           Family History   Problem Relation Age of Onset    Heart Disease Mother     Dementia Mother     Thyroid Disease Mother     Heart Disease Father     Alcohol abuse Father     Psychiatric Disorder Father     Dementia Father     Bipolar Disorder Father     Psychiatric Disorder Sister     Suicide Sister     Psychiatric Disorder Brother     Suicide Brother     Psychiatric Disorder Other     Psychiatric Disorder Daughter     Bipolar Disorder Daughter     Coronary Artery Disease Other      multiple family members in 52's      History reviewed, no pertinent family history.   Social History   Substance Use Topics    Smoking status: Former Smoker     Packs/day: 1.00     Years: 30.00    Smokeless tobacco: Never Used    Alcohol use Yes      Comment: occasional     Allergies   Allergen Reactions    Latex Contact Dermatitis    Dilaudid [Hydromorphone (Pf)] Other (comments)     Feels like bugs are crawling all over her    Lipitor [Atorvastatin] Other (comments)     LIVER TROUBLE ON THIS MEDICATION    Remeron [Mirtazapine] Other (comments)     Tremors    Sulfa (Sulfonamide Antibiotics) Itching      Current Facility-Administered Medications   Medication Dose Route Frequency    [START ON 2018] methylPREDNISolone (PF) (SOLU-MEDROL) injection 40 mg  40 mg IntraVENous DAILY    ezetimibe/simvastatin (VYTORIN) 10/40 mg  (Patient Supplied)   Oral QPM    B complex-vitaminC-folic acid (NEPHROCAP) cap (Patient Supplied)  1 Cap Oral DAILY    vancomycin (VANCOCIN) 1250 mg in  ml infusion  1,250 mg IntraVENous Q12H    iopamidol (ISOVUE 300) 61 % contrast injection 100 mL  100 mL IntraVENous RAD ONCE    esomeprazole (NEXIUM) capsule 40 mg (Patient Supplied)  40 mg Oral DAILY    oxyCODONE IR (ROXICODONE) tablet 15 mg  15 mg Oral Q4H PRN    ketorolac (TORADOL) injection 15 mg  15 mg IntraVENous Q6H    promethazine (PHENERGAN) tablet 25 mg  25 mg Oral Q6H PRN    metoprolol tartrate (LOPRESSOR) tablet 25 mg  25 mg Oral BID    prazosin (MINIPRESS) capsule 2 mg (Patient Supplied)  2 mg Oral BID    piperacillin-tazobactam (ZOSYN) 3.375 g in 0.9% sodium chloride (MBP/ADV) 100 mL  3.375 g IntraVENous Q8H    lidocaine (LIDODERM) 5 % patch 1 Patch  1 Patch TransDERmal Q24H    albuterol (PROVENTIL VENTOLIN) nebulizer solution 2.5 mg  2.5 mg Nebulization Q4H RT    albuterol (PROVENTIL VENTOLIN) nebulizer solution 2.5 mg  2.5 mg Nebulization Q2H PRN    ALPRAZolam (XANAX) tablet 1 mg  1 mg Oral TID PRN    aspirin chewable tablet 81 mg  81 mg Oral DAILY    benzonatate (TESSALON) capsule 200 mg  200 mg Oral TID PRN    DULoxetine (CYMBALTA) capsule 40 mg  40 mg Oral DAILY    guaiFENesin ER (MUCINEX) tablet 1,200 mg  1,200 mg Oral Q12H    magnesium oxide (MAG-OX) tablet 400 mg  400 mg Oral BID    methocarbamol (ROBAXIN) tablet 750 mg  750 mg Oral QID PRN    niacin SR capsule 250 mg  250 mg Oral DAILY    nystatin (MYCOSTATIN) 100,000 unit/mL oral suspension 500,000 Units  500,000 Units Oral QID PRN    PHENobarbital (LUMINAL) tablet 64.8 mg  64.8 mg Oral BID    primidone (MYSOLINE) tablet 50 mg  50 mg Oral DAILY    primidone (MYSOLINE) tablet 150 mg  150 mg Oral QHS    sodium chloride (NS) flush 5-10 mL  5-10 mL IntraVENous Q8H    sodium chloride (NS) flush 5-10 mL  5-10 mL IntraVENous PRN    acetaminophen (TYLENOL) tablet 650 mg  650 mg Oral Q4H PRN    naloxone (NARCAN) injection 0.4 mg  0.4 mg IntraVENous PRN    ondansetron [facility]) injection 4 mg  4 mg IntraVENous Q4H PRN    insulin lispro (HUMALOG) injection   SubCUTAneous AC&HS    glucose chewable tablet 16 g  4 Tab Oral PRN    dextrose (D50W) injection syrg 12.5-25 g  12.5-25 g IntraVENous PRN    glucagon (GLUCAGEN) injection 1 mg  1 mg IntraMUSCular PRN    lactobac ac& pc-s.therm-b.anim (ROBIN Q/RISAQUAD)  1 Cap Oral DAILY    levothyroxine (SYNTHROID) tablet 200 mcg  200 mcg Oral ACB          LAB AND IMAGING FINDINGS:     Lab Results   Component Value Date/Time    WBC 19.9 (H) 08/06/2018 05:02 AM    HGB 12.3 08/06/2018 05:02 AM    PLATELET 619 (H) 58/16/7643 05:02 AM     Lab Results   Component Value Date/Time    Sodium 142 08/06/2018 05:02 AM    Potassium 3.8 08/06/2018 05:02 AM    Chloride 107 08/06/2018 05:02 AM    CO2 25 08/06/2018 05:02 AM    BUN 12 08/06/2018 05:02 AM    Creatinine 0.90 08/06/2018 05:02 AM    Calcium 9.9 08/06/2018 05:02 AM    Magnesium 2.5 (H) 08/01/2018 05:37 AM    Phosphorus 3.4 08/01/2018 05:37 AM      Lab Results   Component Value Date/Time    AST (SGOT) 15 08/01/2018 05:37 AM    Alk.  phosphatase 138 (H) 08/01/2018 05:37 AM    Protein, total 7.3 08/01/2018 05:37 AM    Albumin 2.4 (L) 08/01/2018 05:37 AM    Globulin 4.9 (H) 08/01/2018 05:37 AM     Lab Results   Component Value Date/Time    INR 0.9 07/05/2013 10:01 AM    Prothrombin time 9.3 07/05/2013 10:01 AM      No results found for: IRON, FE, TIBC, IBCT, PSAT, FERR   Lab Results   Component Value Date/Time    pH 7.34 (L) 08/01/2018 11:12 AM    PCO2 41 08/01/2018 11:12 AM    PO2 82 08/01/2018 11:12 AM     No components found for: [place]   Lab Results   Component Value Date/Time    CK 42 03/31/2015 09:50 PM    CK - MB 2.2 03/31/2015 09:50 PM                Total time: 35  Counseling / coordination time, spent as noted above: 25  > 50% counseling / coordination?: y    Prolonged service was provided for  []30 min   []75 min in face to face time in the presence of the patient, spent as noted above.  Time Start:   Time End:   Note: this can only be billed with 21131 (initial) or 58032 (follow up). If multiple start / stop times, list each separately.

## 2018-08-06 NOTE — PROGRESS NOTES
[facility]  Pulmonary, Critical Care, and Sleep Medicine    Name: Shanelle Abbasi MRN: [id number]   : 1955 Hospital: [facility]   Date: 2018        IMPRESSION:   · Right lower lobe pneumonia with dense consolidation and possible areas of necrosis. Also noted to have consolidation of the left lower lobe. · ?worsening parapneumonic effusion in right lower lobe vs parenchymal necrosis  · Acute chest pain due to severe pneumonia, more difficult to treat due to her chronic use of opiates  · Suspected underlying COPD- acute exacerbation   · Leukocytosis and Sepsis  · NIDDM  · Anemia  · HTN  · Fibromyalgia/Depression/Anxiety, PTSD/ chronic back pain on chronic opiates and benzos. · Essential tremor. · Ex Smoker, quit 1 year ago. · Hypothyroidism. · Obese      RECOMMENDATIONS:   · O2 - wean as tolerated  · Bronchodilators  · IV antibiotics  · Steroids   · Repeat CT today due to concerns about loculated effusion  · If she has significant loculated pleural fluid, may need transfer to [facility] for thoracic surgical consultation  · Appreciate palliative care assistance       Subjective/History:     Weekend events noted. Overall feeling better but still with lots of wheezing and cough. Had thoracentesis yesterday, but fluid not easily accessible and a repeat CT was recommended. Initial history 18: Pt was seen about 1145am and then again about 2pm.    Cc: chest pain, dry cough. This patient has been seen and evaluated at the request of Dr. Danae Andujar for above. Patient is a 58 y.o. female who presents for above. She has moderate chest pain which limits her ability to take a deep breath. Has some productive coughing but again limited by her chest pain. She reports that her pain got severe and that it has affected her breathing. Pt is very senstive to her medications and has intolerance to a lot of the generic meds.  She has been pretty well controlled with her meds prior to admission. On second evaluation pt was much more comfortable and conversant. Was still have intermittent sharp stabbing pain along her right lower to mid chest wall. Not able to get Complete ROS due to her acute illness, severe pain. Limited ability to give HPI and ROS. The patient is critically ill and can not provide additional history due to Unable to speak. Past Medical History:   Diagnosis Date    Anxiety     Bone spur     NECK    COPD     Depression     Endocrine disease     hypothyroidism    Fibromyalgia     Fusion of spine of cervical region     Gastrointestinal disorder     gerd    Hyperlipidemia     Hypothyroid     Morbid obesity ([address]     Osteoarthritis     PUD (peptic ulcer disease)     Tobacco abuse       Past Surgical History:   Procedure Laterality Date    BRONCHOSCOPY-FIBER/THERAPY  4/3/2015         CARDIAC CATHETERIZATION  2013         COLONOSCOPY,DIAGNOSTIC  10/30/2014         HX CATARACT REMOVAL      bilateral    HX GYN          HX ORTHOPAEDIC      Ruptured disc, knuckles on right hand    UPPER GI ENDOSCOPY,BIOPSY  10/30/2014         UPPER GI ENDOSCOPY,DILATN W GUIDE  10/30/2014           Prior to Admission medications    Medication Sig Start Date End Date Taking? Authorizing Provider   prazosin (MINIPRESS) 2 mg capsule TAKE 1 CAPSULE BY MOUTH TWICE A DAY. 18  Yes Eldon Sorto NP   DULoxetine 40 mg cpDR Take 40 mg by mouth daily. 18  Yes Eldon Sorto NP   primidone (MYSOLINE) 50 mg tablet 1 po qam and 3 po qhs 18  Yes Atiya Dailey MD   ALPRAZolam Latrice Zaldivar) 1 mg tablet Take 1 Tab by mouth three (3) times daily as needed for Anxiety. Max Daily Amount: 3 mg. 17  Yes Chichi Simons NP   cetirizine (ZYRTEC) 10 mg tablet Take 1 Tab by mouth daily. 17  Yes Chichi Simons NP   promethazine (PHENERGAN) 25 mg tablet Take 1 Tab by mouth every six (6) hours as needed.  17  Yes Chichi Dia NP ibuprofen (MOTRIN) 800 mg tablet Take 1 Tab by mouth every eight (8) hours as needed. 5/27/17  Yes Chichi Simons NP   guaiFENesin ER (MUCINEX) 1,200 mg Ta12 ER tablet Take 1 Tab by mouth two (2) times a day. 5/27/17  Yes Chichi Simons NP   furosemide (LASIX) 40 mg tablet Take 1 Tab by mouth daily. Patient taking differently: Take 40 mg by mouth daily as needed for Other (LE edema). 5/27/17  Yes Chichi Simons NP   PHENobarbital (LUMINAL) 60 mg tablet Take 1 Tab by mouth two (2) times a day. Max Daily Amount: 120 mg. 5/27/17  Yes Martha Haines MD   dicyclomine (BENTYL) 10 mg capsule  2/13/15  Yes Nico Martinez MD   glipiZIDE SR (GLUCOTROL) 5 mg CR tablet Take 5 mg by mouth two (2) times daily as needed (Patient monitors her BG levels and takes when she is also taking a steroid. ). 2/16/15  Yes Nico Martinez MD   montelukast (SINGULAIR) 10 mg tablet Take 10 mg by mouth daily. 2/16/15  Yes Nico Martinez MD   ezetimibe-simvastatin (VYTORIN 10/40) 10-40 mg per tablet Take 1 tablet by mouth nightly. Yes Historical Provider   metoprolol (LOPRESSOR) 25 mg tablet Take 1 Tab by mouth two (2) times a day. 2/6/14  Yes Jair Hodges NP   nystatin (MYCOSTATIN) 100,000 unit/mL suspension Take 5 mL by mouth four (4) times daily. swish and spit  Patient taking differently: Take 500,000 Units by mouth four (4) times daily as needed. swish and spit 1/20/14  Yes Martha Haines MD   methocarbamol (ROBAXIN) 750 mg tablet Take 750-1,500 mg by mouth four (4) times daily as needed. Yes Historical Provider   niacin  mg CR capsule Take 250 mg by mouth daily. Yes Historical Provider   hyoscyamine SL (LEVSIN/SL) 0.125 mg SL tablet 0.125 mg by SubLINGual route three (3) times daily as needed for Cramping. Yes Historical Provider   esomeprazole (NEXIUM) 40 mg capsule Take 40 mg by mouth daily.    Yes Historical Provider   MAGOX 400 mg tablet TAKE ONE TABLET TWICE A DAY 11/4/13  Yes David Yip NP   vit B Cmplx 3-FA-Vit C-Biotin (NEPHRO SHREYA RX) 1- mg-mg-mcg tablet Take 1 Tab by mouth daily. Yes Historical Provider   aspirin 81 mg chewable tablet Take 81 mg by mouth daily. Yes Nico Other, MD   levothyroxine (SYNTHROID) 200 mcg tablet Take 200 mcg by mouth daily (before breakfast). Yes Phys Other, MD   acetaminophen-codeine (TYLENOL #3) 300-30 mg per tablet Take 1 Tab by mouth every eight (8) hours as needed. Max Daily Amount: 3 Tabs. 5/27/17   Chichi Simons NP   albuterol (VENTOLIN HFA) 90 mcg/actuation inhaler Take 2 Puffs by inhalation every four (4) hours as needed for Wheezing. Historical Provider   benzonatate (TESSALON) 200 mg capsule Take 1 Cap by mouth two (2) times daily as needed. 1/20/14   Kelly Parker MD   albuterol-ipratropium (DUONEB) 2.5 mg-0.5 mg/3 ml nebulizer solution 3 mL by Nebulization route every six (6) hours as needed for Wheezing.     Historical Provider     Current Facility-Administered Medications   Medication Dose Route Frequency    vancomycin (VANCOCIN) 1500 mg in  ml infusion  1,500 mg IntraVENous Q12H    esomeprazole (NEXIUM) capsule 40 mg (Patient Supplied)  40 mg Oral DAILY    methylPREDNISolone (PF) (SOLU-MEDROL) injection 40 mg  40 mg IntraVENous Q12H    ketorolac (TORADOL) injection 15 mg  15 mg IntraVENous Q6H    metoprolol tartrate (LOPRESSOR) tablet 25 mg  25 mg Oral BID    prazosin (MINIPRESS) capsule 2 mg (Patient Supplied)  2 mg Oral BID    piperacillin-tazobactam (ZOSYN) 3.375 g in 0.9% sodium chloride (MBP/ADV) 100 mL  3.375 g IntraVENous Q8H    lidocaine (LIDODERM) 5 % patch 1 Patch  1 Patch TransDERmal Q24H    albuterol (PROVENTIL VENTOLIN) nebulizer solution 2.5 mg  2.5 mg Nebulization Q4H RT    aspirin chewable tablet 81 mg  81 mg Oral DAILY    DULoxetine (CYMBALTA) capsule 40 mg  40 mg Oral DAILY    ezetimibe (ZETIA) 10 mg, pravastatin (PRAVACHOL) 80 mg   Oral DAILY    guaiFENesin ER (MUCINEX) tablet 1,200 mg  1,200 mg Oral Q12H    magnesium oxide (MAG-OX) tablet 400 mg  400 mg Oral BID    niacin SR capsule 250 mg  250 mg Oral DAILY    PHENobarbital (LUMINAL) tablet 64.8 mg  64.8 mg Oral BID    primidone (MYSOLINE) tablet 50 mg  50 mg Oral DAILY    primidone (MYSOLINE) tablet 150 mg  150 mg Oral QHS    sodium chloride (NS) flush 5-10 mL  5-10 mL IntraVENous Q8H    insulin lispro (HUMALOG) injection   SubCUTAneous AC&HS    lactobac ac& pc-s.therm-b.anim (ROBIN Q/RISAQUAD)  1 Cap Oral DAILY    levothyroxine (SYNTHROID) tablet 200 mcg  200 mcg Oral ACB     Allergies   Allergen Reactions    Latex Contact Dermatitis    Dilaudid [Hydromorphone (Pf)] Other (comments)     Feels like bugs are crawling all over her    Lipitor [Atorvastatin] Other (comments)     LIVER TROUBLE ON THIS MEDICATION    Remeron [Mirtazapine] Other (comments)     Tremors    Sulfa (Sulfonamide Antibiotics) Itching      Social History   Substance Use Topics    Smoking status: Former Smoker     Packs/day: 1.00     Years: 30.00    Smokeless tobacco: Never Used    Alcohol use Yes      Comment: occasional      Family History   Problem Relation Age of Onset    Heart Disease Mother     Dementia Mother     Thyroid Disease Mother     Heart Disease Father     Alcohol abuse Father     Psychiatric Disorder Father     Dementia Father     Bipolar Disorder Father     Psychiatric Disorder Sister     Suicide Sister     Psychiatric Disorder Brother     Suicide Brother     Psychiatric Disorder Other     Psychiatric Disorder Daughter     Bipolar Disorder Daughter     Coronary Artery Disease Other      multiple family members in 52's        Review of Systems:Updated on 8/2/18  Review of systems not obtained due to patient factors. Has moderate anxiety, chronic pains. Acute pain of the right mid to lower chest wall, pleuritic in nature. Has productive cough. No Aspiration. Was note to have some loose stools. NO skins issues. No muscle aches, no joint aches.   She does have a resting tremor seems to be worse on the left upper extremity versus RUE. Some also noted in legs. Has moderate to severe anxiety, depression, PTSD. Has a tremendous sensitivity to her meds and is intolerant of several generic meds for her anxiety. Objective:   Vital Signs:    Visit Vitals    /73 (BP 1 Location: Left arm, BP Patient Position: At rest)    Pulse 98    Temp 97.8 °F (36.6 °C)    Resp 24    Ht 5' 4\" (1.626 m)    Wt 90.7 kg (200 lb)    SpO2 90%    BMI 34.33 kg/m2       O2 Device: Nasal cannula   O2 Flow Rate (L/min): 3.5 l/min   Temp (24hrs), Av.8 °F (36.6 °C), Min:97.8 °F (36.6 °C), Max:97.8 °F (36.6 °C)       Intake/Output:   Last shift:         Last 3 shifts:  1901 -  0700  In: -   Out: 2750 [Urine:2750]    Intake/Output Summary (Last 24 hours) at 18 0832  Last data filed at 18 0505   Gross per 24 hour   Intake                0 ml   Output             2550 ml   Net            -2550 ml     Physical Exam:    General:  Alert, cooperative, no distress. Head:  Normocephalic, without obvious abnormality, atraumatic. Eyes:  Conjunctivae/corneas clear. Nose: Nares normal. Septum midline. Mucosa normal.     Throat: Lips, mucosa, and tongue normal. Teeth and gums normal.   Neck: Supple, symmetrical, trachea midline    Back:   Symmetric, no curvature. ROM normal.   Lungs:   Bilateral wheeze and ronchi   Chest wall:  No tenderness or deformity. Heart:  Regular rate and rhythm    Abdomen:   Soft, non-tender. Bowel sounds normal.     Extremities: Extremities normal, atraumatic, no cyanosis, +edema   Skin: Skin color, texture, turgor normal. No rashes or lesions   Lymph nodes: Cervical, supraclavicular, and axillary nodes normal.   Neurologic: Grossly nonfocal, has tremors of the LUE and RUE. Motor is grossly intact. Psych: NO anxiety or depression.       Data:   Labs:  Recent Labs      18   0502  18   0524  18   1004   WBC  19.9*  24.2* 24.4*   HGB  12.3  11.3*  10.9*   HCT  38.0  35.4  33.6*   PLT  480*  454*  394     Recent Labs      08/06/18   0502  08/05/18   0524  08/04/18   0245   NA  142  139  137   K  3.8  4.0  4.1   CL  107  105  103   CO2  25  27  27   GLU  115*  142*  121*   BUN  12  10  11   CREA  0.90  0.65  0.80   CA  9.9  9.9  9.5     No results for input(s): PH, PCO2, PO2, HCO3, FIO2 in the last 72 hours.     Imaging:  I have personally reviewed the patients radiographs:  Yesterday chest X-ray unchanged        Alex Dseir MD

## 2018-08-06 NOTE — PROGRESS NOTES
Music Therapy Assessment    Nazia Conti [id number]  [id number]    1955  58 y.o.  female    Patient Telephone Number: [phone] (home)   Baptism Affiliation: No Taoism   Language: English   Extended Emergency Contact Information  Primary Emergency Contact: Oz Murphy  Address: [address] [place] [address] Phone: [phone]  Mobile Phone: [phone]  Relation: Spouse   Patient Active Problem List    Diagnosis Date Noted    Productive cough 08/02/2018    Chest pain on breathing 08/02/2018    Anxiety and depression 08/02/2018    Polypharmacy 08/02/2018    CAP (community acquired pneumonia) 07/31/2018    Benign essential tremor syndrome 02/21/2018    Obesity (BMI 35.0-39.9 without comorbidity) 05/27/2017    Fibromyalgia 05/27/2017    Altered mental status, unspecified 05/17/2017    Cerebral microvascular disease 05/17/2017    Diabetic peripheral neuropathy associated with type 2 diabetes mellitus ([address] 05/16/2017    Stenosis of both internal carotid arteries 05/16/2017    Cervical radiculopathy due to degenerative joint disease of spine 05/16/2017    Degenerative lumbar spinal stenosis 05/16/2017    Vitamin D deficiency 05/16/2017    Atelectasis 04/01/2015    Allergic rhinitis 04/01/2015    Sepsis ([address] 04/01/2015    Acute exacerbation of COPD with asthma ([address] 04/01/2015    HTN (hypertension) 04/01/2015    Hyperlipidemia 04/01/2015    Hypothyroidism 04/01/2015    Smoking 04/01/2015    COPD (chronic obstructive pulmonary disease) ([address] 03/13/2015     Class: Chronic    COPD with acute exacerbation ([address] 03/13/2015     Class: Present on Admission    Acute respiratory failure with hypoxia ([address] 03/13/2015     Class: Present on Admission    Chronic respiratory failure with hypoxia ([address] 03/13/2015     Class: Chronic    Benign familial tremor 03/13/2015     Class: Chronic    Syncope and collapse 12/18/2014    Cervical post-laminectomy syndrome 11/25/2014    Neck pain 11/25/2014    Ataxia 11/25/2014    B12 deficiency 11/25/2014    Polyneuropathy in diabetes(357.2) 11/25/2014    Depression, major, recurrent ([facility] 10/13/2014    Unspecified hypothyroidism 01/20/2014    Acute respiratory failure ([facility] 01/16/2014    Pneumonia, organism unspecified(486) 01/16/2014    Chest pain with normal coronary angiography 07/11/2013    Abnormal nuclear stress test 07/11/2013    PVC (premature ventricular contraction) 06/21/2013    Other and unspecified hyperlipidemia 06/21/2013    Tobacco use disorder 06/21/2013    Family history of ischemic heart disease 06/21/2013    PTSD (post-traumatic stress disorder) 03/26/2013        Date: 8/6/2018       Mental Status:   [x] Alert [  ] [place] [  ]  Confused  [  ] Minimally responsive    Communication Status: [  ] Impaired Speech [  ] Nonverbal -N/A    Physical Status:   [x] Oxygen in use  [  ] Hard of Hearing [  ] Vision Impaired  [  ] Ambulatory  [  ] Ambulatory with assistance [  ] Non-ambulatory     Music Preferences, Background: Bluegrass and [employer], including [employer], [address] [place] [address]. Pt has multiple family members who play instruments. Clinical Problem addressed: Support healthy coping, self-expression, positive social interaction. Goal(s) met in session:  Physical/Pain management (Scale of 1-10):    Pre-session rating: Pt denied pain. Post-session rating: Pt denied pain.    [  ] Increased relaxation   [  ] Regulated breathing patterns  [  ] Decreased muscle tension   [  ] Minimized physical distress     Emotional/Psychological:  [x] Increased self-expression   [  ] Decreased aggressive behavior   [  ] Decreased sadness   [x] Discussed healthy coping skills     [  ] Improved mood    [  ] Decreased withdrawn behavior     Social:  [  ] Decreased feelings of isolation/loneliness [x] Positive social interaction   [  ] Provided support and/or comfort for family/friends    Spiritual:  [  ] Spiritual support    [  ] Expressed peace  [  ] Expressed narinder    [  ] Discussed beliefs    Techniques Utilized (Check all that apply):   [  ] Procedural support MT [  ] Music for relaxation [x] Patient preferred music  [  ] Tori analysis  [x] Song choice  [x] Music for validation  [  ] Entrainment  [  ] Progressive muscle relax. [  ] Guided visualization  [  ] Debbi Kris  [  ] Patient instrument playing [  ] Dragonfly List writing  [x] Sing along   [  ] Improvisation  [x] Sensory stimulation  [x] Active Listening  [x] Music for spiritual support [  ] Making of CDs as gifts    Session Observations:  Referral from Justin Wright, Palliative NP. Patient (pt) was observed to be lying comfortably in bed. This music therapist (MT) asked pt about her music preferences and music background and pt shared these. MT sat at bedside and sang and played the [employer] song When You Say Nothing At All with guitar. Pt increased self-expression in response to the music as evidenced by (AEB) singing along. Pt tearfully shared that many of the people in her life who were her support system are \"all gone. \" Her mother has passed away and her two brothers and her sister all  by suicide. MT provided active listening and expressed condolences, acknowledging the intense grief those losses and the nature of them would cause. MT asked pt if she's had people she trusts with whom she can talk about this and pt confirmed she does, one of whom is a psychiatrist. Pt requested the [employer] and MT sang and played this with guSprinklrr to provide validation through music. Pt then requested the Bluegrass version of the Spiritual [employer], which MT sang and played. Pt expressed enjoyment and gratitude for the session, saying it lifted her heart. Will follow as able.     Melanie Parker MT-BC (Music Therapist-Board Certified)  Spiritual Care Department  Referral-based service

## 2018-08-06 NOTE — PROGRESS NOTES
PCU SHIFT NURSING NOTE      Bedside shift change report given to Sandra Desir (oncoming nurse) by Mary Lou Mirza (offgoing nurse). Report included the following information SBAR, Kardex, Intake/Output, Recent Results and Cardiac Rhythm ST-NSR. Admission Date 7/31/2018   Admission Diagnosis CAP (community acquired pneumonia)  Acute respiratory failure ([facility]   Consults IP CONSULT TO PULMONOLOGY  IP CONSULT TO PALLIATIVE CARE - PROVIDER        Consults   []PT   []OT   []Speech   []Case Management      [] Palliative      Cardiac Monitoring Order   []Yes   []No     IV drips   []Yes    Drip:                            Dose:  Drip:                            Dose:  Drip:                            Dose:   []No     GI Prophylaxis   []Yes   []No         DVT Prophylaxis   SCDs:             Onel stockings:         [] Medication   []Contraindicated   []None      Activity Level Activity Level: Up with Assistance     Activity Assistance: Partial (one person)   Purposeful Rounding every 1-2 hour? []Yes   Zarate Score  Total Score: 3   Bed Alarm (If score 3 or >)   []Yes   [] Refused (See signed refusal form in chart)   Joseph Score  Joseph Score: 17   Joseph Score (if score 14 or less)   []PMT consult   []Wound Care consult      []Specialty bed   [] Nutrition consult          Needs prior to discharge:   Home O2 required:    []Yes   []No    If yes, how much O2 required?     Other:    Last Bowel Movement: Last Bowel Movement Date: 08/02/18      Influenza Vaccine Received Flu Vaccine for Current Season (usually Sept-March): Not Flu Season        Pneumonia Vaccine           Diet Active Orders   Diet    DIET DIABETIC CONSISTENT CARB Regular      LDAs               Peripheral IV 08/03/18 Right Arm (Active)   Site Assessment Clean, dry, & intact 8/5/2018  7:54 PM   Phlebitis Assessment 0 8/5/2018  7:54 PM   Infiltration Assessment 0 8/5/2018  7:54 PM   Dressing Status Clean, dry, & intact 8/5/2018  7:54 PM   Dressing Type Tape;Transparent 8/5/2018  7:54 PM   Hub Color/Line Status Blue; Infusing 8/5/2018  7:54 PM   Action Taken Open ports on tubing capped 8/4/2018  7:37 AM   Alcohol Cap Used Yes 8/4/2018  7:37 AM                      Urinary Catheter      Intake & Output   Date 08/04/18 1900 - 08/05/18 0659 08/05/18 0700 - 08/06/18 0659   Shift 1797-5102 24 Hour Total 9999-5930 2542-8339 24 Hour Total   I  N  T  A  K  E   P.O.  200         P. O.  200       Shift Total  (mL/kg)  200  (2.2)      O  U  T  P  U  T   Urine  (mL/kg/hr) 200 1325         Urine Voided 200 1325         Urine Occurrence(s) 2 x 2 x       Shift Total  (mL/kg) 200  (2.2) 1325  (14.6)      NET -200 -1125      Weight (kg) 90.7 90.7 90.7 90.7 90.7         Readmission Risk Assessment Tool Score High Risk            28       Total Score        3 Has Seen PCP in Last 6 Months (Yes=3, No=0)    3 Patient Length of Stay (>5 days = 3)    5 Pt. Coverage (Medicare=5 , Medicaid, or Self-Pay=4)    17 Charlson Comorbidity Score (Age + Comorbid Conditions)        Criteria that do not apply:    . Living with Significant Other. Assisted Living. LTAC. SNF.  or   Rehab    IP Visits Last 12 Months (1-3=4, 4=9, >4=11)       Expected Length of Stay 4d 21h   Actual Length of Stay 5

## 2018-08-06 NOTE — PROGRESS NOTES
7:35 PM Bedside report received from [facility], [facility].

## 2018-08-06 NOTE — PROGRESS NOTES
Hospitalist Progress Note    NAME: Renzo Sotelo   :  1955   MRN:  [id number]       Assessment / Plan:  Acute hypoxic respiratory failure POA- stable on oxygen 4L/m via NC  due to Bilateral Pneumonia ( RLL necrotic) with reactive Lymphadenopathy on CT chest POA- WBC down to 19 k today  R Pleural effusion - large on repeat CXR  S/p R thoracentesis  by IR- 400 ml out  Acute COPD exacerbation POA  SevereSepsis due to above ( leukocytosis/tachycardia + resp failure & lact 2.0)  Lactic acidosis POA  CT  Chest noted for-  Significant consolidation in the right lower lobe with areas of necrosis. Small abscesses cannot be excluded in this setting. Consolidation left lower lobe. Trace right effusion with underlying atelectasis. New mediastinal lymphadenopathy may be reactive in nature.   Follow up CXR (8/3) Large right pleural effusion with right basilar consolidation  Sputum Cx- moderate beta strep, Hemophilus influenzae noted   BCx neg in 5 days    Off IVF  S/p IV levaquin x 1 in ER, changed to IV Vanco + zosyn for the CT results of necrotic PNA- cont for now  S/p IR consult for R thoracentesis - cytology sent along with Cultures, 400 ml out yesterday   Changed to IV solumedrol, taper to daily today  Cont jet nebs scheduled   Cont oxygen to keep sats >90%, BIPAP prn, check ABG  IP pulm consult appreciated- following  IV toradol  for pleuritic chest pain- seems to be helping her  Palliative pain consulted- following     Diarrhea POA - C Diff ruled out with no diarrhea in 24 hrs  -per pt started last month s/p doxycycline   DC C.diff stool test as no sample in 24 hrs  -c/w FloraQ      NIDDM type II  Cont holding PO glipizide with poor po intake  -monitor closely, BS may go high wit humberto of steroids  -c/w SSI for now     HTN   -BP stable  -cont home metoprolol   -lasix prn at home for le edema, keep on hold for now     Fibromyalgia / depression/ anxiety/ PTSD /Chronic pain  Polypharmacy    Essential tremor -continue multiple home meds       Ex smoker, quit 1 year ago   Hyperlipidemia, cont statin  Hypothyroidism, cont synthroid   Obesity. Body mass index is 34.33 kg/(m^2).   weight loss recommended        Code Status: Full code   Surrogate Decision Maker:       DVT Prophylaxis: Lovenox   GI Prophylaxis: not indicated     Baseline: independent; no home O2; lives with      Recommended Disposition: Home w/Familysoon     Subjective:     Chief Complaint / Reason for Physician Visit: F/U Bilateral PNA (necrotic), Sepsis, resp failure, COPD, pleuritic chest pain  \"I am feeling much better\". Discussed with RN events overnight. S/p R thoracentesis yesterday by IR- 400 ml out    Review of Systems:  Symptom Y/N Comments  Symptom Y/N Comments   Fever/Chills n   Chest Pain y Pleuritic R>L, improved   Poor Appetite n   Edema     Cough y   Abdominal Pain     Sputum y   Joint Pain     SOB/RODRIGUEZ y improved  Pruritis/Rash     Nausea/vomit    Tolerating PT/OT y    Diarrhea    Tolerating Diet y    Constipation    Other       Could NOT obtain due to:      Objective:     VITALS:   Last 24hrs VS reviewed since prior progress note.  Most recent are:  Patient Vitals for the past 24 hrs:   Temp Pulse Resp BP SpO2   08/06/18 0456 97.8 °F (36.6 °C) 98 24 153/73 90 %   08/06/18 0355 - - - - 98 %   08/05/18 2340 - - - - 94 %   08/05/18 2224 97.8 °F (36.6 °C) 91 18 140/70 94 %   08/05/18 1954 97.8 °F (36.6 °C) 79 20 100/63 99 %   08/05/18 1940 - - - - 93 %   08/05/18 1750 - (!) 108 - 146/58 -   08/05/18 1748 - (!) 108 - 146/58 -   08/05/18 1444 - - - - 94 %   08/05/18 1210 - 96 20 (!) 127/97 91 %   08/05/18 1205 - 94 20 120/70 92 %   08/05/18 1155 - 97 20 122/51 92 %   08/05/18 1145 - (!) 102 20 117/67 92 %   08/05/18 1130 - 96 20 119/58 93 %   08/05/18 0921 - 94 - 139/84 -   08/05/18 0900 - 94 - 139/84 -       Intake/Output Summary (Last 24 hours) at 08/06/18 0831  Last data filed at 08/06/18 0505   Gross per 24 hour   Intake 0 ml   Output             2550 ml   Net            -2550 ml        PHYSICAL EXAM:  General: WD, WN. Alert, cooperative, no acute distress, obese +    EENT:  EOMI. Anicteric sclerae. MMM  Resp:   minimal wheezing + some basal crackles + , improved BS on R side s/p thoracentesis yesterday  CV:  Regular  rhythm,  No edema  GI:  Soft, Non distended, Non tender.  +Bowel sounds  Neurologic:  Alert and oriented X 3, normal speech,   Psych:   Good insight. Not anxious nor agitated  Skin:  No rashes. No jaundice    Reviewed most current lab test results and cultures  YES  Reviewed most current radiology test results   YES  Review and summation of old records today    NO  Reviewed patient's current orders and MAR    YES  PMH/SH reviewed - no change compared to H&P  ________________________________________________________________________  Care Plan discussed with:    Comments   Patient x    Family      RN x    Care Manager     Consultant                        Multidiciplinary team rounds were held today with , nursing, pharmacist and clinical coordinator. Patient's plan of care was discussed; medications were reviewed and discharge planning was addressed. ________________________________________________________________________  Total NON critical care TIME:  16   Minutes    Total CRITICAL CARE TIME Spent:   Minutes non procedure based      Comments   >50% of visit spent in counseling and coordination of care     ________________________________________________________________________  Felicia Hylton MD     Procedures: see electronic medical records for all procedures/Xrays and details which were not copied into this note but were reviewed prior to creation of Plan. LABS:  I reviewed today's most current labs and imaging studies.   Pertinent labs include:  Recent Labs      08/06/18   0502  08/05/18   0524  08/04/18   1004   WBC  19.9*  24.2*  24.4*   HGB  12.3  11.3*  10.9*   HCT  38.0  35.4 33.6*   PLT  480*  454*  394     Recent Labs      08/06/18   0502  08/05/18   0524  08/04/18   0245   NA  142  139  137   K  3.8  4.0  4.1   CL  107  105  103   CO2  25  27  27   GLU  115*  142*  121*   BUN  12  10  11   CREA  0.90  0.65  0.80   CA  9.9  9.9  9.5       Signed: Renay Gonzalez MD

## 2018-08-07 ENCOUNTER — APPOINTMENT (OUTPATIENT)
Dept: INTERVENTIONAL RADIOLOGY/VASCULAR | Age: 63
DRG: 871 | End: 2018-08-07
Attending: INTERNAL MEDICINE
Payer: MEDICARE

## 2018-08-07 LAB
ANION GAP SERPL CALC-SCNC: 9 MMOL/L (ref 5–15)
APPEARANCE FLD: ABNORMAL
BUN SERPL-MCNC: 14 MG/DL (ref 6–20)
BUN/CREAT SERPL: 15 (ref 12–20)
CALCIUM SERPL-MCNC: 9.3 MG/DL (ref 8.5–10.1)
CHLORIDE SERPL-SCNC: 108 MMOL/L (ref 97–108)
CO2 SERPL-SCNC: 25 MMOL/L (ref 21–32)
COLOR FLD: YELLOW
CREAT SERPL-MCNC: 0.93 MG/DL (ref 0.55–1.02)
ERYTHROCYTE [DISTWIDTH] IN BLOOD BY AUTOMATED COUNT: 13.8 % (ref 11.5–14.5)
GLUCOSE BLD STRIP.AUTO-MCNC: 132 MG/DL (ref 65–100)
GLUCOSE BLD STRIP.AUTO-MCNC: 141 MG/DL (ref 65–100)
GLUCOSE BLD STRIP.AUTO-MCNC: 163 MG/DL (ref 65–100)
GLUCOSE BLD STRIP.AUTO-MCNC: 178 MG/DL (ref 65–100)
GLUCOSE FLD-MCNC: 10 MG/DL
GLUCOSE SERPL-MCNC: 146 MG/DL (ref 65–100)
HCT VFR BLD AUTO: 34.9 % (ref 35–47)
HGB BLD-MCNC: 11.2 G/DL (ref 11.5–16)
LDH FLD L TO P-CCNC: >1200 U/L
LYMPHOCYTES NFR FLD: 32 %
MCH RBC QN AUTO: 30.8 PG (ref 26–34)
MCHC RBC AUTO-ENTMCNC: 32.1 G/DL (ref 30–36.5)
MCV RBC AUTO: 95.9 FL (ref 80–99)
NEUTROPHILS NFR FLD: 68 %
NRBC # BLD: 0 K/UL (ref 0–0.01)
NRBC BLD-RTO: 0 PER 100 WBC
NUC CELL # FLD: ABNORMAL /CU MM
PLATELET # BLD AUTO: 454 K/UL (ref 150–400)
PMV BLD AUTO: 8.9 FL (ref 8.9–12.9)
POTASSIUM SERPL-SCNC: 3.7 MMOL/L (ref 3.5–5.1)
PROT FLD-MCNC: 3.9 G/DL
RBC # BLD AUTO: 3.64 M/UL (ref 3.8–5.2)
RBC # FLD: >100 /CU MM
SERVICE CMNT-IMP: ABNORMAL
SODIUM SERPL-SCNC: 142 MMOL/L (ref 136–145)
SPECIMEN SOURCE FLD: ABNORMAL
SPECIMEN SOURCE FLD: NORMAL
WBC # BLD AUTO: 18.7 K/UL (ref 3.6–11)

## 2018-08-07 PROCEDURE — 88305 TISSUE EXAM BY PATHOLOGIST: CPT | Performed by: HOSPITALIST

## 2018-08-07 PROCEDURE — 88112 CYTOPATH CELL ENHANCE TECH: CPT | Performed by: HOSPITALIST

## 2018-08-07 PROCEDURE — 82945 GLUCOSE OTHER FLUID: CPT | Performed by: INTERNAL MEDICINE

## 2018-08-07 PROCEDURE — 36415 COLL VENOUS BLD VENIPUNCTURE: CPT | Performed by: INTERNAL MEDICINE

## 2018-08-07 PROCEDURE — C1769 GUIDE WIRE: HCPCS

## 2018-08-07 PROCEDURE — 74011250636 HC RX REV CODE- 250/636: Performed by: STUDENT IN AN ORGANIZED HEALTH CARE EDUCATION/TRAINING PROGRAM

## 2018-08-07 PROCEDURE — 77030011229 HC DIL VESL COON COOK -A

## 2018-08-07 PROCEDURE — 74011000258 HC RX REV CODE- 258: Performed by: INTERNAL MEDICINE

## 2018-08-07 PROCEDURE — 77010033678 HC OXYGEN DAILY

## 2018-08-07 PROCEDURE — 74011000250 HC RX REV CODE- 250: Performed by: STUDENT IN AN ORGANIZED HEALTH CARE EDUCATION/TRAINING PROGRAM

## 2018-08-07 PROCEDURE — 89050 BODY FLUID CELL COUNT: CPT | Performed by: INTERNAL MEDICINE

## 2018-08-07 PROCEDURE — C1729 CATH, DRAINAGE: HCPCS

## 2018-08-07 PROCEDURE — 74011250637 HC RX REV CODE- 250/637: Performed by: INTERNAL MEDICINE

## 2018-08-07 PROCEDURE — 80048 BASIC METABOLIC PNL TOTAL CA: CPT | Performed by: INTERNAL MEDICINE

## 2018-08-07 PROCEDURE — C1894 INTRO/SHEATH, NON-LASER: HCPCS

## 2018-08-07 PROCEDURE — 74011250636 HC RX REV CODE- 250/636: Performed by: INTERNAL MEDICINE

## 2018-08-07 PROCEDURE — 74011250637 HC RX REV CODE- 250/637: Performed by: HOSPITALIST

## 2018-08-07 PROCEDURE — 82962 GLUCOSE BLOOD TEST: CPT

## 2018-08-07 PROCEDURE — 74011250637 HC RX REV CODE- 250/637: Performed by: NURSE PRACTITIONER

## 2018-08-07 PROCEDURE — L3710 EO ELAS W/METAL JNTS PRE OTS: HCPCS

## 2018-08-07 PROCEDURE — 74011636637 HC RX REV CODE- 636/637: Performed by: HOSPITALIST

## 2018-08-07 PROCEDURE — 87147 CULTURE TYPE IMMUNOLOGIC: CPT | Performed by: INTERNAL MEDICINE

## 2018-08-07 PROCEDURE — 87205 SMEAR GRAM STAIN: CPT | Performed by: INTERNAL MEDICINE

## 2018-08-07 PROCEDURE — 65660000000 HC RM CCU STEPDOWN

## 2018-08-07 PROCEDURE — 32557 INSERT CATH PLEURA W/ IMAGE: CPT

## 2018-08-07 PROCEDURE — 94640 AIRWAY INHALATION TREATMENT: CPT

## 2018-08-07 PROCEDURE — 0W9930Z DRAINAGE OF RIGHT PLEURAL CAVITY WITH DRAINAGE DEVICE, PERCUTANEOUS APPROACH: ICD-10-PCS | Performed by: STUDENT IN AN ORGANIZED HEALTH CARE EDUCATION/TRAINING PROGRAM

## 2018-08-07 PROCEDURE — 85027 COMPLETE CBC AUTOMATED: CPT | Performed by: INTERNAL MEDICINE

## 2018-08-07 PROCEDURE — 84157 ASSAY OF PROTEIN OTHER: CPT | Performed by: INTERNAL MEDICINE

## 2018-08-07 PROCEDURE — C1892 INTRO/SHEATH,FIXED,PEEL-AWAY: HCPCS

## 2018-08-07 PROCEDURE — 83615 LACTATE (LD) (LDH) ENZYME: CPT | Performed by: INTERNAL MEDICINE

## 2018-08-07 PROCEDURE — 74011000250 HC RX REV CODE- 250: Performed by: INTERNAL MEDICINE

## 2018-08-07 PROCEDURE — 77030012390 HC DRN CHST BTL GTNG -B

## 2018-08-07 PROCEDURE — 77030002996 HC SUT SLK J&J -A

## 2018-08-07 RX ORDER — LEVOFLOXACIN 5 MG/ML
750 INJECTION, SOLUTION INTRAVENOUS EVERY 24 HOURS
Status: DISCONTINUED | OUTPATIENT
Start: 2018-08-07 | End: 2018-08-10

## 2018-08-07 RX ORDER — LIDOCAINE HYDROCHLORIDE 20 MG/ML
10 INJECTION, SOLUTION INFILTRATION; PERINEURAL
Status: COMPLETED | OUTPATIENT
Start: 2018-08-07 | End: 2018-08-07

## 2018-08-07 RX ORDER — PRAZOSIN HYDROCHLORIDE 1 MG/1
1 CAPSULE ORAL 2 TIMES DAILY
Status: DISCONTINUED | OUTPATIENT
Start: 2018-08-07 | End: 2018-08-12 | Stop reason: HOSPADM

## 2018-08-07 RX ORDER — SODIUM CHLORIDE 9 MG/ML
25 INJECTION, SOLUTION INTRAVENOUS CONTINUOUS
Status: DISCONTINUED | OUTPATIENT
Start: 2018-08-07 | End: 2018-08-08

## 2018-08-07 RX ORDER — FENTANYL CITRATE 50 UG/ML
100 INJECTION, SOLUTION INTRAMUSCULAR; INTRAVENOUS
Status: DISCONTINUED | OUTPATIENT
Start: 2018-08-07 | End: 2018-08-12 | Stop reason: HOSPADM

## 2018-08-07 RX ORDER — IPRATROPIUM BROMIDE AND ALBUTEROL SULFATE 2.5; .5 MG/3ML; MG/3ML
3 SOLUTION RESPIRATORY (INHALATION)
Status: DISCONTINUED | OUTPATIENT
Start: 2018-08-07 | End: 2018-08-12 | Stop reason: HOSPADM

## 2018-08-07 RX ADMIN — INSULIN LISPRO 2 UNITS: 100 INJECTION, SOLUTION INTRAVENOUS; SUBCUTANEOUS at 13:46

## 2018-08-07 RX ADMIN — IPRATROPIUM BROMIDE AND ALBUTEROL SULFATE 3 ML: .5; 3 SOLUTION RESPIRATORY (INHALATION) at 15:00

## 2018-08-07 RX ADMIN — PRAZOSIN HYDROCHLORIDE 1 MG: 1 CAPSULE ORAL at 17:09

## 2018-08-07 RX ADMIN — PRIMIDONE 150 MG: 50 TABLET ORAL at 21:08

## 2018-08-07 RX ADMIN — ESOMEPRAZOLE MAGNESIUM 40 MG: 40 CAPSULE, DELAYED RELEASE ORAL at 08:07

## 2018-08-07 RX ADMIN — GUAIFENESIN 1200 MG: 600 TABLET, EXTENDED RELEASE ORAL at 08:05

## 2018-08-07 RX ADMIN — Medication 10 ML: at 21:10

## 2018-08-07 RX ADMIN — PIPERACILLIN SODIUM,TAZOBACTAM SODIUM 3.38 G: 3; .375 INJECTION, POWDER, FOR SOLUTION INTRAVENOUS at 08:12

## 2018-08-07 RX ADMIN — FENTANYL CITRATE 25 MCG: 50 INJECTION, SOLUTION INTRAMUSCULAR; INTRAVENOUS at 12:20

## 2018-08-07 RX ADMIN — METHOCARBAMOL 750 MG: 500 TABLET ORAL at 17:05

## 2018-08-07 RX ADMIN — PHENOBARBITAL 64.8 MG: 32.4 TABLET ORAL at 08:05

## 2018-08-07 RX ADMIN — SODIUM BICARBONATE 2 ML: 0.2 INJECTION, SOLUTION INTRAVENOUS at 12:27

## 2018-08-07 RX ADMIN — METOPROLOL TARTRATE 25 MG: 25 TABLET ORAL at 17:06

## 2018-08-07 RX ADMIN — PROMETHAZINE HYDROCHLORIDE 25 MG: 25 TABLET ORAL at 08:17

## 2018-08-07 RX ADMIN — PIPERACILLIN SODIUM,TAZOBACTAM SODIUM 3.38 G: 3; .375 INJECTION, POWDER, FOR SOLUTION INTRAVENOUS at 15:28

## 2018-08-07 RX ADMIN — VANCOMYCIN HYDROCHLORIDE 1250 MG: 10 INJECTION, POWDER, LYOPHILIZED, FOR SOLUTION INTRAVENOUS at 05:56

## 2018-08-07 RX ADMIN — KETOROLAC TROMETHAMINE 15 MG: 30 INJECTION, SOLUTION INTRAMUSCULAR at 05:58

## 2018-08-07 RX ADMIN — PRAZOSIN HYDROCHLORIDE 1 MG: 1 CAPSULE ORAL at 08:03

## 2018-08-07 RX ADMIN — OXYCODONE HYDROCHLORIDE 15 MG: 5 TABLET ORAL at 02:08

## 2018-08-07 RX ADMIN — LIDOCAINE HYDROCHLORIDE 80 MG: 20 INJECTION, SOLUTION INFILTRATION; PERINEURAL at 12:27

## 2018-08-07 RX ADMIN — PRIMIDONE 50 MG: 50 TABLET ORAL at 08:05

## 2018-08-07 RX ADMIN — OXYCODONE HYDROCHLORIDE 15 MG: 5 TABLET ORAL at 23:11

## 2018-08-07 RX ADMIN — Medication 10 ML: at 13:30

## 2018-08-07 RX ADMIN — ASPIRIN 81 MG 81 MG: 81 TABLET ORAL at 08:06

## 2018-08-07 RX ADMIN — LEVOFLOXACIN 750 MG: 5 INJECTION, SOLUTION INTRAVENOUS at 21:09

## 2018-08-07 RX ADMIN — GUAIFENESIN 1200 MG: 600 TABLET, EXTENDED RELEASE ORAL at 21:09

## 2018-08-07 RX ADMIN — POLYETHYLENE GLYCOL 3350 17 G: 17 POWDER, FOR SOLUTION ORAL at 08:06

## 2018-08-07 RX ADMIN — METHYLPREDNISOLONE SODIUM SUCCINATE 40 MG: 40 INJECTION, POWDER, FOR SOLUTION INTRAMUSCULAR; INTRAVENOUS at 08:07

## 2018-08-07 RX ADMIN — IPRATROPIUM BROMIDE AND ALBUTEROL SULFATE 3 ML: .5; 3 SOLUTION RESPIRATORY (INHALATION) at 19:48

## 2018-08-07 RX ADMIN — ALBUTEROL SULFATE 2.5 MG: 2.5 SOLUTION RESPIRATORY (INHALATION) at 10:07

## 2018-08-07 RX ADMIN — OXYCODONE HYDROCHLORIDE 15 MG: 5 TABLET ORAL at 18:08

## 2018-08-07 RX ADMIN — FENTANYL CITRATE 50 MCG: 50 INJECTION, SOLUTION INTRAMUSCULAR; INTRAVENOUS at 11:57

## 2018-08-07 RX ADMIN — OXYCODONE HYDROCHLORIDE 15 MG: 5 TABLET ORAL at 08:06

## 2018-08-07 RX ADMIN — ALBUTEROL SULFATE 2.5 MG: 2.5 SOLUTION RESPIRATORY (INHALATION) at 07:37

## 2018-08-07 RX ADMIN — ALBUTEROL SULFATE 2.5 MG: 2.5 SOLUTION RESPIRATORY (INHALATION) at 03:02

## 2018-08-07 RX ADMIN — LEVOTHYROXINE SODIUM 200 MCG: 100 TABLET ORAL at 05:58

## 2018-08-07 RX ADMIN — Medication 400 MG: at 08:04

## 2018-08-07 RX ADMIN — PHENOBARBITAL 64.8 MG: 32.4 TABLET ORAL at 17:06

## 2018-08-07 RX ADMIN — Medication 400 MG: at 17:06

## 2018-08-07 RX ADMIN — Medication 10 ML: at 05:58

## 2018-08-07 RX ADMIN — METOPROLOL TARTRATE 25 MG: 25 TABLET ORAL at 08:06

## 2018-08-07 RX ADMIN — DULOXETINE HYDROCHLORIDE 40 MG: 20 CAPSULE, DELAYED RELEASE ORAL at 08:05

## 2018-08-07 RX ADMIN — OXYCODONE HYDROCHLORIDE 15 MG: 5 TABLET ORAL at 13:24

## 2018-08-07 RX ADMIN — VANCOMYCIN HYDROCHLORIDE 1250 MG: 10 INJECTION, POWDER, LYOPHILIZED, FOR SOLUTION INTRAVENOUS at 19:34

## 2018-08-07 RX ADMIN — PROMETHAZINE HYDROCHLORIDE 25 MG: 25 TABLET ORAL at 15:31

## 2018-08-07 RX ADMIN — INSULIN LISPRO 2 UNITS: 100 INJECTION, SOLUTION INTRAVENOUS; SUBCUTANEOUS at 17:06

## 2018-08-07 RX ADMIN — ALPRAZOLAM 1 MG: 0.5 TABLET ORAL at 15:28

## 2018-08-07 NOTE — PROGRESS NOTES
Bedside shift change report given to Anselmo Garrison (oncoming nurse) by Peter Pickard (offgoing nurse). Report included the following information SBAR, Kardex, ED Summary, Procedure Summary, Intake/Output, MAR, Recent Results and Cardiac Rhythm NSR .     1110- Pt taken to Xray recovery for chest tube insertion via stretcher.

## 2018-08-07 NOTE — PROGRESS NOTES
Hospitalist Progress Note    NAME: Marino Jean Baptiste   :  1955   MRN:  [id number]       Assessment / Plan:  Acute hypoxic respiratory failure POA  Severe Sepsis POA  due to Bilateral Pneumonia (RLL necrotic) with reactive Lymphadenopathy on CT chest POA  R Para pneumonic Pleural effusion  Acute COPD exacerbation POA  S/p thoracocentesis with 400ml drainage  Plan for Chest tube and TPA  If doesn't respond then transfer to [facility] for decortication for thoracic surgery services  Appreciate pulmonary followup  CT  Chest noted for-  Significant consolidation in the right lower lobe with areas of necrosis. Small abscesses cannot be excluded in this setting. Consolidation left lower lobe. Trace right effusion with underlying atelectasis. New mediastinal lymphadenopathy may be reactive in nature. Follow up CXR (8/3) Large right pleural effusion with right basilar consolidation  Sputum Cx - moderate beta strep, Hemophilus influenzae noted   BCx neg in 5 days  Continue IV Zosyn, will extend the coarse as will need prolong abx coarse  Add IV levaquin  D/c Vancomycin  Off IVF  Taper steroids  Scheduled scheduled   Cont oxygen to keep sats >90%  Oxycodone for pain control   Palliative pain consulted - following     Diarrhea POA - C Diff ruled out with no diarrhea in 24 hrs  C/w FloraQ      NIDDM type II  Cont holding PO glipizide with poor po intake  c/w SSI for now     HTN   BP stable  Cont home metoprolol   Lasix prn at home for le edema, keep on hold for now     Fibromyalgia / depression/ anxiety/ PTSD /Chronic pain  Polypharmacy    Essential tremor   Continue multiple home meds       Ex smoker, quit 1 year ago   Hyperlipidemia, cont statin  Hypothyroidism, cont synthroid   Obesity.  Body mass index is 34.33 kg/(m^2).   weight loss recommended        Code Status: Full code   Surrogate Decision Maker:       DVT Prophylaxis: Lovenox   GI Prophylaxis: not indicated     Baseline: independent; no home O2; lives with      Recommended Disposition: TBD     Subjective: Pt seen and examined at bedside. Feels the same. Overnight events d/w RN     Chief Complaint / Reason for Physician Visit: F/U Bilateral PNA (necrotic), Sepsis, resp failure, COPD, pleuritic chest pain    Review of Systems:  Symptom Y/N Comments  Symptom Y/N Comments   Fever/Chills n   Chest Pain y Pleuritic R>L, improved   Poor Appetite n   Edema     Cough y   Abdominal Pain     Sputum y   Joint Pain     SOB/RODRIGUEZ y improving  Pruritis/Rash     Nausea/vomit    Tolerating PT/OT y    Diarrhea    Tolerating Diet y    Constipation    Other       Could NOT obtain due to:      Objective:     VITALS:   Last 24hrs VS reviewed since prior progress note.  Most recent are:  Patient Vitals for the past 24 hrs:   Temp Pulse Resp BP SpO2   08/07/18 1600 - 99 - 121/65 94 %   08/07/18 1530 97 °F (36.1 °C) 99 18 126/78 94 %   08/07/18 1515 - (!) 101 - 136/69 93 %   08/07/18 1501 - - - - 98 %   08/07/18 1500 - 96 - 145/73 98 %   08/07/18 1430 - (!) 104 - 128/64 92 %   08/07/18 1415 - (!) 106 - 133/57 91 %   08/07/18 1400 - (!) 103 - 134/81 92 %   08/07/18 1330 - 97 - 128/54 92 %   08/07/18 1323 98.7 °F (37.1 °C) 96 22 135/61 92 %   08/07/18 1240 - - 16 126/76 98 %   08/07/18 1235 - - 17 134/76 98 %   08/07/18 1230 - - 16 138/79 97 %   08/07/18 1225 - - 18 140/78 99 %   08/07/18 1220 - 95 21 131/78 100 %   08/07/18 1215 - 93 19 146/74 99 %   08/07/18 1154 - 94 22 140/65 92 %   08/07/18 1051 98.4 °F (36.9 °C) 89 20 123/65 92 %   08/07/18 1009 - - - - 93 %   08/07/18 0801 97.4 °F (36.3 °C) (!) 107 22 146/67 93 %   08/07/18 0737 - - - - 93 %   08/07/18 0302 - - - - 94 %   08/07/18 0206 97.7 °F (36.5 °C) 91 20 133/63 93 %   08/06/18 2309 - - - - 95 %   08/06/18 2245 97.9 °F (36.6 °C) 84 18 113/59 94 %   08/06/18 2149 - 82 - 113/67 -   08/06/18 2012 98.2 °F (36.8 °C) 75 18 116/59 94 %   08/06/18 1928 - - - - 94 %       Intake/Output Summary (Last 24 hours) at 08/07/18 1800  Last data filed at 08/07/18 1748   Gross per 24 hour   Intake             2760 ml   Output             4310 ml   Net            -1550 ml        PHYSICAL EXAM:  General: WD, WN. Alert, cooperative, no acute distress  EENT:  EOMI. Anicteric sclerae. MMM  Resp:  Crackles, improved BS on R side s/p thoracentesis yesterday  CV:  Regular  rhythm,  No edema  GI:  Soft, Non distended, Non tender.  +Bowel sounds  Neurologic:  Alert and oriented X 3, normal speech,   Psych:   Good insight. Not anxious nor agitated  Skin:  No rashes. No jaundice    Reviewed most current lab test results and cultures  YES  Reviewed most current radiology test results   YES  Review and summation of old records today    NO  Reviewed patient's current orders and MAR    YES  PMH/SH reviewed - no change compared to H&P  ________________________________________________________________________  Care Plan discussed with:    Comments   Patient x    Family      RN x    Care Manager     Consultant                        Multidiciplinary team rounds were held today with , nursing, pharmacist and clinical coordinator. Patient's plan of care was discussed; medications were reviewed and discharge planning was addressed. ________________________________________________________________________  Total NON critical care TIME:  35 Minutes    Total CRITICAL CARE TIME Spent:   Minutes non procedure based      Comments   >50% of visit spent in counseling and coordination of care x Chart review   ________________________________________________________________________  Chan Garcia MD     Procedures: see electronic medical records for all procedures/Xrays and details which were not copied into this note but were reviewed prior to creation of Plan. LABS:  I reviewed today's most current labs and imaging studies.   Pertinent labs include:  Recent Labs      08/07/18   0157  08/06/18   0502  08/05/18   0524   WBC  18.7*  19.9*  24.2*   HGB  11.2*  12.3 11.3*   HCT  34.9*  38.0  35.4   PLT  454*  480*  454*     Recent Labs      08/07/18   0157  08/06/18   0502  08/05/18   0524   NA  142  142  139   K  3.7  3.8  4.0   CL  108  107  105   CO2  25  25  27   GLU  146*  115*  142*   BUN  14  12  10   CREA  0.93  0.90  0.65   CA  9.3  9.9  9.9       Signed: Alis Morgan MD

## 2018-08-07 NOTE — ROUTINE PROCESS
1150- Dr Diggs Simple in to discuss right chest tube placement with possible instillation of TPA. Pt aware of risks and benefits and wishes to proceed. Consent obtained. Ambulated few steps to BR to void. 1210- Pt to angio sats decreased to 85 while on 4lpm. Placed on nonbreather. Sats 99%. Pt alert states SOB is relieved. 1300- Pt to PCU, report at bedside to [facility]. CT to wall suction.

## 2018-08-07 NOTE — PROGRESS NOTES
Palliative Medicine  [place]: [phone])  [facility]: [phone])        GOALS OF CARE:  Patient/Health Care Proxy Stated Goals: Prolong life    TREATMENT PREFERENCES:   Code Status: Full Code    Advance Care Planning:  Advance Care Planning 8/2/2018   Patient's Healthcare Decision Maker is: Legal Next of Santhosh 69   Primary Decision Maker Name Nathan Brown   Primary Decision Maker Phone Number [phone]   Primary Decision Maker Relationship to Patient Spouse   Confirm Advance Directive None   Patient Would Like to Complete Advance Directive -       Medical Interventions: Full interventions     Patient seen for emotional support due to noted history of anxiety, depression and significant traumatic losses of her siblings. Patient is a delightful, social lady who uses humor to cope. She also shared that she has a therapist in the community who has followed her for some time but unfortunately she has been unable to see her due to her medical problems. LCSW shared with patient that sometimes insurance companies may allow tele-therapy based on her medical condition and inability to get to the therapist's office. Patient noted that she may check this. No signs of depression or anxiety today. Patient shared she had a new \"tube placed\" to help the fluid drain, so maybe my breathing can get better. Patient is not in any distress today. LCSW explained role of SW with palliative medicine, explored ways that patient could be supported while here. Patient raved about music therapist who saw patient yesterday and patient found this very healing. Offered her a CD player and CDs, but patient noted that she has one at home and that her  could bring one in. Patient also accepted Pet Therapy which will be here this pm. Patient shared that she has a Pug at home and four cats. She has been  to her  for 34 years.  They have a daughter, Arnie Hairston, who has a serious mental health illness and she was living with them but \"we had to kick her out in Oct 17 because of her behavior\".  has a daughter from a previous marriage, Grace Barrett, but the couple does not see her much due to some issues that  has with her. Patient would like to be able to see her more. Patient will continue to be seen for support while here, if she accepts this.

## 2018-08-07 NOTE — PROGRESS NOTES
[facility]  Pulmonary, Critical Care, and Sleep Medicine    Name: Dominga Mota MRN: [id number]   : 1955 Hospital: [facility]   Date: 2018        IMPRESSION:   · Right lower lobe pneumonia with dense consolidation and possible areas of necrosis. Also noted to have consolidation of the left lower lobe. · Worsening loculate parapneumonic effusion in right lower lobe, unable to be adequately drained by thoracentesis   · Acute chest pain due to severe pneumonia, more difficult to treat due to her chronic use of opiates  · Suspected underlying COPD- acute exacerbation   · Leukocytosis and Sepsis  · NIDDM  · Anemia  · HTN  · Fibromyalgia/Depression/Anxiety, PTSD/ chronic back pain on chronic opiates and benzos. · Essential tremor. · Ex Smoker, quit 1 year ago. · Hypothyroidism. · Obese      RECOMMENDATIONS:   · O2 - wean as tolerated  · Bronchodilators  · IV antibiotics  · Steroids   · Needs chest tube and TPA for loculated right pleural effusion. If still not drained, will need surgical decortication at [facility]  · Appreciate palliative care assistance       Subjective/History:     Feeling better with less dyspnea. Initial history 18: Pt was seen about 1145am and then again about 2pm.    Cc: chest pain, dry cough. This patient has been seen and evaluated at the request of Dr. Lorie Gilford for above. Patient is a 58 y.o. female who presents for above. She has moderate chest pain which limits her ability to take a deep breath. Has some productive coughing but again limited by her chest pain. She reports that her pain got severe and that it has affected her breathing. Pt is very senstive to her medications and has intolerance to a lot of the generic meds. She has been pretty well controlled with her meds prior to admission. On second evaluation pt was much more comfortable and conversant.  Was still have intermittent sharp stabbing pain along her right lower to mid chest wall. Not able to get Complete ROS due to her acute illness, severe pain. Limited ability to give HPI and ROS. The patient is critically ill and can not provide additional history due to Unable to speak. Past Medical History:   Diagnosis Date    Anxiety     Bone spur     NECK    COPD     Depression     Endocrine disease     hypothyroidism    Fibromyalgia     Fusion of spine of cervical region     Gastrointestinal disorder     gerd    Hyperlipidemia     Hypothyroid     Morbid obesity ([address]     Osteoarthritis     PUD (peptic ulcer disease)     Tobacco abuse       Past Surgical History:   Procedure Laterality Date    BRONCHOSCOPY-FIBER/THERAPY  4/3/2015         CARDIAC CATHETERIZATION  2013         COLONOSCOPY,DIAGNOSTIC  10/30/2014         HX CATARACT REMOVAL      bilateral    HX GYN          HX ORTHOPAEDIC      Ruptured disc, knuckles on right hand    UPPER GI ENDOSCOPY,BIOPSY  10/30/2014         UPPER GI ENDOSCOPY,DILATN W GUIDE  10/30/2014           Prior to Admission medications    Medication Sig Start Date End Date Taking? Authorizing Provider   prazosin (MINIPRESS) 2 mg capsule TAKE 1 CAPSULE BY MOUTH TWICE A DAY. 18  Yes Nikki Cruz NP   DULoxetine 40 mg cpDR Take 40 mg by mouth daily. 18  Yes Nikki Cruz NP   primidone (MYSOLINE) 50 mg tablet 1 po qam and 3 po qhs 18  Yes Monica Fernandez MD   ALPRAZolam Kymberly Tootie) 1 mg tablet Take 1 Tab by mouth three (3) times daily as needed for Anxiety. Max Daily Amount: 3 mg. 17  Yes Chichi Simons NP   cetirizine (ZYRTEC) 10 mg tablet Take 1 Tab by mouth daily. 17  Yes Chichi Simons NP   promethazine (PHENERGAN) 25 mg tablet Take 1 Tab by mouth every six (6) hours as needed. 17  Yes Chichi Simons NP   ibuprofen (MOTRIN) 800 mg tablet Take 1 Tab by mouth every eight (8) hours as needed.  17  Yes Chichi Simons NP   guaiFENesin ER (MUCINEX) 1,200 mg Ta12 ER tablet Take 1 Tab by mouth two (2) times a day. 5/27/17  Yes Chichi Simons NP   furosemide (LASIX) 40 mg tablet Take 1 Tab by mouth daily. Patient taking differently: Take 40 mg by mouth daily as needed for Other (LE edema). 5/27/17  Yes Chichi Simons NP   PHENobarbital (LUMINAL) 60 mg tablet Take 1 Tab by mouth two (2) times a day. Max Daily Amount: 120 mg. 5/27/17  Yes Goldie Murray MD   dicyclomine (BENTYL) 10 mg capsule  2/13/15  Yes Nico Martinez MD   glipiZIDE SR (GLUCOTROL) 5 mg CR tablet Take 5 mg by mouth two (2) times daily as needed (Patient monitors her BG levels and takes when she is also taking a steroid. ). 2/16/15  Yes Nico Martinez MD   montelukast (SINGULAIR) 10 mg tablet Take 10 mg by mouth daily. 2/16/15  Yes Nico Martinez MD   ezetimibe-simvastatin (VYTORIN 10/40) 10-40 mg per tablet Take 1 tablet by mouth nightly. Yes Historical Provider   metoprolol (LOPRESSOR) 25 mg tablet Take 1 Tab by mouth two (2) times a day. 2/6/14  Yes Shiv Adamson NP   nystatin (MYCOSTATIN) 100,000 unit/mL suspension Take 5 mL by mouth four (4) times daily. swish and spit  Patient taking differently: Take 500,000 Units by mouth four (4) times daily as needed. swish and spit 1/20/14  Yes Goldie Murray MD   methocarbamol (ROBAXIN) 750 mg tablet Take 750-1,500 mg by mouth four (4) times daily as needed. Yes Historical Provider   niacin  mg CR capsule Take 250 mg by mouth daily. Yes Historical Provider   hyoscyamine SL (LEVSIN/SL) 0.125 mg SL tablet 0.125 mg by SubLINGual route three (3) times daily as needed for Cramping. Yes Historical Provider   esomeprazole (NEXIUM) 40 mg capsule Take 40 mg by mouth daily. Yes Historical Provider   MAGOX 400 mg tablet TAKE ONE TABLET TWICE A DAY 11/4/13  Yes Kari Trotter NP   vit B Cmplx 3-FA-Vit C-Biotin (NEPHRO SHREYA RX) 1- mg-mg-mcg tablet Take 1 Tab by mouth daily. Yes Historical Provider   aspirin 81 mg chewable tablet Take 81 mg by mouth daily.    Yes Nico Martinez, MD   levothyroxine (SYNTHROID) 200 mcg tablet Take 200 mcg by mouth daily (before breakfast). Yes Phys Other, MD   acetaminophen-codeine (TYLENOL #3) 300-30 mg per tablet Take 1 Tab by mouth every eight (8) hours as needed. Max Daily Amount: 3 Tabs. 5/27/17   Chichi Simons NP   albuterol (VENTOLIN HFA) 90 mcg/actuation inhaler Take 2 Puffs by inhalation every four (4) hours as needed for Wheezing. Historical Provider   benzonatate (TESSALON) 200 mg capsule Take 1 Cap by mouth two (2) times daily as needed. 1/20/14   Casey Richardson MD   albuterol-ipratropium (DUONEB) 2.5 mg-0.5 mg/3 ml nebulizer solution 3 mL by Nebulization route every six (6) hours as needed for Wheezing.     Historical Provider     Current Facility-Administered Medications   Medication Dose Route Frequency    albuterol-ipratropium (DUO-NEB) 2.5 MG-0.5 MG/3 ML  3 mL Nebulization QID RT    methylPREDNISolone (PF) (SOLU-MEDROL) injection 40 mg  40 mg IntraVENous DAILY    ezetimibe/simvastatin (VYTORIN) 10/40 mg  (Patient Supplied)   Oral QPM    B complex-vitaminC-folic acid (NEPHROCAP) cap (Patient Supplied)  1 Cap Oral DAILY    vancomycin (VANCOCIN) 1250 mg in  ml infusion  1,250 mg IntraVENous Q12H    polyethylene glycol (MIRALAX) packet 17 g  17 g Oral DAILY    esomeprazole (NEXIUM) capsule 40 mg (Patient Supplied)  40 mg Oral DAILY    ketorolac (TORADOL) injection 15 mg  15 mg IntraVENous Q6H    metoprolol tartrate (LOPRESSOR) tablet 25 mg  25 mg Oral BID    prazosin (MINIPRESS) capsule 2 mg (Patient Supplied)  2 mg Oral BID    piperacillin-tazobactam (ZOSYN) 3.375 g in 0.9% sodium chloride (MBP/ADV) 100 mL  3.375 g IntraVENous Q8H    lidocaine (LIDODERM) 5 % patch 1 Patch  1 Patch TransDERmal Q24H    aspirin chewable tablet 81 mg  81 mg Oral DAILY    DULoxetine (CYMBALTA) capsule 40 mg  40 mg Oral DAILY    guaiFENesin ER (MUCINEX) tablet 1,200 mg  1,200 mg Oral Q12H    magnesium oxide (MAG-OX) tablet 400 mg  400 mg Oral BID    niacin SR capsule 250 mg  250 mg Oral DAILY    PHENobarbital (LUMINAL) tablet 64.8 mg  64.8 mg Oral BID    primidone (MYSOLINE) tablet 50 mg  50 mg Oral DAILY    primidone (MYSOLINE) tablet 150 mg  150 mg Oral QHS    sodium chloride (NS) flush 5-10 mL  5-10 mL IntraVENous Q8H    insulin lispro (HUMALOG) injection   SubCUTAneous AC&HS    lactobac ac& pc-s.therm-b.anim (ROBIN Q/RISAQUAD)  1 Cap Oral DAILY    levothyroxine (SYNTHROID) tablet 200 mcg  200 mcg Oral ACB     Allergies   Allergen Reactions    Latex Contact Dermatitis    Dilaudid [Hydromorphone (Pf)] Other (comments)     Feels like bugs are crawling all over her    Lipitor [Atorvastatin] Other (comments)     LIVER TROUBLE ON THIS MEDICATION    Remeron [Mirtazapine] Other (comments)     Tremors    Sulfa (Sulfonamide Antibiotics) Itching      Social History   Substance Use Topics    Smoking status: Former Smoker     Packs/day: 1.00     Years: 30.00    Smokeless tobacco: Never Used    Alcohol use Yes      Comment: occasional      Family History   Problem Relation Age of Onset    Heart Disease Mother     Dementia Mother     Thyroid Disease Mother     Heart Disease Father     Alcohol abuse Father     Psychiatric Disorder Father     Dementia Father     Bipolar Disorder Father     Psychiatric Disorder Sister     Suicide Sister     Psychiatric Disorder Brother     Suicide Brother     Psychiatric Disorder Other     Psychiatric Disorder Daughter     Bipolar Disorder Daughter     Coronary Artery Disease Other      multiple family members in 52's        Review of Systems:Updated on 8/2/18  Review of systems not obtained due to patient factors. Has moderate anxiety, chronic pains. Acute pain of the right mid to lower chest wall, pleuritic in nature. Has productive cough. No Aspiration. Was note to have some loose stools. NO skins issues. No muscle aches, no joint aches.   She does have a resting tremor seems to be worse on the left upper extremity versus RUE. Some also noted in legs. Has moderate to severe anxiety, depression, PTSD. Has a tremendous sensitivity to her meds and is intolerant of several generic meds for her anxiety. Objective:   Vital Signs:    Visit Vitals    /67    Pulse (!) 107    Temp 97.7 °F (36.5 °C)    Resp 20    Ht 5' 4\" (1.626 m)    Wt 90.7 kg (200 lb)    SpO2 93%    BMI 34.33 kg/m2       O2 Device: Nasal cannula   O2 Flow Rate (L/min): 3.5 l/min   Temp (24hrs), Av °F (36.7 °C), Min:97.7 °F (36.5 °C), Max:98.2 °F (36.8 °C)       Intake/Output:   Last shift:      701 - 1900  In: -   Out: 200 [Urine:200]  Last 3 shifts: 1901 - 700  In: 1520 [P.O.:970; I.V.:550]  Out: 4600 [Urine:4600]    Intake/Output Summary (Last 24 hours) at 18 0833  Last data filed at 18 0757   Gross per 24 hour   Intake             1520 ml   Output             2250 ml   Net             -730 ml     Physical Exam:    General:  Alert, cooperative, no distress. Head:  Normocephalic, without obvious abnormality, atraumatic. Eyes:  Conjunctivae/corneas clear. Nose: Nares normal. Septum midline. Mucosa normal.     Throat: Lips, mucosa, and tongue normal. Teeth and gums normal.   Neck: Supple, symmetrical, trachea midline    Back:   Symmetric, no curvature. ROM normal.   Lungs:   Bilateral wheeze and ronchi, decreased breath sounds in the right base   Chest wall:  No tenderness or deformity. Heart:  Regular rate and rhythm    Abdomen:   Soft, non-tender. Bowel sounds normal.     Extremities: Extremities normal, atraumatic, no cyanosis, +edema   Skin: Skin color, texture, turgor normal. No rashes or lesions   Lymph nodes: Cervical, supraclavicular, and axillary nodes normal.   Neurologic: Grossly nonfocal, has tremors of the LUE and RUE. Motor is grossly intact. Psych: NO anxiety or depression.       Data:   Labs:  Recent Labs      18   0157  18 0502  08/05/18   0524   WBC  18.7*  19.9*  24.2*   HGB  11.2*  12.3  11.3*   HCT  34.9*  38.0  35.4   PLT  454*  480*  454*     Recent Labs      08/07/18   0157  08/06/18   0502  08/05/18   0524   NA  142  142  139   K  3.7  3.8  4.0   CL  108  107  105   CO2  25  25  27   GLU  146*  115*  142*   BUN  14  12  10   CREA  0.93  0.90  0.65   CA  9.3  9.9  9.9     No results for input(s): PH, PCO2, PO2, HCO3, FIO2 in the last 72 hours.     Imaging:  I have personally reviewed the patients radiographs:  Large right loculated pleural effusion, necrotizing pneumonia        Bela Lawrence MD

## 2018-08-08 ENCOUNTER — APPOINTMENT (OUTPATIENT)
Dept: GENERAL RADIOLOGY | Age: 63
DRG: 871 | End: 2018-08-08
Attending: INTERNAL MEDICINE
Payer: MEDICARE

## 2018-08-08 LAB
ANION GAP SERPL CALC-SCNC: 8 MMOL/L (ref 5–15)
BUN SERPL-MCNC: 12 MG/DL (ref 6–20)
BUN/CREAT SERPL: 12 (ref 12–20)
CALCIUM SERPL-MCNC: 9.3 MG/DL (ref 8.5–10.1)
CHLORIDE SERPL-SCNC: 107 MMOL/L (ref 97–108)
CO2 SERPL-SCNC: 26 MMOL/L (ref 21–32)
CREAT SERPL-MCNC: 1.02 MG/DL (ref 0.55–1.02)
ERYTHROCYTE [DISTWIDTH] IN BLOOD BY AUTOMATED COUNT: 14 % (ref 11.5–14.5)
GLUCOSE BLD STRIP.AUTO-MCNC: 111 MG/DL (ref 65–100)
GLUCOSE BLD STRIP.AUTO-MCNC: 125 MG/DL (ref 65–100)
GLUCOSE BLD STRIP.AUTO-MCNC: 142 MG/DL (ref 65–100)
GLUCOSE BLD STRIP.AUTO-MCNC: 162 MG/DL (ref 65–100)
GLUCOSE SERPL-MCNC: 134 MG/DL (ref 65–100)
HCT VFR BLD AUTO: 35.4 % (ref 35–47)
HGB BLD-MCNC: 11.2 G/DL (ref 11.5–16)
LDH SERPL L TO P-CCNC: 179 U/L (ref 81–246)
MAGNESIUM SERPL-MCNC: 2.5 MG/DL (ref 1.6–2.4)
MCH RBC QN AUTO: 30.6 PG (ref 26–34)
MCHC RBC AUTO-ENTMCNC: 31.6 G/DL (ref 30–36.5)
MCV RBC AUTO: 96.7 FL (ref 80–99)
NRBC # BLD: 0 K/UL (ref 0–0.01)
NRBC BLD-RTO: 0 PER 100 WBC
PHOSPHATE SERPL-MCNC: 3.9 MG/DL (ref 2.6–4.7)
PLATELET # BLD AUTO: 489 K/UL (ref 150–400)
PMV BLD AUTO: 9.1 FL (ref 8.9–12.9)
POTASSIUM SERPL-SCNC: 3.6 MMOL/L (ref 3.5–5.1)
RBC # BLD AUTO: 3.66 M/UL (ref 3.8–5.2)
SERVICE CMNT-IMP: ABNORMAL
SODIUM SERPL-SCNC: 141 MMOL/L (ref 136–145)
WBC # BLD AUTO: 17.5 K/UL (ref 3.6–11)

## 2018-08-08 PROCEDURE — 74011250636 HC RX REV CODE- 250/636: Performed by: INTERNAL MEDICINE

## 2018-08-08 PROCEDURE — 74011000258 HC RX REV CODE- 258: Performed by: INTERNAL MEDICINE

## 2018-08-08 PROCEDURE — 80048 BASIC METABOLIC PNL TOTAL CA: CPT | Performed by: INTERNAL MEDICINE

## 2018-08-08 PROCEDURE — 74011636637 HC RX REV CODE- 636/637: Performed by: HOSPITALIST

## 2018-08-08 PROCEDURE — 74011250637 HC RX REV CODE- 250/637: Performed by: INTERNAL MEDICINE

## 2018-08-08 PROCEDURE — 36415 COLL VENOUS BLD VENIPUNCTURE: CPT | Performed by: INTERNAL MEDICINE

## 2018-08-08 PROCEDURE — 84100 ASSAY OF PHOSPHORUS: CPT | Performed by: INTERNAL MEDICINE

## 2018-08-08 PROCEDURE — 82962 GLUCOSE BLOOD TEST: CPT

## 2018-08-08 PROCEDURE — 85027 COMPLETE CBC AUTOMATED: CPT | Performed by: INTERNAL MEDICINE

## 2018-08-08 PROCEDURE — 83615 LACTATE (LD) (LDH) ENZYME: CPT | Performed by: INTERNAL MEDICINE

## 2018-08-08 PROCEDURE — 94640 AIRWAY INHALATION TREATMENT: CPT

## 2018-08-08 PROCEDURE — 74011000250 HC RX REV CODE- 250: Performed by: INTERNAL MEDICINE

## 2018-08-08 PROCEDURE — 77010033678 HC OXYGEN DAILY

## 2018-08-08 PROCEDURE — 83735 ASSAY OF MAGNESIUM: CPT | Performed by: INTERNAL MEDICINE

## 2018-08-08 PROCEDURE — 77030027138 HC INCENT SPIROMETER -A

## 2018-08-08 PROCEDURE — 74011250637 HC RX REV CODE- 250/637: Performed by: NURSE PRACTITIONER

## 2018-08-08 PROCEDURE — 74011250637 HC RX REV CODE- 250/637: Performed by: HOSPITALIST

## 2018-08-08 PROCEDURE — 65660000000 HC RM CCU STEPDOWN

## 2018-08-08 PROCEDURE — 71045 X-RAY EXAM CHEST 1 VIEW: CPT

## 2018-08-08 RX ORDER — AMOXICILLIN 250 MG
2 CAPSULE ORAL DAILY
Status: DISCONTINUED | OUTPATIENT
Start: 2018-08-09 | End: 2018-08-12 | Stop reason: HOSPADM

## 2018-08-08 RX ORDER — BUDESONIDE AND FORMOTEROL FUMARATE DIHYDRATE 160; 4.5 UG/1; UG/1
2 AEROSOL RESPIRATORY (INHALATION) 2 TIMES DAILY
COMMUNITY

## 2018-08-08 RX ADMIN — OXYCODONE HYDROCHLORIDE 15 MG: 5 TABLET ORAL at 09:49

## 2018-08-08 RX ADMIN — LEVOFLOXACIN 750 MG: 5 INJECTION, SOLUTION INTRAVENOUS at 19:50

## 2018-08-08 RX ADMIN — PIPERACILLIN SODIUM,TAZOBACTAM SODIUM 3.38 G: 3; .375 INJECTION, POWDER, FOR SOLUTION INTRAVENOUS at 09:27

## 2018-08-08 RX ADMIN — PROMETHAZINE HYDROCHLORIDE 25 MG: 25 TABLET ORAL at 09:23

## 2018-08-08 RX ADMIN — Medication 10 ML: at 06:11

## 2018-08-08 RX ADMIN — METHOCARBAMOL 750 MG: 500 TABLET ORAL at 21:36

## 2018-08-08 RX ADMIN — ESOMEPRAZOLE MAGNESIUM 40 MG: 40 CAPSULE, DELAYED RELEASE ORAL at 09:24

## 2018-08-08 RX ADMIN — Medication 400 MG: at 17:50

## 2018-08-08 RX ADMIN — METHOCARBAMOL 750 MG: 500 TABLET ORAL at 09:49

## 2018-08-08 RX ADMIN — Medication 400 MG: at 09:26

## 2018-08-08 RX ADMIN — GUAIFENESIN 1200 MG: 600 TABLET, EXTENDED RELEASE ORAL at 09:26

## 2018-08-08 RX ADMIN — PHENOBARBITAL 64.8 MG: 32.4 TABLET ORAL at 17:50

## 2018-08-08 RX ADMIN — PRIMIDONE 50 MG: 50 TABLET ORAL at 09:27

## 2018-08-08 RX ADMIN — DULOXETINE HYDROCHLORIDE 40 MG: 20 CAPSULE, DELAYED RELEASE ORAL at 09:26

## 2018-08-08 RX ADMIN — PROMETHAZINE HYDROCHLORIDE 25 MG: 25 TABLET ORAL at 19:50

## 2018-08-08 RX ADMIN — IPRATROPIUM BROMIDE AND ALBUTEROL SULFATE 3 ML: .5; 3 SOLUTION RESPIRATORY (INHALATION) at 11:28

## 2018-08-08 RX ADMIN — ASPIRIN 81 MG 81 MG: 81 TABLET ORAL at 09:26

## 2018-08-08 RX ADMIN — POLYETHYLENE GLYCOL 3350 17 G: 17 POWDER, FOR SOLUTION ORAL at 09:27

## 2018-08-08 RX ADMIN — IPRATROPIUM BROMIDE AND ALBUTEROL SULFATE 3 ML: .5; 3 SOLUTION RESPIRATORY (INHALATION) at 19:17

## 2018-08-08 RX ADMIN — PRIMIDONE 150 MG: 50 TABLET ORAL at 21:32

## 2018-08-08 RX ADMIN — PRAZOSIN HYDROCHLORIDE 1 MG: 1 CAPSULE ORAL at 09:23

## 2018-08-08 RX ADMIN — METOPROLOL TARTRATE 25 MG: 25 TABLET ORAL at 17:50

## 2018-08-08 RX ADMIN — OXYCODONE HYDROCHLORIDE 15 MG: 5 TABLET ORAL at 06:10

## 2018-08-08 RX ADMIN — METHYLPREDNISOLONE SODIUM SUCCINATE 40 MG: 40 INJECTION, POWDER, FOR SOLUTION INTRAMUSCULAR; INTRAVENOUS at 09:29

## 2018-08-08 RX ADMIN — PHENOBARBITAL 64.8 MG: 32.4 TABLET ORAL at 09:26

## 2018-08-08 RX ADMIN — LEVOTHYROXINE SODIUM 200 MCG: 100 TABLET ORAL at 06:10

## 2018-08-08 RX ADMIN — PIPERACILLIN SODIUM,TAZOBACTAM SODIUM 3.38 G: 3; .375 INJECTION, POWDER, FOR SOLUTION INTRAVENOUS at 15:38

## 2018-08-08 RX ADMIN — Medication 1 CAPSULE: at 09:27

## 2018-08-08 RX ADMIN — PRAZOSIN HYDROCHLORIDE 1 MG: 1 CAPSULE ORAL at 17:53

## 2018-08-08 RX ADMIN — INSULIN LISPRO 2 UNITS: 100 INJECTION, SOLUTION INTRAVENOUS; SUBCUTANEOUS at 12:18

## 2018-08-08 RX ADMIN — OXYCODONE HYDROCHLORIDE 15 MG: 5 TABLET ORAL at 17:59

## 2018-08-08 RX ADMIN — Medication 10 ML: at 15:39

## 2018-08-08 RX ADMIN — ALPRAZOLAM 1 MG: 0.5 TABLET ORAL at 21:36

## 2018-08-08 RX ADMIN — GUAIFENESIN 1200 MG: 600 TABLET, EXTENDED RELEASE ORAL at 21:32

## 2018-08-08 RX ADMIN — METOPROLOL TARTRATE 25 MG: 25 TABLET ORAL at 09:26

## 2018-08-08 RX ADMIN — IPRATROPIUM BROMIDE AND ALBUTEROL SULFATE 3 ML: .5; 3 SOLUTION RESPIRATORY (INHALATION) at 08:46

## 2018-08-08 RX ADMIN — PIPERACILLIN SODIUM,TAZOBACTAM SODIUM 3.38 G: 3; .375 INJECTION, POWDER, FOR SOLUTION INTRAVENOUS at 00:46

## 2018-08-08 NOTE — PROGRESS NOTES
[facility]  Pulmonary, Critical Care, and Sleep Medicine    Name: Kelin Weller MRN: [id number]   : 1955 Hospital: [facility]    Date: 2018        IMPRESSION:   · Right lower lobe pneumonia with dense consolidation and possible areas of necrosis. Also noted to have consolidation of the left lower lobe. · Right sided empyema, now s/p chest tube placement  · Acute chest pain due to severe pneumonia, more difficult to treat due to her chronic use of opiates  · Suspected underlying COPD- acute exacerbation   · Leukocytosis and Sepsis  · NIDDM  · Anemia  · HTN  · Fibromyalgia/Depression/Anxiety, PTSD/ chronic back pain on chronic opiates and benzos. · Essential tremor. · Ex Smoker, quit 1 year ago. · Hypothyroidism. · Obese      RECOMMENDATIONS:   · O2 - wean as tolerated  · Bronchodilators  · IV antibiotics  · Steroids   · Chest tube drainage   · Appreciate palliative care assistance       Subjective/History:     Feels somewhat better after chest tube placement. No new complaints. Initial history 18: Pt was seen about 1145am and then again about 2pm.    Cc: chest pain, dry cough. This patient has been seen and evaluated at the request of Dr. Anatoliy Ni for above. Patient is a 58 y.o. female who presents for above. She has moderate chest pain which limits her ability to take a deep breath. Has some productive coughing but again limited by her chest pain. She reports that her pain got severe and that it has affected her breathing. Pt is very senstive to her medications and has intolerance to a lot of the generic meds. She has been pretty well controlled with her meds prior to admission. On second evaluation pt was much more comfortable and conversant. Was still have intermittent sharp stabbing pain along her right lower to mid chest wall. Not able to get Complete ROS due to her acute illness, severe pain.  Limited ability to give HPI and ROS.     The patient is critically ill and can not provide additional history due to Unable to speak. Past Medical History:   Diagnosis Date    Anxiety     Bone spur     NECK    COPD     Depression     Endocrine disease     hypothyroidism    Fibromyalgia     Fusion of spine of cervical region     Gastrointestinal disorder     gerd    Hyperlipidemia     Hypothyroid     Morbid obesity ([facility] [address]     Osteoarthritis     PUD (peptic ulcer disease)     Tobacco abuse       Past Surgical History:   Procedure Laterality Date    BRONCHOSCOPY-FIBER/THERAPY  4/3/2015         CARDIAC CATHETERIZATION  2013         COLONOSCOPY,DIAGNOSTIC  10/30/2014         HX CATARACT REMOVAL      bilateral    HX GYN          HX ORTHOPAEDIC      Ruptured disc, knuckles on right hand    UPPER GI ENDOSCOPY,BIOPSY  10/30/2014         UPPER GI ENDOSCOPY,DILATN W GUIDE  10/30/2014           Prior to Admission medications    Medication Sig Start Date End Date Taking? Authorizing Provider   prazosin (MINIPRESS) 2 mg capsule TAKE 1 CAPSULE BY MOUTH TWICE A DAY. 18  Yes Racheal Quijano, NP   DULoxetine 40 mg cpDR Take 40 mg by mouth daily. 18  Yes Racheal Males, NP   primidone (MYSOLINE) 50 mg tablet 1 po qam and 3 po qhs 18  Yes Paresh Patterson MD   ALPRAZolam [place] [facility]) 1 mg tablet Take 1 Tab by mouth three (3) times daily as needed for Anxiety. Max Daily Amount: 3 mg. 17  Yes Chichi Simons NP   cetirizine (ZYRTEC) 10 mg tablet Take 1 Tab by mouth daily. 17  Yes Chichi Simons NP   promethazine (PHENERGAN) 25 mg tablet Take 1 Tab by mouth every six (6) hours as needed. 17  Yes Chichi Simons NP   ibuprofen (MOTRIN) 800 mg tablet Take 1 Tab by mouth every eight (8) hours as needed. 17  Yes Chichi Simons NP   guaiFENesin ER (MUCINEX) 1,200 mg Ta12 ER tablet Take 1 Tab by mouth two (2) times a day.  17  Yes Chichi Simons NP   furosemide (LASIX) 40 mg tablet Take 1 Tab by mouth daily. Patient taking differently: Take 40 mg by mouth daily as needed for Other (LE edema). 5/27/17  Yes Chichi Simons NP   PHENobarbital (LUMINAL) 60 mg tablet Take 1 Tab by mouth two (2) times a day. Max Daily Amount: 120 mg. 5/27/17  Yes Stacie Sotelo MD   dicyclomine (BENTYL) 10 mg capsule  2/13/15  Yes Nico Martinez MD   glipiZIDE SR (GLUCOTROL) 5 mg CR tablet Take 5 mg by mouth two (2) times daily as needed (Patient monitors her BG levels and takes when she is also taking a steroid. ). 2/16/15  Yes Nico Martinez MD   montelukast (SINGULAIR) 10 mg tablet Take 10 mg by mouth daily. 2/16/15  Yes Nico Martinez MD   ezetimibe-simvastatin (VYTORIN 10/40) 10-40 mg per tablet Take 1 tablet by mouth nightly. Yes Historical Provider   metoprolol (LOPRESSOR) 25 mg tablet Take 1 Tab by mouth two (2) times a day. 2/6/14  Yes Bety Carty NP   nystatin (MYCOSTATIN) 100,000 unit/mL suspension Take 5 mL by mouth four (4) times daily. swish and spit  Patient taking differently: Take 500,000 Units by mouth four (4) times daily as needed. swish and spit 1/20/14  Yes Stacie Sotelo MD   methocarbamol (ROBAXIN) 750 mg tablet Take 750-1,500 mg by mouth four (4) times daily as needed. Yes Historical Provider   niacin  mg CR capsule Take 250 mg by mouth daily. Yes Historical Provider   hyoscyamine SL (LEVSIN/SL) 0.125 mg SL tablet 0.125 mg by SubLINGual route three (3) times daily as needed for Cramping. Yes Historical Provider   esomeprazole (NEXIUM) 40 mg capsule Take 40 mg by mouth daily. Yes Historical Provider   MAGOX 400 mg tablet TAKE ONE TABLET TWICE A DAY 11/4/13  Yes Teodoro Blair NP   vit B Cmplx 3-FA-Vit C-Biotin (NEPHRO SHREYA RX) 1- mg-mg-mcg tablet Take 1 Tab by mouth daily. Yes Historical Provider   aspirin 81 mg chewable tablet Take 81 mg by mouth daily. Yes Nico Martinez MD   levothyroxine (SYNTHROID) 200 mcg tablet Take 200 mcg by mouth daily (before breakfast).    Yes Nico Martinez MD acetaminophen-codeine (TYLENOL #3) 300-30 mg per tablet Take 1 Tab by mouth every eight (8) hours as needed. Max Daily Amount: 3 Tabs. 5/27/17   Chichi Simons NP   albuterol (VENTOLIN HFA) 90 mcg/actuation inhaler Take 2 Puffs by inhalation every four (4) hours as needed for Wheezing. Historical Provider   benzonatate (TESSALON) 200 mg capsule Take 1 Cap by mouth two (2) times daily as needed. 1/20/14   Martha Haines MD   albuterol-ipratropium (DUONEB) 2.5 mg-0.5 mg/3 ml nebulizer solution 3 mL by Nebulization route every six (6) hours as needed for Wheezing.     Historical Provider     Current Facility-Administered Medications   Medication Dose Route Frequency    albuterol-ipratropium (DUO-NEB) 2.5 MG-0.5 MG/3 ML  3 mL Nebulization QID RT    0.9% sodium chloride infusion  25 mL/hr IntraVENous CONTINUOUS    prazosin (MINIPRESS) capsule 1 mg  1 mg Oral BID    levoFLOXacin (LEVAQUIN) 750 mg in D5W IVPB  750 mg IntraVENous Q24H    piperacillin-tazobactam (ZOSYN) 3.375 g in 0.9% sodium chloride (MBP/ADV) 100 mL  3.375 g IntraVENous Q8H    methylPREDNISolone (PF) (SOLU-MEDROL) injection 40 mg  40 mg IntraVENous DAILY    ezetimibe/simvastatin (VYTORIN) 10/40 mg  (Patient Supplied)   Oral QPM    B complex-vitaminC-folic acid (NEPHROCAP) cap (Patient Supplied)  1 Cap Oral DAILY    polyethylene glycol (MIRALAX) packet 17 g  17 g Oral DAILY    esomeprazole (NEXIUM) capsule 40 mg (Patient Supplied)  40 mg Oral DAILY    metoprolol tartrate (LOPRESSOR) tablet 25 mg  25 mg Oral BID    lidocaine (LIDODERM) 5 % patch 1 Patch  1 Patch TransDERmal Q24H    aspirin chewable tablet 81 mg  81 mg Oral DAILY    DULoxetine (CYMBALTA) capsule 40 mg  40 mg Oral DAILY    guaiFENesin ER (MUCINEX) tablet 1,200 mg  1,200 mg Oral Q12H    magnesium oxide (MAG-OX) tablet 400 mg  400 mg Oral BID    niacin SR capsule 250 mg  250 mg Oral DAILY    PHENobarbital (LUMINAL) tablet 64.8 mg  64.8 mg Oral BID    primidone (MYSOLINE) tablet 50 mg  50 mg Oral DAILY    primidone (MYSOLINE) tablet 150 mg  150 mg Oral QHS    sodium chloride (NS) flush 5-10 mL  5-10 mL IntraVENous Q8H    insulin lispro (HUMALOG) injection   SubCUTAneous AC&HS    lactobac ac& pc-s.therm-b.anim (ROBIN Q/RISAQUAD)  1 Cap Oral DAILY    levothyroxine (SYNTHROID) tablet 200 mcg  200 mcg Oral ACB     Allergies   Allergen Reactions    Latex Contact Dermatitis    Dilaudid [Hydromorphone (Pf)] Other (comments)     Feels like bugs are crawling all over her    Lipitor [Atorvastatin] Other (comments)     LIVER TROUBLE ON THIS MEDICATION    Remeron [Mirtazapine] Other (comments)     Tremors    Sulfa (Sulfonamide Antibiotics) Itching      Social History   Substance Use Topics    Smoking status: Former Smoker     Packs/day: 1.00     Years: 30.00    Smokeless tobacco: Never Used    Alcohol use Yes      Comment: occasional      Family History   Problem Relation Age of Onset    Heart Disease Mother     Dementia Mother     Thyroid Disease Mother     Heart Disease Father     Alcohol abuse Father     Psychiatric Disorder Father     Dementia Father     Bipolar Disorder Father     Psychiatric Disorder Sister     Suicide Sister     Psychiatric Disorder Brother     Suicide Brother     Psychiatric Disorder Other     Psychiatric Disorder Daughter     Bipolar Disorder Daughter     Coronary Artery Disease Other      multiple family members in 52's        Review of Systems:Updated on 8/2/18  Review of systems not obtained due to patient factors. Has moderate anxiety, chronic pains. Acute pain of the right mid to lower chest wall, pleuritic in nature. Has productive cough. No Aspiration. Was note to have some loose stools. NO skins issues. No muscle aches, no joint aches. She does have a resting tremor seems to be worse on the left upper extremity versus RUE. Some also noted in legs. Has moderate to severe anxiety, depression, PTSD.  Has a tremendous sensitivity to her meds and is intolerant of several generic meds for her anxiety. Objective:   Vital Signs:    Visit Vitals    /73 (BP 1 Location: Right arm, BP Patient Position: At rest)    Pulse 87    Temp 97.9 °F (36.6 °C)    Resp 20    Ht 5' 4\" (1.626 m)    Wt 90.7 kg (200 lb)    SpO2 95%    BMI 34.33 kg/m2       O2 Device: Nasal cannula   O2 Flow Rate (L/min): 4 l/min   Temp (24hrs), Av.1 °F (36.7 °C), Min:97 °F (36.1 °C), Max:98.7 °F (37.1 °C)       Intake/Output:   Last shift:      701 - 1900  In: -   Out: 150 [Urine:150]  Last 3 shifts: 1901 -  0700  In: 6840 [P.O.:3060; I.V.:600]  Out: 0955 [Urine:5400]    Intake/Output Summary (Last 24 hours) at 18 0817  Last data filed at 18 0804   Gross per 24 hour   Intake             2520 ml   Output             4300 ml   Net            -1780 ml     Physical Exam:    General:  Alert, cooperative, no distress. Head:  Normocephalic, without obvious abnormality, atraumatic. Eyes:  Conjunctivae/corneas clear. Nose: Nares normal. Septum midline. Mucosa normal.     Throat: Lips, mucosa, and tongue normal. Teeth and gums normal.   Neck: Supple, symmetrical, trachea midline    Back:   Symmetric, no curvature. ROM normal.   Lungs:   Bilateral wheeze and ronchi, decreased breath sounds in the right base   Chest wall:  No tenderness or deformity. right chest tube in place   Heart:  Regular rate and rhythm    Abdomen:   Soft, non-tender. Bowel sounds normal.     Extremities: Extremities normal, atraumatic, no cyanosis, +edema   Skin: Skin color, texture, turgor normal. No rashes or lesions   Neurologic: Grossly nonfocal, has tremors of the LUE and RUE. Motor is grossly intact. Psych: NO anxiety or depression.       Data:   Labs:  Recent Labs      18   0147  18   0157  18   0502   WBC  17.5*  18.7*  19.9*   HGB  11.2*  11.2*  12.3   HCT  35.4  34.9*  38.0   PLT  489*  454*  480*     Recent Labs 08/08/18   0147  08/07/18   0157  08/06/18   0502   NA  141  142  142   K  3.6  3.7  3.8   CL  107  108  107   CO2  26  25  25   GLU  134*  146*  115*   BUN  12  14  12   CREA  1.02  0.93  0.90   CA  9.3  9.3  9.9   MG  2.5*   --    --    PHOS  3.9   --    --      No results for input(s): PH, PCO2, PO2, HCO3, FIO2 in the last 72 hours.     Imaging:  I have personally reviewed the patients radiographs:  Marked improvement in right pleural effusion after chest tube placement         Isma Jones MD

## 2018-08-08 NOTE — PROGRESS NOTES
ADULT PROTOCOL: JET AEROSOL  REASSESSMENT    Patient  Octavio Cassidy     58 y.o.   female     8/8/2018  2:57 PM    Breath Sounds Pre Procedure: Right Breath Sounds: Coarse                               Left Breath Sounds: Coarse    Breath Sounds Post Procedure: Right Breath Sounds: Coarse                                 Left Breath Sounds: Coarse    Breathing pattern: Pre procedure Breathing Pattern: Regular          Post procedure Breathing Pattern: Regular    Heart Rate: Pre procedure Pulse: 77           Post procedure Pulse: 80    Resp Rate: Pre procedure Respirations: 18           Post procedure Respirations: 18        Cough: Pre procedure Cough: Non-productive               Post procedure Cough: Non-productive        Sputum: Pre procedure Sputum amount: Moderate  Sputum color/odor: Yellow  Sputum consistency:  Thick  Sputum method obtained: Oral                 Post procedure      Oxygen: O2 Device: Nasal cannula   Flow rate (L/min) 3     Changed: NO    SpO2: Pre procedure SpO2: 92 %   with oxygen              Post procedure SpO2: 93 %  with oxygen    Nebulizer Therapy: Current medications Aerosolized Medications: DuoNeb      Changed: NO    Smoking History: former smoker 30 years     Problem List:   Patient Active Problem List   Diagnosis Code    COPD (chronic obstructive pulmonary disease) (HCC) J44.9    PTSD (post-traumatic stress disorder) F43.10    PVC (premature ventricular contraction) I49.3    Other and unspecified hyperlipidemia E78.5    Tobacco use disorder F17.200    Family history of ischemic heart disease Z82.49    Chest pain with normal coronary angiography R07.9    Abnormal nuclear stress test R94.39    Acute respiratory failure (HCC) J96.00    Pneumonia, organism unspecified(486) J18.9    Unspecified hypothyroidism E03.9    Depression, major, recurrent (HCC) F33.9    Cervical post-laminectomy syndrome M96.1    Neck pain M54.2    Ataxia R27.0    B12 deficiency E53.8    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.42    Syncope and collapse R55    COPD with acute exacerbation (HCC) J44.1    Acute respiratory failure with hypoxia (HCC) J96.01    Chronic respiratory failure with hypoxia ([facility]) J96.11    Benign familial tremor G25.0    Atelectasis J98.11    Allergic rhinitis J30.9    Sepsis (HCC) A41.9    Acute exacerbation of COPD with asthma ([facility]) J44.1, J45.901    HTN (hypertension) I10    Hyperlipidemia E78.5    Hypothyroidism E03.9    Smoking F17.200    Diabetic peripheral neuropathy associated with type 2 diabetes mellitus (HCC) E11.42    Stenosis of both internal carotid arteries I65.23    Cervical radiculopathy due to degenerative joint disease of spine M47.22    Degenerative lumbar spinal stenosis M48.061    Vitamin D deficiency E55.9    Altered mental status, unspecified R41.82    Cerebral microvascular disease I67.9    Obesity (BMI 35.0-39.9 without comorbidity) E66.9    Fibromyalgia M79.7    Benign essential tremor syndrome G25.0    CAP (community acquired pneumonia) J18.9    Productive cough R05    Chest pain on breathing R07.1    Anxiety and depression F41.9, F32.9    Polypharmacy Z79.899    Shortness of breath R06.02    Drug-induced constipation K59.03       Respiratory Therapist: Roe Salgado RT

## 2018-08-08 NOTE — PROGRESS NOTES
Hospitalist Progress Note    NAME: Yusra Noguera   :  1955   MRN:  [id number]       Assessment / Plan:  Acute hypoxic respiratory failure POA  Severe Sepsis POA  due to Bilateral Pneumonia (RLL necrotic) with reactive Lymphadenopathy on CT chest POA  R Para pneumonic Pleural effusion  Acute COPD exacerbation POA  S/p thoracocentesis with 400ml drainage  s/p Chest tube placement   Appreciate pulmonary followup  CT  Chest noted for-  Significant consolidation in the right lower lobe with areas of necrosis. Small abscesses cannot be excluded in this setting. Consolidation left lower lobe. Trace right effusion with underlying atelectasis. New mediastinal lymphadenopathy may be reactive in nature. Follow up CXR (8/3) Large right pleural effusion with right basilar consolidation  Sputum Cx - moderate beta strep, Hemophilus influenzae noted   BCx neg in 5 days  Continue IV Zosyn and IV levaquin  D/trent Vancomycin  Off IVF  Taper steroids  Scheduled scheduled   Cont oxygen to keep sats >90%  Oxycodone for pain control   Palliative pain consulted - following     Diarrhea POA - C Diff ruled out with no diarrhea in 24 hrs  C/w FloraQ      NIDDM type II  Cont holding PO glipizide with poor po intake  c/w SSI for now     HTN   BP stable  Cont home metoprolol   Lasix prn at home for le edema, keep on hold for now     Fibromyalgia / depression/ anxiety/ PTSD /Chronic pain  Polypharmacy    Essential tremor   Continue multiple home meds       Ex smoker, quit 1 year ago   Hyperlipidemia, cont statin  Hypothyroidism, cont synthroid   Obesity. Body mass index is 34.33 kg/(m^2).   weight loss recommended        Code Status: Full code   Surrogate Decision Maker:       DVT Prophylaxis: Lovenox   GI Prophylaxis: not indicated     Baseline: independent; no home O2; lives with      Recommended Disposition: TBD     Subjective: Pt seen and examined at bedside. Started to breath better after chest tube placement. Overnight events d/w RN     Chief Complaint / Reason for Physician Visit: F/U Bilateral PNA (necrotic), Sepsis, resp failure, COPD, pleuritic chest pain    Review of Systems:  Symptom Y/N Comments  Symptom Y/N Comments   Fever/Chills n   Chest Pain y Pleuritic R>L, improved   Poor Appetite n   Edema     Cough y   Abdominal Pain     Sputum y   Joint Pain     SOB/RODRIGUEZ y improving  Pruritis/Rash     Nausea/vomit    Tolerating PT/OT y    Diarrhea    Tolerating Diet y    Constipation    Other       Could NOT obtain due to:      Objective:     VITALS:   Last 24hrs VS reviewed since prior progress note. Most recent are:  Patient Vitals for the past 24 hrs:   Temp Pulse Resp BP SpO2   08/08/18 0846 - - - - 92 %   08/08/18 0749 98 °F (36.7 °C) 89 20 148/66 93 %   08/08/18 0351 97.9 °F (36.6 °C) 87 20 137/73 95 %   08/07/18 2314 98.3 °F (36.8 °C) 85 20 148/67 94 %   08/07/18 1948 - - - - 95 %   08/07/18 1915 98.1 °F (36.7 °C) 79 22 122/66 94 %   08/07/18 1807 - - 20 - -   08/07/18 1600 - 99 - 121/65 94 %   08/07/18 1530 97 °F (36.1 °C) 99 18 126/78 94 %   08/07/18 1515 - (!) 101 - 136/69 93 %   08/07/18 1501 - - - - 98 %   08/07/18 1500 - 96 - 145/73 98 %   08/07/18 1430 - (!) 104 - 128/64 92 %   08/07/18 1415 - (!) 106 - 133/57 91 %   08/07/18 1400 - (!) 103 - 134/81 92 %   08/07/18 1330 - 97 - 128/54 92 %   08/07/18 1323 98.7 °F (37.1 °C) 96 22 135/61 92 %   08/07/18 1240 - - 16 126/76 98 %   08/07/18 1235 - - 17 134/76 98 %   08/07/18 1230 - - 16 138/79 97 %   08/07/18 1225 - - 18 140/78 99 %   08/07/18 1220 - 95 21 131/78 100 %   08/07/18 1215 - 93 19 146/74 99 %   08/07/18 1154 - 94 22 140/65 92 %   08/07/18 1051 98.4 °F (36.9 °C) 89 20 123/65 92 %       Intake/Output Summary (Last 24 hours) at 08/08/18 1022  Last data filed at 08/08/18 0804   Gross per 24 hour   Intake             2400 ml   Output             4550 ml   Net            -2150 ml        PHYSICAL EXAM:  General: WD, WN.  Alert, cooperative, no acute distress  EENT:  EOMI. Anicteric sclerae. MMM  Resp:  Crackles, improved BS on R side s/p thoracentesis yesterday  CV:  Regular  rhythm,  No edema  GI:  Soft, Non distended, Non tender.  +Bowel sounds  Neurologic:  Alert and oriented X 3, normal speech,   Psych:   Good insight. Not anxious nor agitated  Skin:  No rashes. No jaundice    Reviewed most current lab test results and cultures  YES  Reviewed most current radiology test results   YES  Review and summation of old records today    NO  Reviewed patient's current orders and MAR    YES  PMH/SH reviewed - no change compared to H&P  ________________________________________________________________________  Care Plan discussed with:    Comments   Patient x    Family      RN x    Care Manager     Consultant                        Multidiciplinary team rounds were held today with , nursing, pharmacist and clinical coordinator. Patient's plan of care was discussed; medications were reviewed and discharge planning was addressed. ________________________________________________________________________  Total NON critical care TIME:  25 Minutes    Total CRITICAL CARE TIME Spent:   Minutes non procedure based      Comments   >50% of visit spent in counseling and coordination of care     ________________________________________________________________________  Brandi Hoyos MD     Procedures: see electronic medical records for all procedures/Xrays and details which were not copied into this note but were reviewed prior to creation of Plan. LABS:  I reviewed today's most current labs and imaging studies.   Pertinent labs include:  Recent Labs      08/08/18   0147  08/07/18   0157  08/06/18   0502   WBC  17.5*  18.7*  19.9*   HGB  11.2*  11.2*  12.3   HCT  35.4  34.9*  38.0   PLT  489*  454*  480*     Recent Labs      08/08/18   0147  08/07/18   0157  08/06/18   0502   NA  141  142  142   K  3.6  3.7  3.8   CL  107  108  107   CO2  26  25  25   GLU  134* 146*  115*   BUN  12  14  12   CREA  1.02  0.93  0.90   CA  9.3  9.3  9.9   MG  2.5*   --    --    PHOS  3.9   --    --        Signed: Breanna Haji MD

## 2018-08-08 NOTE — PROGRESS NOTES
Bedside shift change report given to [address] (oncoming nurse) by Jeffrey Fitzpatrick RN (offgoing nurse). Report included the following information SBAR, Kardex, MAR, Recent Results and Cardiac Rhythm NSR.

## 2018-08-08 NOTE — PROGRESS NOTES
Palliative Medicine Consult  [place]: [phone] ([phone])    Patient Name: Arnav Rios  YOB: 1955    Date of Initial Consult: 8/2/18  Reason for Consult: Pt has chronic psych illness, chronic pain, presented with severe pleuritic chest pain due to severe pneumonia. Please assist in her management. On    chronic Tyl #3 and benzos. Requesting Provider: Brian العلي MD   Primary Care Physician: Jenny Mccurdy MD     SUMMARY:   Arnav Rios is a 58 y.o. with a past history of COPD, tobacco abuse, OA, PUD, hypothyroidism, GERD, fibromyalgia, depression, anxiety, hyperlipidemia,, who was admitted on 7/31/2018 from home with a diagnosis of PNA. Current medical issues leading to Palliative Medicine involvement include: Pt has chronic psych illness, chronic pain, presented with severe pleuritic chest pain due to severe pneumonia. Please assist in her management. On chronic Tyl #3 and benzos.  shows pt gets the following monthly prescriptions from [facility], no signs of aberrant behavior. Tramadol 50mg #90/30 days  Tylenol #3 #30/10 days  Phenobarbital 60mg #60/30 days  Norco 7.5/325mg #30/10 days  Alprazolam 1mg #90/30 days    Psychosocial: lives with  in 1 story home, independent with all ADLs and IADLs. PALLIATIVE DIAGNOSES:   1. Chills  2. Fever   3. Cough (productive)  4. Chest tightness  5. Dyspnea with speech  6. Acute chest pain: bilat rib pain  7. PNA : right lower lobe PNA with dense consolidation and possible areas of necrosis, also consolidation of LLL. 8. Sepsis   9. Diarrhea  10. Leukocytosis   11. Fibromyalgia   12. Anxiety/depression/PTSD  13. Polypharmacy   14. Constipation opioid induced     PLAN:   1. Pain is better since the chest tube was placed. 5 doses of 15mg oxycodone and 75mcg Fentanyl yesterday. 2. Still no BM since admission: pt requests waiting until morning before addressing this issue. 3. Extensive chart review.    4. Initial consult note routed to primary continuity provider  5. Communicated plan of care with: Palliative IDT, RN       GOALS OF CARE / TREATMENT PREFERENCES:     GOALS OF CARE:  Patient/Health Care Proxy Stated Goals: Prolong life      TREATMENT PREFERENCES:   Code Status: Full Code    Advance Care Planning:  Advance Care Planning 8/2/2018   Patient's Healthcare Decision Maker is: Legal Next of Kin   Primary Decision Maker Name Jonnie Green   Primary Decision Maker Phone Number [phone]   Primary Decision Maker Relationship to Patient Spouse   Confirm Advance Directive None   Patient Would Like to Complete Advance Directive -       Medical Interventions: Full interventions   Other Instructions: Other:    As far as possible, the palliative care team has discussed with patient / health care proxy about goals of care / treatment preferences for patient. HISTORY:     History obtained from: chart, patient    CHIEF COMPLAINT: worsening cough    HPI/SUBJECTIVE:    The patient is:   [x] Verbal and participatory  [] Non-participatory due to:     7/31:  Presented to ED with c/o worsening productive cough, bright yellow sputum, bilateral rib pain, intermittent fever and chills s/p long-standing bronchitis since 6/18. Pt saw her PCP 6/18 and was dx with bronchitis, treated with Doxycycline and prednisone, however, pt stopped taking the Doxycycline due to diarrhea s/e. This am, she woke up at 0200 due to chest tightness, bilateral rib pain and coughing. She used her nebulizer x3 today, last tx was before 3pm.     ED W/U:  EXAM:  ECG: sinus tachycardia  LAB:  Wbc 22.0, plt 438, ALT 98, alk phos 159, albumin 2.7, lactic acid 2.5  IMAGING:  CXR opacification/consolidation in right middle lobe and left lung base posterior medially may represent PNA. HOSPITAL COURSE:  8/1: transferred to ortho for medical care. Pt requesting bipap. Transferred to PCU for increased monitoring. 8/6: still SOB, no BM since admission.  Still with pleuritic chest pain on right side. 8/8: no nausea, vomiting or abdominal pain. Chest pain is better, now pain is 2/2 chest tube. Clinical Pain Assessment (nonverbal scale for severity on nonverbal patients):   Clinical Pain Assessment  Severity: 4  Location: bilat chest wall  Character: sharp pain  pleuritic  Duration: days  Factors: coughing hurts  Frequency: continuous     Activity (Movement): Restless, excessive activity and/or withdrawal reflexes    Duration: for how long has pt been experiencing pain (e.g., 2 days, 1 month, years)  Frequency: how often pain is an issue (e.g., several times per day, once every few days, constant)     FUNCTIONAL ASSESSMENT:     Palliative Performance Scale (PPS):  PPS: 40       PSYCHOSOCIAL/SPIRITUAL SCREENING:     Palliative IDT has assessed this patient for cultural preferences / practices and a referral made as appropriate to needs (Cultural Services, Patient Advocacy, Ethics, etc.)    Advance Care Planning:  Advance Care Planning 8/2/2018   Patient's Healthcare Decision Maker is: Legal Next of Santhosh Chapman   Primary Decision Maker Name Kenney Alvarez   Primary Decision Maker Phone Number [phone]   Primary Decision Maker Relationship to Patient Spouse   Confirm Advance Directive None   Patient Would Like to Complete Advance Directive -       Any spiritual / Shinto concerns:  [] Yes /  [x] No    Caregiver Burnout:  [] Yes /  [x] No /  [] No Caregiver Present      Anticipatory grief assessment:   [x] Normal  / [] Maladaptive       ESAS Anxiety: Anxiety: 0  +ESAS Depression: Depression: 2        REVIEW OF SYSTEMS:     Positive and pertinent negative findings in ROS are noted above in HPI. The following systems were [x] reviewed / [] unable to be reviewed as noted in HPI  Other findings are noted below. Systems: constitutional, ears/nose/mouth/throat, respiratory, gastrointestinal, genitourinary, musculoskeletal, integumentary, neurologic, psychiatric, endocrine.  Positive findings noted below.  Modified ESAS Completed by: provider   Fatigue: 4 Drowsiness: 0   Depression: 2 Pain: 4   Anxiety: 0 Nausea: 0   Anorexia: 4 Dyspnea: 1     Constipation: Yes              PHYSICAL EXAM:     From RN flowsheet:  Wt Readings from Last 3 Encounters:   07/31/18 200 lb (90.7 kg)   06/18/18 200 lb (90.7 kg)   05/10/18 206 lb (93.4 kg)     Blood pressure 141/69, pulse 95, temperature 98 °F (36.7 °C), resp. rate 16, height 5' 4\" (1.626 m), weight 200 lb (90.7 kg), SpO2 92 %.     Pain Scale 1: Numeric (0 - 10)  Pain Intensity 1: 8  Pain Onset 1: acute/chronic  Pain Location 1: Flank  Pain Orientation 1: Right  Pain Description 1: Aching  Pain Intervention(s) 1: Medication (see MAR)  Last bowel movement, if known:     Constitutional: ill appearing, awake, alert, NAD  Eyes: pupils equal, anicteric  ENMT: no nasal discharge, moist mucous membranes  Cardiovascular: regular rhythm, distal pulses intact  Respiratory: breathing labored with speech, symmetric, crackles bilat, chest tube right posterior chest  Gastrointestinal: soft non-tender, +bowel sounds  Musculoskeletal: no deformity, no tenderness to palpation  Skin: warm, dry  Neurologic: following commands, moving all extremities  Psychiatric: full affect, no hallucinations          HISTORY:     Principal Problem:    CAP (community acquired pneumonia) (7/31/2018)    Active Problems:    COPD (chronic obstructive pulmonary disease) ([facility] [address] (3/13/2015)      Acute respiratory failure (HCC) (1/16/2014)      Acute respiratory failure with hypoxia (HCC) (3/13/2015)      Sepsis ([facility] [address] (4/1/2015)      Diabetic peripheral neuropathy associated with type 2 diabetes mellitus ([facility] [address] (5/16/2017)      Productive cough (8/2/2018)      Chest pain on breathing (8/2/2018)      Anxiety and depression (8/2/2018)      Polypharmacy (8/2/2018)      Shortness of breath (8/6/2018)      Drug-induced constipation (8/6/2018)      Past Medical History:   Diagnosis Date    Anxiety     Bone spur NECK    COPD     Depression     Endocrine disease     hypothyroidism    Fibromyalgia     Fusion of spine of cervical region     Gastrointestinal disorder     gerd    Hyperlipidemia     Hypothyroid     Morbid obesity ([address]     Osteoarthritis     PUD (peptic ulcer disease)     Tobacco abuse       Past Surgical History:   Procedure Laterality Date    BRONCHOSCOPY-FIBER/THERAPY  4/3/2015         CARDIAC CATHETERIZATION  2013         COLONOSCOPY,DIAGNOSTIC  10/30/2014         HX CATARACT REMOVAL      bilateral    HX GYN          HX ORTHOPAEDIC      Ruptured disc, knuckles on right hand    UPPER GI ENDOSCOPY,BIOPSY  10/30/2014         UPPER GI ENDOSCOPY,DILATN W GUIDE  10/30/2014           Family History   Problem Relation Age of Onset    Heart Disease Mother     Dementia Mother     Thyroid Disease Mother     Heart Disease Father     Alcohol abuse Father     Psychiatric Disorder Father     Dementia Father     Bipolar Disorder Father     Psychiatric Disorder Sister     Suicide Sister     Psychiatric Disorder Brother     Suicide Brother     Psychiatric Disorder Other     Psychiatric Disorder Daughter     Bipolar Disorder Daughter     Coronary Artery Disease Other      multiple family members in 52's      History reviewed, no pertinent family history.   Social History   Substance Use Topics    Smoking status: Former Smoker     Packs/day: 1.00     Years: 30.00    Smokeless tobacco: Never Used    Alcohol use Yes      Comment: occasional     Allergies   Allergen Reactions    Latex Contact Dermatitis    Dilaudid [Hydromorphone (Pf)] Other (comments)     Feels like bugs are crawling all over her    Lipitor [Atorvastatin] Other (comments)     LIVER TROUBLE ON THIS MEDICATION    Remeron [Mirtazapine] Other (comments)     Tremors    Sulfa (Sulfonamide Antibiotics) Itching      Current Facility-Administered Medications   Medication Dose Route Frequency    senna-docusate (PERICOLACE) 8.6-50 mg per tablet 2 Tab  2 Tab Oral DAILY    fluticasone furoate (ARNUITY ELLIPTA) 100 mcg/puff  1 Puff Inhalation DAILY    albuterol-ipratropium (DUO-NEB) 2.5 MG-0.5 MG/3 ML  3 mL Nebulization QID RT    fentaNYL citrate (PF) injection 100 mcg  100 mcg IntraVENous Multiple    prazosin (MINIPRESS) capsule 1 mg  1 mg Oral BID    levoFLOXacin (LEVAQUIN) 750 mg in D5W IVPB  750 mg IntraVENous Q24H    piperacillin-tazobactam (ZOSYN) 3.375 g in 0.9% sodium chloride (MBP/ADV) 100 mL  3.375 g IntraVENous Q8H    methylPREDNISolone (PF) (SOLU-MEDROL) injection 40 mg  40 mg IntraVENous DAILY    ezetimibe/simvastatin (VYTORIN) 10/40 mg  (Patient Supplied)   Oral QPM    B complex-vitaminC-folic acid (NEPHROCAP) cap (Patient Supplied)  1 Cap Oral DAILY    polyethylene glycol (MIRALAX) packet 17 g  17 g Oral DAILY    esomeprazole (NEXIUM) capsule 40 mg (Patient Supplied)  40 mg Oral DAILY    oxyCODONE IR (ROXICODONE) tablet 15 mg  15 mg Oral Q4H PRN    promethazine (PHENERGAN) tablet 25 mg  25 mg Oral Q6H PRN    metoprolol tartrate (LOPRESSOR) tablet 25 mg  25 mg Oral BID    lidocaine (LIDODERM) 5 % patch 1 Patch  1 Patch TransDERmal Q24H    albuterol (PROVENTIL VENTOLIN) nebulizer solution 2.5 mg  2.5 mg Nebulization Q2H PRN    ALPRAZolam (XANAX) tablet 1 mg  1 mg Oral TID PRN    aspirin chewable tablet 81 mg  81 mg Oral DAILY    benzonatate (TESSALON) capsule 200 mg  200 mg Oral TID PRN    DULoxetine (CYMBALTA) capsule 40 mg  40 mg Oral DAILY    guaiFENesin ER (MUCINEX) tablet 1,200 mg  1,200 mg Oral Q12H    magnesium oxide (MAG-OX) tablet 400 mg  400 mg Oral BID    methocarbamol (ROBAXIN) tablet 750 mg  750 mg Oral QID PRN    nystatin (MYCOSTATIN) 100,000 unit/mL oral suspension 500,000 Units  500,000 Units Oral QID PRN    PHENobarbital (LUMINAL) tablet 64.8 mg  64.8 mg Oral BID    primidone (MYSOLINE) tablet 50 mg  50 mg Oral DAILY    primidone (MYSOLINE) tablet 150 mg  150 mg Oral QHS    sodium chloride (NS) flush 5-10 mL  5-10 mL IntraVENous Q8H    sodium chloride (NS) flush 5-10 mL  5-10 mL IntraVENous PRN    acetaminophen (TYLENOL) tablet 650 mg  650 mg Oral Q4H PRN    naloxone (NARCAN) injection 0.4 mg  0.4 mg IntraVENous PRN    ondansetron (ZOFRAN) injection 4 mg  4 mg IntraVENous Q4H PRN    insulin lispro (HUMALOG) injection   SubCUTAneous AC&HS    glucose chewable tablet 16 g  4 Tab Oral PRN    dextrose (D50W) injection syrg 12.5-25 g  12.5-25 g IntraVENous PRN    glucagon (GLUCAGEN) injection 1 mg  1 mg IntraMUSCular PRN    lactobac ac& pc-s.therm-b.anim (ROBIN Q/RISAQUAD)  1 Cap Oral DAILY    levothyroxine (SYNTHROID) tablet 200 mcg  200 mcg Oral ACB          LAB AND IMAGING FINDINGS:     Lab Results   Component Value Date/Time    WBC 15.0 (H) 08/09/2018 02:59 AM    HGB 11.9 08/09/2018 02:59 AM    PLATELET 389 (H) 57/09/1332 02:59 AM     Lab Results   Component Value Date/Time    Sodium 143 08/09/2018 02:59 AM    Potassium 3.5 08/09/2018 02:59 AM    Chloride 107 08/09/2018 02:59 AM    CO2 27 08/09/2018 02:59 AM    BUN 11 08/09/2018 02:59 AM    Creatinine 1.13 (H) 08/09/2018 02:59 AM    Calcium 9.5 08/09/2018 02:59 AM    Magnesium 2.5 (H) 08/08/2018 01:47 AM    Phosphorus 3.9 08/08/2018 01:47 AM      Lab Results   Component Value Date/Time    AST (SGOT) 15 08/01/2018 05:37 AM    Alk.  phosphatase 138 (H) 08/01/2018 05:37 AM    Protein, total 7.3 08/01/2018 05:37 AM    Albumin 2.4 (L) 08/01/2018 05:37 AM    Globulin 4.9 (H) 08/01/2018 05:37 AM     Lab Results   Component Value Date/Time    INR 0.9 07/05/2013 10:01 AM    Prothrombin time 9.3 07/05/2013 10:01 AM      No results found for: IRON, FE, TIBC, IBCT, PSAT, FERR   Lab Results   Component Value Date/Time    pH 7.34 (L) 08/01/2018 11:12 AM    PCO2 41 08/01/2018 11:12 AM    PO2 82 08/01/2018 11:12 AM     No components found for: [place]   Lab Results   Component Value Date/Time    CK 42 03/31/2015 09:50 PM    CK - MB 2.2 03/31/2015 09:50 PM                Total time: 35  Counseling / coordination time, spent as noted above: 25  > 50% counseling / coordination?: y    Prolonged service was provided for  []30 min   []75 min in face to face time in the presence of the patient, spent as noted above. Time Start:   Time End:   Note: this can only be billed with 68227 (initial) or 95518 (follow up). If multiple start / stop times, list each separately.

## 2018-08-08 NOTE — PROGRESS NOTES
Initial Nutrition Assessment:    INTERVENTIONS/RECOMMENDATIONS:   · Meals/Snacks: General/healthful diet: Continue current diet    ASSESSMENT:   Patient medically noted for bilateral pneumonia, acute respiratory failure, right pleural effusion, and DM. PMH for COPD, anxiety, and depression. Patient reports her appetite has been down recently. She reports a 16# weight loss but is unable to provide a UBW or a recent weight from MD visit. States weight loss was r/t diarrhea PTA. No significant weight changes noted from chart review. Appears to be eating fairly well per flowsheets. PO mostly >50% of meals. Menu at bedside; she reports ordering meals that she feels she can tolerate. States she is lactose intolerant \"sometimes. \"  Encouraged intake of meals/snacks as tolerated. Diet Order: Consistent carb  % Eaten:  Patient Vitals for the past 72 hrs:   % Diet Eaten   08/07/18 1808 75 %   08/07/18 0909 50 %   08/06/18 1915 75 %   08/06/18 1335 75 %   08/06/18 1310 100 %   08/06/18 0900 20 %       Pertinent Medications: [x]Reviewed []Other: Nephrocap, Cymbalta, Humalog, Mylene Q, Synthroid, Mag-ox, Solu-medrol, Lopressor, Miralax   Pertinent Labs: [x]Reviewed []Other: -[phone]  Food Allergies: []None [x]Other: Latex, lactose (\"sometimes\" per patient)    Last BM: 8/2   [x]Active     []Hyperactive  []Hypoactive       [] Absent BS  Skin:    [x] Intact   [] Incision  [] Breakdown: [] Edema []Other:    Anthropometrics:   Height: 5' 4\" (162.6 cm) Weight: 90.7 kg (200 lb)   IBW (%IBW):   ( ) UBW (%UBW):   (  %)   Last Weight Metrics:  Weight Loss Metrics 7/31/2018 6/18/2018 5/10/2018 5/3/2018 3/29/2018 3/28/2018 3/20/2018   Today's Wt 200 lb 200 lb 206 lb 206 lb 202 lb 6.4 oz 203 lb 203 lb 12.8 oz   BMI 34.33 kg/m2 34.33 kg/m2 35.36 kg/m2 35.36 kg/m2 34.74 kg/m2 34.84 kg/m2 34.98 kg/m2       BMI: Body mass index is 34.33 kg/(m^2).     This BMI is indicative of:   []Underweight    []Normal    []Overweight    [x] Obesity [] Extreme Obesity (BMI>40)     Estimated Nutrition Needs (Based on):   1892 Kcals/day (BMR (1455) x 1. 3AF) , 73 g (-91g (0.8-1.0 g/kg bw)) Protein  Carbohydrate: At Least 130 g/day  Fluids: 1900 mL/day (1ml/kcal)    Pt expected to meet estimated nutrient needs: [x]Yes []No    NUTRITION DIAGNOSES:   Problem:  No nutritional diagnosis at this time      Etiology: related to       Signs/Symptoms: as evidenced by        NUTRITION INTERVENTIONS:  Meals/Snacks: General/healthful diet                  GOAL:   PO intake >50% of meals/snacks next 5-7 days    LEARNING NEEDS (Diet, Food/Nutrient-Drug Interaction):    [x] None Identified   [] Identified and Education Provided/Documented   [] Identified and Pt declined/was not appropriate     Cultural, Zoroastrian, OR Ethnic Dietary Needs:    [x] None Identified   [] Identified and Addressed     [x] Interdisciplinary Care Plan Reviewed/Documented    [x] Discharge Planning:  Consistent carbohydrate diet      MONITORING /EVALUATION:   Food/Nutrient Intake Outcomes:  Total energy intake  Physical Signs/Symptoms Outcomes: Weight/weight change, Glucose profile    NUTRITION RISK:    [] High              [] Moderate           [x]  Low  []  Minimal/Uncompromised    PT SEEN FOR:    []  MD Consult: []Calorie Count      []Diabetic Diet Education        []Diet Education     []Electrolyte Management     []General Nutrition Management and Supplements     []Management of Tube Feeding     []TPN Recommendations    []  RN Referral:  []MST score >=2     []Enteral/Parenteral Nutrition PTA     []Pregnant: Gestational DM or Multigestation     []Pressure Ulcer/Wound Care needs        []  Low BMI  [x]  LOS    [facility]  Pager [phone]                 Weekend Pager [phone]

## 2018-08-09 LAB
ANION GAP SERPL CALC-SCNC: 9 MMOL/L (ref 5–15)
BACTERIA SPEC CULT: NORMAL
BASOPHILS # BLD: 0 K/UL (ref 0–0.1)
BASOPHILS NFR BLD: 0 % (ref 0–1)
BUN SERPL-MCNC: 11 MG/DL (ref 6–20)
BUN/CREAT SERPL: 10 (ref 12–20)
CALCIUM SERPL-MCNC: 9.5 MG/DL (ref 8.5–10.1)
CHLORIDE SERPL-SCNC: 107 MMOL/L (ref 97–108)
CO2 SERPL-SCNC: 27 MMOL/L (ref 21–32)
CREAT SERPL-MCNC: 1.13 MG/DL (ref 0.55–1.02)
DIFFERENTIAL METHOD BLD: ABNORMAL
EOSINOPHIL # BLD: 0 K/UL (ref 0–0.4)
EOSINOPHIL NFR BLD: 0 % (ref 0–7)
ERYTHROCYTE [DISTWIDTH] IN BLOOD BY AUTOMATED COUNT: 14.1 % (ref 11.5–14.5)
GLUCOSE BLD STRIP.AUTO-MCNC: 133 MG/DL (ref 65–100)
GLUCOSE BLD STRIP.AUTO-MCNC: 136 MG/DL (ref 65–100)
GLUCOSE BLD STRIP.AUTO-MCNC: 156 MG/DL (ref 65–100)
GLUCOSE BLD STRIP.AUTO-MCNC: 178 MG/DL (ref 65–100)
GLUCOSE SERPL-MCNC: 118 MG/DL (ref 65–100)
GRAM STN SPEC: NORMAL
GRAM STN SPEC: NORMAL
HCT VFR BLD AUTO: 38.7 % (ref 35–47)
HGB BLD-MCNC: 11.9 G/DL (ref 11.5–16)
IMM GRANULOCYTES # BLD: 0 K/UL (ref 0–0.04)
IMM GRANULOCYTES NFR BLD AUTO: 0 % (ref 0–0.5)
LYMPHOCYTES # BLD: 2.7 K/UL (ref 0.8–3.5)
LYMPHOCYTES NFR BLD: 18 % (ref 12–49)
MCH RBC QN AUTO: 30.1 PG (ref 26–34)
MCHC RBC AUTO-ENTMCNC: 30.7 G/DL (ref 30–36.5)
MCV RBC AUTO: 97.7 FL (ref 80–99)
METAMYELOCYTES NFR BLD MANUAL: 1 %
MONOCYTES # BLD: 0.8 K/UL (ref 0–1)
MONOCYTES NFR BLD: 5 % (ref 5–13)
NEUTS BAND NFR BLD MANUAL: 3 %
NEUTS SEG # BLD: 11.4 K/UL (ref 1.8–8)
NEUTS SEG NFR BLD: 73 % (ref 32–75)
NRBC # BLD: 0 K/UL (ref 0–0.01)
NRBC BLD-RTO: 0 PER 100 WBC
PLATELET # BLD AUTO: 502 K/UL (ref 150–400)
PMV BLD AUTO: 9.1 FL (ref 8.9–12.9)
POTASSIUM SERPL-SCNC: 3.5 MMOL/L (ref 3.5–5.1)
RBC # BLD AUTO: 3.96 M/UL (ref 3.8–5.2)
RBC MORPH BLD: ABNORMAL
SERVICE CMNT-IMP: ABNORMAL
SERVICE CMNT-IMP: NORMAL
SODIUM SERPL-SCNC: 143 MMOL/L (ref 136–145)
WBC # BLD AUTO: 15 K/UL (ref 3.6–11)

## 2018-08-09 PROCEDURE — 74011000250 HC RX REV CODE- 250: Performed by: INTERNAL MEDICINE

## 2018-08-09 PROCEDURE — 74011250636 HC RX REV CODE- 250/636: Performed by: INTERNAL MEDICINE

## 2018-08-09 PROCEDURE — 74011250637 HC RX REV CODE- 250/637: Performed by: INTERNAL MEDICINE

## 2018-08-09 PROCEDURE — 36415 COLL VENOUS BLD VENIPUNCTURE: CPT | Performed by: INTERNAL MEDICINE

## 2018-08-09 PROCEDURE — 74011250637 HC RX REV CODE- 250/637: Performed by: NURSE PRACTITIONER

## 2018-08-09 PROCEDURE — 82962 GLUCOSE BLOOD TEST: CPT

## 2018-08-09 PROCEDURE — 94640 AIRWAY INHALATION TREATMENT: CPT

## 2018-08-09 PROCEDURE — 74011636637 HC RX REV CODE- 636/637: Performed by: HOSPITALIST

## 2018-08-09 PROCEDURE — 74011250637 HC RX REV CODE- 250/637: Performed by: HOSPITALIST

## 2018-08-09 PROCEDURE — 77010033678 HC OXYGEN DAILY

## 2018-08-09 PROCEDURE — 80048 BASIC METABOLIC PNL TOTAL CA: CPT | Performed by: INTERNAL MEDICINE

## 2018-08-09 PROCEDURE — 65660000000 HC RM CCU STEPDOWN

## 2018-08-09 PROCEDURE — 85025 COMPLETE CBC W/AUTO DIFF WBC: CPT | Performed by: INTERNAL MEDICINE

## 2018-08-09 PROCEDURE — 74011000258 HC RX REV CODE- 258: Performed by: INTERNAL MEDICINE

## 2018-08-09 RX ADMIN — Medication 10 ML: at 05:45

## 2018-08-09 RX ADMIN — PRIMIDONE 150 MG: 50 TABLET ORAL at 21:40

## 2018-08-09 RX ADMIN — OXYCODONE HYDROCHLORIDE 15 MG: 5 TABLET ORAL at 08:44

## 2018-08-09 RX ADMIN — LEVOTHYROXINE SODIUM 200 MCG: 100 TABLET ORAL at 05:45

## 2018-08-09 RX ADMIN — PHENOBARBITAL 64.8 MG: 32.4 TABLET ORAL at 17:26

## 2018-08-09 RX ADMIN — LEVOFLOXACIN 750 MG: 5 INJECTION, SOLUTION INTRAVENOUS at 21:40

## 2018-08-09 RX ADMIN — PRAZOSIN HYDROCHLORIDE 1 MG: 1 CAPSULE ORAL at 17:17

## 2018-08-09 RX ADMIN — ESOMEPRAZOLE MAGNESIUM 40 MG: 40 CAPSULE, DELAYED RELEASE ORAL at 08:19

## 2018-08-09 RX ADMIN — PIPERACILLIN SODIUM,TAZOBACTAM SODIUM 3.38 G: 3; .375 INJECTION, POWDER, FOR SOLUTION INTRAVENOUS at 16:18

## 2018-08-09 RX ADMIN — METHYLPREDNISOLONE SODIUM SUCCINATE 40 MG: 40 INJECTION, POWDER, FOR SOLUTION INTRAMUSCULAR; INTRAVENOUS at 08:45

## 2018-08-09 RX ADMIN — POLYETHYLENE GLYCOL 3350 17 G: 17 POWDER, FOR SOLUTION ORAL at 08:44

## 2018-08-09 RX ADMIN — METOPROLOL TARTRATE 25 MG: 25 TABLET ORAL at 17:26

## 2018-08-09 RX ADMIN — Medication 400 MG: at 08:44

## 2018-08-09 RX ADMIN — DOCUSATE SODIUM AND SENNOSIDES 2 TABLET: 8.6; 5 TABLET, FILM COATED ORAL at 08:44

## 2018-08-09 RX ADMIN — PHENOBARBITAL 64.8 MG: 32.4 TABLET ORAL at 08:44

## 2018-08-09 RX ADMIN — Medication 400 MG: at 17:26

## 2018-08-09 RX ADMIN — PIPERACILLIN SODIUM,TAZOBACTAM SODIUM 3.38 G: 3; .375 INJECTION, POWDER, FOR SOLUTION INTRAVENOUS at 08:17

## 2018-08-09 RX ADMIN — Medication 10 ML: at 21:40

## 2018-08-09 RX ADMIN — GUAIFENESIN 1200 MG: 600 TABLET, EXTENDED RELEASE ORAL at 21:40

## 2018-08-09 RX ADMIN — PRIMIDONE 50 MG: 50 TABLET ORAL at 08:45

## 2018-08-09 RX ADMIN — METOPROLOL TARTRATE 25 MG: 25 TABLET ORAL at 08:45

## 2018-08-09 RX ADMIN — IPRATROPIUM BROMIDE AND ALBUTEROL SULFATE 3 ML: .5; 3 SOLUTION RESPIRATORY (INHALATION) at 07:29

## 2018-08-09 RX ADMIN — FLUTICASONE FUROATE 1 PUFF: 100 POWDER RESPIRATORY (INHALATION) at 00:21

## 2018-08-09 RX ADMIN — INSULIN LISPRO 2 UNITS: 100 INJECTION, SOLUTION INTRAVENOUS; SUBCUTANEOUS at 12:16

## 2018-08-09 RX ADMIN — IPRATROPIUM BROMIDE AND ALBUTEROL SULFATE 3 ML: .5; 3 SOLUTION RESPIRATORY (INHALATION) at 15:15

## 2018-08-09 RX ADMIN — GUAIFENESIN 1200 MG: 600 TABLET, EXTENDED RELEASE ORAL at 08:44

## 2018-08-09 RX ADMIN — Medication 10 ML: at 00:21

## 2018-08-09 RX ADMIN — DULOXETINE HYDROCHLORIDE 40 MG: 20 CAPSULE, DELAYED RELEASE ORAL at 12:18

## 2018-08-09 RX ADMIN — ASPIRIN 81 MG 81 MG: 81 TABLET ORAL at 08:44

## 2018-08-09 RX ADMIN — IPRATROPIUM BROMIDE AND ALBUTEROL SULFATE 3 ML: .5; 3 SOLUTION RESPIRATORY (INHALATION) at 20:13

## 2018-08-09 RX ADMIN — OXYCODONE HYDROCHLORIDE 15 MG: 5 TABLET ORAL at 15:14

## 2018-08-09 RX ADMIN — ALPRAZOLAM 1 MG: 0.5 TABLET ORAL at 21:40

## 2018-08-09 RX ADMIN — IPRATROPIUM BROMIDE AND ALBUTEROL SULFATE 3 ML: .5; 3 SOLUTION RESPIRATORY (INHALATION) at 11:18

## 2018-08-09 RX ADMIN — Medication 10 ML: at 15:14

## 2018-08-09 RX ADMIN — METHOCARBAMOL 750 MG: 500 TABLET ORAL at 21:40

## 2018-08-09 RX ADMIN — PRAZOSIN HYDROCHLORIDE 1 MG: 1 CAPSULE ORAL at 08:20

## 2018-08-09 RX ADMIN — Medication 1 CAPSULE: at 08:44

## 2018-08-09 RX ADMIN — PIPERACILLIN SODIUM,TAZOBACTAM SODIUM 3.38 G: 3; .375 INJECTION, POWDER, FOR SOLUTION INTRAVENOUS at 00:21

## 2018-08-09 NOTE — PROGRESS NOTES
Hospitalist Progress Note    NAME: Octavio Cassidy   :  1955   MRN:  [id number]       Assessment / Plan:  Acute hypoxic respiratory failure POA  Severe Sepsis POA  due to Bilateral Pneumonia (RLL necrotic) with reactive Lymphadenopathy on CT chest POA  R Para pneumonic Pleural effusion  Acute COPD exacerbation POA  S/p thoracocentesis with 400ml drainage  s/p Chest tube placement   Appreciate pulmonary assistance  CT  Chest noted for-  Significant consolidation in the right lower lobe with areas of necrosis. Small abscesses cannot be excluded in this setting. Consolidation left lower lobe. Trace right effusion with underlying atelectasis. New mediastinal lymphadenopathy may be reactive in nature. Follow up CXR (8/3) Large right pleural effusion with right basilar consolidation  Follow up CXR () Reduced right pleural effusion and overlying airspace disease status post right pleural drainage catheter placement with no pneumothorax  Sputum Cx - moderate beta strep, Hemophilus influenzae noted   BCx neg in 5 days  Continue IV Zosyn and IV levaquin  D/trent Vancomycin  Off IVF  Taper steroids  Scheduled scheduled   Cont oxygen to keep sats >90%  Oxycodone for pain control   Palliative pain consulted - following     Diarrhea POA - C Diff ruled out with no diarrhea in 24 hrs  C/w FloraQ      NIDDM type II  Cont holding PO glipizide with poor po intake  c/w SSI for now     HTN   BP stable  Cont home metoprolol   Lasix prn at home for le edema, keep on hold for now     Fibromyalgia / depression/ anxiety/ PTSD /Chronic pain  Polypharmacy    Essential tremor   Continue multiple home meds       Ex smoker, quit 1 year ago   Hyperlipidemia, cont statin  Hypothyroidism, cont synthroid   Obesity.  Body mass index is 34.33 kg/(m^2).   weight loss recommended        Code Status: Full code   Surrogate Decision Maker:       DVT Prophylaxis: Lovenox   GI Prophylaxis: not indicated     Baseline: independent; no home O2; lives with      Recommended Disposition: TBD     Subjective: Pt seen and examined at bedside. Breathing better. Overnight events d/w RN     Chief Complaint / Reason for Physician Visit: F/U Bilateral PNA (necrotic), Sepsis, resp failure, COPD, pleuritic chest pain    Review of Systems:  Symptom Y/N Comments  Symptom Y/N Comments   Fever/Chills n   Chest Pain y Pleuritic R>L, improved   Poor Appetite n   Edema     Cough y   Abdominal Pain     Sputum y   Joint Pain     SOB/RODRIGUEZ y improving  Pruritis/Rash     Nausea/vomit    Tolerating PT/OT y    Diarrhea    Tolerating Diet y    Constipation    Other       Could NOT obtain due to:      Objective:     VITALS:   Last 24hrs VS reviewed since prior progress note. Most recent are:  Patient Vitals for the past 24 hrs:   Temp Pulse Resp BP SpO2   08/09/18 0744 - 95 - - 92 %   08/09/18 0743 98 °F (36.7 °C) 99 16 141/69 92 %   08/09/18 0729 - - - - 97 %   08/09/18 0305 97.9 °F (36.6 °C) 81 16 131/62 91 %   08/08/18 2326 - 93 - 133/67 -   08/08/18 2239 97.6 °F (36.4 °C) 72 14 (!) 82/51 95 %   08/08/18 2044 - - - 130/40 -   08/08/18 1956 98.6 °F (37 °C) 85 22 134/76 91 %   08/08/18 1536 98 °F (36.7 °C) 92 20 139/67 92 %   08/08/18 1128 - - - - 92 %   08/08/18 1106 97 °F (36.1 °C) 73 - 117/65 95 %       Intake/Output Summary (Last 24 hours) at 08/09/18 0857  Last data filed at 08/09/18 0646   Gross per 24 hour   Intake              780 ml   Output             2450 ml   Net            -1670 ml        PHYSICAL EXAM:  General: WD, WN. Alert, cooperative, no acute distress  EENT:  EOMI. Anicteric sclerae. MMM  Resp:  Crackles, improved BS on R side s/p thoracentesis yesterday  CV:  Regular  rhythm,  No edema  GI:  Soft, Non distended, Non tender.  +Bowel sounds  Neurologic:  Alert and oriented X 3, normal speech,   Psych:   Good insight. Not anxious nor agitated  Skin:  No rashes.   No jaundice    Reviewed most current lab test results and cultures  YES  Reviewed most current radiology test results   YES  Review and summation of old records today    NO  Reviewed patient's current orders and MAR    YES  PMH/SH reviewed - no change compared to H&P  ________________________________________________________________________  Care Plan discussed with:    Comments   Patient x    Family      RN x    Care Manager     Consultant                        Multidiciplinary team rounds were held today with , nursing, pharmacist and clinical coordinator. Patient's plan of care was discussed; medications were reviewed and discharge planning was addressed. ________________________________________________________________________  Total NON critical care TIME:  25 Minutes    Total CRITICAL CARE TIME Spent:   Minutes non procedure based      Comments   >50% of visit spent in counseling and coordination of care     ________________________________________________________________________  Tanna Almonte MD     Procedures: see electronic medical records for all procedures/Xrays and details which were not copied into this note but were reviewed prior to creation of Plan. LABS:  I reviewed today's most current labs and imaging studies.   Pertinent labs include:  Recent Labs      08/09/18   0259  08/08/18   0147  08/07/18   0157   WBC  15.0*  17.5*  18.7*   HGB  11.9  11.2*  11.2*   HCT  38.7  35.4  34.9*   PLT  502*  489*  454*     Recent Labs      08/09/18   0259  08/08/18   0147  08/07/18   0157   NA  143  141  142   K  3.5  3.6  3.7   CL  107  107  108   CO2  27  26  25   GLU  118*  134*  146*   BUN  11  12  14   CREA  1.13*  1.02  0.93   CA  9.5  9.3  9.3   MG   --   2.5*   --    PHOS   --   3.9   --        Signed: Tanna Almonte MD

## 2018-08-09 NOTE — PROGRESS NOTES
PCU SHIFT NURSING NOTE      Bedside shift change report given to Frna Weaver (oncoming nurse) by Amy Doan (offgoing nurse). Report included the following information SBAR. Shift Summary: 2145- pt stated that her phycologists did a genetic study on her and the results were that if she took a different color pill that it would impair her. Pt also requesting several med classes at once stating that she took them all the time at home with no issue, when tried to educate about different resp issues created by layering these meds on top of each other, pt adamantly stated it never hurt her before. Admission Date 7/31/2018   Admission Diagnosis CAP (community acquired pneumonia)  Acute respiratory failure ([facility] [address]   Consults IP CONSULT TO PULMONOLOGY  IP CONSULT TO PALLIATIVE CARE - PROVIDER        Consults   []PT   []OT   []Speech   []Case Management      [] Palliative      Cardiac Monitoring Order   []Yes   []No     IV drips   []Yes    Drip:                            Dose:  Drip:                            Dose:  Drip:                            Dose:   []No     GI Prophylaxis   []Yes   []No         DVT Prophylaxis   SCDs:             Onel stockings:         [] Medication   []Contraindicated   []None      Activity Level Activity Level: Logroll, Up with Assistance     Activity Assistance: Partial (one person)   Purposeful Rounding every 1-2 hour? []Yes   Zarate Score  Total Score: 4   Bed Alarm (If score 3 or >)   []Yes   [] Refused (See signed refusal form in chart)   Joseph Score  Joseph Score: 19   Joseph Score (if score 14 or less)   []PMT consult   []Wound Care consult      []Specialty bed   [] Nutrition consult          Needs prior to discharge:   Home O2 required:    []Yes   []No    If yes, how much O2 required?     Other:    Last Bowel Movement: Last Bowel Movement Date: 08/02/18      Influenza Vaccine Received Flu Vaccine for Current Season (usually Sept-March): Not Flu Season        Pneumonia Vaccine Diet Active Orders   Diet    DIET DIABETIC CONSISTENT CARB Regular      LDAs               Peripheral IV 08/07/18 Left Antecubital (Active)   Site Assessment Clean, dry, & intact 8/8/2018  8:00 AM   Phlebitis Assessment 0 8/8/2018  8:00 AM   Infiltration Assessment 0 8/8/2018  8:00 AM   Dressing Status Clean, dry, & intact 8/8/2018  8:00 AM   Dressing Type Transparent 8/8/2018  8:00 AM   Hub Color/Line Status Blue 8/8/2018  8:00 AM       Peripheral IV 08/08/18 Right Antecubital (Active)   Site Assessment Clean, dry, & intact 8/8/2018  7:56 PM   Phlebitis Assessment 0 8/8/2018  7:56 PM   Infiltration Assessment 0 8/8/2018  7:56 PM   Dressing Status Clean, dry, & intact 8/8/2018  7:56 PM   Dressing Type Transparent 8/8/2018  7:56 PM   Hub Color/Line Status Blue;Capped 8/8/2018  7:56 PM   Alcohol Cap Used Yes 8/8/2018  7:56 PM                      Urinary Catheter      Intake & Output   Date 08/07/18 1900 - 08/08/18 0659 08/08/18 0700 - 08/09/18 0659   Shift 9682-0497 24 Hour Total 1052-5162 0003-5308 24 Hour Total   I  N  T  A  K  E   P. O. 700 2520 100  100      P. O. 700 2520 100  100    I.V.  (mL/kg/hr)   200  (0.2)  200      Volume (piperacillin-tazobactam (ZOSYN) 3.375 g in 0.9% sodium chloride (MBP/ADV) 100 mL)   200  200    Shift Total  (mL/kg) 700  (7.7) 2520  (27.8) 300  (3.3)  300  (3.3)   O  U  T  P  U  T   Urine  (mL/kg/hr) 1700 3600 1650  (1.5) 200 1850      Urine Voided 1700 3600 5243 [id number]      Urine Occurrence(s) 2 x 2 x 2 x 1 x 3 x    Other  300         Other Output  300       Chest Tube 140 450         Output (ml) (Chest Tube #1 08/07/18 Right) 140 450       Shift Total  (mL/kg) 1840  (20.3) 4350  (48) 1650  (18.2) 200  (2.2) 1850  (20.4)   NET -1140 -1830 -1350 -200 -1550   Weight (kg) 90.7 90.7 90.7 90.7 90.7         Readmission Risk Assessment Tool Score High Risk            28       Total Score        3 Has Seen PCP in Last 6 Months (Yes=3, No=0)    3 Patient Length of Stay (>5 days = 3)    5 Pt. Coverage (Medicare=5 , Medicaid, or Self-Pay=4)    17 Charlson Comorbidity Score (Age + Comorbid Conditions)        Criteria that do not apply:    . Living with Significant Other. Assisted Living. LTAC. SNF.  or   Rehab    IP Visits Last 12 Months (1-3=4, 4=9, >4=11)       Expected Length of Stay 4d 21h   Actual Length of Stay 8

## 2018-08-09 NOTE — PROGRESS NOTES
[facility]  Pulmonary, Critical Care, and Sleep Medicine    Name: Gilberto Jaramillo MRN: [id number]   : 1955 Hospital: [facility]   Date: 2018        IMPRESSION:   · Right lower lobe pneumonia with dense consolidation and possible areas of necrosis. Also noted to have consolidation of the left lower lobe. · Right sided empyema, now s/p chest tube placement  · Acute chest pain due to severe pneumonia, more difficult to treat due to her chronic use of opiates  · Suspected underlying COPD- acute exacerbation   · Leukocytosis and Sepsis  · NIDDM  · Anemia  · HTN  · Fibromyalgia/Depression/Anxiety, PTSD/ chronic back pain on chronic opiates and benzos. · Essential tremor. · Ex Smoker, quit 1 year ago. · Hypothyroidism. · Obese      RECOMMENDATIONS:   · O2 - wean as tolerated  · Bronchodilators  · IV antibiotics  · Steroids   · Chest tube drainage   · If output remains low and WBC and chest X-ray continue to be stable/improved, will plan on removing chest tube tomorrow  · Appreciate palliative care assistance       Subjective/History:     Slowly feeling better. No new complaints. Initial history 18: Pt was seen about 1145am and then again about 2pm.    Cc: chest pain, dry cough. This patient has been seen and evaluated at the request of Dr. Lv Holden for above. Patient is a 58 y.o. female who presents for above. She has moderate chest pain which limits her ability to take a deep breath. Has some productive coughing but again limited by her chest pain. She reports that her pain got severe and that it has affected her breathing. Pt is very senstive to her medications and has intolerance to a lot of the generic meds. She has been pretty well controlled with her meds prior to admission. On second evaluation pt was much more comfortable and conversant. Was still have intermittent sharp stabbing pain along her right lower to mid chest wall.    Not able to get Complete ROS due to her acute illness, severe pain. Limited ability to give HPI and ROS. The patient is critically ill and can not provide additional history due to Unable to speak. Past Medical History:   Diagnosis Date    Anxiety     Bone spur     NECK    COPD     Depression     Endocrine disease     hypothyroidism    Fibromyalgia     Fusion of spine of cervical region     Gastrointestinal disorder     gerd    Hyperlipidemia     Hypothyroid     Morbid obesity ([facility] [address]     Osteoarthritis     PUD (peptic ulcer disease)     Tobacco abuse       Past Surgical History:   Procedure Laterality Date    BRONCHOSCOPY-FIBER/THERAPY  4/3/2015         CARDIAC CATHETERIZATION  2013         COLONOSCOPY,DIAGNOSTIC  10/30/2014         HX CATARACT REMOVAL      bilateral    HX GYN          HX ORTHOPAEDIC      Ruptured disc, knuckles on right hand    UPPER GI ENDOSCOPY,BIOPSY  10/30/2014         UPPER GI ENDOSCOPY,DILATN W GUIDE  10/30/2014           Prior to Admission medications    Medication Sig Start Date End Date Taking? Authorizing Provider   budesonide-formoterol (SYMBICORT) 160-4.5 mcg/actuation HFAA Take 2 Puffs by inhalation two (2) times a day. Yes Historical Provider   prazosin (MINIPRESS) 2 mg capsule TAKE 1 CAPSULE BY MOUTH TWICE A DAY. 18  Yes Caitlin Garibay NP   DULoxetine 40 mg cpDR Take 40 mg by mouth daily. 18  Yes Caitlin Garibay NP   primidone (MYSOLINE) 50 mg tablet 1 po qam and 3 po qhs 18  Yes Laura Boo MD   ALPRAZolam Estil Massa) 1 mg tablet Take 1 Tab by mouth three (3) times daily as needed for Anxiety. Max Daily Amount: 3 mg. 17  Yes Chichi Simons NP   cetirizine (ZYRTEC) 10 mg tablet Take 1 Tab by mouth daily. 17  Yes Chichi Simons NP   promethazine (PHENERGAN) 25 mg tablet Take 1 Tab by mouth every six (6) hours as needed.  17  Yes Chichi Simons NP   ibuprofen (MOTRIN) 800 mg tablet Take 1 Tab by mouth every eight (8) hours as needed. 5/27/17  Yes Chichi Simons NP   guaiFENesin ER (MUCINEX) 1,200 mg Ta12 ER tablet Take 1 Tab by mouth two (2) times a day. 5/27/17  Yes Chichi Simons NP   furosemide (LASIX) 40 mg tablet Take 1 Tab by mouth daily. Patient taking differently: Take 40 mg by mouth daily as needed for Other (LE edema). 5/27/17  Yes Chichi Simons NP   PHENobarbital (LUMINAL) 60 mg tablet Take 1 Tab by mouth two (2) times a day. Max Daily Amount: 120 mg. 5/27/17  Yes Tracey Sarmiento MD   dicyclomine (BENTYL) 10 mg capsule  2/13/15  Yes Nico Martinez MD   glipiZIDE SR (GLUCOTROL) 5 mg CR tablet Take 5 mg by mouth two (2) times daily as needed (Patient monitors her BG levels and takes when she is also taking a steroid. ). 2/16/15  Yes Nico Martinez MD   montelukast (SINGULAIR) 10 mg tablet Take 10 mg by mouth daily. 2/16/15  Yes Nico Martinez MD   ezetimibe-simvastatin (VYTORIN 10/40) 10-40 mg per tablet Take 1 tablet by mouth nightly. Yes Historical Provider   metoprolol (LOPRESSOR) 25 mg tablet Take 1 Tab by mouth two (2) times a day. 2/6/14  Yes Santo Rosario NP   nystatin (MYCOSTATIN) 100,000 unit/mL suspension Take 5 mL by mouth four (4) times daily. swish and spit  Patient taking differently: Take 500,000 Units by mouth four (4) times daily as needed. swish and spit 1/20/14  Yes Tracey Sarmiento MD   methocarbamol (ROBAXIN) 750 mg tablet Take 750-1,500 mg by mouth four (4) times daily as needed. Yes Historical Provider   hyoscyamine SL (LEVSIN/SL) 0.125 mg SL tablet 0.125 mg by SubLINGual route three (3) times daily as needed for Cramping. Yes Historical Provider   esomeprazole (NEXIUM) 40 mg capsule Take 40 mg by mouth daily. Yes Historical Provider   MAGOX 400 mg tablet TAKE ONE TABLET TWICE A DAY 11/4/13  Yes Terri Liu NP   vit B Cmplx 3-FA-Vit C-Biotin (NEPHRO SHREYA RX) 1- mg-mg-mcg tablet Take 1 Tab by mouth daily. Yes Historical Provider   aspirin 81 mg chewable tablet Take 81 mg by mouth daily. Yes Phys Other, MD   levothyroxine (SYNTHROID) 200 mcg tablet Take 200 mcg by mouth daily (before breakfast). Yes Phys Other, MD   acetaminophen-codeine (TYLENOL #3) 300-30 mg per tablet Take 1 Tab by mouth every eight (8) hours as needed. Max Daily Amount: 3 Tabs. 5/27/17   Chichi Simons NP   albuterol (VENTOLIN HFA) 90 mcg/actuation inhaler Take 2 Puffs by inhalation every four (4) hours as needed for Wheezing. Historical Provider   benzonatate (TESSALON) 200 mg capsule Take 1 Cap by mouth two (2) times daily as needed. 1/20/14   Adan Mauricio MD   albuterol-ipratropium (DUONEB) 2.5 mg-0.5 mg/3 ml nebulizer solution 3 mL by Nebulization route every six (6) hours as needed for Wheezing.     Historical Provider     Current Facility-Administered Medications   Medication Dose Route Frequency    senna-docusate (PERICOLACE) 8.6-50 mg per tablet 2 Tab  2 Tab Oral DAILY    fluticasone furoate (ARNUITY ELLIPTA) 100 mcg/puff  1 Puff Inhalation DAILY    albuterol-ipratropium (DUO-NEB) 2.5 MG-0.5 MG/3 ML  3 mL Nebulization QID RT    prazosin (MINIPRESS) capsule 1 mg  1 mg Oral BID    levoFLOXacin (LEVAQUIN) 750 mg in D5W IVPB  750 mg IntraVENous Q24H    piperacillin-tazobactam (ZOSYN) 3.375 g in 0.9% sodium chloride (MBP/ADV) 100 mL  3.375 g IntraVENous Q8H    methylPREDNISolone (PF) (SOLU-MEDROL) injection 40 mg  40 mg IntraVENous DAILY    ezetimibe/simvastatin (VYTORIN) 10/40 mg  (Patient Supplied)   Oral QPM    B complex-vitaminC-folic acid (NEPHROCAP) cap (Patient Supplied)  1 Cap Oral DAILY    polyethylene glycol (MIRALAX) packet 17 g  17 g Oral DAILY    esomeprazole (NEXIUM) capsule 40 mg (Patient Supplied)  40 mg Oral DAILY    metoprolol tartrate (LOPRESSOR) tablet 25 mg  25 mg Oral BID    lidocaine (LIDODERM) 5 % patch 1 Patch  1 Patch TransDERmal Q24H    aspirin chewable tablet 81 mg  81 mg Oral DAILY    DULoxetine (CYMBALTA) capsule 40 mg  40 mg Oral DAILY    guaiFENesin ER (MUCINEX) tablet 1,200 mg  1,200 mg Oral Q12H    magnesium oxide (MAG-OX) tablet 400 mg  400 mg Oral BID    PHENobarbital (LUMINAL) tablet 64.8 mg  64.8 mg Oral BID    primidone (MYSOLINE) tablet 50 mg  50 mg Oral DAILY    primidone (MYSOLINE) tablet 150 mg  150 mg Oral QHS    sodium chloride (NS) flush 5-10 mL  5-10 mL IntraVENous Q8H    insulin lispro (HUMALOG) injection   SubCUTAneous AC&HS    lactobac ac& pc-s.therm-b.anim (ROBIN Q/RISAQUAD)  1 Cap Oral DAILY    levothyroxine (SYNTHROID) tablet 200 mcg  200 mcg Oral ACB     Allergies   Allergen Reactions    Latex Contact Dermatitis    Dilaudid [Hydromorphone (Pf)] Other (comments)     Feels like bugs are crawling all over her    Lipitor [Atorvastatin] Other (comments)     LIVER TROUBLE ON THIS MEDICATION    Remeron [Mirtazapine] Other (comments)     Tremors    Sulfa (Sulfonamide Antibiotics) Itching      Social History   Substance Use Topics    Smoking status: Former Smoker     Packs/day: 1.00     Years: 30.00    Smokeless tobacco: Never Used    Alcohol use Yes      Comment: occasional      Family History   Problem Relation Age of Onset    Heart Disease Mother     Dementia Mother     Thyroid Disease Mother     Heart Disease Father     Alcohol abuse Father     Psychiatric Disorder Father     Dementia Father     Bipolar Disorder Father     Psychiatric Disorder Sister     Suicide Sister     Psychiatric Disorder Brother     Suicide Brother     Psychiatric Disorder Other     Psychiatric Disorder Daughter     Bipolar Disorder Daughter     Coronary Artery Disease Other      multiple family members in 52's        Review of Systems:Updated on 8/2/18  Review of systems not obtained due to patient factors. Has moderate anxiety, chronic pains. Acute pain of the right mid to lower chest wall, pleuritic in nature. Has productive cough. No Aspiration. Was note to have some loose stools. NO skins issues. No muscle aches, no joint aches.   She does have a resting tremor seems to be worse on the left upper extremity versus RUE. Some also noted in legs. Has moderate to severe anxiety, depression, PTSD. Has a tremendous sensitivity to her meds and is intolerant of several generic meds for her anxiety. Objective:   Vital Signs:    Visit Vitals    /69    Pulse 95    Temp 98 °F (36.7 °C)    Resp 16    Ht 5' 4\" (1.626 m)    Wt 90.7 kg (200 lb)    SpO2 92%    BMI 34.33 kg/m2       O2 Device: Nasal cannula   O2 Flow Rate (L/min): 6 l/min   Temp (24hrs), Av.9 °F (36.6 °C), Min:97 °F (36.1 °C), Max:98.6 °F (37 °C)       Intake/Output:   Last shift:         Last 3 shifts:  1901 -  0700  In: 1480 [P.O.:1280; I.V.:200]  Out: 4790 [Urine:4550]    Intake/Output Summary (Last 24 hours) at 18 0839  Last data filed at 18 0646   Gross per 24 hour   Intake              780 ml   Output             2450 ml   Net            -1670 ml     Physical Exam:    General:  Alert, cooperative, no distress. Head:  Normocephalic, without obvious abnormality, atraumatic. Eyes:  Conjunctivae/corneas clear. Nose: Nares normal. Septum midline. Mucosa normal.     Throat: Lips, mucosa, and tongue normal. Teeth and gums normal.   Neck: Supple, symmetrical, trachea midline    Back:   Symmetric, no curvature. ROM normal.   Lungs:   Bilateral wheeze and ronchi, decreased breath sounds in the right base   Chest wall:  No tenderness or deformity. right chest tube in place   Heart:  Regular rate and rhythm    Abdomen:   Soft, non-tender. Bowel sounds normal.     Extremities: Extremities normal, atraumatic, no cyanosis, +edema   Skin: Skin color, texture, turgor normal. No rashes or lesions   Neurologic: Grossly nonfocal, has tremors of the LUE and RUE. Motor is grossly intact. Psych: NO anxiety or depression.       Data:   Labs:  Recent Labs      18   0259  18   0147  18   0157   WBC  15.0*  17.5*  18.7*   HGB  11.9  11.2*  11.2*   HCT  38.7 35.4  34.9*   PLT  502*  489*  454*     Recent Labs      08/09/18   0259  08/08/18   0147  08/07/18   0157   NA  143  141  142   K  3.5  3.6  3.7   CL  107  107  108   CO2  27  26  25   GLU  118*  134*  146*   BUN  11  12  14   CREA  1.13*  1.02  0.93   CA  9.5  9.3  9.3   MG   --   2.5*   --    PHOS   --   3.9   --      No results for input(s): PH, PCO2, PO2, HCO3, FIO2 in the last 72 hours.     Imaging:  I have personally reviewed the patients radiographs:  None today        Marielena Morgan MD

## 2018-08-10 ENCOUNTER — APPOINTMENT (OUTPATIENT)
Dept: CT IMAGING | Age: 63
DRG: 871 | End: 2018-08-10
Attending: INTERNAL MEDICINE
Payer: MEDICARE

## 2018-08-10 ENCOUNTER — APPOINTMENT (OUTPATIENT)
Dept: GENERAL RADIOLOGY | Age: 63
DRG: 871 | End: 2018-08-10
Attending: INTERNAL MEDICINE
Payer: MEDICARE

## 2018-08-10 LAB
ANION GAP SERPL CALC-SCNC: 4 MMOL/L (ref 5–15)
BASOPHILS # BLD: 0 K/UL (ref 0–0.1)
BASOPHILS NFR BLD: 0 % (ref 0–1)
BUN SERPL-MCNC: 10 MG/DL (ref 6–20)
BUN/CREAT SERPL: 10 (ref 12–20)
CALCIUM SERPL-MCNC: 9.2 MG/DL (ref 8.5–10.1)
CHLORIDE SERPL-SCNC: 106 MMOL/L (ref 97–108)
CO2 SERPL-SCNC: 30 MMOL/L (ref 21–32)
CREAT SERPL-MCNC: 1 MG/DL (ref 0.55–1.02)
DIFFERENTIAL METHOD BLD: ABNORMAL
EOSINOPHIL # BLD: 0 K/UL (ref 0–0.4)
EOSINOPHIL NFR BLD: 0 % (ref 0–7)
ERYTHROCYTE [DISTWIDTH] IN BLOOD BY AUTOMATED COUNT: 14.2 % (ref 11.5–14.5)
GLUCOSE BLD STRIP.AUTO-MCNC: 111 MG/DL (ref 65–100)
GLUCOSE BLD STRIP.AUTO-MCNC: 125 MG/DL (ref 65–100)
GLUCOSE BLD STRIP.AUTO-MCNC: 153 MG/DL (ref 65–100)
GLUCOSE BLD STRIP.AUTO-MCNC: 190 MG/DL (ref 65–100)
GLUCOSE SERPL-MCNC: 134 MG/DL (ref 65–100)
HCT VFR BLD AUTO: 36.9 % (ref 35–47)
HGB BLD-MCNC: 11.5 G/DL (ref 11.5–16)
IMM GRANULOCYTES # BLD: 0 K/UL (ref 0–0.04)
IMM GRANULOCYTES NFR BLD AUTO: 0 % (ref 0–0.5)
LYMPHOCYTES # BLD: 2.1 K/UL (ref 0.8–3.5)
LYMPHOCYTES NFR BLD: 15 % (ref 12–49)
MCH RBC QN AUTO: 30.2 PG (ref 26–34)
MCHC RBC AUTO-ENTMCNC: 31.2 G/DL (ref 30–36.5)
MCV RBC AUTO: 96.9 FL (ref 80–99)
METAMYELOCYTES NFR BLD MANUAL: 1 %
MONOCYTES # BLD: 1.1 K/UL (ref 0–1)
MONOCYTES NFR BLD: 8 % (ref 5–13)
NEUTS SEG # BLD: 10.7 K/UL (ref 1.8–8)
NEUTS SEG NFR BLD: 76 % (ref 32–75)
NRBC # BLD: 0 K/UL (ref 0–0.01)
NRBC BLD-RTO: 0 PER 100 WBC
PLATELET # BLD AUTO: 446 K/UL (ref 150–400)
PMV BLD AUTO: 9 FL (ref 8.9–12.9)
POTASSIUM SERPL-SCNC: 3.6 MMOL/L (ref 3.5–5.1)
RBC # BLD AUTO: 3.81 M/UL (ref 3.8–5.2)
RBC MORPH BLD: ABNORMAL
SERVICE CMNT-IMP: ABNORMAL
SODIUM SERPL-SCNC: 140 MMOL/L (ref 136–145)
WBC # BLD AUTO: 14.1 K/UL (ref 3.6–11)

## 2018-08-10 PROCEDURE — 74011250637 HC RX REV CODE- 250/637: Performed by: NURSE PRACTITIONER

## 2018-08-10 PROCEDURE — 71045 X-RAY EXAM CHEST 1 VIEW: CPT

## 2018-08-10 PROCEDURE — 94640 AIRWAY INHALATION TREATMENT: CPT

## 2018-08-10 PROCEDURE — 74011636637 HC RX REV CODE- 636/637: Performed by: HOSPITALIST

## 2018-08-10 PROCEDURE — 65660000000 HC RM CCU STEPDOWN

## 2018-08-10 PROCEDURE — 74011250637 HC RX REV CODE- 250/637: Performed by: INTERNAL MEDICINE

## 2018-08-10 PROCEDURE — 74011250636 HC RX REV CODE- 250/636: Performed by: INTERNAL MEDICINE

## 2018-08-10 PROCEDURE — 74011000250 HC RX REV CODE- 250: Performed by: INTERNAL MEDICINE

## 2018-08-10 PROCEDURE — 74011000258 HC RX REV CODE- 258: Performed by: INTERNAL MEDICINE

## 2018-08-10 PROCEDURE — 85025 COMPLETE CBC W/AUTO DIFF WBC: CPT | Performed by: INTERNAL MEDICINE

## 2018-08-10 PROCEDURE — 36415 COLL VENOUS BLD VENIPUNCTURE: CPT | Performed by: INTERNAL MEDICINE

## 2018-08-10 PROCEDURE — 80048 BASIC METABOLIC PNL TOTAL CA: CPT | Performed by: INTERNAL MEDICINE

## 2018-08-10 PROCEDURE — 77010033678 HC OXYGEN DAILY

## 2018-08-10 PROCEDURE — 71250 CT THORAX DX C-: CPT

## 2018-08-10 PROCEDURE — 74011636637 HC RX REV CODE- 636/637: Performed by: INTERNAL MEDICINE

## 2018-08-10 PROCEDURE — 82962 GLUCOSE BLOOD TEST: CPT

## 2018-08-10 PROCEDURE — 74011250637 HC RX REV CODE- 250/637: Performed by: HOSPITALIST

## 2018-08-10 RX ORDER — PREDNISONE 20 MG/1
60 TABLET ORAL
Status: DISCONTINUED | OUTPATIENT
Start: 2018-08-10 | End: 2018-08-12 | Stop reason: HOSPADM

## 2018-08-10 RX ORDER — LEVOFLOXACIN 750 MG/1
750 TABLET ORAL EVERY 24 HOURS
Status: DISCONTINUED | OUTPATIENT
Start: 2018-08-10 | End: 2018-08-12 | Stop reason: HOSPADM

## 2018-08-10 RX ADMIN — Medication 5 ML: at 22:00

## 2018-08-10 RX ADMIN — PROMETHAZINE HYDROCHLORIDE 25 MG: 25 TABLET ORAL at 09:45

## 2018-08-10 RX ADMIN — INSULIN LISPRO 2 UNITS: 100 INJECTION, SOLUTION INTRAVENOUS; SUBCUTANEOUS at 13:09

## 2018-08-10 RX ADMIN — LEVOFLOXACIN 750 MG: 750 TABLET, FILM COATED ORAL at 20:31

## 2018-08-10 RX ADMIN — Medication 400 MG: at 09:41

## 2018-08-10 RX ADMIN — PRIMIDONE 150 MG: 50 TABLET ORAL at 22:02

## 2018-08-10 RX ADMIN — Medication 1 CAPSULE: at 09:44

## 2018-08-10 RX ADMIN — PIPERACILLIN SODIUM,TAZOBACTAM SODIUM 3.38 G: 3; .375 INJECTION, POWDER, FOR SOLUTION INTRAVENOUS at 00:36

## 2018-08-10 RX ADMIN — FLUTICASONE FUROATE 1 PUFF: 100 POWDER RESPIRATORY (INHALATION) at 09:48

## 2018-08-10 RX ADMIN — IPRATROPIUM BROMIDE AND ALBUTEROL SULFATE 3 ML: .5; 3 SOLUTION RESPIRATORY (INHALATION) at 15:11

## 2018-08-10 RX ADMIN — OXYCODONE HYDROCHLORIDE 15 MG: 5 TABLET ORAL at 05:10

## 2018-08-10 RX ADMIN — IPRATROPIUM BROMIDE AND ALBUTEROL SULFATE 3 ML: .5; 3 SOLUTION RESPIRATORY (INHALATION) at 20:52

## 2018-08-10 RX ADMIN — PRAZOSIN HYDROCHLORIDE 1 MG: 1 CAPSULE ORAL at 17:10

## 2018-08-10 RX ADMIN — METOPROLOL TARTRATE 25 MG: 25 TABLET ORAL at 17:09

## 2018-08-10 RX ADMIN — PIPERACILLIN SODIUM,TAZOBACTAM SODIUM 3.38 G: 3; .375 INJECTION, POWDER, FOR SOLUTION INTRAVENOUS at 16:57

## 2018-08-10 RX ADMIN — PRIMIDONE 50 MG: 50 TABLET ORAL at 09:43

## 2018-08-10 RX ADMIN — DULOXETINE HYDROCHLORIDE 40 MG: 20 CAPSULE, DELAYED RELEASE ORAL at 09:43

## 2018-08-10 RX ADMIN — PREDNISONE 60 MG: 20 TABLET ORAL at 09:43

## 2018-08-10 RX ADMIN — POLYETHYLENE GLYCOL 3350 17 G: 17 POWDER, FOR SOLUTION ORAL at 09:39

## 2018-08-10 RX ADMIN — GUAIFENESIN 1200 MG: 600 TABLET, EXTENDED RELEASE ORAL at 09:54

## 2018-08-10 RX ADMIN — ALPRAZOLAM 1 MG: 0.5 TABLET ORAL at 18:38

## 2018-08-10 RX ADMIN — INSULIN LISPRO 2 UNITS: 100 INJECTION, SOLUTION INTRAVENOUS; SUBCUTANEOUS at 17:09

## 2018-08-10 RX ADMIN — Medication 400 MG: at 17:09

## 2018-08-10 RX ADMIN — Medication 5 ML: at 14:00

## 2018-08-10 RX ADMIN — ESOMEPRAZOLE MAGNESIUM 40 MG: 40 CAPSULE, DELAYED RELEASE ORAL at 09:45

## 2018-08-10 RX ADMIN — LEVOTHYROXINE SODIUM 200 MCG: 100 TABLET ORAL at 06:24

## 2018-08-10 RX ADMIN — OXYCODONE HYDROCHLORIDE 15 MG: 5 TABLET ORAL at 10:34

## 2018-08-10 RX ADMIN — PIPERACILLIN SODIUM,TAZOBACTAM SODIUM 3.38 G: 3; .375 INJECTION, POWDER, FOR SOLUTION INTRAVENOUS at 09:39

## 2018-08-10 RX ADMIN — METHOCARBAMOL 750 MG: 500 TABLET ORAL at 22:50

## 2018-08-10 RX ADMIN — ALPRAZOLAM 1 MG: 0.5 TABLET ORAL at 22:50

## 2018-08-10 RX ADMIN — OXYCODONE HYDROCHLORIDE 15 MG: 5 TABLET ORAL at 20:30

## 2018-08-10 RX ADMIN — ALPRAZOLAM 1 MG: 0.5 TABLET ORAL at 09:54

## 2018-08-10 RX ADMIN — METOPROLOL TARTRATE 25 MG: 25 TABLET ORAL at 09:42

## 2018-08-10 RX ADMIN — IPRATROPIUM BROMIDE AND ALBUTEROL SULFATE 3 ML: .5; 3 SOLUTION RESPIRATORY (INHALATION) at 11:20

## 2018-08-10 RX ADMIN — Medication 10 ML: at 05:11

## 2018-08-10 RX ADMIN — GUAIFENESIN 1200 MG: 600 TABLET, EXTENDED RELEASE ORAL at 20:31

## 2018-08-10 RX ADMIN — PHENOBARBITAL 64.8 MG: 32.4 TABLET ORAL at 09:41

## 2018-08-10 RX ADMIN — ASPIRIN 81 MG 81 MG: 81 TABLET ORAL at 09:44

## 2018-08-10 RX ADMIN — PHENOBARBITAL 64.8 MG: 32.4 TABLET ORAL at 17:09

## 2018-08-10 RX ADMIN — PRAZOSIN HYDROCHLORIDE 1 MG: 1 CAPSULE ORAL at 09:48

## 2018-08-10 RX ADMIN — IPRATROPIUM BROMIDE AND ALBUTEROL SULFATE 3 ML: .5; 3 SOLUTION RESPIRATORY (INHALATION) at 07:32

## 2018-08-10 NOTE — PROGRESS NOTES
Hospitalist Progress Note    NAME: Nash Cutler   :  1955   MRN:  [id number]       Assessment / Plan:  Acute hypoxic respiratory failure POA  Severe Sepsis POA  due to Bilateral Pneumonia (RLL necrotic) with reactive Lymphadenopathy on CT chest POA  R Para pneumonic Pleural effusion  Acute COPD exacerbation POA  S/p thoracocentesis with 400ml drainage  s/p Chest tube placement   Appreciate pulmonary assistance   CXR with improving effusion and stable per radiology but looking at personally, I still see remarkable effusion. I will get CT to rule out loculated effusion d/w pulmonary Dr Bennie Galvez  CT  Chest noted for-  Significant consolidation in the right lower lobe with areas of necrosis. Small abscesses cannot be excluded in this setting. Consolidation left lower lobe. Trace right effusion with underlying atelectasis. New mediastinal lymphadenopathy may be reactive in nature.   Follow up CXR (8/3) Large right pleural effusion with right basilar consolidation  Follow up CXR () Reduced right pleural effusion and overlying airspace disease status post right pleural drainage catheter placement with no pneumothorax  Follow up CXR 8/10 - Stable pleural effusion  Sputum Cx - moderate beta strep, Hemophilus influenzae noted   BCx neg in 5 days  Continue IV Zosyn and IV levaquin  D/trent Vancomycin  Off IVF  Taper steroids  Scheduled scheduled   Cont oxygen to keep sats >90%  Oxycodone for pain control   Palliative pain consulted - following     Diarrhea POA - C Diff ruled out with no diarrhea in 24 hrs  C/w FloraQ      NIDDM type II  Cont holding PO glipizide with poor po intake  c/w SSI for now     HTN   BP stable  Cont home metoprolol   Lasix prn at home for le edema, keep on hold for now     Fibromyalgia / depression/ anxiety/ PTSD /Chronic pain  Polypharmacy    Essential tremor   Continue multiple home meds       Ex smoker, quit 1 year ago   Hyperlipidemia, cont statin  Hypothyroidism, cont synthroid Obesity. Body mass index is 34.33 kg/(m^2).   weight loss recommended        Code Status: Full code   Surrogate Decision Maker:       DVT Prophylaxis: Lovenox   GI Prophylaxis: not indicated     Baseline: independent; no home O2; lives with      Recommended Disposition: TBD     Subjective: Pt seen and examined at bedside. Continue to breath better. Overnight events d/w RN     Chief Complaint / Reason for Physician Visit: F/U Bilateral PNA (necrotic), Sepsis, resp failure, COPD, pleuritic chest pain    Review of Systems:  Symptom Y/N Comments  Symptom Y/N Comments   Fever/Chills n   Chest Pain y Pleuritic R>L, improved   Poor Appetite n   Edema     Cough y   Abdominal Pain     Sputum y   Joint Pain     SOB/RODRIGUEZ y improving  Pruritis/Rash     Nausea/vomit    Tolerating PT/OT y    Diarrhea    Tolerating Diet y    Constipation    Other       Could NOT obtain due to:      Objective:     VITALS:   Last 24hrs VS reviewed since prior progress note. Most recent are:  Patient Vitals for the past 24 hrs:   Temp Pulse Resp BP SpO2   08/10/18 1512 - - - - 94 %   08/10/18 1120 - - - - 92 %   08/10/18 1116 98.3 °F (36.8 °C) 78 18 127/63 94 %   08/10/18 0939 - (!) 101 - 136/70 -   08/10/18 0732 - - - - 94 %   08/10/18 0715 97.8 °F (36.6 °C) (!) 102 18 136/70 96 %   08/10/18 0252 97.9 °F (36.6 °C) 90 18 139/73 90 %   08/09/18 2337 97.9 °F (36.6 °C) 74 18 106/66 98 %   08/09/18 1924 97.6 °F (36.4 °C) 82 16 111/68 96 %       Intake/Output Summary (Last 24 hours) at 08/10/18 1620  Last data filed at 08/10/18 0717   Gross per 24 hour   Intake              350 ml   Output             1750 ml   Net            -1400 ml        PHYSICAL EXAM:  General: WD, WN. Alert, cooperative, no acute distress  EENT:  EOMI. Anicteric sclerae.  MMM  Resp:  Crackles, improved BS on R side s/p thoracentesis yesterday  CV:  Regular  rhythm,  No edema  GI:  Soft, Non distended, Non tender.  +Bowel sounds  Neurologic:  Alert and oriented X 3, normal speech,   Psych:   Good insight. Not anxious nor agitated  Skin:  No rashes. No jaundice    Reviewed most current lab test results and cultures  YES  Reviewed most current radiology test results   YES  Review and summation of old records today    NO  Reviewed patient's current orders and MAR    YES  PMH/SH reviewed - no change compared to H&P  ________________________________________________________________________  Care Plan discussed with:    Comments   Patient x    Family      RN x    Care Manager     Consultant  x pulmonary                     Multidiciplinary team rounds were held today with , nursing, pharmacist and clinical coordinator. Patient's plan of care was discussed; medications were reviewed and discharge planning was addressed. ________________________________________________________________________  Total NON critical care TIME:  30 Minutes    Total CRITICAL CARE TIME Spent:   Minutes non procedure based      Comments   >50% of visit spent in counseling and coordination of care x Chart review and discussion with pulmonary   ________________________________________________________________________  Willem Rivera MD     Procedures: see electronic medical records for all procedures/Xrays and details which were not copied into this note but were reviewed prior to creation of Plan. LABS:  I reviewed today's most current labs and imaging studies.   Pertinent labs include:  Recent Labs      08/10/18   0255  08/09/18   0259  08/08/18   0147   WBC  14.1*  15.0*  17.5*   HGB  11.5  11.9  11.2*   HCT  36.9  38.7  35.4   PLT  446*  502*  489*     Recent Labs      08/10/18   0255  08/09/18   0259  08/08/18   0147   NA  140  143  141   K  3.6  3.5  3.6   CL  106  107  107   CO2  30  27  26   GLU  134*  118*  134*   BUN  10  11  12   CREA  1.00  1.13*  1.02   CA  9.2  9.5  9.3   MG   --    --   2.5*   PHOS   --    --   3.9       Signed: Willem Rivera MD

## 2018-08-10 NOTE — PROGRESS NOTES
PCU SHIFT NURSING NOTE      Bedside and Verbal shift change report given to Michael Zuniga RN (oncoming nurse) by Jorge A Velasquez RN (offgoing nurse). Report included the following information SBAR, Kardex, ED Summary, Intake/Output, MAR, Recent Results and Cardiac Rhythm NSR. Shift Summary: [id number]: Bedside and Verbal shift change report given to Kane Oliveira RN (oncoming nurse) by Michael Zuniga RN (offgoing nurse). Report included the following information SBAR, Kardex, ED Summary, Intake/Output, MAR, Recent Results and Cardiac Rhythm NSR. Admission Date 7/31/2018   Admission Diagnosis CAP (community acquired pneumonia)  Acute respiratory failure ([facility] [address]   Consults IP CONSULT TO PULMONOLOGY  IP CONSULT TO PALLIATIVE CARE - PROVIDER        Consults   []PT   [x]OT   []Speech   []Case Management      [] Palliative      Cardiac Monitoring Order   [x]Yes   []No     IV drips   []Yes    Drip:                            Dose:  Drip:                            Dose:  Drip:                            Dose:   [x]No     GI Prophylaxis   [x]Yes   []No         DVT Prophylaxis   SCDs:             Onel stockings:         [] Medication   []Contraindicated   []None      Activity Level Activity Level: Up with Assistance     Activity Assistance: Partial (one person)   Purposeful Rounding every 1-2 hour? [x]Yes   Zarate Score  Total Score: 3   Bed Alarm (If score 3 or >)   [x]Yes   [] Refused (See signed refusal form in chart)   Joseph Score  Joseph Score: 17   Joseph Score (if score 14 or less)   []PMT consult   []Wound Care consult      []Specialty bed   [] Nutrition consult          Needs prior to discharge:   Home O2 required:    [x]Yes   []No    If yes, how much O2 required?  3L NC    Other:    Last Bowel Movement: Last Bowel Movement Date: 08/10/18      Influenza Vaccine Received Flu Vaccine for Current Season (usually Sept-March): Not Flu Season        Pneumonia Vaccine           Diet Active Orders   Diet    DIET DIABETIC CONSISTENT CARB Regular      LDAs               Peripheral IV 08/08/18 Right Antecubital (Active)   Site Assessment Clean, dry, & intact 8/9/2018 11:39 PM   Phlebitis Assessment 0 8/9/2018 11:39 PM   Infiltration Assessment 0 8/9/2018 11:39 PM   Dressing Status Clean, dry, & intact 8/9/2018 11:39 PM   Dressing Type Transparent;Tape 8/9/2018 11:39 PM   Hub Color/Line Status Blue; Infusing 8/9/2018 11:39 PM   Alcohol Cap Used Yes 8/9/2018  7:24 PM                      Urinary Catheter      Intake & Output   Date 08/09/18 0700 - 08/10/18 0659 08/10/18 0700 - 08/11/18 0659   Shift 5576-0075 1748-9194 24 Hour Total 9221-7594 8454-0347 24 Hour Total   I  N  T  A  K  E   P.O. 360 100 460         P. O. 360 100 460       I.V.  (mL/kg/hr) 300  (0.3) 250 550         Volume (levoFLOXacin (LEVAQUIN) 750 mg in D5W IVPB)  150 150         Volume (piperacillin-tazobactam (ZOSYN) 3.375 g in 0.9% sodium chloride (MBP/ADV) 100 mL) 300 100 400       Shift Total  (mL/kg) 660  (7.3) 350  (3.9) 1010  (11.1)      O  U  T  P  U  T   Urine  (mL/kg/hr) 1500  (1.4) 600 2100         Urine Voided 6776 137 6863       Stool 2  2         Stool Occurrence(s) 1 x 1 x 2 x         Stool 2  2       Chest Tube  120 120         Output (ml) (Chest Tube #1 08/07/18 Right)  120 120       Shift Total  (mL/kg) 1502  (16.6) 720  (7.9) 2222  (24.5)      NET -462 [phone] [phone]      Weight (kg) 90.7 90.7 90.7 90.7 90.7 90.7         Readmission Risk Assessment Tool Score High Risk            28       Total Score        3 Has Seen PCP in Last 6 Months (Yes=3, No=0)    3 Patient Length of Stay (>5 days = 3)    5 Pt. Coverage (Medicare=5 , Medicaid, or Self-Pay=4)    17 Charlson Comorbidity Score (Age + Comorbid Conditions)        Criteria that do not apply:    . Living with Significant Other. Assisted Living. LTAC. SNF.  or   Rehab    IP Visits Last 12 Months (1-3=4, 4=9, >4=11)       Expected Length of Stay 4d 21h   Actual Length of Stay 10            '

## 2018-08-10 NOTE — PROGRESS NOTES
ADULT PROTOCOL: JET AEROSOL  REASSESSMENT    Patient  Wily Gray     58 y.o.   female     8/10/2018  8:44 AM    Breath Sounds Pre Procedure: Right Breath Sounds: Coarse                               Left Breath Sounds: Coarse    Breath Sounds Post Procedure: Right Breath Sounds: Coarse                                 Left Breath Sounds: Coarse    Breathing pattern: Pre procedure Breathing Pattern: Regular          Post procedure Breathing Pattern: Regular    Heart Rate: Pre procedure Pulse: 83           Post procedure Pulse: 80    Resp Rate: Pre procedure Respirations: 18           Post procedure Respirations: 18      Incentive Spirometry:  Actual Volume (ml): 750 ml          Cough: Pre procedure Cough: Non-productive               Post procedure Cough: Non-productive      Oxygen: O2 Device: Nasal cannula   3L       SpO2: Pre procedure SpO2: 94 %                 Post procedure SpO2: 93 %     Nebulizer Therapy: Current medications Aerosolized Medications: DuoNeb qid      Changed: No    Smoking History:   Former    Problem List:   Patient Active Problem List   Diagnosis Code    COPD (chronic obstructive pulmonary disease) ([facility] J44.9    PTSD (post-traumatic stress disorder) F43.10    PVC (premature ventricular contraction) I49.3    Other and unspecified hyperlipidemia E78.5    Tobacco use disorder F17.200    Family history of ischemic heart disease Z82.49    Chest pain with normal coronary angiography R07.9    Abnormal nuclear stress test R94.39    Acute respiratory failure (HCC) J96.00    Pneumonia, organism unspecified(486) J18.9    Unspecified hypothyroidism E03.9    Depression, major, recurrent (HCC) F33.9    Cervical post-laminectomy syndrome M96.1    Neck pain M54.2    Ataxia R27.0    B12 deficiency E53.8    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.42    Syncope and collapse R55    COPD with acute exacerbation (HCC) J44.1    Acute respiratory failure with hypoxia (HCC) J96.01    Chronic respiratory failure with hypoxia ([facility]) J96.11    Benign familial tremor G25.0    Atelectasis J98.11    Allergic rhinitis J30.9    Sepsis (HCC) A41.9    Acute exacerbation of COPD with asthma ([facility]) J44.1, J45.901    HTN (hypertension) I10    Hyperlipidemia E78.5    Hypothyroidism E03.9    Smoking F17.200    Diabetic peripheral neuropathy associated with type 2 diabetes mellitus ([facility]) E11.42    Stenosis of both internal carotid arteries I65.23    Cervical radiculopathy due to degenerative joint disease of spine M47.22    Degenerative lumbar spinal stenosis M48.061    Vitamin D deficiency E55.9    Altered mental status, unspecified R41.82    Cerebral microvascular disease I67.9    Obesity (BMI 35.0-39.9 without comorbidity) E66.9    Fibromyalgia M79.7    Benign essential tremor syndrome G25.0    CAP (community acquired pneumonia) J18.9    Productive cough R05    Chest pain on breathing R07.1    Anxiety and depression F41.9, F32.9    Polypharmacy Z79.899    Shortness of breath R06.02    Drug-induced constipation K59.03       Respiratory Therapist: Carina Uribe V RT

## 2018-08-10 NOTE — PROGRESS NOTES
Palliative Medicine  [place]: [phone] ([phone]  [facility]: [phone] ([phone])    Patient fast asleep when LCSW went by to see her. Did not wake her up as this was just a social visit. Will try again later this afternoon or next week. Patient appeared to be resting comfortably.

## 2018-08-10 NOTE — PROGRESS NOTES
Problem: Pressure Injury - Risk of  Goal: *Prevention of pressure injury  Document Joseph Scale and appropriate interventions in the flowsheet. Outcome: Progressing Towards Goal  Pressure Injury Interventions:  Sensory Interventions: Assess changes in LOC, Discuss PT/OT consult with provider, Float heels, Keep linens dry and wrinkle-free, Maintain/enhance activity level, Minimize linen layers, Monitor skin under medical devices, Turn and reposition approx.  every two hours (pillows and wedges if needed)    Moisture Interventions: Absorbent underpads, Assess need for specialty bed, Maintain skin hydration (lotion/cream), Minimize layers    Activity Interventions: Increase time out of bed, Pressure redistribution bed/mattress(bed type), PT/OT evaluation    Mobility Interventions: HOB 30 degrees or less, Pressure redistribution bed/mattress (bed type), PT/OT evaluation    Nutrition Interventions: Document food/fluid/supplement intake, Offer support with meals,snacks and hydration    Friction and Shear Interventions: HOB 30 degrees or less, Lift sheet

## 2018-08-10 NOTE — PROGRESS NOTES
[facility]  Pulmonary, Critical Care, and Sleep Medicine    Name: Cheryle Balls MRN: [id number]   : 1955 Hospital: [address] [facility]   Date: 8/10/2018        IMPRESSION:   · Right lower lobe pneumonia with dense consolidation and possible areas of necrosis. Also noted to have consolidation of the left lower lobe. · Right sided empyema, now s/p chest tube placement  · Acute chest pain due to severe pneumonia, more difficult to treat due to her chronic use of opiates  · Suspected underlying COPD- acute exacerbation   · Leukocytosis and Sepsis  · NIDDM  · Anemia  · HTN  · Fibromyalgia/Depression/Anxiety, PTSD/ chronic back pain on chronic opiates and benzos. · Essential tremor. · Ex Smoker, quit 1 year ago. · Hypothyroidism. · Obese      RECOMMENDATIONS:   · O2 - wean as tolerated  · Bronchodilators  · IV antibiotics  · Steroid taper  · Follow up chest X-ray. If stable to improved, can remove chest tube  · Appreciate palliative care assistance       Subjective/History:     Feels a lot better today with less dyspnea. Some discomfort at chest tube site. Initial history 18: Pt was seen about 1145am and then again about 2pm.    Cc: chest pain, dry cough. This patient has been seen and evaluated at the request of Dr. Lakshmi Wells for above. Patient is a 58 y.o. female who presents for above. She has moderate chest pain which limits her ability to take a deep breath. Has some productive coughing but again limited by her chest pain. She reports that her pain got severe and that it has affected her breathing. Pt is very senstive to her medications and has intolerance to a lot of the generic meds. She has been pretty well controlled with her meds prior to admission. On second evaluation pt was much more comfortable and conversant. Was still have intermittent sharp stabbing pain along her right lower to mid chest wall.    Not able to get Complete ROS due to her acute illness, severe pain. Limited ability to give HPI and ROS. The patient is critically ill and can not provide additional history due to Unable to speak. Past Medical History:   Diagnosis Date    Anxiety     Bone spur     NECK    COPD     Depression     Endocrine disease     hypothyroidism    Fibromyalgia     Fusion of spine of cervical region     Gastrointestinal disorder     gerd    Hyperlipidemia     Hypothyroid     Morbid obesity ([address]     Osteoarthritis     PUD (peptic ulcer disease)     Tobacco abuse       Past Surgical History:   Procedure Laterality Date    BRONCHOSCOPY-FIBER/THERAPY  4/3/2015         CARDIAC CATHETERIZATION  2013         COLONOSCOPY,DIAGNOSTIC  10/30/2014         HX CATARACT REMOVAL      bilateral    HX GYN          HX ORTHOPAEDIC      Ruptured disc, knuckles on right hand    UPPER GI ENDOSCOPY,BIOPSY  10/30/2014         UPPER GI ENDOSCOPY,DILATN W GUIDE  10/30/2014           Prior to Admission medications    Medication Sig Start Date End Date Taking? Authorizing Provider   budesonide-formoterol (SYMBICORT) 160-4.5 mcg/actuation HFAA Take 2 Puffs by inhalation two (2) times a day. Yes Historical Provider   prazosin (MINIPRESS) 2 mg capsule TAKE 1 CAPSULE BY MOUTH TWICE A DAY. 18  Yes Daquan Blackman NP   DULoxetine 40 mg cpDR Take 40 mg by mouth daily. 18  Yes Daquan Blackman NP   primidone (MYSOLINE) 50 mg tablet 1 po qam and 3 po qhs 18  Yes Risa Lira MD   ALPRAZolam Candida Rayolaliana) 1 mg tablet Take 1 Tab by mouth three (3) times daily as needed for Anxiety. Max Daily Amount: 3 mg. 17  Yes Chichi Simons NP   cetirizine (ZYRTEC) 10 mg tablet Take 1 Tab by mouth daily. 17  Yes Chichi Simons NP   promethazine (PHENERGAN) 25 mg tablet Take 1 Tab by mouth every six (6) hours as needed. 17  Yes Chichi Simons NP   ibuprofen (MOTRIN) 800 mg tablet Take 1 Tab by mouth every eight (8) hours as needed.  17  Yes Chichi Simons NP   guaiFENesin ER (MUCINEX) 1,200 mg Ta12 ER tablet Take 1 Tab by mouth two (2) times a day. 5/27/17  Yes Chichi Simons NP   furosemide (LASIX) 40 mg tablet Take 1 Tab by mouth daily. Patient taking differently: Take 40 mg by mouth daily as needed for Other (LE edema). 5/27/17  Yes Chichi Simons NP   PHENobarbital (LUMINAL) 60 mg tablet Take 1 Tab by mouth two (2) times a day. Max Daily Amount: 120 mg. 5/27/17  Yes Isiah Ferrer MD   dicyclomine (BENTYL) 10 mg capsule  2/13/15  Yes Nico Martinez MD   glipiZIDE SR (GLUCOTROL) 5 mg CR tablet Take 5 mg by mouth two (2) times daily as needed (Patient monitors her BG levels and takes when she is also taking a steroid. ). 2/16/15  Yes Nico Martinez MD   montelukast (SINGULAIR) 10 mg tablet Take 10 mg by mouth daily. 2/16/15  Yes Nico Martinez MD   ezetimibe-simvastatin (VYTORIN 10/40) 10-40 mg per tablet Take 1 tablet by mouth nightly. Yes Historical Provider   metoprolol (LOPRESSOR) 25 mg tablet Take 1 Tab by mouth two (2) times a day. 2/6/14  Yes Diego Abdul NP   nystatin (MYCOSTATIN) 100,000 unit/mL suspension Take 5 mL by mouth four (4) times daily. swish and spit  Patient taking differently: Take 500,000 Units by mouth four (4) times daily as needed. swish and spit 1/20/14  Yes Isiah Ferrer MD   methocarbamol (ROBAXIN) 750 mg tablet Take 750-1,500 mg by mouth four (4) times daily as needed. Yes Historical Provider   hyoscyamine SL (LEVSIN/SL) 0.125 mg SL tablet 0.125 mg by SubLINGual route three (3) times daily as needed for Cramping. Yes Historical Provider   esomeprazole (NEXIUM) 40 mg capsule Take 40 mg by mouth daily. Yes Historical Provider   MAGOX 400 mg tablet TAKE ONE TABLET TWICE A DAY 11/4/13  Yes Oscar Candelaria NP   vit B Cmplx 3-FA-Vit C-Biotin (NEPHRO SHREYA RX) 1- mg-mg-mcg tablet Take 1 Tab by mouth daily. Yes Historical Provider   aspirin 81 mg chewable tablet Take 81 mg by mouth daily.    Yes Phys Other, MD levothyroxine (SYNTHROID) 200 mcg tablet Take 200 mcg by mouth daily (before breakfast). Yes Phys Other, MD   acetaminophen-codeine (TYLENOL #3) 300-30 mg per tablet Take 1 Tab by mouth every eight (8) hours as needed. Max Daily Amount: 3 Tabs. 5/27/17   Chichi Simons NP   albuterol (VENTOLIN HFA) 90 mcg/actuation inhaler Take 2 Puffs by inhalation every four (4) hours as needed for Wheezing. Historical Provider   benzonatate (TESSALON) 200 mg capsule Take 1 Cap by mouth two (2) times daily as needed. 1/20/14   Leon Hall MD   albuterol-ipratropium (DUONEB) 2.5 mg-0.5 mg/3 ml nebulizer solution 3 mL by Nebulization route every six (6) hours as needed for Wheezing.     Historical Provider     Current Facility-Administered Medications   Medication Dose Route Frequency    senna-docusate (PERICOLACE) 8.6-50 mg per tablet 2 Tab  2 Tab Oral DAILY    fluticasone furoate (ARNUITY ELLIPTA) 100 mcg/puff  1 Puff Inhalation DAILY    albuterol-ipratropium (DUO-NEB) 2.5 MG-0.5 MG/3 ML  3 mL Nebulization QID RT    prazosin (MINIPRESS) capsule 1 mg  1 mg Oral BID    levoFLOXacin (LEVAQUIN) 750 mg in D5W IVPB  750 mg IntraVENous Q24H    piperacillin-tazobactam (ZOSYN) 3.375 g in 0.9% sodium chloride (MBP/ADV) 100 mL  3.375 g IntraVENous Q8H    methylPREDNISolone (PF) (SOLU-MEDROL) injection 40 mg  40 mg IntraVENous DAILY    ezetimibe/simvastatin (VYTORIN) 10/40 mg  (Patient Supplied)   Oral QPM    B complex-vitaminC-folic acid (NEPHROCAP) cap (Patient Supplied)  1 Cap Oral DAILY    polyethylene glycol (MIRALAX) packet 17 g  17 g Oral DAILY    esomeprazole (NEXIUM) capsule 40 mg (Patient Supplied)  40 mg Oral DAILY    metoprolol tartrate (LOPRESSOR) tablet 25 mg  25 mg Oral BID    lidocaine (LIDODERM) 5 % patch 1 Patch  1 Patch TransDERmal Q24H    aspirin chewable tablet 81 mg  81 mg Oral DAILY    DULoxetine (CYMBALTA) capsule 40 mg  40 mg Oral DAILY    guaiFENesin ER (MUCINEX) tablet 1,200 mg  1,200 mg Oral Q12H    magnesium oxide (MAG-OX) tablet 400 mg  400 mg Oral BID    PHENobarbital (LUMINAL) tablet 64.8 mg  64.8 mg Oral BID    primidone (MYSOLINE) tablet 50 mg  50 mg Oral DAILY    primidone (MYSOLINE) tablet 150 mg  150 mg Oral QHS    sodium chloride (NS) flush 5-10 mL  5-10 mL IntraVENous Q8H    insulin lispro (HUMALOG) injection   SubCUTAneous AC&HS    lactobac ac& pc-s.therm-b.anim (ROBIN Q/RISAQUAD)  1 Cap Oral DAILY    levothyroxine (SYNTHROID) tablet 200 mcg  200 mcg Oral ACB     Allergies   Allergen Reactions    Latex Contact Dermatitis    Dilaudid [Hydromorphone (Pf)] Other (comments)     Feels like bugs are crawling all over her    Lipitor [Atorvastatin] Other (comments)     LIVER TROUBLE ON THIS MEDICATION    Remeron [Mirtazapine] Other (comments)     Tremors    Sulfa (Sulfonamide Antibiotics) Itching      Social History   Substance Use Topics    Smoking status: Former Smoker     Packs/day: 1.00     Years: 30.00    Smokeless tobacco: Never Used    Alcohol use Yes      Comment: occasional      Family History   Problem Relation Age of Onset    Heart Disease Mother     Dementia Mother     Thyroid Disease Mother     Heart Disease Father     Alcohol abuse Father     Psychiatric Disorder Father     Dementia Father     Bipolar Disorder Father     Psychiatric Disorder Sister     Suicide Sister     Psychiatric Disorder Brother     Suicide Brother     Psychiatric Disorder Other     Psychiatric Disorder Daughter     Bipolar Disorder Daughter     Coronary Artery Disease Other      multiple family members in 52's        Review of Systems:Updated on 8/2/18  Review of systems not obtained due to patient factors. Has moderate anxiety, chronic pains. Acute pain of the right mid to lower chest wall, pleuritic in nature. Has productive cough. No Aspiration. Was note to have some loose stools. NO skins issues. No muscle aches, no joint aches.   She does have a resting tremor seems to be worse on the left upper extremity versus RUE. Some also noted in legs. Has moderate to severe anxiety, depression, PTSD. Has a tremendous sensitivity to her meds and is intolerant of several generic meds for her anxiety. Objective:   Vital Signs:    Visit Vitals    /73 (BP 1 Location: Left arm, BP Patient Position: At rest)    Pulse 90    Temp 97.9 °F (36.6 °C)    Resp 18    Ht 5' 4\" (1.626 m)    Wt 90.7 kg (200 lb)    SpO2 94%    BMI 34.33 kg/m2       O2 Device: Nasal cannula   O2 Flow Rate (L/min): 3 l/min   Temp (24hrs), Av.9 °F (36.6 °C), Min:97.6 °F (36.4 °C), Max:98.3 °F (36.8 °C)       Intake/Output:   Last shift:      08/10 0701 - 08/10 1900  In: -   Out: 425 [Urine:425]  Last 3 shifts: 1901 - 08/10 0700  In: 1640 [P.O.:940; I.V.:700]  Out: 3827 [Urine:3575]    Intake/Output Summary (Last 24 hours) at 08/10/18 0820  Last data filed at 08/10/18 0717   Gross per 24 hour   Intake              630 ml   Output             2952 ml   Net            -2322 ml     Physical Exam:    General:  Alert, cooperative, no distress. Head:  Normocephalic, without obvious abnormality, atraumatic. Eyes:  Conjunctivae/corneas clear. Nose: Nares normal. Septum midline. Mucosa normal.     Throat: Lips, mucosa, and tongue normal. Teeth and gums normal.   Neck: Supple, symmetrical, trachea midline    Back:   Symmetric, no curvature. ROM normal.   Lungs:   Bilateral wheeze and ronchi, decreased breath sounds in the right base   Chest wall:  No tenderness or deformity. right chest tube in place   Heart:  Regular rate and rhythm    Abdomen:   Soft, non-tender. Bowel sounds normal.     Extremities: Extremities normal, atraumatic, no cyanosis, +edema   Skin: Skin color, texture, turgor normal. No rashes or lesions   Neurologic: Grossly nonfocal, has tremors of the LUE and RUE. Motor is grossly intact. Psych: NO anxiety or depression.       Data:   Labs:  Recent Labs      08/10/18   0255 08/09/18   0259 08/08/18 0147   WBC  14.1*  15.0*  17.5*   HGB  11.5  11.9  11.2*   HCT  36.9  38.7  35.4   PLT  446*  502*  489*     Recent Labs      08/10/18   0255 08/09/18 0259 08/08/18 0147   NA  140  143  141   K  3.6  3.5  3.6   CL  106  107  107   CO2  30  27  26   GLU  134*  118*  134*   BUN  10  11  12   CREA  1.00  1.13*  1.02   CA  9.2  9.5  9.3   MG   --    --   2.5*   PHOS   --    --   3.9     No results for input(s): PH, PCO2, PO2, HCO3, FIO2 in the last 72 hours.     Imaging:  I have personally reviewed the patients radiographs:  Pending         Lissett Miller MD

## 2018-08-10 NOTE — PROGRESS NOTES
Problem: Falls - Risk of  Goal: *Absence of Falls  Document Palma Fall Risk and appropriate interventions in the flowsheet.    Outcome: Progressing Towards Goal  Fall Risk Interventions:  Mobility Interventions: Assess mobility with egress test, Bed/chair exit alarm, OT consult for ADLs, Patient to call before getting OOB, PT Consult for mobility concerns    Mentation Interventions: Adequate sleep, hydration, pain control, Bed/chair exit alarm, Eyeglasses and hearing aids, Increase mobility, More frequent rounding, Room close to nurse's station, Toileting rounds, Update white board    Medication Interventions: Bed/chair exit alarm, Patient to call before getting OOB, Teach patient to arise slowly    Elimination Interventions: Bed/chair exit alarm, Call light in reach, Patient to call for help with toileting needs, Toileting schedule/hourly rounds    History of Falls Interventions: Bed/chair exit alarm, Door open when patient unattended, Room close to nurse's station

## 2018-08-11 LAB
ANION GAP SERPL CALC-SCNC: 6 MMOL/L (ref 5–15)
BACTERIA SPEC CULT: ABNORMAL
BASOPHILS # BLD: 0 K/UL (ref 0–0.1)
BASOPHILS NFR BLD: 0 % (ref 0–1)
BUN SERPL-MCNC: 13 MG/DL (ref 6–20)
BUN/CREAT SERPL: 13 (ref 12–20)
CALCIUM SERPL-MCNC: 9.3 MG/DL (ref 8.5–10.1)
CHLORIDE SERPL-SCNC: 105 MMOL/L (ref 97–108)
CO2 SERPL-SCNC: 29 MMOL/L (ref 21–32)
CREAT SERPL-MCNC: 0.97 MG/DL (ref 0.55–1.02)
DIFFERENTIAL METHOD BLD: ABNORMAL
EOSINOPHIL # BLD: 0 K/UL (ref 0–0.4)
EOSINOPHIL NFR BLD: 0 % (ref 0–7)
ERYTHROCYTE [DISTWIDTH] IN BLOOD BY AUTOMATED COUNT: 14 % (ref 11.5–14.5)
GLUCOSE BLD STRIP.AUTO-MCNC: 142 MG/DL (ref 65–100)
GLUCOSE BLD STRIP.AUTO-MCNC: 150 MG/DL (ref 65–100)
GLUCOSE BLD STRIP.AUTO-MCNC: 189 MG/DL (ref 65–100)
GLUCOSE BLD STRIP.AUTO-MCNC: 199 MG/DL (ref 65–100)
GLUCOSE BLD STRIP.AUTO-MCNC: 77 MG/DL (ref 65–100)
GLUCOSE SERPL-MCNC: 130 MG/DL (ref 65–100)
GRAM STN SPEC: ABNORMAL
HCT VFR BLD AUTO: 37.9 % (ref 35–47)
HGB BLD-MCNC: 11.8 G/DL (ref 11.5–16)
IMM GRANULOCYTES # BLD: 0 K/UL (ref 0–0.04)
IMM GRANULOCYTES NFR BLD AUTO: 0 % (ref 0–0.5)
LYMPHOCYTES # BLD: 2 K/UL (ref 0.8–3.5)
LYMPHOCYTES NFR BLD: 13 % (ref 12–49)
MCH RBC QN AUTO: 29.9 PG (ref 26–34)
MCHC RBC AUTO-ENTMCNC: 31.1 G/DL (ref 30–36.5)
MCV RBC AUTO: 95.9 FL (ref 80–99)
MONOCYTES # BLD: 0.9 K/UL (ref 0–1)
MONOCYTES NFR BLD: 6 % (ref 5–13)
NEUTS BAND NFR BLD MANUAL: 2 %
NEUTS SEG # BLD: 12.7 K/UL (ref 1.8–8)
NEUTS SEG NFR BLD: 79 % (ref 32–75)
NRBC # BLD: 0 K/UL (ref 0–0.01)
NRBC BLD-RTO: 0 PER 100 WBC
PLATELET # BLD AUTO: 429 K/UL (ref 150–400)
PMV BLD AUTO: 9 FL (ref 8.9–12.9)
POTASSIUM SERPL-SCNC: 3.8 MMOL/L (ref 3.5–5.1)
RBC # BLD AUTO: 3.95 M/UL (ref 3.8–5.2)
RBC MORPH BLD: ABNORMAL
SERVICE CMNT-IMP: ABNORMAL
SERVICE CMNT-IMP: NORMAL
SODIUM SERPL-SCNC: 140 MMOL/L (ref 136–145)
WBC # BLD AUTO: 15.6 K/UL (ref 3.6–11)

## 2018-08-11 PROCEDURE — 74011000258 HC RX REV CODE- 258: Performed by: INTERNAL MEDICINE

## 2018-08-11 PROCEDURE — 74011636637 HC RX REV CODE- 636/637: Performed by: INTERNAL MEDICINE

## 2018-08-11 PROCEDURE — 74011250636 HC RX REV CODE- 250/636: Performed by: INTERNAL MEDICINE

## 2018-08-11 PROCEDURE — 74011000250 HC RX REV CODE- 250: Performed by: INTERNAL MEDICINE

## 2018-08-11 PROCEDURE — 74011250637 HC RX REV CODE- 250/637: Performed by: HOSPITALIST

## 2018-08-11 PROCEDURE — 82962 GLUCOSE BLOOD TEST: CPT

## 2018-08-11 PROCEDURE — 77010033678 HC OXYGEN DAILY

## 2018-08-11 PROCEDURE — 74011250637 HC RX REV CODE- 250/637: Performed by: NURSE PRACTITIONER

## 2018-08-11 PROCEDURE — 80048 BASIC METABOLIC PNL TOTAL CA: CPT | Performed by: INTERNAL MEDICINE

## 2018-08-11 PROCEDURE — 74011250637 HC RX REV CODE- 250/637: Performed by: INTERNAL MEDICINE

## 2018-08-11 PROCEDURE — 74011636637 HC RX REV CODE- 636/637: Performed by: HOSPITALIST

## 2018-08-11 PROCEDURE — 85025 COMPLETE CBC W/AUTO DIFF WBC: CPT | Performed by: INTERNAL MEDICINE

## 2018-08-11 PROCEDURE — 65660000000 HC RM CCU STEPDOWN

## 2018-08-11 PROCEDURE — 36415 COLL VENOUS BLD VENIPUNCTURE: CPT | Performed by: INTERNAL MEDICINE

## 2018-08-11 PROCEDURE — 94640 AIRWAY INHALATION TREATMENT: CPT

## 2018-08-11 RX ORDER — FUROSEMIDE 10 MG/ML
20 INJECTION INTRAMUSCULAR; INTRAVENOUS 2 TIMES DAILY
Status: DISCONTINUED | OUTPATIENT
Start: 2018-08-11 | End: 2018-08-12 | Stop reason: HOSPADM

## 2018-08-11 RX ADMIN — IPRATROPIUM BROMIDE AND ALBUTEROL SULFATE 3 ML: .5; 3 SOLUTION RESPIRATORY (INHALATION) at 11:23

## 2018-08-11 RX ADMIN — FUROSEMIDE 20 MG: 10 INJECTION, SOLUTION INTRAMUSCULAR; INTRAVENOUS at 12:29

## 2018-08-11 RX ADMIN — PRAZOSIN HYDROCHLORIDE 1 MG: 1 CAPSULE ORAL at 17:59

## 2018-08-11 RX ADMIN — FLUTICASONE FUROATE 1 PUFF: 100 POWDER RESPIRATORY (INHALATION) at 08:52

## 2018-08-11 RX ADMIN — Medication 10 ML: at 21:13

## 2018-08-11 RX ADMIN — PROMETHAZINE HYDROCHLORIDE 25 MG: 25 TABLET ORAL at 08:52

## 2018-08-11 RX ADMIN — IPRATROPIUM BROMIDE AND ALBUTEROL SULFATE 3 ML: .5; 3 SOLUTION RESPIRATORY (INHALATION) at 20:38

## 2018-08-11 RX ADMIN — ESOMEPRAZOLE MAGNESIUM 40 MG: 40 CAPSULE, DELAYED RELEASE ORAL at 08:51

## 2018-08-11 RX ADMIN — PIPERACILLIN SODIUM,TAZOBACTAM SODIUM 3.38 G: 3; .375 INJECTION, POWDER, FOR SOLUTION INTRAVENOUS at 15:43

## 2018-08-11 RX ADMIN — METOPROLOL TARTRATE 25 MG: 25 TABLET ORAL at 17:58

## 2018-08-11 RX ADMIN — Medication 400 MG: at 17:58

## 2018-08-11 RX ADMIN — METOPROLOL TARTRATE 25 MG: 25 TABLET ORAL at 08:51

## 2018-08-11 RX ADMIN — PHENOBARBITAL 64.8 MG: 32.4 TABLET ORAL at 08:51

## 2018-08-11 RX ADMIN — DOCUSATE SODIUM AND SENNOSIDES 2 TABLET: 8.6; 5 TABLET, FILM COATED ORAL at 08:50

## 2018-08-11 RX ADMIN — PHENOBARBITAL 64.8 MG: 32.4 TABLET ORAL at 17:58

## 2018-08-11 RX ADMIN — METHOCARBAMOL 750 MG: 500 TABLET ORAL at 09:26

## 2018-08-11 RX ADMIN — PIPERACILLIN SODIUM,TAZOBACTAM SODIUM 3.38 G: 3; .375 INJECTION, POWDER, FOR SOLUTION INTRAVENOUS at 00:07

## 2018-08-11 RX ADMIN — DULOXETINE HYDROCHLORIDE 40 MG: 20 CAPSULE, DELAYED RELEASE ORAL at 08:50

## 2018-08-11 RX ADMIN — INSULIN LISPRO 2 UNITS: 100 INJECTION, SOLUTION INTRAVENOUS; SUBCUTANEOUS at 08:50

## 2018-08-11 RX ADMIN — OXYCODONE HYDROCHLORIDE 15 MG: 5 TABLET ORAL at 06:12

## 2018-08-11 RX ADMIN — ASPIRIN 81 MG 81 MG: 81 TABLET ORAL at 08:51

## 2018-08-11 RX ADMIN — OXYCODONE HYDROCHLORIDE 15 MG: 5 TABLET ORAL at 12:28

## 2018-08-11 RX ADMIN — Medication 10 ML: at 15:44

## 2018-08-11 RX ADMIN — OXYCODONE HYDROCHLORIDE 15 MG: 5 TABLET ORAL at 21:13

## 2018-08-11 RX ADMIN — OXYCODONE HYDROCHLORIDE 15 MG: 5 TABLET ORAL at 17:58

## 2018-08-11 RX ADMIN — INSULIN LISPRO 2 UNITS: 100 INJECTION, SOLUTION INTRAVENOUS; SUBCUTANEOUS at 18:05

## 2018-08-11 RX ADMIN — FUROSEMIDE 20 MG: 10 INJECTION, SOLUTION INTRAMUSCULAR; INTRAVENOUS at 17:58

## 2018-08-11 RX ADMIN — INSULIN LISPRO 2 UNITS: 100 INJECTION, SOLUTION INTRAVENOUS; SUBCUTANEOUS at 12:28

## 2018-08-11 RX ADMIN — Medication 5 ML: at 06:00

## 2018-08-11 RX ADMIN — PREDNISONE 60 MG: 20 TABLET ORAL at 08:51

## 2018-08-11 RX ADMIN — IPRATROPIUM BROMIDE AND ALBUTEROL SULFATE 3 ML: .5; 3 SOLUTION RESPIRATORY (INHALATION) at 07:27

## 2018-08-11 RX ADMIN — ALPRAZOLAM 1 MG: 0.5 TABLET ORAL at 09:26

## 2018-08-11 RX ADMIN — PIPERACILLIN SODIUM,TAZOBACTAM SODIUM 3.38 G: 3; .375 INJECTION, POWDER, FOR SOLUTION INTRAVENOUS at 08:49

## 2018-08-11 RX ADMIN — PRAZOSIN HYDROCHLORIDE 1 MG: 1 CAPSULE ORAL at 08:52

## 2018-08-11 RX ADMIN — PRIMIDONE 50 MG: 50 TABLET ORAL at 08:51

## 2018-08-11 RX ADMIN — GUAIFENESIN 1200 MG: 600 TABLET, EXTENDED RELEASE ORAL at 21:12

## 2018-08-11 RX ADMIN — IPRATROPIUM BROMIDE AND ALBUTEROL SULFATE 3 ML: .5; 3 SOLUTION RESPIRATORY (INHALATION) at 16:57

## 2018-08-11 RX ADMIN — ALPRAZOLAM 1 MG: 0.5 TABLET ORAL at 15:43

## 2018-08-11 RX ADMIN — Medication: at 09:13

## 2018-08-11 RX ADMIN — POLYETHYLENE GLYCOL 3350 17 G: 17 POWDER, FOR SOLUTION ORAL at 08:50

## 2018-08-11 RX ADMIN — Medication 1 CAPSULE: at 08:51

## 2018-08-11 RX ADMIN — BENZONATATE 200 MG: 100 CAPSULE ORAL at 06:12

## 2018-08-11 RX ADMIN — LEVOTHYROXINE SODIUM 200 MCG: 100 TABLET ORAL at 06:08

## 2018-08-11 RX ADMIN — PRIMIDONE 150 MG: 50 TABLET ORAL at 21:12

## 2018-08-11 RX ADMIN — Medication 400 MG: at 08:51

## 2018-08-11 RX ADMIN — LEVOFLOXACIN 750 MG: 750 TABLET, FILM COATED ORAL at 21:12

## 2018-08-11 RX ADMIN — GUAIFENESIN 1200 MG: 600 TABLET, EXTENDED RELEASE ORAL at 08:51

## 2018-08-11 NOTE — PROGRESS NOTES
[facility]  Pulmonary, Critical Care, and Sleep Medicine    Name: Jim Almendarez MRN: [id number]   : 1955 Hospital: [facility]   Date: 2018        IMPRESSION:   · Right lower lobe pneumonia with dense consolidation and possible areas of necrosis. Also noted to have consolidation of the left lower lobe. · Right sided empyema, now s/p chest tube placement  · Acute chest pain due to severe pneumonia, more difficult to treat due to her chronic use of opiates  · Suspected underlying COPD- acute exacerbation   · Leukocytosis and Sepsis  · NIDDM  · Anemia  · HTN  · Fibromyalgia/Depression/Anxiety, PTSD/ chronic back pain on chronic opiates and benzos. · Essential tremor. · Ex Smoker, quit 1 year ago. · Hypothyroidism. · Obese      RECOMMENDATIONS:   · O2 - wean as tolerated  · Bronchodilators  · IV antibiotics  · Steroids   · Chest tube pulled yesterday  · CT demonstrates continued abnormalities at right base  · She is clinically improved. CT reviewed with dr Mara Dick at [facility] who believes patient would benefit from exploration,drainage and decortication. · He advised transfer to [facility] in AM for  R VATS on Monday      Subjective/History:      feels better. No new complaints.          Past Medical History:   Diagnosis Date    Anxiety     Bone spur     NECK    COPD     Depression     Endocrine disease     hypothyroidism    Fibromyalgia     Fusion of spine of cervical region     Gastrointestinal disorder     gerd    Hyperlipidemia     Hypothyroid     Morbid obesity ([address]     Osteoarthritis     PUD (peptic ulcer disease)     Tobacco abuse       Past Surgical History:   Procedure Laterality Date    BRONCHOSCOPY-FIBER/THERAPY  4/3/2015         CARDIAC CATHETERIZATION  2013         COLONOSCOPY,DIAGNOSTIC  10/30/2014         HX CATARACT REMOVAL      bilateral    HX GYN          HX ORTHOPAEDIC      Ruptured disc, knuckles on right hand    UPPER GI ENDOSCOPY,BIOPSY  10/30/2014         UPPER GI ENDOSCOPY,DILATN W GUIDE  10/30/2014           Prior to Admission medications    Medication Sig Start Date End Date Taking? Authorizing Provider   budesonide-formoterol (SYMBICORT) 160-4.5 mcg/actuation HFAA Take 2 Puffs by inhalation two (2) times a day. Yes Historical Provider   prazosin (MINIPRESS) 2 mg capsule TAKE 1 CAPSULE BY MOUTH TWICE A DAY. 6/18/18  Yes Marta Gustafson NP   DULoxetine 40 mg cpDR Take 40 mg by mouth daily. 6/18/18  Yes Marta Gustafson NP   primidone (MYSOLINE) 50 mg tablet 1 po qam and 3 po qhs 2/21/18  Yes Jermaine Batista MD   ALPRAZolam Hannah Allis) 1 mg tablet Take 1 Tab by mouth three (3) times daily as needed for Anxiety. Max Daily Amount: 3 mg. 5/27/17  Yes Chichi Simons NP   cetirizine (ZYRTEC) 10 mg tablet Take 1 Tab by mouth daily. 5/27/17  Yes Chichi Simons NP   promethazine (PHENERGAN) 25 mg tablet Take 1 Tab by mouth every six (6) hours as needed. 5/27/17  Yes Chichi Simons NP   ibuprofen (MOTRIN) 800 mg tablet Take 1 Tab by mouth every eight (8) hours as needed. 5/27/17  Yes Chichi Simons NP   guaiFENesin ER (MUCINEX) 1,200 mg Ta12 ER tablet Take 1 Tab by mouth two (2) times a day. 5/27/17  Yes Chichi Simons NP   furosemide (LASIX) 40 mg tablet Take 1 Tab by mouth daily. Patient taking differently: Take 40 mg by mouth daily as needed for Other (LE edema). 5/27/17  Yes Chichi Simons NP   PHENobarbital (LUMINAL) 60 mg tablet Take 1 Tab by mouth two (2) times a day. Max Daily Amount: 120 mg. 5/27/17  Yes Omaira Loja MD   dicyclomine (BENTYL) 10 mg capsule  2/13/15  Yes Nico Martinez MD   glipiZIDE SR (GLUCOTROL) 5 mg CR tablet Take 5 mg by mouth two (2) times daily as needed (Patient monitors her BG levels and takes when she is also taking a steroid. ). 2/16/15  Yes Phys Other, MD   montelukast (SINGULAIR) 10 mg tablet Take 10 mg by mouth daily.  2/16/15  Yes Phys Other, MD   ezetimibe-simvastatin (VYTORIN 10/40) 10-40 mg per tablet Take 1 tablet by mouth nightly. Yes Historical Provider   metoprolol (LOPRESSOR) 25 mg tablet Take 1 Tab by mouth two (2) times a day. 2/6/14  Yes Leydi Padron NP   nystatin (MYCOSTATIN) 100,000 unit/mL suspension Take 5 mL by mouth four (4) times daily. swish and spit  Patient taking differently: Take 500,000 Units by mouth four (4) times daily as needed. swish and spit 1/20/14  Yes Etelvina Garibay MD   methocarbamol (ROBAXIN) 750 mg tablet Take 750-1,500 mg by mouth four (4) times daily as needed. Yes Historical Provider   hyoscyamine SL (LEVSIN/SL) 0.125 mg SL tablet 0.125 mg by SubLINGual route three (3) times daily as needed for Cramping. Yes Historical Provider   esomeprazole (NEXIUM) 40 mg capsule Take 40 mg by mouth daily. Yes Historical Provider   MAGOX 400 mg tablet TAKE ONE TABLET TWICE A DAY 11/4/13  Yes Titus Scheuermann, NP   vit B Cmplx 3-FA-Vit C-Biotin (NEPHRO SHREYA RX) 1- mg-mg-mcg tablet Take 1 Tab by mouth daily. Yes Historical Provider   aspirin 81 mg chewable tablet Take 81 mg by mouth daily. Yes Nico Martinez MD   levothyroxine (SYNTHROID) 200 mcg tablet Take 200 mcg by mouth daily (before breakfast). Yes Nico Martinez MD   acetaminophen-codeine (TYLENOL #3) 300-30 mg per tablet Take 1 Tab by mouth every eight (8) hours as needed. Max Daily Amount: 3 Tabs. 5/27/17   Chichi Simons NP   albuterol (VENTOLIN HFA) 90 mcg/actuation inhaler Take 2 Puffs by inhalation every four (4) hours as needed for Wheezing. Historical Provider   benzonatate (TESSALON) 200 mg capsule Take 1 Cap by mouth two (2) times daily as needed. 1/20/14   Etelvina Garibay MD   albuterol-ipratropium (DUONEB) 2.5 mg-0.5 mg/3 ml nebulizer solution 3 mL by Nebulization route every six (6) hours as needed for Wheezing.     Historical Provider     Current Facility-Administered Medications   Medication Dose Route Frequency    furosemide (LASIX) injection 20 mg  20 mg IntraVENous BID    predniSONE (DELTASONE) tablet 60 mg  60 mg Oral DAILY WITH BREAKFAST    levoFLOXacin (LEVAQUIN) tablet 750 mg  750 mg Oral Q24H    senna-docusate (PERICOLACE) 8.6-50 mg per tablet 2 Tab  2 Tab Oral DAILY    fluticasone furoate (ARNUITY ELLIPTA) 100 mcg/puff  1 Puff Inhalation DAILY    albuterol-ipratropium (DUO-NEB) 2.5 MG-0.5 MG/3 ML  3 mL Nebulization QID RT    prazosin (MINIPRESS) capsule 1 mg  1 mg Oral BID    piperacillin-tazobactam (ZOSYN) 3.375 g in 0.9% sodium chloride (MBP/ADV) 100 mL  3.375 g IntraVENous Q8H    ezetimibe/simvastatin (VYTORIN) 10/40 mg  (Patient Supplied)   Oral QPM    B complex-vitaminC-folic acid (NEPHROCAP) cap (Patient Supplied)  1 Cap Oral DAILY    polyethylene glycol (MIRALAX) packet 17 g  17 g Oral DAILY    esomeprazole (NEXIUM) capsule 40 mg (Patient Supplied)  40 mg Oral DAILY    metoprolol tartrate (LOPRESSOR) tablet 25 mg  25 mg Oral BID    lidocaine (LIDODERM) 5 % patch 1 Patch  1 Patch TransDERmal Q24H    aspirin chewable tablet 81 mg  81 mg Oral DAILY    DULoxetine (CYMBALTA) capsule 40 mg  40 mg Oral DAILY    guaiFENesin ER (MUCINEX) tablet 1,200 mg  1,200 mg Oral Q12H    magnesium oxide (MAG-OX) tablet 400 mg  400 mg Oral BID    PHENobarbital (LUMINAL) tablet 64.8 mg  64.8 mg Oral BID    primidone (MYSOLINE) tablet 50 mg  50 mg Oral DAILY    primidone (MYSOLINE) tablet 150 mg  150 mg Oral QHS    sodium chloride (NS) flush 5-10 mL  5-10 mL IntraVENous Q8H    insulin lispro (HUMALOG) injection   SubCUTAneous AC&HS    lactobac ac& pc-s.therm-b.anim (ROBIN Q/RISAQUAD)  1 Cap Oral DAILY    levothyroxine (SYNTHROID) tablet 200 mcg  200 mcg Oral ACB     Allergies   Allergen Reactions    Latex Contact Dermatitis    Dilaudid [Hydromorphone (Pf)] Other (comments)     Feels like bugs are crawling all over her    Lipitor [Atorvastatin] Other (comments)     LIVER TROUBLE ON THIS MEDICATION    Remeron [Mirtazapine] Other (comments)     Tremors    Sulfa (Sulfonamide Antibiotics) Itching      Social History   Substance Use Topics    Smoking status: Former Smoker     Packs/day: 1.00     Years: 30.00    Smokeless tobacco: Never Used    Alcohol use Yes      Comment: occasional          Objective:   Vital Signs:    Visit Vitals    /69    Pulse 80    Temp 98.1 °F (36.7 °C)    Resp 20    Ht 5' 4\" (1.626 m)    Wt 90.7 kg (200 lb)    SpO2 93%    BMI 34.33 kg/m2       O2 Device: Nasal cannula   O2 Flow Rate (L/min): 3 l/min   Temp (24hrs), Av °F (36.7 °C), Min:97.8 °F (36.6 °C), Max:98.5 °F (36.9 °C)       Intake/Output:   Last shift:      701 - 1900  In: -   Out: 6278 [Urine:1025]  Last 3 shifts: 1901 -  07  In: 724 [P.O.:275; I.V.:450]  Out: 2600 [Urine:2350]    Intake/Output Summary (Last 24 hours) at 18 1404  Last data filed at 18 1223   Gross per 24 hour   Intake              375 ml   Output             2175 ml   Net            -1800 ml     Physical Exam:    General:  Alert, cooperative, no distress. Head:  Normocephalic, without obvious abnormality, atraumatic. Eyes:  Conjunctivae/corneas clear. Nose: Nares normal. Septum midline. Mucosa normal.     Throat: Lips, mucosa, and tongue normal. Teeth and gums normal.   Neck: Supple, symmetrical, trachea midline    Back:   Symmetric, no curvature. ROM normal.   Lungs:   Mild ronchi, decreased breath sounds in the right base   Chest wall:  No tenderness or deformity. right chest tube in place   Heart:  Regular rate and rhythm    Abdomen:   Soft, non-tender. Bowel sounds normal.     Extremities: Extremities normal, atraumatic, no cyanosis, +edema   Skin: Skin color, texture, turgor normal. No rashes or lesions   Neurologic: Grossly nonfocal, has tremors of the LUE and RUE. Motor is grossly intact. Psych: NO anxiety or depression.       Data:   Labs:  Recent Labs      18   0357  08/10/18   0255  18   0259   WBC  15.6*  14.1*  15.0*   HGB  11.8  11.5  11.9 HCT  37.9  36.9  38.7   PLT  429*  446*  502*     Recent Labs      08/11/18   0357  08/10/18   0255  08/09/18   0259   NA  140  140  143   K  3.8  3.6  3.5   CL  105  106  107   CO2  29  30  27   GLU  130*  134*  118*   BUN  13  10  11   CREA  0.97  1.00  1.13*   CA  9.3  9.2  9.5     No results for input(s): PH, PCO2, PO2, HCO3, FIO2 in the last 72 hours.     Imaging:  I have personally reviewed the patients radiographs:CT   None today        Rachael Williamson MD

## 2018-08-11 NOTE — ROUTINE PROCESS
Bedside report provided by Nani Mcgowan RN. SBAR report, MAR, cardiac rhythm (NSR to ST), vital signs, and patient's general condition reviewed.  at bedside. New IV placed prior to report 2/2 infiltration and leakage at old IV site. Pt noted with +3 edema; pt stated she'd refused addition diuretics because she is already voiding frequently.

## 2018-08-11 NOTE — PROGRESS NOTES
PCU SHIFT NURSING NOTE      Bedside shift change report given to Ruth Lora (oncoming nurse) by Lenore Barakat (offgoing nurse). Report included the following information SBAR, Kardex, ED Summary and MAR. Shift Summary: Patient is awaiting transfer orders to the floor and for doctors to make decision about chest tube removal.      Admission Date 7/31/2018   Admission Diagnosis CAP (community acquired pneumonia)  Acute respiratory failure ([facility] [address]   Consults IP CONSULT TO PULMONOLOGY  IP CONSULT TO PALLIATIVE CARE - PROVIDER        Consults   []PT   []OT   []Speech   []Case Management      [] Palliative      Cardiac Monitoring Order   []Yes   []No     IV drips   []Yes    Drip:                            Dose:  Drip:                            Dose:  Drip:                            Dose:   []No     GI Prophylaxis   []Yes   []No         DVT Prophylaxis   SCDs:             Onel stockings:         [] Medication   []Contraindicated   []None      Activity Level Activity Level: Up with Assistance     Activity Assistance: Partial (one person)   Purposeful Rounding every 1-2 hour? []Yes   Zarate Score  Total Score: 3   Bed Alarm (If score 3 or >)   []Yes   [] Refused (See signed refusal form in chart)   Joseph Score  Joseph Score: 17   Joseph Score (if score 14 or less)   []PMT consult   []Wound Care consult      []Specialty bed   [] Nutrition consult          Needs prior to discharge:   Home O2 required:    []Yes   []No    If yes, how much O2 required? Other:    Last Bowel Movement: Last Bowel Movement Date: 08/10/18      Influenza Vaccine Received Flu Vaccine for Current Season (usually Sept-March): Not Flu Season        Pneumonia Vaccine           Diet Active Orders   Diet    DIET DIABETIC CONSISTENT CARB Regular      LDAs               Peripheral IV 08/10/18 Right; Inner Wrist (Active)   Site Assessment Clean, dry, & intact 8/10/2018  8:16 PM   Phlebitis Assessment 0 8/10/2018  8:16 PM   Infiltration Assessment 0 8/10/2018 8:16 PM   Dressing Status Clean, dry, & intact 8/10/2018  8:16 PM   Dressing Type Transparent 8/10/2018  8:16 PM   Hub Color/Line Status Pink; Infusing;Flushed;Patent 8/10/2018  8:16 PM                      Urinary Catheter      Intake & Output   Date 08/09/18 1900 - 08/10/18 0659 08/10/18 0700 - 08/11/18 0659   Shift 4038-9753 24 Hour Total 4348-2021 0609-0148 24 Hour Total   I  N  T  A  K  E   P.O. 100 460         P. O. 100 460       I.V.  (mL/kg/hr) 250 550         Volume (levoFLOXacin (LEVAQUIN) 750 mg in D5W IVPB) 150 150         Volume (piperacillin-tazobactam (ZOSYN) 3.375 g in 0.9% sodium chloride (MBP/ADV) 100 mL) 100 400       Shift Total  (mL/kg) 350  (3.9) 1010  (11.1)      O  U  T  P  U  T   Urine  (mL/kg/hr) 875 2375 425  (0.4)  425      Urine Voided 875 2375 425  425      Urine Occurrence(s)   3 x  3 x    Stool  2         Stool Occurrence(s) 1 x 2 x 1 x  1 x      Stool  2       Chest Tube 150 150         Output (ml) (Chest Tube #1 08/07/18 Right) 150 150       Shift Total  (mL/kg) 1025  (11.3) 2527  (27.9) 425  (4.7)  425  (4.7)   NET -675 -1517 -425  -425   Weight (kg) 90.7 90.7 90.7 90.7 90.7         Readmission Risk Assessment Tool Score High Risk            28       Total Score        3 Has Seen PCP in Last 6 Months (Yes=3, No=0)    3 Patient Length of Stay (>5 days = 3)    5 Pt. Coverage (Medicare=5 , Medicaid, or Self-Pay=4)    17 Charlson Comorbidity Score (Age + Comorbid Conditions)        Criteria that do not apply:    . Living with Significant Other. Assisted Living. LTAC. SNF.  or   Rehab    IP Visits Last 12 Months (1-3=4, 4=9, >4=11)       Expected Length of Stay 4d 21h   Actual Length of Stay 10

## 2018-08-11 NOTE — PROGRESS NOTES
Hospitalist Progress Note    NAME: Marino Jean Baptiste   :  1955   MRN:  [id number]       Assessment / Plan:  Acute hypoxic respiratory failure POA  Severe Sepsis POA  due to Bilateral Pneumonia (RLL necrotic) with reactive Lymphadenopathy on CT chest POA  R Para pneumonic Pleural effusion  Acute COPD exacerbation POA  S/p thoracocentesis with 400ml drainage  s/p Chest tube placement   Appreciate pulmonary assistance   CT chest 8/10 with Persistent small moderate gas and fluid containing right empyema  status post pleural drainage catheter placement with no clearly demonstrated  loculated components. Associated right lower lobe atelectasis and volume loss  I spoke to pulmonary findings who has spoken to Dr Ruth Conroy from [facility] who has recommended transfer tomorrow () to have R VATs on monday  Sputum Cx - moderate beta strep, Hemophilus influenzae noted   BCx neg in 5 days  Continue IV Zosyn and IV levaquin  D/trent Vancomycin  Off IVF  Taper steroids  Scheduled scheduled   Cont oxygen to keep sats >90%  Oxycodone for pain control   Palliative pain consulted - following     Diarrhea POA - C Diff ruled out with no diarrhea in 24 hrs  C/w FloraQ      NIDDM type II  Cont holding PO glipizide with poor po intake  c/w SSI for now     HTN   BP stable  Cont home metoprolol   Lasix prn at home for le edema, keep on hold for now     Fibromyalgia / depression/ anxiety/ PTSD /Chronic pain  Polypharmacy    Essential tremor   Continue multiple home meds       Ex smoker, quit 1 year ago   Hyperlipidemia, cont statin  Hypothyroidism, cont synthroid   Obesity. Body mass index is 34.33 kg/(m^2).   weight loss recommended        Code Status: Full code   Surrogate Decision Maker:       DVT Prophylaxis: Lovenox   GI Prophylaxis: not indicated     Baseline: independent; no home O2; lives with      Recommended Disposition: TBD     Subjective: Pt seen and examined at bedside.  Breathing somewhat better. Overnight events d/w RN     Chief Complaint / Reason for Physician Visit: F/U Bilateral PNA (necrotic), Sepsis, resp failure, COPD, pleuritic chest pain    Review of Systems:  Symptom Y/N Comments  Symptom Y/N Comments   Fever/Chills n   Chest Pain y Pleuritic R>L, improved   Poor Appetite n   Edema     Cough y   Abdominal Pain     Sputum y   Joint Pain     SOB/RODRIGUEZ y improving  Pruritis/Rash     Nausea/vomit    Tolerating PT/OT y    Diarrhea    Tolerating Diet y    Constipation    Other       Could NOT obtain due to:      Objective:     VITALS:   Last 24hrs VS reviewed since prior progress note. Most recent are:  Patient Vitals for the past 24 hrs:   Temp Pulse Resp BP SpO2   08/11/18 1234 98.1 °F (36.7 °C) 80 20 128/69 93 %   08/11/18 1124 - - - - 93 %   08/11/18 0729 - - - - 96 %   08/11/18 0724 97.9 °F (36.6 °C) 92 20 141/72 92 %   08/11/18 0400 98.5 °F (36.9 °C) 94 18 142/69 -   08/11/18 0010 98.1 °F (36.7 °C) 88 18 132/72 96 %   08/10/18 2301 97.8 °F (36.6 °C) 81 20 130/76 95 %   08/10/18 2052 - - - - 94 %   08/10/18 2033 97.8 °F (36.6 °C) 85 20 128/68 94 %   08/10/18 1512 - - - - 94 %       Intake/Output Summary (Last 24 hours) at 08/11/18 1413  Last data filed at 08/11/18 1223   Gross per 24 hour   Intake              375 ml   Output             2175 ml   Net            -1800 ml        PHYSICAL EXAM:  General: WD, WN. Alert, cooperative, no acute distress  EENT:  EOMI. Anicteric sclerae. MMM  Resp:  Crackles, improved BS on R side s/p thoracentesis yesterday  CV:  Regular  rhythm,  No edema  GI:  Soft, Non distended, Non tender.  +Bowel sounds  Neurologic:  Alert and oriented X 3, normal speech,   Psych:   Good insight. Not anxious nor agitated  Skin:  No rashes.   No jaundice    Reviewed most current lab test results and cultures  YES  Reviewed most current radiology test results   YES  Review and summation of old records today    NO  Reviewed patient's current orders and MAR    YES  PMH/SH reviewed - no change compared to H&P  ________________________________________________________________________  Care Plan discussed with:    Comments   Patient x    Family      RN x    Care Manager     Consultant  x pulmonary                     Multidiciplinary team rounds were held today with , nursing, pharmacist and clinical coordinator. Patient's plan of care was discussed; medications were reviewed and discharge planning was addressed. ________________________________________________________________________  Total NON critical care TIME:  30 Minutes    Total CRITICAL CARE TIME Spent:   Minutes non procedure based      Comments   >50% of visit spent in counseling and coordination of care x Chart review and discussion with pulmonary   ________________________________________________________________________  Carolin Barajas MD     Procedures: see electronic medical records for all procedures/Xrays and details which were not copied into this note but were reviewed prior to creation of Plan. LABS:  I reviewed today's most current labs and imaging studies.   Pertinent labs include:  Recent Labs      08/11/18   0357  08/10/18   0255  08/09/18   0259   WBC  15.6*  14.1*  15.0*   HGB  11.8  11.5  11.9   HCT  37.9  36.9  38.7   PLT  429*  446*  502*     Recent Labs      08/11/18   0357  08/10/18   0255  08/09/18   0259   NA  140  140  143   K  3.8  3.6  3.5   CL  105  106  107   CO2  29  30  27   GLU  130*  134*  118*   BUN  13  10  11   CREA  0.97  1.00  1.13*   CA  9.3  9.2  9.5       Signed: Carolin Barajas MD

## 2018-08-12 ENCOUNTER — HOSPITAL ENCOUNTER (INPATIENT)
Age: 63
LOS: 14 days | Discharge: HOME | DRG: 163 | End: 2018-08-26
Attending: THORACIC SURGERY (CARDIOTHORACIC VASCULAR SURGERY) | Admitting: THORACIC SURGERY (CARDIOTHORACIC VASCULAR SURGERY)
Payer: MEDICARE

## 2018-08-12 VITALS
HEART RATE: 86 BPM | BODY MASS INDEX: 34.15 KG/M2 | DIASTOLIC BLOOD PRESSURE: 84 MMHG | OXYGEN SATURATION: 88 % | WEIGHT: 200 LBS | RESPIRATION RATE: 18 BRPM | HEIGHT: 64 IN | TEMPERATURE: 98 F | SYSTOLIC BLOOD PRESSURE: 123 MMHG

## 2018-08-12 DIAGNOSIS — R52 PAIN: Primary | ICD-10-CM

## 2018-08-12 PROBLEM — J86.9 EMPYEMA (HCC): Status: ACTIVE | Noted: 2018-08-12

## 2018-08-12 LAB
GLUCOSE BLD STRIP.AUTO-MCNC: 143 MG/DL (ref 65–100)
GLUCOSE BLD STRIP.AUTO-MCNC: 150 MG/DL (ref 65–100)
SERVICE CMNT-IMP: ABNORMAL
SERVICE CMNT-IMP: ABNORMAL

## 2018-08-12 PROCEDURE — 74011250637 HC RX REV CODE- 250/637: Performed by: INTERNAL MEDICINE

## 2018-08-12 PROCEDURE — 74011636637 HC RX REV CODE- 636/637: Performed by: HOSPITALIST

## 2018-08-12 PROCEDURE — 82962 GLUCOSE BLOOD TEST: CPT

## 2018-08-12 PROCEDURE — 74011250636 HC RX REV CODE- 250/636: Performed by: INTERNAL MEDICINE

## 2018-08-12 PROCEDURE — 74011000250 HC RX REV CODE- 250: Performed by: INTERNAL MEDICINE

## 2018-08-12 PROCEDURE — 77010033678 HC OXYGEN DAILY

## 2018-08-12 PROCEDURE — 74011000258 HC RX REV CODE- 258: Performed by: INTERNAL MEDICINE

## 2018-08-12 PROCEDURE — 74011250637 HC RX REV CODE- 250/637: Performed by: NURSE PRACTITIONER

## 2018-08-12 PROCEDURE — 74011250637 HC RX REV CODE- 250/637: Performed by: HOSPITALIST

## 2018-08-12 PROCEDURE — 74011250637 HC RX REV CODE- 250/637: Performed by: THORACIC SURGERY (CARDIOTHORACIC VASCULAR SURGERY)

## 2018-08-12 PROCEDURE — 94640 AIRWAY INHALATION TREATMENT: CPT

## 2018-08-12 PROCEDURE — 77030029684 HC NEB SM VOL KT MONA -A

## 2018-08-12 PROCEDURE — 94760 N-INVAS EAR/PLS OXIMETRY 1: CPT

## 2018-08-12 PROCEDURE — 74011000258 HC RX REV CODE- 258: Performed by: THORACIC SURGERY (CARDIOTHORACIC VASCULAR SURGERY)

## 2018-08-12 PROCEDURE — 74011250636 HC RX REV CODE- 250/636: Performed by: THORACIC SURGERY (CARDIOTHORACIC VASCULAR SURGERY)

## 2018-08-12 PROCEDURE — 74011000250 HC RX REV CODE- 250: Performed by: THORACIC SURGERY (CARDIOTHORACIC VASCULAR SURGERY)

## 2018-08-12 PROCEDURE — 65660000000 HC RM CCU STEPDOWN

## 2018-08-12 PROCEDURE — 74011636637 HC RX REV CODE- 636/637: Performed by: INTERNAL MEDICINE

## 2018-08-12 RX ORDER — ARFORMOTEROL TARTRATE 15 UG/2ML
15 SOLUTION RESPIRATORY (INHALATION)
Status: DISCONTINUED | OUTPATIENT
Start: 2018-08-12 | End: 2018-08-26 | Stop reason: HOSPADM

## 2018-08-12 RX ORDER — LEVOFLOXACIN 750 MG/1
750 TABLET ORAL
Status: DISCONTINUED | OUTPATIENT
Start: 2018-08-12 | End: 2018-08-23

## 2018-08-12 RX ORDER — PREDNISONE 20 MG/1
60 TABLET ORAL
Status: DISCONTINUED | OUTPATIENT
Start: 2018-08-13 | End: 2018-08-15

## 2018-08-12 RX ORDER — PRAZOSIN HYDROCHLORIDE 1 MG/1
1 CAPSULE ORAL
Status: DISCONTINUED | OUTPATIENT
Start: 2018-08-12 | End: 2018-08-13

## 2018-08-12 RX ORDER — FUROSEMIDE 40 MG/1
40 TABLET ORAL DAILY
Status: DISCONTINUED | OUTPATIENT
Start: 2018-08-13 | End: 2018-08-26 | Stop reason: HOSPADM

## 2018-08-12 RX ORDER — BUDESONIDE 0.5 MG/2ML
500 INHALANT ORAL
Status: DISCONTINUED | OUTPATIENT
Start: 2018-08-12 | End: 2018-08-26 | Stop reason: HOSPADM

## 2018-08-12 RX ORDER — ALPRAZOLAM 0.5 MG/1
1 TABLET ORAL
Status: DISCONTINUED | OUTPATIENT
Start: 2018-08-12 | End: 2018-08-19

## 2018-08-12 RX ORDER — PRIMIDONE 50 MG/1
150 TABLET ORAL
Status: DISCONTINUED | OUTPATIENT
Start: 2018-08-12 | End: 2018-08-26 | Stop reason: HOSPADM

## 2018-08-12 RX ORDER — DULOXETIN HYDROCHLORIDE 20 MG/1
40 CAPSULE, DELAYED RELEASE ORAL DAILY
Status: DISCONTINUED | OUTPATIENT
Start: 2018-08-13 | End: 2018-08-26 | Stop reason: HOSPADM

## 2018-08-12 RX ORDER — PHENOBARBITAL 64.8 MG/1
64.8 TABLET ORAL 2 TIMES DAILY
Status: DISCONTINUED | OUTPATIENT
Start: 2018-08-12 | End: 2018-08-26 | Stop reason: HOSPADM

## 2018-08-12 RX ORDER — MONTELUKAST SODIUM 10 MG/1
10 TABLET ORAL
Status: DISCONTINUED | OUTPATIENT
Start: 2018-08-12 | End: 2018-08-26 | Stop reason: HOSPADM

## 2018-08-12 RX ORDER — OXYCODONE HYDROCHLORIDE 5 MG/1
15 TABLET ORAL
Status: DISCONTINUED | OUTPATIENT
Start: 2018-08-12 | End: 2018-08-26 | Stop reason: HOSPADM

## 2018-08-12 RX ORDER — IPRATROPIUM BROMIDE AND ALBUTEROL SULFATE 2.5; .5 MG/3ML; MG/3ML
3 SOLUTION RESPIRATORY (INHALATION)
Status: DISCONTINUED | OUTPATIENT
Start: 2018-08-12 | End: 2018-08-16

## 2018-08-12 RX ORDER — PRIMIDONE 50 MG/1
50 TABLET ORAL DAILY
Status: DISCONTINUED | OUTPATIENT
Start: 2018-08-13 | End: 2018-08-26 | Stop reason: HOSPADM

## 2018-08-12 RX ORDER — METOPROLOL TARTRATE 25 MG/1
25 TABLET, FILM COATED ORAL EVERY 12 HOURS
Status: DISCONTINUED | OUTPATIENT
Start: 2018-08-12 | End: 2018-08-14

## 2018-08-12 RX ADMIN — POLYETHYLENE GLYCOL 3350 17 G: 17 POWDER, FOR SOLUTION ORAL at 08:52

## 2018-08-12 RX ADMIN — PHENOBARBITAL 64.8 MG: 32.4 TABLET ORAL at 08:52

## 2018-08-12 RX ADMIN — SIMVASTATIN: 20 TABLET, FILM COATED ORAL at 18:23

## 2018-08-12 RX ADMIN — PROMETHAZINE HYDROCHLORIDE 25 MG: 25 TABLET ORAL at 08:54

## 2018-08-12 RX ADMIN — GUAIFENESIN 1200 MG: 600 TABLET, EXTENDED RELEASE ORAL at 08:50

## 2018-08-12 RX ADMIN — IPRATROPIUM BROMIDE AND ALBUTEROL SULFATE 3 ML: .5; 3 SOLUTION RESPIRATORY (INHALATION) at 18:19

## 2018-08-12 RX ADMIN — OXYCODONE HYDROCHLORIDE 15 MG: 5 TABLET ORAL at 18:22

## 2018-08-12 RX ADMIN — LEVOTHYROXINE SODIUM 200 MCG: 100 TABLET ORAL at 06:16

## 2018-08-12 RX ADMIN — PIPERACILLIN AND TAZOBACTAM 3.38 G: 3; .375 INJECTION, POWDER, FOR SOLUTION INTRAVENOUS at 17:23

## 2018-08-12 RX ADMIN — PRIMIDONE 150 MG: 50 TABLET ORAL at 22:20

## 2018-08-12 RX ADMIN — Medication 400 MG: at 08:53

## 2018-08-12 RX ADMIN — IPRATROPIUM BROMIDE AND ALBUTEROL SULFATE 3 ML: .5; 3 SOLUTION RESPIRATORY (INHALATION) at 07:35

## 2018-08-12 RX ADMIN — FLUTICASONE FUROATE 1 PUFF: 100 POWDER RESPIRATORY (INHALATION) at 08:58

## 2018-08-12 RX ADMIN — PHENOBARBITAL 64.8 MG: 64.8 TABLET ORAL at 22:20

## 2018-08-12 RX ADMIN — OXYCODONE HYDROCHLORIDE 15 MG: 5 TABLET ORAL at 22:25

## 2018-08-12 RX ADMIN — METOPROLOL TARTRATE 25 MG: 25 TABLET ORAL at 20:34

## 2018-08-12 RX ADMIN — MONTELUKAST SODIUM 10 MG: 10 TABLET, FILM COATED ORAL at 22:20

## 2018-08-12 RX ADMIN — BUDESONIDE 500 MCG: 0.5 INHALANT RESPIRATORY (INHALATION) at 19:44

## 2018-08-12 RX ADMIN — PRIMIDONE 50 MG: 50 TABLET ORAL at 08:51

## 2018-08-12 RX ADMIN — INSULIN LISPRO 2 UNITS: 100 INJECTION, SOLUTION INTRAVENOUS; SUBCUTANEOUS at 07:30

## 2018-08-12 RX ADMIN — ASPIRIN 81 MG 81 MG: 81 TABLET ORAL at 08:50

## 2018-08-12 RX ADMIN — PRAZOSIN HYDROCHLORIDE 1 MG: 1 CAPSULE ORAL at 08:56

## 2018-08-12 RX ADMIN — PREDNISONE 60 MG: 20 TABLET ORAL at 08:12

## 2018-08-12 RX ADMIN — ESOMEPRAZOLE MAGNESIUM 40 MG: 40 CAPSULE, DELAYED RELEASE ORAL at 08:57

## 2018-08-12 RX ADMIN — LEVOFLOXACIN 750 MG: 750 TABLET, FILM COATED ORAL at 20:34

## 2018-08-12 RX ADMIN — IPRATROPIUM BROMIDE AND ALBUTEROL SULFATE 3 ML: .5; 3 SOLUTION RESPIRATORY (INHALATION) at 19:43

## 2018-08-12 RX ADMIN — ALPRAZOLAM 1 MG: 0.5 TABLET ORAL at 20:34

## 2018-08-12 RX ADMIN — BENZONATATE 200 MG: 100 CAPSULE ORAL at 00:19

## 2018-08-12 RX ADMIN — PIPERACILLIN SODIUM,TAZOBACTAM SODIUM 3.38 G: 3; .375 INJECTION, POWDER, FOR SOLUTION INTRAVENOUS at 08:13

## 2018-08-12 RX ADMIN — ALPRAZOLAM 1 MG: 0.5 TABLET ORAL at 08:12

## 2018-08-12 RX ADMIN — FUROSEMIDE 20 MG: 10 INJECTION, SOLUTION INTRAMUSCULAR; INTRAVENOUS at 10:13

## 2018-08-12 RX ADMIN — PIPERACILLIN SODIUM,TAZOBACTAM SODIUM 3.38 G: 3; .375 INJECTION, POWDER, FOR SOLUTION INTRAVENOUS at 00:16

## 2018-08-12 RX ADMIN — OXYCODONE HYDROCHLORIDE 15 MG: 5 TABLET ORAL at 06:16

## 2018-08-12 RX ADMIN — METOPROLOL TARTRATE 25 MG: 25 TABLET ORAL at 08:52

## 2018-08-12 RX ADMIN — DULOXETINE HYDROCHLORIDE 40 MG: 20 CAPSULE, DELAYED RELEASE ORAL at 09:23

## 2018-08-12 RX ADMIN — OXYCODONE HYDROCHLORIDE 15 MG: 5 TABLET ORAL at 10:50

## 2018-08-12 RX ADMIN — Medication 1 CAPSULE: at 08:51

## 2018-08-12 RX ADMIN — Medication 10 ML: at 08:50

## 2018-08-12 NOTE — DISCHARGE SUMMARY
Hospitalist Discharge Summary     Patient ID:  Octavio Cassidy  [id number]  58 y.o.  1955    PCP on record: Thor Thomson MD    Admit date: 7/31/2018  Discharge date and time: 8/12/2018      DISCHARGE DIAGNOSIS:  Acute hypoxic respiratory failure   Severe Sepsis   Bilateral Pneumonia (RLL necrotic) with reactive Lymphadenopathy on CT chest   R Para pneumonic Pleural effusion  Acute COPD exacerbation   Diarrhea  NIDDM type II  HTN   Fibromyalgia / depression/ anxiety/ PTSD /Chronic pain  Polypharmacy    Essential tremor   Ex smoker  Hyperlipidemia  Hypothyroidism  Obesity    CONSULTATIONS:  IP CONSULT TO PULMONOLOGY  IP CONSULT TO PALLIATIVE CARE - PROVIDER    Excerpted HPI from H&P of Bradley Fernandes MD:  Chepe Damon is a 58 y.o.  female who presents with above complaint. Pt woke up today at 2 am with severe sharp L sided chest pain. She continued with intermittent chest pain all day. Pain is worsening with taking a deep breath. Pt called PCP this am and was advised to be seen in ED. Pt has to wait for her  to come back from work. She admits to productive cough with brownish sputum. Pt also admits to fever and chills. Pt stated she was sick with bronchitis in June. She was treated with Doxycyline and prednisone at that time. She stated she never really recovered from that episode. She was not able to complete doxycycline due to diarrhea which she still have. She c/o worsening SOB and wheezing since this am. She was using jet nebs at home with some relieve. Pt is hypoxic in ED. Her O2 at 88% on RA at rest.   Pt quit smoking 1 year ago.      Vs: 98.2 °F (36.8 °C) - 109 - 123/85 - 22 - 88% RA at rest      We were asked to admit for work up and evaluation of the above problems. ______________________________________________________________________  DISCHARGE SUMMARY/HOSPITAL COURSE:  for full details see H&P, daily progress notes, labs, consult notes.      Acute hypoxic respiratory failure POA  Severe Sepsis POA  due to Bilateral Pneumonia (RLL necrotic) with reactive Lymphadenopathy on CT chest POA  R Para pneumonic Pleural effusion  Acute COPD exacerbation POA  S/p thoracocentesis with 400ml drainage  s/p Chest tube placement 8/7  Appreciate pulmonary assistance   CT chest 8/10 with Persistent small moderate gas and fluid containing right empyema  status post pleural drainage catheter placement with no clearly demonstrated  loculated components. Associated right lower lobe atelectasis and volume loss  I spoke to pulmonary findings who has spoken to Dr Hay Wilhelm from [address] [facility] who has recommended transfer to have R VATs on Monday  Pt transfer to [facility] for likely VATs tomorrow  Sputum Cx - moderate beta strep, Hemophilus influenzae noted   BCx neg in 5 days  Continue IV Zosyn and IV levaquin  D/trent Vancomycin  Off IVF  Taper steroids  Scheduled scheduled   Cont oxygen to keep sats >90%  Oxycodone for pain control   Palliative pain consulted - following      Diarrhea POA - C Diff ruled out with no diarrhea in 24 hrs  C/w FloraQ       NIDDM type II  Cont holding PO glipizide with poor po intake  c/w SSI for now      HTN   BP stable  Cont home metoprolol   Lasix prn at home for le edema, keep on hold for now      Fibromyalgia / depression/ anxiety/ PTSD /Chronic pain  Polypharmacy    Essential tremor   Continue multiple home meds       Ex smoker, quit 1 year ago   Hyperlipidemia, cont statin  Hypothyroidism, cont synthroid   Obesity. Body mass index is 34.33 kg/(m^2).   weight loss recommended        _______________________________________________________________________  Patient seen and examined by me on discharge day. Pertinent Findings:  Gen:    Not in distress  Chest: Clear lungs  CVS:   Regular rhythm.   No edema  Abd:  Soft, not distended, not tender  Neuro:  Alert, non focal  _______________________________________________________________________  DISCHARGE MEDICATIONS:   Current Discharge Medication List          My Recommended Diet, Activity, Wound Care, and follow-up labs are listed in the patient's Discharge Insturctions which I have personally completed and reviewed.     ______________________________________________________________________    Risk of deterioration: Moderate    Condition at Discharge:  Stable  ______________________________________________________________________    Disposition  Transfer to [facility]  ______________________________________________________________________    Care Plan discussed with:   Patient, RN, Consultant    ______________________________________________________________________    Code Status: Full Code  ______________________________________________________________________          Total time in minutes spent coordinating this discharge (includes going over instructions, follow-up, prescriptions, and preparing report for sign off to her PCP) :  35 minutes    Signed:  Will Miller MD

## 2018-08-12 NOTE — PROGRESS NOTES
Full consult to follow. Called by [id number] yesterday. 59 y/o female with CAP who developed an empyema. Did not fully respond to pigtail drainage. Transferre [facility] for Thoracic evaluation. Posted for a Right VATS decortication tomorrow. Pre-op orders placed.

## 2018-08-12 NOTE — IP AVS SNAPSHOT
110 [facility] [address] [place] [address] 
[phone] Patient: Arnav Rios MRN: [id number] :1955 About your hospitalization You were admitted on:  2018 You last received care in the:  [facility] 2N MED SURG You were discharged on:  2018 Why you were hospitalized Your primary diagnosis was:  Empyema (Hcc) Your diagnoses also included:  Right-Sided Chest Pain, Chronic Pain Syndrome Follow-up Information Follow up With Details Comments Contact Info Jenny Mccurdy MD On 2018 Hospital f/u PCP appointment 18 @ 3:00 p.m.  [address] 
[phone] Mozell Pop will deliver O2 Tank. Call [phone] for any problems. [address] [place] [address],[address] [place] [id number] 
[phone] Your Scheduled Appointments 2018  2:00 PM EDT Follow Up with Agustina Huber MD  
Neurology Clinic at [facility] [address], 
[address] [place] [address], [address] [facility]  
[phone] Discharge Orders None A check jennifer indicates which time of day the medication should be taken. My Medications START taking these medications Instructions Each Dose to Equal  
 Morning Noon Evening Bedtime  
 levoFLOXacin 750 mg tablet Commonly known as:  Murtaza Aloe Your last dose was: Your next dose is: Take 1 Tab by mouth nightly. 750 mg  
    
   
   
   
  
 naloxone 4 mg/actuation nasal spray Commonly known as:  [employer] Your last dose was: Your next dose is:    
   
   
 Use 1 spray intranasally, then discard. Repeat with new spray every 2 min as needed for opioid overdose symptoms, alternating nostrils. potassium chloride SR 20 mEq tablet Commonly known as:  K-TAB Your last dose was: Your next dose is: Take 1 Tab by mouth two (2) times a day. 20 mEq * predniSONE 20 mg tablet Commonly known as:  Marcelene Im Your last dose was: Your next dose is: Take 2 Tabs by mouth daily (with breakfast) for 14 days. Indications: sarcoidosis 40 mg  
    
   
   
   
  
 * predniSONE 10 mg tablet Commonly known as:  Marcelene Im Your last dose was: Your next dose is: Take 1 Tab by mouth See Admin Instructions. 3 tabs daily for  7 days then 3 tabs daily for 7 days then 2 tabs daily for 7 days and 1 tab daily for 7 days and stop 10 mg  
    
   
   
   
  
 * Notice: This list has 2 medication(s) that are the same as other medications prescribed for you. Read the directions carefully, and ask your doctor or other care provider to review them with you. CHANGE how you take these medications Instructions Each Dose to Equal  
 Morning Noon Evening Bedtime  
 furosemide 40 mg tablet Commonly known as:  LASIX What changed:   
- when to take this 
- reasons to take this Your last dose was: Your next dose is: Take 1 Tab by mouth daily. 40 mg  
    
   
   
   
  
 * glipiZIDE SR 5 mg CR tablet Commonly known as:  GLUCOTROL XL What changed:  Another medication with the same name was added. Make sure you understand how and when to take each. Your last dose was: Your next dose is: Take 5 mg by mouth two (2) times daily as needed (Patient monitors her BG levels and takes when she is also taking a steroid. ). 5 mg * glipiZIDE 5 mg tablet Commonly known as:  Atlee Crystal What changed: You were already taking a medication with the same name, and this prescription was added. Make sure you understand how and when to take each. Your last dose was: Your next dose is: Take 1 Tab by mouth Before breakfast and dinner. 5 mg  
    
   
   
   
  
 metoprolol tartrate 25 mg tablet Commonly known as:  LOPRESSOR What changed:  when to take this Your last dose was: Your next dose is: Take 1 Tab by mouth two (2) times a day. 25 mg  
    
   
   
   
  
 * nystatin 100,000 unit/mL suspension Commonly known as:  MYCOSTATIN What changed:   
- when to take this 
- reasons to take this 
- additional instructions Your last dose was: Your next dose is: Take 5 mL by mouth four (4) times daily. swish and spit [id number] Units * nystatin 100,000 unit/mL suspension Commonly known as:  MYCOSTATIN What changed: You were already taking a medication with the same name, and this prescription was added. Make sure you understand how and when to take each. Your last dose was: Your next dose is: Take 5 mL by mouth four (4) times daily for 10 days. swish and spit 1 tsp * Notice: This list has 4 medication(s) that are the same as other medications prescribed for you. Read the directions carefully, and ask your doctor or other care provider to review them with you. CONTINUE taking these medications Instructions Each Dose to Equal  
 Morning Noon Evening Bedtime  
 acetaminophen-codeine 300-30 mg per tablet Commonly known as:  TYLENOL #3 Your last dose was: Your next dose is: Take 1 Tab by mouth every eight (8) hours as needed. Max Daily Amount: 3 Tabs. 1 Tab ALPRAZolam 1 mg tablet Commonly known as:  Hay Anderson Your last dose was: Your next dose is: Take 1 Tab by mouth three (3) times daily as needed for Anxiety. Max Daily Amount: 3 mg.  
 1 mg  
    
   
   
   
  
 aspirin 81 mg chewable tablet Your last dose was: Your next dose is: Take 81 mg by mouth daily. 81 mg  
    
   
   
   
  
 benzonatate 200 mg capsule Commonly known as:  TESSALON Your last dose was: Your next dose is: Take 1 Cap by mouth two (2) times daily as needed. 200 mg  
    
   
   
   
  
 cetirizine 10 mg tablet Commonly known as:  ZYRTEC Your last dose was: Your next dose is: Take 1 Tab by mouth daily. 10 mg  
    
   
   
   
  
 dicyclomine 10 mg capsule Commonly known as:  BENTYL Your last dose was: Your next dose is: DULoxetine 40 mg Cpdr  
   
Your last dose was: Your next dose is: Take 40 mg by mouth daily. 40 mg  
    
   
   
   
  
 DUONEB 2.5 mg-0.5 mg/3 ml Nebu Generic drug:  albuterol-ipratropium Your last dose was: Your next dose is:    
   
   
 3 mL by Nebulization route every six (6) hours as needed for Wheezing. 3 mL  
    
   
   
   
  
 guaiFENesin 1,200 mg Ta12 ER tablet Commonly known as:  Edinson Zank Edinson Your last dose was: Your next dose is: Take 1 Tab by mouth two (2) times a day. 1200 mg  
    
   
   
   
  
 ibuprofen 800 mg tablet Commonly known as:  MOTRIN Your last dose was: Your next dose is: Take 1 Tab by mouth every eight (8) hours as needed. 800 mg  
    
   
   
   
  
 levothyroxine 200 mcg tablet Commonly known as:  SYNTHROID Your last dose was: Your next dose is: Take 200 mcg by mouth daily (before breakfast). 200 mcg LEVSIN/SL 0.125 mg SL tablet Generic drug:  hyoscyamine SL Your last dose was: Your next dose is:    
   
   
 0.125 mg by SubLINGual route three (3) times daily as needed for Cramping.  
 0.125 mg  
    
   
   
   
  
 MAGOX 400 mg tablet Generic drug:  magnesium oxide Your last dose was: Your next dose is: TAKE ONE TABLET TWICE A DAY  
     
   
   
   
  
 methocarbamol 750 mg tablet Commonly known as:  ROBAXIN Your last dose was: Your next dose is: Take 750-1,500 mg by mouth four (4) times daily as needed. 750-1500 mg  
    
   
   
   
  
 montelukast 10 mg tablet Commonly known as:  SINGULAIR Your last dose was: Your next dose is: Take 10 mg by mouth daily. 10 mg NexIUM 40 mg capsule Generic drug:  esomeprazole Your last dose was: Your next dose is: Take 40 mg by mouth daily. 40 mg PHENobarbital 60 mg tablet Commonly known as:  LUMINAL Your last dose was: Your next dose is: Take 1 Tab by mouth two (2) times a day. Max Daily Amount: 120 mg.  
 60 mg  
    
   
   
   
  
 prazosin 2 mg capsule Commonly known as:  MINIPRESS Your last dose was: Your next dose is: TAKE 1 CAPSULE BY MOUTH TWICE A DAY. primidone 50 mg tablet Commonly known as: MYSOLINE Your last dose was: Your next dose is:    
   
   
 1 po qam and 3 po qhs  
     
   
   
   
  
 promethazine 25 mg tablet Commonly known as:  PHENERGAN Your last dose was: Your next dose is: Take 1 Tab by mouth every six (6) hours as needed. 25 mg  
    
   
   
   
  
 SYMBICORT 160-4.5 mcg/actuation Hfaa Generic drug:  budesonide-formoterol Your last dose was: Your next dose is: Take 2 Puffs by inhalation two (2) times a day. 2 Puff VENTOLIN HFA 90 mcg/actuation inhaler Generic drug:  albuterol Your last dose was: Your next dose is: Take 2 Puffs by inhalation every four (4) hours as needed for Wheezing. 2 Puff  
    
   
   
   
  
 vit B Cmplx 3-FA-Vit C-Biotin 1- mg-mg-mcg tablet Commonly known as:  NEPHRO SHREYA RX Your last dose was: Your next dose is: Take 1 Tab by mouth daily. 1 Tab Vytorin 10/40 10-40 mg per tablet Generic drug:  ezetimibe-simvastatin Your last dose was: Your next dose is: Take 1 tablet by mouth nightly. 1 Tab  
    
   
   
   
  
 zinc oxide-cod liver oil 40 % ointment Commonly known as:  Tressia Jason Your last dose was: Your next dose is:    
   
   
 Apply  to affected area as needed for Skin Irritation. ASK your doctor about these medications Instructions Each Dose to Equal  
 Morning Noon Evening Bedtime  
 oxyCODONE IR 15 mg immediate release tablet Commonly known as:  OXY-IR Ask about: Should I take this medication? Your last dose was: Your next dose is: Take 1 Tab by mouth every four (4) hours as needed for up to 7 days. Max Daily Amount: 90 mg. Indications: Pain 15 mg Where to Get Your Medications These medications were sent to [address], [place] [address]  [address] [facility] [place][note 2]0 [address] [address] Phone:  [phone]  
  naloxone 4 mg/actuation nasal spray  
 predniSONE 20 mg tablet Information on where to get these meds will be given to you by the nurse or doctor. ! Ask your nurse or doctor about these medications  
  guaiFENesin 1,200 mg Ta12 ER tablet  
 levoFLOXacin 750 mg tablet  
 nystatin 100,000 unit/mL suspension  
 oxyCODONE IR 15 mg immediate release tablet  
 potassium chloride SR 20 mEq tablet  
 predniSONE 10 mg tablet Opioid Education  Prescription Opioids: What You Need to Know: 
 
 
You will have two or three small incisions. These incisions are sealed with sutures that dissolve. The lower incision is from the chest tube. This lower incision will seal itself in 5 to 7 days. Until then you may have drainage from that incision. The drainage will be thin yellowish or red in color and may drain for 5 to 7 days. This is normal and expected. INCISION CARE: 
 
Wash incisions daily with soap. Pat dry. Showers only, no tub baths. If you have drainage, you can place a bandage over the area, otherwise keep open to air. You may experience drainage of clear-strawberry colored fluid from the chest tube site. This is to be expected and will stop in 24-48 hours. PAIN: 
 
What you will experience: ? Tenderness and soreness around incisions ? Burning and/or numbness ? Soreness around front/back of chest 
 
For relief of discomfort: ? Take the pain medicine you were given at discharge ? Aleve one or two tablets every 12 hrs (with food) along with pain medicine (this can be purchased over the counter at your local pharmacy/drug store). Advil may be substituted. Start this after you finish taking Toradol if prescribed. ? Heating pad 10 minutes at a time to the upper incision area as often as you wish. ? For the Ladies: Spandex or elastic sports bra will give you the support you need without pressure on the incision. Most will find this to be a great comfort. COUGHING: 
 
Coughing is helpful after lung surgery. Place a pillow over your incision and apply pressure when coughing to reduce pain.  
 
ACTIVITY: 
 
 DO: 1. Walk daily outside if weather permits, at a mall if not. Increase your distance         
             each day. Exercise has many benefits. It promotes healing, expands your lungs,  
             helps you cough, relaxes your body, tones muscles, lowers blood pressure and  
            improves your appetite. After you exercise expect to feel tired and probably short  
             of breath. Plan to take a nap 1-2 times a day to regain your strength . 2. Climb stairs 3. Ride in a car 4. Perform breathing exercises (incentive spirometer) DO NOT: 
            
1. Lift heavy objects (8-10 pounds) 2. Drive a car/truck until after your post-op visit 3. Do heavy yard or housework APPETITE: It is usual to have a decreased appetite after surgery. Exercise is the best stimulant. You may expect to slowly improve over 4-10 days. CALL THE OFFICE IF YOU DEVELOP: 
 
? Increasing pain that is not controlled by the pain medicine. ? Increasing redness or drainage around the incision. ? Unusual or increasing shortness of breath ? Fever greater than 101 degrees ? Change in the color of your sputum to yellow or green, especially if you also have increasing shortness of breath and a fever greater than 101. YOUR MEDICATIONS: 
As instructed FOLLOW-UP APPOINTMENT: 
MANUEL CALL THE OFFICE TO SCHEDULE A FOLLOW UP APPOINTMENT IN TWO WEEKS. Please arrive 45 minutes prior to you appointment time in order to have a chest X-Ray. Check in at [address] [facility] on the ground floor of the [facility]. After your X-ray come to the [address] [place] [address] for your appointment. FOLLOW UP WITH DR Lian Pérez ( [facility] [phone]) IN 1-2 WEEKS Physician Signature The App3t Announcement We are excited to announce that we are making your provider's discharge notes available to you in Sunbayhart.   You will see these notes when they are completed and signed by the physician that discharged you from your recent hospital stay. If you have any questions or concerns about any information you see in KIWATCH, please call the Health Information Department where you were seen or reach out to your Primary Care Provider for more information about your plan of care. Introducing [facility] SERVICES! Dina Omalley introduces KIWATCH patient portal. Now you can access parts of your medical record, email your doctor's office, and request medication refills online. 1. In your internet browser, go to [email or web address] 2. Click on the First Time User? Click Here link in the Sign In box. You will see the New Member Sign Up page. 3. Enter your KIWATCH Access Code exactly as it appears below. You will not need to use this code after youve completed the sign-up process. If you do not sign up before the expiration date, you must request a new code. · KIWATCH Access Code: [email or web address] Expires: 9/16/2018  2:00 PM 
 
4. Enter the last four digits of your Social Security Number (xxxx) and Date of Birth (mm/dd/yyyy) as indicated and click Submit. You will be taken to the next sign-up page. 5. Create a KIWATCH ID. This will be your KIWATCH login ID and cannot be changed, so think of one that is secure and easy to remember. 6. Create a KIWATCH password. You can change your password at any time. 7. Enter your Password Reset Question and Answer. This can be used at a later time if you forget your password. 8. Enter your e-mail address. You will receive e-mail notification when new information is available in [address] 9. Click Sign Up. You can now view and download portions of your medical record. 10. Click the Download Summary menu link to download a portable copy of your medical information.  
 
If you have questions, please visit the Frequently Asked Questions section of the Toopher. Remember, MyChart is NOT to be used for urgent needs. For medical emergencies, dial 911. Now available from your iPhone and Android! Introducing Charles Wahl As a [facility] patient, I wanted to make you aware of our electronic visit tool called Charles Wahl. Load [employer] allows you to connect within minutes with a medical provider 24 hours a day, seven days a week via a mobile device or tablet or logging into a secure website from your computer. You can access Charles Wahl from anywhere in the [place]. A virtual visit might be right for you when you have a simple condition and feel like you just dont want to get out of bed, or cant get away from work for an appointment, when your regular [facility] provider is not available (evenings, weekends or holidays), or when youre out of town and need minor care. Electronic visits cost only $49 and if the [employer] provider determines a prescription is needed to treat your condition, one can be electronically transmitted to a nearby pharmacy*. Please take a moment to enroll today if you have not already done so. The enrollment process is free and takes just a few minutes. To enroll, please download the Fiber Options garo to your tablet or phone, or visit [email or web address] to enroll on your computer. And, as an [address] [place] [address] patient with a HealthWave account, the results of your visits will be scanned into your electronic medical record and your primary care provider will be able to view the scanned results. We urge you to continue to see your regular [facility] provider for your ongoing medical care. And while your primary care provider may not be the one available when you seek a Charles Wahl virtual visit, the peace of mind you get from getting a real diagnosis real time can be priceless. For more information on Charles Vicjulia, view our Frequently Asked Questions (FAQs) at [email or web address]. Sincerely, 
 
Tab Lozada MD 
Chief Medical Officer Nathaniel Mejia *:  certain medications cannot be prescribed via Charles Wahl Unresulted Labs-Please follow up with your PCP about these lab tests Order Current Status CULTURE, FUNGUS In process AFB CULTURE + SMEAR W/RFLX ID FROM CULTURE Preliminary result CULTURE, BLOOD Preliminary result Providers Seen During Your Hospitalization Provider Specialty Primary office phone Dee Carvajal MD Thoracic (non-cardiac) Surgery [phone] Nicolas Temple MD Internal Medicine [phone] Your Primary Care Physician (PCP) Primary Care Physician Office Phone Office Fax Guillermina Sofia [phone] You are allergic to the following Allergen Reactions Latex Contact Dermatitis Dilaudid (Hydromorphone (Pf)) Other (comments) Feels like bugs are crawling all over her Lipitor (Atorvastatin) Other (comments) LIVER TROUBLE ON THIS MEDICATION Remeron (Mirtazapine) Other (comments) Tremors Sulfa (Sulfonamide Antibiotics) Itching Recent Documentation Height Weight BMI OB Status Smoking Status 1.626 m 81.1 kg 30.69 kg/m2 Menopause Former Smoker Emergency Contacts Name Discharge Info Relation Home Work Mobile [phone] CAREGIVER [3] Spouse [3]   [phone] Patient Belongings The following personal items are in your possession at time of discharge: 
  Dental Appliances: None  Visual Aid: Glasses      Home Medications: Locked   Jewelry: Ring  Clothing: Slippers    Other Valuables: None Please provide this summary of care documentation to your next provider.  
  
  
 
  
Signatures-by signing, you are acknowledging that this After Visit Summary has been reviewed with you and you have received a copy. Patient Signature:  ____________________________________________________________ Date:  ____________________________________________________________  
  
Earnstine Wally Provider Signature:  ____________________________________________________________ Date:  ____________________________________________________________

## 2018-08-12 NOTE — PROGRESS NOTES
ADULT PROTOCOL: JET AEROSOL  REASSESSMENT    Patient  Marino Jean Baptiste     58 y.o.   female     8/11/2018  9:51 PM    Breath Sounds Pre Procedure: Right Breath Sounds: Diminished                               Left Breath Sounds: Expiratory wheezing    Breath Sounds Post Procedure: Right Breath Sounds: Diminished                                 Left Breath Sounds: Diminished (Faint exp wheeze)    Breathing pattern: Pre procedure Breathing Pattern: Regular          Post procedure Breathing Pattern: Regular    Heart Rate: Pre procedure Pulse: 84           Post procedure Pulse: 81    Resp Rate: Pre procedure Respirations: 20           Post procedure Respirations: 20      Incentive Spirometry:  Actual Volume (ml): 750 ml          Cough: Pre procedure Cough: Non-productive               Post procedure Cough: Non-productive      Sputum: Pre procedure Sputum amount: Moderate  Sputum color/odor: Yellow  Sputum consistency:  Thick  Sputum method obtained: Oral                 Post procedure      Oxygen: O2 Device: Nasal cannula   Flow rate (L/min) 3     Changed: NO    SpO2: Pre procedure SpO2: 93 %   with oxygen              Post procedure SpO2: 93 %  with oxygen    Nebulizer Therapy: Current medications Aerosolized Medications: DuoNeb      Changed: NO    Smoking History:    Smoking status: Former Smoker       Packs/day: 1.00       Years: 30.00         Problem List:   Patient Active Problem List   Diagnosis Code    COPD (chronic obstructive pulmonary disease) (HCC) J44.9    PTSD (post-traumatic stress disorder) F43.10    PVC (premature ventricular contraction) I49.3    Other and unspecified hyperlipidemia E78.5    Tobacco use disorder F17.200    Family history of ischemic heart disease Z82.49    Chest pain with normal coronary angiography R07.9    Abnormal nuclear stress test R94.39    Acute respiratory failure (HCC) J96.00    Pneumonia, organism unspecified(486) J18.9    Unspecified hypothyroidism E03.9    Depression, major, recurrent ([facility] F33.9    Cervical post-laminectomy syndrome M96.1    Neck pain M54.2    Ataxia R27.0    B12 deficiency E53.8    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.42    Syncope and collapse R55    COPD with acute exacerbation (HCC) J44.1    Acute respiratory failure with hypoxia (HCC) J96.01    Chronic respiratory failure with hypoxia (HCC) J96.11    Benign familial tremor G25.0    Atelectasis J98.11    Allergic rhinitis J30.9    Sepsis (HCC) A41.9    Acute exacerbation of COPD with asthma (HCC) J44.1, J45.901    HTN (hypertension) I10    Hyperlipidemia E78.5    Hypothyroidism E03.9    Smoking F17.200    Diabetic peripheral neuropathy associated with type 2 diabetes mellitus (HCC) E11.42    Stenosis of both internal carotid arteries I65.23    Cervical radiculopathy due to degenerative joint disease of spine M47.22    Degenerative lumbar spinal stenosis M48.061    Vitamin D deficiency E55.9    Altered mental status, unspecified R41.82    Cerebral microvascular disease I67.9    Obesity (BMI 35.0-39.9 without comorbidity) E66.9    Fibromyalgia M79.7    Benign essential tremor syndrome G25.0    CAP (community acquired pneumonia) J18.9    Productive cough R05    Chest pain on breathing R07.1    Anxiety and depression F41.9, F32.9    Polypharmacy Z79.899    Shortness of breath R06.02    Drug-induced constipation K59.03       Respiratory Therapist: Charlie Ferguson RT

## 2018-08-13 ENCOUNTER — APPOINTMENT (OUTPATIENT)
Dept: GENERAL RADIOLOGY | Age: 63
DRG: 163 | End: 2018-08-13
Attending: THORACIC SURGERY (CARDIOTHORACIC VASCULAR SURGERY)
Payer: MEDICARE

## 2018-08-13 ENCOUNTER — ANESTHESIA (OUTPATIENT)
Dept: SURGERY | Age: 63
DRG: 163 | End: 2018-08-13
Payer: MEDICARE

## 2018-08-13 ENCOUNTER — ANESTHESIA EVENT (OUTPATIENT)
Dept: SURGERY | Age: 63
DRG: 163 | End: 2018-08-13
Payer: MEDICARE

## 2018-08-13 LAB
ABO + RH BLD: NORMAL
BLOOD GROUP ANTIBODIES SERPL: NORMAL
GLUCOSE BLD STRIP.AUTO-MCNC: 243 MG/DL (ref 65–100)
SERVICE CMNT-IMP: ABNORMAL
SPECIMEN EXP DATE BLD: NORMAL

## 2018-08-13 PROCEDURE — 77030018836 HC SOL IRR NACL ICUM -A: Performed by: THORACIC SURGERY (CARDIOTHORACIC VASCULAR SURGERY)

## 2018-08-13 PROCEDURE — 74011250637 HC RX REV CODE- 250/637: Performed by: THORACIC SURGERY (CARDIOTHORACIC VASCULAR SURGERY)

## 2018-08-13 PROCEDURE — 77030002996 HC SUT SLK J&J -A: Performed by: THORACIC SURGERY (CARDIOTHORACIC VASCULAR SURGERY)

## 2018-08-13 PROCEDURE — 94760 N-INVAS EAR/PLS OXIMETRY 1: CPT

## 2018-08-13 PROCEDURE — 74011000250 HC RX REV CODE- 250: Performed by: THORACIC SURGERY (CARDIOTHORACIC VASCULAR SURGERY)

## 2018-08-13 PROCEDURE — 94640 AIRWAY INHALATION TREATMENT: CPT

## 2018-08-13 PROCEDURE — 77030032490 HC SLV COMPR SCD KNE COVD -B: Performed by: THORACIC SURGERY (CARDIOTHORACIC VASCULAR SURGERY)

## 2018-08-13 PROCEDURE — C1729 CATH, DRAINAGE: HCPCS | Performed by: THORACIC SURGERY (CARDIOTHORACIC VASCULAR SURGERY)

## 2018-08-13 PROCEDURE — 74011000250 HC RX REV CODE- 250

## 2018-08-13 PROCEDURE — 76060000037 HC ANESTHESIA 3 TO 3.5 HR: Performed by: THORACIC SURGERY (CARDIOTHORACIC VASCULAR SURGERY)

## 2018-08-13 PROCEDURE — 82962 GLUCOSE BLOOD TEST: CPT

## 2018-08-13 PROCEDURE — 74011636637 HC RX REV CODE- 636/637: Performed by: THORACIC SURGERY (CARDIOTHORACIC VASCULAR SURGERY)

## 2018-08-13 PROCEDURE — 77030034696 HC CATH URETH FOL 2W BARD -A: Performed by: THORACIC SURGERY (CARDIOTHORACIC VASCULAR SURGERY)

## 2018-08-13 PROCEDURE — P9045 ALBUMIN (HUMAN), 5%, 250 ML: HCPCS

## 2018-08-13 PROCEDURE — 0BND4ZZ RELEASE RIGHT MIDDLE LUNG LOBE, PERCUTANEOUS ENDOSCOPIC APPROACH: ICD-10-PCS | Performed by: THORACIC SURGERY (CARDIOTHORACIC VASCULAR SURGERY)

## 2018-08-13 PROCEDURE — 74011250636 HC RX REV CODE- 250/636

## 2018-08-13 PROCEDURE — 76210000017 HC OR PH I REC 1.5 TO 2 HR: Performed by: THORACIC SURGERY (CARDIOTHORACIC VASCULAR SURGERY)

## 2018-08-13 PROCEDURE — 76010000132 HC OR TIME 2.5 TO 3 HR: Performed by: THORACIC SURGERY (CARDIOTHORACIC VASCULAR SURGERY)

## 2018-08-13 PROCEDURE — 77030018846 HC SOL IRR STRL H20 ICUM -A: Performed by: THORACIC SURGERY (CARDIOTHORACIC VASCULAR SURGERY)

## 2018-08-13 PROCEDURE — 3E0T3BZ INTRODUCTION OF ANESTHETIC AGENT INTO PERIPHERAL NERVES AND PLEXI, PERCUTANEOUS APPROACH: ICD-10-PCS | Performed by: THORACIC SURGERY (CARDIOTHORACIC VASCULAR SURGERY)

## 2018-08-13 PROCEDURE — 77030039266 HC ADH SKN EXOFIN S2SG -A: Performed by: THORACIC SURGERY (CARDIOTHORACIC VASCULAR SURGERY)

## 2018-08-13 PROCEDURE — 36415 COLL VENOUS BLD VENIPUNCTURE: CPT | Performed by: THORACIC SURGERY (CARDIOTHORACIC VASCULAR SURGERY)

## 2018-08-13 PROCEDURE — 74011000250 HC RX REV CODE- 250: Performed by: NURSE ANESTHETIST, CERTIFIED REGISTERED

## 2018-08-13 PROCEDURE — 77030003666 HC NDL SPINAL BD -A: Performed by: THORACIC SURGERY (CARDIOTHORACIC VASCULAR SURGERY)

## 2018-08-13 PROCEDURE — 77030018673: Performed by: THORACIC SURGERY (CARDIOTHORACIC VASCULAR SURGERY)

## 2018-08-13 PROCEDURE — 74011250636 HC RX REV CODE- 250/636: Performed by: THORACIC SURGERY (CARDIOTHORACIC VASCULAR SURGERY)

## 2018-08-13 PROCEDURE — 77030008671 HC TU ENDO/BRNC CUF COVD -B: Performed by: ANESTHESIOLOGY

## 2018-08-13 PROCEDURE — 65660000000 HC RM CCU STEPDOWN

## 2018-08-13 PROCEDURE — 77010033678 HC OXYGEN DAILY

## 2018-08-13 PROCEDURE — 0BNF4ZZ RELEASE RIGHT LOWER LUNG LOBE, PERCUTANEOUS ENDOSCOPIC APPROACH: ICD-10-PCS | Performed by: THORACIC SURGERY (CARDIOTHORACIC VASCULAR SURGERY)

## 2018-08-13 PROCEDURE — 71045 X-RAY EXAM CHEST 1 VIEW: CPT

## 2018-08-13 PROCEDURE — 86900 BLOOD TYPING SEROLOGIC ABO: CPT | Performed by: THORACIC SURGERY (CARDIOTHORACIC VASCULAR SURGERY)

## 2018-08-13 PROCEDURE — 77030011264 HC ELECTRD BLD EXT COVD -A: Performed by: THORACIC SURGERY (CARDIOTHORACIC VASCULAR SURGERY)

## 2018-08-13 PROCEDURE — 88305 TISSUE EXAM BY PATHOLOGIST: CPT | Performed by: THORACIC SURGERY (CARDIOTHORACIC VASCULAR SURGERY)

## 2018-08-13 PROCEDURE — 77030031139 HC SUT VCRL2 J&J -A: Performed by: THORACIC SURGERY (CARDIOTHORACIC VASCULAR SURGERY)

## 2018-08-13 PROCEDURE — 77030012390 HC DRN CHST BTL GTNG -B: Performed by: THORACIC SURGERY (CARDIOTHORACIC VASCULAR SURGERY)

## 2018-08-13 PROCEDURE — 77030011640 HC PAD GRND REM COVD -A: Performed by: THORACIC SURGERY (CARDIOTHORACIC VASCULAR SURGERY)

## 2018-08-13 PROCEDURE — 74011000258 HC RX REV CODE- 258: Performed by: THORACIC SURGERY (CARDIOTHORACIC VASCULAR SURGERY)

## 2018-08-13 PROCEDURE — 64450 NJX AA&/STRD OTHER PN/BRANCH: CPT

## 2018-08-13 PROCEDURE — 0W9940Z DRAINAGE OF RIGHT PLEURAL CAVITY WITH DRAINAGE DEVICE, PERCUTANEOUS ENDOSCOPIC APPROACH: ICD-10-PCS | Performed by: THORACIC SURGERY (CARDIOTHORACIC VASCULAR SURGERY)

## 2018-08-13 RX ORDER — SODIUM CHLORIDE, SODIUM LACTATE, POTASSIUM CHLORIDE, CALCIUM CHLORIDE 600; 310; 30; 20 MG/100ML; MG/100ML; MG/100ML; MG/100ML
20 INJECTION, SOLUTION INTRAVENOUS CONTINUOUS
Status: DISCONTINUED | OUTPATIENT
Start: 2018-08-13 | End: 2018-08-16

## 2018-08-13 RX ORDER — FENTANYL CITRATE 50 UG/ML
25 INJECTION, SOLUTION INTRAMUSCULAR; INTRAVENOUS
Status: DISCONTINUED | OUTPATIENT
Start: 2018-08-13 | End: 2018-08-13 | Stop reason: HOSPADM

## 2018-08-13 RX ORDER — LIDOCAINE HYDROCHLORIDE 10 MG/ML
0.1 INJECTION, SOLUTION EPIDURAL; INFILTRATION; INTRACAUDAL; PERINEURAL AS NEEDED
Status: DISCONTINUED | OUTPATIENT
Start: 2018-08-13 | End: 2018-08-13 | Stop reason: HOSPADM

## 2018-08-13 RX ORDER — PRAZOSIN HYDROCHLORIDE 1 MG/1
2 CAPSULE ORAL 2 TIMES DAILY
Status: DISCONTINUED | OUTPATIENT
Start: 2018-08-13 | End: 2018-08-26 | Stop reason: HOSPADM

## 2018-08-13 RX ORDER — KETOROLAC TROMETHAMINE 30 MG/ML
30 INJECTION, SOLUTION INTRAMUSCULAR; INTRAVENOUS EVERY 6 HOURS
Status: COMPLETED | OUTPATIENT
Start: 2018-08-13 | End: 2018-08-15

## 2018-08-13 RX ORDER — MIDAZOLAM HYDROCHLORIDE 1 MG/ML
1 INJECTION, SOLUTION INTRAMUSCULAR; INTRAVENOUS AS NEEDED
Status: DISCONTINUED | OUTPATIENT
Start: 2018-08-13 | End: 2018-08-13 | Stop reason: HOSPADM

## 2018-08-13 RX ORDER — DIPHENHYDRAMINE HYDROCHLORIDE 50 MG/ML
12.5 INJECTION, SOLUTION INTRAMUSCULAR; INTRAVENOUS AS NEEDED
Status: DISCONTINUED | OUTPATIENT
Start: 2018-08-13 | End: 2018-08-13 | Stop reason: HOSPADM

## 2018-08-13 RX ORDER — ALPRAZOLAM 0.5 MG/1
1 TABLET ORAL ONCE
Status: COMPLETED | OUTPATIENT
Start: 2018-08-13 | End: 2018-08-13

## 2018-08-13 RX ORDER — MORPHINE SULFATE 4 MG/ML
INJECTION, SOLUTION INTRAMUSCULAR; INTRAVENOUS AS NEEDED
Status: DISCONTINUED | OUTPATIENT
Start: 2018-08-13 | End: 2018-08-13 | Stop reason: HOSPADM

## 2018-08-13 RX ORDER — ONDANSETRON 2 MG/ML
4 INJECTION INTRAMUSCULAR; INTRAVENOUS
Status: DISCONTINUED | OUTPATIENT
Start: 2018-08-13 | End: 2018-08-26 | Stop reason: HOSPADM

## 2018-08-13 RX ORDER — LIDOCAINE HYDROCHLORIDE 20 MG/ML
INJECTION, SOLUTION EPIDURAL; INFILTRATION; INTRACAUDAL; PERINEURAL AS NEEDED
Status: DISCONTINUED | OUTPATIENT
Start: 2018-08-13 | End: 2018-08-13 | Stop reason: HOSPADM

## 2018-08-13 RX ORDER — ONDANSETRON 2 MG/ML
INJECTION INTRAMUSCULAR; INTRAVENOUS AS NEEDED
Status: DISCONTINUED | OUTPATIENT
Start: 2018-08-13 | End: 2018-08-13 | Stop reason: HOSPADM

## 2018-08-13 RX ORDER — ROCURONIUM BROMIDE 10 MG/ML
INJECTION, SOLUTION INTRAVENOUS AS NEEDED
Status: DISCONTINUED | OUTPATIENT
Start: 2018-08-13 | End: 2018-08-13 | Stop reason: HOSPADM

## 2018-08-13 RX ORDER — DOCUSATE SODIUM 100 MG/1
100 CAPSULE, LIQUID FILLED ORAL 2 TIMES DAILY
Status: DISCONTINUED | OUTPATIENT
Start: 2018-08-13 | End: 2018-08-14

## 2018-08-13 RX ORDER — ALBUMIN HUMAN 50 G/1000ML
SOLUTION INTRAVENOUS AS NEEDED
Status: DISCONTINUED | OUTPATIENT
Start: 2018-08-13 | End: 2018-08-13 | Stop reason: HOSPADM

## 2018-08-13 RX ORDER — NALOXONE HYDROCHLORIDE 0.4 MG/ML
0.4 INJECTION, SOLUTION INTRAMUSCULAR; INTRAVENOUS; SUBCUTANEOUS AS NEEDED
Status: DISCONTINUED | OUTPATIENT
Start: 2018-08-13 | End: 2018-08-26 | Stop reason: HOSPADM

## 2018-08-13 RX ORDER — KETOROLAC TROMETHAMINE 30 MG/ML
INJECTION, SOLUTION INTRAMUSCULAR; INTRAVENOUS
Status: DISPENSED
Start: 2018-08-13 | End: 2018-08-14

## 2018-08-13 RX ORDER — MIDAZOLAM HYDROCHLORIDE 1 MG/ML
0.5 INJECTION, SOLUTION INTRAMUSCULAR; INTRAVENOUS
Status: DISCONTINUED | OUTPATIENT
Start: 2018-08-13 | End: 2018-08-13 | Stop reason: HOSPADM

## 2018-08-13 RX ORDER — SODIUM CHLORIDE 0.9 % (FLUSH) 0.9 %
5-10 SYRINGE (ML) INJECTION EVERY 8 HOURS
Status: DISCONTINUED | OUTPATIENT
Start: 2018-08-13 | End: 2018-08-26 | Stop reason: HOSPADM

## 2018-08-13 RX ORDER — ENOXAPARIN SODIUM 100 MG/ML
40 INJECTION SUBCUTANEOUS EVERY 24 HOURS
Status: DISCONTINUED | OUTPATIENT
Start: 2018-08-14 | End: 2018-08-26 | Stop reason: HOSPADM

## 2018-08-13 RX ORDER — FENTANYL CITRATE 50 UG/ML
50 INJECTION, SOLUTION INTRAMUSCULAR; INTRAVENOUS AS NEEDED
Status: DISCONTINUED | OUTPATIENT
Start: 2018-08-13 | End: 2018-08-13 | Stop reason: HOSPADM

## 2018-08-13 RX ORDER — SODIUM CHLORIDE, SODIUM LACTATE, POTASSIUM CHLORIDE, CALCIUM CHLORIDE 600; 310; 30; 20 MG/100ML; MG/100ML; MG/100ML; MG/100ML
100 INJECTION, SOLUTION INTRAVENOUS CONTINUOUS
Status: DISCONTINUED | OUTPATIENT
Start: 2018-08-13 | End: 2018-08-13 | Stop reason: SDUPTHER

## 2018-08-13 RX ORDER — SUCCINYLCHOLINE CHLORIDE 20 MG/ML
INJECTION INTRAMUSCULAR; INTRAVENOUS AS NEEDED
Status: DISCONTINUED | OUTPATIENT
Start: 2018-08-13 | End: 2018-08-13 | Stop reason: HOSPADM

## 2018-08-13 RX ORDER — MORPHINE SULFATE 1 MG/ML
INJECTION, SOLUTION EPIDURAL; INTRATHECAL; INTRAVENOUS
Status: DISPENSED
Start: 2018-08-13 | End: 2018-08-14

## 2018-08-13 RX ORDER — HYDROMORPHONE HYDROCHLORIDE 1 MG/ML
INJECTION, SOLUTION INTRAMUSCULAR; INTRAVENOUS; SUBCUTANEOUS AS NEEDED
Status: DISCONTINUED | OUTPATIENT
Start: 2018-08-13 | End: 2018-08-13

## 2018-08-13 RX ORDER — FENTANYL CITRATE 50 UG/ML
INJECTION, SOLUTION INTRAMUSCULAR; INTRAVENOUS
Status: COMPLETED
Start: 2018-08-13 | End: 2018-08-13

## 2018-08-13 RX ORDER — FENTANYL CITRATE 50 UG/ML
INJECTION, SOLUTION INTRAMUSCULAR; INTRAVENOUS AS NEEDED
Status: DISCONTINUED | OUTPATIENT
Start: 2018-08-13 | End: 2018-08-13 | Stop reason: HOSPADM

## 2018-08-13 RX ORDER — ROPIVACAINE HYDROCHLORIDE 5 MG/ML
150 INJECTION, SOLUTION EPIDURAL; INFILTRATION; PERINEURAL AS NEEDED
Status: DISCONTINUED | OUTPATIENT
Start: 2018-08-13 | End: 2018-08-13 | Stop reason: HOSPADM

## 2018-08-13 RX ORDER — FENTANYL CITRATE-0.9 % NACL/PF 900MCG/30
PATIENT CONTROLLED ANALGESIA VIAL INJECTION
Status: DISCONTINUED | OUTPATIENT
Start: 2018-08-13 | End: 2018-08-16

## 2018-08-13 RX ORDER — PROPOFOL 10 MG/ML
INJECTION, EMULSION INTRAVENOUS AS NEEDED
Status: DISCONTINUED | OUTPATIENT
Start: 2018-08-13 | End: 2018-08-13 | Stop reason: HOSPADM

## 2018-08-13 RX ORDER — SODIUM CHLORIDE, SODIUM LACTATE, POTASSIUM CHLORIDE, CALCIUM CHLORIDE 600; 310; 30; 20 MG/100ML; MG/100ML; MG/100ML; MG/100ML
INJECTION, SOLUTION INTRAVENOUS
Status: DISCONTINUED | OUTPATIENT
Start: 2018-08-13 | End: 2018-08-13 | Stop reason: HOSPADM

## 2018-08-13 RX ORDER — SODIUM CHLORIDE 0.9 % (FLUSH) 0.9 %
5-10 SYRINGE (ML) INJECTION AS NEEDED
Status: DISCONTINUED | OUTPATIENT
Start: 2018-08-13 | End: 2018-08-13 | Stop reason: HOSPADM

## 2018-08-13 RX ORDER — PHENYLEPHRINE HCL IN 0.9% NACL 0.4MG/10ML
SYRINGE (ML) INTRAVENOUS AS NEEDED
Status: DISCONTINUED | OUTPATIENT
Start: 2018-08-13 | End: 2018-08-13 | Stop reason: HOSPADM

## 2018-08-13 RX ORDER — KETAMINE HYDROCHLORIDE 10 MG/ML
INJECTION, SOLUTION INTRAMUSCULAR; INTRAVENOUS AS NEEDED
Status: DISCONTINUED | OUTPATIENT
Start: 2018-08-13 | End: 2018-08-13 | Stop reason: HOSPADM

## 2018-08-13 RX ORDER — DEXAMETHASONE SODIUM PHOSPHATE 4 MG/ML
INJECTION, SOLUTION INTRA-ARTICULAR; INTRALESIONAL; INTRAMUSCULAR; INTRAVENOUS; SOFT TISSUE AS NEEDED
Status: DISCONTINUED | OUTPATIENT
Start: 2018-08-13 | End: 2018-08-13 | Stop reason: HOSPADM

## 2018-08-13 RX ORDER — SODIUM CHLORIDE 0.9 % (FLUSH) 0.9 %
5-10 SYRINGE (ML) INJECTION AS NEEDED
Status: DISCONTINUED | OUTPATIENT
Start: 2018-08-13 | End: 2018-08-26 | Stop reason: HOSPADM

## 2018-08-13 RX ORDER — SODIUM CHLORIDE, SODIUM LACTATE, POTASSIUM CHLORIDE, CALCIUM CHLORIDE 600; 310; 30; 20 MG/100ML; MG/100ML; MG/100ML; MG/100ML
100 INJECTION, SOLUTION INTRAVENOUS CONTINUOUS
Status: DISCONTINUED | OUTPATIENT
Start: 2018-08-13 | End: 2018-08-13 | Stop reason: HOSPADM

## 2018-08-13 RX ORDER — SODIUM CHLORIDE 0.9 % (FLUSH) 0.9 %
5-10 SYRINGE (ML) INJECTION EVERY 8 HOURS
Status: DISCONTINUED | OUTPATIENT
Start: 2018-08-13 | End: 2018-08-13 | Stop reason: HOSPADM

## 2018-08-13 RX ORDER — SENNOSIDES 8.6 MG/1
1 TABLET ORAL
Status: DISCONTINUED | OUTPATIENT
Start: 2018-08-13 | End: 2018-08-14

## 2018-08-13 RX ORDER — GLYCOPYRROLATE 0.2 MG/ML
INJECTION INTRAMUSCULAR; INTRAVENOUS AS NEEDED
Status: DISCONTINUED | OUTPATIENT
Start: 2018-08-13 | End: 2018-08-13 | Stop reason: HOSPADM

## 2018-08-13 RX ADMIN — ROCURONIUM BROMIDE 25 MG: 10 INJECTION, SOLUTION INTRAVENOUS at 11:18

## 2018-08-13 RX ADMIN — MORPHINE SULFATE 2 MG: 4 INJECTION, SOLUTION INTRAMUSCULAR; INTRAVENOUS at 13:45

## 2018-08-13 RX ADMIN — GLYCOPYRROLATE 0.2 MG: 0.2 INJECTION INTRAMUSCULAR; INTRAVENOUS at 11:00

## 2018-08-13 RX ADMIN — BUDESONIDE 500 MCG: 0.5 INHALANT RESPIRATORY (INHALATION) at 08:17

## 2018-08-13 RX ADMIN — SUGAMMADEX 200 MG: 100 INJECTION, SOLUTION INTRAVENOUS at 12:54

## 2018-08-13 RX ADMIN — OXYCODONE HYDROCHLORIDE 15 MG: 5 TABLET ORAL at 01:59

## 2018-08-13 RX ADMIN — FENTANYL CITRATE 100 MCG: 50 INJECTION, SOLUTION INTRAMUSCULAR; INTRAVENOUS at 11:43

## 2018-08-13 RX ADMIN — OXYCODONE HYDROCHLORIDE 15 MG: 5 TABLET ORAL at 05:57

## 2018-08-13 RX ADMIN — PRAZOSIN HYDROCHLORIDE 1 MG: 1 CAPSULE ORAL at 00:49

## 2018-08-13 RX ADMIN — ROCURONIUM BROMIDE 5 MG: 10 INJECTION, SOLUTION INTRAVENOUS at 11:03

## 2018-08-13 RX ADMIN — PRAZOSIN HYDROCHLORIDE 2 MG: 1 CAPSULE ORAL at 18:15

## 2018-08-13 RX ADMIN — Medication 80 MCG: at 13:05

## 2018-08-13 RX ADMIN — FUROSEMIDE 40 MG: 40 TABLET ORAL at 18:02

## 2018-08-13 RX ADMIN — PROPOFOL 150 MG: 10 INJECTION, EMULSION INTRAVENOUS at 11:05

## 2018-08-13 RX ADMIN — Medication 80 MCG: at 13:24

## 2018-08-13 RX ADMIN — DOCUSATE SODIUM 100 MG: 100 CAPSULE, LIQUID FILLED ORAL at 18:01

## 2018-08-13 RX ADMIN — METOPROLOL TARTRATE 25 MG: 25 TABLET ORAL at 22:09

## 2018-08-13 RX ADMIN — IPRATROPIUM BROMIDE AND ALBUTEROL SULFATE 3 ML: .5; 3 SOLUTION RESPIRATORY (INHALATION) at 19:48

## 2018-08-13 RX ADMIN — SODIUM CHLORIDE, SODIUM LACTATE, POTASSIUM CHLORIDE, CALCIUM CHLORIDE: 600; 310; 30; 20 INJECTION, SOLUTION INTRAVENOUS at 10:43

## 2018-08-13 RX ADMIN — LEVOTHYROXINE SODIUM 200 MCG: 100 TABLET ORAL at 06:35

## 2018-08-13 RX ADMIN — ALPRAZOLAM 1 MG: 0.5 TABLET ORAL at 01:38

## 2018-08-13 RX ADMIN — Medication: at 15:18

## 2018-08-13 RX ADMIN — ONDANSETRON 4 MG: 2 INJECTION INTRAMUSCULAR; INTRAVENOUS at 12:52

## 2018-08-13 RX ADMIN — BUDESONIDE 500 MCG: 0.5 INHALANT RESPIRATORY (INHALATION) at 19:48

## 2018-08-13 RX ADMIN — PIPERACILLIN AND TAZOBACTAM 3.38 G: 3; .375 INJECTION, POWDER, FOR SOLUTION INTRAVENOUS at 08:37

## 2018-08-13 RX ADMIN — SUCCINYLCHOLINE CHLORIDE 160 MG: 20 INJECTION INTRAMUSCULAR; INTRAVENOUS at 11:05

## 2018-08-13 RX ADMIN — KETAMINE HYDROCHLORIDE 30 MG: 10 INJECTION, SOLUTION INTRAMUSCULAR; INTRAVENOUS at 11:20

## 2018-08-13 RX ADMIN — Medication 40 MCG: at 13:02

## 2018-08-13 RX ADMIN — DULOXETINE HYDROCHLORIDE 40 MG: 20 CAPSULE, DELAYED RELEASE ORAL at 18:01

## 2018-08-13 RX ADMIN — ARFORMOTEROL TARTRATE 15 MCG: 15 SOLUTION RESPIRATORY (INHALATION) at 01:36

## 2018-08-13 RX ADMIN — KETAMINE HYDROCHLORIDE 20 MG: 10 INJECTION, SOLUTION INTRAMUSCULAR; INTRAVENOUS at 11:03

## 2018-08-13 RX ADMIN — METOPROLOL TARTRATE 25 MG: 25 TABLET ORAL at 08:37

## 2018-08-13 RX ADMIN — PRIMIDONE 150 MG: 50 TABLET ORAL at 22:09

## 2018-08-13 RX ADMIN — ALPRAZOLAM 1 MG: 0.5 TABLET ORAL at 18:30

## 2018-08-13 RX ADMIN — LEVOFLOXACIN 750 MG: 750 TABLET, FILM COATED ORAL at 22:08

## 2018-08-13 RX ADMIN — FENTANYL CITRATE 50 MCG: 50 INJECTION, SOLUTION INTRAMUSCULAR; INTRAVENOUS at 13:03

## 2018-08-13 RX ADMIN — Medication 80 MCG: at 13:10

## 2018-08-13 RX ADMIN — MONTELUKAST SODIUM 10 MG: 10 TABLET, FILM COATED ORAL at 22:09

## 2018-08-13 RX ADMIN — SODIUM CHLORIDE, SODIUM LACTATE, POTASSIUM CHLORIDE, AND CALCIUM CHLORIDE 20 ML/HR: 600; 310; 30; 20 INJECTION, SOLUTION INTRAVENOUS at 16:49

## 2018-08-13 RX ADMIN — Medication 10 ML: at 22:10

## 2018-08-13 RX ADMIN — LIDOCAINE HYDROCHLORIDE 50 MG: 20 INJECTION, SOLUTION EPIDURAL; INFILTRATION; INTRACAUDAL; PERINEURAL at 11:03

## 2018-08-13 RX ADMIN — PHENOBARBITAL 64.8 MG: 64.8 TABLET ORAL at 08:37

## 2018-08-13 RX ADMIN — PROPOFOL 50 MG: 10 INJECTION, EMULSION INTRAVENOUS at 11:09

## 2018-08-13 RX ADMIN — FENTANYL CITRATE 25 MCG: 50 INJECTION, SOLUTION INTRAMUSCULAR; INTRAVENOUS at 14:10

## 2018-08-13 RX ADMIN — ARFORMOTEROL TARTRATE 15 MCG: 15 SOLUTION RESPIRATORY (INHALATION) at 22:12

## 2018-08-13 RX ADMIN — IPRATROPIUM BROMIDE AND ALBUTEROL SULFATE 3 ML: .5; 3 SOLUTION RESPIRATORY (INHALATION) at 16:36

## 2018-08-13 RX ADMIN — FENTANYL CITRATE 50 MCG: 50 INJECTION, SOLUTION INTRAMUSCULAR; INTRAVENOUS at 12:57

## 2018-08-13 RX ADMIN — PIPERACILLIN AND TAZOBACTAM 3.38 G: 3; .375 INJECTION, POWDER, FOR SOLUTION INTRAVENOUS at 00:47

## 2018-08-13 RX ADMIN — PREDNISONE 60 MG: 20 TABLET ORAL at 18:01

## 2018-08-13 RX ADMIN — Medication 8.6 MG: at 22:08

## 2018-08-13 RX ADMIN — FENTANYL CITRATE 25 MCG: 50 INJECTION, SOLUTION INTRAMUSCULAR; INTRAVENOUS at 14:50

## 2018-08-13 RX ADMIN — KETOROLAC TROMETHAMINE 30 MG: 30 INJECTION, SOLUTION INTRAMUSCULAR at 14:23

## 2018-08-13 RX ADMIN — ROCURONIUM BROMIDE 10 MG: 10 INJECTION, SOLUTION INTRAVENOUS at 11:37

## 2018-08-13 RX ADMIN — ALBUMIN HUMAN 250 ML: 50 SOLUTION INTRAVENOUS at 13:32

## 2018-08-13 RX ADMIN — DEXAMETHASONE SODIUM PHOSPHATE 8 MG: 4 INJECTION, SOLUTION INTRA-ARTICULAR; INTRALESIONAL; INTRAMUSCULAR; INTRAVENOUS; SOFT TISSUE at 12:00

## 2018-08-13 RX ADMIN — PHENOBARBITAL 64.8 MG: 64.8 TABLET ORAL at 18:02

## 2018-08-13 RX ADMIN — SIMVASTATIN: 20 TABLET, FILM COATED ORAL at 18:00

## 2018-08-13 RX ADMIN — FENTANYL CITRATE 50 MCG: 50 INJECTION, SOLUTION INTRAMUSCULAR; INTRAVENOUS at 11:03

## 2018-08-13 RX ADMIN — IPRATROPIUM BROMIDE AND ALBUTEROL SULFATE 3 ML: .5; 3 SOLUTION RESPIRATORY (INHALATION) at 08:17

## 2018-08-13 RX ADMIN — ROCURONIUM BROMIDE 10 MG: 10 INJECTION, SOLUTION INTRAVENOUS at 12:00

## 2018-08-13 RX ADMIN — MORPHINE SULFATE 2 MG: 4 INJECTION, SOLUTION INTRAMUSCULAR; INTRAVENOUS at 13:58

## 2018-08-13 RX ADMIN — PIPERACILLIN AND TAZOBACTAM 3.38 G: 3; .375 INJECTION, POWDER, FOR SOLUTION INTRAVENOUS at 18:04

## 2018-08-13 RX ADMIN — MORPHINE SULFATE 2 MG: 4 INJECTION, SOLUTION INTRAMUSCULAR; INTRAVENOUS at 13:48

## 2018-08-13 RX ADMIN — ONDANSETRON 4 MG: 2 INJECTION INTRAMUSCULAR; INTRAVENOUS at 19:57

## 2018-08-13 NOTE — BRIEF OP NOTE
BRIEF OPERATIVE NOTE    Date of Procedure: 8/13/2018   Preoperative Diagnosis: EMPYEMA  Postoperative Diagnosis: EMPYEMA    Procedure(s):  VIDEO ASSISTED THORACOSCOPY--RIGHT--WITH DECORTICATION  Surgeon(s) and Role:     * Manuel Blanchard MD - Primary         Surgical Assistant: none    Surgical Staff:  Circ-1: Dorita Benitez RN  Scrub Tech-1: Thelbert Loop  Surg Asst-2: Joel Blakely  Float Staff: Jan Maciel RN  Event Time In   Incision Start 1146   Incision Close      Anesthesia: General   Estimated Blood Loss: 300ml  Specimens:   ID Type Source Tests Collected by Time Destination   1 : right pleural peel Fresh Tissue  Manuel Blanchard MD 8/13/2018 1202 Pathology      Findings: inflamed empyema with a thick rind.   Necrotic RLL tissue friable   Complications: none  Implants: * No implants in log *     Condition: stable    Disposition: to pacu

## 2018-08-13 NOTE — H&P
Asked to see Ms. Murphy re: right empyema    Ms. Catherine Real is a pleasant 57 y/o lady with a past medical history significant for depression, anxiety and fibromyalgia who was admitted to [facility] with a RLL pneumonia. While admitted she developed a parapneumonic effusion. This was partially drained with a pigtail catheter. Cultures grew beta hemolytic strept. Transferred to [facility] for decortication of empyema. Allergies: latex, dilaudid, remeron, sulfa, lipitor    PMHx: depression, hypothyroidism, obesity, fibromyalgia    PSHx: C section    SocHx: former smoker    FamHx: mother heart disease, father bipolar    Meds: metoprolol, synthroid, cymbalta, lasix, luminal, prazosin, primidone, prednisone, Levaquin, Zosyn    ROS:    Constitutional- easily fatigued  HEENT- denies dysphagia  Neuro- denies syncope  Optho- no changes in vision  Resp- dyspnea, pleuritic chest pain  CV- denies angina  GI- recent diarrhea  - no complaints  ID- recent low grade fevers  Vasc- denies claudication    Afebrile  P 70  /70    On exam she is laying in bed  Alert and oriented  Obese  Appears her stated age  Not tachypneic  Distant breath sounds b/l  Pigtail dressing c/d/i  RRR, no murmurs  Radial pulses palp b/l  Abd obese, SNTND, no organomegaly  LE mild edema    ==========================    WBC 15.6  Hgb 11.8  Plts 429    Cr 0.97    ===========================    I have personally reviewed the most recent chest CT. RLL consolidation with gas bubbles suggestive of ongoing infection. Loculated fluid collection incompletely drained via pigtail.    ==========================    Microbiology-  Beta Hemolytic Strept    ==========================    Diagnoses  1: Beta Hemolytic strept Empyema    ===========================    Ms. Carren Addie had a RLL pneumonia which has progressed to an empyema despite appropriate antibiotic coverage. Pigtail drainage only partially effective. Plan to proceed to the OR for a right VATS decortication.

## 2018-08-13 NOTE — PROGRESS NOTES
Faculty or Preceptor Review of Student Work    8/13/2018  - Shift times - 0730 to 1100    The student documentation of patient care for [facility] has been reviewed and approved. All medications have been administered under the direct supervision of the faculty or preceptor.     Rudy Hearn RN

## 2018-08-13 NOTE — ROUTINE PROCESS
Bedside shift change report given to Kendra Lang (oncoming nurse) by Haritha Alexis (offgoing nurse). Report included the following information SBAR.

## 2018-08-13 NOTE — ANESTHESIA PREPROCEDURE EVALUATION
Anesthetic History   No history of anesthetic complications            Review of Systems / Medical History  Patient summary reviewed, nursing notes reviewed and pertinent labs reviewed    Pulmonary    COPD      Smoker  Asthma        Neuro/Psych         Psychiatric history     Cardiovascular    Hypertension        Dysrhythmias : PVC           GI/Hepatic/Renal           PUD     Endo/Other    Diabetes  Hypothyroidism  Obesity and arthritis     Other Findings            Physical Exam    Airway  Mallampati: II  TM Distance: > 6 cm  Neck ROM: normal range of motion   Mouth opening: Normal     Cardiovascular  Regular rate and rhythm,  S1 and S2 normal,  no murmur, click, rub, or gallop             Dental  No notable dental hx       Pulmonary  Breath sounds clear to auscultation               Abdominal  GI exam deferred       Other Findings            Anesthetic Plan    ASA: 3  Anesthesia type: general          Induction: Intravenous  Anesthetic plan and risks discussed with: Patient

## 2018-08-13 NOTE — PROGRESS NOTES
Verbal shift change report given to Kelin Victor RN (oncoming nurse) by Eleazar Kahn RN (offgoing nurse).  Report included the following information SBAR, [facility], [facility]

## 2018-08-13 NOTE — PROGRESS NOTES
TRANSFER - IN REPORT:    Verbal report received from [facility]) on [employer]  being received from [facility]) for routine post - op      Report consisted of patients Situation, Background, Assessment and   Recommendations(SBAR). Information from the following report(s) SBAR, OR Summary, Procedure Summary, MAR, Recent Results and Cardiac Rhythm NSR was reviewed with the receiving nurse. Opportunity for questions and clarification was provided. Assessment completed upon patients arrival to unit and care assumed.

## 2018-08-13 NOTE — PERIOP NOTES
TRANSFER - OUT REPORT:    Verbal report given to RN(name) on Toya Pendleton  being transferred to 433(unit) for routine post - op       Report consisted of patients Situation, Background, Assessment and   Recommendations(SBAR). Time Pre op antibiotic given:na  Anesthesia Stop time: na  Ly Present on Transfer to floor:yes  Order for Ly on Chart:yes  Discharge Prescriptions with Chart:no    Information from the following report(s) SBAR, Kardex, OR Summary, Procedure Summary, Intake/Output, MAR, Recent Results and Med Rec Status was reviewed with the receiving nurse. Opportunity for questions and clarification was provided. Is the patient on 02? YES       L/Min        Other 50% face tent        Is the patient on a monitor? YES    Is the nurse transporting with the patient? YES    Surgical Waiting Area notified of patient's transfer from PACU? YES      The following personal items collected during your admission accompanied patient upon transfer:   Dental Appliance: Dental Appliances: None  Vision: Visual Aid: Glasses, With patient  Hearing Aid:    Jewelry: Jewelry: Ring  Clothing: Clothing: Slippers  Other Valuables:  Other Valuables: None  Valuables sent to safe:        None in PACU./

## 2018-08-13 NOTE — ROUTINE PROCESS
Patient: Shelli Go MRN: [id number]  SSN: [id number]   YOB: 1955  Age: 58 y.o. Sex: female     Patient is status post Procedure(s):  VIDEO ASSISTED THORACOSCOPY--RIGHT--WITH DECORTICATION. Surgeon(s) and Role:     * Laura Zimmer MD - Primary    Local/Dose/Irrigation:  See [facility]                  Peripheral IV 08/12/18 Right Wrist (Active)   Site Assessment Clean, dry, & intact 8/13/2018  4:30 AM   Phlebitis Assessment 0 8/13/2018  4:30 AM   Infiltration Assessment 0 8/13/2018  4:30 AM   Dressing Status Clean, dry, & intact 8/13/2018  4:30 AM   Dressing Type Transparent 8/13/2018  4:30 AM   Hub Color/Line Status Pink; Infusing 8/13/2018  4:30 AM   Action Taken Open ports on tubing capped 8/13/2018  4:30 AM   Alcohol Cap Used Yes 8/13/2018  4:30 AM       Peripheral IV 08/13/18 Left Wrist (Active)            Airway - Endotracheal Tube 08/13/18 Oral (Active)                   Dressing/Packing:  Wound Chest Right-DRESSING TYPE: Topical skin adhesive/glue (08/13/18 1250)  Splint/Cast:  ]

## 2018-08-14 ENCOUNTER — APPOINTMENT (OUTPATIENT)
Dept: GENERAL RADIOLOGY | Age: 63
DRG: 163 | End: 2018-08-14
Attending: THORACIC SURGERY (CARDIOTHORACIC VASCULAR SURGERY)
Payer: MEDICARE

## 2018-08-14 LAB
ERYTHROCYTE [DISTWIDTH] IN BLOOD BY AUTOMATED COUNT: 14.2 % (ref 11.5–14.5)
GLUCOSE BLD STRIP.AUTO-MCNC: 126 MG/DL (ref 65–100)
GLUCOSE BLD STRIP.AUTO-MCNC: 138 MG/DL (ref 65–100)
GLUCOSE BLD STRIP.AUTO-MCNC: 155 MG/DL (ref 65–100)
GLUCOSE BLD STRIP.AUTO-MCNC: 207 MG/DL (ref 65–100)
HCT VFR BLD AUTO: 29.6 % (ref 35–47)
HGB BLD-MCNC: 9.1 G/DL (ref 11.5–16)
MCH RBC QN AUTO: 30.6 PG (ref 26–34)
MCHC RBC AUTO-ENTMCNC: 30.7 G/DL (ref 30–36.5)
MCV RBC AUTO: 99.7 FL (ref 80–99)
NRBC # BLD: 0 K/UL (ref 0–0.01)
NRBC BLD-RTO: 0 PER 100 WBC
PLATELET # BLD AUTO: 378 K/UL (ref 150–400)
PMV BLD AUTO: 9.7 FL (ref 8.9–12.9)
RBC # BLD AUTO: 2.97 M/UL (ref 3.8–5.2)
SERVICE CMNT-IMP: ABNORMAL
WBC # BLD AUTO: 25.1 K/UL (ref 3.6–11)

## 2018-08-14 PROCEDURE — 74011636637 HC RX REV CODE- 636/637: Performed by: THORACIC SURGERY (CARDIOTHORACIC VASCULAR SURGERY)

## 2018-08-14 PROCEDURE — 74011636637 HC RX REV CODE- 636/637: Performed by: NURSE PRACTITIONER

## 2018-08-14 PROCEDURE — P9045 ALBUMIN (HUMAN), 5%, 250 ML: HCPCS | Performed by: NURSE PRACTITIONER

## 2018-08-14 PROCEDURE — 74011000250 HC RX REV CODE- 250: Performed by: THORACIC SURGERY (CARDIOTHORACIC VASCULAR SURGERY)

## 2018-08-14 PROCEDURE — 74011000250 HC RX REV CODE- 250: Performed by: NURSE PRACTITIONER

## 2018-08-14 PROCEDURE — 94760 N-INVAS EAR/PLS OXIMETRY 1: CPT

## 2018-08-14 PROCEDURE — 82962 GLUCOSE BLOOD TEST: CPT

## 2018-08-14 PROCEDURE — 74011000258 HC RX REV CODE- 258: Performed by: THORACIC SURGERY (CARDIOTHORACIC VASCULAR SURGERY)

## 2018-08-14 PROCEDURE — 74011250637 HC RX REV CODE- 250/637: Performed by: THORACIC SURGERY (CARDIOTHORACIC VASCULAR SURGERY)

## 2018-08-14 PROCEDURE — 77010033678 HC OXYGEN DAILY

## 2018-08-14 PROCEDURE — 94640 AIRWAY INHALATION TREATMENT: CPT

## 2018-08-14 PROCEDURE — 71045 X-RAY EXAM CHEST 1 VIEW: CPT

## 2018-08-14 PROCEDURE — 65660000000 HC RM CCU STEPDOWN

## 2018-08-14 PROCEDURE — 36415 COLL VENOUS BLD VENIPUNCTURE: CPT | Performed by: NURSE PRACTITIONER

## 2018-08-14 PROCEDURE — 74011250636 HC RX REV CODE- 250/636: Performed by: NURSE PRACTITIONER

## 2018-08-14 PROCEDURE — 74011250637 HC RX REV CODE- 250/637: Performed by: NURSE PRACTITIONER

## 2018-08-14 PROCEDURE — 74011250636 HC RX REV CODE- 250/636: Performed by: THORACIC SURGERY (CARDIOTHORACIC VASCULAR SURGERY)

## 2018-08-14 PROCEDURE — 85027 COMPLETE CBC AUTOMATED: CPT | Performed by: NURSE PRACTITIONER

## 2018-08-14 RX ORDER — MAGNESIUM SULFATE 100 %
4 CRYSTALS MISCELLANEOUS AS NEEDED
Status: DISCONTINUED | OUTPATIENT
Start: 2018-08-14 | End: 2018-08-26 | Stop reason: HOSPADM

## 2018-08-14 RX ORDER — PROMETHAZINE HYDROCHLORIDE 25 MG/1
25 TABLET ORAL
Status: DISCONTINUED | OUTPATIENT
Start: 2018-08-14 | End: 2018-08-26 | Stop reason: HOSPADM

## 2018-08-14 RX ORDER — METOPROLOL TARTRATE 25 MG/1
25 TABLET, FILM COATED ORAL DAILY
Status: DISCONTINUED | OUTPATIENT
Start: 2018-08-14 | End: 2018-08-14

## 2018-08-14 RX ORDER — AMOXICILLIN 250 MG
1 CAPSULE ORAL DAILY
Status: DISCONTINUED | OUTPATIENT
Start: 2018-08-15 | End: 2018-08-26 | Stop reason: HOSPADM

## 2018-08-14 RX ORDER — ESOMEPRAZOLE MAGNESIUM 40 MG/1
40 CAPSULE, DELAYED RELEASE ORAL
Status: DISCONTINUED | OUTPATIENT
Start: 2018-08-15 | End: 2018-08-26 | Stop reason: HOSPADM

## 2018-08-14 RX ORDER — DEXTROSE 50 % IN WATER (D50W) INTRAVENOUS SYRINGE
12.5-25 AS NEEDED
Status: DISCONTINUED | OUTPATIENT
Start: 2018-08-14 | End: 2018-08-26 | Stop reason: HOSPADM

## 2018-08-14 RX ORDER — ALBUMIN HUMAN 50 G/1000ML
12.5 SOLUTION INTRAVENOUS ONCE
Status: COMPLETED | OUTPATIENT
Start: 2018-08-14 | End: 2018-08-16

## 2018-08-14 RX ORDER — NYSTATIN 100000 [USP'U]/ML
500000 SUSPENSION ORAL 4 TIMES DAILY
Status: DISCONTINUED | OUTPATIENT
Start: 2018-08-14 | End: 2018-08-26 | Stop reason: HOSPADM

## 2018-08-14 RX ORDER — INSULIN LISPRO 100 [IU]/ML
INJECTION, SOLUTION INTRAVENOUS; SUBCUTANEOUS
Status: DISCONTINUED | OUTPATIENT
Start: 2018-08-14 | End: 2018-08-26 | Stop reason: HOSPADM

## 2018-08-14 RX ORDER — METOPROLOL TARTRATE 25 MG/1
25 TABLET, FILM COATED ORAL
Status: DISCONTINUED | OUTPATIENT
Start: 2018-08-14 | End: 2018-08-26 | Stop reason: HOSPADM

## 2018-08-14 RX ADMIN — DOCUSATE SODIUM 100 MG: 100 CAPSULE, LIQUID FILLED ORAL at 09:43

## 2018-08-14 RX ADMIN — Medication 10 ML: at 18:57

## 2018-08-14 RX ADMIN — BUDESONIDE 500 MCG: 0.5 INHALANT RESPIRATORY (INHALATION) at 20:18

## 2018-08-14 RX ADMIN — OXYCODONE HYDROCHLORIDE 15 MG: 5 TABLET ORAL at 21:34

## 2018-08-14 RX ADMIN — FUROSEMIDE 40 MG: 40 TABLET ORAL at 09:45

## 2018-08-14 RX ADMIN — BUDESONIDE 500 MCG: 0.5 INHALANT RESPIRATORY (INHALATION) at 07:32

## 2018-08-14 RX ADMIN — PRIMIDONE 50 MG: 50 TABLET ORAL at 10:44

## 2018-08-14 RX ADMIN — KETOROLAC TROMETHAMINE 30 MG: 30 INJECTION, SOLUTION INTRAMUSCULAR at 06:25

## 2018-08-14 RX ADMIN — Medication 10 ML: at 21:35

## 2018-08-14 RX ADMIN — Medication 1 CAPSULE: at 09:43

## 2018-08-14 RX ADMIN — MONTELUKAST SODIUM 10 MG: 10 TABLET, FILM COATED ORAL at 21:34

## 2018-08-14 RX ADMIN — PIPERACILLIN AND TAZOBACTAM 3.38 G: 3; .375 INJECTION, POWDER, FOR SOLUTION INTRAVENOUS at 17:19

## 2018-08-14 RX ADMIN — KETOROLAC TROMETHAMINE 30 MG: 30 INJECTION, SOLUTION INTRAMUSCULAR at 11:51

## 2018-08-14 RX ADMIN — Medication 1 TABLET: at 11:51

## 2018-08-14 RX ADMIN — SIMVASTATIN: 20 TABLET, FILM COATED ORAL at 18:00

## 2018-08-14 RX ADMIN — PRIMIDONE 150 MG: 50 TABLET ORAL at 19:23

## 2018-08-14 RX ADMIN — IPRATROPIUM BROMIDE AND ALBUTEROL SULFATE 3 ML: .5; 3 SOLUTION RESPIRATORY (INHALATION) at 07:32

## 2018-08-14 RX ADMIN — PIPERACILLIN AND TAZOBACTAM 3.38 G: 3; .375 INJECTION, POWDER, FOR SOLUTION INTRAVENOUS at 09:47

## 2018-08-14 RX ADMIN — ALBUMIN (HUMAN) 12.5 G: 12.5 INJECTION, SOLUTION INTRAVENOUS at 09:46

## 2018-08-14 RX ADMIN — IPRATROPIUM BROMIDE AND ALBUTEROL SULFATE 3 ML: .5; 3 SOLUTION RESPIRATORY (INHALATION) at 12:36

## 2018-08-14 RX ADMIN — Medication 10 ML: at 06:25

## 2018-08-14 RX ADMIN — IPRATROPIUM BROMIDE AND ALBUTEROL SULFATE 3 ML: .5; 3 SOLUTION RESPIRATORY (INHALATION) at 15:58

## 2018-08-14 RX ADMIN — Medication 10 ML: at 11:51

## 2018-08-14 RX ADMIN — IPRATROPIUM BROMIDE AND ALBUTEROL SULFATE 3 ML: .5; 3 SOLUTION RESPIRATORY (INHALATION) at 20:18

## 2018-08-14 RX ADMIN — KETOROLAC TROMETHAMINE 30 MG: 30 INJECTION, SOLUTION INTRAMUSCULAR at 17:18

## 2018-08-14 RX ADMIN — NYSTATIN 500000 UNITS: 100000 SUSPENSION ORAL at 17:18

## 2018-08-14 RX ADMIN — ALPRAZOLAM 1 MG: 0.5 TABLET ORAL at 12:24

## 2018-08-14 RX ADMIN — INSULIN LISPRO 4 UNITS: 100 INJECTION, SOLUTION INTRAVENOUS; SUBCUTANEOUS at 16:27

## 2018-08-14 RX ADMIN — PHENOBARBITAL 64.8 MG: 64.8 TABLET ORAL at 09:45

## 2018-08-14 RX ADMIN — PHENOBARBITAL 64.8 MG: 64.8 TABLET ORAL at 19:23

## 2018-08-14 RX ADMIN — NYSTATIN 500000 UNITS: 100000 SUSPENSION ORAL at 21:34

## 2018-08-14 RX ADMIN — METOPROLOL TARTRATE 25 MG: 25 TABLET ORAL at 21:34

## 2018-08-14 RX ADMIN — ONDANSETRON 4 MG: 2 INJECTION INTRAMUSCULAR; INTRAVENOUS at 21:35

## 2018-08-14 RX ADMIN — ALPRAZOLAM 1 MG: 0.5 TABLET ORAL at 17:18

## 2018-08-14 RX ADMIN — ONDANSETRON 4 MG: 2 INJECTION INTRAMUSCULAR; INTRAVENOUS at 00:18

## 2018-08-14 RX ADMIN — PREDNISONE 60 MG: 20 TABLET ORAL at 09:44

## 2018-08-14 RX ADMIN — ONDANSETRON 4 MG: 2 INJECTION INTRAMUSCULAR; INTRAVENOUS at 04:11

## 2018-08-14 RX ADMIN — LEVOFLOXACIN 750 MG: 750 TABLET, FILM COATED ORAL at 21:34

## 2018-08-14 RX ADMIN — ENOXAPARIN SODIUM 40 MG: 100 INJECTION SUBCUTANEOUS at 10:44

## 2018-08-14 RX ADMIN — PIPERACILLIN AND TAZOBACTAM 3.38 G: 3; .375 INJECTION, POWDER, FOR SOLUTION INTRAVENOUS at 00:18

## 2018-08-14 RX ADMIN — PROMETHAZINE HYDROCHLORIDE 25 MG: 25 TABLET ORAL at 19:23

## 2018-08-14 RX ADMIN — DULOXETINE HYDROCHLORIDE 40 MG: 20 CAPSULE, DELAYED RELEASE ORAL at 09:43

## 2018-08-14 RX ADMIN — Medication 10 ML: at 17:19

## 2018-08-14 RX ADMIN — LEVOTHYROXINE SODIUM 200 MCG: 100 TABLET ORAL at 06:27

## 2018-08-14 RX ADMIN — KETOROLAC TROMETHAMINE 30 MG: 30 INJECTION, SOLUTION INTRAMUSCULAR at 00:18

## 2018-08-14 NOTE — PROGRESS NOTES
[facility]  Pulmonary, Critical Care, and Sleep Medicine    Name: Sumaya Zepeda MRN: [id number]   : 1955 [facility]   Date: 2018        IMPRESSION:   · Right lower lobe pneumonia with dense consolidation and possible areas of necrosis. Also noted to have consolidation of the left lower lobe. · Right sided empyema, s/p right VATS with decortication on 18  · Acute chest pain due to severe pneumonia, more difficult to treat due to her chronic use of opiates  · Suspected underlying COPD- acute exacerbation   · NIDDM  · HTN  · Fibromyalgia/Depression/Anxiety, PTSD/ chronic back pain on chronic opiates and benzos. · Essential tremor. · Ex Smoker, quit 1 year ago. · Hypothyroidism. · Obese      RECOMMENDATIONS:   · O2 - wean as tolerated  · Bronchodilators  · Zosyn and levaquin. Follow cultures   · Steroids, taper    · asymptomatic BP drop this am. Patient reports that she only take metoprolol at night; but medication reconciliation states BID. Suggest reducing to what she states   · Responding to fluid; suggest more if she becomes symptomatic.  Currently taking PO quite well so we can see how she does   · Suggest holding lasix today  · Pulmicort/brovana  · lovenox proph      Subjective/History:      BP drop this morning was asymptomatic   Overall breathing better          Past Medical History:   Diagnosis Date    Anxiety     Bone spur     NECK    COPD     Depression     Endocrine disease     hypothyroidism    Fibromyalgia     Fusion of spine of cervical region     Gastrointestinal disorder     gerd    Hyperlipidemia     Hypothyroid     Morbid obesity ([facility] [address]     Osteoarthritis     PUD (peptic ulcer disease)     Tobacco abuse       Past Surgical History:   Procedure Laterality Date    BRONCHOSCOPY-FIBER/THERAPY  4/3/2015         CARDIAC CATHETERIZATION  2013         COLONOSCOPY,DIAGNOSTIC  10/30/2014         HX CATARACT REMOVAL bilateral    HX GYN          HX ORTHOPAEDIC      Ruptured disc, knuckles on right hand    UPPER GI ENDOSCOPY,BIOPSY  10/30/2014         UPPER GI ENDOSCOPY,DILATN W GUIDE  10/30/2014           Prior to Admission medications    Medication Sig Start Date End Date Taking? Authorizing Provider   zinc oxide-cod liver oil (DESITIN) 40 % ointment Apply  to affected area as needed for Skin Irritation. Yes Historical Provider   prazosin (MINIPRESS) 2 mg capsule TAKE 1 CAPSULE BY MOUTH TWICE A DAY. 18  Yes Eldon Sorto NP   DULoxetine 40 mg cpDR Take 40 mg by mouth daily. 18  Yes Eldon Sorto NP   primidone (MYSOLINE) 50 mg tablet 1 po qam and 3 po qhs 18  Yes Atiya Dailey MD   ALPRAZolam Latrice Ar) 1 mg tablet Take 1 Tab by mouth three (3) times daily as needed for Anxiety. Max Daily Amount: 3 mg. 17  Yes Chichi Simons NP   promethazine (PHENERGAN) 25 mg tablet Take 1 Tab by mouth every six (6) hours as needed. 17  Yes Chichi Simons NP   guaiFENesin ER (MUCINEX) 1,200 mg Ta12 ER tablet Take 1 Tab by mouth two (2) times a day. 17  Yes Chichi Simons NP   furosemide (LASIX) 40 mg tablet Take 1 Tab by mouth daily. Patient taking differently: Take 40 mg by mouth daily as needed for Other (LE edema). 17  Yes Chichi Simons NP   PHENobarbital (LUMINAL) 60 mg tablet Take 1 Tab by mouth two (2) times a day. Max Daily Amount: 120 mg. 17  Yes Nani Pearce MD   montelukast (SINGULAIR) 10 mg tablet Take 10 mg by mouth daily. 2/16/15  Yes Nico Martinez MD   ezetimibe-simvastatin (VYTORIN 10/40) 10-40 mg per tablet Take 1 tablet by mouth nightly. Yes Historical Provider   metoprolol (LOPRESSOR) 25 mg tablet Take 1 Tab by mouth two (2) times a day. Patient taking differently: Take 25 mg by mouth nightly. 14  Yes Song Orellana NP   benzonatate (TESSALON) 200 mg capsule Take 1 Cap by mouth two (2) times daily as needed.  14  Yes Nani Pearce MD albuterol-ipratropium (DUONEB) 2.5 mg-0.5 mg/3 ml nebulizer solution 3 mL by Nebulization route every six (6) hours as needed for Wheezing. Yes Historical Provider   esomeprazole (NEXIUM) 40 mg capsule Take 40 mg by mouth daily. Yes Historical Provider   MAGOX 400 mg tablet TAKE ONE TABLET TWICE A DAY 11/4/13  Yes Josue Davidson NP   aspirin 81 mg chewable tablet Take 81 mg by mouth daily. Yes Nico Martinez MD   levothyroxine (SYNTHROID) 200 mcg tablet Take 200 mcg by mouth daily (before breakfast). Yes Nico Martinez MD   budesonide-formoterol (SYMBICORT) 160-4.5 mcg/actuation HFAA Take 2 Puffs by inhalation two (2) times a day. Historical Provider   acetaminophen-codeine (TYLENOL #3) 300-30 mg per tablet Take 1 Tab by mouth every eight (8) hours as needed. Max Daily Amount: 3 Tabs. 5/27/17   Chichi Simons NP   cetirizine (ZYRTEC) 10 mg tablet Take 1 Tab by mouth daily. 5/27/17   Chichi Simons NP   ibuprofen (MOTRIN) 800 mg tablet Take 1 Tab by mouth every eight (8) hours as needed. 5/27/17   Chichi Simons NP   albuterol (VENTOLIN HFA) 90 mcg/actuation inhaler Take 2 Puffs by inhalation every four (4) hours as needed for Wheezing. Historical Provider   dicyclomine (BENTYL) 10 mg capsule  2/13/15   Nico Martinez MD   glipiZIDE SR (GLUCOTROL) 5 mg CR tablet Take 5 mg by mouth two (2) times daily as needed (Patient monitors her BG levels and takes when she is also taking a steroid. ). 2/16/15   Nico Martinez MD   nystatin (MYCOSTATIN) 100,000 unit/mL suspension Take 5 mL by mouth four (4) times daily. swish and spit  Patient taking differently: Take 500,000 Units by mouth four (4) times daily as needed. swish and spit 1/20/14   Ct Marks MD   methocarbamol (ROBAXIN) 750 mg tablet Take 750-1,500 mg by mouth four (4) times daily as needed. Historical Provider   hyoscyamine SL (LEVSIN/SL) 0.125 mg SL tablet 0.125 mg by SubLINGual route three (3) times daily as needed for Cramping.     Historical Provider vit B Cmplx 3-FA-Vit C-Biotin (NEPHRO SHREYA RX) 1- mg-mg-mcg tablet Take 1 Tab by mouth daily.     Historical Provider     Current Facility-Administered Medications   Medication Dose Route Frequency    OTHER(NON-FORMULARY)    DAILY    prazosin (MINIPRESS) capsule 2 mg  2 mg Oral BID    senna (SENOKOT) tablet 8.6 mg  1 Tab Oral QHS    lactated Ringers infusion  20 mL/hr IntraVENous CONTINUOUS    fentaNYL (PF)  mcg/30 ml (ADULT)   IntraVENous TITRATE    sodium chloride (NS) flush 5-10 mL  5-10 mL IntraVENous Q8H    ketorolac (TORADOL) injection 30 mg  30 mg IntraVENous Q6H    docusate sodium (COLACE) capsule 100 mg  100 mg Oral BID    enoxaparin (LOVENOX) injection 40 mg  40 mg SubCUTAneous Q24H    arformoterol (BROVANA) neb solution 15 mcg  15 mcg Nebulization BID RT    And    budesonide (PULMICORT) 500 mcg/2 ml nebulizer suspension  500 mcg Nebulization BID RT    DULoxetine (CYMBALTA) capsule 40 mg  40 mg Oral DAILY    primidone (MYSOLINE) tablet 50 mg  50 mg Oral DAILY    primidone (MYSOLINE) tablet 150 mg  150 mg Oral QHS    furosemide (LASIX) tablet 40 mg  40 mg Oral DAILY    montelukast (SINGULAIR) tablet 10 mg  10 mg Oral QHS    ezetimibe/simvastatin (VYTORIN) 10/40 mg   Oral QPM    metoprolol tartrate (LOPRESSOR) tablet 25 mg  25 mg Oral Q12H    levoFLOXacin (LEVAQUIN) tablet 750 mg  750 mg Oral QHS    albuterol-ipratropium (DUO-NEB) 2.5 MG-0.5 MG/3 ML  3 mL Nebulization QID RT    predniSONE (DELTASONE) tablet 60 mg  60 mg Oral DAILY WITH BREAKFAST    piperacillin-tazobactam (ZOSYN) 3.375 g in 0.9% sodium chloride (MBP/ADV) 100 mL  3.375 g IntraVENous Q8H    levothyroxine (SYNTHROID) tablet 200 mcg  200 mcg Oral ACB    lactobac ac& pc-s.therm-b.anim (ROBIN Q/RISAQUAD)  1 Cap Oral DAILY    PHENobarbital (LUMINAL) tablet 64.8 mg  64.8 mg Oral BID     Allergies   Allergen Reactions    Latex Contact Dermatitis    Dilaudid [Hydromorphone (Pf)] Other (comments)     Feels like bugs are crawling all over her    Lipitor [Atorvastatin] Other (comments)     LIVER TROUBLE ON THIS MEDICATION    Remeron [Mirtazapine] Other (comments)     Tremors    Sulfa (Sulfonamide Antibiotics) Itching      Social History   Substance Use Topics    Smoking status: Former Smoker     Packs/day: 1.00     Years: 30.00    Smokeless tobacco: Never Used    Alcohol use Yes      Comment: occasional          Objective:   Vital Signs:    Visit Vitals    /67 (BP 1 Location: Right arm, BP Patient Position: At rest;Sitting)    Pulse 90    Temp 98.2 °F (36.8 °C)    Resp 20    Ht 5' 4\" (1.626 m)    Wt 88.5 kg (195 lb)    SpO2 90%    BMI 33.47 kg/m2       O2 Device: Nasal cannula   O2 Flow Rate (L/min): 4 l/min   Temp (24hrs), Av.2 °F (36.8 °C), Min:97.5 °F (36.4 °C), Max:98.8 °F (37.1 °C)       Intake/Output:   Last shift:       0701 -  1900  In: 240 [P.O.:240]  Out: -   Last 3 shifts:  190 -  0700  In: 1771.7 [P.O.:420; I.V.:1351.7]  Out: 1241 [Urine:1925]    Intake/Output Summary (Last 24 hours) at 18 1022  Last data filed at 18 0721   Gross per 24 hour   Intake          1811.66 ml   Output             1920 ml   Net          -108.34 ml     Physical Exam:    General:  Alert, cooperative, no distress. Head:  Normocephalic, without obvious abnormality, atraumatic. Eyes:  Conjunctivae/corneas clear. Nose: Nares normal. Septum midline. Mucosa normal.     Throat: Lips, mucosa, and tongue normal. Teeth and gums normal.   Neck: Supple, symmetrical, trachea midline    Back:   Symmetric, no curvature. ROM normal.   Lungs:   Mild ronchi, decreased breath sounds in the right base   Chest wall:  No tenderness or deformity. right chest tube in place   Heart:  Regular rate and rhythm    Abdomen:   Soft, non-tender.  Bowel sounds normal.     Extremities: Extremities normal, atraumatic, no cyanosis, +edema   Skin: Skin color, texture, turgor normal. No rashes or lesions Neurologic: Grossly nonfocal, has tremors of the LUE and RUE. Motor is grossly intact. Psych: NO anxiety or depression. Data:   Labs:  Recent Labs      08/14/18   0415   WBC  25.1*   HGB  9.1*   HCT  29.6*   PLT  378     No results for input(s): NA, K, CL, CO2, GLU, BUN, CREA, CA, MG, PHOS, ALB, TBIL, TBILI, SGOT, ALT, INR in the last 72 hours. No lab exists for component: INREXT, INREXT  No results for input(s): PH, PCO2, PO2, HCO3, FIO2 in the last 72 hours.     Imaging:  I have personally reviewed the patients radiographs:CT   None today        Barrett Autumn Frankel, PA

## 2018-08-14 NOTE — PROGRESS NOTES
NUTRITION COMPLETE ASSESSMENT    RECOMMENDATIONS:   1. Adjust diet order to Regular, 2 gm Sodium, No Concentrated Sweets  2. Continue daily weights in the hospital     Interventions/Plan:   Food/Nutrient Delivery:  RD to adjust diet order as above    Assessment:   Reason for Assessment:   [x]BPA/MST Referral     Diet:  Diabetic Consistent Carb    Nutritionally Significant Medications: [x] Reviewed & Includes: colace twice/day; lasix daily; LR @ 20 mL/hr; floraQ daily; levaquin daily; zofran q 4 hours prn; zosyn q 8 hours; senna q bedtime; vitamin B complex and C no. 4    Meal Intake: No data found. Pre-Hospitalization:  Usual Appetite: Good  Diet at Home: regular, low sodium  Vitamins/Supplements: No    Current Hospitalization:   Fluid Restriction:  N/A  Appetite: Good  PO Ability: Independent      Subjective:  Pt in good spirits. Admits she usually has a great appetite, but watches concentrated sweets (does have gingerale soda 2-3 times per week, but otherwise drinks water). She also limits sodium intake at home. Objective:  Chart reviewed, discussed with RN and team during interdisciplinary rounds. Pt [facility] [id number] [facility] with empyema. PMHx:  NIDDM, HTN, fibromyalgia, PUD, others noted. Pt with adequate appetite PTA, but admits she has a \"sensitive\" stomach. She is POD 1 from right VATS and has a chest tube. Weight loss of 11# (5%) x 3 months noted, but not significant. Pt also notes little change in appetite and intake. A1c 6.1% in 2014 (no recent lab to assess). BG have been elevated on lab work in the hospital-- likely related to steroids.     Estimated Nutrition Needs:   Kcals/day: 1770 Kcals/day (1770 kcal/day (20 kcal/kg))  Protein: 71 g (71-89 g/day (0.8-1 g/kg))  Fluid: 1800 ml (1 mL/kcal)  Based On: Kcal/kg - specify (Comment)  Weight Used: Actual wt (88.5 kg)    Pt expected to meet estimated nutrient needs:  [x]   Yes     []  No [] Unable to predict at this time    Nutrition Diagnosis: 1. No nutritional diagnosis at this time     Goals:     Pt to consume at least 50% of meals over the next 5-7 days     Monitoring & Evaluation:    - Total energy intake   - Weight/weight change      Previous Nutrition Goals Met:   N/A  Previous Recommendations:    N/A    Education & Discharge Needs:   [x] None Identified   [] Identified and addressed    [x] Participated in care plan, discharge planning, and/or interdisciplinary rounds        Cultural, Orthodoxy and ethnic food preferences identified:      Skin Integrity: []Intact  [x]Other (incision)  Edema: []None [x]Other: 1-2+  Last BM: 8/12/18  Food Allergies: [x]None []Other  Diet Restrictions:   Pt avoids acidic foods secondary to PUD     Anthropometrics:    Weight Loss Metrics 8/14/2018 8/12/2018 7/31/2018 6/18/2018 5/10/2018 5/3/2018 3/29/2018   Today's Wt 195 lb - 200 lb 200 lb 206 lb 206 lb 202 lb 6.4 oz   BMI - 33.47 kg/m2 34.33 kg/m2 34.33 kg/m2 35.36 kg/m2 35.36 kg/m2 34.74 kg/m2      Weight Source: Bed  Height: 5' 4\" (162.6 cm),    Body mass index is 33.47 kg/(m^2).   IBW : 54.4 kg (120 lb),    Usual Body Weight: 93.4 kg (206 lb) (5/3/18),      Labs:    Lab Results   Component Value Date/Time    Sodium 140 08/11/2018 03:57 AM    Potassium 3.8 08/11/2018 03:57 AM    Chloride 105 08/11/2018 03:57 AM    CO2 29 08/11/2018 03:57 AM    Glucose 130 (H) 08/11/2018 03:57 AM    BUN 13 08/11/2018 03:57 AM    Creatinine 0.97 08/11/2018 03:57 AM    Calcium 9.3 08/11/2018 03:57 AM    Magnesium 2.5 (H) 08/08/2018 01:47 AM    Phosphorus 3.9 08/08/2018 01:47 AM    Albumin 2.4 (L) 08/01/2018 05:37 AM     Lab Results   Component Value Date/Time    Hemoglobin A1c 6.1 (H) 11/25/2014 03:16 PM     Salma Albarran, [address]
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
## 2018-08-14 NOTE — ANESTHESIA POSTPROCEDURE EVALUATION
Post-Anesthesia Evaluation and Assessment    Patient: Toya Pendleton MRN: [id number]  SSN: [id number]    YOB: 1955  Age: 58 y.o. Sex: female       Cardiovascular Function/Vital Signs  Visit Vitals    /52 (BP 1 Location: Left arm, BP Patient Position: Sitting)    Pulse (!) 107    Temp 36.4 °C (97.5 °F)    Resp 18    Ht 5' 4\" (1.626 m)    Wt 86.8 kg (191 lb 5.8 oz)    SpO2 94%    BMI 32.85 kg/m2       Patient is status post general anesthesia for Procedure(s):  VIDEO ASSISTED THORACOSCOPY--RIGHT--WITH DECORTICATION. Nausea/Vomiting: None    Postoperative hydration reviewed and adequate. Pain:  Pain Scale 1: Numeric (0 - 10) (08/13/18 1600)  Pain Intensity 1: 0 (08/13/18 1600)   Managed    Neurological Status:   Neuro (WDL): Within Defined Limits (08/13/18 1501)  Neuro  Neurologic State: Alert (08/13/18 1401)  Orientation Level: Oriented to person;Oriented to place;Oriented to situation (08/13/18 1401)  Cognition: Appropriate decision making; Appropriate for age attention/concentration; Appropriate safety awareness; Follows commands (08/12/18 2035)  Speech: Clear (08/12/18 2035)  Assessment L Pupil: Round (08/12/18 1217)  Size L Pupil (mm): 3 (08/12/18 1217)  Assessment R Pupil: Round (08/12/18 1217)  Size R Pupil (mm): 3 (08/12/18 1217)  LUE Motor Response: Tremorous (08/12/18 2035)  LLE Motor Response: Purposeful (08/12/18 2035)  RUE Motor Response: Tremorous (08/13/18 1630)  RLE Motor Response: Purposeful (08/12/18 2035)   At baseline    Mental Status and Level of Consciousness: Arousable    Pulmonary Status:   O2 Device: Nasal cannula (08/13/18 1950)   Adequate oxygenation and airway patent    Complications related to anesthesia: None    Post-anesthesia assessment completed.  No concerns    Signed By: Angelina Love MD     August 13, 2018

## 2018-08-14 NOTE — PROGRESS NOTES
Reason for Admission:   'Admitted to [facility] with a RLL pneumonia. While admitted she developed a parapneumonic effusion. This was partially drained with a pigtail catheter. Cultures grew beta hemolytic strept. Transferred to [facility] for decortication of empyema\"               RRAT Score: 29                 Resources/supports as identified by patient/family:   Patient is , will be followed by the Thoracic surgery group and her PCP                Top Challenges facing patient (as identified by patient/family and CM): past medical history significant for depression, anxiety and fibromyalgia                       Finances/Medication cost?  Patient has Medicare and did not voice any financial stressors                  Transportation? Her  will transport              Support system or lack thereof? See above                     Living arrangements? She lives with her  in their private residence. Self-care/ADL's/Cognition? She is independent, uses a walking stick, alert and oriented x 4          Current Advanced Directive/Advance Care Plan:  Full Code                          Plan for utilizing home health:   Not indicated                       Likelihood of readmission:  High due to her comorbidities                 Transition of Care Plan:  CM met with patient to discuss discharge planning. She uses her local [address] for prescriptions. Patient is scheduled for R VATS today. CM will continue to follow. Katia Townsend MSA, RN, CRM. Care Management Interventions  PCP Verified by CM: Yes  Palliative Care Criteria Met (RRAT>21 & CHF Dx)?: Yes  Palliative Consult Recommended?: No  Reason Palliative Care Not Recommended?: Provider preference to wait  Mode of Transport at Discharge:  Other (see comment) (Private car)  Transition of Care Consult (CM Consult): Discharge Planning  Physical Therapy Consult: Yes  Current Support Network: Lives with Spouse  Confirm Follow Up Transport: Family  Plan discussed with Pt/Family/Caregiver: Yes  Discharge Location  Discharge Placement: Home with family assistance

## 2018-08-14 NOTE — PROGRESS NOTES
Problem: Falls - Risk of  Goal: *Absence of Falls  Document Palma Fall Risk and appropriate interventions in the flowsheet. Outcome: Progressing Towards Goal  Fall Risk Interventions:  Mobility Interventions: Assess mobility with egress test, Communicate number of staff needed for ambulation/transfer, Patient to call before getting OOB, Utilize walker, cane, or other assistive device    Mentation Interventions: Adequate sleep, hydration, pain control, Door open when patient unattended, Increase mobility, More frequent rounding, Room close to nurse's station, Self-releasing belt, Toileting rounds    Medication Interventions: Assess postural VS orthostatic hypotension, Evaluate medications/consider consulting pharmacy, Patient to call before getting OOB, Teach patient to arise slowly, Utilize gait belt for transfers/ambulation    Elimination Interventions: Bed/chair exit alarm, Call light in reach, Patient to call for help with toileting needs, Toileting schedule/hourly rounds, Toilet paper/wipes in reach    History of Falls Interventions: Consult care management for discharge planning, Door open when patient unattended, Investigate reason for fall, Room close to nurse's station, Utilize gait belt for transfer/ambulation        Problem: Pressure Injury - Risk of  Goal: *Prevention of pressure injury  Document Joseph Scale and appropriate interventions in the flowsheet. Outcome: Progressing Towards Goal  Pressure Injury Interventions:  Sensory Interventions: Assess changes in LOC, Float heels, Keep linens dry and wrinkle-free, Pressure redistribution bed/mattress (bed type), Turn and reposition approx.  every two hours (pillows and wedges if needed)    Moisture Interventions: Apply protective barrier, creams and emollients, Check for incontinence Q2 hours and as needed, Internal/External urinary devices, Moisture barrier    Activity Interventions: Assess need for specialty bed, Increase time out of bed, Pressure redistribution bed/mattress(bed type), PT/OT evaluation    Mobility Interventions: Assess need for specialty bed, Float heels, HOB 30 degrees or less, Pressure redistribution bed/mattress (bed type), PT/OT evaluation, Turn and reposition approx.  every two hours(pillow and wedges)    Nutrition Interventions: Document food/fluid/supplement intake, Discuss nutritional consult with provider, Offer support with meals,snacks and hydration    Friction and Shear Interventions: Apply protective barrier, creams and emollients, Foam dressings/transparent film/skin sealants, HOB 30 degrees or less, Transfer aides:transfer board/Nancy lift/ceiling lift, Transferring/repositioning devices

## 2018-08-14 NOTE — DISCHARGE INSTRUCTIONS
*DISCHARGE INSTRUCTIONS AFTER LUNG [address] [facility]  [address] [facility] [address] [facility], [address] [place] [address]  [phone]    Leave the chest tube dressing in place for 48 hours(8'18/18), then you can remove it and shower. SURGICAL INCISION:    You will have two or three small incisions. These incisions are sealed with sutures that dissolve. The lower incision is from the chest tube. This lower incision will seal itself in 5 to 7 days. Until then you may have drainage from that incision. The drainage will be thin yellowish or red in color and may drain for 5 to 7 days. This is normal and expected. INCISION CARE:    Wash incisions daily with soap. Pat dry. Showers only, no tub baths. If you have drainage, you can place a bandage over the area, otherwise keep open to air. You may experience drainage of clear-strawberry colored fluid from the chest tube site. This is to be expected and will stop in 24-48 hours. PAIN:    What you will experience:     Tenderness and soreness around incisions   Burning and/or numbness   Soreness around front/back of chest    For relief of discomfort:     Take the pain medicine you were given at discharge   Aleve one or two tablets every 12 hrs (with food) along with pain medicine (this can be purchased over the counter at your local pharmacy/drug store). Advil may be substituted. Start this after you finish taking Toradol if prescribed.  Heating pad 10 minutes at a time to the upper incision area as often as you wish.  For the Ladies: Spandex or elastic sports bra will give you the support you need without pressure on the incision. Most will find this to be a great comfort. COUGHING:    Coughing is helpful after lung surgery. Place a pillow over your incision and apply pressure when coughing to reduce pain. ACTIVITY:    DO: 1. Walk daily outside if weather permits, at a mall if not.  Increase your distance each day. Exercise has many benefits. It promotes healing, expands your lungs,                helps you cough, relaxes your body, tones muscles, lowers blood pressure and               improves your appetite. After you exercise expect to feel tired and probably short                of breath. Plan to take a nap 1-2 times a day to regain your strength . 2. Climb stairs           3. Ride in a car           4. Perform breathing exercises (incentive spirometer)    DO NOT:               1. Lift heavy objects (8-10 pounds)  2. Drive a car/truck until after your post-op visit  3. Do heavy yard or housework     APPETITE:    It is usual to have a decreased appetite after surgery. Exercise is the best stimulant. You may expect to slowly improve over 4-10 days. CALL THE OFFICE IF YOU DEVELOP:     Increasing pain that is not controlled by the pain medicine.  Increasing redness or drainage around the incision.  Unusual or increasing shortness of breath   Fever greater than 101 degrees   Change in the color of your sputum to yellow or green, especially if you also have increasing shortness of breath and a fever greater than 101. YOUR MEDICATIONS:  As instructed        FOLLOW-UP APPOINTMENT:  MANUEL CALL THE OFFICE TO SCHEDULE A FOLLOW UP APPOINTMENT IN TWO WEEKS. Please arrive 45 minutes prior to you appointment time in order to have a chest X-Ray. Check in at [address] [facility] on the ground floor of the [facility]. After your X-ray come to the [address] [place] [address] for your appointment.     FOLLOW UP WITH DR Macy Taveras ( [facility] [phone]) IN 1-2 WEEKS     Physician Signature

## 2018-08-14 NOTE — PROGRESS NOTES
Thoracic Surgery Simple Progress Note    Admit Date: 2018  POD: 1 Day Post-Op      Procedure:  Procedure(s):  VIDEO ASSISTED THORACOSCOPY--RIGHT--WITH DECORTICATION      Subjective:     Patient has complaints: No significant medical complaints    Review of Systems:    CARDIAC: positive for irregular heart beats  RESP: positive for pneumonia or emphysema  NEURO:  negative  INCISION: Clean, dry, and intact  EXT: Denies new swelling or pain in the legs or calves. Objective:     Blood pressure 109/67, pulse 90, temperature 98.2 °F (36.8 °C), resp. rate 20, height 5' 4\" (1.626 m), weight 195 lb (88.5 kg), SpO2 90 %. Temp (24hrs), Av.2 °F (36.8 °C), Min:97.5 °F (36.4 °C), Max:98.8 °F (37.1 °C)        Hemodynamics    PAP    CO    CI     07 -  1900  In: 240 [P.O.:240]  Out: -   1901 -  0700  In: 1771.7 [P.O.:420; I.V.:1351.7]  Out:  [Urine:1925]    EXAM:  GENERAL: VSS, afrible, alert and cooperative. On exam she was setting up in chair. HEART:  regular rate and rhythm. Had run of V-tach when coughing, self limiting  LUNG: clear to auscultation bilaterally, chest tubes x 2 on right recorded output of 220 ml, CXR reviewed. Productive cough for bloody sputum  NEURO:  normal without focal findings  mental status, speech normal, alert and oriented x iii  INCISION: Clean, dry, and intact  EXTREMITIES:No evidence of DVT seen on physical exam.  GI/: Abd soft, nonterder with + bowel sounds.  Voiding    Labs:  Recent Results (from the past 24 hour(s))   GLUCOSE, POC    Collection Time: 18 10:16 PM   Result Value Ref Range    Glucose (POC) 243 (H) 65 - 100 mg/dL    Performed by Mary Irene    CBC W/O DIFF    Collection Time: 18  4:15 AM   Result Value Ref Range    WBC 25.1 (H) 3.6 - 11.0 K/uL    RBC 2.97 (L) 3.80 - 5.20 M/uL    HGB 9.1 (L) 11.5 - 16.0 g/dL    HCT 29.6 (L) 35.0 - 47.0 %    MCV 99.7 (H) 80.0 - 99.0 FL    MCH 30.6 26.0 - 34.0 PG    MCHC 30.7 30.0 - 36.5 g/dL    RDW 14.2 11.5 - 14.5 %    PLATELET 474 653 - 359 K/uL    MPV 9.7 8.9 - 12.9 FL    NRBC 0.0 0  WBC    ABSOLUTE NRBC 0.00 0.00 - 0.01 K/uL   GLUCOSE, POC    Collection Time: 08/14/18  7:19 AM   Result Value Ref Range    Glucose (POC) 126 (H) 65 - 100 mg/dL    Performed by KARO SKY        Assessment:   No evidence of DVT.   Active Problems:    Empyema ([address] (8/12/2018)      PATH:  pending                                   Plan/Recommendations:   Advance diet  Hypotensive: will give her 250 ml 5% Albumen   Re consult Pulmonary  Chest tubes to suction except when ambulating  Should be seen by Hospitalist service today    See orders    Signed By: Ursula HOGAN

## 2018-08-14 NOTE — PROGRESS NOTES
[address] with [facility] [address] about  Patient medications; patient has been up set that she can not take her pych meds like she does at home and she feeling \"messed up\"  Medications Ok'd by Kunal Pina. Pharmacy called and medications sent to pharmacy for [facility] label. [facility] had prior approved the, as the have their labels.   Juan Boyd

## 2018-08-14 NOTE — OP NOTES
[address] [place] [address] REPORT    Hansel Klein  MR#: [id number]  : 1955  ACCOUNT #: [de-identified]   DATE OF SERVICE: 2018    Clinical Service: Thoracic Surgery    SURGEON:  Mikey Dumont MD    PROCEDURES PERFORMED:  1. Right video-assisted thoracoscopy. 2.  Right pulmonary decortication. PREOPERATIVE DIAGNOSIS:  Streptococcal empyema. POSTOPERATIVE DIAGNOSIS:  Streptococcal empyema. ASSISTANT:  Teodora Moran    SPECIMENS REMOVED:  Right pleural peel was sent to anatomic pathology. DRAINS AND TUBES:  Two 28-Albanian chest tubes were left within the right hemithorax. ANESTHESIA:  General with double lumen endotracheal intubation. ESTIMATED BLOOD LOSS:  For this case was 300 mL. Indications for procedure:  Patient is a 70-year-old lady with a complex past medical history who was admitted to [facility] with community-acquired pneumonia. This progressed and she has developed a parapneumonic effusion, which was drained via pigtail catheter. Unfortunately, the drainage was incomplete and repeat imaging showed the development of an empyema. Cultures from the pleural fluid did grow group B hemolytic strep. She was transferred to [facility] for thoracic surgery evaluation and the decision was made to proceed to the operating room for decortication. PROCEDURE IN DETAIL:  After informed consent was obtained and placed on the chart, the patient was taken to the operating room and placed supine on the operating table. General anesthesia with double lumen endotracheal intubation was induced without complication. Preop antibiotics were administered. Ly catheter was placed. A radial art line was placed. The patient was then placed in the left lateral decubitus position with right side up. The patient's right chest was prepped and draped in sterile fashion. Pigtail catheter was removed. Timeout was performed.     Through the seventh intercostal space along the posterior axillary line, a 10 mm Thoracoport was placed. It was difficult to gain access to the thoracic cavity. There was dense amount of fibrinopurulent material.  Additionally, a 10 mm working port was placed posteriorly in the fifth intercostal space. The apical portion of the right upper lobe appeared to be spared from the process; however, the right middle and right lower lobe were fully encased in a very thick peel. A full parietal and visceral pleural decortication was then performed. Patient has been on chronic prednisone and had very friable tissue. In addition, the posterior portion of the right lower lobe was necrotic. There was a fair amount of oozing from this area and unable to completely remove the peel out of concern of tearing too much pulmonary parenchyma. After decortication was performed, the chest was irrigated with multiple liters of warm saline. Approximately 60 mL of 1% lidocaine mixed with 0.25% Marcaine were then used to perform intercostal nerve blocks. One 28-Georgian straight chest tube was placed posteriorly and a 28-Georgian right angle chest tube was placed on the diaphragm. The lung was reexpanded under direct visualization and there was good reexpansion of all 3 lobes. The 2 ports were then closed in a multilayer fashion and covered with Dermabond. The patient was then reversed from general anesthesia, extubated and taken to PACU in stable condition. All surgical counts were correct x 2 at the end of the case. There were no immediate complications identified during this case. Dr. Akila Rock was present and scrubbed throughout the entire procedure. MD ALLEY Amor / Patti Causey  D: 08/13/2018 13:50     T: 08/13/2018 22:43  JOB #: [id number]

## 2018-08-14 NOTE — PROGRESS NOTES
Faculty or Preceptor Review of Student Work    8/14/2018  - Shift times - [phone] to [phone]    The student documentation of patient care for Kelin Weller has been reviewed and approved. All medications have been administered under the direct supervision of the faculty or preceptor.     Keri Bryant RN

## 2018-08-15 ENCOUNTER — APPOINTMENT (OUTPATIENT)
Dept: GENERAL RADIOLOGY | Age: 63
DRG: 163 | End: 2018-08-15
Attending: NURSE PRACTITIONER
Payer: MEDICARE

## 2018-08-15 LAB
ANION GAP SERPL CALC-SCNC: 9 MMOL/L (ref 5–15)
BUN SERPL-MCNC: 18 MG/DL (ref 6–20)
BUN/CREAT SERPL: 16 (ref 12–20)
CALCIUM SERPL-MCNC: 8.9 MG/DL (ref 8.5–10.1)
CHLORIDE SERPL-SCNC: 101 MMOL/L (ref 97–108)
CO2 SERPL-SCNC: 29 MMOL/L (ref 21–32)
CREAT SERPL-MCNC: 1.13 MG/DL (ref 0.55–1.02)
ERYTHROCYTE [DISTWIDTH] IN BLOOD BY AUTOMATED COUNT: 14.3 % (ref 11.5–14.5)
EST. AVERAGE GLUCOSE BLD GHB EST-MCNC: 166 MG/DL
FERRITIN SERPL-MCNC: 163 NG/ML (ref 8–252)
GLUCOSE BLD STRIP.AUTO-MCNC: 147 MG/DL (ref 65–100)
GLUCOSE BLD STRIP.AUTO-MCNC: 153 MG/DL (ref 65–100)
GLUCOSE BLD STRIP.AUTO-MCNC: 240 MG/DL (ref 65–100)
GLUCOSE BLD STRIP.AUTO-MCNC: 275 MG/DL (ref 65–100)
GLUCOSE SERPL-MCNC: 137 MG/DL (ref 65–100)
HBA1C MFR BLD: 7.4 % (ref 4.2–6.3)
HCT VFR BLD AUTO: 28.4 % (ref 35–47)
HGB BLD-MCNC: 8.8 G/DL (ref 11.5–16)
IRON SATN MFR SERPL: 33 % (ref 20–50)
IRON SERPL-MCNC: 57 UG/DL (ref 35–150)
MCH RBC QN AUTO: 30.9 PG (ref 26–34)
MCHC RBC AUTO-ENTMCNC: 31 G/DL (ref 30–36.5)
MCV RBC AUTO: 99.6 FL (ref 80–99)
NRBC # BLD: 0 K/UL (ref 0–0.01)
NRBC BLD-RTO: 0 PER 100 WBC
PLATELET # BLD AUTO: 353 K/UL (ref 150–400)
PMV BLD AUTO: 9.9 FL (ref 8.9–12.9)
POTASSIUM SERPL-SCNC: 4 MMOL/L (ref 3.5–5.1)
RBC # BLD AUTO: 2.85 M/UL (ref 3.8–5.2)
SERVICE CMNT-IMP: ABNORMAL
SODIUM SERPL-SCNC: 139 MMOL/L (ref 136–145)
TIBC SERPL-MCNC: 173 UG/DL (ref 250–450)
WBC # BLD AUTO: 15.5 K/UL (ref 3.6–11)

## 2018-08-15 PROCEDURE — 74011250637 HC RX REV CODE- 250/637: Performed by: THORACIC SURGERY (CARDIOTHORACIC VASCULAR SURGERY)

## 2018-08-15 PROCEDURE — 82962 GLUCOSE BLOOD TEST: CPT

## 2018-08-15 PROCEDURE — 80048 BASIC METABOLIC PNL TOTAL CA: CPT | Performed by: NURSE PRACTITIONER

## 2018-08-15 PROCEDURE — 94640 AIRWAY INHALATION TREATMENT: CPT

## 2018-08-15 PROCEDURE — 83540 ASSAY OF IRON: CPT | Performed by: THORACIC SURGERY (CARDIOTHORACIC VASCULAR SURGERY)

## 2018-08-15 PROCEDURE — 77010033678 HC OXYGEN DAILY

## 2018-08-15 PROCEDURE — 94760 N-INVAS EAR/PLS OXIMETRY 1: CPT

## 2018-08-15 PROCEDURE — 74011636637 HC RX REV CODE- 636/637: Performed by: THORACIC SURGERY (CARDIOTHORACIC VASCULAR SURGERY)

## 2018-08-15 PROCEDURE — 74011000250 HC RX REV CODE- 250: Performed by: THORACIC SURGERY (CARDIOTHORACIC VASCULAR SURGERY)

## 2018-08-15 PROCEDURE — 74011250637 HC RX REV CODE- 250/637: Performed by: NURSE PRACTITIONER

## 2018-08-15 PROCEDURE — 74011636637 HC RX REV CODE- 636/637: Performed by: NURSE PRACTITIONER

## 2018-08-15 PROCEDURE — 83036 HEMOGLOBIN GLYCOSYLATED A1C: CPT

## 2018-08-15 PROCEDURE — 74011250636 HC RX REV CODE- 250/636: Performed by: THORACIC SURGERY (CARDIOTHORACIC VASCULAR SURGERY)

## 2018-08-15 PROCEDURE — 85027 COMPLETE CBC AUTOMATED: CPT | Performed by: NURSE PRACTITIONER

## 2018-08-15 PROCEDURE — 82728 ASSAY OF FERRITIN: CPT | Performed by: THORACIC SURGERY (CARDIOTHORACIC VASCULAR SURGERY)

## 2018-08-15 PROCEDURE — 74011000258 HC RX REV CODE- 258: Performed by: THORACIC SURGERY (CARDIOTHORACIC VASCULAR SURGERY)

## 2018-08-15 PROCEDURE — 71045 X-RAY EXAM CHEST 1 VIEW: CPT

## 2018-08-15 PROCEDURE — 74011000250 HC RX REV CODE- 250: Performed by: NURSE PRACTITIONER

## 2018-08-15 PROCEDURE — 65660000000 HC RM CCU STEPDOWN

## 2018-08-15 PROCEDURE — 36415 COLL VENOUS BLD VENIPUNCTURE: CPT | Performed by: THORACIC SURGERY (CARDIOTHORACIC VASCULAR SURGERY)

## 2018-08-15 RX ORDER — PREDNISONE 20 MG/1
40 TABLET ORAL
Status: DISCONTINUED | OUTPATIENT
Start: 2018-08-16 | End: 2018-08-25

## 2018-08-15 RX ORDER — POLYETHYLENE GLYCOL 3350 17 G/17G
17 POWDER, FOR SOLUTION ORAL DAILY
Status: DISCONTINUED | OUTPATIENT
Start: 2018-08-15 | End: 2018-08-26 | Stop reason: HOSPADM

## 2018-08-15 RX ORDER — FACIAL-BODY WIPES
10 EACH TOPICAL DAILY PRN
Status: DISCONTINUED | OUTPATIENT
Start: 2018-08-15 | End: 2018-08-26 | Stop reason: HOSPADM

## 2018-08-15 RX ADMIN — PROMETHAZINE HYDROCHLORIDE 25 MG: 25 TABLET ORAL at 12:18

## 2018-08-15 RX ADMIN — METOPROLOL TARTRATE 25 MG: 25 TABLET ORAL at 21:45

## 2018-08-15 RX ADMIN — INSULIN LISPRO 4 UNITS: 100 INJECTION, SOLUTION INTRAVENOUS; SUBCUTANEOUS at 12:10

## 2018-08-15 RX ADMIN — PRAZOSIN HYDROCHLORIDE 2 MG: 1 CAPSULE ORAL at 19:17

## 2018-08-15 RX ADMIN — FUROSEMIDE 40 MG: 40 TABLET ORAL at 08:45

## 2018-08-15 RX ADMIN — BUDESONIDE 500 MCG: 0.5 INHALANT RESPIRATORY (INHALATION) at 20:43

## 2018-08-15 RX ADMIN — Medication 1 TABLET: at 09:48

## 2018-08-15 RX ADMIN — PRIMIDONE 50 MG: 50 TABLET ORAL at 08:45

## 2018-08-15 RX ADMIN — NYSTATIN 500000 UNITS: 100000 SUSPENSION ORAL at 17:47

## 2018-08-15 RX ADMIN — Medication 10 ML: at 06:51

## 2018-08-15 RX ADMIN — POLYETHYLENE GLYCOL 3350 17 G: 17 POWDER, FOR SOLUTION ORAL at 12:11

## 2018-08-15 RX ADMIN — PROMETHAZINE HYDROCHLORIDE 25 MG: 25 TABLET ORAL at 06:51

## 2018-08-15 RX ADMIN — INSULIN LISPRO 3 UNITS: 100 INJECTION, SOLUTION INTRAVENOUS; SUBCUTANEOUS at 06:50

## 2018-08-15 RX ADMIN — PHENOBARBITAL 64.8 MG: 64.8 TABLET ORAL at 19:13

## 2018-08-15 RX ADMIN — IPRATROPIUM BROMIDE AND ALBUTEROL SULFATE 3 ML: .5; 3 SOLUTION RESPIRATORY (INHALATION) at 16:35

## 2018-08-15 RX ADMIN — IPRATROPIUM BROMIDE AND ALBUTEROL SULFATE 3 ML: .5; 3 SOLUTION RESPIRATORY (INHALATION) at 12:21

## 2018-08-15 RX ADMIN — PHENOBARBITAL 64.8 MG: 64.8 TABLET ORAL at 06:50

## 2018-08-15 RX ADMIN — NYSTATIN 500000 UNITS: 100000 SUSPENSION ORAL at 21:45

## 2018-08-15 RX ADMIN — KETOROLAC TROMETHAMINE 30 MG: 30 INJECTION, SOLUTION INTRAMUSCULAR at 06:51

## 2018-08-15 RX ADMIN — PIPERACILLIN AND TAZOBACTAM 3.38 G: 3; .375 INJECTION, POWDER, FOR SOLUTION INTRAVENOUS at 00:49

## 2018-08-15 RX ADMIN — DULOXETINE HYDROCHLORIDE 40 MG: 20 CAPSULE, DELAYED RELEASE ORAL at 08:46

## 2018-08-15 RX ADMIN — PROMETHAZINE HYDROCHLORIDE 25 MG: 25 TABLET ORAL at 18:06

## 2018-08-15 RX ADMIN — Medication 1 CAPSULE: at 08:45

## 2018-08-15 RX ADMIN — OXYCODONE HYDROCHLORIDE 15 MG: 5 TABLET ORAL at 11:55

## 2018-08-15 RX ADMIN — Medication 10 ML: at 21:46

## 2018-08-15 RX ADMIN — SIMVASTATIN: 20 TABLET, FILM COATED ORAL at 17:47

## 2018-08-15 RX ADMIN — IPRATROPIUM BROMIDE AND ALBUTEROL SULFATE 3 ML: .5; 3 SOLUTION RESPIRATORY (INHALATION) at 08:24

## 2018-08-15 RX ADMIN — INSULIN LISPRO 7 UNITS: 100 INJECTION, SOLUTION INTRAVENOUS; SUBCUTANEOUS at 17:48

## 2018-08-15 RX ADMIN — LEVOFLOXACIN 750 MG: 750 TABLET, FILM COATED ORAL at 21:45

## 2018-08-15 RX ADMIN — NYSTATIN 500000 UNITS: 100000 SUSPENSION ORAL at 08:44

## 2018-08-15 RX ADMIN — MONTELUKAST SODIUM 10 MG: 10 TABLET, FILM COATED ORAL at 21:45

## 2018-08-15 RX ADMIN — PREDNISONE 60 MG: 20 TABLET ORAL at 08:46

## 2018-08-15 RX ADMIN — PIPERACILLIN AND TAZOBACTAM 3.38 G: 3; .375 INJECTION, POWDER, FOR SOLUTION INTRAVENOUS at 08:43

## 2018-08-15 RX ADMIN — KETOROLAC TROMETHAMINE 30 MG: 30 INJECTION, SOLUTION INTRAMUSCULAR at 12:11

## 2018-08-15 RX ADMIN — PRIMIDONE 150 MG: 50 TABLET ORAL at 19:23

## 2018-08-15 RX ADMIN — ESOMEPRAZOLE MAGNESIUM 40 MG: 40 CAPSULE, DELAYED RELEASE ORAL at 06:51

## 2018-08-15 RX ADMIN — IPRATROPIUM BROMIDE AND ALBUTEROL SULFATE 3 ML: .5; 3 SOLUTION RESPIRATORY (INHALATION) at 20:43

## 2018-08-15 RX ADMIN — STANDARDIZED SENNA CONCENTRATE AND DOCUSATE SODIUM 1 TABLET: 8.6; 5 TABLET, FILM COATED ORAL at 08:47

## 2018-08-15 RX ADMIN — KETOROLAC TROMETHAMINE 30 MG: 30 INJECTION, SOLUTION INTRAMUSCULAR at 00:49

## 2018-08-15 RX ADMIN — PIPERACILLIN AND TAZOBACTAM 3.38 G: 3; .375 INJECTION, POWDER, FOR SOLUTION INTRAVENOUS at 17:46

## 2018-08-15 RX ADMIN — NYSTATIN 500000 UNITS: 100000 SUSPENSION ORAL at 14:16

## 2018-08-15 RX ADMIN — OXYCODONE HYDROCHLORIDE 15 MG: 5 TABLET ORAL at 19:23

## 2018-08-15 RX ADMIN — BUDESONIDE 500 MCG: 0.5 INHALANT RESPIRATORY (INHALATION) at 08:24

## 2018-08-15 RX ADMIN — LEVOTHYROXINE SODIUM 200 MCG: 100 TABLET ORAL at 06:50

## 2018-08-15 RX ADMIN — ENOXAPARIN SODIUM 40 MG: 100 INJECTION SUBCUTANEOUS at 09:48

## 2018-08-15 RX ADMIN — Medication 10 ML: at 14:17

## 2018-08-15 RX ADMIN — PRAZOSIN HYDROCHLORIDE 2 MG: 1 CAPSULE ORAL at 06:51

## 2018-08-15 NOTE — PROGRESS NOTES
Thoracic Surgery Simple Progress Note    Admit Date: 2018  POD: 2 Days Post-Op      Procedure:  Procedure(s):  VIDEO ASSISTED THORACOSCOPY--RIGHT--WITH DECORTICATION      Subjective:     Patient has complaints: Constipation    Review of Systems:    CARDIAC: positive for irregular heart beats  RESP: positive for pneumonia, emphysema or empyema  NEURO:  positive for memory problems, Tremors  INCISION: Clean, dry, and intact  EXT: Denies new swelling or pain in the legs or calves. Objective:     Blood pressure 153/58, pulse 97, temperature 98.1 °F (36.7 °C), resp. rate 17, height 5' 4\" (1.626 m), weight 193 lb 12.6 oz (87.9 kg), SpO2 97 %. Temp (24hrs), Av °F (36.7 °C), Min:97.7 °F (36.5 °C), Max:98.4 °F (36.9 °C)        Hemodynamics    PAP    CO    CI        1901 - 08/15 0700  In: 1061.7 [P.O.:660; I.V.:401.7]  Out: 0741 [Urine:2325]    EXAM:  GENERAL: VSS, afrible, alert and cooperative  HEART:  regular rate and rhythm  LUNG: clear to auscultation bilaterally, chest tubes x 2 on right, no air leak, CXR reviewed  NEURO:  normal without focal findings  mental status, speech normal, alert and oriented x iii  INCISION: Clean, dry, and intact  EXTREMITIES:No evidence of DVT seen on physical exam.  GI/: Abd soft, nonterder with + bowel sounds.  Voiding    Labs:  Recent Results (from the past 24 hour(s))   GLUCOSE, POC    Collection Time: 18 11:24 AM   Result Value Ref Range    Glucose (POC) 138 (H) 65 - 100 mg/dL    Performed by Arron Burroughs, POC    Collection Time: 18  4:16 PM   Result Value Ref Range    Glucose (POC) 207 (H) 65 - 100 mg/dL    Performed by Leonardo Lofton    GLUCOSE, POC    Collection Time: 18  9:35 PM   Result Value Ref Range    Glucose (POC) 155 (H) 65 - 100 mg/dL    Performed by Sanjeev Kinney    CBC W/O DIFF    Collection Time: 08/15/18  4:30 AM   Result Value Ref Range    WBC 15.5 (H) 3.6 - 11.0 K/uL    RBC 2.85 (L) 3.80 - 5.20 M/uL    HGB 8.8 (L) 11.5 - 16.0 g/dL    HCT 28.4 (L) 35.0 - 47.0 %    MCV 99.6 (H) 80.0 - 99.0 FL    MCH 30.9 26.0 - 34.0 PG    MCHC 31.0 30.0 - 36.5 g/dL    RDW 14.3 11.5 - 14.5 %    PLATELET 762 544 - 020 K/uL    MPV 9.9 8.9 - 12.9 FL    NRBC 0.0 0  WBC    ABSOLUTE NRBC 0.00 0.00 - 0.01 K/uL   IRON PROFILE    Collection Time: 08/15/18  4:30 AM   Result Value Ref Range    Iron 57 35 - 150 ug/dL    TIBC 173 (L) 250 - 450 ug/dL    Iron % saturation 33 20 - 50 %   FERRITIN    Collection Time: 08/15/18  4:30 AM   Result Value Ref Range    Ferritin 163 8 - 252 NG/ML   HEMOGLOBIN A1C WITH EAG    Collection Time: 08/15/18  4:30 AM   Result Value Ref Range    Hemoglobin A1c 7.4 (H) 4.2 - 6.3 %    Est. average glucose 912 mg/dL   METABOLIC PANEL, BASIC    Collection Time: 08/15/18  4:30 AM   Result Value Ref Range    Sodium 139 136 - 145 mmol/L    Potassium 4.0 3.5 - 5.1 mmol/L    Chloride 101 97 - 108 mmol/L    CO2 29 21 - 32 mmol/L    Anion gap 9 5 - 15 mmol/L    Glucose 137 (H) 65 - 100 mg/dL    BUN 18 6 - 20 MG/DL    Creatinine 1.13 (H) 0.55 - 1.02 MG/DL    BUN/Creatinine ratio 16 12 - 20      GFR est AA 59 (L) >60 ml/min/1.73m2    GFR est non-AA 49 (L) >60 ml/min/1.73m2    Calcium 8.9 8.5 - 10.1 MG/DL   GLUCOSE, POC    Collection Time: 08/15/18  6:40 AM   Result Value Ref Range    Glucose (POC) 153 (H) 65 - 100 mg/dL    Performed by Yuridia Fox        Assessment:   No evidence of DVT.   Active Problems:    Empyema ([address] (8/12/2018)      PATH: Benign                                   Plan/Recommendations:   Chest tube to gravity today, plan to remove the posterior tube tomorrow  Will add MiraLax at her request, as she uses it at home and has not had BM yet  Will not restart home potassium as her serum K+ is 4.0 today    See orders    Signed By: Geno Olszewski FNP

## 2018-08-15 NOTE — PROGRESS NOTES
Hospitalist Progress Note  Ketty Carcamo NP  Answering service: [phone] from in house phone  Cell: [phone]      Date of Service:  8/15/2018  NAME:  Cuauhtemoc Bill  :  1955  MRN:  [id number]      Admission Summary:   Cuauhtemoc Bill is a 58 y.o. female who presented with severe left sided chest pain on  to [address] Pt reported she was treated with doxycycline/prednisone in  for bronchitis. She was unable to complete doxycycline secondary to diarrhea which she still had on admission. Pt was admitted with acute hypoxic respiratory failure, severe sepsis 2/2 bilateral pna, Right Para pneumonic pleural effusion. Pt was evaluated by pulmonary and she underwent a thoracentesis with chest tube placement . She was transferred to [facility] on  for VAT's on  with Dr. Tegan Fritz. Hospitalist team is consulted for medical management    Interval history / Subjective:   Pt sitting in chair in no acute distress receiving neb treatment. Denies sob. Right lower rib pain controlled with PCA pump. Reports some abdominal pain overnight r/t reflux. Still no BM, nursing to document. Hypotension has resolved after medication adjustment  CT op 220 ml sanguinous drainage over last 24 hours  CXR noted with increased right opacity and effusion, management per primary team     Assessment & Plan:     Acute hypoxic Respiratory Failure   - multifactorial: PNA, empyema, copd exacerbation  - does use home oxygen at baseline     RLL PNA  - with dense consolidation and possible areas of necrosis. Consolidation of LLL as well  - wean oxygen as tolerated  - c/w zoysn and levaquin  - follow cx's  - pulmonary following     Streptococcal Right Sided Empyema  - per primary team  - s/p VATs with decortication on 18  - Chest tube x 2 to suction  -  Right pleural drains in place with trace right pleural gas and fluid.   Atelectasis or residual pneumonia in the lower right lung.  - pt on pca pump per primary team     Acute chest pain - improved with PCA pump  - 2/2 severe pna, difficulty in managing pain with chronic use of opiates     Suspected underlying COPD - Acute exacerbation  - former smoker  - c/w pulmicort/brovana  - wean steroids     NIDDM 2  - pt takes glipizide SR 5mg bid at home, she reports she only takes this when she is on steroids, this will be on hold for right now  - poc, ssi, diabetic diet  - A1c 7.4 (166), discussed with patient     Anemia  - noted 2.7 gram drop over three days, will monitor and send for iron panel/ferritin in am  - 8/15 not iron deficient, d/c ivf     Asymptomatic hypotension on am of 8/14   - s/p albumin, bp improved to low 100's from high 80's  - pt was receiving metoprolol bid, she only takes this at bedtime, this has been adjusted     HTN  - c/w metoprolol, pt takes at bedtime, not bid, adjusted, has hx of orthostatic hypotension  - c/w minipress     Diarrhea on admission which has resolved     Constipation Opoid induced   - chronic opiates and now on pca pump  - bowel regimen ordered     Fibromyalgia  - on chronic opiates for back pain  - pain control, lidocaine patch     Obese   - bmi 34, counseled on weight loss     Hypothyroidism - c/w levothyroxine     Familial HLD - c/w vytorin     Depression/anxiety - c/w cymbalta, xanax prn     PTSD - c/w minipress     Essential tremor - c/w primidone, phenobarbital         Code status: Full  DVT prophylaxis: Lovneonx  Care Plan discussed with: patient, attending MD  Disposition: per primary team  Pt lives her , no home oxygen     Hospital Problems  Date Reviewed: 8/1/2018          Codes Class Noted POA    Empyema ([facility] [address] ICD-10-CM: J86.9  ICD-9-CM: 510.9  8/12/2018 Unknown                Review of Systems:   Denies cp/sob  + right lower rib pain controlled with pca pump  + abd pain r/t reflux per patient  Denies n/v/d  + constipation      Vital Signs:    Last 24hrs VS reviewed since prior progress note. Most recent are:  Visit Vitals    /58 (BP 1 Location: Right arm, BP Patient Position: Sitting)    Pulse 97    Temp 98.1 °F (36.7 °C)    Resp 17    Ht 5' 4\" (1.626 m)    Wt 87.9 kg (193 lb 12.6 oz)    SpO2 97%    BMI 33.26 kg/m2         Intake/Output Summary (Last 24 hours) at 08/15/18 0841  Last data filed at 08/15/18 0359   Gross per 24 hour   Intake                0 ml   Output             1745 ml   Net            -1745 ml        Physical Examination:             Constitutional:  No acute distress, cooperative, pleasant    ENT:  Oral mucous moist, oropharynx benign. Neck supple   Resp:  Rhonchi bilaterally, left > R. Mild exp wheezing on the left posteriorly. CT x2 on the right  No accessory muscle use   CV:  Regular rhythm, normal rate, no murmurs, gallops, rubs    GI:  Soft, obese, non distended, non tender. normoactive bowel sounds, no hepatosplenomegaly     Musculoskeletal:  NP trace ble edema, warm, 2+ pulses throughout    Neurologic:  Moves all extremities. AAOx3, CN II-XII reviewed     Psych:  Fair insight, + anxiety  Skin:  Good turgor, no rashes or ulcers       Data Review:    Review and/or order of clinical lab test  Review and/or order of tests in the radiology section of CPT  Review and/or order of tests in the medicine section of CPT      Labs:     Recent Labs      08/15/18   0430  08/14/18   0415   WBC  15.5*  25.1*   HGB  8.8*  9.1*   HCT  28.4*  29.6*   PLT  353  378     Recent Labs      08/15/18   0430   NA  139   K  4.0   CL  101   CO2  29   BUN  18   CREA  1.13*   GLU  137*   CA  8.9     No results for input(s): SGOT, GPT, ALT, AP, TBIL, TBILI, TP, ALB, GLOB, GGT, AML, LPSE in the last 72 hours. No lab exists for component: AMYP, HLPSE  No results for input(s): INR, PTP, APTT in the last 72 hours.     No lab exists for component: INREXT   Recent Labs      08/15/18   0430   TIBC  173*   PSAT  33   FERR  163      Lab Results   Component Value Date/Time Folate 13.7 11/25/2014 03:16 PM      No results for input(s): PH, PCO2, PO2 in the last 72 hours. No results for input(s): CPK, CKNDX, TROIQ in the last 72 hours.     No lab exists for component: CPKMB  Lab Results   Component Value Date/Time    Cholesterol, total 254 (H) 01/19/2014 02:45 AM    HDL Cholesterol 97 01/19/2014 02:45 AM    LDL, calculated 121 (H) 01/19/2014 02:45 AM    Triglyceride 180 (H) 01/19/2014 02:45 AM    CHOL/HDL Ratio 2.6 01/19/2014 02:45 AM     Lab Results   Component Value Date/Time    Glucose (POC) 153 (H) 08/15/2018 06:40 AM    Glucose (POC) 155 (H) 08/14/2018 09:35 PM    Glucose (POC) 207 (H) 08/14/2018 04:16 PM    Glucose (POC) 138 (H) 08/14/2018 11:24 AM    Glucose (POC) 126 (H) 08/14/2018 07:19 AM     Lab Results   Component Value Date/Time    Color YELLOW/STRAW 07/31/2018 09:10 PM    Appearance CLEAR 07/31/2018 09:10 PM    Specific gravity 1.012 07/31/2018 09:10 PM    pH (UA) 7.0 07/31/2018 09:10 PM    Protein NEGATIVE  07/31/2018 09:10 PM    Glucose NEGATIVE  07/31/2018 09:10 PM    Ketone NEGATIVE  07/31/2018 09:10 PM    Bilirubin NEGATIVE  07/31/2018 09:10 PM    Urobilinogen 0.2 07/31/2018 09:10 PM    Nitrites NEGATIVE  07/31/2018 09:10 PM    Leukocyte Esterase NEGATIVE  07/31/2018 09:10 PM    Epithelial cells FEW 07/31/2018 09:10 PM    Bacteria NEGATIVE  07/31/2018 09:10 PM    WBC 0-4 07/31/2018 09:10 PM    RBC 0-5 07/31/2018 09:10 PM         Medications Reviewed:     Current Facility-Administered Medications   Medication Dose Route Frequency    vitamin b comp & c no.4 tab 1 Tab  1 Tab Oral DAILY    insulin lispro (HUMALOG) injection   SubCUTAneous AC&HS    glucose chewable tablet 16 g  4 Tab Oral PRN    dextrose (D50W) injection syrg 12.5-25 g  12.5-25 g IntraVENous PRN    glucagon (GLUCAGEN) injection 1 mg  1 mg IntraMUSCular PRN    metoprolol tartrate (LOPRESSOR) tablet 25 mg  25 mg Oral QHS    senna-docusate (PERICOLACE) 8.6-50 mg per tablet 1 Tab  1 Tab Oral DAILY    nystatin (MYCOSTATIN) 100,000 unit/mL oral suspension 500,000 Units  500,000 Units Oral QID    zinc oxide-cod liver oil (DESITIN) 40 % paste (Patient Supplied)   Topical PRN    esomeprazole (NEXIUM) capsule 40 mg (Patient Supplied)  40 mg Oral ACB    promethazine (PHENERGAN) tablet 25 mg (Patient Supplied)  25 mg Oral Q6H PRN    prazosin (MINIPRESS) capsule 2 mg (Patient Supplied)  2 mg Oral BID    lactated Ringers infusion  20 mL/hr IntraVENous CONTINUOUS    fentaNYL (PF)  mcg/30 ml (ADULT)   IntraVENous TITRATE    sodium chloride (NS) flush 5-10 mL  5-10 mL IntraVENous Q8H    sodium chloride (NS) flush 5-10 mL  5-10 mL IntraVENous PRN    ketorolac (TORADOL) injection 30 mg  30 mg IntraVENous Q6H    naloxone (NARCAN) injection 0.4 mg  0.4 mg IntraVENous PRN    ondansetron (ZOFRAN) injection 4 mg  4 mg IntraVENous Q4H PRN    enoxaparin (LOVENOX) injection 40 mg  40 mg SubCUTAneous Q24H    arformoterol (BROVANA) neb solution 15 mcg  15 mcg Nebulization BID RT    And    budesonide (PULMICORT) 500 mcg/2 ml nebulizer suspension  500 mcg Nebulization BID RT    DULoxetine (CYMBALTA) capsule 40 mg  40 mg Oral DAILY    primidone (MYSOLINE) tablet 50 mg  50 mg Oral DAILY    primidone (MYSOLINE) tablet 150 mg  150 mg Oral QHS    ALPRAZolam (XANAX) tablet 1 mg  1 mg Oral TID PRN    furosemide (LASIX) tablet 40 mg  40 mg Oral DAILY    montelukast (SINGULAIR) tablet 10 mg  10 mg Oral QHS    ezetimibe/simvastatin (VYTORIN) 10/40 mg   Oral QPM    levoFLOXacin (LEVAQUIN) tablet 750 mg  750 mg Oral QHS    albuterol-ipratropium (DUO-NEB) 2.5 MG-0.5 MG/3 ML  3 mL Nebulization QID RT    predniSONE (DELTASONE) tablet 60 mg  60 mg Oral DAILY WITH BREAKFAST    piperacillin-tazobactam (ZOSYN) 3.375 g in 0.9% sodium chloride (MBP/ADV) 100 mL  3.375 g IntraVENous Q8H    levothyroxine (SYNTHROID) tablet 200 mcg  200 mcg Oral ACB    lactobac ac& pc-s.therm-b.anim (ROBIN Q/RISAQUAD)  1 Cap Oral DAILY    oxyCODONE IR (ROXICODONE) tablet 15 mg  15 mg Oral Q4H PRN    PHENobarbital (LUMINAL) tablet 64.8 mg  64.8 mg Oral BID     ______________________________________________________________________  EXPECTED LENGTH OF STAY: 3d 2h  ACTUAL LENGTH OF STAY:          3                 Yesica Craig V, NP

## 2018-08-16 ENCOUNTER — APPOINTMENT (OUTPATIENT)
Dept: GENERAL RADIOLOGY | Age: 63
DRG: 163 | End: 2018-08-16
Attending: NURSE PRACTITIONER
Payer: MEDICARE

## 2018-08-16 LAB
ANION GAP SERPL CALC-SCNC: 6 MMOL/L (ref 5–15)
BUN SERPL-MCNC: 12 MG/DL (ref 6–20)
BUN/CREAT SERPL: 11 (ref 12–20)
CALCIUM SERPL-MCNC: 8.5 MG/DL (ref 8.5–10.1)
CHLORIDE SERPL-SCNC: 102 MMOL/L (ref 97–108)
CO2 SERPL-SCNC: 30 MMOL/L (ref 21–32)
CREAT SERPL-MCNC: 1.12 MG/DL (ref 0.55–1.02)
ERYTHROCYTE [DISTWIDTH] IN BLOOD BY AUTOMATED COUNT: 14.3 % (ref 11.5–14.5)
GLUCOSE BLD STRIP.AUTO-MCNC: 107 MG/DL (ref 65–100)
GLUCOSE BLD STRIP.AUTO-MCNC: 122 MG/DL (ref 65–100)
GLUCOSE BLD STRIP.AUTO-MCNC: 130 MG/DL (ref 65–100)
GLUCOSE BLD STRIP.AUTO-MCNC: 230 MG/DL (ref 65–100)
GLUCOSE SERPL-MCNC: 121 MG/DL (ref 65–100)
HCT VFR BLD AUTO: 29.1 % (ref 35–47)
HGB BLD-MCNC: 9.1 G/DL (ref 11.5–16)
MCH RBC QN AUTO: 31.1 PG (ref 26–34)
MCHC RBC AUTO-ENTMCNC: 31.3 G/DL (ref 30–36.5)
MCV RBC AUTO: 99.3 FL (ref 80–99)
NRBC # BLD: 0 K/UL (ref 0–0.01)
NRBC BLD-RTO: 0 PER 100 WBC
PLATELET # BLD AUTO: 386 K/UL (ref 150–400)
PMV BLD AUTO: 9.9 FL (ref 8.9–12.9)
POTASSIUM SERPL-SCNC: 3.8 MMOL/L (ref 3.5–5.1)
RBC # BLD AUTO: 2.93 M/UL (ref 3.8–5.2)
SERVICE CMNT-IMP: ABNORMAL
SODIUM SERPL-SCNC: 138 MMOL/L (ref 136–145)
WBC # BLD AUTO: 14.6 K/UL (ref 3.6–11)

## 2018-08-16 PROCEDURE — 74011636637 HC RX REV CODE- 636/637: Performed by: PHYSICIAN ASSISTANT

## 2018-08-16 PROCEDURE — 36415 COLL VENOUS BLD VENIPUNCTURE: CPT | Performed by: NURSE PRACTITIONER

## 2018-08-16 PROCEDURE — 65660000000 HC RM CCU STEPDOWN

## 2018-08-16 PROCEDURE — 74011000250 HC RX REV CODE- 250: Performed by: NURSE PRACTITIONER

## 2018-08-16 PROCEDURE — 94760 N-INVAS EAR/PLS OXIMETRY 1: CPT

## 2018-08-16 PROCEDURE — 94640 AIRWAY INHALATION TREATMENT: CPT

## 2018-08-16 PROCEDURE — 74011000250 HC RX REV CODE- 250: Performed by: THORACIC SURGERY (CARDIOTHORACIC VASCULAR SURGERY)

## 2018-08-16 PROCEDURE — 77010033678 HC OXYGEN DAILY

## 2018-08-16 PROCEDURE — 74011250636 HC RX REV CODE- 250/636: Performed by: THORACIC SURGERY (CARDIOTHORACIC VASCULAR SURGERY)

## 2018-08-16 PROCEDURE — 74011636637 HC RX REV CODE- 636/637: Performed by: NURSE PRACTITIONER

## 2018-08-16 PROCEDURE — 82962 GLUCOSE BLOOD TEST: CPT

## 2018-08-16 PROCEDURE — 76937 US GUIDE VASCULAR ACCESS: CPT

## 2018-08-16 PROCEDURE — 74011250637 HC RX REV CODE- 250/637: Performed by: THORACIC SURGERY (CARDIOTHORACIC VASCULAR SURGERY)

## 2018-08-16 PROCEDURE — 71045 X-RAY EXAM CHEST 1 VIEW: CPT

## 2018-08-16 PROCEDURE — 74011250637 HC RX REV CODE- 250/637: Performed by: NURSE PRACTITIONER

## 2018-08-16 PROCEDURE — 85027 COMPLETE CBC AUTOMATED: CPT | Performed by: NURSE PRACTITIONER

## 2018-08-16 PROCEDURE — 80048 BASIC METABOLIC PNL TOTAL CA: CPT | Performed by: NURSE PRACTITIONER

## 2018-08-16 PROCEDURE — 74011000258 HC RX REV CODE- 258: Performed by: THORACIC SURGERY (CARDIOTHORACIC VASCULAR SURGERY)

## 2018-08-16 PROCEDURE — 74011250637 HC RX REV CODE- 250/637: Performed by: INTERNAL MEDICINE

## 2018-08-16 PROCEDURE — 94762 N-INVAS EAR/PLS OXIMTRY CONT: CPT

## 2018-08-16 RX ORDER — IPRATROPIUM BROMIDE AND ALBUTEROL SULFATE 2.5; .5 MG/3ML; MG/3ML
3 SOLUTION RESPIRATORY (INHALATION)
Status: DISCONTINUED | OUTPATIENT
Start: 2018-08-16 | End: 2018-08-26 | Stop reason: HOSPADM

## 2018-08-16 RX ORDER — GLIPIZIDE 5 MG/1
5 TABLET, FILM COATED, EXTENDED RELEASE ORAL
Status: DISCONTINUED | OUTPATIENT
Start: 2018-08-16 | End: 2018-08-21

## 2018-08-16 RX ORDER — SODIUM CHLORIDE 0.9 % (FLUSH) 0.9 %
5-10 SYRINGE (ML) INJECTION AS NEEDED
Status: DISCONTINUED | OUTPATIENT
Start: 2018-08-16 | End: 2018-08-26 | Stop reason: HOSPADM

## 2018-08-16 RX ORDER — POTASSIUM CHLORIDE 750 MG/1
40 TABLET, FILM COATED, EXTENDED RELEASE ORAL ONCE
Status: COMPLETED | OUTPATIENT
Start: 2018-08-16 | End: 2018-08-16

## 2018-08-16 RX ORDER — GLIPIZIDE 5 MG/1
2.5 TABLET ORAL
Status: DISCONTINUED | OUTPATIENT
Start: 2018-08-17 | End: 2018-08-16

## 2018-08-16 RX ORDER — SODIUM CHLORIDE 0.9 % (FLUSH) 0.9 %
5-10 SYRINGE (ML) INJECTION EVERY 8 HOURS
Status: DISCONTINUED | OUTPATIENT
Start: 2018-08-16 | End: 2018-08-26 | Stop reason: HOSPADM

## 2018-08-16 RX ORDER — GLIPIZIDE 5 MG/1
2.5 TABLET ORAL
Status: DISCONTINUED | OUTPATIENT
Start: 2018-08-16 | End: 2018-08-26

## 2018-08-16 RX ADMIN — ARFORMOTEROL TARTRATE 15 MCG: 15 SOLUTION RESPIRATORY (INHALATION) at 08:18

## 2018-08-16 RX ADMIN — LEVOTHYROXINE SODIUM 200 MCG: 100 TABLET ORAL at 06:58

## 2018-08-16 RX ADMIN — PROMETHAZINE HYDROCHLORIDE 25 MG: 25 TABLET ORAL at 07:08

## 2018-08-16 RX ADMIN — Medication 10 ML: at 17:10

## 2018-08-16 RX ADMIN — METOPROLOL TARTRATE 25 MG: 25 TABLET ORAL at 22:14

## 2018-08-16 RX ADMIN — PRAZOSIN HYDROCHLORIDE 2 MG: 1 CAPSULE ORAL at 07:04

## 2018-08-16 RX ADMIN — ESOMEPRAZOLE MAGNESIUM 40 MG: 40 CAPSULE, DELAYED RELEASE ORAL at 07:04

## 2018-08-16 RX ADMIN — MONTELUKAST SODIUM 10 MG: 10 TABLET, FILM COATED ORAL at 22:14

## 2018-08-16 RX ADMIN — BISACODYL 10 MG: 10 SUPPOSITORY RECTAL at 09:31

## 2018-08-16 RX ADMIN — NYSTATIN 500000 UNITS: 100000 SUSPENSION ORAL at 17:10

## 2018-08-16 RX ADMIN — NYSTATIN 500000 UNITS: 100000 SUSPENSION ORAL at 08:09

## 2018-08-16 RX ADMIN — INSULIN LISPRO 4 UNITS: 100 INJECTION, SOLUTION INTRAVENOUS; SUBCUTANEOUS at 11:37

## 2018-08-16 RX ADMIN — PIPERACILLIN AND TAZOBACTAM 3.38 G: 3; .375 INJECTION, POWDER, FOR SOLUTION INTRAVENOUS at 00:56

## 2018-08-16 RX ADMIN — OXYCODONE HYDROCHLORIDE 15 MG: 5 TABLET ORAL at 18:20

## 2018-08-16 RX ADMIN — BUDESONIDE 500 MCG: 0.5 INHALANT RESPIRATORY (INHALATION) at 20:44

## 2018-08-16 RX ADMIN — STANDARDIZED SENNA CONCENTRATE AND DOCUSATE SODIUM 1 TABLET: 8.6; 5 TABLET, FILM COATED ORAL at 08:08

## 2018-08-16 RX ADMIN — PHENOBARBITAL 64.8 MG: 64.8 TABLET ORAL at 19:32

## 2018-08-16 RX ADMIN — PIPERACILLIN AND TAZOBACTAM 3.38 G: 3; .375 INJECTION, POWDER, FOR SOLUTION INTRAVENOUS at 17:09

## 2018-08-16 RX ADMIN — POTASSIUM CHLORIDE 40 MEQ: 750 TABLET, EXTENDED RELEASE ORAL at 09:30

## 2018-08-16 RX ADMIN — SIMVASTATIN: 20 TABLET, FILM COATED ORAL at 19:31

## 2018-08-16 RX ADMIN — GLIPIZIDE 2.5 MG: 5 TABLET ORAL at 17:09

## 2018-08-16 RX ADMIN — OXYCODONE HYDROCHLORIDE 15 MG: 5 TABLET ORAL at 02:24

## 2018-08-16 RX ADMIN — PHENOBARBITAL 64.8 MG: 64.8 TABLET ORAL at 06:58

## 2018-08-16 RX ADMIN — PRAZOSIN HYDROCHLORIDE 2 MG: 1 CAPSULE ORAL at 19:38

## 2018-08-16 RX ADMIN — ARFORMOTEROL TARTRATE 15 MCG: 15 SOLUTION RESPIRATORY (INHALATION) at 20:44

## 2018-08-16 RX ADMIN — PRIMIDONE 50 MG: 50 TABLET ORAL at 08:08

## 2018-08-16 RX ADMIN — PROMETHAZINE HYDROCHLORIDE 25 MG: 25 TABLET ORAL at 12:50

## 2018-08-16 RX ADMIN — Medication 1 TABLET: at 09:31

## 2018-08-16 RX ADMIN — Medication 1 CAPSULE: at 08:07

## 2018-08-16 RX ADMIN — POLYETHYLENE GLYCOL 3350 17 G: 17 POWDER, FOR SOLUTION ORAL at 08:09

## 2018-08-16 RX ADMIN — ENOXAPARIN SODIUM 40 MG: 100 INJECTION SUBCUTANEOUS at 11:36

## 2018-08-16 RX ADMIN — OXYCODONE HYDROCHLORIDE 15 MG: 5 TABLET ORAL at 22:22

## 2018-08-16 RX ADMIN — OXYCODONE HYDROCHLORIDE 15 MG: 5 TABLET ORAL at 08:06

## 2018-08-16 RX ADMIN — IPRATROPIUM BROMIDE AND ALBUTEROL SULFATE 3 ML: .5; 3 SOLUTION RESPIRATORY (INHALATION) at 18:23

## 2018-08-16 RX ADMIN — PREDNISONE 40 MG: 20 TABLET ORAL at 06:58

## 2018-08-16 RX ADMIN — FUROSEMIDE 40 MG: 40 TABLET ORAL at 08:08

## 2018-08-16 RX ADMIN — DULOXETINE HYDROCHLORIDE 40 MG: 20 CAPSULE, DELAYED RELEASE ORAL at 08:07

## 2018-08-16 RX ADMIN — PIPERACILLIN AND TAZOBACTAM 3.38 G: 3; .375 INJECTION, POWDER, FOR SOLUTION INTRAVENOUS at 08:09

## 2018-08-16 RX ADMIN — NYSTATIN 500000 UNITS: 100000 SUSPENSION ORAL at 22:14

## 2018-08-16 RX ADMIN — LEVOFLOXACIN 750 MG: 750 TABLET, FILM COATED ORAL at 22:14

## 2018-08-16 RX ADMIN — BUDESONIDE 500 MCG: 0.5 INHALANT RESPIRATORY (INHALATION) at 08:18

## 2018-08-16 RX ADMIN — PRIMIDONE 150 MG: 50 TABLET ORAL at 19:32

## 2018-08-16 RX ADMIN — OXYCODONE HYDROCHLORIDE 15 MG: 5 TABLET ORAL at 12:49

## 2018-08-16 RX ADMIN — Medication 10 ML: at 22:15

## 2018-08-16 NOTE — PROGRESS NOTES
Post Fall Documentation      Jim Almendarez unwitnessed fall occurred on 8/15/2018 (Date) at 2315 (Time). The answers to the following questions summarize the fall:     · In the patient's own words,:  · What was he/she doing when he/she fell? Using bedside commode    · What are his/her complaints? Bottom pain    · Nurse:  · Document observation, treatment, conversation, follow-up, and patient response. PCT was in with patient assisting to bedside commode, patient was pulling up underwear and PCT stepped into bathroom to empty bedside commode and patient fell to floor landing on her bottom. She stated that she did not hit her head or anything else, just landed on her bottom. · What was the patient's condition when found (i.e., pain, symptoms, cuts, bruises)? Sitting on floor skin intact with not obvious bruises, red marks, etc. Patient was talking and laughing. · What specific complaints did the patient have? \"just my butt hurts, but my butt and back hurt all the time. \"     · What did the staff do when patient was found (i.e., vital signs, returned to bed with fall alarm, side rails up)? Vitals, returned to bed, bed alarm, side rails up X 3, complete assessment    · Which physician was notified? Dr. Tang Knight, no orders received    Kylie Hodges, RN     8019 Bedside and Verbal shift change report given to Brody (oncoming nurse) by Ravi Huang (offgoing nurse). Report included the following information SBAR, Kardex, MAR and Recent Results.

## 2018-08-16 NOTE — PROGRESS NOTES
Hospitalist Progress Note  Marbella Longoria NP  Answering service: [phone] from in house phone  Cell: [phone]      Date of Service:  2018  NAME:  Shannon Ramires  :  1955  MRN:  [id number]      Admission Summary:   Shannon Ramires is a 58 y.o. female who presented with severe left sided chest pain on  to [address] Pt reported she was treated with doxycycline/prednisone in  for bronchitis. She was unable to complete doxycycline secondary to diarrhea which she still had on admission. Pt was admitted with acute hypoxic respiratory failure, severe sepsis 2/2 bilateral pna, Right Para pneumonic pleural effusion. Pt was evaluated by pulmonary and she underwent a thoracentesis with chest tube placement . She was transferred to [address] [place] [address] on  for VAT's on  with Dr. Breezy Brice. Hospitalist team is consulted for medical management    Interval history / Subjective:   Wbc down trending but also on steroids  Cr 1.12 very mild renal insufficiency  cxr w/o interval change  CT op 50 ml over 24 hours  CTS to remove posterior CT today  BP stable now  Still no BM, discussed with nursing to provide suppository  Wean oxygen as able  Restart glipizide      Assessment & Plan:     Acute hypoxic Respiratory Failure   - multifactorial: PNA, empyema, copd exacerbation  - does use home oxygen at baseline     RLL PNA  - with dense consolidation and possible areas of necrosis. Consolidation of LLL as well  - wean oxygen as tolerated  - c/w zoysn and levaquin  - follow cx's => strep  - pulmonary following     Streptococcal Right Sided Empyema  - per primary team  - s/p VATs with decortication on 18  - Chest tube x 2 to suction  -  Right pleural drains in place with trace right pleural gas and fluid.   Atelectasis or residual pneumonia in the lower right lung.  - pt on pca pump per primary team     Acute chest pain - improved with PCA pump  - /2 severe pna, difficulty in managing pain with chronic use of opiates     Suspected underlying COPD - Acute exacerbation  - former smoker  - c/w pulmicort/brovana  - wean steroids     NIDDM 2  - pt takes glipizide SR 5mg bid at home, she reports she only takes this when she is on steroids  - poc, ssi, diabetic diet  - A1c 7.4 (166), discussed with patient  - 8/16 restart glipizide at 2.5mg bid     Anemia  - noted 2.7 gram drop over three days, will monitor and send for iron panel/ferritin in am  - 8/15 not iron deficient, d/c ivf     Asymptomatic hypotension on am of 8/14   - s/p albumin, bp improved to low 100's from high 80's  - pt was receiving metoprolol bid, she only takes this at bedtime, this has been adjusted     HTN  - c/w metoprolol, pt takes at bedtime, not bid, adjusted, has hx of orthostatic hypotension  - c/w minipress     Diarrhea on admission which has resolved     Constipation Opoid induced   - chronic opiates and now on pca pump  - bowel regimen ordered     Fibromyalgia  - on chronic opiates for back pain  - pain control, lidocaine patch     Obese   - bmi 34, counseled on weight loss     Hypothyroidism - c/w levothyroxine     Familial HLD - c/w vytorin     Depression/anxiety - c/w cymbalta, xanax prn     PTSD - c/w minipress     Essential tremor - c/w primidone, phenobarbital         Code status: Full  DVT prophylaxis: Lovneonx  Care Plan discussed with: patient, attending MD  Disposition: per primary team  Pt lives her , no home oxygen     Hospital Problems  Date Reviewed: 8/1/2018          Codes Class Noted POA    * (Principal)Empyema ([facility] [place] [address] ICD-10-CM: J86.9  ICD-9-CM: 510.9  8/12/2018 Yes                Review of Systems:   Denies cp/sob  + right lower rib pain controlled with pca pump  Denies abd pain   Denies n/v/d  + constipation      Vital Signs:    Last 24hrs VS reviewed since prior progress note.  Most recent are:  Visit Vitals    /46 (BP 1 Location: Left arm, BP Patient Position: Sitting)    Pulse 89    Temp 97.5 °F (36.4 °C)    Resp 18    Ht 5' 4\" (1.626 m)    Wt 88.3 kg (194 lb 10.7 oz)    SpO2 95%    BMI 33.41 kg/m2         Intake/Output Summary (Last 24 hours) at 08/16/18 4909  Last data filed at 08/15/18 2044   Gross per 24 hour   Intake             1549 ml   Output             1625 ml   Net              -76 ml        Physical Examination:             Constitutional:  No acute distress, cooperative, pleasant    ENT:  Oral mucous moist, oropharynx benign. Neck supple   Resp:  Rhonchi bilaterally, left > R.. CT x2 on the right  No accessory muscle use   CV:  Regular rhythm, normal rate, no murmurs, gallops, rubs    GI:  Soft, obese, non distended, non tender. normoactive bowel sounds, no hepatosplenomegaly     Musculoskeletal:  NP trace ble edema, warm, 2+ pulses throughout    Neurologic:  Moves all extremities. AAOx3, CN II-XII reviewed     Psych:  Fair insight, + anxiety  Skin:  Good turgor, no rashes or ulcers       Data Review:    Review and/or order of clinical lab test  Review and/or order of tests in the radiology section of CPT  Review and/or order of tests in the medicine section of CPT      Labs:     Recent Labs      08/16/18   0657  08/15/18   0430   WBC  14.6*  15.5*   HGB  9.1*  8.8*   HCT  29.1*  28.4*   PLT  386  353     Recent Labs      08/16/18   0657  08/15/18   0430   NA  138  139   K  3.8  4.0   CL  102  101   CO2  30  29   BUN  12  18   CREA  1.12*  1.13*   GLU  121*  137*   CA  8.5  8.9     No results for input(s): SGOT, GPT, ALT, AP, TBIL, TBILI, TP, ALB, GLOB, GGT, AML, LPSE in the last 72 hours. No lab exists for component: AMYP, HLPSE  No results for input(s): INR, PTP, APTT in the last 72 hours.     No lab exists for component: INREXT, INREXT   Recent Labs      08/15/18   0430   TIBC  173*   PSAT  33   FERR  163      Lab Results   Component Value Date/Time    Folate 13.7 11/25/2014 03:16 PM      No results for input(s): PH, PCO2, PO2 in the last 72 hours. No results for input(s): CPK, CKNDX, TROIQ in the last 72 hours.     No lab exists for component: CPKMB  Lab Results   Component Value Date/Time    Cholesterol, total 254 (H) 01/19/2014 02:45 AM    HDL Cholesterol 97 01/19/2014 02:45 AM    LDL, calculated 121 (H) 01/19/2014 02:45 AM    Triglyceride 180 (H) 01/19/2014 02:45 AM    CHOL/HDL Ratio 2.6 01/19/2014 02:45 AM     Lab Results   Component Value Date/Time    Glucose (POC) 130 (H) 08/16/2018 06:56 AM    Glucose (POC) 147 (H) 08/15/2018 10:33 PM    Glucose (POC) 275 (H) 08/15/2018 04:26 PM    Glucose (POC) 240 (H) 08/15/2018 11:27 AM    Glucose (POC) 153 (H) 08/15/2018 06:40 AM     Lab Results   Component Value Date/Time    Color YELLOW/STRAW 07/31/2018 09:10 PM    Appearance CLEAR 07/31/2018 09:10 PM    Specific gravity 1.012 07/31/2018 09:10 PM    pH (UA) 7.0 07/31/2018 09:10 PM    Protein NEGATIVE  07/31/2018 09:10 PM    Glucose NEGATIVE  07/31/2018 09:10 PM    Ketone NEGATIVE  07/31/2018 09:10 PM    Bilirubin NEGATIVE  07/31/2018 09:10 PM    Urobilinogen 0.2 07/31/2018 09:10 PM    Nitrites NEGATIVE  07/31/2018 09:10 PM    Leukocyte Esterase NEGATIVE  07/31/2018 09:10 PM    Epithelial cells FEW 07/31/2018 09:10 PM    Bacteria NEGATIVE  07/31/2018 09:10 PM    WBC 0-4 07/31/2018 09:10 PM    RBC 0-5 07/31/2018 09:10 PM         Medications Reviewed:     Current Facility-Administered Medications   Medication Dose Route Frequency    albuterol-ipratropium (DUO-NEB) 2.5 MG-0.5 MG/3 ML  3 mL Nebulization Q4H PRN    potassium chloride SR (KLOR-CON 10) tablet 40 mEq  40 mEq Oral ONCE    bisacodyl (DULCOLAX) suppository 10 mg  10 mg Rectal DAILY PRN    polyethylene glycol (MIRALAX) packet 17 g  17 g Oral DAILY    predniSONE (DELTASONE) tablet 40 mg  40 mg Oral DAILY WITH BREAKFAST    vitamin b comp & c no.4 tab 1 Tab  1 Tab Oral DAILY    insulin lispro (HUMALOG) injection   SubCUTAneous AC&HS    glucose chewable tablet 16 g  4 Tab Oral PRN    dextrose (D50W) injection syrg 12.5-25 g  12.5-25 g IntraVENous PRN    glucagon (GLUCAGEN) injection 1 mg  1 mg IntraMUSCular PRN    metoprolol tartrate (LOPRESSOR) tablet 25 mg  25 mg Oral QHS    senna-docusate (PERICOLACE) 8.6-50 mg per tablet 1 Tab  1 Tab Oral DAILY    nystatin (MYCOSTATIN) 100,000 unit/mL oral suspension 500,000 Units  500,000 Units Oral QID    zinc oxide-cod liver oil (DESITIN) 40 % paste (Patient Supplied)   Topical PRN    esomeprazole (NEXIUM) capsule 40 mg (Patient Supplied)  40 mg Oral ACB    promethazine (PHENERGAN) tablet 25 mg (Patient Supplied)  25 mg Oral Q6H PRN    prazosin (MINIPRESS) capsule 2 mg (Patient Supplied)  2 mg Oral BID    lactated Ringers infusion  20 mL/hr IntraVENous CONTINUOUS    fentaNYL (PF)  mcg/30 ml (ADULT)   IntraVENous TITRATE    sodium chloride (NS) flush 5-10 mL  5-10 mL IntraVENous Q8H    sodium chloride (NS) flush 5-10 mL  5-10 mL IntraVENous PRN    naloxone (NARCAN) injection 0.4 mg  0.4 mg IntraVENous PRN    ondansetron (ZOFRAN) injection 4 mg  4 mg IntraVENous Q4H PRN    enoxaparin (LOVENOX) injection 40 mg  40 mg SubCUTAneous Q24H    arformoterol (BROVANA) neb solution 15 mcg  15 mcg Nebulization BID RT    And    budesonide (PULMICORT) 500 mcg/2 ml nebulizer suspension  500 mcg Nebulization BID RT    DULoxetine (CYMBALTA) capsule 40 mg  40 mg Oral DAILY    primidone (MYSOLINE) tablet 50 mg  50 mg Oral DAILY    primidone (MYSOLINE) tablet 150 mg  150 mg Oral QHS    ALPRAZolam (XANAX) tablet 1 mg  1 mg Oral TID PRN    furosemide (LASIX) tablet 40 mg  40 mg Oral DAILY    montelukast (SINGULAIR) tablet 10 mg  10 mg Oral QHS    ezetimibe/simvastatin (VYTORIN) 10/40 mg   Oral QPM    levoFLOXacin (LEVAQUIN) tablet 750 mg  750 mg Oral QHS    piperacillin-tazobactam (ZOSYN) 3.375 g in 0.9% sodium chloride (MBP/ADV) 100 mL  3.375 g IntraVENous Q8H    levothyroxine (SYNTHROID) tablet 200 mcg  200 mcg Oral ACB    lactobac ac& deniseanim (ROBIN Q/RISAQUAD)  1 Cap Oral DAILY    oxyCODONE IR (ROXICODONE) tablet 15 mg  15 mg Oral Q4H PRN    PHENobarbital (LUMINAL) tablet 64.8 mg  64.8 mg Oral BID     ______________________________________________________________________  EXPECTED LENGTH OF STAY: 10d 0h  ACTUAL LENGTH OF STAY:          4                 Yesica Craig V NP

## 2018-08-16 NOTE — PROGRESS NOTES
Bedside and Verbal shift change report given to [facility] (oncoming nurse) by Oscar Hinson (offgoing nurse). Report included the following informatiSinus TachycardiaSBAR, Kardex, OR Summary, Intake/Output, MAR, Recent Results, Med Rec Status and Cardiac Rhythm Sinus Tachycardia.

## 2018-08-16 NOTE — PROGRESS NOTES
Thoracic Surgery Simple Progress Note    Admit Date: 2018  POD: 3 Days Post-Op      Procedure:  Procedure(s):  VIDEO ASSISTED THORACOSCOPY--RIGHT--WITH DECORTICATION      Subjective:     Patient has complaints: Constipation    Review of Systems:    CARDIAC: positive for HTN  RESP: Positive for COPD, empyema  NEURO:  Memory problems,  Tremors  INCISION: Clean, dry, and intact  EXT: Denies new swelling or pain in the legs or calves. Objective:     Blood pressure 126/46, pulse 89, temperature 97.5 °F (36.4 °C), resp. rate 18, height 5' 4\" (1.626 m), weight 194 lb 10.7 oz (88.3 kg), SpO2 95 %. Temp (24hrs), Av.7 °F (36.5 °C), Min:97.5 °F (36.4 °C), Max:97.9 °F (36.6 °C)        Hemodynamics    PAP    CO    CI        1901 -  0700  In: 7947 [P.O.:480; I.V.:1069]  Out: 2245 [Urine:2075]    EXAM:  GENERAL: VSS, afrible, alert and cooperative. H&H 9.1/29.1 WBC 14 BUN/ Creatinine 12/.12 this AM  HEART:  regular rate and rhythm  LUNG: clear to auscultation bilaterally, chest tubes x 2 in place, no air leak, minimal drainage. CXR reviewed  NEURO:  normal without focal findings  mental status, speech normal, alert and oriented x iii  INCISION: Clean, dry, and intact  EXTREMITIES:No evidence of DVT seen on physical exam.  GI/: Abd soft, nonterder with + bowel sounds. No BM yet. Getting suppository this morning.   Voiding    Labs:  Recent Results (from the past 24 hour(s))   GLUCOSE, POC    Collection Time: 08/15/18 11:27 AM   Result Value Ref Range    Glucose (POC) 240 (H) 65 - 100 mg/dL    Performed by Fluid    GLUCOSE, POC    Collection Time: 08/15/18  4:26 PM   Result Value Ref Range    Glucose (POC) 275 (H) 65 - 100 mg/dL    Performed by Fluid    GLUCOSE, POC    Collection Time: 08/15/18 10:33 PM   Result Value Ref Range    Glucose (POC) 147 (H) 65 - 100 mg/dL    Performed by [address], POC    Collection Time: 18  6:56 AM   Result Value Ref Range    Glucose (POC) 130 (H) 65 - 100 mg/dL    Performed by [address], BASIC    Collection Time: 08/16/18  6:57 AM   Result Value Ref Range    Sodium 138 136 - 145 mmol/L    Potassium 3.8 3.5 - 5.1 mmol/L    Chloride 102 97 - 108 mmol/L    CO2 30 21 - 32 mmol/L    Anion gap 6 5 - 15 mmol/L    Glucose 121 (H) 65 - 100 mg/dL    BUN 12 6 - 20 MG/DL    Creatinine 1.12 (H) 0.55 - 1.02 MG/DL    BUN/Creatinine ratio 11 (L) 12 - 20      GFR est AA 60 (L) >60 ml/min/1.73m2    GFR est non-AA 49 (L) >60 ml/min/1.73m2    Calcium 8.5 8.5 - 10.1 MG/DL   CBC W/O DIFF    Collection Time: 08/16/18  6:57 AM   Result Value Ref Range    WBC 14.6 (H) 3.6 - 11.0 K/uL    RBC 2.93 (L) 3.80 - 5.20 M/uL    HGB 9.1 (L) 11.5 - 16.0 g/dL    HCT 29.1 (L) 35.0 - 47.0 %    MCV 99.3 (H) 80.0 - 99.0 FL    MCH 31.1 26.0 - 34.0 PG    MCHC 31.3 30.0 - 36.5 g/dL    RDW 14.3 11.5 - 14.5 %    PLATELET 461 504 - 549 K/uL    MPV 9.9 8.9 - 12.9 FL    NRBC 0.0 0  WBC    ABSOLUTE NRBC 0.00 0.00 - 0.01 K/uL       Assessment:   No evidence of DVT. Principal Problem:    Empyema ([facility] [address] (8/12/2018)    MICRO: On Pleural fluid 8/11/18: Streptococci, Beta Hemolytic Group F  PATH:  Benign                              Plan/Recommendations:   Would like to convert her to PO ABX in prep for discharge in Friday or Saturday. She States Augmentin \"tears her stomach up\"  Repeat CXR in AM  Needs to ambulate in halls today  Posterior chest tube removed, Anterior chest tube to gravity  Plan to remove anterior chest tube tomorrow.   See orders    Signed By: Nayan Amor [facility]

## 2018-08-16 NOTE — DIABETES MGMT
DTC Progress Note    Recommendations/ Comments: Chart reveiw for variable BG's.  - 153 mg/dL the past two days. During the day BG's rise to > 200 mg/dl, likely due to steroids. Noted Glipizide 5 mg BID added today; this starts this evening. She is on Prednisone 40 mg daily    Current hospital DM medication:   Glipizide 5 mg BID and   Lispro resistant correction scale    Chart reviewed on Tony Schroeder. Patient is a 58 y.o. female with known DM on Glipizide 5 mg BID. Per PTA pt monitors her BG and takes this only when she is on steroids. A1c:   Lab Results   Component Value Date/Time    Hemoglobin A1c 7.4 (H) 08/15/2018 04:30 AM    Hemoglobin A1c 6.1 (H) 11/25/2014 03:16 PM       Recent Glucose Results: Lab Results   Component Value Date/Time     (H) 08/16/2018 06:57 AM    GLUCPOC 122 (H) 08/16/2018 04:15 PM    GLUCPOC 230 (H) 08/16/2018 11:19 AM    GLUCPOC 130 (H) 08/16/2018 06:56 AM        Lab Results   Component Value Date/Time    Creatinine 1.12 (H) 08/16/2018 06:57 AM     Estimated Creatinine Clearance: 56 mL/min (based on Cr of 1.12). Active Orders   Diet    DIET DIABETIC CONSISTENT CARB Regular; 2 GM NA (House Low NA); AHA-LOW-CHOL FAT; No Conc. Sweets        PO intake: Patient Vitals for the past 72 hrs:   % Diet Eaten   08/16/18 0800 80 %   08/15/18 1221 70 %   08/15/18 0825 90 %       Will continue to follow as needed.     Thank you  Kaleigh Jimenez RN, CDE

## 2018-08-17 ENCOUNTER — APPOINTMENT (OUTPATIENT)
Dept: GENERAL RADIOLOGY | Age: 63
DRG: 163 | End: 2018-08-17
Attending: NURSE PRACTITIONER
Payer: MEDICARE

## 2018-08-17 LAB
GLUCOSE BLD STRIP.AUTO-MCNC: 109 MG/DL (ref 65–100)
GLUCOSE BLD STRIP.AUTO-MCNC: 142 MG/DL (ref 65–100)
GLUCOSE BLD STRIP.AUTO-MCNC: 232 MG/DL (ref 65–100)
GLUCOSE BLD STRIP.AUTO-MCNC: 79 MG/DL (ref 65–100)
SERVICE CMNT-IMP: ABNORMAL
SERVICE CMNT-IMP: NORMAL

## 2018-08-17 PROCEDURE — 74011636637 HC RX REV CODE- 636/637: Performed by: PHYSICIAN ASSISTANT

## 2018-08-17 PROCEDURE — 74011250637 HC RX REV CODE- 250/637: Performed by: NURSE PRACTITIONER

## 2018-08-17 PROCEDURE — 71045 X-RAY EXAM CHEST 1 VIEW: CPT

## 2018-08-17 PROCEDURE — 74011250637 HC RX REV CODE- 250/637: Performed by: THORACIC SURGERY (CARDIOTHORACIC VASCULAR SURGERY)

## 2018-08-17 PROCEDURE — 94760 N-INVAS EAR/PLS OXIMETRY 1: CPT

## 2018-08-17 PROCEDURE — 74011000258 HC RX REV CODE- 258: Performed by: NURSE PRACTITIONER

## 2018-08-17 PROCEDURE — 65660000000 HC RM CCU STEPDOWN

## 2018-08-17 PROCEDURE — 82962 GLUCOSE BLOOD TEST: CPT

## 2018-08-17 PROCEDURE — 77010033678 HC OXYGEN DAILY

## 2018-08-17 PROCEDURE — 74011000258 HC RX REV CODE- 258: Performed by: THORACIC SURGERY (CARDIOTHORACIC VASCULAR SURGERY)

## 2018-08-17 PROCEDURE — 74011250636 HC RX REV CODE- 250/636: Performed by: THORACIC SURGERY (CARDIOTHORACIC VASCULAR SURGERY)

## 2018-08-17 PROCEDURE — 74011000250 HC RX REV CODE- 250: Performed by: THORACIC SURGERY (CARDIOTHORACIC VASCULAR SURGERY)

## 2018-08-17 PROCEDURE — 74011250636 HC RX REV CODE- 250/636: Performed by: NURSE PRACTITIONER

## 2018-08-17 PROCEDURE — 94640 AIRWAY INHALATION TREATMENT: CPT

## 2018-08-17 PROCEDURE — 74011000250 HC RX REV CODE- 250: Performed by: NURSE PRACTITIONER

## 2018-08-17 PROCEDURE — 74011636637 HC RX REV CODE- 636/637: Performed by: NURSE PRACTITIONER

## 2018-08-17 RX ORDER — NALOXONE HYDROCHLORIDE 4 MG/.1ML
SPRAY NASAL
Qty: 1 EACH | Refills: 0 | Status: SHIPPED | OUTPATIENT
Start: 2018-08-17

## 2018-08-17 RX ORDER — LORAZEPAM 0.5 MG/1
0.5 TABLET ORAL ONCE
Status: COMPLETED | OUTPATIENT
Start: 2018-08-17 | End: 2018-08-17

## 2018-08-17 RX ORDER — PREDNISONE 20 MG/1
40 TABLET ORAL
Qty: 14 TAB | Refills: 0 | Status: SHIPPED | OUTPATIENT
Start: 2018-08-18 | End: 2018-09-01

## 2018-08-17 RX ORDER — LEVOFLOXACIN 750 MG/1
750 TABLET ORAL
Qty: 10 TAB | Refills: 0 | Status: SHIPPED | OUTPATIENT
Start: 2018-08-17 | End: 2018-08-26

## 2018-08-17 RX ORDER — OXYCODONE HYDROCHLORIDE 15 MG/1
15 TABLET ORAL
Qty: 35 TAB | Refills: 0 | Status: SHIPPED | OUTPATIENT
Start: 2018-08-17 | End: 2018-08-24

## 2018-08-17 RX ADMIN — BUDESONIDE 500 MCG: 0.5 INHALANT RESPIRATORY (INHALATION) at 08:27

## 2018-08-17 RX ADMIN — Medication 10 ML: at 22:54

## 2018-08-17 RX ADMIN — PRIMIDONE 150 MG: 50 TABLET ORAL at 18:56

## 2018-08-17 RX ADMIN — OXYCODONE HYDROCHLORIDE 15 MG: 5 TABLET ORAL at 12:51

## 2018-08-17 RX ADMIN — GLIPIZIDE 2.5 MG: 5 TABLET ORAL at 16:59

## 2018-08-17 RX ADMIN — SIMVASTATIN: 20 TABLET, FILM COATED ORAL at 18:55

## 2018-08-17 RX ADMIN — OXYCODONE HYDROCHLORIDE 15 MG: 5 TABLET ORAL at 03:51

## 2018-08-17 RX ADMIN — PROMETHAZINE HYDROCHLORIDE 6.25 MG: 25 INJECTION INTRAMUSCULAR; INTRAVENOUS at 13:15

## 2018-08-17 RX ADMIN — ESOMEPRAZOLE MAGNESIUM 40 MG: 40 CAPSULE, DELAYED RELEASE ORAL at 06:45

## 2018-08-17 RX ADMIN — PRAZOSIN HYDROCHLORIDE 2 MG: 1 CAPSULE ORAL at 06:44

## 2018-08-17 RX ADMIN — LEVOFLOXACIN 750 MG: 750 TABLET, FILM COATED ORAL at 22:52

## 2018-08-17 RX ADMIN — IPRATROPIUM BROMIDE AND ALBUTEROL SULFATE 3 ML: .5; 3 SOLUTION RESPIRATORY (INHALATION) at 13:25

## 2018-08-17 RX ADMIN — Medication 1 CAPSULE: at 08:03

## 2018-08-17 RX ADMIN — OXYCODONE HYDROCHLORIDE 15 MG: 5 TABLET ORAL at 08:18

## 2018-08-17 RX ADMIN — PIPERACILLIN AND TAZOBACTAM 3.38 G: 3; .375 INJECTION, POWDER, FOR SOLUTION INTRAVENOUS at 08:00

## 2018-08-17 RX ADMIN — NYSTATIN 500000 UNITS: 100000 SUSPENSION ORAL at 12:17

## 2018-08-17 RX ADMIN — DULOXETINE HYDROCHLORIDE 40 MG: 20 CAPSULE, DELAYED RELEASE ORAL at 08:03

## 2018-08-17 RX ADMIN — ARFORMOTEROL TARTRATE 15 MCG: 15 SOLUTION RESPIRATORY (INHALATION) at 08:27

## 2018-08-17 RX ADMIN — PIPERACILLIN AND TAZOBACTAM 3.38 G: 3; .375 INJECTION, POWDER, FOR SOLUTION INTRAVENOUS at 16:55

## 2018-08-17 RX ADMIN — PROMETHAZINE HYDROCHLORIDE 25 MG: 25 TABLET ORAL at 06:46

## 2018-08-17 RX ADMIN — NYSTATIN 500000 UNITS: 100000 SUSPENSION ORAL at 08:05

## 2018-08-17 RX ADMIN — GLIPIZIDE 2.5 MG: 5 TABLET ORAL at 06:39

## 2018-08-17 RX ADMIN — PIPERACILLIN AND TAZOBACTAM 3.38 G: 3; .375 INJECTION, POWDER, FOR SOLUTION INTRAVENOUS at 00:59

## 2018-08-17 RX ADMIN — PREDNISONE 40 MG: 20 TABLET ORAL at 08:04

## 2018-08-17 RX ADMIN — BUDESONIDE 500 MCG: 0.5 INHALANT RESPIRATORY (INHALATION) at 19:35

## 2018-08-17 RX ADMIN — Medication 10 ML: at 08:05

## 2018-08-17 RX ADMIN — MONTELUKAST SODIUM 10 MG: 10 TABLET, FILM COATED ORAL at 22:53

## 2018-08-17 RX ADMIN — ARFORMOTEROL TARTRATE 15 MCG: 15 SOLUTION RESPIRATORY (INHALATION) at 19:35

## 2018-08-17 RX ADMIN — Medication 10 ML: at 06:00

## 2018-08-17 RX ADMIN — PHENOBARBITAL 64.8 MG: 64.8 TABLET ORAL at 18:56

## 2018-08-17 RX ADMIN — POLYETHYLENE GLYCOL 3350 17 G: 17 POWDER, FOR SOLUTION ORAL at 08:03

## 2018-08-17 RX ADMIN — PHENOBARBITAL 64.8 MG: 64.8 TABLET ORAL at 06:40

## 2018-08-17 RX ADMIN — METOPROLOL TARTRATE 25 MG: 25 TABLET ORAL at 22:53

## 2018-08-17 RX ADMIN — PROMETHAZINE HYDROCHLORIDE 25 MG: 25 TABLET ORAL at 12:14

## 2018-08-17 RX ADMIN — FUROSEMIDE 40 MG: 40 TABLET ORAL at 08:04

## 2018-08-17 RX ADMIN — INSULIN LISPRO 3 UNITS: 100 INJECTION, SOLUTION INTRAVENOUS; SUBCUTANEOUS at 16:55

## 2018-08-17 RX ADMIN — INSULIN LISPRO 4 UNITS: 100 INJECTION, SOLUTION INTRAVENOUS; SUBCUTANEOUS at 12:16

## 2018-08-17 RX ADMIN — NYSTATIN 500000 UNITS: 100000 SUSPENSION ORAL at 18:57

## 2018-08-17 RX ADMIN — ALPRAZOLAM 1 MG: 0.5 TABLET ORAL at 10:41

## 2018-08-17 RX ADMIN — Medication 1 TABLET: at 08:07

## 2018-08-17 RX ADMIN — NYSTATIN 500000 UNITS: 100000 SUSPENSION ORAL at 22:52

## 2018-08-17 RX ADMIN — LORAZEPAM 0.5 MG: 0.5 TABLET ORAL at 12:43

## 2018-08-17 RX ADMIN — PRIMIDONE 50 MG: 50 TABLET ORAL at 08:04

## 2018-08-17 RX ADMIN — LEVOTHYROXINE SODIUM 200 MCG: 100 TABLET ORAL at 06:40

## 2018-08-17 RX ADMIN — ENOXAPARIN SODIUM 40 MG: 100 INJECTION SUBCUTANEOUS at 10:41

## 2018-08-17 RX ADMIN — STANDARDIZED SENNA CONCENTRATE AND DOCUSATE SODIUM 1 TABLET: 8.6; 5 TABLET, FILM COATED ORAL at 08:04

## 2018-08-17 RX ADMIN — OXYCODONE HYDROCHLORIDE 15 MG: 5 TABLET ORAL at 22:52

## 2018-08-17 NOTE — PROGRESS NOTES
Problem: Falls - Risk of  Goal: *Absence of Falls  Document Palma Fall Risk and appropriate interventions in the flowsheet. Outcome: Progressing Towards Goal  Fall Risk Interventions:  Mobility Interventions: Bed/chair exit alarm, Communicate number of staff needed for ambulation/transfer, PT Consult for mobility concerns, PT Consult for assist device competence    Mentation Interventions: Adequate sleep, hydration, pain control, Bed/chair exit alarm, Door open when patient unattended, More frequent rounding    Medication Interventions: Bed/chair exit alarm, Patient to call before getting OOB, Teach patient to arise slowly    Elimination Interventions: Bed/chair exit alarm, Call light in reach    History of Falls Interventions: Bed/chair exit alarm, Door open when patient unattended, Investigate reason for fall        Problem: Pressure Injury - Risk of  Goal: *Prevention of pressure injury  Document Joseph Scale and appropriate interventions in the flowsheet.    Outcome: Progressing Towards Goal  Pressure Injury Interventions:  Sensory Interventions: Assess changes in LOC, Assess need for specialty bed, Discuss PT/OT consult with provider, Float heels, Keep linens dry and wrinkle-free, Minimize linen layers    Moisture Interventions: Absorbent underpads, Minimize layers    Activity Interventions: Increase time out of bed, Pressure redistribution bed/mattress(bed type), PT/OT evaluation    Mobility Interventions: HOB 30 degrees or less, PT/OT evaluation    Nutrition Interventions: Document food/fluid/supplement intake    Friction and Shear Interventions: HOB 30 degrees or less

## 2018-08-17 NOTE — CONSULTS
Palliative Medicine Consult  [place]: [phone] ([phone])    Patient Name: Bernadine Izquierdo  YOB: 1955    Date of Initial Consult: 8/17/18  Reason for Consult: pain   Requesting Provider: Dr. Je Rodriguez  Primary Care Physician: Taryn Swanson MD     SUMMARY:   Bernadine Izquierdo is a 58 y.o. with a past history of COPD, tobacco abuse, OA, PUD, hypothyroidism, GERD, fibromyalgia, depression, anxiety, hyperlipidemia,, who was admitted on 8/12/2018 from [facility] with a diagnosis of parapneumonic effusion, s/p VATs to right lung and chest tube placement which was removed earlier today. Current medical issues leading to Palliative Medicine involvement include: Pt has chronic psych illness, chronic pain, presented with severe pleuritic chest pain due to severe pneumonia. We are asked to provide discharge opioid recommendations.  shows pt gets the following monthly prescriptions from [facility], no signs of aberrant behavior. Tramadol 50mg #90/30 days  Tylenol #3 #30/10 days  Phenobarbital 60mg #60/30 days  Norco 7.5/325mg #30/10 days  Alprazolam 1mg #90/30 days    Psychosocial: lives with  in 1 story home, independent with all ADLs and IADLs. PALLIATIVE DIAGNOSES:   1. Chills  2. Fever   3. Cough (productive)  4. Chest tightness  5. Dyspnea with speech  6. Acute chest pain: bilat rib pain  7. PNA : right lower lobe PNA with dense consolidation and possible areas of necrosis, also consolidation of LLL. 8. Sepsis   9. Diarrhea  10. Leukocytosis   11. Fibromyalgia   12. Anxiety/depression/PTSD  13. Polypharmacy   14. Constipation opioid induced     PLAN:   1. Pain - crying from abdominal pain/ esophageal spasm. She wants extra dose of Phenargan but hospitalist has ordered IV ativan for anxiety. 2. Discharge opioid- Chest tube was removed earlier tody and her pain related to chest tube is fairly well controlled. It is reasonable to discharge patient with the same medications she was taking at home. She is on Norco 7.5/325 mg every 6 hours as needed. This can be increased to every 4 hours as needed for the next 15 days to manage acute postprocedure pain. 3. She should have close follow-up with her PCP, psychiatrist.  4. Initial consult note routed to primary continuity provider  5. Communicated plan of care with: Palliative IDT, RN, Adi Murray NP       GOALS OF CARE / TREATMENT PREFERENCES:     GOALS OF CARE:  Patient/Health Care Proxy Stated Goals: Cure      TREATMENT PREFERENCES:   Code Status: Full Code    Advance Care Planning:  Advance Care Planning 8/13/2018   Patient's Healthcare Decision Maker is: Legal Next of Santhosh 69   Primary Decision Maker Name Nisha Landeros   Primary Decision Maker Phone Number [phone]   Primary Decision Maker Relationship to Patient -   Confirm Advance Directive Yes, on file   Patient Would Like to Complete Advance Directive -       Medical Interventions: Full interventions   Other Instructions:   Artificially Administered Nutrition: No feeding tube     Other:    As far as possible, the palliative care team has discussed with patient / health care proxy about goals of care / treatment preferences for patient. HISTORY:     History obtained from: chart, patient    CHIEF COMPLAINT: worsening cough    HPI/SUBJECTIVE:    The patient is:   [x] Verbal and participatory  [] Non-participatory due to:     Patient initially admitted to [facility] with cough, shortness of breath. She was seen by palliative medicine team there as well for pain management and no extra opioid was added to her home regimen. She was transferred to [facility] on 8/12 for parapneumonic effusion drainage. She has done fairly well since procedure. She was treated with Dilaudid PCA postprocedure which was weaned off yesterday. No clear documentation as to how much Dilaudid she required for optimal pain management.   She is currently having esophageal spasm and requesting Phenergan for that. But she did do well with some Ativan for anxiety. Clinical Pain Assessment (nonverbal scale for severity on nonverbal patients):   Clinical Pain Assessment  Severity: 3  Location: upper stomach, esophagus  Character: cramping  Duration: days  Effect: functional  Factors: none  Frequency: on and off          Duration: for how long has pt been experiencing pain (e.g., 2 days, 1 month, years)  Frequency: how often pain is an issue (e.g., several times per day, once every few days, constant)     FUNCTIONAL ASSESSMENT:     Palliative Performance Scale (PPS):  PPS: 70       PSYCHOSOCIAL/SPIRITUAL SCREENING:     Palliative IDT has assessed this patient for cultural preferences / practices and a referral made as appropriate to needs (Cultural Services, Patient Advocacy, Ethics, etc.)    Advance Care Planning:  Advance Care Planning 8/13/2018   Patient's Healthcare Decision Maker is: Legal Next of Santhosh 69   Primary Decision Maker Name Santana Clark   Primary Decision Maker Phone Number [phone]   Primary Decision Maker Relationship to Patient -   Confirm Advance Directive Yes, on file   Patient Would Like to Complete Advance Directive -       Any spiritual / Buddhism concerns:  [] Yes /  [x] No    Caregiver Burnout:  [] Yes /  [x] No /  [] No Caregiver Present      Anticipatory grief assessment:   [x] Normal  / [] Maladaptive       ESAS Anxiety: Anxiety: 3  +ESAS Depression: Depression: 0        REVIEW OF SYSTEMS:     Positive and pertinent negative findings in ROS are noted above in HPI. The following systems were [x] reviewed / [] unable to be reviewed as noted in HPI  Other findings are noted below. Systems: constitutional, ears/nose/mouth/throat, respiratory, gastrointestinal, genitourinary, musculoskeletal, integumentary, neurologic, psychiatric, endocrine. Positive findings noted below.   Modified ESAS Completed by: provider   Fatigue: 3 Drowsiness: 0   Depression: 0 Pain: 3   Anxiety: 3 Nausea: 0 Anorexia: 2 Dyspnea: 0     Constipation: No     Stool Occurrence(s): 1        PHYSICAL EXAM:     From RN flowsheet:  Wt Readings from Last 3 Encounters:   18 194 lb 7.1 oz (88.2 kg)   18 200 lb (90.7 kg)   18 200 lb (90.7 kg)     Blood pressure 116/64, pulse (!) 124, temperature 98.2 °F (36.8 °C), resp. rate 17, height 5' 4\" (1.626 m), weight 194 lb 7.1 oz (88.2 kg), SpO2 92 %.     Pain Scale 1: Numeric (0 - 10)  Pain Intensity 1: 3  Pain Onset 1: post op  Pain Location 1: Abdomen  Pain Orientation 1: Anterior  Pain Description 1: Aching  Pain Intervention(s) 1: Medication (see MAR)  Last bowel movement, if known:     Constitutional: ill appearing, awake, alert, NAD  Eyes: pupils equal, anicteric  ENMT: no nasal discharge, moist mucous membranes  Cardiovascular: regular rhythm, distal pulses intact  Respiratory: breathing labored with speech, symmetric, crackles bilat  Gastrointestinal: soft non-tender, +bowel sounds  Musculoskeletal: no deformity, no tenderness to palpation  Skin: warm, dry  Neurologic: following commands, moving all extremities  Psychiatric: full affect, no hallucinations          HISTORY:     Principal Problem:    Empyema (HCC) (2018)      Past Medical History:   Diagnosis Date    Anxiety     Bone spur     NECK    COPD     Depression     Endocrine disease     hypothyroidism    Fibromyalgia     Fusion of spine of cervical region     Gastrointestinal disorder     gerd    Hyperlipidemia     Hypothyroid     Morbid obesity ([address]     Osteoarthritis     PUD (peptic ulcer disease)     Tobacco abuse       Past Surgical History:   Procedure Laterality Date    BRONCHOSCOPY-FIBER/THERAPY  4/3/2015         CARDIAC CATHETERIZATION  2013         COLONOSCOPY,DIAGNOSTIC  10/30/2014         HX CATARACT REMOVAL      bilateral    HX GYN          HX ORTHOPAEDIC      Ruptured disc, knuckles on right hand    UPPER GI ENDOSCOPY,BIOPSY  10/30/2014         UPPER GI ENDOSCOPY,JOELLEATN W GUIDE  10/30/2014           Family History   Problem Relation Age of Onset    Heart Disease Mother     Dementia Mother     Thyroid Disease Mother     Heart Disease Father     Alcohol abuse Father     Psychiatric Disorder Father     Dementia Father     Bipolar Disorder Father     Psychiatric Disorder Sister     Suicide Sister     Psychiatric Disorder Brother     Suicide Brother     Psychiatric Disorder Other     Psychiatric Disorder Daughter     Bipolar Disorder Daughter     Coronary Artery Disease Other      multiple family members in 52's      History reviewed, no pertinent family history.   Social History   Substance Use Topics    Smoking status: Former Smoker     Packs/day: 1.00     Years: 30.00    Smokeless tobacco: Never Used    Alcohol use Yes      Comment: occasional     Allergies   Allergen Reactions    Latex Contact Dermatitis    Dilaudid [Hydromorphone (Pf)] Other (comments)     Feels like bugs are crawling all over her    Lipitor [Atorvastatin] Other (comments)     LIVER TROUBLE ON THIS MEDICATION    Remeron [Mirtazapine] Other (comments)     Tremors    Sulfa (Sulfonamide Antibiotics) Itching      Current Facility-Administered Medications   Medication Dose Route Frequency    albuterol-ipratropium (DUO-NEB) 2.5 MG-0.5 MG/3 ML  3 mL Nebulization Q4H PRN    glipiZIDE SR (GLUCOTROL XL) tablet 5 mg  5 mg Oral BID PRN    glipiZIDE (GLUCOTROL) tablet 2.5 mg  2.5 mg Oral ACB&D    sodium chloride (NS) flush 5-10 mL  5-10 mL IntraVENous Q8H    sodium chloride (NS) flush 5-10 mL  5-10 mL IntraVENous PRN    bisacodyl (DULCOLAX) suppository 10 mg  10 mg Rectal DAILY PRN    polyethylene glycol (MIRALAX) packet 17 g  17 g Oral DAILY    predniSONE (DELTASONE) tablet 40 mg  40 mg Oral DAILY WITH BREAKFAST    vitamin b comp & c no.4 tab 1 Tab  1 Tab Oral DAILY    insulin lispro (HUMALOG) injection   SubCUTAneous AC&HS    glucose chewable tablet 16 g  4 Tab Oral PRN  dextrose (D50W) injection syrg 12.5-25 g  12.5-25 g IntraVENous PRN    glucagon (GLUCAGEN) injection 1 mg  1 mg IntraMUSCular PRN    metoprolol tartrate (LOPRESSOR) tablet 25 mg  25 mg Oral QHS    senna-docusate (PERICOLACE) 8.6-50 mg per tablet 1 Tab  1 Tab Oral DAILY    nystatin (MYCOSTATIN) 100,000 unit/mL oral suspension 500,000 Units  500,000 Units Oral QID    zinc oxide-cod liver oil (DESITIN) 40 % paste (Patient Supplied)   Topical PRN    esomeprazole (NEXIUM) capsule 40 mg (Patient Supplied)  40 mg Oral ACB    promethazine (PHENERGAN) tablet 25 mg (Patient Supplied)  25 mg Oral Q6H PRN    prazosin (MINIPRESS) capsule 2 mg (Patient Supplied)  2 mg Oral BID    sodium chloride (NS) flush 5-10 mL  5-10 mL IntraVENous Q8H    sodium chloride (NS) flush 5-10 mL  5-10 mL IntraVENous PRN    naloxone (NARCAN) injection 0.4 mg  0.4 mg IntraVENous PRN    ondansetron (ZOFRAN) injection 4 mg  4 mg IntraVENous Q4H PRN    enoxaparin (LOVENOX) injection 40 mg  40 mg SubCUTAneous Q24H    arformoterol (BROVANA) neb solution 15 mcg  15 mcg Nebulization BID RT    And    budesonide (PULMICORT) 500 mcg/2 ml nebulizer suspension  500 mcg Nebulization BID RT    DULoxetine (CYMBALTA) capsule 40 mg  40 mg Oral DAILY    primidone (MYSOLINE) tablet 50 mg  50 mg Oral DAILY    primidone (MYSOLINE) tablet 150 mg  150 mg Oral QHS    ALPRAZolam (XANAX) tablet 1 mg  1 mg Oral TID PRN    furosemide (LASIX) tablet 40 mg  40 mg Oral DAILY    montelukast (SINGULAIR) tablet 10 mg  10 mg Oral QHS    ezetimibe/simvastatin (VYTORIN) 10/40 mg   Oral QPM    levoFLOXacin (LEVAQUIN) tablet 750 mg  750 mg Oral QHS    piperacillin-tazobactam (ZOSYN) 3.375 g in 0.9% sodium chloride (MBP/ADV) 100 mL  3.375 g IntraVENous Q8H    levothyroxine (SYNTHROID) tablet 200 mcg  200 mcg Oral ACB    lactobac ac& pc-s.therm-b.anim (ROBIN Q/RISAQUAD)  1 Cap Oral DAILY    oxyCODONE IR (ROXICODONE) tablet 15 mg  15 mg Oral Q4H PRN    PHENobarbital (LUMINAL) tablet 64.8 mg  64.8 mg Oral BID          LAB AND IMAGING FINDINGS:     Lab Results   Component Value Date/Time    WBC 14.6 (H) 08/16/2018 06:57 AM    HGB 9.1 (L) 08/16/2018 06:57 AM    PLATELET 294 13/89/9692 06:57 AM     Lab Results   Component Value Date/Time    Sodium 138 08/16/2018 06:57 AM    Potassium 3.8 08/16/2018 06:57 AM    Chloride 102 08/16/2018 06:57 AM    CO2 30 08/16/2018 06:57 AM    BUN 12 08/16/2018 06:57 AM    Creatinine 1.12 (H) 08/16/2018 06:57 AM    Calcium 8.5 08/16/2018 06:57 AM    Magnesium 2.5 (H) 08/08/2018 01:47 AM    Phosphorus 3.9 08/08/2018 01:47 AM      Lab Results   Component Value Date/Time    AST (SGOT) 15 08/01/2018 05:37 AM    Alk. phosphatase 138 (H) 08/01/2018 05:37 AM    Protein, total 7.3 08/01/2018 05:37 AM    Albumin 2.4 (L) 08/01/2018 05:37 AM    Globulin 4.9 (H) 08/01/2018 05:37 AM     Lab Results   Component Value Date/Time    INR 0.9 07/05/2013 10:01 AM    Prothrombin time 9.3 07/05/2013 10:01 AM      Lab Results   Component Value Date/Time    Iron 57 08/15/2018 04:30 AM    TIBC 173 (L) 08/15/2018 04:30 AM    Iron % saturation 33 08/15/2018 04:30 AM    Ferritin 163 08/15/2018 04:30 AM      Lab Results   Component Value Date/Time    pH 7.34 (L) 08/01/2018 11:12 AM    PCO2 41 08/01/2018 11:12 AM    PO2 82 08/01/2018 11:12 AM     No components found for: [place]   Lab Results   Component Value Date/Time    CK 42 03/31/2015 09:50 PM    CK - MB 2.2 03/31/2015 09:50 PM                Total time: 50m  Counseling / coordination time, spent as noted above: 40m  > 50% counseling / coordination?: y    Prolonged service was provided for  []30 min   []75 min in face to face time in the presence of the patient, spent as noted above. Time Start:   Time End:   Note: this can only be billed with 01543 (initial) or 02548 (follow up). If multiple start / stop times, list each separately.

## 2018-08-17 NOTE — PROGRESS NOTES
Hospitalist Progress Note  Chepe Cuevas NP  Answering service: [phone] from in house phone  Cell: [phone]      Date of Service:  2018  NAME:  Yusra Noguera  :  1955  MRN:  [id number]      Admission Summary:   Yusra Noguera is a 58 y.o. female who presented with severe left sided chest pain on  to [facility]. Pt reported she was treated with doxycycline/prednisone in  for bronchitis. She was unable to complete doxycycline secondary to diarrhea which she still had on admission. Pt was admitted with acute hypoxic respiratory failure, severe sepsis 2/2 bilateral pna, Right Para pneumonic pleural effusion. Pt was evaluated by pulmonary and she underwent a thoracentesis with chest tube placement . She was transferred to [facility] on  for VAT's on  with Dr. Keny Jc. Hospitalist team is consulted for medical management    Interval history / Subjective:   Offers no new complaints  Chest tube d/c today, stable cxr post pull, oxygen is off, 91-92% on RA  Primary team evaluating for home oxygen needs  Plan for d/c tomorrow am by primary team  Recommend pt to take glipizide and metoprolol the way she was taking it here  + bm yesterday and today  We will sign off. Assessment & Plan:     Acute hypoxic Respiratory Failure - Improved  - multifactorial: PNA, empyema, copd exacerbation  - does use home oxygen at baseline     RLL PNA  - with dense consolidation and possible areas of necrosis. Consolidation of LLL as well  - wean oxygen as tolerated  - c/w zoysn and levaquin  - follow cx's => strep  - pulmonary following     Streptococcal Right Sided Empyema  - per primary team  - s/p VATs with decortication on 18  - Chest tube x 2 to suction  -  Right pleural drains in place with trace right pleural gas and fluid.   Atelectasis or residual pneumonia in the lower right lung.  - pt on pca pump per primary team     Acute chest pain - improved with PCA pump  - 2/2 severe pna, difficulty in managing pain with chronic use of opiates     Suspected underlying COPD - Acute exacerbation  - former smoker  - c/w pulmicort/brovana  - wean steroids     NIDDM 2  - pt takes glipizide SR 5mg bid at home, she reports she only takes this when she is on steroids  - poc, ssi, diabetic diet  - A1c 7.4 (166), discussed with patient  - 8/16 restart glipizide at 2.5mg bid     Anemia  - noted 2.7 gram drop over three days, will monitor and send for iron panel/ferritin in am  - 8/15 not iron deficient, d/c ivf     Asymptomatic hypotension on am of 8/14   - s/p albumin, bp improved to low 100's from high 80's  - pt was receiving metoprolol bid, she only takes this at bedtime, this has been adjusted     HTN  - c/w metoprolol, pt takes at bedtime, not bid, adjusted, has hx of orthostatic hypotension  - c/w minipress     Diarrhea on admission which has resolved     Constipation Opoid induced   - chronic opiates and now on pca pump  - bowel regimen ordered     Fibromyalgia  - on chronic opiates for back pain  - pain control, lidocaine patch     Obese   - bmi 34, counseled on weight loss     Hypothyroidism - c/w levothyroxine     Familial HLD - c/w vytorin     Depression/anxiety - c/w cymbalta, xanax prn     PTSD - c/w minipress     Essential tremor - c/w primidone, phenobarbital         Code status: Full  DVT prophylaxis: Lovneonx  Care Plan discussed with: patient, attending MD  Disposition: per primary team  Pt lives her , no home oxygen     Hospital Problems  Date Reviewed: 8/1/2018          Codes Class Noted POA    * (Principal)Empyema ([facility] [address] ICD-10-CM: J86.9  ICD-9-CM: 510.9  8/12/2018 Yes                Review of Systems:   Denies cp/sob  + right lower rib pain controlled with pca pump  Denies abd pain   Denies n/v/d  Constipation resolved      Vital Signs:    Last 24hrs VS reviewed since prior progress note.  Most recent are:  Visit Vitals    BP 119/52 (BP 1 Location: Right arm, BP Patient Position: At rest)    Pulse (!) 110    Temp 98.2 °F (36.8 °C)    Resp 17    Ht 5' 4\" (1.626 m)    Wt 88.2 kg (194 lb 7.1 oz)    SpO2 90%    BMI 33.38 kg/m2         Intake/Output Summary (Last 24 hours) at 08/17/18 1146  Last data filed at 08/17/18 0827   Gross per 24 hour   Intake             1440 ml   Output             1140 ml   Net              300 ml        Physical Examination:             Constitutional:  No acute distress, cooperative, pleasant    ENT:  Oral mucous moist, oropharynx benign. Neck supple   Resp:  Dimnished bilaterally, R> L, less rhonchi  No accessory muscle use   CV:  Regular rhythm, normal rate, no murmurs, gallops, rubs    GI:  Soft, obese, non distended, non tender. normoactive bowel sounds, no hepatosplenomegaly     Musculoskeletal:  NP trace ble edema, warm, 2+ pulses throughout    Neurologic:  Moves all extremities. AAOx3, CN II-XII reviewed     Psych:  Fair insight, + anxiety  Skin:  Good turgor, no rashes or ulcers       Data Review:    Review and/or order of clinical lab test  Review and/or order of tests in the radiology section of CPT  Review and/or order of tests in the medicine section of CPT      Labs:     Recent Labs      08/16/18   0657  08/15/18   0430   WBC  14.6*  15.5*   HGB  9.1*  8.8*   HCT  29.1*  28.4*   PLT  386  353     Recent Labs      08/16/18   0657  08/15/18   0430   NA  138  139   K  3.8  4.0   CL  102  101   CO2  30  29   BUN  12  18   CREA  1.12*  1.13*   GLU  121*  137*   CA  8.5  8.9     No results for input(s): SGOT, GPT, ALT, AP, TBIL, TBILI, TP, ALB, GLOB, GGT, AML, LPSE in the last 72 hours. No lab exists for component: AMYP, HLPSE  No results for input(s): INR, PTP, APTT in the last 72 hours.     No lab exists for component: INREXT, INREXT   Recent Labs      08/15/18   0430   TIBC  173*   PSAT  33   FERR  163      Lab Results   Component Value Date/Time    Folate 13.7 11/25/2014 03:16 PM      No results for input(s): PH, PCO2, PO2 in the last 72 hours. No results for input(s): CPK, CKNDX, TROIQ in the last 72 hours.     No lab exists for component: CPKMB  Lab Results   Component Value Date/Time    Cholesterol, total 254 (H) 01/19/2014 02:45 AM    HDL Cholesterol 97 01/19/2014 02:45 AM    LDL, calculated 121 (H) 01/19/2014 02:45 AM    Triglyceride 180 (H) 01/19/2014 02:45 AM    CHOL/HDL Ratio 2.6 01/19/2014 02:45 AM     Lab Results   Component Value Date/Time    Glucose (POC) 232 (H) 08/17/2018 11:25 AM    Glucose (POC) 107 (H) 08/16/2018 09:32 PM    Glucose (POC) 122 (H) 08/16/2018 04:15 PM    Glucose (POC) 230 (H) 08/16/2018 11:19 AM    Glucose (POC) 130 (H) 08/16/2018 06:56 AM     Lab Results   Component Value Date/Time    Color YELLOW/STRAW 07/31/2018 09:10 PM    Appearance CLEAR 07/31/2018 09:10 PM    Specific gravity 1.012 07/31/2018 09:10 PM    pH (UA) 7.0 07/31/2018 09:10 PM    Protein NEGATIVE  07/31/2018 09:10 PM    Glucose NEGATIVE  07/31/2018 09:10 PM    Ketone NEGATIVE  07/31/2018 09:10 PM    Bilirubin NEGATIVE  07/31/2018 09:10 PM    Urobilinogen 0.2 07/31/2018 09:10 PM    Nitrites NEGATIVE  07/31/2018 09:10 PM    Leukocyte Esterase NEGATIVE  07/31/2018 09:10 PM    Epithelial cells FEW 07/31/2018 09:10 PM    Bacteria NEGATIVE  07/31/2018 09:10 PM    WBC 0-4 07/31/2018 09:10 PM    RBC 0-5 07/31/2018 09:10 PM         Medications Reviewed:     Current Facility-Administered Medications   Medication Dose Route Frequency    albuterol-ipratropium (DUO-NEB) 2.5 MG-0.5 MG/3 ML  3 mL Nebulization Q4H PRN    glipiZIDE SR (GLUCOTROL XL) tablet 5 mg  5 mg Oral BID PRN    glipiZIDE (GLUCOTROL) tablet 2.5 mg  2.5 mg Oral ACB&D    sodium chloride (NS) flush 5-10 mL  5-10 mL IntraVENous Q8H    sodium chloride (NS) flush 5-10 mL  5-10 mL IntraVENous PRN    bisacodyl (DULCOLAX) suppository 10 mg  10 mg Rectal DAILY PRN    polyethylene glycol (MIRALAX) packet 17 g  17 g Oral DAILY    predniSONE (DELTASONE) tablet 40 mg  40 mg Oral DAILY WITH BREAKFAST    vitamin b comp & c no.4 tab 1 Tab  1 Tab Oral DAILY    insulin lispro (HUMALOG) injection   SubCUTAneous AC&HS    glucose chewable tablet 16 g  4 Tab Oral PRN    dextrose (D50W) injection syrg 12.5-25 g  12.5-25 g IntraVENous PRN    glucagon (GLUCAGEN) injection 1 mg  1 mg IntraMUSCular PRN    metoprolol tartrate (LOPRESSOR) tablet 25 mg  25 mg Oral QHS    senna-docusate (PERICOLACE) 8.6-50 mg per tablet 1 Tab  1 Tab Oral DAILY    nystatin (MYCOSTATIN) 100,000 unit/mL oral suspension 500,000 Units  500,000 Units Oral QID    zinc oxide-cod liver oil (DESITIN) 40 % paste (Patient Supplied)   Topical PRN    esomeprazole (NEXIUM) capsule 40 mg (Patient Supplied)  40 mg Oral ACB    promethazine (PHENERGAN) tablet 25 mg (Patient Supplied)  25 mg Oral Q6H PRN    prazosin (MINIPRESS) capsule 2 mg (Patient Supplied)  2 mg Oral BID    sodium chloride (NS) flush 5-10 mL  5-10 mL IntraVENous Q8H    sodium chloride (NS) flush 5-10 mL  5-10 mL IntraVENous PRN    naloxone (NARCAN) injection 0.4 mg  0.4 mg IntraVENous PRN    ondansetron (ZOFRAN) injection 4 mg  4 mg IntraVENous Q4H PRN    enoxaparin (LOVENOX) injection 40 mg  40 mg SubCUTAneous Q24H    arformoterol (BROVANA) neb solution 15 mcg  15 mcg Nebulization BID RT    And    budesonide (PULMICORT) 500 mcg/2 ml nebulizer suspension  500 mcg Nebulization BID RT    DULoxetine (CYMBALTA) capsule 40 mg  40 mg Oral DAILY    primidone (MYSOLINE) tablet 50 mg  50 mg Oral DAILY    primidone (MYSOLINE) tablet 150 mg  150 mg Oral QHS    ALPRAZolam (XANAX) tablet 1 mg  1 mg Oral TID PRN    furosemide (LASIX) tablet 40 mg  40 mg Oral DAILY    montelukast (SINGULAIR) tablet 10 mg  10 mg Oral QHS    ezetimibe/simvastatin (VYTORIN) 10/40 mg   Oral QPM    levoFLOXacin (LEVAQUIN) tablet 750 mg  750 mg Oral QHS    piperacillin-tazobactam (ZOSYN) 3.375 g in 0.9% sodium chloride (MBP/ADV) 100 mL  3.375 g IntraVENous Q8H  levothyroxine (SYNTHROID) tablet 200 mcg  200 mcg Oral ACB    lactobac ac& pc-s.therm-b.anim (ROBIN Q/RISAQUAD)  1 Cap Oral DAILY    oxyCODONE IR (ROXICODONE) tablet 15 mg  15 mg Oral Q4H PRN    PHENobarbital (LUMINAL) tablet 64.8 mg  64.8 mg Oral BID     ______________________________________________________________________  EXPECTED LENGTH OF STAY: 10d 0h  ACTUAL LENGTH OF STAY:          5                 Yesica Craig V NP

## 2018-08-17 NOTE — PROGRESS NOTES
Spiritual Care Assessment/Progress Note  ST. [address]      NAME: Emigdio Miranda      MRN: [id number]  AGE: 58 y.o.  SEX: female  Yazdanism Affiliation: No Denominational   Language: English     8/17/2018     Total Time (in minutes): 15     Spiritual Assessment begun in [facility] SURG/BARIATRICS through conversation with:         [x]Patient        [] Family    [] Friend(s)        Reason for Consult: Palliative Care, Initial/Spiritual Assessment     Spiritual beliefs: (Please include comment if needed)     [] Identifies with a narinder tradition:         [] Supported by a narinder community:            [] Claims no spiritual orientation:           [] Seeking spiritual identity:                [x] Adheres to an individual form of spirituality:           [] Not able to assess:                           Identified resources for coping:      [x] Prayer                               [] Music                  [] Guided Imagery     [x] Family/friends                 [] Pet visits     [] Devotional reading                         [] Unknown     [x] Other:                                              Interventions offered during this visit: (See comments for more details)    Patient Interventions: Affirmation of narinder, Initial visit, Affirmation of emotions/emotional suffering, Normalization of emotional/spiritual concerns     Family/Friend(s): Prayer (assurance of)     Plan of Care:     [x] Support spiritual and/or cultural needs    [] Support AMD and/or advance care planning process      [] Support grieving process   [] Coordinate Rites and/or Rituals    [] Coordination with community clergy   [] No spiritual needs identified at this time   [] Detailed Plan of Care below (See Comments)  [] Make referral to Music Therapy  [] Make referral to Pet Therapy     [] Make referral to Addiction services  [] Make referral to [facility]  [] Make referral to Spiritual Care Partner  [] No future visits requested        [x] Follow up visits as needed     Comments:  Intern in for initial spiritual assessment in 18 with Ms. Murphy. No family present at this time. Provided listening ear and spiritual presence as patient shared about medical journey. She does not identify with a denomination or narinder community. Patient states she was a [employer]ism, however she now adheres to her own form of spirituality. Believes in a highter power.  and fireinds are supportive. She has a prayer blanket at bedside which she utilizes a prayer blanket for comfort. Provided  Assurance of prayer. Advised of  Availability.     Lidia Burns, [facility]   Intern  [phone])

## 2018-08-17 NOTE — PROGRESS NOTES
Spoke with Dr Buster Fairbanks and Beltran Chacon Minor NP made aware of patients complaints of abd pain and elevated .   Order given for ativan

## 2018-08-17 NOTE — PROGRESS NOTES
Hospital follow-up PCP transitional care appointment has been scheduled with Dr. Romayne Gails for Wednesday, 8/22/18 at 3:00 p.m. Pending patient discharge.   Ashwini Mulligan, Care Management Specialist.

## 2018-08-17 NOTE — ADVANCED PRACTICE NURSE
Mrs Remy Tanner is stable from surgery. Attempted to wean oxygen, at rest on room air her O2 sat was noted to be 85% Ambulated a short distance and O2 Sat was 82%. O2 Sat 92% on 3 liters NC. She will therefore need home oxygen when discharged.

## 2018-08-17 NOTE — PROGRESS NOTES
Thoracic Surgery Simple Progress Note    Admit Date: 2018  POD: 4 Days Post-Op      Procedure:  Procedure(s):  VIDEO ASSISTED THORACOSCOPY--RIGHT--WITH DECORTICATION      Subjective:     Patient has complaints: complains about timing of pain meds     Review of Systems:    CARDIAC: positive for cadiac rhythm issues  RESP: positive for pneumonia or empyema  NEURO:  positive for memory problems and tremor  INCISION: Clean, dry, and intact  EXT: Denies new swelling or pain in the legs or calves. Objective:     Blood pressure 107/56, pulse 98, temperature 98.3 °F (36.8 °C), resp. rate 18, height 5' 4\" (1.626 m), weight 194 lb 7.1 oz (88.2 kg), SpO2 94 %. Temp (24hrs), Av.1 °F (36.7 °C), Min:97.5 °F (36.4 °C), Max:98.5 °F (36.9 °C)        Hemodynamics    PAP    CO    CI       08/15 1901 -  0700  In: 9796 [P.O.:1040; I.V.:500]  Out: 1515 [Urine:1425]    EXAM:  GENERAL: VSS, afrible, alert and cooperative  HEART:  regular rate and rhythm  LUNG: rhonchi through out, productive cough, chest tube in place, no air leak, minimal drainage. CXR reviewed  NEURO:  normal without focal findings  mental status, speech normal, alert and oriented x iii  INCISION: Clean, dry, and intact  EXTREMITIES:No evidence of DVT seen on physical exam.  GI/: Abd soft, nonterder with + bowel sounds. + BM yesterday Voiding    Labs:  Recent Results (from the past 24 hour(s))   GLUCOSE, POC    Collection Time: 18 11:19 AM   Result Value Ref Range    Glucose (POC) 230 (H) 65 - 100 mg/dL    Performed by [address], POC    Collection Time: 18  4:15 PM   Result Value Ref Range    Glucose (POC) 122 (H) 65 - 100 mg/dL    Performed by [address], POC    Collection Time: 18  9:32 PM   Result Value Ref Range    Glucose (POC) 107 (H) 65 - 100 mg/dL    Performed by Jimy Campos        Assessment:   No evidence of DVT.   Principal Problem:    Empyema ([address] (2018)      PATH: Benign                                     Plan/Recommendations:   Chest tube removed.  Post pull CXR ordered  Assess need for Oxygen at home  Plan discharge for tomorrow  Ambulate in halls today    See orders    Signed By: Lisa HOGAN

## 2018-08-17 NOTE — PROGRESS NOTES
1220  Patient sitting in the chair eating lunch. Began complaining about abd pain  HR remains at 130. Jina Cain NP made aware.  Call placed to Dr Sheldon Amador

## 2018-08-17 NOTE — PROGRESS NOTES
12:00 pm. CM informed patient's O2 sat dropped to 85% while ambulation without O2. CM notified Zeny Ayoub NP of same. Awaiting O2 order. Patient will be discharged tomorrow. 1:00 pm. CM received O2 orders, called Katt Naidu at [phone], [facility], as patient has [facility]. Rahul Estes asked that clinical notes and O2 order be faxed to [phone]. Same faxed as requested. Marilyn Mendoza MSA, RN, CRM. 3:30 pm. O2 tank delivered by Katt Naidu with [facility]. Marilyn Mendoza MSA, RN, CRM.

## 2018-08-17 NOTE — PROGRESS NOTES
[facility] assess the patient. The respiratory therapy came also to give breath treatment to the patient.

## 2018-08-17 NOTE — PROGRESS NOTES
Patient at rest, sitting in chair. Room air SAT 85. Ambulating approximately feet . Room air 82%. O2 N/A at 3 liter applied O2 SAT went up to 92.

## 2018-08-18 ENCOUNTER — APPOINTMENT (OUTPATIENT)
Dept: GENERAL RADIOLOGY | Age: 63
DRG: 163 | End: 2018-08-18
Attending: NURSE PRACTITIONER
Payer: MEDICARE

## 2018-08-18 LAB
ARTERIAL PATENCY WRIST A: NO
BASE EXCESS BLD CALC-SCNC: 7 MMOL/L
BDY SITE: ABNORMAL
GAS FLOW.O2 O2 DELIVERY SYS: ABNORMAL L/MIN
GAS FLOW.O2 SETTING OXYMISER: 15 L/M
GLUCOSE BLD STRIP.AUTO-MCNC: 129 MG/DL (ref 65–100)
GLUCOSE BLD STRIP.AUTO-MCNC: 136 MG/DL (ref 65–100)
GLUCOSE BLD STRIP.AUTO-MCNC: 152 MG/DL (ref 65–100)
GLUCOSE BLD STRIP.AUTO-MCNC: 225 MG/DL (ref 65–100)
HCO3 BLD-SCNC: 32 MMOL/L (ref 22–26)
O2/TOTAL GAS SETTING VFR VENT: 100 %
PCO2 BLD: 54.5 MMHG (ref 35–45)
PH BLD: 7.38 [PH] (ref 7.35–7.45)
PO2 BLD: 68 MMHG (ref 80–100)
SAO2 % BLD: 92 % (ref 92–97)
SERVICE CMNT-IMP: ABNORMAL
SPECIMEN TYPE: ABNORMAL

## 2018-08-18 PROCEDURE — 74011250636 HC RX REV CODE- 250/636: Performed by: THORACIC SURGERY (CARDIOTHORACIC VASCULAR SURGERY)

## 2018-08-18 PROCEDURE — 71045 X-RAY EXAM CHEST 1 VIEW: CPT

## 2018-08-18 PROCEDURE — 74011000250 HC RX REV CODE- 250: Performed by: THORACIC SURGERY (CARDIOTHORACIC VASCULAR SURGERY)

## 2018-08-18 PROCEDURE — 36600 WITHDRAWAL OF ARTERIAL BLOOD: CPT

## 2018-08-18 PROCEDURE — 94640 AIRWAY INHALATION TREATMENT: CPT

## 2018-08-18 PROCEDURE — 74011000258 HC RX REV CODE- 258: Performed by: THORACIC SURGERY (CARDIOTHORACIC VASCULAR SURGERY)

## 2018-08-18 PROCEDURE — 74011250637 HC RX REV CODE- 250/637: Performed by: THORACIC SURGERY (CARDIOTHORACIC VASCULAR SURGERY)

## 2018-08-18 PROCEDURE — 74011000250 HC RX REV CODE- 250: Performed by: NURSE PRACTITIONER

## 2018-08-18 PROCEDURE — 77010033678 HC OXYGEN DAILY

## 2018-08-18 PROCEDURE — 74011250637 HC RX REV CODE- 250/637: Performed by: INTERNAL MEDICINE

## 2018-08-18 PROCEDURE — 65610000006 HC RM INTENSIVE CARE

## 2018-08-18 PROCEDURE — 93970 EXTREMITY STUDY: CPT

## 2018-08-18 PROCEDURE — 74011636637 HC RX REV CODE- 636/637: Performed by: NURSE PRACTITIONER

## 2018-08-18 PROCEDURE — 82803 BLOOD GASES ANY COMBINATION: CPT

## 2018-08-18 PROCEDURE — 74011636637 HC RX REV CODE- 636/637: Performed by: PHYSICIAN ASSISTANT

## 2018-08-18 PROCEDURE — 74011250637 HC RX REV CODE- 250/637: Performed by: NURSE PRACTITIONER

## 2018-08-18 PROCEDURE — 82962 GLUCOSE BLOOD TEST: CPT

## 2018-08-18 RX ORDER — GUAIFENESIN 600 MG/1
600 TABLET, EXTENDED RELEASE ORAL EVERY 12 HOURS
Status: DISCONTINUED | OUTPATIENT
Start: 2018-08-18 | End: 2018-08-18

## 2018-08-18 RX ORDER — GUAIFENESIN 600 MG/1
1200 TABLET, EXTENDED RELEASE ORAL EVERY 12 HOURS
Status: DISCONTINUED | OUTPATIENT
Start: 2018-08-18 | End: 2018-08-26 | Stop reason: HOSPADM

## 2018-08-18 RX ADMIN — ENOXAPARIN SODIUM 40 MG: 100 INJECTION SUBCUTANEOUS at 10:21

## 2018-08-18 RX ADMIN — FUROSEMIDE 40 MG: 40 TABLET ORAL at 08:18

## 2018-08-18 RX ADMIN — Medication 10 ML: at 14:38

## 2018-08-18 RX ADMIN — NYSTATIN 500000 UNITS: 100000 SUSPENSION ORAL at 21:40

## 2018-08-18 RX ADMIN — PRIMIDONE 50 MG: 50 TABLET ORAL at 08:18

## 2018-08-18 RX ADMIN — PIPERACILLIN AND TAZOBACTAM 3.38 G: 3; .375 INJECTION, POWDER, FOR SOLUTION INTRAVENOUS at 01:09

## 2018-08-18 RX ADMIN — DULOXETINE HYDROCHLORIDE 40 MG: 20 CAPSULE, DELAYED RELEASE ORAL at 08:18

## 2018-08-18 RX ADMIN — POLYETHYLENE GLYCOL 3350 17 G: 17 POWDER, FOR SOLUTION ORAL at 08:19

## 2018-08-18 RX ADMIN — GUAIFENESIN 1200 MG: 600 TABLET, EXTENDED RELEASE ORAL at 20:01

## 2018-08-18 RX ADMIN — PROMETHAZINE HYDROCHLORIDE 25 MG: 25 TABLET ORAL at 08:51

## 2018-08-18 RX ADMIN — ESOMEPRAZOLE MAGNESIUM 40 MG: 40 CAPSULE, DELAYED RELEASE ORAL at 07:59

## 2018-08-18 RX ADMIN — INSULIN LISPRO 3 UNITS: 100 INJECTION, SOLUTION INTRAVENOUS; SUBCUTANEOUS at 18:29

## 2018-08-18 RX ADMIN — GLIPIZIDE 2.5 MG: 5 TABLET ORAL at 18:12

## 2018-08-18 RX ADMIN — PRAZOSIN HYDROCHLORIDE 2 MG: 1 CAPSULE ORAL at 07:59

## 2018-08-18 RX ADMIN — GUAIFENESIN 600 MG: 600 TABLET, EXTENDED RELEASE ORAL at 10:20

## 2018-08-18 RX ADMIN — NYSTATIN 500000 UNITS: 100000 SUSPENSION ORAL at 08:18

## 2018-08-18 RX ADMIN — IPRATROPIUM BROMIDE AND ALBUTEROL SULFATE 3 ML: .5; 3 SOLUTION RESPIRATORY (INHALATION) at 19:30

## 2018-08-18 RX ADMIN — LEVOFLOXACIN 750 MG: 750 TABLET, FILM COATED ORAL at 21:40

## 2018-08-18 RX ADMIN — INSULIN LISPRO 4 UNITS: 100 INJECTION, SOLUTION INTRAVENOUS; SUBCUTANEOUS at 11:39

## 2018-08-18 RX ADMIN — PIPERACILLIN AND TAZOBACTAM 3.38 G: 3; .375 INJECTION, POWDER, FOR SOLUTION INTRAVENOUS at 08:18

## 2018-08-18 RX ADMIN — PIPERACILLIN AND TAZOBACTAM 3.38 G: 3; .375 INJECTION, POWDER, FOR SOLUTION INTRAVENOUS at 18:09

## 2018-08-18 RX ADMIN — IPRATROPIUM BROMIDE AND ALBUTEROL SULFATE 3 ML: .5; 3 SOLUTION RESPIRATORY (INHALATION) at 03:51

## 2018-08-18 RX ADMIN — PROMETHAZINE HYDROCHLORIDE 25 MG: 25 TABLET ORAL at 18:19

## 2018-08-18 RX ADMIN — SIMVASTATIN: 20 TABLET, FILM COATED ORAL at 18:00

## 2018-08-18 RX ADMIN — PHENOBARBITAL 64.8 MG: 64.8 TABLET ORAL at 19:28

## 2018-08-18 RX ADMIN — ARFORMOTEROL TARTRATE 15 MCG: 15 SOLUTION RESPIRATORY (INHALATION) at 09:25

## 2018-08-18 RX ADMIN — BUDESONIDE 500 MCG: 0.5 INHALANT RESPIRATORY (INHALATION) at 19:30

## 2018-08-18 RX ADMIN — ALPRAZOLAM 1 MG: 0.5 TABLET ORAL at 06:15

## 2018-08-18 RX ADMIN — LEVOTHYROXINE SODIUM 200 MCG: 100 TABLET ORAL at 07:13

## 2018-08-18 RX ADMIN — Medication 5 ML: at 06:00

## 2018-08-18 RX ADMIN — PREDNISONE 40 MG: 20 TABLET ORAL at 08:18

## 2018-08-18 RX ADMIN — PRAZOSIN HYDROCHLORIDE 2 MG: 1 CAPSULE ORAL at 19:29

## 2018-08-18 RX ADMIN — Medication 10 ML: at 21:40

## 2018-08-18 RX ADMIN — Medication 1 TABLET: at 19:31

## 2018-08-18 RX ADMIN — PRIMIDONE 150 MG: 50 TABLET ORAL at 19:28

## 2018-08-18 RX ADMIN — Medication 10 ML: at 07:15

## 2018-08-18 RX ADMIN — NYSTATIN 500000 UNITS: 100000 SUSPENSION ORAL at 18:07

## 2018-08-18 RX ADMIN — OXYCODONE HYDROCHLORIDE 15 MG: 5 TABLET ORAL at 20:00

## 2018-08-18 RX ADMIN — OXYCODONE HYDROCHLORIDE 15 MG: 5 TABLET ORAL at 15:38

## 2018-08-18 RX ADMIN — Medication 1 CAPSULE: at 08:17

## 2018-08-18 RX ADMIN — NYSTATIN 500000 UNITS: 100000 SUSPENSION ORAL at 14:38

## 2018-08-18 RX ADMIN — PHENOBARBITAL 64.8 MG: 64.8 TABLET ORAL at 07:13

## 2018-08-18 RX ADMIN — MONTELUKAST SODIUM 10 MG: 10 TABLET, FILM COATED ORAL at 21:40

## 2018-08-18 RX ADMIN — STANDARDIZED SENNA CONCENTRATE AND DOCUSATE SODIUM 1 TABLET: 8.6; 5 TABLET, FILM COATED ORAL at 08:18

## 2018-08-18 RX ADMIN — METOPROLOL TARTRATE 25 MG: 25 TABLET ORAL at 21:40

## 2018-08-18 RX ADMIN — GLIPIZIDE 2.5 MG: 5 TABLET ORAL at 08:18

## 2018-08-18 RX ADMIN — OXYCODONE HYDROCHLORIDE 15 MG: 5 TABLET ORAL at 06:59

## 2018-08-18 RX ADMIN — BUDESONIDE 500 MCG: 0.5 INHALANT RESPIRATORY (INHALATION) at 09:25

## 2018-08-18 NOTE — PROGRESS NOTES
08/18/18 0508   Vitals   Temp 97.9 °F (36.6 °C)   Temp Source Oral   Pulse (Heart Rate) 99   Heart Rate Source Monitor   Resp Rate 20   O2 Sat (%) (!) 87 %     Paged hospitalist, waiting on call back.    Spoke with RT, PRN Treatment given earlier

## 2018-08-18 NOTE — PROGRESS NOTES
[facility]  [address]  ____________________________________________________________      Admit Date: 2018    POD/HD 5 Days Post-Op    Procedure:  Procedure(s):  VIDEO ASSISTED THORACOSCOPY--RIGHT--WITH DECORTICATION    Subjective:     Patient has no new complaints and has complaints of shortness of breathe. Objective:     Blood pressure (!) 89/42, pulse 98, temperature 98.1 °F (36.7 °C), resp. rate 17, height 5' 4\" (1.626 m), weight 194 lb 7.1 oz (88.2 kg), SpO2 (!) 87 %.     Temp (24hrs), Av °F (36.7 °C), Min:97.6 °F (36.4 °C), Max:98.5 °F (36.9 °C)        Date 18 07 - 1859   Shift 2712-6132 9920-7786 6823-5485 24 Hour Total   I  N  T  A  K  E   I.V.  (mL/kg/hr) 300   300    Shift Total  (mL/kg) 300  (3.4)   300  (3.4)   O  U  T  P  U  T   Shift Total  (mL/kg)       Weight (kg) 88.2 88.2 88.2 88.2         Date 18 07 - 1859   Shift 1111-8527 5961-6851 5530-8028 24 Hour Total   I  N  T  A  K  E   I.V.  (mL/kg/hr) 300   300    Shift Total  (mL/kg) 300  (3.4)   300  (3.4)   O  U  T  P  U  T   Shift Total  (mL/kg)       Weight (kg) 88.2 88.2 88.2 88.2         Physical Exam:  GENERAL: alert, cooperative, no distress, LUNG: clear to auscultation bilaterally, HEART: regular rate and rhythm, S1, S2 normal, no murmur, click, rub or gallop    Incision:clean, dry and intact        Labs:   Recent Results (from the past 24 hour(s))   GLUCOSE, POC    Collection Time: 18  4:18 PM   Result Value Ref Range    Glucose (POC) 142 (H) 65 - 100 mg/dL    Performed by Melanie Habermann, POC    Collection Time: 18 10:58 PM   Result Value Ref Range    Glucose (POC) 79 65 - 100 mg/dL    Performed by [address], POC    Collection Time: 18 11:24 PM   Result Value Ref Range    Glucose (POC) 109 (H) 65 - 100 mg/dL    Performed by Terrel Babinski    POC G3 - PUL    Collection Time: 18  5:42 AM   Result Value Ref Range FIO2 (POC) 100 %    pH (POC) 7.378 7.35 - 7.45      pCO2 (POC) 54.5 (H) 35.0 - 45.0 MMHG    pO2 (POC) 68 (L) 80 - 100 MMHG    HCO3 (POC) 32.0 (H) 22 - 26 MMOL/L    sO2 (POC) 92 92 - 97 %    Base excess (POC) 7 mmol/L    Site RIGHT RADIAL      Device: Non rebreather      Flow rate (POC) 15 L/M    Allens test (POC) NO      Specimen type (POC) ARTERIAL     GLUCOSE, POC    Collection Time: 08/18/18  7:07 AM   Result Value Ref Range    Glucose (POC) 129 (H) 65 - 100 mg/dL    Performed by Lynne Ricketts    GLUCOSE, POC    Collection Time: 08/18/18 11:36 AM   Result Value Ref Range    Glucose (POC) 225 (H) 65 - 100 mg/dL    Performed by Hilarie Cowden        Data Review images and reports reviewed postop changes    Assessment:     Principal Problem:    Empyema ([address] (8/12/2018)    Active Problems:    Right-sided chest pain ()      Chronic pain syndrome ()        Plan/Recommendations/Medical Decision Making:     Cont with care per ICU team  From thoracic surgical standpoint no further interventions  Defer to [facility] and ICU team     Thank you for allowing us to participate in the care of your patient.     Nikhil Dockery MD

## 2018-08-18 NOTE — PROGRESS NOTES
[facility]  Pulmonary, Critical Care, and Sleep Medicine  Name: Arnav Rios MRN: [id number]   : 1955 Hospital: [facility]    Date: 2018      Subjective:    Alert less SOB. Denies abd pain. C/o cough with poor productions- right CP with insp. Moved to ICU overnight for desat/bipap need. Off bipap now. Pertinent items are noted in HPI. Objective:      Exam  Vital Signs:  Visit Vitals    /51    Pulse (!) 113    Temp 98.1 °F (36.7 °C)    Resp 22    Ht 5' 4\" (1.626 m)    Wt 88.2 kg (194 lb 7.1 oz)    SpO2 (!) 85%    BMI 33.38 kg/m2     Temp (24hrs), Av °F (36.7 °C), Min:97.6 °F (36.4 °C), Max:98.5 °F (36.9 °C)          Intake/Output Summary (Last 24 hours) at 18 0956  Last data filed at 18 0818   Gross per 24 hour   Intake              740 ml   Output             1350 ml   Net             -610 ml     GENERAL: well developed and in mild distress, HEENT:  PERRL, EOMI, no alar flaring or epistaxis, oral mucosa moist without cyanosis, NECK:  no jugular vein distention, no retractions, no thyromegaly or masses, HEART:  Regular rate and rhythm with no MGR; no edema is present, ABDOMEN:  soft with no tenderness, bowel sounds present, EXTREMITIES:  warm with no cyanosis and SKIN:  no jaundice or ecchymosis   Coarse BS both bases no wheeze     Labs:  Recent Labs      18   0657   WBC  14.6*   HGB  9.1*   HCT  29.1*   PLT  386     Recent Labs      18   0657   NA  138   K  3.8   CL  102   CO2  30   GLU  121*   BUN  12   CREA  1.12*   CA  8.5       Recent Labs      18   0542   PHI  7.378   PO2I  68*   PCO2I  54.5*         Impression     Plan  1. S/p right VATS decortication for streptococcal PNA/empyema- was at [facility] transferred here for surgery  2. Acute resp failure-  COPD + Edema/basilar ASD and retained secretions with some basilar ATX. Needs aggressive pulm toilet! 3. RLL PNA  4. DM2  5. Anemia  6.  HTN · Nebs  · Steroids  · Aggressive pulm toilet  · Hold off on bronch for now as FIO2 req better. · NAC  · SUP  · mobilty  · Can go to IMCU  · Continue current abx  · BP mgmt per hospitalist    Pt ia acutely ill and at risk for decline due to sepsis/MODS.     ·       Enrique Nava MD  8/18/2018

## 2018-08-18 NOTE — PROCEDURES
[employer]  *** FINAL REPORT ***    Name: Karie Srivastava  MRN: [id number]    Inpatient  : 23 Dec 1955  HIS Order #: [id number]  [id number] [facility] Visit #: [id number]  Date: 18 Aug 2018    TYPE OF TEST: Peripheral Venous Testing    REASON FOR TEST  Limb swelling    Right Leg:-  Deep venous thrombosis:           No  Superficial venous thrombosis:    No  Deep venous insufficiency:        Not examined  Superficial venous insufficiency: Not examined    Left Leg:-  Deep venous thrombosis:           No  Superficial venous thrombosis:    No  Deep venous insufficiency:        Not examined  Superficial venous insufficiency: Not examined      INTERPRETATION/FINDINGS  PROCEDURE:  Color duplex ultrasound imaging of lower extremity veins. FINDINGS:       Right: The common femoral, deep femoral, femoral, popliteal,  posterior tibial, peroneal, and great saphenous are patent and without   evidence of thrombus;  each is fully compressible and there is no  narrowing of the flow channel on color Doppler imaging. Phasic flow  is observed in the common femoral vein. Left:   The common femoral, deep femoral, femoral, popliteal,  posterior tibial, peroneal, and great saphenous are patent and without   evidence of thrombus;  each is fully compressible and there is no  narrowing of the flow channel on color Doppler imaging. Phasic flow  is observed in the common femoral vein. IMPRESSION:  No evidence of right or left lower extremity vein  thrombosis. ADDITIONAL COMMENTS    I have personally reviewed the data relevant to the interpretation of  this  study.     TECHNOLOGIST: Mitzi Monson RVT  Signed: 2018 09:30 AM    PHYSICIAN: Jodi Breen MD  Signed: 2018 06:51 AM

## 2018-08-18 NOTE — PROGRESS NOTES
08/18/18 0528   Vitals   Temp 97.9 °F (36.6 °C)   Temp Source Oral   Pulse (Heart Rate) 93   Heart Rate Source Monitor   Resp Rate 20   O2 Sat (%) 95 %   Level of Consciousness Alert   BP 93/63   MAP (Calculated) 73   BP 1 Location Right arm   BP 1 Method Automatic   BP Patient Position At rest   Cardiac Rhythm NSR   MEWS Score 2   Oxygen Therapy   O2 Device Non-rebreather mask   pt to be transferred to ICU

## 2018-08-18 NOTE — PROGRESS NOTES
Problem: Falls - Risk of  Goal: *Absence of Falls  Document Palma Fall Risk and appropriate interventions in the flowsheet. Outcome: Progressing Towards Goal  Fall Risk Interventions:  Mobility Interventions: Bed/chair exit alarm, Patient to call before getting OOB    Mentation Interventions: Bed/chair exit alarm, Door open when patient unattended    Medication Interventions: Bed/chair exit alarm    Elimination Interventions: Call light in reach, Bed/chair exit alarm    History of Falls Interventions: Bed/chair exit alarm        Problem: Pressure Injury - Risk of  Goal: *Prevention of pressure injury  Document Joseph Scale and appropriate interventions in the flowsheet.    Outcome: Progressing Towards Goal  Pressure Injury Interventions:  Sensory Interventions: Assess changes in LOC, Assess need for specialty bed, Discuss PT/OT consult with provider, Float heels, Keep linens dry and wrinkle-free, Minimize linen layers    Moisture Interventions: Absorbent underpads    Activity Interventions: Increase time out of bed    Mobility Interventions: HOB 30 degrees or less    Nutrition Interventions: Document food/fluid/supplement intake    Friction and Shear Interventions: HOB 30 degrees or less

## 2018-08-18 NOTE — PROGRESS NOTES
I returned page from nurse who reports that patient was hypoxic with O2sat decreased to 80s (initially on 3 liters O2 NC). Patient reported that been asymptomatic at ~ 04:00 hours but patient requested broth. Afterwards, patient has been more SOB. Patient denies choking. Pt seen and evaluated. VS:  T = 97.9 F BP = 93/63 HR = 90  RR= 30   O2sat= 96% on 100% NRB  PE:  GEN- patient in acute respiratory distress  PSYCH-A&Ox3  NEURO- GCS 15. Moves all extremities x 4. No slurred speech. No facial droop. Sensation grossly intact. HEENT-NCAT/pupils 2 mm reactive bilateral. Oropharynx clear. NECK-supple, trachea midline  LUNGS-rhonchi B  HEART-RRR  ABD-soft, NT,ND,  +BS. No R/G/R  VASCULAR-2+ radial/1+DP pulses bilateral.  2+ pitting edema BLE  SKIN-warm/dry  MS-no calf tenderness    A/P:  1)  Acute respiratory failure with hypoxia. Transfer to ICU. May require BIPAP but awaiting results of ABG which is pending. Repeat chest xray portable as patient's respiratory status has deteriorated compared to earlier this morning. Recommend pulmonologist/ intensivist consultation this a.m. 2)  Hypotension. May need vasopressor support but repeat BP. Cautious with IV fluids with noted edema on exam.    3)  BLE edema. Place on strict Is/Os/ daily weights. Order venous duplex BLE to r/o DVT.

## 2018-08-19 ENCOUNTER — APPOINTMENT (OUTPATIENT)
Dept: GENERAL RADIOLOGY | Age: 63
DRG: 163 | End: 2018-08-19
Attending: INTERNAL MEDICINE
Payer: MEDICARE

## 2018-08-19 LAB
ANION GAP SERPL CALC-SCNC: 9 MMOL/L (ref 5–15)
BASOPHILS # BLD: 0.1 K/UL (ref 0–0.1)
BASOPHILS NFR BLD: 1 % (ref 0–1)
BUN SERPL-MCNC: 11 MG/DL (ref 6–20)
BUN/CREAT SERPL: 13 (ref 12–20)
CALCIUM SERPL-MCNC: 8.8 MG/DL (ref 8.5–10.1)
CHLORIDE SERPL-SCNC: 101 MMOL/L (ref 97–108)
CO2 SERPL-SCNC: 28 MMOL/L (ref 21–32)
CREAT SERPL-MCNC: 0.85 MG/DL (ref 0.55–1.02)
DIFFERENTIAL METHOD BLD: ABNORMAL
EOSINOPHIL # BLD: 0 K/UL (ref 0–0.4)
EOSINOPHIL NFR BLD: 0 % (ref 0–7)
ERYTHROCYTE [DISTWIDTH] IN BLOOD BY AUTOMATED COUNT: 14.8 % (ref 11.5–14.5)
GLUCOSE BLD STRIP.AUTO-MCNC: 136 MG/DL (ref 65–100)
GLUCOSE BLD STRIP.AUTO-MCNC: 144 MG/DL (ref 65–100)
GLUCOSE BLD STRIP.AUTO-MCNC: 218 MG/DL (ref 65–100)
GLUCOSE BLD STRIP.AUTO-MCNC: 89 MG/DL (ref 65–100)
GLUCOSE SERPL-MCNC: 103 MG/DL (ref 65–100)
HCT VFR BLD AUTO: 27.6 % (ref 35–47)
HGB BLD-MCNC: 8.5 G/DL (ref 11.5–16)
IMM GRANULOCYTES # BLD: 0.1 K/UL (ref 0–0.04)
IMM GRANULOCYTES NFR BLD AUTO: 1 % (ref 0–0.5)
LYMPHOCYTES # BLD: 2.9 K/UL (ref 0.8–3.5)
LYMPHOCYTES NFR BLD: 23 % (ref 12–49)
MCH RBC QN AUTO: 30.9 PG (ref 26–34)
MCHC RBC AUTO-ENTMCNC: 30.8 G/DL (ref 30–36.5)
MCV RBC AUTO: 100.4 FL (ref 80–99)
MONOCYTES # BLD: 1.2 K/UL (ref 0–1)
MONOCYTES NFR BLD: 9 % (ref 5–13)
NEUTS SEG # BLD: 8.3 K/UL (ref 1.8–8)
NEUTS SEG NFR BLD: 66 % (ref 32–75)
NRBC # BLD: 0 K/UL (ref 0–0.01)
NRBC BLD-RTO: 0 PER 100 WBC
PLATELET # BLD AUTO: 380 K/UL (ref 150–400)
PMV BLD AUTO: 10.2 FL (ref 8.9–12.9)
POTASSIUM SERPL-SCNC: 3.6 MMOL/L (ref 3.5–5.1)
RBC # BLD AUTO: 2.75 M/UL (ref 3.8–5.2)
SERVICE CMNT-IMP: ABNORMAL
SERVICE CMNT-IMP: NORMAL
SODIUM SERPL-SCNC: 138 MMOL/L (ref 136–145)
WBC # BLD AUTO: 12.6 K/UL (ref 3.6–11)

## 2018-08-19 PROCEDURE — 74011636637 HC RX REV CODE- 636/637: Performed by: PHYSICIAN ASSISTANT

## 2018-08-19 PROCEDURE — 94669 MECHANICAL CHEST WALL OSCILL: CPT

## 2018-08-19 PROCEDURE — 85025 COMPLETE CBC W/AUTO DIFF WBC: CPT | Performed by: INTERNAL MEDICINE

## 2018-08-19 PROCEDURE — 65610000006 HC RM INTENSIVE CARE

## 2018-08-19 PROCEDURE — 94640 AIRWAY INHALATION TREATMENT: CPT

## 2018-08-19 PROCEDURE — 77030021676

## 2018-08-19 PROCEDURE — 74011636637 HC RX REV CODE- 636/637: Performed by: NURSE PRACTITIONER

## 2018-08-19 PROCEDURE — 74011000258 HC RX REV CODE- 258: Performed by: THORACIC SURGERY (CARDIOTHORACIC VASCULAR SURGERY)

## 2018-08-19 PROCEDURE — 74011250637 HC RX REV CODE- 250/637: Performed by: NURSE PRACTITIONER

## 2018-08-19 PROCEDURE — 74011250637 HC RX REV CODE- 250/637: Performed by: THORACIC SURGERY (CARDIOTHORACIC VASCULAR SURGERY)

## 2018-08-19 PROCEDURE — 74011250637 HC RX REV CODE- 250/637: Performed by: INTERNAL MEDICINE

## 2018-08-19 PROCEDURE — 74011250636 HC RX REV CODE- 250/636: Performed by: HOSPITALIST

## 2018-08-19 PROCEDURE — 36415 COLL VENOUS BLD VENIPUNCTURE: CPT | Performed by: INTERNAL MEDICINE

## 2018-08-19 PROCEDURE — 74011000250 HC RX REV CODE- 250: Performed by: NURSE PRACTITIONER

## 2018-08-19 PROCEDURE — 74011250637 HC RX REV CODE- 250/637: Performed by: HOSPITALIST

## 2018-08-19 PROCEDURE — 74011250636 HC RX REV CODE- 250/636: Performed by: THORACIC SURGERY (CARDIOTHORACIC VASCULAR SURGERY)

## 2018-08-19 PROCEDURE — 80048 BASIC METABOLIC PNL TOTAL CA: CPT | Performed by: INTERNAL MEDICINE

## 2018-08-19 PROCEDURE — 74011000250 HC RX REV CODE- 250: Performed by: THORACIC SURGERY (CARDIOTHORACIC VASCULAR SURGERY)

## 2018-08-19 PROCEDURE — 71045 X-RAY EXAM CHEST 1 VIEW: CPT

## 2018-08-19 PROCEDURE — 82962 GLUCOSE BLOOD TEST: CPT

## 2018-08-19 RX ORDER — ALPRAZOLAM 0.5 MG/1
1 TABLET ORAL
Status: DISCONTINUED | OUTPATIENT
Start: 2018-08-19 | End: 2018-08-26 | Stop reason: HOSPADM

## 2018-08-19 RX ORDER — MORPHINE SULFATE 2 MG/ML
2 INJECTION, SOLUTION INTRAMUSCULAR; INTRAVENOUS
Status: DISCONTINUED | OUTPATIENT
Start: 2018-08-19 | End: 2018-08-26 | Stop reason: HOSPADM

## 2018-08-19 RX ADMIN — LEVOFLOXACIN 750 MG: 750 TABLET, FILM COATED ORAL at 20:13

## 2018-08-19 RX ADMIN — GUAIFENESIN 1200 MG: 600 TABLET, EXTENDED RELEASE ORAL at 09:10

## 2018-08-19 RX ADMIN — PROMETHAZINE HYDROCHLORIDE 25 MG: 25 TABLET ORAL at 10:05

## 2018-08-19 RX ADMIN — PIPERACILLIN AND TAZOBACTAM 3.38 G: 3; .375 INJECTION, POWDER, FOR SOLUTION INTRAVENOUS at 01:30

## 2018-08-19 RX ADMIN — PRIMIDONE 50 MG: 50 TABLET ORAL at 06:56

## 2018-08-19 RX ADMIN — NYSTATIN 500000 UNITS: 100000 SUSPENSION ORAL at 22:00

## 2018-08-19 RX ADMIN — GUAIFENESIN 1200 MG: 600 TABLET, EXTENDED RELEASE ORAL at 20:13

## 2018-08-19 RX ADMIN — INSULIN LISPRO 4 UNITS: 100 INJECTION, SOLUTION INTRAVENOUS; SUBCUTANEOUS at 12:27

## 2018-08-19 RX ADMIN — FUROSEMIDE 40 MG: 40 TABLET ORAL at 09:11

## 2018-08-19 RX ADMIN — Medication 10 ML: at 15:30

## 2018-08-19 RX ADMIN — PHENOBARBITAL 64.8 MG: 64.8 TABLET ORAL at 06:55

## 2018-08-19 RX ADMIN — DULOXETINE HYDROCHLORIDE 40 MG: 20 CAPSULE, DELAYED RELEASE ORAL at 09:10

## 2018-08-19 RX ADMIN — GLIPIZIDE 2.5 MG: 5 TABLET ORAL at 06:55

## 2018-08-19 RX ADMIN — OXYCODONE HYDROCHLORIDE 15 MG: 5 TABLET ORAL at 10:53

## 2018-08-19 RX ADMIN — ENOXAPARIN SODIUM 40 MG: 100 INJECTION SUBCUTANEOUS at 09:13

## 2018-08-19 RX ADMIN — STANDARDIZED SENNA CONCENTRATE AND DOCUSATE SODIUM 1 TABLET: 8.6; 5 TABLET, FILM COATED ORAL at 09:11

## 2018-08-19 RX ADMIN — ARFORMOTEROL TARTRATE 15 MCG: 15 SOLUTION RESPIRATORY (INHALATION) at 20:32

## 2018-08-19 RX ADMIN — IPRATROPIUM BROMIDE AND ALBUTEROL SULFATE 3 ML: .5; 3 SOLUTION RESPIRATORY (INHALATION) at 05:30

## 2018-08-19 RX ADMIN — GLIPIZIDE 2.5 MG: 5 TABLET ORAL at 16:29

## 2018-08-19 RX ADMIN — CHLORASEPTIC 1 SPRAY: 1.5 LIQUID ORAL at 21:00

## 2018-08-19 RX ADMIN — ONDANSETRON 4 MG: 2 INJECTION INTRAMUSCULAR; INTRAVENOUS at 16:32

## 2018-08-19 RX ADMIN — NYSTATIN 500000 UNITS: 100000 SUSPENSION ORAL at 09:11

## 2018-08-19 RX ADMIN — Medication 10 ML: at 05:47

## 2018-08-19 RX ADMIN — MORPHINE SULFATE 2 MG: 2 INJECTION, SOLUTION INTRAMUSCULAR; INTRAVENOUS at 21:54

## 2018-08-19 RX ADMIN — PRAZOSIN HYDROCHLORIDE 2 MG: 1 CAPSULE ORAL at 18:33

## 2018-08-19 RX ADMIN — METOPROLOL TARTRATE 25 MG: 25 TABLET ORAL at 20:13

## 2018-08-19 RX ADMIN — PIPERACILLIN AND TAZOBACTAM 3.38 G: 3; .375 INJECTION, POWDER, FOR SOLUTION INTRAVENOUS at 09:08

## 2018-08-19 RX ADMIN — PRIMIDONE 150 MG: 50 TABLET ORAL at 18:29

## 2018-08-19 RX ADMIN — BUDESONIDE 500 MCG: 0.5 INHALANT RESPIRATORY (INHALATION) at 20:32

## 2018-08-19 RX ADMIN — Medication 1 TABLET: at 09:00

## 2018-08-19 RX ADMIN — ESOMEPRAZOLE MAGNESIUM 40 MG: 40 CAPSULE, DELAYED RELEASE ORAL at 06:55

## 2018-08-19 RX ADMIN — BUDESONIDE 500 MCG: 0.5 INHALANT RESPIRATORY (INHALATION) at 08:05

## 2018-08-19 RX ADMIN — OXYCODONE HYDROCHLORIDE 15 MG: 5 TABLET ORAL at 05:45

## 2018-08-19 RX ADMIN — PHENOBARBITAL 64.8 MG: 64.8 TABLET ORAL at 18:31

## 2018-08-19 RX ADMIN — INSULIN LISPRO 3 UNITS: 100 INJECTION, SOLUTION INTRAVENOUS; SUBCUTANEOUS at 16:38

## 2018-08-19 RX ADMIN — NYSTATIN 500000 UNITS: 100000 SUSPENSION ORAL at 18:32

## 2018-08-19 RX ADMIN — PIPERACILLIN AND TAZOBACTAM 3.38 G: 3; .375 INJECTION, POWDER, FOR SOLUTION INTRAVENOUS at 16:28

## 2018-08-19 RX ADMIN — NYSTATIN 500000 UNITS: 100000 SUSPENSION ORAL at 12:17

## 2018-08-19 RX ADMIN — ARFORMOTEROL TARTRATE 15 MCG: 15 SOLUTION RESPIRATORY (INHALATION) at 08:05

## 2018-08-19 RX ADMIN — Medication 1 CAPSULE: at 09:10

## 2018-08-19 RX ADMIN — LEVOTHYROXINE SODIUM 200 MCG: 100 TABLET ORAL at 06:56

## 2018-08-19 RX ADMIN — PREDNISONE 40 MG: 20 TABLET ORAL at 09:11

## 2018-08-19 RX ADMIN — ALPRAZOLAM 1 MG: 0.5 TABLET ORAL at 23:05

## 2018-08-19 RX ADMIN — SIMVASTATIN: 20 TABLET, FILM COATED ORAL at 18:31

## 2018-08-19 RX ADMIN — OXYCODONE HYDROCHLORIDE 15 MG: 5 TABLET ORAL at 16:29

## 2018-08-19 RX ADMIN — PRAZOSIN HYDROCHLORIDE 2 MG: 1 CAPSULE ORAL at 06:54

## 2018-08-19 RX ADMIN — MONTELUKAST SODIUM 10 MG: 10 TABLET, FILM COATED ORAL at 21:54

## 2018-08-19 RX ADMIN — ALPRAZOLAM 1 MG: 0.5 TABLET ORAL at 12:27

## 2018-08-19 NOTE — CONSULTS
Hospitalist RE [facility] Cielo Penn MD  Answering service: [phone] from in house phone  Cell: [phone]      Date of Service:  2018  NAME:  Renzo Sotelo  :  1955  MRN:  [id number]    Reconsult for management of anxiety. Admission history    Renzo Sotelo is a 58 y.o. female who presented with severe left sided chest pain on  to [address] [facility]. Pt reported she was treated with doxycycline/prednisone in  for bronchitis. She was unable to complete doxycycline secondary to diarrhea which she still had on admission. Pt was admitted with acute hypoxic respiratory failure, severe sepsis 2/2 bilateral pna, Right Para pneumonic pleural effusion. Pt was evaluated by pulmonary and she underwent a thoracentesis with chest tube placement . She was transferred to [address] [place] [address] on  for VAT's on  with Dr. Anali Ortez. Hospitalist team was consulted for medical management at that time. Pt s/p VATs with decortication on 18. Pt known to have depression, anxiety, PTSD, essential tremor. At the early morning of  developed Acute respiratory failure with hypoxia. Transferre to ICU with bipap. Currently pt on high flow, but still high risk of decompensation. She is extremely anxious, on xanax 1mg q8 prn, but not help. Interval history / Subjective:   Left side chest pain /10 still,   still very anxious      Assessment & Plan:     Acute hypoxic Respiratory Failure - Improved  - multifactorial: PNA, empyema, copd exacerbation  - retained secretions with some basilar ATX. Needs aggressive pulm toilet!  -continue ICU, further management per pulmonologist     RLL PNA  - with dense consolidation and possible areas of necrosis.   Consolidation of LLL as well  -abx per pulmonologist        Streptococcal Right Sided Empyema  - per primary team  - s/p VATs with decortication on 18  - will need better pain control           Suspected underlying COPD - Acute exacerbation  - former smoker  -nebs and prednisone taper      NIDDM 2  - pt takes glipizide SR 5mg bid at home, she reports she only takes this when she is on steroids  - poc, ssi, diabetic diet  - A1c 7.4 (166), discussed with patient  - 8/16 restart glipizide at 2.5mg bid  -follow      Anemia  - noted 2.7 gram drop over three days, will monitor and send for iron panel/ferritin in am  - 8/15 not iron deficient, d/c ivf     Asymptomatic hypotension on am of 8/14   - s/p albumin, bp improved to low 100's from high 80's  - pt was receiving metoprolol bid, she only takes this at bedtime, this has been adjusted     HTN  - c/w metoprolol, pt takes at bedtime, not bid, adjusted, has hx of orthostatic hypotension  - c/w minipress     Diarrhea on admission which has resolved     Constipation Opoid induced   - chronic opiates and now on pca pump  - bowel regimen ordered     Fibromyalgia  - on chronic opiates for back pain  - pain control, lidocaine patch     Obese   - bmi 34, counseled on weight loss     Hypothyroidism - c/w levothyroxine     Familial HLD - c/w vytorin     Depression/anxiety/PTSD - c/w cymbalta,  Will increase xanax to q6h prn, should do better pain control,  Consider small dose IV morphine on top of her po oxy.    Will consult psych           Essential tremor - c/w primidone, phenobarbital         Code status: Full  DVT prophylaxis: Lovneonx  Disposition: per primary team.   Pt lives her , no home oxygen     Hospital Problems  Date Reviewed: 8/17/2018          Codes Class Noted POA    Right-sided chest pain ICD-10-CM: R07.9  ICD-9-CM: 786.50  Unknown Unknown        Chronic pain syndrome ICD-10-CM: G89.4  ICD-9-CM: 338.4  Unknown Unknown        * (Principal)Empyema ([facility] [address] ICD-10-CM: J86.9  ICD-9-CM: 510.9  8/12/2018 Yes                Review of Systems:     + right lower rib pain controlled with pca pump  Denies abd pain   Denies n/v/d  Constipation resolved   no headache, no vision changes, no nose discharge, no hearing changes     CVS: +cp, + palpitation. Muscular: no joint swelling, no muscle pain, no leg swelling  Skin: no rash, no itching   GI: no vomiting, no diarrhea  : no dysuria, no hematuria  Hemo: no gum bleeding, no petechial   Neuro: no sensation changes, no focal weakness , resting tremor   Endo: no polydipsia   Psych: denied depression , but very anxious now       Vital Signs:    Last 24hrs VS reviewed since prior progress note. Most recent are:  Visit Vitals    /53    Pulse (!) 117    Temp 97.8 °F (36.6 °C)    Resp 18    Ht 5' 4\" (1.626 m)    Wt 86.4 kg (190 lb 7.6 oz)    SpO2 (!) 89%    BMI 32.7 kg/m2         Intake/Output Summary (Last 24 hours) at 08/19/18 1627  Last data filed at 08/19/18 0400   Gross per 24 hour   Intake             1200 ml   Output              500 ml   Net              700 ml        Physical Examination:             Constitutional:  very anxious, on high flow     ENT:  Oral mucous dry  oropharynx benign. Neck supple   Resp:  Dimnished bilaterally, R> L,diffuse rhonchi, mild exp wheezing   No accessory muscle use   CV:  Regular rhythm, tachy,, no murmurs, gallops, rubs    GI:  Soft, obese, non distended, non tender. normoactive bowel sounds, no hepatosplenomegaly     Musculoskeletal:  NP trace ble edema, warm, 2+ pulses throughout, resting tremor     Neurologic:  Moves all extremities.   AAOx3, CN II-XII reviewed     Psych:  Fair insight, + anxiety         Data Review:    Review and/or order of clinical lab test  Review and/or order of tests in the radiology section of CPT  Review and/or order of tests in the medicine section of CPT      Labs:     Recent Labs      08/19/18   0447   WBC  12.6*   HGB  8.5*   HCT  27.6*   PLT  380     Recent Labs      08/19/18   0447   NA  138   K  3.6   CL  101   CO2  28   BUN  11   CREA  0.85   GLU  103*   CA  8.8     No results for input(s): SGOT, GPT, ALT, AP, TBIL, TBILI, TP, ALB, GLOB, GGT, AML, LPSE in the last 72 hours. No lab exists for component: AMYP, HLPSE  No results for input(s): INR, PTP, APTT in the last 72 hours. No lab exists for component: INREXT, INREXT   No results for input(s): FE, TIBC, PSAT, FERR in the last 72 hours. Lab Results   Component Value Date/Time    Folate 13.7 11/25/2014 03:16 PM      No results for input(s): PH, PCO2, PO2 in the last 72 hours. No results for input(s): CPK, CKNDX, TROIQ in the last 72 hours.     No lab exists for component: CPKMB  Lab Results   Component Value Date/Time    Cholesterol, total 254 (H) 01/19/2014 02:45 AM    HDL Cholesterol 97 01/19/2014 02:45 AM    LDL, calculated 121 (H) 01/19/2014 02:45 AM    Triglyceride 180 (H) 01/19/2014 02:45 AM    CHOL/HDL Ratio 2.6 01/19/2014 02:45 AM     Lab Results   Component Value Date/Time    Glucose (POC) 218 (H) 08/19/2018 12:16 PM    Glucose (POC) 136 (H) 08/19/2018 06:52 AM    Glucose (POC) 136 (H) 08/18/2018 09:44 PM    Glucose (POC) 152 (H) 08/18/2018 06:06 PM    Glucose (POC) 225 (H) 08/18/2018 11:36 AM     Lab Results   Component Value Date/Time    Color YELLOW/STRAW 07/31/2018 09:10 PM    Appearance CLEAR 07/31/2018 09:10 PM    Specific gravity 1.012 07/31/2018 09:10 PM    pH (UA) 7.0 07/31/2018 09:10 PM    Protein NEGATIVE  07/31/2018 09:10 PM    Glucose NEGATIVE  07/31/2018 09:10 PM    Ketone NEGATIVE  07/31/2018 09:10 PM    Bilirubin NEGATIVE  07/31/2018 09:10 PM    Urobilinogen 0.2 07/31/2018 09:10 PM    Nitrites NEGATIVE  07/31/2018 09:10 PM    Leukocyte Esterase NEGATIVE  07/31/2018 09:10 PM    Epithelial cells FEW 07/31/2018 09:10 PM    Bacteria NEGATIVE  07/31/2018 09:10 PM    WBC 0-4 07/31/2018 09:10 PM    RBC 0-5 07/31/2018 09:10 PM         Medications Reviewed:     Current Facility-Administered Medications   Medication Dose Route Frequency    morphine injection 2 mg  2 mg IntraVENous Q3H PRN    ALPRAZolam (XANAX) tablet 1 mg  1 mg Oral Q6H PRN    guaiFENesin ER (MUCINEX) tablet 1,200 mg 1,200 mg Oral Q12H    albuterol-ipratropium (DUO-NEB) 2.5 MG-0.5 MG/3 ML  3 mL Nebulization Q4H PRN    glipiZIDE SR (GLUCOTROL XL) tablet 5 mg  5 mg Oral BID PRN    glipiZIDE (GLUCOTROL) tablet 2.5 mg  2.5 mg Oral ACB&D    sodium chloride (NS) flush 5-10 mL  5-10 mL IntraVENous Q8H    sodium chloride (NS) flush 5-10 mL  5-10 mL IntraVENous PRN    bisacodyl (DULCOLAX) suppository 10 mg  10 mg Rectal DAILY PRN    polyethylene glycol (MIRALAX) packet 17 g  17 g Oral DAILY    predniSONE (DELTASONE) tablet 40 mg  40 mg Oral DAILY WITH BREAKFAST    vitamin b comp & c no.4 tab 1 Tab  1 Tab Oral DAILY    insulin lispro (HUMALOG) injection   SubCUTAneous AC&HS    glucose chewable tablet 16 g  4 Tab Oral PRN    dextrose (D50W) injection syrg 12.5-25 g  12.5-25 g IntraVENous PRN    glucagon (GLUCAGEN) injection 1 mg  1 mg IntraMUSCular PRN    metoprolol tartrate (LOPRESSOR) tablet 25 mg  25 mg Oral QHS    senna-docusate (PERICOLACE) 8.6-50 mg per tablet 1 Tab  1 Tab Oral DAILY    nystatin (MYCOSTATIN) 100,000 unit/mL oral suspension 500,000 Units  500,000 Units Oral QID    zinc oxide-cod liver oil (DESITIN) 40 % paste (Patient Supplied)   Topical PRN    esomeprazole (NEXIUM) capsule 40 mg (Patient Supplied)  40 mg Oral ACB    promethazine (PHENERGAN) tablet 25 mg (Patient Supplied)  25 mg Oral Q6H PRN    prazosin (MINIPRESS) capsule 2 mg (Patient Supplied)  2 mg Oral BID    sodium chloride (NS) flush 5-10 mL  5-10 mL IntraVENous Q8H    sodium chloride (NS) flush 5-10 mL  5-10 mL IntraVENous PRN    naloxone (NARCAN) injection 0.4 mg  0.4 mg IntraVENous PRN    ondansetron (ZOFRAN) injection 4 mg  4 mg IntraVENous Q4H PRN    enoxaparin (LOVENOX) injection 40 mg  40 mg SubCUTAneous Q24H    arformoterol (BROVANA) neb solution 15 mcg  15 mcg Nebulization BID RT    And    budesonide (PULMICORT) 500 mcg/2 ml nebulizer suspension  500 mcg Nebulization BID RT    DULoxetine (CYMBALTA) capsule 40 mg  40 mg Oral DAILY    primidone (MYSOLINE) tablet 50 mg  50 mg Oral DAILY    primidone (MYSOLINE) tablet 150 mg  150 mg Oral QHS    furosemide (LASIX) tablet 40 mg  40 mg Oral DAILY    montelukast (SINGULAIR) tablet 10 mg  10 mg Oral QHS    ezetimibe/simvastatin (VYTORIN) 10/40 mg   Oral QPM    levoFLOXacin (LEVAQUIN) tablet 750 mg  750 mg Oral QHS    piperacillin-tazobactam (ZOSYN) 3.375 g in 0.9% sodium chloride (MBP/ADV) 100 mL  3.375 g IntraVENous Q8H    levothyroxine (SYNTHROID) tablet 200 mcg  200 mcg Oral ACB    lactobac ac& pc-s.therm-b.anim (ROBIN Q/RISAQUAD)  1 Cap Oral DAILY    oxyCODONE IR (ROXICODONE) tablet 15 mg  15 mg Oral Q4H PRN    PHENobarbital (LUMINAL) tablet 64.8 mg  64.8 mg Oral BID     ______________________________________________________________________  EXPECTED LENGTH OF STAY: 10d 0h  ACTUAL LENGTH OF STAY:          7                 Shaniqua Rizvi MD

## 2018-08-19 NOTE — PROGRESS NOTES
[facility]  Pulmonary, Critical Care, and Sleep Medicine  Name: Chad Lo MRN: [id number]   : 1955 Hospital: [facility]    Date: 2018      Subjective:    Pt alert c/o difficulty with cough/expectoration. Pertinent items are noted in HPI. Objective:      Exam  Vital Signs:  Visit Vitals    /53    Pulse (!) 117    Temp 97.8 °F (36.6 °C)    Resp 18    Ht 5' 4\" (1.626 m)    Wt 86.4 kg (190 lb 7.6 oz)    SpO2 (!) 89%    BMI 32.7 kg/m2     Temp (24hrs), Av.1 °F (36.7 °C), Min:97.8 °F (36.6 °C), Max:98.5 °F (36.9 °C)          Intake/Output Summary (Last 24 hours) at 18 1126  Last data filed at 18 0400   Gross per 24 hour   Intake             1200 ml   Output             2300 ml   Net            -1100 ml     GENERAL: well developed and in mild distress, HEENT:  PERRL, EOMI, no alar flaring or epistaxis, oral mucosa moist without cyanosis, NECK:  no jugular vein distention, no retractions, no thyromegaly or masses, HEART:  Regular rate and rhythm with no MGR; no edema is present, ABDOMEN:  soft with no tenderness, bowel sounds present, EXTREMITIES:  warm with no cyanosis and SKIN:  no jaundice or ecchymosis   Coarse BS both bases no wheeze     Labs:  Recent Labs      18   0447   WBC  12.6*   HGB  8.5*   HCT  27.6*   PLT  380     Recent Labs      18   0447   NA  138   K  3.6   CL  101   CO2  28   GLU  103*   BUN  11   CREA  0.85   CA  8.8       Recent Labs      18   0542   PHI  7.378   PO2I  68*   PCO2I  54.5*         Impression     Plan  1. S/p right VATS decortication for streptococcal PNA/empyema- was at [address] transferred here for surgery  2. Acute resp failure-  COPD + Edema/basilar ASD and retained secretions with some basilar ATX. Needs aggressive pulm toilet! 3. RLL PNA  4. DM2  5. Anemia  6. HTN · Place oh HFNC to humidify secretions and help mucociliary clearance.    · Also place NT and suction every 2 hours while awake  · Keep in ICU for now due to pulm toilet needs. · CXR reviewed and left lung clear, RLL ASD unchanged. · mobilty  · Continue current abx  · BP mgmt per hospitalist    Pt ia acutely ill and at risk for decline due to sepsis/MODS.     ·       Christopher Orozco MD  8/19/2018

## 2018-08-19 NOTE — PROGRESS NOTES
Problem: Falls - Risk of  Goal: *Absence of Falls  Document Palma Fall Risk and appropriate interventions in the flowsheet. Outcome: Progressing Towards Goal  Fall Risk Interventions:  Mobility Interventions: Assess mobility with egress test, Communicate number of staff needed for ambulation/transfer    Mentation Interventions: Adequate sleep, hydration, pain control, Door open when patient unattended, More frequent rounding, Reorient patient, Increase mobility    Medication Interventions: Assess postural VS orthostatic hypotension, Evaluate medications/consider consulting pharmacy, Patient to call before getting OOB    Elimination Interventions: Call light in reach    History of Falls Interventions: Bed/chair exit alarm, Investigate reason for fall, Evaluate medications/consider consulting pharmacy        Problem: Pressure Injury - Risk of  Goal: *Prevention of pressure injury  Document Joseph Scale and appropriate interventions in the flowsheet. Outcome: Progressing Towards Goal  Pressure Injury Interventions:  Sensory Interventions: Assess changes in LOC, Assess need for specialty bed, Keep linens dry and wrinkle-free, Minimize linen layers, Pressure redistribution bed/mattress (bed type), Turn and reposition approx. every two hours (pillows and wedges if needed)    Moisture Interventions: Absorbent underpads, Minimize layers, Assess need for specialty bed, Check for incontinence Q2 hours and as needed    Activity Interventions: Increase time out of bed, Pressure redistribution bed/mattress(bed type)    Mobility Interventions: Pressure redistribution bed/mattress (bed type), Turn and reposition approx.  every two hours(pillow and wedges)    Nutrition Interventions: Document food/fluid/supplement intake    Friction and Shear Interventions: Apply protective barrier, creams and emollients, HOB 30 degrees or less, Lift sheet

## 2018-08-19 NOTE — PROGRESS NOTES
[facility]  [address]  ____________________________________________________________      Admit Date: 2018    POD/HD 6 Days Post-Op    Procedure:  Procedure(s):  VIDEO ASSISTED THORACOSCOPY--RIGHT--WITH DECORTICATION    Subjective:     Patient has complaints of shortness of breath. Objective:     Blood pressure 114/63, pulse 88, temperature 97.8 °F (36.6 °C), resp. rate 18, height 5' 4\" (1.626 m), weight 190 lb 7.6 oz (86.4 kg), SpO2 92 %.     Temp (24hrs), Av.2 °F (36.8 °C), Min:97.8 °F (36.6 °C), Max:98.5 °F (36.9 °C)                  Physical Exam:  GENERAL: alert, cooperative, no distress, LUNG: diminished breath sounds R base, HEART: regular rate and rhythm, S1, S2 normal, no murmur, click, rub or gallop    Incision:dresses        Labs:   Recent Results (from the past 24 hour(s))   GLUCOSE, POC    Collection Time: 18 11:36 AM   Result Value Ref Range    Glucose (POC) 225 (H) 65 - 100 mg/dL    Performed by Molly Chandler    GLUCOSE, POC    Collection Time: 18  6:06 PM   Result Value Ref Range    Glucose (POC) 152 (H) 65 - 100 mg/dL    Performed by Mindy Lehman) [address] [place] [address],[address], POC    Collection Time: 18  9:44 PM   Result Value Ref Range    Glucose (POC) 136 (H) 65 - 100 mg/dL    Performed by Mary Christian    METABOLIC PANEL, BASIC    Collection Time: 18  4:47 AM   Result Value Ref Range    Sodium 138 136 - 145 mmol/L    Potassium 3.6 3.5 - 5.1 mmol/L    Chloride 101 97 - 108 mmol/L    CO2 28 21 - 32 mmol/L    Anion gap 9 5 - 15 mmol/L    Glucose 103 (H) 65 - 100 mg/dL    BUN 11 6 - 20 MG/DL    Creatinine 0.85 0.55 - 1.02 MG/DL    BUN/Creatinine ratio 13 12 - 20      GFR est AA >60 >60 ml/min/1.73m2    GFR est non-AA >60 >60 ml/min/1.73m2    Calcium 8.8 8.5 - 10.1 MG/DL   CBC WITH AUTOMATED DIFF    Collection Time: 18  4:47 AM   Result Value Ref Range    WBC 12.6 (H) 3.6 - 11.0 K/uL    RBC 2.75 (L) 3.80 - 5.20 M/uL    HGB 8.5 (L) 11.5 - 16.0 g/dL    HCT 27.6 (L) 35.0 - 47.0 %    .4 (H) 80.0 - 99.0 FL    MCH 30.9 26.0 - 34.0 PG    MCHC 30.8 30.0 - 36.5 g/dL    RDW 14.8 (H) 11.5 - 14.5 %    PLATELET 880 435 - 871 K/uL    MPV 10.2 8.9 - 12.9 FL    NRBC 0.0 0  WBC    ABSOLUTE NRBC 0.00 0.00 - 0.01 K/uL    NEUTROPHILS 66 32 - 75 %    LYMPHOCYTES 23 12 - 49 %    MONOCYTES 9 5 - 13 %    EOSINOPHILS 0 0 - 7 %    BASOPHILS 1 0 - 1 %    IMMATURE GRANULOCYTES 1 (H) 0.0 - 0.5 %    ABS. NEUTROPHILS 8.3 (H) 1.8 - 8.0 K/UL    ABS. LYMPHOCYTES 2.9 0.8 - 3.5 K/UL    ABS. MONOCYTES 1.2 (H) 0.0 - 1.0 K/UL    ABS. EOSINOPHILS 0.0 0.0 - 0.4 K/UL    ABS. BASOPHILS 0.1 0.0 - 0.1 K/UL    ABS. IMM. GRANS. 0.1 (H) 0.00 - 0.04 K/UL    DF AUTOMATED     GLUCOSE, POC    Collection Time: 08/19/18  6:52 AM   Result Value Ref Range    Glucose (POC) 136 (H) 65 - 100 mg/dL    Performed by Derrick Caballero        Data Review images and reports reviewed CXR stable postop changes    Assessment:     Principal Problem:    Empyema ([address] (8/12/2018)    Active Problems:    Right-sided chest pain ()      Chronic pain syndrome ()        Plan/Recommendations/Medical Decision Making: At present no further thoracic surgical intervention  Care per ICU and pulmonary team     Thank you for allowing us to participate in the care of your patient.     Panda Hassan MD

## 2018-08-19 NOTE — PROGRESS NOTES
0500- Patient O2 sat went down to 75% after using the bathroom, got back to bed and O2 sat still in the low 80s. NRB at 7L placed, DuoNeb given by RT, patient saturation back to 90%. 0530- RT doing PT using bed.

## 2018-08-19 NOTE — PROGRESS NOTES
0730 Bedside and Verbal shift change report given to IZZY Chino RN (oncoming nurse) by Annabel Chowdhury RN (offgoing nurse). Report included the following information SBAR, Kardex, ED Summary, Intake/Output, MAR, Accordion and Recent Results. SHIFT SUMMARY: Hi-Carter NC started today d/t O2 saturations dropping to low 80's. Nasal airway and Q2hr NT suctioning with relief of secretions, patient is unable to cough up on her own. VSS otherwise. 1930 Bedside and Verbal shift change report given to Shanelle Bhatia RN (oncoming nurse) by Jovanny Carmona RN (offgoing nurse). Report included the following information SBAR, Kardex, ED Summary, Procedure Summary, Intake/Output, MAR and Recent Results.

## 2018-08-19 NOTE — PROGRESS NOTES
Patient transferred to ICU for hypoxia for Bipap. Patient currently on NC. Care management continuing to follow.  Blank Lang RN,CRM

## 2018-08-20 ENCOUNTER — APPOINTMENT (OUTPATIENT)
Dept: GENERAL RADIOLOGY | Age: 63
DRG: 163 | End: 2018-08-20
Attending: INTERNAL MEDICINE
Payer: MEDICARE

## 2018-08-20 LAB
ANION GAP SERPL CALC-SCNC: 9 MMOL/L (ref 5–15)
BUN SERPL-MCNC: 13 MG/DL (ref 6–20)
BUN/CREAT SERPL: 14 (ref 12–20)
CALCIUM SERPL-MCNC: 8.6 MG/DL (ref 8.5–10.1)
CHLORIDE SERPL-SCNC: 100 MMOL/L (ref 97–108)
CO2 SERPL-SCNC: 31 MMOL/L (ref 21–32)
CREAT SERPL-MCNC: 0.91 MG/DL (ref 0.55–1.02)
GLUCOSE BLD STRIP.AUTO-MCNC: 115 MG/DL (ref 65–100)
GLUCOSE BLD STRIP.AUTO-MCNC: 153 MG/DL (ref 65–100)
GLUCOSE BLD STRIP.AUTO-MCNC: 221 MG/DL (ref 65–100)
GLUCOSE BLD STRIP.AUTO-MCNC: 93 MG/DL (ref 65–100)
GLUCOSE SERPL-MCNC: 165 MG/DL (ref 65–100)
POTASSIUM SERPL-SCNC: 3.3 MMOL/L (ref 3.5–5.1)
SERVICE CMNT-IMP: ABNORMAL
SERVICE CMNT-IMP: NORMAL
SODIUM SERPL-SCNC: 140 MMOL/L (ref 136–145)

## 2018-08-20 PROCEDURE — 94640 AIRWAY INHALATION TREATMENT: CPT

## 2018-08-20 PROCEDURE — 82962 GLUCOSE BLOOD TEST: CPT

## 2018-08-20 PROCEDURE — 74011250637 HC RX REV CODE- 250/637: Performed by: THORACIC SURGERY (CARDIOTHORACIC VASCULAR SURGERY)

## 2018-08-20 PROCEDURE — 74011250637 HC RX REV CODE- 250/637: Performed by: INTERNAL MEDICINE

## 2018-08-20 PROCEDURE — 65660000001 HC RM ICU INTERMED STEPDOWN

## 2018-08-20 PROCEDURE — 74011636637 HC RX REV CODE- 636/637: Performed by: NURSE PRACTITIONER

## 2018-08-20 PROCEDURE — 80048 BASIC METABOLIC PNL TOTAL CA: CPT | Performed by: INTERNAL MEDICINE

## 2018-08-20 PROCEDURE — 74011000250 HC RX REV CODE- 250: Performed by: NURSE PRACTITIONER

## 2018-08-20 PROCEDURE — 74011250636 HC RX REV CODE- 250/636: Performed by: HOSPITALIST

## 2018-08-20 PROCEDURE — 74011250636 HC RX REV CODE- 250/636: Performed by: THORACIC SURGERY (CARDIOTHORACIC VASCULAR SURGERY)

## 2018-08-20 PROCEDURE — 74011000250 HC RX REV CODE- 250: Performed by: THORACIC SURGERY (CARDIOTHORACIC VASCULAR SURGERY)

## 2018-08-20 PROCEDURE — 74011000250 HC RX REV CODE- 250: Performed by: INTERNAL MEDICINE

## 2018-08-20 PROCEDURE — 36415 COLL VENOUS BLD VENIPUNCTURE: CPT | Performed by: INTERNAL MEDICINE

## 2018-08-20 PROCEDURE — 74011250637 HC RX REV CODE- 250/637: Performed by: NURSE PRACTITIONER

## 2018-08-20 PROCEDURE — 74011250637 HC RX REV CODE- 250/637: Performed by: HOSPITALIST

## 2018-08-20 PROCEDURE — 74011636637 HC RX REV CODE- 636/637: Performed by: PHYSICIAN ASSISTANT

## 2018-08-20 PROCEDURE — 77010033711 HC HIGH FLOW OXYGEN

## 2018-08-20 PROCEDURE — 71045 X-RAY EXAM CHEST 1 VIEW: CPT

## 2018-08-20 PROCEDURE — 74011000258 HC RX REV CODE- 258: Performed by: THORACIC SURGERY (CARDIOTHORACIC VASCULAR SURGERY)

## 2018-08-20 RX ORDER — POTASSIUM CHLORIDE 20MEQ/15ML
40 LIQUID (ML) ORAL
Status: COMPLETED | OUTPATIENT
Start: 2018-08-20 | End: 2018-08-20

## 2018-08-20 RX ORDER — ACETYLCYSTEINE 200 MG/ML
400 SOLUTION ORAL; RESPIRATORY (INHALATION)
Status: DISCONTINUED | OUTPATIENT
Start: 2018-08-20 | End: 2018-08-25

## 2018-08-20 RX ADMIN — INSULIN LISPRO 4 UNITS: 100 INJECTION, SOLUTION INTRAVENOUS; SUBCUTANEOUS at 11:45

## 2018-08-20 RX ADMIN — ACETYLCYSTEINE 400 MG: 200 SOLUTION ORAL; RESPIRATORY (INHALATION) at 20:17

## 2018-08-20 RX ADMIN — IPRATROPIUM BROMIDE AND ALBUTEROL SULFATE 3 ML: .5; 3 SOLUTION RESPIRATORY (INHALATION) at 15:50

## 2018-08-20 RX ADMIN — ENOXAPARIN SODIUM 40 MG: 100 INJECTION SUBCUTANEOUS at 10:57

## 2018-08-20 RX ADMIN — MONTELUKAST SODIUM 10 MG: 10 TABLET, FILM COATED ORAL at 22:02

## 2018-08-20 RX ADMIN — GUAIFENESIN 1200 MG: 600 TABLET, EXTENDED RELEASE ORAL at 08:17

## 2018-08-20 RX ADMIN — NYSTATIN 500000 UNITS: 100000 SUSPENSION ORAL at 22:02

## 2018-08-20 RX ADMIN — LEVOTHYROXINE SODIUM 200 MCG: 100 TABLET ORAL at 06:34

## 2018-08-20 RX ADMIN — GLIPIZIDE 2.5 MG: 5 TABLET ORAL at 06:34

## 2018-08-20 RX ADMIN — ACETYLCYSTEINE 400 MG: 200 SOLUTION ORAL; RESPIRATORY (INHALATION) at 15:50

## 2018-08-20 RX ADMIN — OXYCODONE HYDROCHLORIDE 15 MG: 5 TABLET ORAL at 22:02

## 2018-08-20 RX ADMIN — OXYCODONE HYDROCHLORIDE 15 MG: 5 TABLET ORAL at 08:18

## 2018-08-20 RX ADMIN — PIPERACILLIN AND TAZOBACTAM 3.38 G: 3; .375 INJECTION, POWDER, FOR SOLUTION INTRAVENOUS at 00:33

## 2018-08-20 RX ADMIN — PRAZOSIN HYDROCHLORIDE 2 MG: 1 CAPSULE ORAL at 18:31

## 2018-08-20 RX ADMIN — MORPHINE SULFATE 2 MG: 2 INJECTION, SOLUTION INTRAMUSCULAR; INTRAVENOUS at 11:29

## 2018-08-20 RX ADMIN — DULOXETINE HYDROCHLORIDE 40 MG: 20 CAPSULE, DELAYED RELEASE ORAL at 08:18

## 2018-08-20 RX ADMIN — ARFORMOTEROL TARTRATE 15 MCG: 15 SOLUTION RESPIRATORY (INHALATION) at 08:48

## 2018-08-20 RX ADMIN — PRIMIDONE 50 MG: 50 TABLET ORAL at 06:34

## 2018-08-20 RX ADMIN — BUDESONIDE 500 MCG: 0.5 INHALANT RESPIRATORY (INHALATION) at 08:48

## 2018-08-20 RX ADMIN — ACETYLCYSTEINE 400 MG: 200 SOLUTION ORAL; RESPIRATORY (INHALATION) at 00:30

## 2018-08-20 RX ADMIN — POTASSIUM CHLORIDE 40 MEQ: 20 SOLUTION ORAL at 08:23

## 2018-08-20 RX ADMIN — FUROSEMIDE 40 MG: 40 TABLET ORAL at 08:17

## 2018-08-20 RX ADMIN — PIPERACILLIN AND TAZOBACTAM 3.38 G: 3; .375 INJECTION, POWDER, FOR SOLUTION INTRAVENOUS at 16:28

## 2018-08-20 RX ADMIN — Medication 10 ML: at 13:46

## 2018-08-20 RX ADMIN — Medication 1 CAPSULE: at 08:18

## 2018-08-20 RX ADMIN — POLYETHYLENE GLYCOL 3350 17 G: 17 POWDER, FOR SOLUTION ORAL at 08:18

## 2018-08-20 RX ADMIN — Medication 10 ML: at 00:05

## 2018-08-20 RX ADMIN — ESOMEPRAZOLE MAGNESIUM 40 MG: 40 CAPSULE, DELAYED RELEASE ORAL at 06:39

## 2018-08-20 RX ADMIN — ARFORMOTEROL TARTRATE 15 MCG: 15 SOLUTION RESPIRATORY (INHALATION) at 20:17

## 2018-08-20 RX ADMIN — PIPERACILLIN AND TAZOBACTAM 3.38 G: 3; .375 INJECTION, POWDER, FOR SOLUTION INTRAVENOUS at 08:19

## 2018-08-20 RX ADMIN — PROMETHAZINE HYDROCHLORIDE 25 MG: 25 TABLET ORAL at 11:32

## 2018-08-20 RX ADMIN — IPRATROPIUM BROMIDE AND ALBUTEROL SULFATE 3 ML: .5; 3 SOLUTION RESPIRATORY (INHALATION) at 11:17

## 2018-08-20 RX ADMIN — Medication 1 TABLET: at 08:20

## 2018-08-20 RX ADMIN — ACETYLCYSTEINE 400 MG: 200 SOLUTION ORAL; RESPIRATORY (INHALATION) at 11:17

## 2018-08-20 RX ADMIN — CHLORASEPTIC 1 SPRAY: 1.5 LIQUID ORAL at 08:25

## 2018-08-20 RX ADMIN — PROMETHAZINE HYDROCHLORIDE 25 MG: 25 TABLET ORAL at 17:40

## 2018-08-20 RX ADMIN — IPRATROPIUM BROMIDE AND ALBUTEROL SULFATE 3 ML: .5; 3 SOLUTION RESPIRATORY (INHALATION) at 00:31

## 2018-08-20 RX ADMIN — BUDESONIDE 500 MCG: 0.5 INHALANT RESPIRATORY (INHALATION) at 20:16

## 2018-08-20 RX ADMIN — INSULIN LISPRO 3 UNITS: 100 INJECTION, SOLUTION INTRAVENOUS; SUBCUTANEOUS at 17:39

## 2018-08-20 RX ADMIN — SIMVASTATIN: 20 TABLET, FILM COATED ORAL at 17:40

## 2018-08-20 RX ADMIN — ALPRAZOLAM 1 MG: 0.5 TABLET ORAL at 13:47

## 2018-08-20 RX ADMIN — CHLORASEPTIC 1 SPRAY: 1.5 LIQUID ORAL at 00:05

## 2018-08-20 RX ADMIN — GLIPIZIDE 2.5 MG: 5 TABLET ORAL at 16:28

## 2018-08-20 RX ADMIN — GUAIFENESIN 1200 MG: 600 TABLET, EXTENDED RELEASE ORAL at 22:02

## 2018-08-20 RX ADMIN — LEVOFLOXACIN 750 MG: 750 TABLET, FILM COATED ORAL at 22:02

## 2018-08-20 RX ADMIN — OXYCODONE HYDROCHLORIDE 15 MG: 5 TABLET ORAL at 00:03

## 2018-08-20 RX ADMIN — METOPROLOL TARTRATE 25 MG: 25 TABLET ORAL at 22:02

## 2018-08-20 RX ADMIN — Medication 10 ML: at 06:00

## 2018-08-20 RX ADMIN — Medication 10 ML: at 22:02

## 2018-08-20 RX ADMIN — NYSTATIN 500000 UNITS: 100000 SUSPENSION ORAL at 17:40

## 2018-08-20 RX ADMIN — PRAZOSIN HYDROCHLORIDE 2 MG: 1 CAPSULE ORAL at 06:38

## 2018-08-20 RX ADMIN — PRIMIDONE 150 MG: 50 TABLET ORAL at 18:30

## 2018-08-20 RX ADMIN — PHENOBARBITAL 64.8 MG: 64.8 TABLET ORAL at 18:31

## 2018-08-20 RX ADMIN — PHENOBARBITAL 64.8 MG: 64.8 TABLET ORAL at 06:34

## 2018-08-20 RX ADMIN — PREDNISONE 40 MG: 20 TABLET ORAL at 08:17

## 2018-08-20 RX ADMIN — NYSTATIN 500000 UNITS: 100000 SUSPENSION ORAL at 08:17

## 2018-08-20 RX ADMIN — OXYCODONE HYDROCHLORIDE 15 MG: 5 TABLET ORAL at 13:47

## 2018-08-20 RX ADMIN — ALPRAZOLAM 1 MG: 0.5 TABLET ORAL at 07:18

## 2018-08-20 RX ADMIN — ACETYLCYSTEINE 400 MG: 200 SOLUTION ORAL; RESPIRATORY (INHALATION) at 08:48

## 2018-08-20 RX ADMIN — STANDARDIZED SENNA CONCENTRATE AND DOCUSATE SODIUM 1 TABLET: 8.6; 5 TABLET, FILM COATED ORAL at 08:18

## 2018-08-20 RX ADMIN — NYSTATIN 500000 UNITS: 100000 SUSPENSION ORAL at 13:46

## 2018-08-20 NOTE — PROGRESS NOTES
Problem: Falls - Risk of  Goal: *Absence of Falls  Document Palma Fall Risk and appropriate interventions in the flowsheet.    Outcome: Progressing Towards Goal  Fall Risk Interventions:  Mobility Interventions: Assess mobility with egress test, Bed/chair exit alarm, Communicate number of staff needed for ambulation/transfer, Patient to call before getting OOB, Utilize walker, cane, or other assistive device, Strengthening exercises (ROM-active/passive)    Mentation Interventions: Adequate sleep, hydration, pain control, Bed/chair exit alarm, Door open when patient unattended, Evaluate medications/consider consulting pharmacy, Reorient patient, Room close to nurse's station, Toileting rounds    Medication Interventions: Assess postural VS orthostatic hypotension, Evaluate medications/consider consulting pharmacy, Teach patient to arise slowly    Elimination Interventions: Bed/chair exit alarm, Call light in reach, Elevated toilet seat, Patient to call for help with toileting needs, Toileting schedule/hourly rounds    History of Falls Interventions: Bed/chair exit alarm, Door open when patient unattended, Consult care management for discharge planning, Evaluate medications/consider consulting pharmacy, Utilize gait belt for transfer/ambulation

## 2018-08-20 NOTE — PROGRESS NOTES
Hospitalist Progress Note  Mariangel Silver MD  Answering service: [phone] OR [phone] from in house phone      Date of Service:  2018  NAME:  Jessie Bah  :  1955  MRN:  [id number]      Admission Summary:   Edwige Fatima a 58 y. o. female who presented with severe left sided chest pain on  to [facility].  Pt reported she was treated with doxycycline/prednisone in  for bronchitis. Yanira Huggins was unable to complete doxycycline secondary to diarrhea which she still had on admission. Pt was admitted with acute hypoxic respiratory failure, severe sepsis 2/2 bilateral pna, Right Para pneumonic pleural effusion. Pt was evaluated by pulmonary and she underwent a thoracentesis with chest tube placement .  She was transferred to [address] [place] [address] on  for VAT's on  with Dr. Bre Buchanan. [facility] team was consulted for medical management at that time. Pt s/p VATs with decortication on 18. Pt known to have depression, anxiety, PTSD, essential tremor. At the early morning of  developed Acute respiratory failure with hypoxia.  Transferre to ICU with bipap. Currently pt on high flow, but still high risk of decompensation. She is extremely anxious, on xanax 1mg q8 prn, but not help.       Interval history / Subjective:     Pt seen at bedside in icu, she reports that she is having a cough and not able to bring up phlegm completely. Denies fevers or chills. Assessment & Plan:     Acute hypoxic Respiratory Failure - Improved  - multifactorial: PNA, empyema, copd exacerbation  - retained secretions with some basilar ATX.  Needs aggressive pulm toilet!  -continue ICU, further management per pulmonologist, consideration for bronch if not able to mobilize secretions,      RLL PNA  - with dense consolidation and possible areas of necrosis.  Consolidation of LLL as well  -abx per pulmonologist        Streptococcal Right Sided Empyema  - per primary team  - s/p VATs with decortication on 8/13/18  - will need better pain control        Suspected underlying COPD - Acute exacerbation  - former smoker  -nebs and prednisone taper       NIDDM 2  - poc, ssi, diabetic diet  - A1c 7.4  - on  glipizide at 2.5mg bid  -follow   -Hypoglycemia Protocol      Anemia  -Could be related to acute illness and IVF  - 8/15 not iron deficient, d/c ivf      Asymptomatic hypotension on am of 8/14   - s/p albumin, bp improved to low 100's from high 80's  - pt was receiving metoprolol bid, she only takes this at bedtime, this has been adjusted      HTN  Pt reports she takes bb for tachycardia and not htn  - c/w metoprolol, pt takes at bedtime,  hx of orthostatic hypotension - c/w minipress      Diarrhea on admission which has resolved      Constipation Opoid induced   - chronic opiates and now on pca pump  - bowel regimen ordered      Fibromyalgia  - on chronic opiates for back pain  - pain control, lidocaine patch      Obese   - bmi 34, counseled on weight loss      Hypothyroidism - c/w levothyroxine      Familial HLD - c/w vytorin      Depression/anxiety/PTSD - c/w cymbalta,  Will increase xanax to q6h prn, should do better pain control,  Consider small dose IV morphine on top of her po oxy. Will consult psych        Essential tremor - c/w primidone, phenobarbital         Code status: Full  DVT prophylaxis: Lovneonx  Pt lives her , no home oxygen    Care Plan discussed with: Patient/Family and Nurse  Patient has given Verbal permission to discuss medical care with   persons present in the room and and also with contact as listed on face sheet.    Disposition: TBD     Hospital Problems  Date Reviewed: 8/17/2018          Codes Class Noted POA    Right-sided chest pain ICD-10-CM: R07.9  ICD-9-CM: 786.50  Unknown Unknown        Chronic pain syndrome ICD-10-CM: G89.4  ICD-9-CM: 338.4  Unknown Unknown        * (Principal)Emp[facility]ema ([facility] ICD-10-CM: J86.9  ICD-9-CM: 510.9  8/12/2018 Yes Review of Systems:   A comprehensive review of systems was negative except for that written in the HPI. Vital Signs:    Last 24hrs VS reviewed since prior progress note. Most recent are:  Visit Vitals    /60    Pulse 85    Temp 98.8 °F (37.1 °C)    Resp 15    Ht 5' 4\" (1.626 m)    Wt 82.9 kg (182 lb 12.2 oz)    SpO2 96%    BMI 31.37 kg/m2         Intake/Output Summary (Last 24 hours) at 08/20/18 0825  Last data filed at 08/20/18 0400   Gross per 24 hour   Intake              300 ml   Output              850 ml   Net             -550 ml        Physical Examination:             Constitutional:  No acute distress, cooperative, pleasant    ENT:  Oral mucous moist, oropharynx benign. Neck supple,    Resp:  Rhonchi noted bilaterally   CV:  Regular rhythm, normal rate, no murmurs, gallops, rubs    GI:  Soft, non distended, non tender. normoactive bowel sounds, no hepatosplenomegaly         Neurologic:  Moves all extremities. AAOx3, CN II-XII reviewed     Skin:  Good turgor, no rashes or ulcers       Data Review:    Review and/or order of clinical lab test  Review and/or order of tests in the radiology section of CPT  Review and/or order of tests in the medicine section of CPT      Labs:     Recent Labs      08/19/18   0447   WBC  12.6*   HGB  8.5*   HCT  27.6*   PLT  380     Recent Labs      08/20/18   0414  08/19/18   0447   NA  140  138   K  3.3*  3.6   CL  100  101   CO2  31  28   BUN  13  11   CREA  0.91  0.85   GLU  165*  103*   CA  8.6  8.8     No results for input(s): SGOT, GPT, ALT, AP, TBIL, TBILI, TP, ALB, GLOB, GGT, AML, LPSE in the last 72 hours. No lab exists for component: AMYP, HLPSE  No results for input(s): INR, PTP, APTT in the last 72 hours. No lab exists for component: INREXT   No results for input(s): FE, TIBC, PSAT, FERR in the last 72 hours.    Lab Results   Component Value Date/Time    Folate 13.7 11/25/2014 03:16 PM      No results for input(s): PH, PCO2, PO2 in the last 72 hours. No results for input(s): CPK, CKNDX, TROIQ in the last 72 hours.     No lab exists for component: CPKMB  Lab Results   Component Value Date/Time    Cholesterol, total 254 (H) 01/19/2014 02:45 AM    HDL Cholesterol 97 01/19/2014 02:45 AM    LDL, calculated 121 (H) 01/19/2014 02:45 AM    Triglyceride 180 (H) 01/19/2014 02:45 AM    CHOL/HDL Ratio 2.6 01/19/2014 02:45 AM     Lab Results   Component Value Date/Time    Glucose (POC) 115 (H) 08/20/2018 06:32 AM    Glucose (POC) 89 08/19/2018 09:47 PM    Glucose (POC) 144 (H) 08/19/2018 04:28 PM    Glucose (POC) 218 (H) 08/19/2018 12:16 PM    Glucose (POC) 136 (H) 08/19/2018 06:52 AM     Lab Results   Component Value Date/Time    Color YELLOW/STRAW 07/31/2018 09:10 PM    Appearance CLEAR 07/31/2018 09:10 PM    Specific gravity 1.012 07/31/2018 09:10 PM    pH (UA) 7.0 07/31/2018 09:10 PM    Protein NEGATIVE  07/31/2018 09:10 PM    Glucose NEGATIVE  07/31/2018 09:10 PM    Ketone NEGATIVE  07/31/2018 09:10 PM    Bilirubin NEGATIVE  07/31/2018 09:10 PM    Urobilinogen 0.2 07/31/2018 09:10 PM    Nitrites NEGATIVE  07/31/2018 09:10 PM    Leukocyte Esterase NEGATIVE  07/31/2018 09:10 PM    Epithelial cells FEW 07/31/2018 09:10 PM    Bacteria NEGATIVE  07/31/2018 09:10 PM    WBC 0-4 07/31/2018 09:10 PM    RBC 0-5 07/31/2018 09:10 PM         Medications Reviewed:     Current Facility-Administered Medications   Medication Dose Route Frequency    acetylcysteine (MUCOMYST) 200 mg/mL (20 %) solution 400 mg  400 mg Nebulization QID RT    morphine injection 2 mg  2 mg IntraVENous Q3H PRN    ALPRAZolam (XANAX) tablet 1 mg  1 mg Oral Q6H PRN    phenol throat spray (CHLORASEPTIC) 1 Spray  1 Spray Oral PRN    guaiFENesin ER (MUCINEX) tablet 1,200 mg  1,200 mg Oral Q12H    albuterol-ipratropium (DUO-NEB) 2.5 MG-0.5 MG/3 ML  3 mL Nebulization Q4H PRN    glipiZIDE SR (GLUCOTROL XL) tablet 5 mg  5 mg Oral BID PRN    glipiZIDE (GLUCOTROL) tablet 2.5 mg  2.5 mg Oral ACB&D    sodium chloride (NS) flush 5-10 mL  5-10 mL IntraVENous Q8H    sodium chloride (NS) flush 5-10 mL  5-10 mL IntraVENous PRN    bisacodyl (DULCOLAX) suppository 10 mg  10 mg Rectal DAILY PRN    polyethylene glycol (MIRALAX) packet 17 g  17 g Oral DAILY    predniSONE (DELTASONE) tablet 40 mg  40 mg Oral DAILY WITH BREAKFAST    vitamin b comp & c no.4 tab 1 Tab  1 Tab Oral DAILY    insulin lispro (HUMALOG) injection   SubCUTAneous AC&HS    glucose chewable tablet 16 g  4 Tab Oral PRN    dextrose (D50W) injection syrg 12.5-25 g  12.5-25 g IntraVENous PRN    glucagon (GLUCAGEN) injection 1 mg  1 mg IntraMUSCular PRN    metoprolol tartrate (LOPRESSOR) tablet 25 mg  25 mg Oral QHS    senna-docusate (PERICOLACE) 8.6-50 mg per tablet 1 Tab  1 Tab Oral DAILY    nystatin (MYCOSTATIN) 100,000 unit/mL oral suspension 500,000 Units  500,000 Units Oral QID    zinc oxide-cod liver oil (DESITIN) 40 % paste (Patient Supplied)   Topical PRN    esomeprazole (NEXIUM) capsule 40 mg (Patient Supplied)  40 mg Oral ACB    promethazine (PHENERGAN) tablet 25 mg (Patient Supplied)  25 mg Oral Q6H PRN    prazosin (MINIPRESS) capsule 2 mg (Patient Supplied)  2 mg Oral BID    sodium chloride (NS) flush 5-10 mL  5-10 mL IntraVENous Q8H    sodium chloride (NS) flush 5-10 mL  5-10 mL IntraVENous PRN    naloxone (NARCAN) injection 0.4 mg  0.4 mg IntraVENous PRN    ondansetron (ZOFRAN) injection 4 mg  4 mg IntraVENous Q4H PRN    enoxaparin (LOVENOX) injection 40 mg  40 mg SubCUTAneous Q24H    arformoterol (BROVANA) neb solution 15 mcg  15 mcg Nebulization BID RT    And    budesonide (PULMICORT) 500 mcg/2 ml nebulizer suspension  500 mcg Nebulization BID RT    DULoxetine (CYMBALTA) capsule 40 mg  40 mg Oral DAILY    primidone (MYSOLINE) tablet 50 mg  50 mg Oral DAILY    primidone (MYSOLINE) tablet 150 mg  150 mg Oral QHS    furosemide (LASIX) tablet 40 mg  40 mg Oral DAILY    montelukast (SINGULAIR) tablet 10 mg  10 mg Oral QHS    ezetimibe/simvastatin (VYTORIN) 10/40 mg   Oral QPM    levoFLOXacin (LEVAQUIN) tablet 750 mg  750 mg Oral QHS    piperacillin-tazobactam (ZOSYN) 3.375 g in 0.9% sodium chloride (MBP/ADV) 100 mL  3.375 g IntraVENous Q8H    levothyroxine (SYNTHROID) tablet 200 mcg  200 mcg Oral ACB    lactobac ac& pc-s.therm-b.anim (ROBIN Q/RISAQUAD)  1 Cap Oral DAILY    oxyCODONE IR (ROXICODONE) tablet 15 mg  15 mg Oral Q4H PRN    PHENobarbital (LUMINAL) tablet 64.8 mg  64.8 mg Oral BID     ______________________________________________________________________  EXPECTED LENGTH OF STAY: 10d 0h  ACTUAL LENGTH OF STAY:          8                 Alek Saldana MD

## 2018-08-20 NOTE — PROGRESS NOTES
Bedside and Verbal shift change report given to Celestine Ortiz (oncoming nurse) by Be Bradshaw (offgoing nurse). Report included the following information SBAR, Kardex, Intake/Output, MAR, Accordion, Recent Results, Med Rec Status, Cardiac Rhythm nsr and Alarm Parameters . 2019: Pt complaining of sore throat on 40L MD carlos enrique paged for throat spray  2209: Paged RT for Q2 suction reminder while pt is awake  2257: RT at bedside for nasal trumpet suction, pt complaining that she can feel build up deeper in lungs, mucomyst neb advised by RT to help break it up. RT offered physiotherapy vest, pt accepted. Hospitalist paged for mucomyst orders  2306: RT with pt at bedside, using vest [id number]. Relief verbalized by pt, states she will use vest again for therapy  2345: Hospitalist repaged regarding mucomyst  0000: Return page, orders for mucomyst neb received    Bedside and Verbal shift change report given to Batsheva Castanon (oncoming nurse) by Celestine Ortiz (offgoing nurse). Report included the following information SBAR, Kardex, Intake/Output, MAR, Accordion, Recent Results, Med Rec Status, Cardiac Rhythm nsr and Alarm Parameters .

## 2018-08-20 NOTE — PROGRESS NOTES
Thoracic Surgery Simple Progress Note    Admit Date: 2018  POD: 7 Days Post-Op      Procedure:  Procedure(s):  VIDEO ASSISTED THORACOSCOPY--RIGHT--WITH DECORTICATION      Subjective:     Patient has complaints: Short of Breath    Review of Systems:    CARDIAC: positive for irregular heart beats, HTN  RESP: positive for pneumonia or empyema  NEURO:  negative  INCISION: Clean, dry, and intact  EXT: Denies new swelling or pain in the legs or calves. Objective:     Blood pressure 124/65, pulse 88, temperature 98.8 °F (37.1 °C), resp. rate 22, height 5' 4\" (1.626 m), weight 182 lb 12.2 oz (82.9 kg), SpO2 98 %. Temp (24hrs), Av.4 °F (36.9 °C), Min:98 °F (36.7 °C), Max:98.8 °F (37.1 °C)        Hemodynamics    PAP    CO    CI        1901 -  0700  In: 1500 [P.O.:1000; I.V.:500]  Out: 1350 [Urine:1350]    EXAM:  GENERAL: VSS, afrible, alert and cooperative  HEART:  regular rate and rhythm  LUNG: wheezes through out, diminished breath sounds B/L bases, poor cough effort  NEURO:  normal without focal findings  mental status, speech normal, alert and oriented x iii  INCISION: Clean, dry, and intact  EXTREMITIES:No evidence of DVT seen on physical exam.  GI/: Abd soft, nonterder with + bowel sounds.  Voiding    Labs:  Recent Results (from the past 24 hour(s))   GLUCOSE, POC    Collection Time: 18 12:16 PM   Result Value Ref Range    Glucose (POC) 218 (H) 65 - 100 mg/dL    Performed by Betzy Hunger    GLUCOSE, POC    Collection Time: 18  4:28 PM   Result Value Ref Range    Glucose (POC) 144 (H) 65 - 100 mg/dL    Performed by Betzy Hunger    GLUCOSE, POC    Collection Time: 18  9:47 PM   Result Value Ref Range    Glucose (POC) 89 65 - 100 mg/dL    Performed by Biju Gregory    METABOLIC PANEL, BASIC    Collection Time: 18  4:14 AM   Result Value Ref Range    Sodium 140 136 - 145 mmol/L    Potassium 3.3 (L) 3.5 - 5.1 mmol/L    Chloride 100 97 - 108 mmol/L    CO2 31 21 - 32 mmol/L    Anion gap 9 5 - 15 mmol/L    Glucose 165 (H) 65 - 100 mg/dL    BUN 13 6 - 20 MG/DL    Creatinine 0.91 0.55 - 1.02 MG/DL    BUN/Creatinine ratio 14 12 - 20      GFR est AA >60 >60 ml/min/1.73m2    GFR est non-AA >60 >60 ml/min/1.73m2    Calcium 8.6 8.5 - 10.1 MG/DL   GLUCOSE, POC    Collection Time: 08/20/18  6:32 AM   Result Value Ref Range    Glucose (POC) 115 (H) 65 - 100 mg/dL    Performed by [address] [place] [address], POC    Collection Time: 08/20/18 11:38 AM   Result Value Ref Range    Glucose (POC) 221 (H) 65 - 100 mg/dL    Performed by Rahul Nesbitt        Assessment:   No evidence of DVT.   Principal Problem:    Empyema ([address] (8/12/2018)    Active Problems:    Right-sided chest pain ()      Chronic pain syndrome ()      Plan/Recommendations:   Ask to transfer her to the hospitalist service, as surgically there nothing else to do  Defer to Pulmonary her treatment for respiratory failure    See orders    Signed By: Bela HOGAN

## 2018-08-20 NOTE — PROGRESS NOTES
Intensivist / Pulmonary / Critical Care  Assessment / Plan:    1. S/p right VATS decortication for streptococcal PNA/empyema- was at [facility] ER transferred here for surgery  2. Acute resp failure-  COPD + Edema/basilar ASD and retained secretions with some basilar ATX. Needs aggressive pulm toilet! 3. RLL PNA  4. DM2  5. Anemia  6. HTN    --continue pulm toilet  --mobilize  --levaquin and zosyn   --on high flow  --ICU level of pulmonary toilet    History / Subjective:  Hospital Day: 9 - Interval history and notes reviewed     Still with difficulty mobilizing secretions  On high flow with humidity, nebs including mucomyst and inhaled steroids, also on mucinex. On mechanical percussion    Objective:  Patient Vitals for the past 4 hrs:   BP Pulse Resp SpO2 Weight   18 0700 116/60 85 15 96 % -   18 0638 107/47 95 - - 82.9 kg (182 lb 12.2 oz)   18 0600 107/47 74 14 100 % -   18 0500 92/45 73 15 100 % -   Temp (24hrs), Av.4 °F (36.9 °C), Min:98 °F (36.7 °C), Max:98.8 °F (37.1 °C)    Intake/Output Summary (Last 24 hours) at 18 0807  Last data filed at 18 0400   Gross per 24 hour   Intake              300 ml   Output              850 ml   Net             -550 ml     Lab Results   Component Value Date/Time    Glucose (POC) 115 (H) 2018 06:32 AM    Glucose (POC) 89 2018 09:47 PM    Glucose (POC) 144 (H) 2018 04:28 PM    Glucose (POC) 218 (H) 2018 12:16 PM    Glucose (POC) 136 (H) 2018 06:52 AM     Exam:  Obese  No distress  Anicteric  MMM  No accessory muscle use  Symmetrical chest expansion  Coarse rhonchi bilaterally  RRR  Protuberant soft bs present  Warm and dry  Trace edema    Data:   Interval lab and diagnostic data was reviewed. Interval radiology images were independently viewed and available reports were reviewed.      CXR - no change in R base atx / effusion  LE dopplers - no DVT    Lab:  Recent Labs      18   0414  18   0447   WBC   -- 12.6*   HGB   --   8.5*   PLT   --   380   NA  140  138   K  3.3*  3.6   CL  100  101   CO2  31  28   BUN  13  11   CREA  0.91  0.85   GLU  165*  103*   CA  8.6  8.8     ABG:  Recent Labs      08/18/18   0542   PHI  7.378   PCO2I  54.5*   PO2I  68*   HCO3I  32.0*   SO2I  80   FIO2I  100     Microbiology:  Lab Results   Component Value Date/Time    Specimen Description: SPUTUM 01/17/2014 04:30 AM    Specimen Description: PBC 01/16/2014 08:30 PM    Specimen Description: BLOOD 05/10/2009 07:00 PM     Lab Results   Component Value Date/Time    Culture result: Culture performed on Fluid swab specimen 08/07/2018 08:45 AM    Culture result: NO GROWTH ON SOLID MEDIA 4 DAYS 08/07/2018 08:45 AM    Culture result: (A) 08/07/2018 08:45 AM     STREPTOCOCCI, BETA HEMOLYTIC GROUP F ISOLATED FROM THIO BROTH ONLY Penicillin and ampicillin are drugs of choice for treatment of beta-hemolytic streptococcal infections. Susceptibility testing of penicillins and beta-lactams approved by the FDA for treatment of beta-hemolytic streptococcal infections need not be performed routinely, because nonsusceptible isolates are extremely rare.  CLSI 2012          Blank Abraham MD

## 2018-08-21 ENCOUNTER — APPOINTMENT (OUTPATIENT)
Dept: GENERAL RADIOLOGY | Age: 63
DRG: 163 | End: 2018-08-21
Attending: INTERNAL MEDICINE
Payer: MEDICARE

## 2018-08-21 ENCOUNTER — TELEPHONE (OUTPATIENT)
Dept: BEHAVIORAL/MENTAL HEALTH CLINIC | Age: 63
End: 2018-08-21

## 2018-08-21 LAB
ALBUMIN SERPL-MCNC: 2.3 G/DL (ref 3.5–5)
ALBUMIN/GLOB SERPL: 0.5 {RATIO} (ref 1.1–2.2)
ALP SERPL-CCNC: 87 U/L (ref 45–117)
ALT SERPL-CCNC: 71 U/L (ref 12–78)
ANION GAP SERPL CALC-SCNC: 10 MMOL/L (ref 5–15)
APPEARANCE FLD: ABNORMAL
APPEARANCE UR: ABNORMAL
AST SERPL-CCNC: 45 U/L (ref 15–37)
BACTERIA URNS QL MICRO: ABNORMAL /HPF
BASOPHILS # BLD: 0.1 K/UL (ref 0–0.1)
BASOPHILS NFR BLD: 1 % (ref 0–1)
BILIRUB SERPL-MCNC: 0.3 MG/DL (ref 0.2–1)
BILIRUB UR QL: NEGATIVE
BUN SERPL-MCNC: 10 MG/DL (ref 6–20)
BUN/CREAT SERPL: 11 (ref 12–20)
CALCIUM SERPL-MCNC: 9.6 MG/DL (ref 8.5–10.1)
CHLORIDE SERPL-SCNC: 99 MMOL/L (ref 97–108)
CO2 SERPL-SCNC: 29 MMOL/L (ref 21–32)
COLOR FLD: ABNORMAL
COLOR UR: ABNORMAL
CREAT SERPL-MCNC: 0.93 MG/DL (ref 0.55–1.02)
DIFFERENTIAL METHOD BLD: ABNORMAL
EOSINOPHIL # BLD: 0 K/UL (ref 0–0.4)
EOSINOPHIL NFR BLD: 0 % (ref 0–7)
EPITH CASTS URNS QL MICRO: ABNORMAL /LPF
ERYTHROCYTE [DISTWIDTH] IN BLOOD BY AUTOMATED COUNT: 14.9 % (ref 11.5–14.5)
GLOBULIN SER CALC-MCNC: 4.2 G/DL (ref 2–4)
GLUCOSE BLD STRIP.AUTO-MCNC: 102 MG/DL (ref 65–100)
GLUCOSE BLD STRIP.AUTO-MCNC: 114 MG/DL (ref 65–100)
GLUCOSE BLD STRIP.AUTO-MCNC: 148 MG/DL (ref 65–100)
GLUCOSE BLD STRIP.AUTO-MCNC: 274 MG/DL (ref 65–100)
GLUCOSE SERPL-MCNC: 131 MG/DL (ref 65–100)
GLUCOSE UR STRIP.AUTO-MCNC: NEGATIVE MG/DL
HCT VFR BLD AUTO: 29.3 % (ref 35–47)
HGB BLD-MCNC: 9 G/DL (ref 11.5–16)
HGB UR QL STRIP: ABNORMAL
IMM GRANULOCYTES # BLD: 0.1 K/UL (ref 0–0.04)
IMM GRANULOCYTES NFR BLD AUTO: 1 % (ref 0–0.5)
KETONES UR QL STRIP.AUTO: NEGATIVE MG/DL
LEUKOCYTE ESTERASE UR QL STRIP.AUTO: ABNORMAL
LYMPHOCYTES # BLD: 2.4 K/UL (ref 0.8–3.5)
LYMPHOCYTES NFR BLD: 14 % (ref 12–49)
MAGNESIUM SERPL-MCNC: 1.9 MG/DL (ref 1.6–2.4)
MCH RBC QN AUTO: 30.8 PG (ref 26–34)
MCHC RBC AUTO-ENTMCNC: 30.7 G/DL (ref 30–36.5)
MCV RBC AUTO: 100.3 FL (ref 80–99)
MONOCYTES # BLD: 1.5 K/UL (ref 0–1)
MONOCYTES NFR BLD: 9 % (ref 5–13)
MONOS+MACROS NFR FLD: 8 %
NEUTROPHILS NFR FLD: 92 %
NEUTS SEG # BLD: 13.2 K/UL (ref 1.8–8)
NEUTS SEG NFR BLD: 76 % (ref 32–75)
NITRITE UR QL STRIP.AUTO: NEGATIVE
NRBC # BLD: 0 K/UL (ref 0–0.01)
NRBC BLD-RTO: 0 PER 100 WBC
NUC CELL # FLD: 4850 /CU MM
PH UR STRIP: 7 [PH] (ref 5–8)
PHOSPHATE SERPL-MCNC: 2.6 MG/DL (ref 2.6–4.7)
PLATELET # BLD AUTO: 408 K/UL (ref 150–400)
PMV BLD AUTO: 9.2 FL (ref 8.9–12.9)
POTASSIUM SERPL-SCNC: 3.3 MMOL/L (ref 3.5–5.1)
PROT SERPL-MCNC: 6.5 G/DL (ref 6.4–8.2)
PROT UR STRIP-MCNC: ABNORMAL MG/DL
RBC # BLD AUTO: 2.92 M/UL (ref 3.8–5.2)
RBC # FLD: >100 /CU MM
RBC #/AREA URNS HPF: ABNORMAL /HPF (ref 0–5)
SERVICE CMNT-IMP: ABNORMAL
SODIUM SERPL-SCNC: 138 MMOL/L (ref 136–145)
SP GR UR REFRACTOMETRY: 1.01 (ref 1–1.03)
SPECIMEN SOURCE FLD: ABNORMAL
UA: UC IF INDICATED,UAUC: ABNORMAL
UROBILINOGEN UR QL STRIP.AUTO: 1 EU/DL (ref 0.2–1)
WBC # BLD AUTO: 17.4 K/UL (ref 3.6–11)
WBC URNS QL MICRO: ABNORMAL /HPF (ref 0–4)

## 2018-08-21 PROCEDURE — 74011250636 HC RX REV CODE- 250/636: Performed by: PHYSICIAN ASSISTANT

## 2018-08-21 PROCEDURE — 87116 MYCOBACTERIA CULTURE: CPT | Performed by: INTERNAL MEDICINE

## 2018-08-21 PROCEDURE — 74011000250 HC RX REV CODE- 250: Performed by: NURSE PRACTITIONER

## 2018-08-21 PROCEDURE — 87040 BLOOD CULTURE FOR BACTERIA: CPT | Performed by: PHYSICIAN ASSISTANT

## 2018-08-21 PROCEDURE — 80053 COMPREHEN METABOLIC PANEL: CPT | Performed by: INTERNAL MEDICINE

## 2018-08-21 PROCEDURE — 74011250636 HC RX REV CODE- 250/636: Performed by: INTERNAL MEDICINE

## 2018-08-21 PROCEDURE — 74011636637 HC RX REV CODE- 636/637: Performed by: PHYSICIAN ASSISTANT

## 2018-08-21 PROCEDURE — 74011636637 HC RX REV CODE- 636/637: Performed by: NURSE PRACTITIONER

## 2018-08-21 PROCEDURE — 81001 URINALYSIS AUTO W/SCOPE: CPT | Performed by: PHYSICIAN ASSISTANT

## 2018-08-21 PROCEDURE — 74011000250 HC RX REV CODE- 250: Performed by: INTERNAL MEDICINE

## 2018-08-21 PROCEDURE — 83735 ASSAY OF MAGNESIUM: CPT | Performed by: INTERNAL MEDICINE

## 2018-08-21 PROCEDURE — 74011250637 HC RX REV CODE- 250/637: Performed by: NURSE PRACTITIONER

## 2018-08-21 PROCEDURE — 85025 COMPLETE CBC W/AUTO DIFF WBC: CPT | Performed by: INTERNAL MEDICINE

## 2018-08-21 PROCEDURE — 77010033678 HC OXYGEN DAILY

## 2018-08-21 PROCEDURE — 36415 COLL VENOUS BLD VENIPUNCTURE: CPT | Performed by: INTERNAL MEDICINE

## 2018-08-21 PROCEDURE — 0BC38ZZ EXTIRPATION OF MATTER FROM RIGHT MAIN BRONCHUS, VIA NATURAL OR ARTIFICIAL OPENING ENDOSCOPIC: ICD-10-PCS | Performed by: INTERNAL MEDICINE

## 2018-08-21 PROCEDURE — 74011000250 HC RX REV CODE- 250: Performed by: PHYSICIAN ASSISTANT

## 2018-08-21 PROCEDURE — 89050 BODY FLUID CELL COUNT: CPT | Performed by: INTERNAL MEDICINE

## 2018-08-21 PROCEDURE — 87070 CULTURE OTHR SPECIMN AEROBIC: CPT | Performed by: INTERNAL MEDICINE

## 2018-08-21 PROCEDURE — 74011250637 HC RX REV CODE- 250/637: Performed by: THORACIC SURGERY (CARDIOTHORACIC VASCULAR SURGERY)

## 2018-08-21 PROCEDURE — 87102 FUNGUS ISOLATION CULTURE: CPT | Performed by: INTERNAL MEDICINE

## 2018-08-21 PROCEDURE — 74011250636 HC RX REV CODE- 250/636: Performed by: HOSPITALIST

## 2018-08-21 PROCEDURE — 87106 FUNGI IDENTIFICATION YEAST: CPT | Performed by: INTERNAL MEDICINE

## 2018-08-21 PROCEDURE — 94640 AIRWAY INHALATION TREATMENT: CPT

## 2018-08-21 PROCEDURE — 87086 URINE CULTURE/COLONY COUNT: CPT | Performed by: PHYSICIAN ASSISTANT

## 2018-08-21 PROCEDURE — 88112 CYTOPATH CELL ENHANCE TECH: CPT | Performed by: THORACIC SURGERY (CARDIOTHORACIC VASCULAR SURGERY)

## 2018-08-21 PROCEDURE — 87186 SC STD MICRODIL/AGAR DIL: CPT | Performed by: INTERNAL MEDICINE

## 2018-08-21 PROCEDURE — 74011250637 HC RX REV CODE- 250/637: Performed by: HOSPITALIST

## 2018-08-21 PROCEDURE — 0BC78ZZ EXTIRPATION OF MATTER FROM LEFT MAIN BRONCHUS, VIA NATURAL OR ARTIFICIAL OPENING ENDOSCOPIC: ICD-10-PCS | Performed by: INTERNAL MEDICINE

## 2018-08-21 PROCEDURE — 77030032490 HC SLV COMPR SCD KNE COVD -B

## 2018-08-21 PROCEDURE — 74011000250 HC RX REV CODE- 250: Performed by: THORACIC SURGERY (CARDIOTHORACIC VASCULAR SURGERY)

## 2018-08-21 PROCEDURE — 71045 X-RAY EXAM CHEST 1 VIEW: CPT

## 2018-08-21 PROCEDURE — 74011000258 HC RX REV CODE- 258: Performed by: THORACIC SURGERY (CARDIOTHORACIC VASCULAR SURGERY)

## 2018-08-21 PROCEDURE — 82962 GLUCOSE BLOOD TEST: CPT

## 2018-08-21 PROCEDURE — 65660000001 HC RM ICU INTERMED STEPDOWN

## 2018-08-21 PROCEDURE — 74011250636 HC RX REV CODE- 250/636: Performed by: THORACIC SURGERY (CARDIOTHORACIC VASCULAR SURGERY)

## 2018-08-21 PROCEDURE — 74011250637 HC RX REV CODE- 250/637: Performed by: INTERNAL MEDICINE

## 2018-08-21 PROCEDURE — 76010000379 HC BRONCHOSCOPY DIAG/THERAPEUTIC

## 2018-08-21 PROCEDURE — 87149 DNA/RNA DIRECT PROBE: CPT | Performed by: INTERNAL MEDICINE

## 2018-08-21 PROCEDURE — 77030021668 HC NEB PREFIL KT VYRM -A

## 2018-08-21 PROCEDURE — 84100 ASSAY OF PHOSPHORUS: CPT | Performed by: INTERNAL MEDICINE

## 2018-08-21 RX ORDER — FLUMAZENIL 0.1 MG/ML
0.2 INJECTION INTRAVENOUS
Status: DISCONTINUED | OUTPATIENT
Start: 2018-08-21 | End: 2018-08-26 | Stop reason: HOSPADM

## 2018-08-21 RX ORDER — IPRATROPIUM BROMIDE AND ALBUTEROL SULFATE 2.5; .5 MG/3ML; MG/3ML
3 SOLUTION RESPIRATORY (INHALATION)
Status: DISCONTINUED | OUTPATIENT
Start: 2018-08-21 | End: 2018-08-26 | Stop reason: HOSPADM

## 2018-08-21 RX ORDER — MIDAZOLAM HYDROCHLORIDE 1 MG/ML
.25-5 INJECTION, SOLUTION INTRAMUSCULAR; INTRAVENOUS
Status: DISCONTINUED | OUTPATIENT
Start: 2018-08-21 | End: 2018-08-26 | Stop reason: HOSPADM

## 2018-08-21 RX ORDER — NALOXONE HYDROCHLORIDE 0.4 MG/ML
0.4 INJECTION, SOLUTION INTRAMUSCULAR; INTRAVENOUS; SUBCUTANEOUS
Status: DISCONTINUED | OUTPATIENT
Start: 2018-08-21 | End: 2018-08-26 | Stop reason: HOSPADM

## 2018-08-21 RX ORDER — LIDOCAINE HYDROCHLORIDE 10 MG/ML
12 INJECTION INFILTRATION; PERINEURAL ONCE
Status: COMPLETED | OUTPATIENT
Start: 2018-08-21 | End: 2018-08-21

## 2018-08-21 RX ORDER — LIDOCAINE HYDROCHLORIDE 20 MG/ML
1-20 INJECTION, SOLUTION EPIDURAL; INFILTRATION; INTRACAUDAL; PERINEURAL ONCE
Status: COMPLETED | OUTPATIENT
Start: 2018-08-21 | End: 2018-08-21

## 2018-08-21 RX ORDER — FENTANYL CITRATE 50 UG/ML
50 INJECTION, SOLUTION INTRAMUSCULAR; INTRAVENOUS
Status: DISPENSED | OUTPATIENT
Start: 2018-08-21 | End: 2018-08-21

## 2018-08-21 RX ORDER — LIDOCAINE HYDROCHLORIDE 20 MG/ML
5 SOLUTION OROPHARYNGEAL ONCE
Status: ACTIVE | OUTPATIENT
Start: 2018-08-21 | End: 2018-08-21

## 2018-08-21 RX ORDER — LIDOCAINE HYDROCHLORIDE 10 MG/ML
12 INJECTION, SOLUTION EPIDURAL; INFILTRATION; INTRACAUDAL; PERINEURAL ONCE
Status: DISCONTINUED | OUTPATIENT
Start: 2018-08-21 | End: 2018-08-21

## 2018-08-21 RX ORDER — POTASSIUM CHLORIDE 7.45 MG/ML
10 INJECTION INTRAVENOUS
Status: COMPLETED | OUTPATIENT
Start: 2018-08-21 | End: 2018-08-21

## 2018-08-21 RX ORDER — LIDOCAINE HYDROCHLORIDE 20 MG/ML
JELLY TOPICAL ONCE
Status: ACTIVE | OUTPATIENT
Start: 2018-08-21 | End: 2018-08-21

## 2018-08-21 RX ADMIN — PIPERACILLIN AND TAZOBACTAM 3.38 G: 3; .375 INJECTION, POWDER, FOR SOLUTION INTRAVENOUS at 02:05

## 2018-08-21 RX ADMIN — IPRATROPIUM BROMIDE AND ALBUTEROL SULFATE 3 ML: .5; 3 SOLUTION RESPIRATORY (INHALATION) at 11:21

## 2018-08-21 RX ADMIN — STANDARDIZED SENNA CONCENTRATE AND DOCUSATE SODIUM 1 TABLET: 8.6; 5 TABLET, FILM COATED ORAL at 10:48

## 2018-08-21 RX ADMIN — FUROSEMIDE 40 MG: 40 TABLET ORAL at 10:48

## 2018-08-21 RX ADMIN — IPRATROPIUM BROMIDE AND ALBUTEROL SULFATE 3 ML: .5; 3 SOLUTION RESPIRATORY (INHALATION) at 19:12

## 2018-08-21 RX ADMIN — Medication 1 TABLET: at 06:49

## 2018-08-21 RX ADMIN — INSULIN LISPRO 3 UNITS: 100 INJECTION, SOLUTION INTRAVENOUS; SUBCUTANEOUS at 06:47

## 2018-08-21 RX ADMIN — NYSTATIN 500000 UNITS: 100000 SUSPENSION ORAL at 21:31

## 2018-08-21 RX ADMIN — PRIMIDONE 50 MG: 50 TABLET ORAL at 06:48

## 2018-08-21 RX ADMIN — OXYCODONE HYDROCHLORIDE 15 MG: 5 TABLET ORAL at 12:24

## 2018-08-21 RX ADMIN — GUAIFENESIN 1200 MG: 600 TABLET, EXTENDED RELEASE ORAL at 21:31

## 2018-08-21 RX ADMIN — NYSTATIN 500000 UNITS: 100000 SUSPENSION ORAL at 17:40

## 2018-08-21 RX ADMIN — BUDESONIDE 500 MCG: 0.5 INHALANT RESPIRATORY (INHALATION) at 19:12

## 2018-08-21 RX ADMIN — FENTANYL CITRATE 25 MCG: 50 INJECTION, SOLUTION INTRAMUSCULAR; INTRAVENOUS at 09:46

## 2018-08-21 RX ADMIN — PHENOBARBITAL 64.8 MG: 64.8 TABLET ORAL at 19:15

## 2018-08-21 RX ADMIN — FENTANYL CITRATE 25 MCG: 50 INJECTION, SOLUTION INTRAMUSCULAR; INTRAVENOUS at 09:42

## 2018-08-21 RX ADMIN — ACETYLCYSTEINE 400 MG: 200 SOLUTION ORAL; RESPIRATORY (INHALATION) at 19:13

## 2018-08-21 RX ADMIN — LIDOCAINE HYDROCHLORIDE 12 ML: 10 INJECTION, SOLUTION INFILTRATION; PERINEURAL at 11:26

## 2018-08-21 RX ADMIN — INSULIN LISPRO 7 UNITS: 100 INJECTION, SOLUTION INTRAVENOUS; SUBCUTANEOUS at 16:51

## 2018-08-21 RX ADMIN — ONDANSETRON 4 MG: 2 INJECTION INTRAMUSCULAR; INTRAVENOUS at 08:59

## 2018-08-21 RX ADMIN — SIMVASTATIN: 20 TABLET, FILM COATED ORAL at 18:00

## 2018-08-21 RX ADMIN — ENOXAPARIN SODIUM 40 MG: 100 INJECTION SUBCUTANEOUS at 10:48

## 2018-08-21 RX ADMIN — Medication 1 CAPSULE: at 10:48

## 2018-08-21 RX ADMIN — MORPHINE SULFATE 2 MG: 2 INJECTION, SOLUTION INTRAMUSCULAR; INTRAVENOUS at 08:48

## 2018-08-21 RX ADMIN — Medication 10 ML: at 21:32

## 2018-08-21 RX ADMIN — PIPERACILLIN AND TAZOBACTAM 3.38 G: 3; .375 INJECTION, POWDER, FOR SOLUTION INTRAVENOUS at 08:47

## 2018-08-21 RX ADMIN — Medication 10 ML: at 06:42

## 2018-08-21 RX ADMIN — ESOMEPRAZOLE MAGNESIUM 40 MG: 40 CAPSULE, DELAYED RELEASE ORAL at 06:48

## 2018-08-21 RX ADMIN — ACETYLCYSTEINE 400 MG: 200 SOLUTION ORAL; RESPIRATORY (INHALATION) at 11:21

## 2018-08-21 RX ADMIN — PHENOBARBITAL 64.8 MG: 64.8 TABLET ORAL at 06:47

## 2018-08-21 RX ADMIN — PREDNISONE 40 MG: 20 TABLET ORAL at 11:02

## 2018-08-21 RX ADMIN — MIDAZOLAM HYDROCHLORIDE 1 MG: 1 INJECTION, SOLUTION INTRAMUSCULAR; INTRAVENOUS at 09:42

## 2018-08-21 RX ADMIN — MORPHINE SULFATE 2 MG: 2 INJECTION, SOLUTION INTRAMUSCULAR; INTRAVENOUS at 20:26

## 2018-08-21 RX ADMIN — ACETYLCYSTEINE 400 MG: 200 SOLUTION ORAL; RESPIRATORY (INHALATION) at 15:05

## 2018-08-21 RX ADMIN — IPRATROPIUM BROMIDE AND ALBUTEROL SULFATE 3 ML: .5; 3 SOLUTION RESPIRATORY (INHALATION) at 08:02

## 2018-08-21 RX ADMIN — PROMETHAZINE HYDROCHLORIDE 25 MG: 25 TABLET ORAL at 13:37

## 2018-08-21 RX ADMIN — ACETYLCYSTEINE 400 MG: 200 SOLUTION ORAL; RESPIRATORY (INHALATION) at 07:57

## 2018-08-21 RX ADMIN — LIDOCAINE HYDROCHLORIDE 320 MG: 20 INJECTION, SOLUTION EPIDURAL; INFILTRATION; INTRACAUDAL; PERINEURAL at 10:00

## 2018-08-21 RX ADMIN — GUAIFENESIN 1200 MG: 600 TABLET, EXTENDED RELEASE ORAL at 10:48

## 2018-08-21 RX ADMIN — POTASSIUM CHLORIDE 10 MEQ: 10 INJECTION, SOLUTION INTRAVENOUS at 11:04

## 2018-08-21 RX ADMIN — NYSTATIN 500000 UNITS: 100000 SUSPENSION ORAL at 12:22

## 2018-08-21 RX ADMIN — Medication 5 ML: at 14:00

## 2018-08-21 RX ADMIN — MONTELUKAST SODIUM 10 MG: 10 TABLET, FILM COATED ORAL at 21:32

## 2018-08-21 RX ADMIN — ALPRAZOLAM 1 MG: 0.5 TABLET ORAL at 11:03

## 2018-08-21 RX ADMIN — OXYCODONE HYDROCHLORIDE 15 MG: 5 TABLET ORAL at 17:45

## 2018-08-21 RX ADMIN — PRAZOSIN HYDROCHLORIDE 2 MG: 1 CAPSULE ORAL at 06:48

## 2018-08-21 RX ADMIN — MORPHINE SULFATE 2 MG: 2 INJECTION, SOLUTION INTRAMUSCULAR; INTRAVENOUS at 03:46

## 2018-08-21 RX ADMIN — BENZOCAINE: 200 SPRAY DENTAL; ORAL; PERIODONTAL at 10:13

## 2018-08-21 RX ADMIN — LEVOFLOXACIN 750 MG: 750 TABLET, FILM COATED ORAL at 21:31

## 2018-08-21 RX ADMIN — DULOXETINE HYDROCHLORIDE 40 MG: 20 CAPSULE, DELAYED RELEASE ORAL at 10:48

## 2018-08-21 RX ADMIN — MIDAZOLAM HYDROCHLORIDE 1 MG: 1 INJECTION, SOLUTION INTRAMUSCULAR; INTRAVENOUS at 09:51

## 2018-08-21 RX ADMIN — GLIPIZIDE 2.5 MG: 5 TABLET ORAL at 16:53

## 2018-08-21 RX ADMIN — GLIPIZIDE 2.5 MG: 5 TABLET ORAL at 06:48

## 2018-08-21 RX ADMIN — BUDESONIDE 500 MCG: 0.5 INHALANT RESPIRATORY (INHALATION) at 07:59

## 2018-08-21 RX ADMIN — LEVOTHYROXINE SODIUM 200 MCG: 100 TABLET ORAL at 06:48

## 2018-08-21 RX ADMIN — IPRATROPIUM BROMIDE AND ALBUTEROL SULFATE 3 ML: .5; 3 SOLUTION RESPIRATORY (INHALATION) at 15:05

## 2018-08-21 RX ADMIN — OXYCODONE HYDROCHLORIDE 15 MG: 5 TABLET ORAL at 21:31

## 2018-08-21 RX ADMIN — PRAZOSIN HYDROCHLORIDE 2 MG: 1 CAPSULE ORAL at 19:15

## 2018-08-21 RX ADMIN — POTASSIUM CHLORIDE 10 MEQ: 10 INJECTION, SOLUTION INTRAVENOUS at 08:47

## 2018-08-21 RX ADMIN — PRIMIDONE 150 MG: 50 TABLET ORAL at 19:15

## 2018-08-21 RX ADMIN — PIPERACILLIN AND TAZOBACTAM 3.38 G: 3; .375 INJECTION, POWDER, FOR SOLUTION INTRAVENOUS at 16:47

## 2018-08-21 RX ADMIN — Medication 10 ML: at 14:00

## 2018-08-21 NOTE — PROGRESS NOTES
Problem: Falls - Risk of  Goal: *Absence of Falls  Document Palma Fall Risk and appropriate interventions in the flowsheet. Outcome: Progressing Towards Goal  Fall Risk Interventions:  Mobility Interventions: Communicate number of staff needed for ambulation/transfer, Patient to call before getting OOB, Strengthening exercises (ROM-active/passive)    Mentation Interventions: Adequate sleep, hydration, pain control, Eyeglasses and hearing aids, Increase mobility, More frequent rounding, Toileting rounds    Medication Interventions: Evaluate medications/consider consulting pharmacy, Patient to call before getting OOB, Teach patient to arise slowly    Elimination Interventions: Call light in reach, Toileting schedule/hourly rounds    History of Falls Interventions: Door open when patient unattended, Room close to nurse's station, Utilize gait belt for transfer/ambulation        Problem: Pressure Injury - Risk of  Goal: *Prevention of pressure injury  Document Joseph Scale and appropriate interventions in the flowsheet. Outcome: Progressing Towards Goal  Pressure Injury Interventions:  Sensory Interventions: Assess need for specialty bed, Avoid rigorous massage over bony prominences, Pressure redistribution bed/mattress (bed type), Minimize linen layers, Keep linens dry and wrinkle-free, Maintain/enhance activity level    Moisture Interventions: Absorbent underpads, Apply protective barrier, creams and emollients, Internal/External urinary devices, Maintain skin hydration (lotion/cream), Minimize layers    Activity Interventions: Increase time out of bed, Pressure redistribution bed/mattress(bed type)    Mobility Interventions: Pressure redistribution bed/mattress (bed type), HOB 30 degrees or less, Turn and reposition approx.  every two hours(pillow and wedges)    Nutrition Interventions: Document food/fluid/supplement intake    Friction and Shear Interventions: Apply protective barrier, creams and emollients, HOB 30 degrees or less, Lift sheet, Minimize layers

## 2018-08-21 NOTE — PROGRESS NOTES
[facility]  Bronchoscopy Report    Procedure: Therapeutic bronchoscopy. Indication: Mucus Plugging    Consent/Treatment: Informed consent was obtained from the  patient after risks, benefits and alternatives were explained. Timeout verified the correct patient and correct procedure. Anesthesia:   Topical sedation to nares, mouth, and tracheobronchial tree with lidocaine  Moderate sedation with Fentanyl 50 mcg and Versed 2mg was used    Moderate (conscious) sedation was administered by the endoscopy nurse under the direct supervision of the endoscopist.  Heart rate, EKG, blood pressure, resp rate, adequacy of pulmonary ventilation, Oxygen saturation, and response to care were monitored. Total physician intraservice time was 15 minutes    Procedure Details:   -- The bronchoscope was introduced orally with use of a bite block. -- The vocal cords were found to be normal.  -- The trachea and prasad were completely inspected and were found to be normal.  -- The right-sided endobronchial anatomy was completely inspected and noted both white frothy and thick green secretions cleared from lower lobe airways. -- The left-sided endobronchial anatomy was completely inspected and less mucus than on the Right side.      Specimens:   Bronchial washings were sent for  microbiology, cytology, AFB smear and culture and fungal culture    Rapid On-Site Evaluation: n/a    Complications: none    Estimated Blood Loss: Minimal    Impression  Thick purulent secretions cleared from lower lobes (R>L)  Samples sent for bacteriology studies  On levaquin and zosyn currently  On mucolytics and bronchodilators    --continue abx and follow up cultures  --continue inhaled meds / mucolytics and mechanical secretion mobilization  --may need additional bronchoscopy Lori Sainz MD

## 2018-08-21 NOTE — PROGRESS NOTES
Physical Therapy  8/21/18    Order received. Patient chart reviewed. Patient on hold and currently having bronchoscopy. Will follow up tomorrow. Trinh Woodruff, PT, DPT

## 2018-08-21 NOTE — PROGRESS NOTES
Hospitalist Progress Note  Caprice Crockett MD  Answering service: [phone] OR [phone] from in house phone      Date of Service:  2018  NAME:  Toya Pendleton  :  1955  MRN:  [id number]      Admission Summary:   Franny Palm a 58 y. o. female who presented with severe left sided chest pain on  to [address]  Pt reported she was treated with doxycycline/prednisone in  for bronchitis. Ryan Jean-Baptiste was unable to complete doxycycline secondary to diarrhea which she still had on admission. Pt was admitted with acute hypoxic respiratory failure, severe sepsis 2/2 bilateral pna, Right Para pneumonic pleural effusion. Pt was evaluated by pulmonary and she underwent a thoracentesis with chest tube placement .  She was transferred to [facility] on  for VAT's on  with Dr. Krueger Leader. [facility] team was consulted for medical management at that time. Pt s/p VATs with decortication on 18. Pt known to have depression, anxiety, PTSD, essential tremor. At the early morning of  developed Acute respiratory failure with hypoxia.  Transferre to ICU with bipap. Currently pt on high flow, but still high risk of decompensation. She is extremely anxious, on xanax 1mg q8 prn, but not help.       Interval history / Subjective:     Pt seen at bedside in icu, She had a bronchoscopy today, she reports that her secretions are more mobilized today,. She denies fevers or chills. Reports chronic fibromyalgia pains. Assessment & Plan:     Acute hypoxic Respiratory Failure - Improved  - multifactorial: PNA, empyema, copd exacerbation  - retained secretions with some basilar ATX.  Needs aggressive pulm toilet!  -continue ICU, further management per pulmonologist, consideration for bronch if not able to mobilize secretions,      RLL PNA  - with dense consolidation and possible areas of necrosis.  Consolidation of LLL as well  -abx per pulmonologist    -S/P Bronch On 8/21for Mucus Plugging       Streptococcal Right Sided Empyema  - per primary team  - s/p VATs with decortication on 8/13/18  - will need better pain control        Suspected underlying COPD - Acute exacerbation  - former smoker  -nebs and prednisone taper       NIDDM 2  - poc, ssi, diabetic diet  - A1c 7.4  - on  glipizide at 2.5mg bid  -follow   -Hypoglycemia Protocol      Anemia  -Could be related to acute illness and IVF  - 8/15 not iron deficient, d/c ivf      Asymptomatic hypotension on am of 8/14   - s/p albumin, bp improved to low 100's from high 80's  - pt was receiving metoprolol bid, she only takes this at bedtime, this has been adjusted      HTN  Pt reports she takes bb for tachycardia and not htn  - c/w metoprolol, pt takes at bedtime,  hx of orthostatic hypotension - c/w minipress      Diarrhea on admission which has resolved      Constipation Opoid induced   - chronic opiates  - bowel regimen ordered      Fibromyalgia  - on chronic opiates for back pain  - pain control, lidocaine patch      Obese   - bmi 34, counseled on weight loss      Hypothyroidism - c/w levothyroxine      Familial HLD - c/w vytorin      Depression/anxiety/PTSD - c/w cymbalta,  Seen By Psychiatry  Meds adjusted       Essential tremor - c/w primidone, phenobarbital         Code status: Full  DVT prophylaxis: Lovneonx  Pt lives her , no home oxygen    Care Plan discussed with: Patient/Family and Nurse  Patient has given Verbal permission to discuss medical care with   persons present in the room and and also with contact as listed on face sheet.    Disposition: TBD, Patient was transferred to Hospitalist service, Was informed of this by Nurse, Was not aware of request to do so from primary team.      Hospital Problems  Date Reviewed: 8/17/2018          Codes Class Noted POA    Right-sided chest pain ICD-10-CM: R07.9  ICD-9-CM: 786.50  Unknown Unknown        Chronic pain syndrome ICD-10-CM: G89.4  ICD-9-CM: 338.4 Unknown Unknown        * (Principal)Empyema [facility]) ICD-10-CM: J86.9  ICD-9-CM: 510.9  8/12/2018 Yes                Review of Systems:   A comprehensive review of systems was negative except for that written in the HPI. Vital Signs:    Last 24hrs VS reviewed since prior progress note. Most recent are:  Visit Vitals    /65    Pulse (!) 119    Temp 98.1 °F (36.7 °C)    Resp 26    Ht 5' 4\" (1.626 m)    Wt 83.2 kg (183 lb 6.8 oz)    SpO2 (!) 86%    BMI 31.48 kg/m2         Intake/Output Summary (Last 24 hours) at 08/21/18 1415  Last data filed at 08/21/18 1200   Gross per 24 hour   Intake              200 ml   Output             1650 ml   Net            -1450 ml        Physical Examination:             Constitutional:  No acute distress, cooperative, pleasant    ENT:  Oral mucous moist, oropharynx benign. Neck supple,    Resp:  Rhonchi noted bilaterally   CV:  Regular rhythm, normal rate, no murmurs, gallops, rubs    GI:  Soft, non distended, non tender. normoactive bowel sounds, no hepatosplenomegaly         Neurologic:  Moves all extremities. AAOx3, CN II-XII reviewed     Skin:  Good turgor, no rashes or ulcers       Data Review:    Review and/or order of clinical lab test  Review and/or order of tests in the radiology section of CPT  Review and/or order of tests in the medicine section of CPT      Labs:     Recent Labs      08/21/18   0440  08/19/18   0447   WBC  17.4*  12.6*   HGB  9.0*  8.5*   HCT  29.3*  27.6*   PLT  408*  380     Recent Labs      08/21/18   0440  08/20/18   0414  08/19/18   0447   NA  138  140  138   K  3.3*  3.3*  3.6   CL  99  100  101   CO2  29  31  28   BUN  10  13  11   CREA  0.93  0.91  0.85   GLU  131*  165*  103*   CA  9.6  8.6  8.8   MG  1.9   --    --    PHOS  2.6   --    --      Recent Labs      08/21/18   0440   SGOT  45*   ALT  71   AP  87   TBILI  0.3   TP  6.5   ALB  2.3*   GLOB  4.2*     No results for input(s): INR, PTP, APTT in the last 72 hours.     No lab exists for component: INREXT, INREXT   No results for input(s): FE, TIBC, PSAT, FERR in the last 72 hours. Lab Results   Component Value Date/Time    Folate 13.7 11/25/2014 03:16 PM      No results for input(s): PH, PCO2, PO2 in the last 72 hours. No results for input(s): CPK, CKNDX, TROIQ in the last 72 hours.     No lab exists for component: CPKMB  Lab Results   Component Value Date/Time    Cholesterol, total 254 (H) 01/19/2014 02:45 AM    HDL Cholesterol 97 01/19/2014 02:45 AM    LDL, calculated 121 (H) 01/19/2014 02:45 AM    Triglyceride 180 (H) 01/19/2014 02:45 AM    CHOL/HDL Ratio 2.6 01/19/2014 02:45 AM     Lab Results   Component Value Date/Time    Glucose (POC) 102 (H) 08/21/2018 11:21 AM    Glucose (POC) 148 (H) 08/21/2018 06:40 AM    Glucose (POC) 93 08/20/2018 09:58 PM    Glucose (POC) 153 (H) 08/20/2018 04:46 PM    Glucose (POC) 221 (H) 08/20/2018 11:38 AM     Lab Results   Component Value Date/Time    Color YELLOW/STRAW 07/31/2018 09:10 PM    Appearance CLEAR 07/31/2018 09:10 PM    Specific gravity 1.012 07/31/2018 09:10 PM    pH (UA) 7.0 07/31/2018 09:10 PM    Protein NEGATIVE  07/31/2018 09:10 PM    Glucose NEGATIVE  07/31/2018 09:10 PM    Ketone NEGATIVE  07/31/2018 09:10 PM    Bilirubin NEGATIVE  07/31/2018 09:10 PM    Urobilinogen 0.2 07/31/2018 09:10 PM    Nitrites NEGATIVE  07/31/2018 09:10 PM    Leukocyte Esterase NEGATIVE  07/31/2018 09:10 PM    Epithelial cells FEW 07/31/2018 09:10 PM    Bacteria NEGATIVE  07/31/2018 09:10 PM    WBC 0-4 07/31/2018 09:10 PM    RBC 0-5 07/31/2018 09:10 PM         Medications Reviewed:     Current Facility-Administered Medications   Medication Dose Route Frequency    albuterol-ipratropium (DUO-NEB) 2.5 MG-0.5 MG/3 ML  3 mL Nebulization QID RT    naloxone (NARCAN) injection 0.4 mg  0.4 mg IntraVENous Multiple    flumazenil (ROMAZICON) 0.1 mg/mL injection 0.2 mg  0.2 mg IntraVENous Multiple    lidocaine (XYLOCAINE) 2 % viscous solution 5 mL  5 mL Mouth/Throat ONCE    lidocaine (XYLOCAINE) 2 % jelly   Topical ONCE    midazolam (VERSED) injection 0.25-5 mg  0.25-5 mg IntraVENous Multiple    acetylcysteine (MUCOMYST) 200 mg/mL (20 %) solution 400 mg  400 mg Nebulization QID RT    morphine injection 2 mg  2 mg IntraVENous Q3H PRN    ALPRAZolam (XANAX) tablet 1 mg  1 mg Oral Q6H PRN    phenol throat spray (CHLORASEPTIC) 1 Spray  1 Spray Oral PRN    guaiFENesin ER (MUCINEX) tablet 1,200 mg  1,200 mg Oral Q12H    albuterol-ipratropium (DUO-NEB) 2.5 MG-0.5 MG/3 ML  3 mL Nebulization Q4H PRN    glipiZIDE (GLUCOTROL) tablet 2.5 mg  2.5 mg Oral ACB&D    sodium chloride (NS) flush 5-10 mL  5-10 mL IntraVENous Q8H    sodium chloride (NS) flush 5-10 mL  5-10 mL IntraVENous PRN    bisacodyl (DULCOLAX) suppository 10 mg  10 mg Rectal DAILY PRN    polyethylene glycol (MIRALAX) packet 17 g  17 g Oral DAILY    predniSONE (DELTASONE) tablet 40 mg  40 mg Oral DAILY WITH BREAKFAST    vitamin b comp & c no.4 tab 1 Tab  1 Tab Oral DAILY    insulin lispro (HUMALOG) injection   SubCUTAneous AC&HS    glucose chewable tablet 16 g  4 Tab Oral PRN    dextrose (D50W) injection syrg 12.5-25 g  12.5-25 g IntraVENous PRN    glucagon (GLUCAGEN) injection 1 mg  1 mg IntraMUSCular PRN    metoprolol tartrate (LOPRESSOR) tablet 25 mg  25 mg Oral QHS    senna-docusate (PERICOLACE) 8.6-50 mg per tablet 1 Tab  1 Tab Oral DAILY    nystatin (MYCOSTATIN) 100,000 unit/mL oral suspension 500,000 Units  500,000 Units Oral QID    zinc oxide-cod liver oil (DESITIN) 40 % paste (Patient Supplied)   Topical PRN    esomeprazole (NEXIUM) capsule 40 mg (Patient Supplied)  40 mg Oral ACB    promethazine (PHENERGAN) tablet 25 mg (Patient Supplied)  25 mg Oral Q6H PRN    prazosin (MINIPRESS) capsule 2 mg (Patient Supplied)  2 mg Oral BID    sodium chloride (NS) flush 5-10 mL  5-10 mL IntraVENous Q8H    sodium chloride (NS) flush 5-10 mL  5-10 mL IntraVENous PRN    naloxone (NARCAN) injection 0.4 mg  0.4 mg IntraVENous PRN    ondansetron (ZOFRAN) injection 4 mg  4 mg IntraVENous Q4H PRN    enoxaparin (LOVENOX) injection 40 mg  40 mg SubCUTAneous Q24H    arformoterol (BROVANA) neb solution 15 mcg  15 mcg Nebulization BID RT    And    budesonide (PULMICORT) 500 mcg/2 ml nebulizer suspension  500 mcg Nebulization BID RT    DULoxetine (CYMBALTA) capsule 40 mg  40 mg Oral DAILY    primidone (MYSOLINE) tablet 50 mg  50 mg Oral DAILY    primidone (MYSOLINE) tablet 150 mg  150 mg Oral QHS    furosemide (LASIX) tablet 40 mg  40 mg Oral DAILY    montelukast (SINGULAIR) tablet 10 mg  10 mg Oral QHS    ezetimibe/simvastatin (VYTORIN) 10/40 mg   Oral QPM    levoFLOXacin (LEVAQUIN) tablet 750 mg  750 mg Oral QHS    piperacillin-tazobactam (ZOSYN) 3.375 g in 0.9% sodium chloride (MBP/ADV) 100 mL  3.375 g IntraVENous Q8H    levothyroxine (SYNTHROID) tablet 200 mcg  200 mcg Oral ACB    lactobac ac& pc-s.therm-b.anim (ROBIN Q/RISAQUAD)  1 Cap Oral DAILY    oxyCODONE IR (ROXICODONE) tablet 15 mg  15 mg Oral Q4H PRN    PHENobarbital (LUMINAL) tablet 64.8 mg  64.8 mg Oral BID     ______________________________________________________________________  EXPECTED LENGTH OF STAY: 10d 0h  ACTUAL LENGTH OF STAY:          9                 Nicolas Temple MD

## 2018-08-21 NOTE — PROGRESS NOTES
0730 Bedside and Verbal shift change report given to IZZY Chino RN and Skylar Hurd (oncoming nurse) by Annabel Chowdhury RN (offgoing nurse). Report included the following information SBAR, Kardex, ED Summary, Procedure Summary, Intake/Output, MAR, Accordion and Recent Results. SHIFT SUMMARY: Bronchoscopy today, cultures sent, tolerated procedure well. Otherwise uneventful shift, pain medications given as ordered, see MAR. CAM negative, RASS 0.     2000 Bedside and Verbal shift change report given to Annabel Chowdhury RN (oncoming nurse) by Jovanny Carmona RN and Maren Robb RN (offgoing nurse). Report included the following information SBAR, Kardex, ED Summary, Procedure Summary, Intake/Output, MAR, Accordion and Recent Results.

## 2018-08-21 NOTE — PROGRESS NOTES
Pulmonary, Critical Care, and Sleep Medicine~Progress Note    Name: Octavio Cassidy MRN: [id number]   : 1955 [facility]   Date: 2018 7:53 AM Admission: 2018     Impression Plan   1. Acute hypoxic resp failure secondary to below; + Edema and excessive mucous with difficult expectorating   2. S/p Right VATS with decortication secondary to streptococcal empyema; on    3. COPD, with exacerbation   4. DM II  5. HTN  6. hypothyroidism  1. WBC increased today  2. On levaquin/zosyn  3. Steroids same level  4. She is at risk for MDROs, will culture  5. lovenox proph  6. O2 titration above 90%, does not want to go back on high flow. Will try a face tent to maintain saturations   7. PPI proph  8. Diuretics/potassium  9. Brovana/pulmicort    10. pulm toilet~ duonebs/mucomyst/chest PT/mucinex   11. Discussed with attendings/RNs     Daily Progression:    + congestion, able to mobilized but with great difficulty. Not doing IS effectively even after coaching her. Dyspnea is baseline. Off High Flow.      OBJECTIVE:     Vital Signs:       Visit Vitals    /65 (BP 1 Location: Left arm, BP Patient Position: At rest)    Pulse 90    Temp 97.9 °F (36.6 °C)    Resp 18    Ht 5' 4\" (1.626 m)    Wt 83.2 kg (183 lb 6.8 oz)    SpO2 95%    BMI 31.48 kg/m2      Temp (24hrs), Av.3 °F (36.8 °C), Min:97.9 °F (36.6 °C), Max:98.7 °F (37.1 °C)     Intake/Output:     Last shift:      Last 3 shifts:  1901 -  0700  In: 400 [I.V.:400]  Out: 8108 [Urine:4050]        Intake/Output Summary (Last 24 hours) at 18 0753  Last data filed at 18 0400   Gross per 24 hour   Intake              300 ml   Output             3200 ml   Net            -2900 ml       Physical Exam:                                        Exam Findings Other   General: No resp distress noted, appears stated age    HEENT:  No ulcers, JVD not elevated, no cervical LAD    Chest: No pectus deformity, normal chest rise b/l    HEART:  RRR, no murmurs/rubs/gallops    Lungs:  Coarse sounds     ABD: Soft/NT, non rigid mildly distended    EXT: No cyanosis/clubbing/edema, normal peripheral pulses    Skin: No rashes or ulcers, no mottling    Neuro: A/O x 3        Medications:  Current Facility-Administered Medications   Medication Dose Route Frequency    albuterol-ipratropium (DUO-NEB) 2.5 MG-0.5 MG/3 ML  3 mL Nebulization QID RT    acetylcysteine (MUCOMYST) 200 mg/mL (20 %) solution 400 mg  400 mg Nebulization QID RT    morphine injection 2 mg  2 mg IntraVENous Q3H PRN    ALPRAZolam (XANAX) tablet 1 mg  1 mg Oral Q6H PRN    phenol throat spray (CHLORASEPTIC) 1 Spray  1 Spray Oral PRN    guaiFENesin ER (MUCINEX) tablet 1,200 mg  1,200 mg Oral Q12H    albuterol-ipratropium (DUO-NEB) 2.5 MG-0.5 MG/3 ML  3 mL Nebulization Q4H PRN    glipiZIDE SR (GLUCOTROL XL) tablet 5 mg  5 mg Oral BID PRN    glipiZIDE (GLUCOTROL) tablet 2.5 mg  2.5 mg Oral ACB&D    sodium chloride (NS) flush 5-10 mL  5-10 mL IntraVENous Q8H    sodium chloride (NS) flush 5-10 mL  5-10 mL IntraVENous PRN    bisacodyl (DULCOLAX) suppository 10 mg  10 mg Rectal DAILY PRN    polyethylene glycol (MIRALAX) packet 17 g  17 g Oral DAILY    predniSONE (DELTASONE) tablet 40 mg  40 mg Oral DAILY WITH BREAKFAST    vitamin b comp & c no.4 tab 1 Tab  1 Tab Oral DAILY    insulin lispro (HUMALOG) injection   SubCUTAneous AC&HS    glucose chewable tablet 16 g  4 Tab Oral PRN    dextrose (D50W) injection syrg 12.5-25 g  12.5-25 g IntraVENous PRN    glucagon (GLUCAGEN) injection 1 mg  1 mg IntraMUSCular PRN    metoprolol tartrate (LOPRESSOR) tablet 25 mg  25 mg Oral QHS    senna-docusate (PERICOLACE) 8.6-50 mg per tablet 1 Tab  1 Tab Oral DAILY    nystatin (MYCOSTATIN) 100,000 unit/mL oral suspension 500,000 Units  500,000 Units Oral QID    zinc oxide-cod liver oil (DESITIN) 40 % paste (Patient Supplied)   Topical PRN    esomeprazole (NEXIUM) capsule 40 mg (Patient Supplied)  40 mg Oral ACB    promethazine (PHENERGAN) tablet 25 mg (Patient Supplied)  25 mg Oral Q6H PRN    prazosin (MINIPRESS) capsule 2 mg (Patient Supplied)  2 mg Oral BID    sodium chloride (NS) flush 5-10 mL  5-10 mL IntraVENous Q8H    sodium chloride (NS) flush 5-10 mL  5-10 mL IntraVENous PRN    naloxone (NARCAN) injection 0.4 mg  0.4 mg IntraVENous PRN    ondansetron (ZOFRAN) injection 4 mg  4 mg IntraVENous Q4H PRN    enoxaparin (LOVENOX) injection 40 mg  40 mg SubCUTAneous Q24H    arformoterol (BROVANA) neb solution 15 mcg  15 mcg Nebulization BID RT    And    budesonide (PULMICORT) 500 mcg/2 ml nebulizer suspension  500 mcg Nebulization BID RT    DULoxetine (CYMBALTA) capsule 40 mg  40 mg Oral DAILY    primidone (MYSOLINE) tablet 50 mg  50 mg Oral DAILY    primidone (MYSOLINE) tablet 150 mg  150 mg Oral QHS    furosemide (LASIX) tablet 40 mg  40 mg Oral DAILY    montelukast (SINGULAIR) tablet 10 mg  10 mg Oral QHS    ezetimibe/simvastatin (VYTORIN) 10/40 mg   Oral QPM    levoFLOXacin (LEVAQUIN) tablet 750 mg  750 mg Oral QHS    piperacillin-tazobactam (ZOSYN) 3.375 g in 0.9% sodium chloride (MBP/ADV) 100 mL  3.375 g IntraVENous Q8H    levothyroxine (SYNTHROID) tablet 200 mcg  200 mcg Oral ACB    lactobac ac& pc-s.therm-b.anim (ROBIN Q/RISAQUAD)  1 Cap Oral DAILY    oxyCODONE IR (ROXICODONE) tablet 15 mg  15 mg Oral Q4H PRN    PHENobarbital (LUMINAL) tablet 64.8 mg  64.8 mg Oral BID       Labs:  ABG No results for input(s): PHI, PCO2I, PO2I, HCO3I, SO2I, FIO2I in the last 72 hours.      CBC Recent Labs      08/21/18   0440  08/19/18   0447   WBC  17.4*  12.6*   HGB  9.0*  8.5*   HCT  29.3*  27.6*   PLT  408*  380   MCV  100.3*  100.4*   MCH  30.8  60.7        Metabolic  Panel Recent Labs      08/21/18   0440  08/20/18   0414  08/19/18   0447   NA  138  140  138   K  3.3*  3.3*  3.6   CL  99  100  101   CO2  29 31  28   GLU  131*  165*  103*   BUN  10  13  11   CREA  0.93  0.91  0.85   CA  9.6  8.6  8.8   MG  1.9   --    --    PHOS  2.6   --    --    ALB  2.3*   --    --    SGOT  45*   --    --    ALT  71   --    --         Pertinent Labs                ROME Hernandez  8/21/2018

## 2018-08-21 NOTE — TELEPHONE ENCOUNTER
Pt has been in hospital since 7/31/18. She is still in hospital and does not know when she will be discharged. She cancelled her fu for next week but is asking for a phone call back when you get a chance.     [phone]

## 2018-08-22 ENCOUNTER — APPOINTMENT (OUTPATIENT)
Dept: GENERAL RADIOLOGY | Age: 63
DRG: 163 | End: 2018-08-22
Attending: INTERNAL MEDICINE
Payer: MEDICARE

## 2018-08-22 LAB
GLUCOSE BLD STRIP.AUTO-MCNC: 117 MG/DL (ref 65–100)
GLUCOSE BLD STRIP.AUTO-MCNC: 123 MG/DL (ref 65–100)
GLUCOSE BLD STRIP.AUTO-MCNC: 177 MG/DL (ref 65–100)
GLUCOSE BLD STRIP.AUTO-MCNC: 256 MG/DL (ref 65–100)
SERVICE CMNT-IMP: ABNORMAL

## 2018-08-22 PROCEDURE — 74011636637 HC RX REV CODE- 636/637: Performed by: PHYSICIAN ASSISTANT

## 2018-08-22 PROCEDURE — 74011000250 HC RX REV CODE- 250: Performed by: INTERNAL MEDICINE

## 2018-08-22 PROCEDURE — 65660000000 HC RM CCU STEPDOWN

## 2018-08-22 PROCEDURE — 74011250636 HC RX REV CODE- 250/636: Performed by: HOSPITALIST

## 2018-08-22 PROCEDURE — 97530 THERAPEUTIC ACTIVITIES: CPT | Performed by: PHYSICAL THERAPIST

## 2018-08-22 PROCEDURE — 74011000250 HC RX REV CODE- 250: Performed by: PHYSICIAN ASSISTANT

## 2018-08-22 PROCEDURE — 74011000258 HC RX REV CODE- 258: Performed by: THORACIC SURGERY (CARDIOTHORACIC VASCULAR SURGERY)

## 2018-08-22 PROCEDURE — 74011250637 HC RX REV CODE- 250/637: Performed by: HOSPITALIST

## 2018-08-22 PROCEDURE — 97116 GAIT TRAINING THERAPY: CPT | Performed by: PHYSICAL THERAPIST

## 2018-08-22 PROCEDURE — 82962 GLUCOSE BLOOD TEST: CPT

## 2018-08-22 PROCEDURE — 97161 PT EVAL LOW COMPLEX 20 MIN: CPT | Performed by: PHYSICAL THERAPIST

## 2018-08-22 PROCEDURE — 74011250637 HC RX REV CODE- 250/637: Performed by: NURSE PRACTITIONER

## 2018-08-22 PROCEDURE — 74011000250 HC RX REV CODE- 250: Performed by: THORACIC SURGERY (CARDIOTHORACIC VASCULAR SURGERY)

## 2018-08-22 PROCEDURE — 97535 SELF CARE MNGMENT TRAINING: CPT | Performed by: OCCUPATIONAL THERAPIST

## 2018-08-22 PROCEDURE — 74011636637 HC RX REV CODE- 636/637: Performed by: NURSE PRACTITIONER

## 2018-08-22 PROCEDURE — 97165 OT EVAL LOW COMPLEX 30 MIN: CPT | Performed by: OCCUPATIONAL THERAPIST

## 2018-08-22 PROCEDURE — 74011250636 HC RX REV CODE- 250/636: Performed by: THORACIC SURGERY (CARDIOTHORACIC VASCULAR SURGERY)

## 2018-08-22 PROCEDURE — 94640 AIRWAY INHALATION TREATMENT: CPT

## 2018-08-22 PROCEDURE — 74011250637 HC RX REV CODE- 250/637: Performed by: INTERNAL MEDICINE

## 2018-08-22 PROCEDURE — 74011000250 HC RX REV CODE- 250: Performed by: NURSE PRACTITIONER

## 2018-08-22 PROCEDURE — 71045 X-RAY EXAM CHEST 1 VIEW: CPT

## 2018-08-22 PROCEDURE — 74011250637 HC RX REV CODE- 250/637: Performed by: THORACIC SURGERY (CARDIOTHORACIC VASCULAR SURGERY)

## 2018-08-22 RX ORDER — POTASSIUM CHLORIDE 750 MG/1
20 TABLET, FILM COATED, EXTENDED RELEASE ORAL DAILY
Status: DISCONTINUED | OUTPATIENT
Start: 2018-08-23 | End: 2018-08-23

## 2018-08-22 RX ADMIN — IPRATROPIUM BROMIDE AND ALBUTEROL SULFATE 3 ML: .5; 3 SOLUTION RESPIRATORY (INHALATION) at 21:24

## 2018-08-22 RX ADMIN — ACETYLCYSTEINE 400 MG: 200 SOLUTION ORAL; RESPIRATORY (INHALATION) at 13:20

## 2018-08-22 RX ADMIN — BUDESONIDE 500 MCG: 0.5 INHALANT RESPIRATORY (INHALATION) at 08:02

## 2018-08-22 RX ADMIN — MORPHINE SULFATE 2 MG: 2 INJECTION, SOLUTION INTRAMUSCULAR; INTRAVENOUS at 07:20

## 2018-08-22 RX ADMIN — NYSTATIN 500000 UNITS: 100000 SUSPENSION ORAL at 18:14

## 2018-08-22 RX ADMIN — Medication 10 ML: at 14:00

## 2018-08-22 RX ADMIN — OXYCODONE HYDROCHLORIDE 15 MG: 5 TABLET ORAL at 03:04

## 2018-08-22 RX ADMIN — PIPERACILLIN AND TAZOBACTAM 3.38 G: 3; .375 INJECTION, POWDER, FOR SOLUTION INTRAVENOUS at 09:49

## 2018-08-22 RX ADMIN — NYSTATIN 500000 UNITS: 100000 SUSPENSION ORAL at 09:50

## 2018-08-22 RX ADMIN — DULOXETINE HYDROCHLORIDE 40 MG: 20 CAPSULE, DELAYED RELEASE ORAL at 09:50

## 2018-08-22 RX ADMIN — Medication 10 ML: at 07:16

## 2018-08-22 RX ADMIN — Medication 10 ML: at 22:39

## 2018-08-22 RX ADMIN — STANDARDIZED SENNA CONCENTRATE AND DOCUSATE SODIUM 1 TABLET: 8.6; 5 TABLET, FILM COATED ORAL at 09:50

## 2018-08-22 RX ADMIN — ENOXAPARIN SODIUM 40 MG: 100 INJECTION SUBCUTANEOUS at 09:50

## 2018-08-22 RX ADMIN — PRIMIDONE 50 MG: 50 TABLET ORAL at 07:15

## 2018-08-22 RX ADMIN — ACETYLCYSTEINE 400 MG: 200 SOLUTION ORAL; RESPIRATORY (INHALATION) at 08:02

## 2018-08-22 RX ADMIN — Medication 1 CAPSULE: at 09:50

## 2018-08-22 RX ADMIN — PHENOBARBITAL 64.8 MG: 64.8 TABLET ORAL at 07:14

## 2018-08-22 RX ADMIN — GUAIFENESIN 1200 MG: 600 TABLET, EXTENDED RELEASE ORAL at 09:50

## 2018-08-22 RX ADMIN — SIMVASTATIN: 20 TABLET, FILM COATED ORAL at 18:14

## 2018-08-22 RX ADMIN — PRAZOSIN HYDROCHLORIDE 2 MG: 1 CAPSULE ORAL at 10:15

## 2018-08-22 RX ADMIN — IPRATROPIUM BROMIDE AND ALBUTEROL SULFATE 3 ML: .5; 3 SOLUTION RESPIRATORY (INHALATION) at 13:19

## 2018-08-22 RX ADMIN — IPRATROPIUM BROMIDE AND ALBUTEROL SULFATE 3 ML: .5; 3 SOLUTION RESPIRATORY (INHALATION) at 08:02

## 2018-08-22 RX ADMIN — Medication 1 TABLET: at 09:00

## 2018-08-22 RX ADMIN — LEVOFLOXACIN 750 MG: 750 TABLET, FILM COATED ORAL at 21:36

## 2018-08-22 RX ADMIN — NYSTATIN 500000 UNITS: 100000 SUSPENSION ORAL at 12:09

## 2018-08-22 RX ADMIN — IPRATROPIUM BROMIDE AND ALBUTEROL SULFATE 3 ML: .5; 3 SOLUTION RESPIRATORY (INHALATION) at 17:43

## 2018-08-22 RX ADMIN — MORPHINE SULFATE 2 MG: 2 INJECTION, SOLUTION INTRAMUSCULAR; INTRAVENOUS at 10:25

## 2018-08-22 RX ADMIN — PIPERACILLIN AND TAZOBACTAM 3.38 G: 3; .375 INJECTION, POWDER, FOR SOLUTION INTRAVENOUS at 17:13

## 2018-08-22 RX ADMIN — GLIPIZIDE 2.5 MG: 5 TABLET ORAL at 17:13

## 2018-08-22 RX ADMIN — LEVOTHYROXINE SODIUM 200 MCG: 100 TABLET ORAL at 07:16

## 2018-08-22 RX ADMIN — ACETYLCYSTEINE 400 MG: 200 SOLUTION ORAL; RESPIRATORY (INHALATION) at 21:24

## 2018-08-22 RX ADMIN — NYSTATIN 500000 UNITS: 100000 SUSPENSION ORAL at 21:36

## 2018-08-22 RX ADMIN — MONTELUKAST SODIUM 10 MG: 10 TABLET, FILM COATED ORAL at 21:36

## 2018-08-22 RX ADMIN — ESOMEPRAZOLE MAGNESIUM 40 MG: 40 CAPSULE, DELAYED RELEASE ORAL at 08:22

## 2018-08-22 RX ADMIN — PHENOBARBITAL 64.8 MG: 64.8 TABLET ORAL at 18:15

## 2018-08-22 RX ADMIN — ACETYLCYSTEINE 400 MG: 200 SOLUTION ORAL; RESPIRATORY (INHALATION) at 17:43

## 2018-08-22 RX ADMIN — PIPERACILLIN AND TAZOBACTAM 3.38 G: 3; .375 INJECTION, POWDER, FOR SOLUTION INTRAVENOUS at 00:25

## 2018-08-22 RX ADMIN — INSULIN LISPRO 7 UNITS: 100 INJECTION, SOLUTION INTRAVENOUS; SUBCUTANEOUS at 12:09

## 2018-08-22 RX ADMIN — ARFORMOTEROL TARTRATE 15 MCG: 15 SOLUTION RESPIRATORY (INHALATION) at 21:31

## 2018-08-22 RX ADMIN — Medication 10 ML: at 07:17

## 2018-08-22 RX ADMIN — ALPRAZOLAM 1 MG: 0.5 TABLET ORAL at 10:10

## 2018-08-22 RX ADMIN — METOPROLOL TARTRATE 25 MG: 25 TABLET ORAL at 21:36

## 2018-08-22 RX ADMIN — PRAZOSIN HYDROCHLORIDE 2 MG: 1 CAPSULE ORAL at 18:17

## 2018-08-22 RX ADMIN — PRIMIDONE 150 MG: 50 TABLET ORAL at 18:15

## 2018-08-22 RX ADMIN — BUDESONIDE 500 MCG: 0.5 INHALANT RESPIRATORY (INHALATION) at 21:31

## 2018-08-22 RX ADMIN — POLYETHYLENE GLYCOL 3350 17 G: 17 POWDER, FOR SOLUTION ORAL at 09:00

## 2018-08-22 RX ADMIN — MORPHINE SULFATE 2 MG: 2 INJECTION, SOLUTION INTRAMUSCULAR; INTRAVENOUS at 15:03

## 2018-08-22 RX ADMIN — GUAIFENESIN 1200 MG: 600 TABLET, EXTENDED RELEASE ORAL at 21:36

## 2018-08-22 RX ADMIN — OXYCODONE HYDROCHLORIDE 15 MG: 5 TABLET ORAL at 21:36

## 2018-08-22 RX ADMIN — GLIPIZIDE 2.5 MG: 5 TABLET ORAL at 07:12

## 2018-08-22 RX ADMIN — PREDNISONE 40 MG: 20 TABLET ORAL at 07:15

## 2018-08-22 NOTE — PROGRESS NOTES
Problem: Falls - Risk of  Goal: *Absence of Falls  Document Palma Fall Risk and appropriate interventions in the flowsheet. Outcome: Progressing Towards Goal  Fall Risk Interventions:  Mobility Interventions: Communicate number of staff needed for ambulation/transfer    Mentation Interventions: Familiar objects from home, Bed/chair exit alarm, More frequent rounding, Room close to nurse's station    Medication Interventions: Assess postural VS orthostatic hypotension, Evaluate medications/consider consulting pharmacy, Patient to call before getting OOB    Elimination Interventions: Call light in reach, Toilet paper/wipes in reach, Toileting schedule/hourly rounds    History of Falls Interventions: Bed/chair exit alarm, Consult care management for discharge planning, Evaluate medications/consider consulting pharmacy        Problem: Pressure Injury - Risk of  Goal: *Prevention of pressure injury  Document Joseph Scale and appropriate interventions in the flowsheet. Outcome: Progressing Towards Goal  Pressure Injury Interventions:  Sensory Interventions: Assess changes in LOC, Pressure redistribution bed/mattress (bed type), Turn and reposition approx. every two hours (pillows and wedges if needed), Assess need for specialty bed    Moisture Interventions: Absorbent underpads, Check for incontinence Q2 hours and as needed    Activity Interventions: Pressure redistribution bed/mattress(bed type), Increase time out of bed    Mobility Interventions: HOB 30 degrees or less, Pressure redistribution bed/mattress (bed type), Turn and reposition approx.  every two hours(pillow and wedges)    Nutrition Interventions: Document food/fluid/supplement intake    Friction and Shear Interventions: Lift sheet

## 2018-08-22 NOTE — PROGRESS NOTES
0100 - pt assigned, Chart opened to review before receiving report, questioning pt condition and room assignment. Hospitalitis paged    [facility] paged, awaiting call back. 0200 - TRANSFER - IN REPORT:    Verbal report received from [address] Reidsharon (name) on Eduard Campos  being received from ICU (unit) for routine progression of care      Report consisted of patients Situation, Background, Assessment and   Recommendations(SBAR). Information from the following report(s) SBAR, Kardex, Procedure Summary, Intake/Output, MAR, Accordion and Recent Results was reviewed with the receiving nurse. Opportunity for questions and clarification was provided. Assessment completed upon patients arrival to unit and care assumed. 0230 - waiting for pt to be transferred    0300 - waiting for pt to be transferred    0318 - pt admitted to floor  Primary Nurse Lexx Mcpherson and Arnoldo Hernandez RN performed a dual skin assessment on this patient No impairment noted  Joseph score is 17;    Weight change: 0 kg (0 lb)  Last 3 Recorded Weights in this Encounter    08/21/18 0500 08/21/18 1401 08/22/18 0318   Weight: 83.2 kg (183 lb 6.8 oz) 83.2 kg (183 lb 6.8 oz) 80.3 kg (177 lb 0.5 oz)       Bedside and Verbal shift change report given to Mimi Aguirre [address] (oncoming nurse) by Maira Weldon RN (offgoing nurse). Report included the following information SBAR, Kardex, Intake/Output, MAR, Accordion and Recent Results.

## 2018-08-22 NOTE — PROGRESS NOTES
01:10- Attempted to call report. 01:40- Attempted to call report. 02:30- Waiting on transport monitor. 02:52- Still waiting on transport monitor.

## 2018-08-22 NOTE — PROGRESS NOTES
Problem: Self Care Deficits Care Plan (Adult)  Goal: *Acute Goals and Plan of Care (Insert Text)  Occupational Therapy Goals  Initiated 8/22/2018  1. Patient will tolerate > or = 5 minutes functional activity without rest and heart rate < or = 120 bpm and oxygen sats > or = 92% on room air within 7 day(s). 2.  Patient will perform lower body dressing with minimal assistance/contact guard assist within 7 day(s). 3.  Patient will perform toilet transfers with minimal assistance/contact guard assist and best equipment within 7 day(s). 4.  Patient will perform all aspects of toileting with minimal assistance/contact guard assist within 7 day(s). 5.  Patient will participate in upper extremity therapeutic exercise/activities with supervision/set-up for 10 minutes within 7 day(s). Occupational Therapy EVALUATION  Patient: Kelin Weller (65 y.o. female)  Date: 8/22/2018  Primary Diagnosis: resp. insufficiency  Empyema (HCC)  Procedure(s) (LRB):  VIDEO ASSISTED THORACOSCOPY--RIGHT--WITH DECORTICATION (Right) 9 Days Post-Op   Precautions:   Fall    ASSESSMENT :  Based on the objective data described below, the patient presents with relief to be out of bed, presents with increased need for oxygen and heartrate in 140's with minimal exertion. Performed stand pivot transfer with min assist, max assist toileting on bedside commode. Disposition dependent on progress, if makes good progress may be able to go home with home health, however may need rehab. Patient will benefit from skilled intervention to address the above impairments.   Patients rehabilitation potential is considered to be Fair  Factors which may influence rehabilitation potential include:   []             None noted  []             Mental ability/status  [x]             Medical condition  []             Home/family situation and support systems  []             Safety awareness  []             Pain tolerance/management  []             Other: PLAN :  Recommendations and Planned Interventions:  [x]               Self Care Training                  [x]        Therapeutic Activities  [x]               Functional Mobility Training    []        Cognitive Retraining  [x]               Therapeutic Exercises           [x]        Endurance Activities  [x]               Balance Training                   []        Neuromuscular Re-Education  []               Visual/Perceptual Training     [x]   Home Safety Training  [x]               Patient Education                 [x]        Family Training/Education  []               Other (comment):    Frequency/Duration: Patient will be followed by occupational therapy 5 times a week to address goals. Discharge Recommendations: Rehab, Home Health and To Be Determined  Further Equipment Recommendations for Discharge: none/to be determined     SUBJECTIVE:   Patient stated I'm so excited to be out of bed.     OBJECTIVE DATA SUMMARY:   HISTORY:   Past Medical History:   Diagnosis Date    Anxiety     Bone spur     NECK    COPD     Depression     Endocrine disease     hypothyroidism    Fibromyalgia     Fusion of spine of cervical region     Gastrointestinal disorder     gerd    Hyperlipidemia     Hypothyroid     Morbid obesity ([facility] [address]     Osteoarthritis     PUD (peptic ulcer disease)     Tobacco abuse      Past Surgical History:   Procedure Laterality Date    BRONCHOSCOPY-FIBER/THERAPY  4/3/2015         CARDIAC CATHETERIZATION  2013         COLONOSCOPY,DIAGNOSTIC  10/30/2014         HX CATARACT REMOVAL      bilateral    HX GYN          HX ORTHOPAEDIC      Ruptured disc, knuckles on right hand    UPPER GI ENDOSCOPY,BIOPSY  10/30/2014         UPPER GI ENDOSCOPY,DILATN W GUIDE  10/30/2014            Prior Level of Function/Environment/Context: lives with her  hx of PTSD, essential tremor, no adaptive equipment for mobility, independent basic ADL's  Occupations in which the patient is/was successful, what are the barriers preventing that success:   Performance Patterns (routines, roles, habits, and rituals):   Personal Interests and/or values:   Expanded or extensive additional review of patient history:     Home Situation  Home Environment: Private residence  # Steps to Enter: 4  Rails to Enter: Yes  Hand Rails : Bilateral  One/Two Story Residence: One story  Living Alone: No  Support Systems: Family member(s)  Patient Expects to be Discharged to[de-identified] Private residence  Current DME Used/Available at Home: Grab bars (walking sticks)  Tub or Shower Type: Tub/Shower combination    Hand dominance: Right    EXAMINATION OF PERFORMANCE DEFICITS:  Cognitive/Behavioral Status:           Perception: Appears intact  Perseveration: No perseveration noted  Safety/Judgement: Awareness of environment; Fall prevention;Home safety; Insight into deficits    Skin: intact    Edema: mild LE    Hearing: Auditory  Auditory Impairment: None    Vision/Perceptual:                                Corrective Lenses: Glasses    Range of Motion:  AROM: Generally decreased, functional                         Strength:  Strength: Generally decreased, functional                Coordination:   decreased due to essential tremor               Balance:  Sitting: Intact  Standing: Impaired  Standing - Static: Good  Standing - Dynamic : Fair    Functional Mobility and Transfers for ADLs:  Bed Mobility:  Supine to Sit: Minimum assistance;Assist x1  Scooting: Supervision    Transfers:  Sit to Stand: Contact guard assistance  Stand to Sit: Contact guard assistance  Bed to Chair: Contact guard assistance;Minimum assistance;Assist x1  Toilet Transfer : Minimum assistance;Assist x1;Adaptive equipment; Additional time    ADL Assessment:  Feeding: Modified independent    Oral Facial Hygiene/Grooming: Setup (seated)    Bathing:  Moderate assistance (limited by decareased activity tolerance)    Upper Body Dressing: Contact guard assistance    Lower Body Dressing: Moderate assistance;Maximum assistance    Toileting: Maximum assistance; Additional time                ADL Intervention and task modifications:     Educated on role of OT, benefit of increased time out of bed, pursed lip breathing, fall prevention, safe footwear    Patient instructed and indicated understanding the benefits of maintaining activity tolerance, functional mobility, and independence with self care tasks during acute stay  to ensure safe return home and to baseline. Encouraged patient to increase frequency and duration OOB, be out of bed for all meals, perform daily ADLs (as approved by RN/MD regarding bathing etc), and performing functional mobility to/from bedside commode       Cognitive Retraining  Safety/Judgement: Awareness of environment; Fall prevention;Home safety; Insight into deficits       Functional Measure:  Barthel Index:    Bathin  Bladder: 5  Bowels: 5  Groomin  Dressin  Feeding: 10  Mobility: 0  Stairs: 0  Toilet Use: 5  Transfer (Bed to Chair and Back): 10  Total: 45       Barthel and G-code impairment scale:  Percentage of impairment CH  0% CI  1-19% CJ  20-39% CK  40-59% CL  60-79% CM  80-99% CN  100%   Barthel Score 0-100 100 99-80 79-60 59-40 20-39 1-19   0   Barthel Score 0-20 20 17-19 13-16 9-12 5-8 1-4 0      The Barthel ADL Index: Guidelines  1. The index should be used as a record of what a patient does, not as a record of what a patient could do. 2. The main aim is to establish degree of independence from any help, physical or verbal, however minor and for whatever reason. 3. The need for supervision renders the patient not independent. 4. A patient's performance should be established using the best available evidence. Asking the patient, friends/relatives and nurses are the usual sources, but direct observation and common sense are also important. However direct testing is not needed.   5. Usually the patient's performance over the preceding 24-48 hours is important, but occasionally longer periods will be relevant. 6. Middle categories imply that the patient supplies over 50 per cent of the effort. 7. Use of aids to be independent is allowed. Jerry Sal., Barthel, D.W. ([id number]). Functional evaluation: the Barthel Index. [address] [facility] (14)2. HUI Sparks, Jeffrey Nathan., Bessie Brar., Joanna, [address] [place] [address] (1999). Measuring the change indisability after inpatient rehabilitation; comparison of the responsiveness of the Barthel Index and Functional [place] Measure. Journal of Neurology, Neurosurgery, and Psychiatry, 66(4), 278-612. Rupert De La Rosa, N.J.A, JESIKA Maynard, & Dayana Cuevas MDENIS. (2004.) Assessment of post-stroke quality of life in cost-effectiveness studies: The usefulness of the Barthel Index and the EuroQoL-5D. Quality of Life Research, 13, 274-22         G codes: In compliance with CMSs Claims Based Outcome Reporting, the following G-code set was chosen for this patient based on their primary functional limitation being treated: The outcome measure chosen to determine the severity of the functional limitation was the Barthel Index with a score of 45/100 which was correlated with the impairment scale. ?  Self Care:     - CURRENT STATUS: CK - 40%-59% impaired, limited or restricted    - GOAL STATUS: CJ - 20%-39% impaired, limited or restricted    - D/C STATUS:  ---------------To be determined---------------     Occupational Therapy Evaluation Charge Determination   History Examination Decision-Making   LOW Complexity : Brief history review  LOW Complexity : 1-3 performance deficits relating to physical, cognitive , or psychosocial skils that result in activity limitations and / or participation restrictions  LOW Complexity : No comorbidities that affect functional and no verbal or physical assistance needed to complete eval tasks       Based on the above components, the patient evaluation is determined to be of the following complexity level: LOW   Pain:    no complaint  Activity Tolerance:   Fair to poor, patient motivated however heartrate increased to 140s with minimal activity  Please refer to the flowsheet for vital signs taken during this treatment. After treatment:   [x] Patient left in no apparent distress sitting up in chair  [] Patient left in no apparent distress in bed  [x] Call bell left within reach  [x] Nursing notified  [] Caregiver present  [] Bed alarm activated    COMMUNICATION/EDUCATION:   The patients plan of care was discussed with: Physical Therapist and Registered Nurse. [x] Home safety education was provided and the patient/caregiver indicated understanding. [x] Patient/family have participated as able in goal setting and plan of care. [] Patient/family agree to work toward stated goals and plan of care. [] Patient understands intent and goals of therapy, but is neutral about his/her participation. [] Patient is unable to participate in goal setting and plan of care. This patients plan of care is appropriate for delegation to [facility].     Thank you for this referral.  Josiane Joyce, OTR/L  Time Calculation: 28 mins

## 2018-08-22 NOTE — PROGRESS NOTES
Problem: Mobility Impaired (Adult and Pediatric)  Goal: *Acute Goals and Plan of Care (Insert Text)  Physical Therapy Goals  Initiated 8/22/2018  1. Patient will move from supine to sit and sit to supine  in bed with modified independence within 7 day(s). 2.  Patient will transfer from bed to chair and chair to bed with modified independence using the least restrictive device within 7 day(s). 3.  Patient will perform sit to stand with modified independence within 7 day(s). 4.  Patient will ambulate with modified independence for 150 feet with the least restrictive device within 7 day(s). 5.  Patient will ascend/descend 4 stairs with 1 handrail(s) with modified independence within 7 day(s). physical Therapy EVALUATION  Patient: Sumaya Zepeda (84 y.o. female)  Date: 8/22/2018  Primary Diagnosis: resp. insufficiency  Empyema (HCC)  Procedure(s) (LRB):  VIDEO ASSISTED THORACOSCOPY--RIGHT--WITH DECORTICATION (Right) 9 Days Post-Op   Precautions:   Fall    ASSESSMENT :  Based on the objective data described below, the patient presents with decreased functional mobility from baseline level of function. Prior to admit patient was living with  in a 1 level home with approx 4 MEDINA and rails. States she was not ambulating with an assistive device at baseline. Currently needing Juana for bed mobility and CGA for transfers. Has baseline essential tremors which limits her mobility at times. Amb approx 8 feet without an assistive device with Juana x 1 from bed to chair. Did not ambulate further secondary to  and SpO2 90% on 6 L O2. Based on current level of function recommend [facility] PT vs rehab setting pending progress. Mobility currently limited by poor activity tolerance. Patient will benefit from skilled intervention to address the above impairments.   Patients rehabilitation potential is considered to be Good  Factors which may influence rehabilitation potential include:   []         None noted  [] Mental ability/status  [x]         Medical condition  []         Home/family situation and support systems  []         Safety awareness  []         Pain tolerance/management  []         Other:      PLAN :  Recommendations and Planned Interventions:  [x]           Bed Mobility Training             [x]    Neuromuscular Re-Education  [x]           Transfer Training                   []    Orthotic/Prosthetic Training  [x]           Gait Training                         []    Modalities  [x]           Therapeutic Exercises           []    Edema Management/Control  [x]           Therapeutic Activities            [x]    Patient and Family Training/Education  []           Other (comment):    Frequency/Duration: Patient will be followed by physical therapy  5 times a week to address goals. Discharge Recommendations: Home Health  Further Equipment Recommendations for Discharge: TBD     SUBJECTIVE:   Patient stated I just really want to get out of the bed.     OBJECTIVE DATA SUMMARY:   HISTORY:    Past Medical History:   Diagnosis Date    Anxiety     Bone spur     NECK    COPD     Depression     Endocrine disease     hypothyroidism    Fibromyalgia     Fusion of spine of cervical region     Gastrointestinal disorder     gerd    Hyperlipidemia     Hypothyroid     Morbid obesity ([facility] [address]     Osteoarthritis     PUD (peptic ulcer disease)     Tobacco abuse      Past Surgical History:   Procedure Laterality Date    BRONCHOSCOPY-FIBER/THERAPY  4/3/2015         CARDIAC CATHETERIZATION  2013         COLONOSCOPY,DIAGNOSTIC  10/30/2014         HX CATARACT REMOVAL      bilateral    HX GYN          HX ORTHOPAEDIC      Ruptured disc, knuckles on right hand    UPPER GI ENDOSCOPY,BIOPSY  10/30/2014         UPPER GI ENDOSCOPY,DILATN W GUIDE  10/30/2014          Prior Level of Function/Home Situation: Independent with mobility at baseline per patient report. Does no have any assistive device.   Lives with  but he works during the day  Personal factors and/or comorbidities impacting plan of care:     Home Situation  Home Environment: Private residence  # Steps to Enter: 4  Rails to Enter: Yes  Hand Rails : Bilateral  One/Two Story Residence: One story  Living Alone: No  Support Systems: Family member(s)  Patient Expects to be Discharged to[de-identified] Private residence  Current DME Used/Available at Home: Grab bars (walking sticks)  Tub or Shower Type: Tub/Shower combination    EXAMINATION/PRESENTATION/DECISION MAKING:   Critical Behavior:  Neurologic State: Alert  Orientation Level: Oriented X4  Cognition: Appropriate for age attention/concentration, Appropriate decision making, Appropriate safety awareness, Follows commands     Hearing: Auditory  Auditory Impairment: None    Range Of Motion:  AROM: Generally decreased, functional                       Strength:    Strength: Generally decreased, functional  Functional Mobility:  Bed Mobility:     Supine to Sit: Minimum assistance;Assist x1     Scooting: Supervision  Transfers:  Sit to Stand: Contact guard assistance  Stand to Sit: Contact guard assistance                       Balance:   Sitting: Intact  Standing: Impaired  Standing - Static: Good  Standing - Dynamic : Fair  Ambulation/Gait Training:  Distance (ft): 8 Feet (ft) (bed to bedside chair)  Assistive Device: Gait belt  Ambulation - Level of Assistance: Minimal assistance;Assist x1     Gait Description (WDL): Exceptions to WDL  Gait Abnormalities: Decreased step clearance;Shuffling gait        Base of Support: Widened     Speed/Awa: Pace decreased (<100 feet/min); Slow  Step Length: Left shortened;Right shortened       Functional Measure:  Barthel Index:    Bathin  Bladder: 5  Bowels: 5  Groomin  Dressin  Feeding: 10  Mobility: 0  Stairs: 0  Toilet Use: 5  Transfer (Bed to Chair and Back): 10  Total: 45       Barthel and G-code impairment scale:  Percentage of impairment CH  0% CI  1-19% CJ  20-39% CK  40-59% CL  60-79% CM  80-99% CN  100%   Barthel Score 0-100 100 99-80 79-60 59-40 20-39 1-19   0   Barthel Score 0-20 20 17-19 13-16 9-12 5-8 1-4 0      The Barthel ADL Index: Guidelines  1. The index should be used as a record of what a patient does, not as a record of what a patient could do. 2. The main aim is to establish degree of independence from any help, physical or verbal, however minor and for whatever reason. 3. The need for supervision renders the patient not independent. 4. A patient's performance should be established using the best available evidence. Asking the patient, friends/relatives and nurses are the usual sources, but direct observation and common sense are also important. However direct testing is not needed. 5. Usually the patient's performance over the preceding 24-48 hours is important, but occasionally longer periods will be relevant. 6. Middle categories imply that the patient supplies over 50 per cent of the effort. 7. Use of aids to be independent is allowed. Taylor Clemente., Barthel, D.W. (0623). Functional evaluation: the Barthel Index. [address] [facility] (14)2. Ortega Neville luís Norma, TRAVONF, Tamela Walsh., Dwayne Baltazar., Jacob, [address] [place] [address] (1999). Measuring the change indisability after inpatient rehabilitation; comparison of the responsiveness of the Barthel Index and Functional [place] Measure. Journal of Neurology, Neurosurgery, and Psychiatry, 66(4), 487-368. Nola Carlisle, N.[place].MICHAEL, JESIKA Maynard, & Stefano Vasquez MDENIS. (2004.) Assessment of post-stroke quality of life in cost-effectiveness studies: The usefulness of the Barthel Index and the EuroQoL-5D. Quality of Life Research, 13, 341-76         G codes: In compliance with CMSs Claims Based Outcome Reporting, the following G-code set was chosen for this patient based on their primary functional limitation being treated:     The outcome measure chosen to determine the severity of the functional limitation was the Barthel with a score of 45/100 which was correlated with the impairment scale. ? Mobility - Walking and Moving Around:     - CURRENT STATUS: CK - 40%-59% impaired, limited or restricted    - GOAL STATUS: CJ - 20%-39% impaired, limited or restricted    - D/C STATUS:  ---------------To be determined---------------          Pain:  Pain Scale 1: Numeric (0 - 10)  Pain Intensity 1: 7           Pain Intervention(s) 1: Repositioned  Activity Tolerance:   SpO2 90% on 6 L O2 with activity  Please refer to the flowsheet for vital signs taken during this treatment. After treatment:   [x]         Patient left in no apparent distress sitting up in chair  []         Patient left in no apparent distress in bed  [x]         Call bell left within reach  [x]         Nursing notified  []         Caregiver present  []         Bed alarm activated    COMMUNICATION/EDUCATION:   The patients plan of care was discussed with: Physical Therapist, Occupational Therapist and Registered Nurse. [x]         Fall prevention education was provided and the patient/caregiver indicated understanding. [x]         Patient/family have participated as able in goal setting and plan of care. [x]         Patient/family agree to work toward stated goals and plan of care. []         Patient understands intent and goals of therapy, but is neutral about his/her participation. []         Patient is unable to participate in goal setting and plan of care.     Thank you for this referral.  Silke Hess, PT, DPT   Time Calculation: 26 mins

## 2018-08-22 NOTE — PROGRESS NOTES
Problem: Breathing Pattern - Ineffective  Goal: *Absence of hypoxia  Outcome: Progressing Towards Goal  Pt on 5L NC. Pt Bp running in the 51-55C Systolic. Chest xray shows improvement per MD. PT is not a candidate for incentive spirometry, pt reports mouthpiece cause rash inside mouth. Will Continue to monitor and educate. 36: Called Dr. Ny Madrigal about pt's bp, orders received to hold a.m lasix. 1000: Pt's HR went to 130s and resp rate in the 30s , xanax was given. Dr. Ny Madrigal called to the bedside. 1015: Dr. Germaine Harrell at the bedside, order received to give pt Minipress and 2mg morphine. Bp 126/73  sats 88%. Bedside shift change report given to [address] (oncoming nurse) by Saint [place]olla RN (offgoing nurse). Report included the following information SBAR, Kardex, Intake/Output, MAR, Accordion, Recent Results, Med Rec Status, Cardiac Rhythm NSR and Alarm Parameters .

## 2018-08-22 NOTE — PROGRESS NOTES
Hospitalist Progress Note  Ophelia Luz MD  Answering service: [phone] OR [phone] from in house phone      Date of Service:  2018  NAME:  Ganesh Telles  :  1955  MRN:  [id number]      Admission Summary:   Nalini Fortune a 58 y. o. female who presented with severe left sided chest pain on  to [facility].  Pt reported she was treated with doxycycline/prednisone in  for bronchitis. Meena Forbes was unable to complete doxycycline secondary to diarrhea which she still had on admission. Pt was admitted with acute hypoxic respiratory failure, severe sepsis 2/2 bilateral pna, Right Para pneumonic pleural effusion. Pt was evaluated by pulmonary and she underwent a thoracentesis with chest tube placement .  She was transferred to [facility] on  for VAT's on  with Dr. Ezequiel Hubbard. [facility] team was consulted for medical management at that time. Pt s/p VATs with decortication on 18. Pt known to have depression, anxiety, PTSD, essential tremor. At the early morning of  developed Acute respiratory failure with hypoxia.  Transferre to ICU with bipap. Currently pt on high flow, but still high risk of decompensation. She is extremely anxious, on xanax 1mg q8 prn, but not help.       Interval history / Subjective:     Pt seen at bedside in the room, she was having a panic attack gentle counseling and reassurance given and she is more calmer now. I have advised to give her medications for ptsd which she said is minipress. Assessment & Plan:     Acute hypoxic Respiratory Failure - Improved  - multifactorial: PNA, empyema, copd exacerbation  - retained secretions with some basilar ATX.  Needs aggressive pulm toilet!  -continue ICU, further management per pulmonologist, consideration for bronch if not able to mobilize secretions,      RLL PNA  - with dense consolidation and possible areas of necrosis.  Consolidation of LLL as well  -abx per pulmonologist    -S/P Bronch On 8/21 for Mucus Plugging  -Repeat cxr on 8/22 showing improving fields.  -On ABX       Streptococcal Right Sided Empyema  - per primary team  - s/p VATs with decortication on 8/13/18  -Surgery has signed off  _pulmonary following       Suspected underlying COPD - Acute exacerbation  - former smoker  -nebs and prednisone taper       NIDDM 2  - poc, ssi, diabetic diet  - A1c 7.4  - on  glipizide at 2.5mg bid  -follow   -Hypoglycemia Protocol      Anemia  -Could be related to acute illness and IVF  - 8/15 not iron deficient, d/c ivf      Asymptomatic hypotension on am of 8/14   - s/p albumin, bp improved to low 100's from high 80's  - pt was receiving metoprolol bid, she only takes this at bedtime, this has been adjusted  As she reports she takes it for chronic tachycardia and not for blood pressure.       Orthostatic Hypotension  Pt reports she takes bb for tachycardia and not htn  - c/w metoprolol, pt takes at bedtime,  hx of orthostatic hypotension - c/w minipress  Check Thyroid panel       Diarrhea on admission which has resolved      Constipation Opoid induced   - chronic opiates  - bowel regimen       Fibromyalgia  - on chronic opiates for back pain  - pain control, lidocaine patch      Obese   - bmi 34, counseled on weight loss      Hypothyroidism - c/w levothyroxine  Check Thyroid panel       Familial HLD - c/w vytorin      Depression/anxiety/PTSD - c/w cymbalta,  Seen By Psychiatry  Meds adjusted   Continue with xanax and minipress - not given for blood pressure, wrote on room board      Essential tremor - c/w primidone, phenobarbital         Code status: Full  DVT prophylaxis: Lovneonx  Pt lives her , Home oxygen has been setup    Care Plan discussed with: Patient/Family and Nurse  Patient has given Verbal permission to discuss medical care with   persons present in the room and and also with contact as listed on face sheet.    Disposition: TBD     Hospital Problems Date Reviewed: 8/17/2018          Codes Class Noted POA    Right-sided chest pain ICD-10-CM: R07.9  ICD-9-CM: 786.50  Unknown Unknown        Chronic pain syndrome ICD-10-CM: G89.4  ICD-9-CM: 338.4  Unknown Unknown        * (Principal)Empyema ([facility] [address] ICD-10-CM: J86.9  ICD-9-CM: 510.9  8/12/2018 Yes                Review of Systems:   A comprehensive review of systems was negative except for that written in the HPI. Vital Signs:    Last 24hrs VS reviewed since prior progress note. Most recent are:  Visit Vitals    /48    Pulse 90    Temp 98.3 °F (36.8 °C)    Resp 17    Ht 5' 4\" (1.626 m)    Wt 80.3 kg (177 lb 0.5 oz)    SpO2 98%    BMI 30.39 kg/m2         Intake/Output Summary (Last 24 hours) at 08/22/18 1193  Last data filed at 08/22/18 0801   Gross per 24 hour   Intake              200 ml   Output             2050 ml   Net            -1850 ml        Physical Examination:             Constitutional:  + anxiety, cooperative, pleasant    ENT:  Oral mucous moist, oropharynx benign. Neck supple,    Resp:  Rhonchi noted bilaterally   CV:  Regular rhythm, normal rate, no murmurs, gallops, rubs    GI:  Soft, non distended, non tender. normoactive bowel sounds, no hepatosplenomegaly         Neurologic:  Moves all extremities.   AAOx3, CN II-XII reviewed,Essential tremor noted of hands     Skin:  Good turgor, no rashes or ulcers       Data Review:    Review and/or order of clinical lab test  Review and/or order of tests in the radiology section of CPT  Review and/or order of tests in the medicine section of CPT      Labs:     Recent Labs      08/21/18   0440   WBC  17.4*   HGB  9.0*   HCT  29.3*   PLT  408*     Recent Labs      08/21/18   0440  08/20/18   0414   NA  138  140   K  3.3*  3.3*   CL  99  100   CO2  29  31   BUN  10  13   CREA  0.93  0.91   GLU  131*  165*   CA  9.6  8.6   MG  1.9   --    PHOS  2.6   --      Recent Labs      08/21/18   0440   SGOT  45*   ALT  71   AP  87   TBILI  0.3   TP  6.5 ALB  2.3*   GLOB  4.2*     No results for input(s): INR, PTP, APTT in the last 72 hours. No lab exists for component: INREXT, INREXT   No results for input(s): FE, TIBC, PSAT, FERR in the last 72 hours. Lab Results   Component Value Date/Time    Folate 13.7 11/25/2014 03:16 PM      No results for input(s): PH, PCO2, PO2 in the last 72 hours. No results for input(s): CPK, CKNDX, TROIQ in the last 72 hours.     No lab exists for component: CPKMB  Lab Results   Component Value Date/Time    Cholesterol, total 254 (H) 01/19/2014 02:45 AM    HDL Cholesterol 97 01/19/2014 02:45 AM    LDL, calculated 121 (H) 01/19/2014 02:45 AM    Triglyceride 180 (H) 01/19/2014 02:45 AM    CHOL/HDL Ratio 2.6 01/19/2014 02:45 AM     Lab Results   Component Value Date/Time    Glucose (POC) 123 (H) 08/22/2018 06:13 AM    Glucose (POC) 114 (H) 08/21/2018 09:28 PM    Glucose (POC) 274 (H) 08/21/2018 04:46 PM    Glucose (POC) 102 (H) 08/21/2018 11:21 AM    Glucose (POC) 148 (H) 08/21/2018 06:40 AM     Lab Results   Component Value Date/Time    Color YELLOW/STRAW 08/21/2018 02:22 PM    Appearance TURBID (A) 08/21/2018 02:22 PM    Specific gravity 1.011 08/21/2018 02:22 PM    pH (UA) 7.0 08/21/2018 02:22 PM    Protein TRACE (A) 08/21/2018 02:22 PM    Glucose NEGATIVE  08/21/2018 02:22 PM    Ketone NEGATIVE  08/21/2018 02:22 PM    Bilirubin NEGATIVE  08/21/2018 02:22 PM    Urobilinogen 1.0 08/21/2018 02:22 PM    Nitrites NEGATIVE  08/21/2018 02:22 PM    Leukocyte Esterase SMALL (A) 08/21/2018 02:22 PM    Epithelial cells MODERATE (A) 08/21/2018 02:22 PM    Bacteria 1+ (A) 08/21/2018 02:22 PM    WBC 0-4 08/21/2018 02:22 PM    RBC 5-10 08/21/2018 02:22 PM         Medications Reviewed:     Current Facility-Administered Medications   Medication Dose Route Frequency    albuterol-ipratropium (DUO-NEB) 2.5 MG-0.5 MG/3 ML  3 mL Nebulization QID RT    naloxone (NARCAN) injection 0.4 mg  0.4 mg IntraVENous Multiple    flumazenil (ROMAZICON) 0.1 mg/mL injection 0.2 mg  0.2 mg IntraVENous Multiple    midazolam (VERSED) injection 0.25-5 mg  0.25-5 mg IntraVENous Multiple    acetylcysteine (MUCOMYST) 200 mg/mL (20 %) solution 400 mg  400 mg Nebulization QID RT    morphine injection 2 mg  2 mg IntraVENous Q3H PRN    ALPRAZolam (XANAX) tablet 1 mg  1 mg Oral Q6H PRN    phenol throat spray (CHLORASEPTIC) 1 Spray  1 Spray Oral PRN    guaiFENesin ER (MUCINEX) tablet 1,200 mg  1,200 mg Oral Q12H    albuterol-ipratropium (DUO-NEB) 2.5 MG-0.5 MG/3 ML  3 mL Nebulization Q4H PRN    glipiZIDE (GLUCOTROL) tablet 2.5 mg  2.5 mg Oral ACB&D    sodium chloride (NS) flush 5-10 mL  5-10 mL IntraVENous Q8H    sodium chloride (NS) flush 5-10 mL  5-10 mL IntraVENous PRN    bisacodyl (DULCOLAX) suppository 10 mg  10 mg Rectal DAILY PRN    polyethylene glycol (MIRALAX) packet 17 g  17 g Oral DAILY    predniSONE (DELTASONE) tablet 40 mg  40 mg Oral DAILY WITH BREAKFAST    vitamin b comp & c no.4 tab 1 Tab  1 Tab Oral DAILY    insulin lispro (HUMALOG) injection   SubCUTAneous AC&HS    glucose chewable tablet 16 g  4 Tab Oral PRN    dextrose (D50W) injection syrg 12.5-25 g  12.5-25 g IntraVENous PRN    glucagon (GLUCAGEN) injection 1 mg  1 mg IntraMUSCular PRN    metoprolol tartrate (LOPRESSOR) tablet 25 mg  25 mg Oral QHS    senna-docusate (PERICOLACE) 8.6-50 mg per tablet 1 Tab  1 Tab Oral DAILY    nystatin (MYCOSTATIN) 100,000 unit/mL oral suspension 500,000 Units  500,000 Units Oral QID    zinc oxide-cod liver oil (DESITIN) 40 % paste (Patient Supplied)   Topical PRN    esomeprazole (NEXIUM) capsule 40 mg (Patient Supplied)  40 mg Oral ACB    promethazine (PHENERGAN) tablet 25 mg (Patient Supplied)  25 mg Oral Q6H PRN    prazosin (MINIPRESS) capsule 2 mg (Patient Supplied)  2 mg Oral BID    sodium chloride (NS) flush 5-10 mL  5-10 mL IntraVENous Q8H    sodium chloride (NS) flush 5-10 mL  5-10 mL IntraVENous PRN    naloxone (NARCAN) injection 0.4 mg  0.4 mg IntraVENous PRN    ondansetron (ZOFRAN) injection 4 mg  4 mg IntraVENous Q4H PRN    enoxaparin (LOVENOX) injection 40 mg  40 mg SubCUTAneous Q24H    arformoterol (BROVANA) neb solution 15 mcg  15 mcg Nebulization BID RT    And    budesonide (PULMICORT) 500 mcg/2 ml nebulizer suspension  500 mcg Nebulization BID RT    DULoxetine (CYMBALTA) capsule 40 mg  40 mg Oral DAILY    primidone (MYSOLINE) tablet 50 mg  50 mg Oral DAILY    primidone (MYSOLINE) tablet 150 mg  150 mg Oral QHS    furosemide (LASIX) tablet 40 mg  40 mg Oral DAILY    montelukast (SINGULAIR) tablet 10 mg  10 mg Oral QHS    ezetimibe/simvastatin (VYTORIN) 10/40 mg   Oral QPM    levoFLOXacin (LEVAQUIN) tablet 750 mg  750 mg Oral QHS    piperacillin-tazobactam (ZOSYN) 3.375 g in 0.9% sodium chloride (MBP/ADV) 100 mL  3.375 g IntraVENous Q8H    levothyroxine (SYNTHROID) tablet 200 mcg  200 mcg Oral ACB    lactobac ac& pc-s.therm-b.anim (ROBIN Q/RISAQUAD)  1 Cap Oral DAILY    oxyCODONE IR (ROXICODONE) tablet 15 mg  15 mg Oral Q4H PRN    PHENobarbital (LUMINAL) tablet 64.8 mg  64.8 mg Oral BID     ______________________________________________________________________  EXPECTED LENGTH OF STAY: 10d 0h  ACTUAL LENGTH OF STAY:          10                 Antonio Monroy MD

## 2018-08-22 NOTE — PROGRESS NOTES
Pulmonary, Critical Care, and Sleep Medicine~Progress Note    Name: Karl Ramsey MRN: [id number]   : 1955 Hospital: [facility]   Date: 2018 7:53 AM Admission: 2018     Impression Plan   1. Acute hypoxic resp failure secondary to below; + Edema and excessive mucous with difficult expectorating   2. S/p Right VATS with decortication secondary to streptococcal empyema; on    3. COPD, with exacerbation   4. DM II  5. HTN  6. hypothyroidism  1. WBC increased today  2. On levaquin/zosyn  3. Steroids taper   4. Cultures pending   5. lovenox proph  6. O2 titration above 90%  7. PPI proph  8. Diuretics  9. Brovana/pulmicort    10. pulm toilet~ duonebs/mucomyst/chest PT/mucinex   11. Sounds so much better. Thanks Dr Yoselyn Colon!!  12. Discussed with RN and hospitalist      Daily Progression:    Congestion resolved.  No dyspnea     OBJECTIVE:     Vital Signs:       Visit Vitals    /48    Pulse 90    Temp 98.3 °F (36.8 °C)    Resp 17    Ht 5' 4\" (1.626 m)    Wt 80.3 kg (177 lb 0.5 oz)    SpO2 98%    BMI 30.39 kg/m2      Temp (24hrs), Av.2 °F (36.8 °C), Min:97.9 °F (36.6 °C), Max:98.6 °F (37 °C)     Intake/Output:     Last shift:  07 - 1900  In: -   Out: 450 [Urine:450]    Last 3 shifts: 1901 -  07  In: 300 [I.V.:300]  Out: 2200 [Urine:2200]          Intake/Output Summary (Last 24 hours) at 18 1039  Last data filed at 18 0801   Gross per 24 hour   Intake              200 ml   Output             2050 ml   Net            -1850 ml       Physical Exam:                                        Exam Findings Other   General: No resp distress noted, appears stated age    HEENT:  No ulcers, JVD not elevated, no cervical LAD    Chest: No pectus deformity, normal chest rise b/l    HEART:  RRR, no murmurs/rubs/gallops    Lungs:  Coarse sounds     ABD: Soft/NT, non rigid mildly distended EXT: No cyanosis/clubbing/edema, normal peripheral pulses    Skin: No rashes or ulcers, no mottling    Neuro: A/O x 3        Medications:  Current Facility-Administered Medications   Medication Dose Route Frequency    albuterol-ipratropium (DUO-NEB) 2.5 MG-0.5 MG/3 ML  3 mL Nebulization QID RT    naloxone (NARCAN) injection 0.4 mg  0.4 mg IntraVENous Multiple    flumazenil (ROMAZICON) 0.1 mg/mL injection 0.2 mg  0.2 mg IntraVENous Multiple    midazolam (VERSED) injection 0.25-5 mg  0.25-5 mg IntraVENous Multiple    acetylcysteine (MUCOMYST) 200 mg/mL (20 %) solution 400 mg  400 mg Nebulization QID RT    morphine injection 2 mg  2 mg IntraVENous Q3H PRN    ALPRAZolam (XANAX) tablet 1 mg  1 mg Oral Q6H PRN    phenol throat spray (CHLORASEPTIC) 1 Spray  1 Spray Oral PRN    guaiFENesin ER (MUCINEX) tablet 1,200 mg  1,200 mg Oral Q12H    albuterol-ipratropium (DUO-NEB) 2.5 MG-0.5 MG/3 ML  3 mL Nebulization Q4H PRN    glipiZIDE (GLUCOTROL) tablet 2.5 mg  2.5 mg Oral ACB&D    sodium chloride (NS) flush 5-10 mL  5-10 mL IntraVENous Q8H    sodium chloride (NS) flush 5-10 mL  5-10 mL IntraVENous PRN    bisacodyl (DULCOLAX) suppository 10 mg  10 mg Rectal DAILY PRN    polyethylene glycol (MIRALAX) packet 17 g  17 g Oral DAILY    predniSONE (DELTASONE) tablet 40 mg  40 mg Oral DAILY WITH BREAKFAST    vitamin b comp & c no.4 tab 1 Tab  1 Tab Oral DAILY    insulin lispro (HUMALOG) injection   SubCUTAneous AC&HS    glucose chewable tablet 16 g  4 Tab Oral PRN    dextrose (D50W) injection syrg 12.5-25 g  12.5-25 g IntraVENous PRN    glucagon (GLUCAGEN) injection 1 mg  1 mg IntraMUSCular PRN    metoprolol tartrate (LOPRESSOR) tablet 25 mg  25 mg Oral QHS    senna-docusate (PERICOLACE) 8.6-50 mg per tablet 1 Tab  1 Tab Oral DAILY    nystatin (MYCOSTATIN) 100,000 unit/mL oral suspension 500,000 Units  500,000 Units Oral QID    zinc oxide-cod liver oil (DESITIN) 40 % paste (Patient Supplied)   Topical PRN    esomeprazole (NEXIUM) capsule 40 mg (Patient Supplied)  40 mg Oral ACB    promethazine (PHENERGAN) tablet 25 mg (Patient Supplied)  25 mg Oral Q6H PRN    prazosin (MINIPRESS) capsule 2 mg (Patient Supplied)  2 mg Oral BID    sodium chloride (NS) flush 5-10 mL  5-10 mL IntraVENous Q8H    sodium chloride (NS) flush 5-10 mL  5-10 mL IntraVENous PRN    naloxone (NARCAN) injection 0.4 mg  0.4 mg IntraVENous PRN    ondansetron (ZOFRAN) injection 4 mg  4 mg IntraVENous Q4H PRN    enoxaparin (LOVENOX) injection 40 mg  40 mg SubCUTAneous Q24H    arformoterol (BROVANA) neb solution 15 mcg  15 mcg Nebulization BID RT    And    budesonide (PULMICORT) 500 mcg/2 ml nebulizer suspension  500 mcg Nebulization BID RT    DULoxetine (CYMBALTA) capsule 40 mg  40 mg Oral DAILY    primidone (MYSOLINE) tablet 50 mg  50 mg Oral DAILY    primidone (MYSOLINE) tablet 150 mg  150 mg Oral QHS    furosemide (LASIX) tablet 40 mg  40 mg Oral DAILY    montelukast (SINGULAIR) tablet 10 mg  10 mg Oral QHS    ezetimibe/simvastatin (VYTORIN) 10/40 mg   Oral QPM    levoFLOXacin (LEVAQUIN) tablet 750 mg  750 mg Oral QHS    piperacillin-tazobactam (ZOSYN) 3.375 g in 0.9% sodium chloride (MBP/ADV) 100 mL  3.375 g IntraVENous Q8H    levothyroxine (SYNTHROID) tablet 200 mcg  200 mcg Oral ACB    lactobac ac& pc-s.therm-b.anim (ROBIN Q/RISAQUAD)  1 Cap Oral DAILY    oxyCODONE IR (ROXICODONE) tablet 15 mg  15 mg Oral Q4H PRN    PHENobarbital (LUMINAL) tablet 64.8 mg  64.8 mg Oral BID       Labs:  ABG No results for input(s): PHI, PCO2I, PO2I, HCO3I, SO2I, FIO2I in the last 72 hours.      CBC Recent Labs      08/21/18   0440   WBC  17.4*   HGB  9.0*   HCT  29.3*   PLT  408*   MCV  100.3*   MCH  24.6        Metabolic  Panel Recent Labs      08/21/18   0440  08/20/18   0414   NA  138  140   K  3.3*  3.3*   CL  99  100   CO2  29  31   GLU  131*  165*   BUN  10  13   CREA  0.93  0.91   CA  9.6  8.6   MG  1.9   --    PHOS  2.6   --    ALB  2.3* --    SGOT  45*   --    ALT  71   --         Pertinent Labs                Jewels Duma  8/22/2018

## 2018-08-22 NOTE — PROGRESS NOTES
Problem: Falls - Risk of  Goal: *Absence of Falls  Document Palma Fall Risk and appropriate interventions in the flowsheet. Outcome: Progressing Towards Goal  Fall Risk Interventions:  Mobility Interventions: Communicate number of staff needed for ambulation/transfer, Mechanical lift, Patient to call before getting OOB, PT Consult for mobility concerns, PT Consult for assist device competence, Utilize walker, cane, or other assistive device, Utilize gait belt for transfers/ambulation, Strengthening exercises (ROM-active/passive), OT consult for ADLs    Mentation Interventions: Familiar objects from home, Bed/chair exit alarm, More frequent rounding, Room close to nurse's station    Medication Interventions: Assess postural VS orthostatic hypotension, Evaluate medications/consider consulting pharmacy, Patient to call before getting OOB, Teach patient to arise slowly, Utilize gait belt for transfers/ambulation    Elimination Interventions: Toileting schedule/hourly rounds, Toilet paper/wipes in reach, Elevated toilet seat, Patient to call for help with toileting needs, Call light in reach    History of Falls Interventions: Bed/chair exit alarm, Consult care management for discharge planning, Evaluate medications/consider consulting pharmacy        Problem: Pressure Injury - Risk of  Goal: *Prevention of pressure injury  Document Joseph Scale and appropriate interventions in the flowsheet. Outcome: Progressing Towards Goal  Pressure Injury Interventions:  Sensory Interventions: Assess changes in LOC, Pressure redistribution bed/mattress (bed type), Turn and reposition approx.  every two hours (pillows and wedges if needed), Assess need for specialty bed    Moisture Interventions: Absorbent underpads, Apply protective barrier, creams and emollients, Check for incontinence Q2 hours and as needed, Assess need for specialty bed, Contain wound drainage, Internal/External urinary devices, Limit adult briefs, Maintain skin hydration (lotion/cream), Minimize layers, Moisture barrier, Offer toileting Q_hr    Activity Interventions: Assess need for specialty bed, Chair cushion, Increase time out of bed, Pressure redistribution bed/mattress(bed type), PT/OT evaluation    Mobility Interventions: Assess need for specialty bed, Float heels, HOB 30 degrees or less, Pressure redistribution bed/mattress (bed type), PT/OT evaluation, Trapeze to reposition, Turn and reposition approx.  every two hours(pillow and wedges)    Nutrition Interventions: Document food/fluid/supplement intake, Discuss nutritional consult with provider, Offer support with meals,snacks and hydration    Friction and Shear Interventions: Apply protective barrier, creams and emollients, Feet elevated on foot rest, HOB 30 degrees or less, Lift sheet, Lift team/patient mobility team, Minimize layers, Transfer aides:transfer board/Nancy lift/ceiling lift, Transferring/repositioning devices

## 2018-08-22 NOTE — PROGRESS NOTES
Music Therapy Assessment    Lionel Mora [id number]  [id number]    1955  58 y.o.  female    Patient Telephone Number: [phone] (home)   Confucianist Affiliation: No Alevism   Language: English   Extended Emergency Contact Information  Primary Emergency Contact: Oz Murphy  Address: [address] [place] [address] Phone: [phone]  Mobile Phone: [phone]  Relation: Spouse   Patient Active Problem List    Diagnosis Date Noted    Right-sided chest pain     Chronic pain syndrome     Empyema ([address] 08/12/2018    Shortness of breath 08/06/2018    Drug-induced constipation 08/06/2018    Productive cough 08/02/2018    Chest pain on breathing 08/02/2018    Anxiety and depression 08/02/2018    Polypharmacy 08/02/2018    CAP (community acquired pneumonia) 07/31/2018    Benign essential tremor syndrome 02/21/2018    Obesity (BMI 35.0-39.9 without comorbidity) 05/27/2017    Fibromyalgia 05/27/2017    Altered mental status, unspecified 05/17/2017    Cerebral microvascular disease 05/17/2017    Diabetic peripheral neuropathy associated with type 2 diabetes mellitus ([address] 05/16/2017    Stenosis of both internal carotid arteries 05/16/2017    Cervical radiculopathy due to degenerative joint disease of spine 05/16/2017    Degenerative lumbar spinal stenosis 05/16/2017    Vitamin D deficiency 05/16/2017    Atelectasis 04/01/2015    Allergic rhinitis 04/01/2015    Sepsis ([address] 04/01/2015    Acute exacerbation of COPD with asthma ([address] 04/01/2015    HTN (hypertension) 04/01/2015    Hyperlipidemia 04/01/2015    Hypothyroidism 04/01/2015    Smoking 04/01/2015    COPD (chronic obstructive pulmonary disease) ([address] 03/13/2015     Class: Chronic    COPD with acute exacerbation ([address] 03/13/2015     Class: Present on Admission    Acute respiratory failure with hypoxia ([address] 03/13/2015     Class: Present on Admission    Chronic respiratory failure with hypoxia ([facility] 03/13/2015     Class: Chronic    Benign familial tremor 03/13/2015     Class: Chronic    Syncope and collapse 12/18/2014    Cervical post-laminectomy syndrome 11/25/2014    Neck pain 11/25/2014    Ataxia 11/25/2014    B12 deficiency 11/25/2014    Polyneuropathy in diabetes(357.2) 11/25/2014    Depression, major, recurrent ([facility] 10/13/2014    Unspecified hypothyroidism 01/20/2014    Acute respiratory failure ([facility] 01/16/2014    Pneumonia, organism unspecified(486) 01/16/2014    Chest pain with normal coronary angiography 07/11/2013    Abnormal nuclear stress test 07/11/2013    PVC (premature ventricular contraction) 06/21/2013    Other and unspecified hyperlipidemia 06/21/2013    Tobacco use disorder 06/21/2013    Family history of ischemic heart disease 06/21/2013    PTSD (post-traumatic stress disorder) 03/26/2013        Date: 8/22/2018       Mental Status:   [x] Alert [  ] Karina Franki [  ]  Confused  [  ] Minimally responsive    Communication Status: [  ] Impaired Speech [  ] Nonverbal -N/A    Physical Status:   [x] Oxygen in use  [  ] Hard of Hearing [  ] Vision Impaired  [  ] Ambulatory  [  ] Ambulatory with assistance [  ] Non-ambulatory    Music Preferences, Background: Bluegrass and Country, including Lila Castro, [address] [place] [address]. Pt has multiple family members who play instruments. Pt's spouse plays the guitar and used to have a get together with friends who play other instruments to play bluegrass music together.     Clinical Problem addressed: Support healthy coping, self-expression, positive social interaction. Goal(s) met in session:  Physical/Pain management (Scale of 1-10):    Pre-session rating: Pt denied pain. Post-session rating: Pt denied pain.    [  ] Increased relaxation   [  ] Regulated breathing patterns  [  ] Decreased muscle tension   [  ] Minimized physical distress     Emotional/Psychological:  [x] Increased self-expression   [  ] Decreased aggressive behavior   [  ] Decreased sadness   [  ] Discussed healthy coping skills     [x] Improved mood    [  ] Decreased withdrawn behavior     Social:  [  ] Decreased feelings of isolation/loneliness [x] Positive social interaction   [x] Provided support and/or comfort for family/friends    Spiritual:  [  ] Spiritual support    [  ] Expressed peace  [  ] Expressed narinder    [  ] Discussed beliefs    Techniques Utilized (Check all that apply):   [  ] Procedural support MT [  ] Music for relaxation [x] Patient preferred music  [  ] Tori analysis  [x] Song choice  [  ] Music for validation  [  ] Entrainment  [  ] Progressive muscle relax. [  ] Guided visualization  [  ] Ike Guallpa  [  ] Patient instrument playing [  ] Lauren Tong writing  [x] Sing along   [  ] Jeffrey Maravilla  [  ] Sensory stimulation  [x] Active Listening  [x] Music for spiritual support [  ] Making of CDs as gifts    Session Observations: This music therapist (MT) worked with this patient (pt) when she was at [facility]), before she was transferred to [facility]. Pt's affect brightened when MT knocked on her door. Pt's spouse Warden Serrano was at bedside and pt said that she'd told him all about the music therapy session she had at [id number] [place] [address]. She requested to hear the song [employer]. MT sat at bedside and sang and played this with Caro Nutr. Pt increased self-expression in response to the music as evidenced by (AEB) singing along. She and her spouse engaged in sharing about their music backgrounds and the nancy they've had over the years in playing music and attending concerts. Pt's eyes brightened, and she and her spouse smiled and laughed as they reminisced. MT provided active listening and words of support. Pt chose to hear a Rosi Median song next. MT sang and played Good-Hearted Woman with guitar and pt sang along. She expressed enjoyment in the music, saying it lifted her spirits.  Pt and her spouse expressed gratitude for the session. Will follow as able.     MUSA Arriola (Music Therapist-Board Certified)  Spiritual Care Department  Referral-based service

## 2018-08-22 NOTE — PROGRESS NOTES
TRANSFER - OUT REPORT:    Verbal report given to Daxa(name) on Ilene Zepeda  being transferred to [facility]) for routine progression of care       Report consisted of patients Situation, Background, Assessment and   Recommendations(SBAR). Information from the following report(s) SBAR, Kardex, Procedure Summary, MAR, Recent Results and Cardiac Rhythm NSR was reviewed with the receiving nurse. Lines:   Peripheral IV 08/16/18 Right;Mid Forearm (Active)   Site Assessment Clean, dry, & intact 8/22/2018 12:00 AM   Phlebitis Assessment 0 8/22/2018 12:00 AM   Infiltration Assessment 0 8/22/2018 12:00 AM   Dressing Status Clean, dry, & intact 8/22/2018 12:00 AM   Dressing Type Transparent 8/22/2018 12:00 AM   Hub Color/Line Status Blue; Infusing 8/22/2018 12:00 AM   Action Taken Open ports on tubing capped 8/21/2018  4:00 PM   Alcohol Cap Used Yes 8/21/2018  4:00 PM       Peripheral IV 08/21/18 Right;Distal Arm (Active)   Site Assessment Clean, dry, & intact 8/22/2018 12:00 AM   Phlebitis Assessment 0 8/22/2018 12:00 AM   Infiltration Assessment 0 8/22/2018 12:00 AM   Dressing Status Clean, dry, & intact 8/22/2018 12:00 AM   Dressing Type Transparent 8/22/2018 12:00 AM   Hub Color/Line Status Pink 8/22/2018 12:00 AM   Action Taken Open ports on tubing capped 8/21/2018  4:00 PM   Alcohol Cap Used Yes 8/21/2018  4:00 PM        Opportunity for questions and clarification was provided.       Patient transported with:   Monitor  O2 @ 6 liters  Patients medications from home  Registered Nurse  Tech

## 2018-08-23 LAB
ANION GAP SERPL CALC-SCNC: 10 MMOL/L (ref 5–15)
BACTERIA SPEC CULT: ABNORMAL
BACTERIA SPEC CULT: ABNORMAL
BACTERIA SPEC CULT: NORMAL
BASOPHILS # BLD: 0.1 K/UL (ref 0–0.1)
BASOPHILS NFR BLD: 1 % (ref 0–1)
BUN SERPL-MCNC: 10 MG/DL (ref 6–20)
BUN/CREAT SERPL: 11 (ref 12–20)
CALCIUM SERPL-MCNC: 8.8 MG/DL (ref 8.5–10.1)
CC UR VC: NORMAL
CHLORIDE SERPL-SCNC: 101 MMOL/L (ref 97–108)
CO2 SERPL-SCNC: 27 MMOL/L (ref 21–32)
CREAT SERPL-MCNC: 0.88 MG/DL (ref 0.55–1.02)
DIFFERENTIAL METHOD BLD: ABNORMAL
EOSINOPHIL # BLD: 0 K/UL (ref 0–0.4)
EOSINOPHIL NFR BLD: 0 % (ref 0–7)
ERYTHROCYTE [DISTWIDTH] IN BLOOD BY AUTOMATED COUNT: 15 % (ref 11.5–14.5)
GLUCOSE BLD STRIP.AUTO-MCNC: 123 MG/DL (ref 65–100)
GLUCOSE BLD STRIP.AUTO-MCNC: 148 MG/DL (ref 65–100)
GLUCOSE BLD STRIP.AUTO-MCNC: 206 MG/DL (ref 65–100)
GLUCOSE BLD STRIP.AUTO-MCNC: 207 MG/DL (ref 65–100)
GLUCOSE SERPL-MCNC: 104 MG/DL (ref 65–100)
GRAM STN SPEC: ABNORMAL
GRAM STN SPEC: ABNORMAL
HCT VFR BLD AUTO: 25.9 % (ref 35–47)
HGB BLD-MCNC: 7.8 G/DL (ref 11.5–16)
IMM GRANULOCYTES # BLD: 0.1 K/UL (ref 0–0.04)
IMM GRANULOCYTES NFR BLD AUTO: 1 % (ref 0–0.5)
LYMPHOCYTES # BLD: 2.2 K/UL (ref 0.8–3.5)
LYMPHOCYTES NFR BLD: 20 % (ref 12–49)
MCH RBC QN AUTO: 30 PG (ref 26–34)
MCHC RBC AUTO-ENTMCNC: 30.1 G/DL (ref 30–36.5)
MCV RBC AUTO: 99.6 FL (ref 80–99)
MONOCYTES # BLD: 1 K/UL (ref 0–1)
MONOCYTES NFR BLD: 9 % (ref 5–13)
NEUTS SEG # BLD: 7.7 K/UL (ref 1.8–8)
NEUTS SEG NFR BLD: 69 % (ref 32–75)
NRBC # BLD: 0 K/UL (ref 0–0.01)
NRBC BLD-RTO: 0 PER 100 WBC
PLATELET # BLD AUTO: 347 K/UL (ref 150–400)
PMV BLD AUTO: 9.3 FL (ref 8.9–12.9)
POTASSIUM SERPL-SCNC: 3.2 MMOL/L (ref 3.5–5.1)
RBC # BLD AUTO: 2.6 M/UL (ref 3.8–5.2)
SERVICE CMNT-IMP: ABNORMAL
SERVICE CMNT-IMP: NORMAL
SODIUM SERPL-SCNC: 138 MMOL/L (ref 136–145)
T4 FREE SERPL-MCNC: 1.1 NG/DL (ref 0.8–1.5)
TSH SERPL DL<=0.05 MIU/L-ACNC: 0.68 UIU/ML (ref 0.36–3.74)
WBC # BLD AUTO: 11.2 K/UL (ref 3.6–11)

## 2018-08-23 PROCEDURE — 36415 COLL VENOUS BLD VENIPUNCTURE: CPT | Performed by: HOSPITALIST

## 2018-08-23 PROCEDURE — 74011250636 HC RX REV CODE- 250/636: Performed by: HOSPITALIST

## 2018-08-23 PROCEDURE — 74011250637 HC RX REV CODE- 250/637: Performed by: NURSE PRACTITIONER

## 2018-08-23 PROCEDURE — 74011000250 HC RX REV CODE- 250: Performed by: NURSE PRACTITIONER

## 2018-08-23 PROCEDURE — 80048 BASIC METABOLIC PNL TOTAL CA: CPT | Performed by: HOSPITALIST

## 2018-08-23 PROCEDURE — 65660000000 HC RM CCU STEPDOWN

## 2018-08-23 PROCEDURE — 97535 SELF CARE MNGMENT TRAINING: CPT

## 2018-08-23 PROCEDURE — 74011636637 HC RX REV CODE- 636/637: Performed by: NURSE PRACTITIONER

## 2018-08-23 PROCEDURE — 94640 AIRWAY INHALATION TREATMENT: CPT

## 2018-08-23 PROCEDURE — 84443 ASSAY THYROID STIM HORMONE: CPT | Performed by: HOSPITALIST

## 2018-08-23 PROCEDURE — 74011250636 HC RX REV CODE- 250/636: Performed by: THORACIC SURGERY (CARDIOTHORACIC VASCULAR SURGERY)

## 2018-08-23 PROCEDURE — 74011250637 HC RX REV CODE- 250/637: Performed by: HOSPITALIST

## 2018-08-23 PROCEDURE — 82962 GLUCOSE BLOOD TEST: CPT

## 2018-08-23 PROCEDURE — 74011000250 HC RX REV CODE- 250: Performed by: PHYSICIAN ASSISTANT

## 2018-08-23 PROCEDURE — 74011000258 HC RX REV CODE- 258: Performed by: THORACIC SURGERY (CARDIOTHORACIC VASCULAR SURGERY)

## 2018-08-23 PROCEDURE — 94760 N-INVAS EAR/PLS OXIMETRY 1: CPT

## 2018-08-23 PROCEDURE — G8996 SWALLOW CURRENT STATUS: HCPCS

## 2018-08-23 PROCEDURE — 74011250637 HC RX REV CODE- 250/637: Performed by: INTERNAL MEDICINE

## 2018-08-23 PROCEDURE — G8997 SWALLOW GOAL STATUS: HCPCS

## 2018-08-23 PROCEDURE — 74011000250 HC RX REV CODE- 250: Performed by: INTERNAL MEDICINE

## 2018-08-23 PROCEDURE — 74011250637 HC RX REV CODE- 250/637: Performed by: THORACIC SURGERY (CARDIOTHORACIC VASCULAR SURGERY)

## 2018-08-23 PROCEDURE — 92610 EVALUATE SWALLOWING FUNCTION: CPT

## 2018-08-23 PROCEDURE — 84439 ASSAY OF FREE THYROXINE: CPT | Performed by: HOSPITALIST

## 2018-08-23 PROCEDURE — 74011000250 HC RX REV CODE- 250: Performed by: THORACIC SURGERY (CARDIOTHORACIC VASCULAR SURGERY)

## 2018-08-23 PROCEDURE — G8998 SWALLOW D/C STATUS: HCPCS

## 2018-08-23 PROCEDURE — 77010033678 HC OXYGEN DAILY

## 2018-08-23 PROCEDURE — 85025 COMPLETE CBC W/AUTO DIFF WBC: CPT | Performed by: HOSPITALIST

## 2018-08-23 PROCEDURE — 74011636637 HC RX REV CODE- 636/637: Performed by: PHYSICIAN ASSISTANT

## 2018-08-23 RX ORDER — POTASSIUM CHLORIDE 750 MG/1
20 TABLET, FILM COATED, EXTENDED RELEASE ORAL
Status: COMPLETED | OUTPATIENT
Start: 2018-08-23 | End: 2018-08-23

## 2018-08-23 RX ORDER — POTASSIUM CHLORIDE 750 MG/1
20 TABLET, FILM COATED, EXTENDED RELEASE ORAL
Status: DISCONTINUED | OUTPATIENT
Start: 2018-08-23 | End: 2018-08-23

## 2018-08-23 RX ORDER — POTASSIUM CHLORIDE 750 MG/1
20 TABLET, FILM COATED, EXTENDED RELEASE ORAL 2 TIMES DAILY
Status: DISCONTINUED | OUTPATIENT
Start: 2018-08-24 | End: 2018-08-26 | Stop reason: HOSPADM

## 2018-08-23 RX ORDER — POTASSIUM CHLORIDE 750 MG/1
20 TABLET, FILM COATED, EXTENDED RELEASE ORAL 2 TIMES DAILY
Status: DISCONTINUED | OUTPATIENT
Start: 2018-08-23 | End: 2018-08-23

## 2018-08-23 RX ADMIN — PHENOBARBITAL 64.8 MG: 64.8 TABLET ORAL at 19:15

## 2018-08-23 RX ADMIN — Medication 10 ML: at 21:31

## 2018-08-23 RX ADMIN — Medication 10 ML: at 21:28

## 2018-08-23 RX ADMIN — PREDNISONE 40 MG: 20 TABLET ORAL at 09:21

## 2018-08-23 RX ADMIN — IPRATROPIUM BROMIDE AND ALBUTEROL SULFATE 3 ML: .5; 3 SOLUTION RESPIRATORY (INHALATION) at 21:08

## 2018-08-23 RX ADMIN — MONTELUKAST SODIUM 10 MG: 10 TABLET, FILM COATED ORAL at 21:26

## 2018-08-23 RX ADMIN — INSULIN LISPRO 4 UNITS: 100 INJECTION, SOLUTION INTRAVENOUS; SUBCUTANEOUS at 17:39

## 2018-08-23 RX ADMIN — OXYCODONE HYDROCHLORIDE 15 MG: 5 TABLET ORAL at 14:48

## 2018-08-23 RX ADMIN — Medication 10 ML: at 14:50

## 2018-08-23 RX ADMIN — INSULIN LISPRO 3 UNITS: 100 INJECTION, SOLUTION INTRAVENOUS; SUBCUTANEOUS at 06:38

## 2018-08-23 RX ADMIN — GUAIFENESIN 1200 MG: 600 TABLET, EXTENDED RELEASE ORAL at 21:26

## 2018-08-23 RX ADMIN — GUAIFENESIN 1200 MG: 600 TABLET, EXTENDED RELEASE ORAL at 09:20

## 2018-08-23 RX ADMIN — BUDESONIDE 500 MCG: 0.5 INHALANT RESPIRATORY (INHALATION) at 08:04

## 2018-08-23 RX ADMIN — PHENOBARBITAL 64.8 MG: 64.8 TABLET ORAL at 06:38

## 2018-08-23 RX ADMIN — NYSTATIN 500000 UNITS: 100000 SUSPENSION ORAL at 12:30

## 2018-08-23 RX ADMIN — GLIPIZIDE 2.5 MG: 5 TABLET ORAL at 17:39

## 2018-08-23 RX ADMIN — PRIMIDONE 50 MG: 50 TABLET ORAL at 06:39

## 2018-08-23 RX ADMIN — NYSTATIN 500000 UNITS: 100000 SUSPENSION ORAL at 19:15

## 2018-08-23 RX ADMIN — INSULIN LISPRO 4 UNITS: 100 INJECTION, SOLUTION INTRAVENOUS; SUBCUTANEOUS at 11:30

## 2018-08-23 RX ADMIN — STANDARDIZED SENNA CONCENTRATE AND DOCUSATE SODIUM 1 TABLET: 8.6; 5 TABLET, FILM COATED ORAL at 09:21

## 2018-08-23 RX ADMIN — PIPERACILLIN AND TAZOBACTAM 3.38 G: 3; .375 INJECTION, POWDER, FOR SOLUTION INTRAVENOUS at 19:18

## 2018-08-23 RX ADMIN — MORPHINE SULFATE 2 MG: 2 INJECTION, SOLUTION INTRAMUSCULAR; INTRAVENOUS at 12:30

## 2018-08-23 RX ADMIN — POTASSIUM CHLORIDE 20 MEQ: 750 TABLET, EXTENDED RELEASE ORAL at 09:21

## 2018-08-23 RX ADMIN — ALPRAZOLAM 1 MG: 0.5 TABLET ORAL at 14:48

## 2018-08-23 RX ADMIN — POLYETHYLENE GLYCOL 3350 17 G: 17 POWDER, FOR SOLUTION ORAL at 09:20

## 2018-08-23 RX ADMIN — PRAZOSIN HYDROCHLORIDE 2 MG: 1 CAPSULE ORAL at 19:16

## 2018-08-23 RX ADMIN — OXYCODONE HYDROCHLORIDE 15 MG: 5 TABLET ORAL at 07:52

## 2018-08-23 RX ADMIN — BUDESONIDE 500 MCG: 0.5 INHALANT RESPIRATORY (INHALATION) at 21:08

## 2018-08-23 RX ADMIN — PIPERACILLIN AND TAZOBACTAM 3.38 G: 3; .375 INJECTION, POWDER, FOR SOLUTION INTRAVENOUS at 12:30

## 2018-08-23 RX ADMIN — ACETYLCYSTEINE 400 MG: 200 SOLUTION ORAL; RESPIRATORY (INHALATION) at 11:18

## 2018-08-23 RX ADMIN — IPRATROPIUM BROMIDE AND ALBUTEROL SULFATE 3 ML: .5; 3 SOLUTION RESPIRATORY (INHALATION) at 11:18

## 2018-08-23 RX ADMIN — DULOXETINE HYDROCHLORIDE 40 MG: 20 CAPSULE, DELAYED RELEASE ORAL at 09:20

## 2018-08-23 RX ADMIN — Medication 10 ML: at 07:31

## 2018-08-23 RX ADMIN — GLIPIZIDE 2.5 MG: 5 TABLET ORAL at 06:38

## 2018-08-23 RX ADMIN — ENOXAPARIN SODIUM 40 MG: 100 INJECTION SUBCUTANEOUS at 09:20

## 2018-08-23 RX ADMIN — PRAZOSIN HYDROCHLORIDE 2 MG: 1 CAPSULE ORAL at 07:30

## 2018-08-23 RX ADMIN — ACETYLCYSTEINE 400 MG: 200 SOLUTION ORAL; RESPIRATORY (INHALATION) at 21:07

## 2018-08-23 RX ADMIN — OXYCODONE HYDROCHLORIDE 15 MG: 5 TABLET ORAL at 01:52

## 2018-08-23 RX ADMIN — IPRATROPIUM BROMIDE AND ALBUTEROL SULFATE 3 ML: .5; 3 SOLUTION RESPIRATORY (INHALATION) at 08:04

## 2018-08-23 RX ADMIN — LEVOTHYROXINE SODIUM 200 MCG: 100 TABLET ORAL at 06:39

## 2018-08-23 RX ADMIN — FUROSEMIDE 40 MG: 40 TABLET ORAL at 09:21

## 2018-08-23 RX ADMIN — SIMVASTATIN: 20 TABLET, FILM COATED ORAL at 19:17

## 2018-08-23 RX ADMIN — Medication 1 CAPSULE: at 09:21

## 2018-08-23 RX ADMIN — NYSTATIN 500000 UNITS: 100000 SUSPENSION ORAL at 09:20

## 2018-08-23 RX ADMIN — PRIMIDONE 150 MG: 50 TABLET ORAL at 19:23

## 2018-08-23 RX ADMIN — ACETYLCYSTEINE 400 MG: 200 SOLUTION ORAL; RESPIRATORY (INHALATION) at 08:04

## 2018-08-23 RX ADMIN — METOPROLOL TARTRATE 25 MG: 25 TABLET ORAL at 21:26

## 2018-08-23 RX ADMIN — ESOMEPRAZOLE MAGNESIUM 40 MG: 40 CAPSULE, DELAYED RELEASE ORAL at 07:30

## 2018-08-23 RX ADMIN — PIPERACILLIN AND TAZOBACTAM 3.38 G: 3; .375 INJECTION, POWDER, FOR SOLUTION INTRAVENOUS at 03:00

## 2018-08-23 RX ADMIN — Medication 1 TABLET: at 10:03

## 2018-08-23 NOTE — PROGRESS NOTES
TRANSFER - OUT REPORT:    Verbal report given to [id number] MATTY Chirinos RN(name) on [employer]  being transferred to [address] [place], [place] [address]unit) for routine progression of care       Report consisted of patients Situation, Background, Assessment and   Recommendations(SBAR). Information from the following report(s) SBAR, Kardex, MAR and Accordion was reviewed with the receiving nurse. Lines:   Peripheral IV 08/16/18 Right;Mid Forearm (Active)   Site Assessment Clean, dry, & intact 8/23/2018 12:00 PM   Phlebitis Assessment 0 8/23/2018 12:00 PM   Infiltration Assessment 0 8/23/2018 12:00 PM   Dressing Status Clean, dry, & intact 8/23/2018 12:00 PM   Dressing Type Transparent;Tape 8/23/2018 12:00 PM   Hub Color/Line Status Blue; Infusing 8/23/2018 12:00 PM   Action Taken Open ports on tubing capped 8/23/2018 12:00 PM   Alcohol Cap Used Yes 8/23/2018 12:00 PM        Opportunity for questions and clarification was provided. Patient transported with:   O2 @ 6 liters  Tech              Problem: Falls - Risk of  Goal: *Absence of Falls  Document Palma Fall Risk and appropriate interventions in the flowsheet. Outcome: Progressing Towards Goal  Fall Risk Interventions:  Mobility Interventions: OT consult for ADLs, Patient to call before getting OOB, PT Consult for mobility concerns    Mentation Interventions: Familiar objects from home, Family/sitter at bedside, Increase mobility, More frequent rounding, Reorient patient    Medication Interventions: Evaluate medications/consider consulting pharmacy, Patient to call before getting OOB, Teach patient to arise slowly    Elimination Interventions:  Toileting schedule/hourly rounds, Toilet paper/wipes in reach    History of Falls Interventions: Consult care management for discharge planning, Investigate reason for fall, Evaluate medications/consider consulting pharmacy        Problem: Pressure Injury - Risk of  Goal: *Prevention of pressure injury  Document Joseph Scale and appropriate interventions in the flowsheet.    Outcome: Progressing Towards Goal  Pressure Injury Interventions:  Sensory Interventions: Discuss PT/OT consult with provider, Float heels, Maintain/enhance activity level, Keep linens dry and wrinkle-free    Moisture Interventions: Maintain skin hydration (lotion/cream), Minimize layers, Moisture barrier    Activity Interventions: PT/OT evaluation, Pressure redistribution bed/mattress(bed type), Increase time out of bed    Mobility Interventions: PT/OT evaluation, Trapeze to reposition, Float heels    Nutrition Interventions: Document food/fluid/supplement intake    Friction and Shear Interventions: HOB 30 degrees or less, Lift sheet, Lift team/patient mobility team, Minimize layers

## 2018-08-23 NOTE — PROGRESS NOTES
Speech Pathology bedside swallow evaluation/discharge  Patient: Kelin Weller (20 y.o. female)  Date: 2018  Primary Diagnosis: resp. insufficiency  Empyema (HCC)  Procedure(s) (LRB):  VIDEO ASSISTED THORACOSCOPY--RIGHT--WITH DECORTICATION (Right) 10 Days Post-Op   Precautions:Fall    ASSESSMENT :  Based on the objective data described below, the patient presents with no oral or pharyngeal dysphagia and suspected esophageal dysphagia. Patient denies trouble swallowing overall but reports occasional \"sticking in chest\". She reports that ~ an hour after eating eggs for breakfast she started to cough and eggs came up. Upon further chart reviewed, it appears patient has GERD and has had esophageal dilation in the past.  Timely and complete mastication of solids observed. Suspect timely swallow initiation and adequate hyolaryngeal elevation/excursion via palpation. No overt s/s aspiration with thin liquids, puree or solid. Skilled therapy provided by a speech-language pathologist is not indicated at this time. PLAN :  Recommendations:  Regular diet/ thin liquids.  Straws ok  meds as tolerated  Strict upright positioning with all PO    Discharge Recommendations: None     SUBJECTIVE:   Patient stated It felt like it was stuck in my chest and it burned. (re: eggs)    OBJECTIVE:     Past Medical History:   Diagnosis Date    Anxiety     Bone spur     NECK    COPD     Depression     Endocrine disease     hypothyroidism    Fibromyalgia     Fusion of spine of cervical region     Gastrointestinal disorder     gerd    Hyperlipidemia     Hypothyroid     Morbid obesity ([address]     Osteoarthritis     PUD (peptic ulcer disease)     Tobacco abuse      Past Surgical History:   Procedure Laterality Date    BRONCHOSCOPY-FIBER/THERAPY  4/3/2015         CARDIAC CATHETERIZATION  2013         COLONOSCOPY,DIAGNOSTIC  10/30/2014         HX CATARACT REMOVAL      bilateral    HX GYN          HX ORTHOPAEDIC      Ruptured disc, knuckles on right hand    UPPER GI ENDOSCOPY,BIOPSY  10/30/2014         UPPER GI ENDOSCOPY,DILATN W GUIDE  10/30/2014          Prior Level of Function/Home Situation:  Home Situation  Home Environment: Private residence  # Steps to Enter: 4  Rails to Enter: Yes  Hand Rails : Bilateral  One/Two Story Residence: One story  Living Alone: No  Support Systems: Family member(s)  Patient Expects to be Discharged to[de-identified] Private residence  Current DME Used/Available at Home: Grab bars (walking sticks)  Tub or Shower Type: Tub/Shower combination  Diet prior to admission: regular/thin  Current Diet:  Regular/thin   Cognitive and Communication Status:  Neurologic State: Alert  Orientation Level: Oriented X4  Cognition: Follows commands  Perception: Appears intact  Perseveration: No perseveration noted  Safety/Judgement: Awareness of environment, Fall prevention  Oral Assessment:  Oral Assessment  Labial: No impairment  Dentition: Natural  Oral Hygiene:  (wfl)  Lingual: No impairment  Velum: Unable to visualize  Mandible: No impairment  P.O. Trials:  Patient Position:  (up in bed)  Vocal quality prior to P.O.: No impairment  Consistency Presented: Thin liquid; Solid;Puree  How Presented: Straw;Cup/sip; Self-fed/presented;SLP-fed/presented;Spoon     Bolus Acceptance: No impairment  Bolus Formation/Control: No impairment        Oral Residue: None  Initiation of Swallow: No impairment  Laryngeal Elevation: Functional  Aspiration Signs/Symptoms: None  Pharyngeal Phase Characteristics: No impairment, issues, or problems   Effective Modifications: None  Cues for Modifications: None       Oral Phase Severity: No impairment  Pharyngeal Phase Severity : No impairment  NOMS:   The NOMS functional outcome measure was used to quantify this patient's level of swallowing impairment. Based on the NOMS, the patient was determined to be at level 7 for swallow function     G Codes:   In compliance with CMSs Claims Based Outcome Reporting, the following G-code set was chosen for this patient based the use of the NOMS functional outcome to quantify this patient's level of swallowing impairment. Using the NOMS, the patient was determined to be at level 7 for swallow function which correlates with the CH= 0% level of severity. Based on the objective assessment provided within this note, the current, goal, and discharge g-codes are as follows:    Swallow  Swallowing:   Swallow Current Status CH= 0%   Swallow Goal Status CH= 0%   Swallow D/C Status CH= 0%      NOMS Swallowing Levels:  Level 1 (CN): NPO  Level 2 (CM): NPO but takes consistency in therapy  Level 3 (CL): Takes less than 50% of nutrition p.o. and continues with nonoral feedings; and/or safe with mod cues; and/or max diet restriction  Level 4 (CK): Safe swallow but needs mod cues; and/or mod diet restriction; and/or still requires some nonoral feeding/supplements  Level 5 (CJ): Safe swallow with min diet restriction; and/or needs min cues  Level 6 (CI): Independent with p.o.; rare cues; usually self cues; may need to avoid some foods or needs extra time  Level 7 ([address] [place] [address]): Independent for all p.o.  ELODIA. (2003). National Outcomes Measurement System (NOMS): Adult Speech-Language Pathology User's Guide. Pain:  Pain Scale 1: Numeric (0 - 10)  Pain Intensity 1: 0     After treatment:   [] Patient left in no apparent distress sitting up in chair  [x] Patient left in no apparent distress in bed  [x] Call bell left within reach  [x] Nursing notified  [] Caregiver present  [] Bed alarm activated    COMMUNICATION/EDUCATION:   The patients plan of care including findings, recommendations, and recommended diet changes were discussed with: Registered Nurse. [x] Patient/family have participated as able and agree with findings and recommendations. [] Patient is unable to participate in plan of care at this time.     Thank you for this referral.  Domingo Suarez Eladia, SLP  Time Calculation: 19 mins

## 2018-08-23 NOTE — PROGRESS NOTES
Problem: Falls - Risk of  Goal: *Absence of Falls  Document Palma Fall Risk and appropriate interventions in the flowsheet. Outcome: Progressing Towards Goal  Fall Risk Interventions:  Mobility Interventions: Bed/chair exit alarm, Communicate number of staff needed for ambulation/transfer, Mechanical lift, OT consult for ADLs, PT Consult for mobility concerns, PT Consult for assist device competence, Strengthening exercises (ROM-active/passive), Utilize walker, cane, or other assistive device, Utilize gait belt for transfers/ambulation, Patient to call before getting OOB    Mentation Interventions: Update white board, Reorient patient, Door open when patient unattended    Medication Interventions: Assess postural VS orthostatic hypotension, Bed/chair exit alarm, Patient to call before getting OOB, Evaluate medications/consider consulting pharmacy, Teach patient to arise slowly, Utilize gait belt for transfers/ambulation    Elimination Interventions: Call light in reach, Elevated toilet seat, Patient to call for help with toileting needs, Toileting schedule/hourly rounds, Toilet paper/wipes in reach    History of Falls Interventions: Room close to nurse's station        Weight change: -2.1 kg (-4 lb 10.1 oz)  Last 3 Recorded Weights in this Encounter    08/21/18 1401 08/22/18 0318 08/23/18 0259   Weight: 83.2 kg (183 lb 6.8 oz) 80.3 kg (177 lb 0.5 oz) 81.1 kg (178 lb 12.7 oz)     Bedside and Verbal shift change report given to Rubina RN (oncoming nurse) by Batsheva Castanon RN (offgoing nurse). Report included the following information SBAR, Kardex, Procedure Summary, Intake/Output, MAR, Accordion and Recent Results.

## 2018-08-23 NOTE — PROGRESS NOTES
Problem: Self Care Deficits Care Plan (Adult)  Goal: *Acute Goals and Plan of Care (Insert Text)  Occupational Therapy Goals  Initiated 8/22/2018  1. Patient will tolerate > or = 5 minutes functional activity without rest and heart rate < or = 120 bpm and oxygen sats > or = 92% on room air within 7 day(s). 2.  Patient will perform lower body dressing with minimal assistance/contact guard assist within 7 day(s). 3.  Patient will perform toilet transfers with minimal assistance/contact guard assist and best equipment within 7 day(s). 4.  Patient will perform all aspects of toileting with minimal assistance/contact guard assist within 7 day(s). 5.  Patient will participate in upper extremity therapeutic exercise/activities with supervision/set-up for 10 minutes within 7 day(s). Occupational Therapy TREATMENT  Patient: Marino Jean Baptiste (18 y.o. female)  Date: 8/23/2018  Diagnosis: resp. insufficiency  Empyema (HCC) Empyema (HCC)  Procedure(s) (LRB):  VIDEO ASSISTED THORACOSCOPY--RIGHT--WITH DECORTICATION (Right) 10 Days Post-Op  Precautions: Fall  Chart, occupational therapy assessment, plan of care, and goals were reviewed. ASSESSMENT:  Cleared by RN to see pt for therapy session. Pt received semisupine in bed, on 6 L O2,  agreeable to participate. Performed bed mobility with min A, good sitting balance EOB. Pt donned socks with mod A, A to don L as pt had difficulty reaching to foot. Stood with min A performed stand pivot to [facility]. After transfer and intermittently on commode pt's O2 sats 87-89%, with RR in 20s and low 30s . Educated pt on PLB technique and to slow breathing, and sats increased to 90-92%. Pt required total A for bowel hygiene due to fatigue and decreased balance. Returned to bed with min A, sats 95% on 6 L. HR  at rest and with activity.   At this time pt is below her functional baseline due to decreased cardiopulmonary endurance, increased supplemental O2 needs (does not use O2 at home) and decreased activity tolerance. She will benefit from continued OT to address the above deficits. Recommend rehab at discharge to increase independence and safety. Progression toward goals:  []       Improving appropriately and progressing toward goals  [x]       Improving slowly and progressing toward goals  []       Not making progress toward goals and plan of care will be adjusted     PLAN:  Patient continues to benefit from skilled intervention to address the above impairments. Continue treatment per established plan of care. Discharge Recommendations:  Rehab; if pt were to go home, would recommend [facility]  Further Equipment Recommendations for Discharge:  TBD     SUBJECTIVE:   Patient stated I need to scoot up.     OBJECTIVE DATA SUMMARY:   Cognitive/Behavioral Status:  Neurologic State: Alert  Orientation Level: Oriented X4  Cognition: Follows commands  Perception: Appears intact  Perseveration: No perseveration noted  Safety/Judgement: Awareness of environment; Fall prevention    Functional Mobility and Transfers for ADLs:  Bed Mobility:  Supine to Sit: Minimum assistance; Additional time  Sit to Supine: Minimum assistance; Additional time    Transfers:  Sit to Stand: Minimum assistance; Additional time  Functional Transfers  Toilet Transfer : Minimum assistance; Additional time; Adaptive equipment (to [facility])       Balance:  Sitting: Intact  Standing: Impaired  Standing - Static: Good  Standing - Dynamic : Fair    ADL Intervention:    Educated pt on PLB technique and to slow breathing, and sats increased to 90-92%. Pt required total A for bowel hygiene due to fatigue and decreased balance. Lower Body Dressing Assistance  Socks: Moderate assistance (A to don L, pt donned R)  Leg Crossed Method Used: Yes  Position Performed: Seated edge of bed    Toileting  Bowel Hygiene:  Total assistance (dependent) (in standing, pt unable due to fatigue)    Cognitive Retraining  Safety/Judgement: Awareness of environment; Fall prevention    Neuro Re-Education:           Pain:  Pain Scale 1: Numeric (0 - 10)  Pain Intensity 1: 0           Pain Intervention(s) 1: Medication (see MAR)  Activity Tolerance:   Good  Please refer to the flowsheet for vital signs taken during this treatment.   After treatment:   [] Patient left in no apparent distress sitting up in chair  [x] Patient left in no apparent distress in bed  [x] Call bell left within reach  [x] Nursing notified  [] Caregiver present  [] Bed alarm activated    COMMUNICATION/COLLABORATION:   The patients plan of care was discussed with: Physical Therapist and Registered Nurse    Elizabeth Milner OT  Time Calculation: 29 mins

## 2018-08-23 NOTE — PROGRESS NOTES
Hospitalist Progress Note  Jw Monge MD  Answering service: [phone] OR [phone] from in house phone      Date of Service:  2018  NAME:  Karl Ramsey  :  1955  MRN:  [id number]      Admission Summary:   Melfa Yuki a 58 y. o. female who presented with severe left sided chest pain on  to [facility].  Pt reported she was treated with doxycycline/prednisone in  for bronchitis. Bobby Naidu was unable to complete doxycycline secondary to diarrhea which she still had on admission. Pt was admitted with acute hypoxic respiratory failure, severe sepsis 2/2 bilateral pna, Right Para pneumonic pleural effusion. Pt was evaluated by pulmonary and she underwent a thoracentesis with chest tube placement .  She was transferred to [facility] on  for VAT's on  with Dr. Gretchen Gonzalez. [facility] team was consulted for medical management at that time. Pt s/p VATs with decortication on 18. Pt known to have depression, anxiety, PTSD, essential tremor. At the early morning of  developed Acute respiratory failure with hypoxia.  Transferre to ICU with bipap. Currently pt on high flow, but still high risk of decompensation. She is extremely anxious, on xanax 1mg q8 prn, but not help.       Interval history / Subjective:     Pt seen at bedside in the room, she was having a panic attack gentle counseling and reassurance given and she is more calmer now. I have advised to give her medications for ptsd which she said is minipress. Assessment & Plan:     Acute hypoxic Respiratory Failure - Improved  - multifactorial: PNA, empyema, copd exacerbation  - retained secretions with some basilar ATX.  Needs aggressive pulm toilet!  -continue ICU, further management per pulmonologist, S/P Bronch by Pulmonary      RLL PNA  - with dense consolidation and possible areas of necrosis.  Consolidation of LLL as well  -abx per pulmonologist    -S/P Bronch On 8/21 for Mucus Plugging  -Repeat cxr on 8/22 showing improving fields.  -On ABX       Streptococcal Right Sided Empyema  - per primary team  - s/p VATs with decortication on 8/13/18  -Surgery has signed off  _pulmonary following       Suspected underlying COPD - Acute exacerbation  - former smoker  -nebs and prednisone taper       NIDDM 2  - poc, ssi, diabetic diet  - A1c 7.4  - on  glipizide at 2.5mg bid  -follow   -Hypoglycemia Protocol      Anemia  -Could be related to acute illness and IVF  - 8/15 not iron deficient, d/c ivf      Asymptomatic hypotension on am of 8/14   - s/p albumin, bp improved to low 100's from high 80's  - pt was receiving metoprolol bid, she only takes this at bedtime, this has been adjusted  As she reports she takes it for chronic tachycardia and not for blood pressure.       Orthostatic Hypotension  Pt reports she takes bb for tachycardia and not htn  - c/w metoprolol, pt takes at bedtime,  hx of orthostatic hypotension - c/w minipress  Check Thyroid panel       Diarrhea on admission which has resolved      Constipation Opoid induced   - chronic opiates  - bowel regimen       Fibromyalgia  - on chronic opiates for back pain  - pain control, lidocaine patch      Obese   - bmi 34, counseled on weight loss      Hypothyroidism - c/w levothyroxine  Check Thyroid panel       Familial HLD - c/w vytorin      Depression/anxiety/PTSD - c/w cymbalta,  Seen By Psychiatry  Meds adjusted   Continue with xanax and minipress - not given for blood pressure, wrote on room board      Essential tremor - c/w primidone, phenobarbital         Code status: Full  DVT prophylaxis: Lovneonx  Pt lives her , Home oxygen has been setup    Care Plan discussed with: Patient/Family and Nurse  Patient has given Verbal permission to discuss medical care with   persons present in the room and and also with contact as listed on face sheet.    Disposition: [facility]     Hospital Problems  Date Reviewed: 8/17/2018 Codes Class Noted POA    Right-sided chest pain ICD-10-CM: R07.9  ICD-9-CM: 786.50  Unknown Unknown        Chronic pain syndrome ICD-10-CM: G89.4  ICD-9-CM: 338.4  Unknown Unknown        * (Principal)Empyema ([address] ICD-10-CM: J86.9  ICD-9-CM: 510.9  8/12/2018 Yes                Review of Systems:   A comprehensive review of systems was negative except for that written in the HPI. Vital Signs:    Last 24hrs VS reviewed since prior progress note. Most recent are:  Visit Vitals    /64 (BP 1 Location: Right arm, BP Patient Position: At rest)    Pulse (!) 104    Temp 97.5 °F (36.4 °C)    Resp 16    Ht 5' 4\" (1.626 m)    Wt 81.1 kg (178 lb 12.7 oz)    SpO2 92%    BMI 30.69 kg/m2         Intake/Output Summary (Last 24 hours) at 08/23/18 1445  Last data filed at 08/23/18 1242   Gross per 24 hour   Intake              560 ml   Output             2510 ml   Net            -1950 ml        Physical Examination:             Constitutional:  + anxiety, cooperative, pleasant    ENT:  Oral mucous moist, oropharynx benign. Neck supple,    Resp:  Rhonchi noted bilaterally   CV:  Regular rhythm, normal rate, no murmurs, gallops, rubs    GI:  Soft, non distended, non tender. normoactive bowel sounds, no hepatosplenomegaly         Neurologic:  Moves all extremities.   AAOx3, CN II-XII reviewed,Essential tremor noted of hands     Skin:  Good turgor, no rashes or ulcers       Data Review:    Review and/or order of clinical lab test  Review and/or order of tests in the radiology section of CPT  Review and/or order of tests in the medicine section of CPT      Labs:     Recent Labs      08/23/18   0334  08/21/18   0440   WBC  11.2*  17.4*   HGB  7.8*  9.0*   HCT  25.9*  29.3*   PLT  347  408*     Recent Labs      08/23/18   0334  08/21/18   0440   NA  138  138   K  3.2*  3.3*   CL  101  99   CO2  27  29   BUN  10  10   CREA  0.88  0.93   GLU  104*  131*   CA  8.8  9.6   MG   --   1.9   PHOS   --   2.6     Recent Labs 08/21/18   0440   SGOT  45*   ALT  71   AP  87   TBILI  0.3   TP  6.5   ALB  2.3*   GLOB  4.2*     No results for input(s): INR, PTP, APTT in the last 72 hours. No lab exists for component: INREXT, INREXT   No results for input(s): FE, TIBC, PSAT, FERR in the last 72 hours. Lab Results   Component Value Date/Time    Folate 13.7 11/25/2014 03:16 PM      No results for input(s): PH, PCO2, PO2 in the last 72 hours. No results for input(s): CPK, CKNDX, TROIQ in the last 72 hours.     No lab exists for component: CPKMB  Lab Results   Component Value Date/Time    Cholesterol, total 254 (H) 01/19/2014 02:45 AM    HDL Cholesterol 97 01/19/2014 02:45 AM    LDL, calculated 121 (H) 01/19/2014 02:45 AM    Triglyceride 180 (H) 01/19/2014 02:45 AM    CHOL/HDL Ratio 2.6 01/19/2014 02:45 AM     Lab Results   Component Value Date/Time    Glucose (POC) 206 (H) 08/23/2018 12:37 PM    Glucose (POC) 148 (H) 08/23/2018 06:26 AM    Glucose (POC) 177 (H) 08/22/2018 09:17 PM    Glucose (POC) 117 (H) 08/22/2018 04:13 PM    Glucose (POC) 256 (H) 08/22/2018 11:21 AM     Lab Results   Component Value Date/Time    Color YELLOW/STRAW 08/21/2018 02:22 PM    Appearance TURBID (A) 08/21/2018 02:22 PM    Specific gravity 1.011 08/21/2018 02:22 PM    pH (UA) 7.0 08/21/2018 02:22 PM    Protein TRACE (A) 08/21/2018 02:22 PM    Glucose NEGATIVE  08/21/2018 02:22 PM    Ketone NEGATIVE  08/21/2018 02:22 PM    Bilirubin NEGATIVE  08/21/2018 02:22 PM    Urobilinogen 1.0 08/21/2018 02:22 PM    Nitrites NEGATIVE  08/21/2018 02:22 PM    Leukocyte Esterase SMALL (A) 08/21/2018 02:22 PM    Epithelial cells MODERATE (A) 08/21/2018 02:22 PM    Bacteria 1+ (A) 08/21/2018 02:22 PM    WBC 0-4 08/21/2018 02:22 PM    RBC 5-10 08/21/2018 02:22 PM         Medications Reviewed:     Current Facility-Administered Medications   Medication Dose Route Frequency    [START ON 8/24/2018] potassium chloride SR (KLOR-CON 10) tablet 20 mEq  20 mEq Oral BID    albuterol-ipratropium (DUO-NEB) 2.5 MG-0.5 MG/3 ML  3 mL Nebulization QID RT    naloxone (NARCAN) injection 0.4 mg  0.4 mg IntraVENous Multiple    flumazenil (ROMAZICON) 0.1 mg/mL injection 0.2 mg  0.2 mg IntraVENous Multiple    midazolam (VERSED) injection 0.25-5 mg  0.25-5 mg IntraVENous Multiple    acetylcysteine (MUCOMYST) 200 mg/mL (20 %) solution 400 mg  400 mg Nebulization QID RT    morphine injection 2 mg  2 mg IntraVENous Q3H PRN    ALPRAZolam (XANAX) tablet 1 mg  1 mg Oral Q6H PRN    phenol throat spray (CHLORASEPTIC) 1 Spray  1 Spray Oral PRN    guaiFENesin ER (MUCINEX) tablet 1,200 mg  1,200 mg Oral Q12H    albuterol-ipratropium (DUO-NEB) 2.5 MG-0.5 MG/3 ML  3 mL Nebulization Q4H PRN    glipiZIDE (GLUCOTROL) tablet 2.5 mg  2.5 mg Oral ACB&D    sodium chloride (NS) flush 5-10 mL  5-10 mL IntraVENous Q8H    sodium chloride (NS) flush 5-10 mL  5-10 mL IntraVENous PRN    bisacodyl (DULCOLAX) suppository 10 mg  10 mg Rectal DAILY PRN    polyethylene glycol (MIRALAX) packet 17 g  17 g Oral DAILY    predniSONE (DELTASONE) tablet 40 mg  40 mg Oral DAILY WITH BREAKFAST    vitamin b comp & c no.4 tab 1 Tab (Patient Supplied)  1 Tab Oral DAILY    insulin lispro (HUMALOG) injection   SubCUTAneous AC&HS    glucose chewable tablet 16 g  4 Tab Oral PRN    dextrose (D50W) injection syrg 12.5-25 g  12.5-25 g IntraVENous PRN    glucagon (GLUCAGEN) injection 1 mg  1 mg IntraMUSCular PRN    metoprolol tartrate (LOPRESSOR) tablet 25 mg  25 mg Oral QHS    senna-docusate (PERICOLACE) 8.6-50 mg per tablet 1 Tab  1 Tab Oral DAILY    nystatin (MYCOSTATIN) 100,000 unit/mL oral suspension 500,000 Units  500,000 Units Oral QID    zinc oxide-cod liver oil (DESITIN) 40 % paste (Patient Supplied)   Topical PRN    esomeprazole (NEXIUM) capsule 40 mg (Patient Supplied)  40 mg Oral ACB    promethazine (PHENERGAN) tablet 25 mg (Patient Supplied)  25 mg Oral Q6H PRN    prazosin (MINIPRESS) capsule 2 mg (Patient Supplied)  2 mg Oral BID    sodium chloride (NS) flush 5-10 mL  5-10 mL IntraVENous Q8H    sodium chloride (NS) flush 5-10 mL  5-10 mL IntraVENous PRN    naloxone (NARCAN) injection 0.4 mg  0.4 mg IntraVENous PRN    ondansetron (ZOFRAN) injection 4 mg  4 mg IntraVENous Q4H PRN    enoxaparin (LOVENOX) injection 40 mg  40 mg SubCUTAneous Q24H    arformoterol (BROVANA) neb solution 15 mcg  15 mcg Nebulization BID RT    And    budesonide (PULMICORT) 500 mcg/2 ml nebulizer suspension  500 mcg Nebulization BID RT    DULoxetine (CYMBALTA) capsule 40 mg  40 mg Oral DAILY    primidone (MYSOLINE) tablet 50 mg  50 mg Oral DAILY    primidone (MYSOLINE) tablet 150 mg  150 mg Oral QHS    furosemide (LASIX) tablet 40 mg  40 mg Oral DAILY    montelukast (SINGULAIR) tablet 10 mg  10 mg Oral QHS    ezetimibe/simvastatin (VYTORIN) 10/40 mg   Oral QPM    piperacillin-tazobactam (ZOSYN) 3.375 g in 0.9% sodium chloride (MBP/ADV) 100 mL  3.375 g IntraVENous Q8H    levothyroxine (SYNTHROID) tablet 200 mcg  200 mcg Oral ACB    lactobac ac& pc-s.therm-b.anim (ROBIN Q/RISAQUAD)  1 Cap Oral DAILY    oxyCODONE IR (ROXICODONE) tablet 15 mg  15 mg Oral Q4H PRN    PHENobarbital (LUMINAL) tablet 64.8 mg  64.8 mg Oral BID     ______________________________________________________________________  EXPECTED LENGTH OF STAY: 10d 0h  ACTUAL LENGTH OF STAY:          11                 Claus Wilhelm MD

## 2018-08-23 NOTE — ADVANCED PRACTICE NURSE
Chest tube dressing removed. OK to leave it off. She will need to call our office to make a F/U appointment once she is discharged.

## 2018-08-23 NOTE — PROGRESS NOTES
CM reviewed case with treatment team during [facility] Interdisciplinary Rounds. Patient continues with oxygen at 6 l/m. Home oxygen has been ordered (has [employer] which has contract with [employer] for home oxygen), but patient is not stable for discharge yet. Plan for today is for transfer to medical unit. CM to continue to follow for discharge planning needs.

## 2018-08-23 NOTE — PROGRESS NOTES
Pulmonary, Critical Care, and Sleep Medicine~Progress Note    Name: Shelli Go MRN: [id number]   : 1955 Hospital: [facility]   Date: 2018 7:53 AM Admission: 2018     Impression Plan   1. Acute hypoxic resp failure secondary to below; + Edema and excessive mucous with difficult expectorating   2. S/p Right VATS with decortication secondary to streptococcal empyema; on    3. COPD, with exacerbation   4. DM II  5. HTN  6. hypothyroidism  1. On levaquin/zosyn; d/c levaquin. 2. Steroids taper   3. lovenox proph  4. O2 titration above 90%, wean O2   5. PPI proph  6. Diuretics  7. Brovana/pulmicort    8. pulm toilet~ duonebs/mucomyst/chest PT/mucinex      Daily Progression:    Congestion resolved.      OBJECTIVE:     Vital Signs:       Visit Vitals    /58 (BP 1 Location: Right arm, BP Patient Position: At rest)    Pulse 86    Temp 97.8 °F (36.6 °C)    Resp 16    Ht 5' 4\" (1.626 m)    Wt 81.1 kg (178 lb 12.7 oz)    SpO2 97%    BMI 30.69 kg/m2      Temp (24hrs), Av.2 °F (36.8 °C), Min:97.8 °F (36.6 °C), Max:98.5 °F (36.9 °C)     Intake/Output:     Last shift:  07 -  190  In: -   Out: 900 [Urine:900]    Last 3 shifts: 1901 -  0700  In: 530 [P.O.:530]  Out: 1210 [Urine:1210]          Intake/Output Summary (Last 24 hours) at 18 1105  Last data filed at 18 0804   Gross per 24 hour   Intake              360 ml   Output             1310 ml   Net             -950 ml       Physical Exam:                                        Exam Findings Other   General: No resp distress noted, appears stated age    HEENT:  No ulcers, JVD not elevated, no cervical LAD    Chest: No pectus deformity, normal chest rise b/l    HEART:  RRR, no murmurs/rubs/gallops    Lungs:  Coarse sounds     ABD: Soft/NT, non rigid mildly distended    EXT: No cyanosis/clubbing/edema, normal peripheral pulses Skin: No rashes or ulcers, no mottling    Neuro: A/O x 3        Medications:  Current Facility-Administered Medications   Medication Dose Route Frequency    [START ON 8/24/2018] potassium chloride SR (KLOR-CON 10) tablet 20 mEq  20 mEq Oral BID    albuterol-ipratropium (DUO-NEB) 2.5 MG-0.5 MG/3 ML  3 mL Nebulization QID RT    naloxone (NARCAN) injection 0.4 mg  0.4 mg IntraVENous Multiple    flumazenil (ROMAZICON) 0.1 mg/mL injection 0.2 mg  0.2 mg IntraVENous Multiple    midazolam (VERSED) injection 0.25-5 mg  0.25-5 mg IntraVENous Multiple    acetylcysteine (MUCOMYST) 200 mg/mL (20 %) solution 400 mg  400 mg Nebulization QID RT    morphine injection 2 mg  2 mg IntraVENous Q3H PRN    ALPRAZolam (XANAX) tablet 1 mg  1 mg Oral Q6H PRN    phenol throat spray (CHLORASEPTIC) 1 Spray  1 Spray Oral PRN    guaiFENesin ER (MUCINEX) tablet 1,200 mg  1,200 mg Oral Q12H    albuterol-ipratropium (DUO-NEB) 2.5 MG-0.5 MG/3 ML  3 mL Nebulization Q4H PRN    glipiZIDE (GLUCOTROL) tablet 2.5 mg  2.5 mg Oral ACB&D    sodium chloride (NS) flush 5-10 mL  5-10 mL IntraVENous Q8H    sodium chloride (NS) flush 5-10 mL  5-10 mL IntraVENous PRN    bisacodyl (DULCOLAX) suppository 10 mg  10 mg Rectal DAILY PRN    polyethylene glycol (MIRALAX) packet 17 g  17 g Oral DAILY    predniSONE (DELTASONE) tablet 40 mg  40 mg Oral DAILY WITH BREAKFAST    vitamin b comp & c no.4 tab 1 Tab (Patient Supplied)  1 Tab Oral DAILY    insulin lispro (HUMALOG) injection   SubCUTAneous AC&HS    glucose chewable tablet 16 g  4 Tab Oral PRN    dextrose (D50W) injection syrg 12.5-25 g  12.5-25 g IntraVENous PRN    glucagon (GLUCAGEN) injection 1 mg  1 mg IntraMUSCular PRN    metoprolol tartrate (LOPRESSOR) tablet 25 mg  25 mg Oral QHS    senna-docusate (PERICOLACE) 8.6-50 mg per tablet 1 Tab  1 Tab Oral DAILY    nystatin (MYCOSTATIN) 100,000 unit/mL oral suspension 500,000 Units  500,000 Units Oral QID    zinc oxide-cod liver oil (DESITIN) 40 % paste (Patient Supplied)   Topical PRN    esomeprazole (NEXIUM) capsule 40 mg (Patient Supplied)  40 mg Oral ACB    promethazine (PHENERGAN) tablet 25 mg (Patient Supplied)  25 mg Oral Q6H PRN    prazosin (MINIPRESS) capsule 2 mg (Patient Supplied)  2 mg Oral BID    sodium chloride (NS) flush 5-10 mL  5-10 mL IntraVENous Q8H    sodium chloride (NS) flush 5-10 mL  5-10 mL IntraVENous PRN    naloxone (NARCAN) injection 0.4 mg  0.4 mg IntraVENous PRN    ondansetron (ZOFRAN) injection 4 mg  4 mg IntraVENous Q4H PRN    enoxaparin (LOVENOX) injection 40 mg  40 mg SubCUTAneous Q24H    arformoterol (BROVANA) neb solution 15 mcg  15 mcg Nebulization BID RT    And    budesonide (PULMICORT) 500 mcg/2 ml nebulizer suspension  500 mcg Nebulization BID RT    DULoxetine (CYMBALTA) capsule 40 mg  40 mg Oral DAILY    primidone (MYSOLINE) tablet 50 mg  50 mg Oral DAILY    primidone (MYSOLINE) tablet 150 mg  150 mg Oral QHS    furosemide (LASIX) tablet 40 mg  40 mg Oral DAILY    montelukast (SINGULAIR) tablet 10 mg  10 mg Oral QHS    ezetimibe/simvastatin (VYTORIN) 10/40 mg   Oral QPM    levoFLOXacin (LEVAQUIN) tablet 750 mg  750 mg Oral QHS    piperacillin-tazobactam (ZOSYN) 3.375 g in 0.9% sodium chloride (MBP/ADV) 100 mL  3.375 g IntraVENous Q8H    levothyroxine (SYNTHROID) tablet 200 mcg  200 mcg Oral ACB    lactobac ac& pc-s.therm-b.anim (ROBIN Q/RISAQUAD)  1 Cap Oral DAILY    oxyCODONE IR (ROXICODONE) tablet 15 mg  15 mg Oral Q4H PRN    PHENobarbital (LUMINAL) tablet 64.8 mg  64.8 mg Oral BID       Labs:  ABG No results for input(s): PHI, PCO2I, PO2I, HCO3I, SO2I, FIO2I in the last 72 hours.      CBC Recent Labs      08/23/18   0334  08/21/18   0440   WBC  11.2*  17.4*   HGB  7.8*  9.0*   HCT  25.9*  29.3*   PLT  347  408*   MCV  99.6*  100.3*   MCH  30.0  43.3        Metabolic  Panel Recent Labs      08/23/18   0334  08/21/18   0440   NA  138  138   K  3.2*  3.3*   CL  101  99   CO2  27  29 GLU  104*  131*   BUN  10  10   CREA  0.88  0.93   CA  8.8  9.6   MG   --   1.9   PHOS   --   2.6   ALB   --   2.3*   SGOT   --   45*   ALT   --   71        Pertinent Labs                [facility], [address]  8/23/2018

## 2018-08-24 ENCOUNTER — APPOINTMENT (OUTPATIENT)
Dept: GENERAL RADIOLOGY | Age: 63
DRG: 163 | End: 2018-08-24
Attending: HOSPITALIST
Payer: MEDICARE

## 2018-08-24 LAB
GLUCOSE BLD STRIP.AUTO-MCNC: 140 MG/DL (ref 65–100)
GLUCOSE BLD STRIP.AUTO-MCNC: 144 MG/DL (ref 65–100)
GLUCOSE BLD STRIP.AUTO-MCNC: 203 MG/DL (ref 65–100)
GLUCOSE BLD STRIP.AUTO-MCNC: 224 MG/DL (ref 65–100)
SERVICE CMNT-IMP: ABNORMAL

## 2018-08-24 PROCEDURE — 74011250637 HC RX REV CODE- 250/637: Performed by: NURSE PRACTITIONER

## 2018-08-24 PROCEDURE — 65660000000 HC RM CCU STEPDOWN

## 2018-08-24 PROCEDURE — 74011250636 HC RX REV CODE- 250/636: Performed by: HOSPITALIST

## 2018-08-24 PROCEDURE — 77010033678 HC OXYGEN DAILY

## 2018-08-24 PROCEDURE — 74011250637 HC RX REV CODE- 250/637: Performed by: THORACIC SURGERY (CARDIOTHORACIC VASCULAR SURGERY)

## 2018-08-24 PROCEDURE — 74011000250 HC RX REV CODE- 250: Performed by: PHYSICIAN ASSISTANT

## 2018-08-24 PROCEDURE — 74011636637 HC RX REV CODE- 636/637: Performed by: NURSE PRACTITIONER

## 2018-08-24 PROCEDURE — 74011250637 HC RX REV CODE- 250/637: Performed by: HOSPITALIST

## 2018-08-24 PROCEDURE — 74011000250 HC RX REV CODE- 250: Performed by: INTERNAL MEDICINE

## 2018-08-24 PROCEDURE — 74011636637 HC RX REV CODE- 636/637: Performed by: PHYSICIAN ASSISTANT

## 2018-08-24 PROCEDURE — 82962 GLUCOSE BLOOD TEST: CPT

## 2018-08-24 PROCEDURE — 94640 AIRWAY INHALATION TREATMENT: CPT

## 2018-08-24 PROCEDURE — 94760 N-INVAS EAR/PLS OXIMETRY 1: CPT

## 2018-08-24 PROCEDURE — 72100 X-RAY EXAM L-S SPINE 2/3 VWS: CPT

## 2018-08-24 PROCEDURE — 74011250637 HC RX REV CODE- 250/637: Performed by: INTERNAL MEDICINE

## 2018-08-24 PROCEDURE — 74011000258 HC RX REV CODE- 258: Performed by: THORACIC SURGERY (CARDIOTHORACIC VASCULAR SURGERY)

## 2018-08-24 PROCEDURE — 74011000250 HC RX REV CODE- 250: Performed by: THORACIC SURGERY (CARDIOTHORACIC VASCULAR SURGERY)

## 2018-08-24 PROCEDURE — 74011250636 HC RX REV CODE- 250/636: Performed by: THORACIC SURGERY (CARDIOTHORACIC VASCULAR SURGERY)

## 2018-08-24 PROCEDURE — 73521 X-RAY EXAM HIPS BI 2 VIEWS: CPT

## 2018-08-24 RX ORDER — POTASSIUM CHLORIDE 750 MG/1
20 TABLET, FILM COATED, EXTENDED RELEASE ORAL
Status: COMPLETED | OUTPATIENT
Start: 2018-08-24 | End: 2018-08-24

## 2018-08-24 RX ADMIN — POTASSIUM CHLORIDE 20 MEQ: 750 TABLET, EXTENDED RELEASE ORAL at 11:01

## 2018-08-24 RX ADMIN — OXYCODONE HYDROCHLORIDE 15 MG: 5 TABLET ORAL at 19:07

## 2018-08-24 RX ADMIN — NYSTATIN 500000 UNITS: 100000 SUSPENSION ORAL at 13:53

## 2018-08-24 RX ADMIN — ALPRAZOLAM 1 MG: 0.5 TABLET ORAL at 21:53

## 2018-08-24 RX ADMIN — IPRATROPIUM BROMIDE AND ALBUTEROL SULFATE 3 ML: .5; 3 SOLUTION RESPIRATORY (INHALATION) at 09:01

## 2018-08-24 RX ADMIN — PIPERACILLIN AND TAZOBACTAM 3.38 G: 3; .375 INJECTION, POWDER, FOR SOLUTION INTRAVENOUS at 11:02

## 2018-08-24 RX ADMIN — IPRATROPIUM BROMIDE AND ALBUTEROL SULFATE 3 ML: .5; 3 SOLUTION RESPIRATORY (INHALATION) at 12:52

## 2018-08-24 RX ADMIN — OXYCODONE HYDROCHLORIDE 15 MG: 5 TABLET ORAL at 08:28

## 2018-08-24 RX ADMIN — DULOXETINE HYDROCHLORIDE 40 MG: 20 CAPSULE, DELAYED RELEASE ORAL at 11:01

## 2018-08-24 RX ADMIN — ACETYLCYSTEINE 400 MG: 200 SOLUTION ORAL; RESPIRATORY (INHALATION) at 19:46

## 2018-08-24 RX ADMIN — GLIPIZIDE 2.5 MG: 5 TABLET ORAL at 07:26

## 2018-08-24 RX ADMIN — Medication 10 ML: at 17:15

## 2018-08-24 RX ADMIN — INSULIN LISPRO 4 UNITS: 100 INJECTION, SOLUTION INTRAVENOUS; SUBCUTANEOUS at 11:53

## 2018-08-24 RX ADMIN — NYSTATIN 500000 UNITS: 100000 SUSPENSION ORAL at 11:01

## 2018-08-24 RX ADMIN — NYSTATIN 500000 UNITS: 100000 SUSPENSION ORAL at 17:14

## 2018-08-24 RX ADMIN — GLIPIZIDE 2.5 MG: 5 TABLET ORAL at 17:14

## 2018-08-24 RX ADMIN — PRIMIDONE 150 MG: 50 TABLET ORAL at 19:08

## 2018-08-24 RX ADMIN — IPRATROPIUM BROMIDE AND ALBUTEROL SULFATE 3 ML: .5; 3 SOLUTION RESPIRATORY (INHALATION) at 19:46

## 2018-08-24 RX ADMIN — BUDESONIDE 500 MCG: 0.5 INHALANT RESPIRATORY (INHALATION) at 19:47

## 2018-08-24 RX ADMIN — GUAIFENESIN 1200 MG: 600 TABLET, EXTENDED RELEASE ORAL at 21:55

## 2018-08-24 RX ADMIN — INSULIN LISPRO 4 UNITS: 100 INJECTION, SOLUTION INTRAVENOUS; SUBCUTANEOUS at 07:25

## 2018-08-24 RX ADMIN — OXYCODONE HYDROCHLORIDE 15 MG: 5 TABLET ORAL at 13:49

## 2018-08-24 RX ADMIN — PREDNISONE 40 MG: 20 TABLET ORAL at 07:26

## 2018-08-24 RX ADMIN — IPRATROPIUM BROMIDE AND ALBUTEROL SULFATE 3 ML: .5; 3 SOLUTION RESPIRATORY (INHALATION) at 15:25

## 2018-08-24 RX ADMIN — ACETYLCYSTEINE 400 MG: 200 SOLUTION ORAL; RESPIRATORY (INHALATION) at 12:52

## 2018-08-24 RX ADMIN — Medication 10 ML: at 07:32

## 2018-08-24 RX ADMIN — POTASSIUM CHLORIDE 20 MEQ: 750 TABLET, EXTENDED RELEASE ORAL at 13:53

## 2018-08-24 RX ADMIN — Medication 1 CAPSULE: at 11:01

## 2018-08-24 RX ADMIN — GUAIFENESIN 1200 MG: 600 TABLET, EXTENDED RELEASE ORAL at 11:01

## 2018-08-24 RX ADMIN — ACETYLCYSTEINE 400 MG: 200 SOLUTION ORAL; RESPIRATORY (INHALATION) at 15:25

## 2018-08-24 RX ADMIN — NYSTATIN 500000 UNITS: 100000 SUSPENSION ORAL at 21:53

## 2018-08-24 RX ADMIN — PRAZOSIN HYDROCHLORIDE 2 MG: 1 CAPSULE ORAL at 07:29

## 2018-08-24 RX ADMIN — PRIMIDONE 50 MG: 50 TABLET ORAL at 11:00

## 2018-08-24 RX ADMIN — POTASSIUM CHLORIDE 20 MEQ: 750 TABLET, EXTENDED RELEASE ORAL at 17:14

## 2018-08-24 RX ADMIN — ALPRAZOLAM 1 MG: 0.5 TABLET ORAL at 08:24

## 2018-08-24 RX ADMIN — PIPERACILLIN AND TAZOBACTAM 3.38 G: 3; .375 INJECTION, POWDER, FOR SOLUTION INTRAVENOUS at 19:07

## 2018-08-24 RX ADMIN — ESOMEPRAZOLE MAGNESIUM 40 MG: 40 CAPSULE, DELAYED RELEASE ORAL at 07:30

## 2018-08-24 RX ADMIN — OXYCODONE HYDROCHLORIDE 15 MG: 5 TABLET ORAL at 22:56

## 2018-08-24 RX ADMIN — MONTELUKAST SODIUM 10 MG: 10 TABLET, FILM COATED ORAL at 21:53

## 2018-08-24 RX ADMIN — ACETYLCYSTEINE 400 MG: 200 SOLUTION ORAL; RESPIRATORY (INHALATION) at 09:01

## 2018-08-24 RX ADMIN — PRAZOSIN HYDROCHLORIDE 2 MG: 1 CAPSULE ORAL at 19:00

## 2018-08-24 RX ADMIN — FUROSEMIDE 40 MG: 40 TABLET ORAL at 11:01

## 2018-08-24 RX ADMIN — LEVOTHYROXINE SODIUM 200 MCG: 100 TABLET ORAL at 07:26

## 2018-08-24 RX ADMIN — SIMVASTATIN: 20 TABLET, FILM COATED ORAL at 17:12

## 2018-08-24 RX ADMIN — METOPROLOL TARTRATE 25 MG: 25 TABLET ORAL at 21:53

## 2018-08-24 RX ADMIN — PHENOBARBITAL 64.8 MG: 64.8 TABLET ORAL at 19:08

## 2018-08-24 RX ADMIN — PIPERACILLIN AND TAZOBACTAM 3.38 G: 3; .375 INJECTION, POWDER, FOR SOLUTION INTRAVENOUS at 03:16

## 2018-08-24 RX ADMIN — ENOXAPARIN SODIUM 40 MG: 100 INJECTION SUBCUTANEOUS at 11:01

## 2018-08-24 RX ADMIN — ALPRAZOLAM 1 MG: 0.5 TABLET ORAL at 13:46

## 2018-08-24 RX ADMIN — Medication 1 TABLET: at 11:05

## 2018-08-24 RX ADMIN — PHENOBARBITAL 64.8 MG: 64.8 TABLET ORAL at 07:26

## 2018-08-24 RX ADMIN — INSULIN LISPRO 4 UNITS: 100 INJECTION, SOLUTION INTRAVENOUS; SUBCUTANEOUS at 17:14

## 2018-08-24 RX ADMIN — BUDESONIDE 500 MCG: 0.5 INHALANT RESPIRATORY (INHALATION) at 09:01

## 2018-08-24 NOTE — PROGRESS NOTES
Bedside and Verbal shift change report given to soniya (oncoming nurse) by Rama Fung (offgoing nurse). Report included the following information SBAR, Kardex and Recent Results.

## 2018-08-24 NOTE — PROGRESS NOTES
Hospitalist Progress Note  Jw Monge MD  Answering service: [phone] OR [phone] from in house phone      Date of Service:  2018  NAME:  Karl Ramsey  :  1955  MRN:  [id number]      Admission Summary:   Maringouin Yuki a 58 y. o. female who presented with severe left sided chest pain on  to [facility].  Pt reported she was treated with doxycycline/prednisone in  for bronchitis. Bobby Naidu was unable to complete doxycycline secondary to diarrhea which she still had on admission. Pt was admitted with acute hypoxic respiratory failure, severe sepsis 2/2 bilateral pna, Right Para pneumonic pleural effusion. Pt was evaluated by pulmonary and she underwent a thoracentesis with chest tube placement .  She was transferred to [facility] on  for VAT's on  with Dr. Gretchen Gonzalez. [facility] team was consulted for medical management at that time. Pt s/p VATs with decortication on 18. Pt known to have depression, anxiety, PTSD, essential tremor. At the early morning of  developed Acute respiratory failure with hypoxia.  Transferre to ICU with bipap. Currently pt on high flow, but still high risk of decompensation. She is extremely anxious, on xanax 1mg q8 prn, but not help.       Interval history / Subjective:     Pt seen at bedside in the room, She denies any complaints today. Denies fevers or chills. Assessment & Plan:     Acute hypoxic Respiratory Failure - Improved  - multifactorial: PNA, empyema, copd exacerbation  - retained secretions with some basilar ATX.  Needs aggressive pulm toilet  - further management per pulmonologist  -  S/P Bronch by Pulmonary      RLL PNA  -S/P Right VATS and decortication on   - with dense consolidation and possible areas of necrosis.  Consolidation of LLL as well  -abx per pulmonologist    -S/P Bronch On  for Mucus Plugging  -Repeat cxr on  showing improving fields.  -On ABX zosyn started 8/12, Levaquin 8/17  -Augmentin at dc recommended by pulm        Streptococcal Right Sided Empyema  - per primary team  - s/p VATs with decortication on 8/13/18  -Surgery has signed off  _pulmonary following       Suspected underlying COPD - Acute exacerbation  - former smoker  -nebs and prednisone taper       NIDDM 2  - poc, ssi, diabetic diet  - A1c 7.4  - on  glipizide at 2.5mg bid  -follow   -Hypoglycemia Protocol      Anemia  -Could be related to acute illness and IVF  - 8/15 not iron deficient, d/c ivf      Asymptomatic hypotension  - s/p albumin, bp improved to low 100's from high 80's  - pt was receiving metoprolol bid, she only takes this at bedtime, this has been adjusted  As she reports she takes it for chronic tachycardia and not for blood pressure.      Pt reports she takes bb for tachycardia and not htn  - c/w metoprolol, pt takes at bedtime,  Check Thyroid panel -  Free t4 ok       Diarrhea on admission which has resolved      Constipation Opoid induced   - chronic opiates  - bowel regimen       Fibromyalgia  - on chronic opiates for back pain  - pain control, lidocaine patch      Obese   - bmi 34, counseled on weight loss      Hypothyroidism - c/w levothyroxine      Familial HLD - c/w vytorin      Depression/anxiety/PTSD - c/w cymbalta,  Seen By Psychiatry  Meds adjusted   Continue with xanax and minipress - not given for blood pressure, wrote on room board      Essential tremor - c/w primidone, phenobarbital    Hypokalemia  Replete Prn    Reports of pelvic bone pain and low back pain  Check Xr pelvis and l spine 8/24         Code status: Full  DVT prophylaxis: Lovneonx  Pt lives her , Home oxygen has been setup    Care Plan discussed with: Patient/Family and Nurse  Patient has given Verbal permission to discuss medical care with   persons present in the room and and also with contact as listed on face sheet.    Disposition:  PT, OT, RN and on [facility] Problems  Date Reviewed: 8/17/2018          Codes Class Noted POA    Right-sided chest pain ICD-10-CM: R07.9  ICD-9-CM: 786.50  Unknown Unknown        Chronic pain syndrome ICD-10-CM: G89.4  ICD-9-CM: 338.4  Unknown Unknown        * (Principal)Empyema ([facility] [address] ICD-10-CM: J86.9  ICD-9-CM: 510.9  8/12/2018 Yes                Review of Systems:   A comprehensive review of systems was negative except for that written in the HPI. Vital Signs:    Last 24hrs VS reviewed since prior progress note. Most recent are:  Visit Vitals    /72 (BP 1 Location: Left arm)    Pulse 79    Temp 98.3 °F (36.8 °C)    Resp 16    Ht 5' 4\" (1.626 m)    Wt 81.1 kg (178 lb 12.7 oz)    SpO2 94%    BMI 30.69 kg/m2         Intake/Output Summary (Last 24 hours) at 08/24/18 2107  Last data filed at 08/23/18 2129   Gross per 24 hour   Intake              360 ml   Output             3950 ml   Net            -3590 ml        Physical Examination:             Constitutional:  + anxiety, cooperative, pleasant    ENT:  Oral mucous moist, oropharynx benign. Neck supple,    Resp:  Rhonchi noted bilaterally   CV:  Regular rhythm, normal rate, no murmurs, gallops, rubs    GI:  Soft, non distended, non tender. normoactive bowel sounds, no hepatosplenomegaly         Neurologic:  Moves all extremities. AAOx3, CN II-XII reviewed,Essential tremor noted of hands     Skin:  Good turgor, no rashes or ulcers       Data Review:    Review and/or order of clinical lab test  Review and/or order of tests in the radiology section of CPT  Review and/or order of tests in the medicine section of CPT      Labs:     Recent Labs      08/23/18   0334   WBC  11.2*   HGB  7.8*   HCT  25.9*   PLT  347     Recent Labs      08/23/18   0334   NA  138   K  3.2*   CL  101   CO2  27   BUN  10   CREA  0.88   GLU  104*   CA  8.8     No results for input(s): SGOT, GPT, ALT, AP, TBIL, TBILI, TP, ALB, GLOB, GGT, AML, LPSE in the last 72 hours.     No lab exists for component: AMYP, HLPSE  No results for input(s): INR, PTP, APTT in the last 72 hours. No lab exists for component: INREXT, INREXT   No results for input(s): FE, TIBC, PSAT, FERR in the last 72 hours. Lab Results   Component Value Date/Time    Folate 13.7 11/25/2014 03:16 PM      No results for input(s): PH, PCO2, PO2 in the last 72 hours. No results for input(s): CPK, CKNDX, TROIQ in the last 72 hours.     No lab exists for component: CPKMB  Lab Results   Component Value Date/Time    Cholesterol, total 254 (H) 01/19/2014 02:45 AM    HDL Cholesterol 97 01/19/2014 02:45 AM    LDL, calculated 121 (H) 01/19/2014 02:45 AM    Triglyceride 180 (H) 01/19/2014 02:45 AM    CHOL/HDL Ratio 2.6 01/19/2014 02:45 AM     Lab Results   Component Value Date/Time    Glucose (POC) 224 (H) 08/24/2018 06:52 AM    Glucose (POC) 123 (H) 08/23/2018 10:33 PM    Glucose (POC) 207 (H) 08/23/2018 04:22 PM    Glucose (POC) 206 (H) 08/23/2018 12:37 PM    Glucose (POC) 148 (H) 08/23/2018 06:26 AM     Lab Results   Component Value Date/Time    Color YELLOW/STRAW 08/21/2018 02:22 PM    Appearance TURBID (A) 08/21/2018 02:22 PM    Specific gravity 1.011 08/21/2018 02:22 PM    pH (UA) 7.0 08/21/2018 02:22 PM    Protein TRACE (A) 08/21/2018 02:22 PM    Glucose NEGATIVE  08/21/2018 02:22 PM    Ketone NEGATIVE  08/21/2018 02:22 PM    Bilirubin NEGATIVE  08/21/2018 02:22 PM    Urobilinogen 1.0 08/21/2018 02:22 PM    Nitrites NEGATIVE  08/21/2018 02:22 PM    Leukocyte Esterase SMALL (A) 08/21/2018 02:22 PM    Epithelial cells MODERATE (A) 08/21/2018 02:22 PM    Bacteria 1+ (A) 08/21/2018 02:22 PM    WBC 0-4 08/21/2018 02:22 PM    RBC 5-10 08/21/2018 02:22 PM         Medications Reviewed:     Current Facility-Administered Medications   Medication Dose Route Frequency    potassium chloride SR (KLOR-CON 10) tablet 20 mEq  20 mEq Oral BID    albuterol-ipratropium (DUO-NEB) 2.5 MG-0.5 MG/3 ML  3 mL Nebulization QID RT    naloxone (NARCAN) injection 0.4 mg  0.4 mg IntraVENous Multiple    flumazenil (ROMAZICON) 0.1 mg/mL injection 0.2 mg  0.2 mg IntraVENous Multiple    midazolam (VERSED) injection 0.25-5 mg  0.25-5 mg IntraVENous Multiple    acetylcysteine (MUCOMYST) 200 mg/mL (20 %) solution 400 mg  400 mg Nebulization QID RT    morphine injection 2 mg  2 mg IntraVENous Q3H PRN    ALPRAZolam (XANAX) tablet 1 mg  1 mg Oral Q6H PRN    phenol throat spray (CHLORASEPTIC) 1 Spray  1 Spray Oral PRN    guaiFENesin ER (MUCINEX) tablet 1,200 mg  1,200 mg Oral Q12H    albuterol-ipratropium (DUO-NEB) 2.5 MG-0.5 MG/3 ML  3 mL Nebulization Q4H PRN    glipiZIDE (GLUCOTROL) tablet 2.5 mg  2.5 mg Oral ACB&D    sodium chloride (NS) flush 5-10 mL  5-10 mL IntraVENous Q8H    sodium chloride (NS) flush 5-10 mL  5-10 mL IntraVENous PRN    bisacodyl (DULCOLAX) suppository 10 mg  10 mg Rectal DAILY PRN    polyethylene glycol (MIRALAX) packet 17 g  17 g Oral DAILY    predniSONE (DELTASONE) tablet 40 mg  40 mg Oral DAILY WITH BREAKFAST    vitamin b comp & c no.4 tab 1 Tab (Patient Supplied)  1 Tab Oral DAILY    insulin lispro (HUMALOG) injection   SubCUTAneous AC&HS    glucose chewable tablet 16 g  4 Tab Oral PRN    dextrose (D50W) injection syrg 12.5-25 g  12.5-25 g IntraVENous PRN    glucagon (GLUCAGEN) injection 1 mg  1 mg IntraMUSCular PRN    metoprolol tartrate (LOPRESSOR) tablet 25 mg  25 mg Oral QHS    senna-docusate (PERICOLACE) 8.6-50 mg per tablet 1 Tab  1 Tab Oral DAILY    nystatin (MYCOSTATIN) 100,000 unit/mL oral suspension 500,000 Units  500,000 Units Oral QID    zinc oxide-cod liver oil (DESITIN) 40 % paste (Patient Supplied)   Topical PRN    esomeprazole (NEXIUM) capsule 40 mg (Patient Supplied)  40 mg Oral ACB    promethazine (PHENERGAN) tablet 25 mg (Patient Supplied)  25 mg Oral Q6H PRN    prazosin (MINIPRESS) capsule 2 mg (Patient Supplied)  2 mg Oral BID    sodium chloride (NS) flush 5-10 mL  5-10 mL IntraVENous Q8H    sodium chloride (NS) flush 5-10 mL  5-10 mL IntraVENous PRN    naloxone (NARCAN) injection 0.4 mg  0.4 mg IntraVENous PRN    ondansetron (ZOFRAN) injection 4 mg  4 mg IntraVENous Q4H PRN    enoxaparin (LOVENOX) injection 40 mg  40 mg SubCUTAneous Q24H    arformoterol (BROVANA) neb solution 15 mcg  15 mcg Nebulization BID RT    And    budesonide (PULMICORT) 500 mcg/2 ml nebulizer suspension  500 mcg Nebulization BID RT    DULoxetine (CYMBALTA) capsule 40 mg  40 mg Oral DAILY    primidone (MYSOLINE) tablet 50 mg  50 mg Oral DAILY    primidone (MYSOLINE) tablet 150 mg  150 mg Oral QHS    furosemide (LASIX) tablet 40 mg  40 mg Oral DAILY    montelukast (SINGULAIR) tablet 10 mg  10 mg Oral QHS    ezetimibe/simvastatin (VYTORIN) 10/40 mg   Oral QPM    piperacillin-tazobactam (ZOSYN) 3.375 g in 0.9% sodium chloride (MBP/ADV) 100 mL  3.375 g IntraVENous Q8H    levothyroxine (SYNTHROID) tablet 200 mcg  200 mcg Oral ACB    lactobac ac& pc-s.therm-b.anim (ROBIN Q/RISAQUAD)  1 Cap Oral DAILY    oxyCODONE IR (ROXICODONE) tablet 15 mg  15 mg Oral Q4H PRN    PHENobarbital (LUMINAL) tablet 64.8 mg  64.8 mg Oral BID     ______________________________________________________________________  EXPECTED LENGTH OF STAY: 10d 0h  ACTUAL LENGTH OF STAY:          12                 Gamaliel Estrada MD

## 2018-08-24 NOTE — DIABETES MGMT
DTC Progress Note    Recommendations/ Comments: Chart reviewed on Omie Apley for hyperglycemia with patient on Prednisone 40 mg daily. If appropriate, please consider:   - Increase Glipizide to 5 mg BID    Current hospital DM medication:   Glipizide 2.5 mg BID and   Lispro resistant correction scale    Patient is a 58 y.o. female with known DM on Glipizide 5 mg BID. Per PTA pt monitors her BG and takes this only when she is on steroids. A1c:   Lab Results   Component Value Date/Time    Hemoglobin A1c 7.4 (H) 08/15/2018 04:30 AM    Hemoglobin A1c 6.1 (H) 11/25/2014 03:16 PM       Recent Glucose Results:   Lab Results   Component Value Date/Time    GLUCPOC 203 (H) 08/24/2018 11:33 AM    GLUCPOC 224 (H) 08/24/2018 06:52 AM    GLUCPOC 123 (H) 08/23/2018 10:33 PM        Lab Results   Component Value Date/Time    Creatinine 0.88 08/23/2018 03:34 AM     Estimated Creatinine Clearance: 68.3 mL/min (based on Cr of 0.88). Active Orders   Diet    DIET DIABETIC CONSISTENT CARB Regular; 2 GM NA (House Low NA); AHA-LOW-CHOL FAT; No Conc. Sweets        PO intake:   Patient Vitals for the past 72 hrs:   % Diet Eaten   08/23/18 1200 85 %   08/23/18 0800 100 %   08/22/18 1600 70 %   08/22/18 1200 50 %   08/22/18 0800 60 %       Will continue to follow as needed.     Thank you  Ubaldo Arellano MS, RN, CDE

## 2018-08-24 NOTE — CDMP QUERY
Please clarify if this patient is (was) being treated/managed for:     => Hypokalemia in the setting of drop in K level requiring administration of KCL supplementation and lab monitoring  => Other explanation of clinical findings  => Clinically Undetermined (no explanation for clinical findings)    The medical record reflects the following:    Risk Factors:  PT s/p VATS with decortication. mucus plugging requiring bronch. Acute illness with resp failure     Clinical Indicators:  K level dropped to 3.3 and then to a low of 3.2 on 08/23. Treatment: KCL po replacement and lab monitoring    Please clarify and document your clinical opinion in the progress notes and discharge summary including the definitive and/or presumptive diagnosis, (suspected or probable), related to the above clinical findings. Please include clinical findings supporting your diagnosis.     Thank you,    Thony Templeton, RN, BSN, [facility], [address]  [phone]

## 2018-08-24 NOTE — PROGRESS NOTES
Bedside and Verbal shift change report given to Yandy (oncoming nurse) by Nimisha Li (offgoing nurse). Report included the following information SBAR, Kardex and MAR.

## 2018-08-24 NOTE — PROGRESS NOTES
Physical Therapy Note:  Attempted to see patient for follow up. Patient received in bedside chair following a bath with nsg. Patient received on 8L O2 via nasal canula with SpO2 84-89% while HR varied between 124 and 133 BPM. Placed patient on face tent on 6L and 4L via nasal canula and her SpO2 slowly increased to 87-89% as her HR decreased to 116 BPM. Nsg notified and deferred treat at this time until respiratory status and HR are improved. Will follow up for the pm as able.  Heriberto Eason, PT, DPT

## 2018-08-24 NOTE — PROGRESS NOTES
Pulmonary, Critical Care, and Sleep Medicine~Progress Note    Name: Cheryle Balls MRN: [id number]   : 1955 Hospital: [place] [address]   Date: 2018 7:53 AM Admission: 2018     Impression Plan   1. Acute hypoxic resp failure secondary to below; + Edema and excessive mucous with difficult expectorating   2. S/p Right VATS with decortication secondary to streptococcal empyema; on    3. COPD, with exacerbation   4. DM II  5. HTN  6. hypothyroidism  1. On zosyn; consider augmentin at discharge   2. Steroids taper   3. lovenox proph  4. O2 titration above 90%, wean O2   5. PPI proph  6. Diuretics  7. Brovana/pulmicort    8. pulm toilet~ duonebs/mucomyst/chest PT/mucinex  9. Would benefit from rehab  10. PRN over the weekend if she stays but she really is doing well from a pulmonary aspect.        Daily Progression:    Mild congestion this morning     OBJECTIVE:     Vital Signs:       Visit Vitals    /70 (BP 1 Location: Left arm, BP Patient Position: At rest)    Pulse (!) 104    Temp 98.6 °F (37 °C)    Resp 16    Ht 5' 4\" (1.626 m)    Wt 81.1 kg (178 lb 12.7 oz)    SpO2 (!) 88%    BMI 30.69 kg/m2      Temp (24hrs), Av °F (36.7 °C), Min:97.5 °F (36.4 °C), Max:98.6 °F (37 °C)     Intake/Output:     Last shift: 701 - 1900  In: -   Out: 300 [Urine:300]    Last 3 shifts: 1901 -  07  In: 360 [P.O.:360]  Out: 3950 [Urine:3950]          Intake/Output Summary (Last 24 hours) at 18 0852  Last data filed at 18 0725   Gross per 24 hour   Intake              120 ml   Output             3350 ml   Net            -3230 ml       Physical Exam:                                        Exam Findings Other   General: No resp distress noted, appears stated age    HEENT:  No ulcers, JVD not elevated, no cervical LAD    Chest: No pectus deformity, normal chest rise b/l    HEART:  RRR, no murmurs/rubs/gallops    Lungs:  Coarse sounds     ABD: Soft/NT, non rigid mildly distended    EXT: No cyanosis/clubbing/edema, normal peripheral pulses    Skin: No rashes or ulcers, no mottling    Neuro: A/O x 3        Medications:  Current Facility-Administered Medications   Medication Dose Route Frequency    potassium chloride SR (KLOR-CON 10) tablet 20 mEq  20 mEq Oral BID    albuterol-ipratropium (DUO-NEB) 2.5 MG-0.5 MG/3 ML  3 mL Nebulization QID RT    naloxone (NARCAN) injection 0.4 mg  0.4 mg IntraVENous Multiple    flumazenil (ROMAZICON) 0.1 mg/mL injection 0.2 mg  0.2 mg IntraVENous Multiple    midazolam (VERSED) injection 0.25-5 mg  0.25-5 mg IntraVENous Multiple    acetylcysteine (MUCOMYST) 200 mg/mL (20 %) solution 400 mg  400 mg Nebulization QID RT    morphine injection 2 mg  2 mg IntraVENous Q3H PRN    ALPRAZolam (XANAX) tablet 1 mg  1 mg Oral Q6H PRN    phenol throat spray (CHLORASEPTIC) 1 Spray  1 Spray Oral PRN    guaiFENesin ER (MUCINEX) tablet 1,200 mg  1,200 mg Oral Q12H    albuterol-ipratropium (DUO-NEB) 2.5 MG-0.5 MG/3 ML  3 mL Nebulization Q4H PRN    glipiZIDE (GLUCOTROL) tablet 2.5 mg  2.5 mg Oral ACB&D    sodium chloride (NS) flush 5-10 mL  5-10 mL IntraVENous Q8H    sodium chloride (NS) flush 5-10 mL  5-10 mL IntraVENous PRN    bisacodyl (DULCOLAX) suppository 10 mg  10 mg Rectal DAILY PRN    polyethylene glycol (MIRALAX) packet 17 g  17 g Oral DAILY    predniSONE (DELTASONE) tablet 40 mg  40 mg Oral DAILY WITH BREAKFAST    vitamin b comp & c no.4 tab 1 Tab (Patient Supplied)  1 Tab Oral DAILY    insulin lispro (HUMALOG) injection   SubCUTAneous AC&HS    glucose chewable tablet 16 g  4 Tab Oral PRN    dextrose (D50W) injection syrg 12.5-25 g  12.5-25 g IntraVENous PRN    glucagon (GLUCAGEN) injection 1 mg  1 mg IntraMUSCular PRN    metoprolol tartrate (LOPRESSOR) tablet 25 mg  25 mg Oral QHS    senna-docusate (PERICOLACE) 8.6-50 mg per tablet 1 Tab  1 Tab Oral DAILY  nystatin (MYCOSTATIN) 100,000 unit/mL oral suspension 500,000 Units  500,000 Units Oral QID    zinc oxide-cod liver oil (DESITIN) 40 % paste (Patient Supplied)   Topical PRN    esomeprazole (NEXIUM) capsule 40 mg (Patient Supplied)  40 mg Oral ACB    promethazine (PHENERGAN) tablet 25 mg (Patient Supplied)  25 mg Oral Q6H PRN    prazosin (MINIPRESS) capsule 2 mg (Patient Supplied)  2 mg Oral BID    sodium chloride (NS) flush 5-10 mL  5-10 mL IntraVENous Q8H    sodium chloride (NS) flush 5-10 mL  5-10 mL IntraVENous PRN    naloxone (NARCAN) injection 0.4 mg  0.4 mg IntraVENous PRN    ondansetron (ZOFRAN) injection 4 mg  4 mg IntraVENous Q4H PRN    enoxaparin (LOVENOX) injection 40 mg  40 mg SubCUTAneous Q24H    arformoterol (BROVANA) neb solution 15 mcg  15 mcg Nebulization BID RT    And    budesonide (PULMICORT) 500 mcg/2 ml nebulizer suspension  500 mcg Nebulization BID RT    DULoxetine (CYMBALTA) capsule 40 mg  40 mg Oral DAILY    primidone (MYSOLINE) tablet 50 mg  50 mg Oral DAILY    primidone (MYSOLINE) tablet 150 mg  150 mg Oral QHS    furosemide (LASIX) tablet 40 mg  40 mg Oral DAILY    montelukast (SINGULAIR) tablet 10 mg  10 mg Oral QHS    ezetimibe/simvastatin (VYTORIN) 10/40 mg   Oral QPM    piperacillin-tazobactam (ZOSYN) 3.375 g in 0.9% sodium chloride (MBP/ADV) 100 mL  3.375 g IntraVENous Q8H    levothyroxine (SYNTHROID) tablet 200 mcg  200 mcg Oral ACB    lactobac ac& pc-s.therm-b.anim (ROBIN Q/RISAQUAD)  1 Cap Oral DAILY    oxyCODONE IR (ROXICODONE) tablet 15 mg  15 mg Oral Q4H PRN    PHENobarbital (LUMINAL) tablet 64.8 mg  64.8 mg Oral BID       Labs:  ABG No results for input(s): PHI, PCO2I, PO2I, HCO3I, SO2I, FIO2I in the last 72 hours.      CBC Recent Labs      08/23/18   0334   WBC  11.2*   HGB  7.8*   HCT  25.9*   PLT  347   MCV  99.6*   MCH  52.6        Metabolic  Panel Recent Labs      08/23/18   0334   NA  138   K  3.2*   CL  101   CO2  27   GLU  104*   BUN  10   CREA 0. [address]  8.8        Pertinent [facility] [place]  8/24/2018

## 2018-08-24 NOTE — PROGRESS NOTES
Celio talked with Garrick Johnson [phone], liaison, with [employer] ([phone]) regarding home 02 for patient. She has been approved for home 02. Patient currently has a portable 02 tank in her room. As she was not discharged on 8/17/18, new SATS will be needed so the home 02 can be delivered. Janine Atwood advised that when patient is discharged that SATS be faxed to [phone]. CELIO can call [phone] over the weekend if patient is discharged so arrangements can be made for the home 02 to be delivered. The company has the order so only new STATS are needed.

## 2018-08-25 LAB
ANION GAP SERPL CALC-SCNC: 9 MMOL/L (ref 5–15)
BASOPHILS # BLD: 0.1 K/UL (ref 0–0.1)
BASOPHILS NFR BLD: 1 % (ref 0–1)
BUN SERPL-MCNC: 13 MG/DL (ref 6–20)
BUN/CREAT SERPL: 14 (ref 12–20)
CALCIUM SERPL-MCNC: 8.6 MG/DL (ref 8.5–10.1)
CHLORIDE SERPL-SCNC: 105 MMOL/L (ref 97–108)
CO2 SERPL-SCNC: 25 MMOL/L (ref 21–32)
CREAT SERPL-MCNC: 0.93 MG/DL (ref 0.55–1.02)
DIFFERENTIAL METHOD BLD: ABNORMAL
EOSINOPHIL # BLD: 0 K/UL (ref 0–0.4)
EOSINOPHIL NFR BLD: 0 % (ref 0–7)
ERYTHROCYTE [DISTWIDTH] IN BLOOD BY AUTOMATED COUNT: 15 % (ref 11.5–14.5)
GLUCOSE BLD STRIP.AUTO-MCNC: 113 MG/DL (ref 65–100)
GLUCOSE BLD STRIP.AUTO-MCNC: 147 MG/DL (ref 65–100)
GLUCOSE BLD STRIP.AUTO-MCNC: 170 MG/DL (ref 65–100)
GLUCOSE BLD STRIP.AUTO-MCNC: 92 MG/DL (ref 65–100)
GLUCOSE SERPL-MCNC: 110 MG/DL (ref 65–100)
HCT VFR BLD AUTO: 28 % (ref 35–47)
HGB BLD-MCNC: 8.5 G/DL (ref 11.5–16)
IMM GRANULOCYTES # BLD: 0.2 K/UL (ref 0–0.04)
IMM GRANULOCYTES NFR BLD AUTO: 1 % (ref 0–0.5)
LYMPHOCYTES # BLD: 3 K/UL (ref 0.8–3.5)
LYMPHOCYTES NFR BLD: 26 % (ref 12–49)
MCH RBC QN AUTO: 29.9 PG (ref 26–34)
MCHC RBC AUTO-ENTMCNC: 30.4 G/DL (ref 30–36.5)
MCV RBC AUTO: 98.6 FL (ref 80–99)
MONOCYTES # BLD: 1 K/UL (ref 0–1)
MONOCYTES NFR BLD: 9 % (ref 5–13)
NEUTS SEG # BLD: 7.2 K/UL (ref 1.8–8)
NEUTS SEG NFR BLD: 63 % (ref 32–75)
NRBC # BLD: 0 K/UL (ref 0–0.01)
NRBC BLD-RTO: 0 PER 100 WBC
PLATELET # BLD AUTO: 359 K/UL (ref 150–400)
PMV BLD AUTO: 9.4 FL (ref 8.9–12.9)
POTASSIUM SERPL-SCNC: 3.8 MMOL/L (ref 3.5–5.1)
RBC # BLD AUTO: 2.84 M/UL (ref 3.8–5.2)
SERVICE CMNT-IMP: ABNORMAL
SERVICE CMNT-IMP: NORMAL
SODIUM SERPL-SCNC: 139 MMOL/L (ref 136–145)
WBC # BLD AUTO: 11.5 K/UL (ref 3.6–11)

## 2018-08-25 PROCEDURE — 74011000258 HC RX REV CODE- 258: Performed by: THORACIC SURGERY (CARDIOTHORACIC VASCULAR SURGERY)

## 2018-08-25 PROCEDURE — 94640 AIRWAY INHALATION TREATMENT: CPT

## 2018-08-25 PROCEDURE — 80048 BASIC METABOLIC PNL TOTAL CA: CPT | Performed by: HOSPITALIST

## 2018-08-25 PROCEDURE — 74011000250 HC RX REV CODE- 250: Performed by: INTERNAL MEDICINE

## 2018-08-25 PROCEDURE — 94760 N-INVAS EAR/PLS OXIMETRY 1: CPT

## 2018-08-25 PROCEDURE — 74011250636 HC RX REV CODE- 250/636: Performed by: THORACIC SURGERY (CARDIOTHORACIC VASCULAR SURGERY)

## 2018-08-25 PROCEDURE — 74011250637 HC RX REV CODE- 250/637: Performed by: INTERNAL MEDICINE

## 2018-08-25 PROCEDURE — 77010033678 HC OXYGEN DAILY

## 2018-08-25 PROCEDURE — 74011636637 HC RX REV CODE- 636/637: Performed by: NURSE PRACTITIONER

## 2018-08-25 PROCEDURE — 74011250637 HC RX REV CODE- 250/637: Performed by: NURSE PRACTITIONER

## 2018-08-25 PROCEDURE — 65660000000 HC RM CCU STEPDOWN

## 2018-08-25 PROCEDURE — 36415 COLL VENOUS BLD VENIPUNCTURE: CPT | Performed by: HOSPITALIST

## 2018-08-25 PROCEDURE — 74011000250 HC RX REV CODE- 250: Performed by: NURSE PRACTITIONER

## 2018-08-25 PROCEDURE — 85025 COMPLETE CBC W/AUTO DIFF WBC: CPT | Performed by: HOSPITALIST

## 2018-08-25 PROCEDURE — 74011250637 HC RX REV CODE- 250/637: Performed by: HOSPITALIST

## 2018-08-25 PROCEDURE — 82962 GLUCOSE BLOOD TEST: CPT

## 2018-08-25 PROCEDURE — 74011000250 HC RX REV CODE- 250: Performed by: THORACIC SURGERY (CARDIOTHORACIC VASCULAR SURGERY)

## 2018-08-25 PROCEDURE — 74011250637 HC RX REV CODE- 250/637: Performed by: THORACIC SURGERY (CARDIOTHORACIC VASCULAR SURGERY)

## 2018-08-25 PROCEDURE — 74011636637 HC RX REV CODE- 636/637: Performed by: PHYSICIAN ASSISTANT

## 2018-08-25 PROCEDURE — 74011000250 HC RX REV CODE- 250: Performed by: PHYSICIAN ASSISTANT

## 2018-08-25 RX ADMIN — FUROSEMIDE 40 MG: 40 TABLET ORAL at 09:59

## 2018-08-25 RX ADMIN — GLIPIZIDE 2.5 MG: 5 TABLET ORAL at 19:51

## 2018-08-25 RX ADMIN — OXYCODONE HYDROCHLORIDE 15 MG: 5 TABLET ORAL at 03:00

## 2018-08-25 RX ADMIN — IPRATROPIUM BROMIDE AND ALBUTEROL SULFATE 3 ML: .5; 3 SOLUTION RESPIRATORY (INHALATION) at 17:41

## 2018-08-25 RX ADMIN — Medication 1 CAPSULE: at 09:59

## 2018-08-25 RX ADMIN — OXYCODONE HYDROCHLORIDE 15 MG: 5 TABLET ORAL at 07:22

## 2018-08-25 RX ADMIN — OXYCODONE HYDROCHLORIDE 15 MG: 5 TABLET ORAL at 14:49

## 2018-08-25 RX ADMIN — PHENOBARBITAL 64.8 MG: 64.8 TABLET ORAL at 19:38

## 2018-08-25 RX ADMIN — INSULIN LISPRO 3 UNITS: 100 INJECTION, SOLUTION INTRAVENOUS; SUBCUTANEOUS at 07:22

## 2018-08-25 RX ADMIN — LEVOTHYROXINE SODIUM 200 MCG: 100 TABLET ORAL at 07:22

## 2018-08-25 RX ADMIN — POTASSIUM CHLORIDE 20 MEQ: 750 TABLET, EXTENDED RELEASE ORAL at 09:59

## 2018-08-25 RX ADMIN — PREDNISONE 40 MG: 20 TABLET ORAL at 07:21

## 2018-08-25 RX ADMIN — PIPERACILLIN AND TAZOBACTAM 3.38 G: 3; .375 INJECTION, POWDER, FOR SOLUTION INTRAVENOUS at 19:39

## 2018-08-25 RX ADMIN — PRAZOSIN HYDROCHLORIDE 2 MG: 1 CAPSULE ORAL at 19:53

## 2018-08-25 RX ADMIN — Medication 10 ML: at 13:17

## 2018-08-25 RX ADMIN — NYSTATIN 500000 UNITS: 100000 SUSPENSION ORAL at 09:57

## 2018-08-25 RX ADMIN — POTASSIUM CHLORIDE 20 MEQ: 750 TABLET, EXTENDED RELEASE ORAL at 19:38

## 2018-08-25 RX ADMIN — BUDESONIDE 500 MCG: 0.5 INHALANT RESPIRATORY (INHALATION) at 09:01

## 2018-08-25 RX ADMIN — ESOMEPRAZOLE MAGNESIUM 40 MG: 40 CAPSULE, DELAYED RELEASE ORAL at 07:22

## 2018-08-25 RX ADMIN — OXYCODONE HYDROCHLORIDE 15 MG: 5 TABLET ORAL at 19:36

## 2018-08-25 RX ADMIN — PHENOBARBITAL 64.8 MG: 64.8 TABLET ORAL at 07:22

## 2018-08-25 RX ADMIN — PRIMIDONE 50 MG: 50 TABLET ORAL at 07:21

## 2018-08-25 RX ADMIN — MONTELUKAST SODIUM 10 MG: 10 TABLET, FILM COATED ORAL at 21:53

## 2018-08-25 RX ADMIN — POLYETHYLENE GLYCOL 3350 17 G: 17 POWDER, FOR SOLUTION ORAL at 09:55

## 2018-08-25 RX ADMIN — Medication 10 ML: at 21:53

## 2018-08-25 RX ADMIN — PRIMIDONE 150 MG: 50 TABLET ORAL at 22:31

## 2018-08-25 RX ADMIN — IPRATROPIUM BROMIDE AND ALBUTEROL SULFATE 3 ML: .5; 3 SOLUTION RESPIRATORY (INHALATION) at 09:01

## 2018-08-25 RX ADMIN — IPRATROPIUM BROMIDE AND ALBUTEROL SULFATE 3 ML: .5; 3 SOLUTION RESPIRATORY (INHALATION) at 12:25

## 2018-08-25 RX ADMIN — SIMVASTATIN: 20 TABLET, FILM COATED ORAL at 18:00

## 2018-08-25 RX ADMIN — ACETYLCYSTEINE 400 MG: 200 SOLUTION ORAL; RESPIRATORY (INHALATION) at 09:01

## 2018-08-25 RX ADMIN — OXYCODONE HYDROCHLORIDE 15 MG: 5 TABLET ORAL at 10:54

## 2018-08-25 RX ADMIN — GUAIFENESIN 1200 MG: 600 TABLET, EXTENDED RELEASE ORAL at 09:59

## 2018-08-25 RX ADMIN — Medication 1 TABLET: at 10:01

## 2018-08-25 RX ADMIN — METOPROLOL TARTRATE 25 MG: 25 TABLET ORAL at 21:53

## 2018-08-25 RX ADMIN — NYSTATIN 500000 UNITS: 100000 SUSPENSION ORAL at 13:17

## 2018-08-25 RX ADMIN — NYSTATIN 500000 UNITS: 100000 SUSPENSION ORAL at 19:51

## 2018-08-25 RX ADMIN — GUAIFENESIN 1200 MG: 600 TABLET, EXTENDED RELEASE ORAL at 21:53

## 2018-08-25 RX ADMIN — ACETYLCYSTEINE 400 MG: 200 SOLUTION ORAL; RESPIRATORY (INHALATION) at 17:41

## 2018-08-25 RX ADMIN — PIPERACILLIN AND TAZOBACTAM 3.38 G: 3; .375 INJECTION, POWDER, FOR SOLUTION INTRAVENOUS at 03:00

## 2018-08-25 RX ADMIN — PROMETHAZINE HYDROCHLORIDE 25 MG: 25 TABLET ORAL at 19:39

## 2018-08-25 RX ADMIN — GLIPIZIDE 2.5 MG: 5 TABLET ORAL at 07:21

## 2018-08-25 RX ADMIN — ENOXAPARIN SODIUM 40 MG: 100 INJECTION SUBCUTANEOUS at 09:56

## 2018-08-25 RX ADMIN — PRAZOSIN HYDROCHLORIDE 2 MG: 1 CAPSULE ORAL at 07:00

## 2018-08-25 RX ADMIN — PIPERACILLIN AND TAZOBACTAM 3.38 G: 3; .375 INJECTION, POWDER, FOR SOLUTION INTRAVENOUS at 10:55

## 2018-08-25 RX ADMIN — INSULIN LISPRO 3 UNITS: 100 INJECTION, SOLUTION INTRAVENOUS; SUBCUTANEOUS at 11:26

## 2018-08-25 RX ADMIN — NYSTATIN 500000 UNITS: 100000 SUSPENSION ORAL at 21:53

## 2018-08-25 RX ADMIN — ACETYLCYSTEINE 400 MG: 200 SOLUTION ORAL; RESPIRATORY (INHALATION) at 12:25

## 2018-08-25 RX ADMIN — DULOXETINE HYDROCHLORIDE 40 MG: 20 CAPSULE, DELAYED RELEASE ORAL at 10:00

## 2018-08-25 NOTE — PROGRESS NOTES
Hospitalist Progress Note  Antonio Monroy MD  Answering service: [phone] OR [phone] from in house phone      Date of Service:  2018  NAME:  David Royal  :  1955  MRN:  [id number]      Admission Summary:   Francois Beaver a 58 y. o. female who presented with severe left sided chest pain on  to [facility].  Pt reported she was treated with doxycycline/prednisone in  for bronchitis. Hosey Carrel was unable to complete doxycycline secondary to diarrhea which she still had on admission. Pt was admitted with acute hypoxic respiratory failure, severe sepsis 2/2 bilateral pna, Right Para pneumonic pleural effusion. Pt was evaluated by pulmonary and she underwent a thoracentesis with chest tube placement .  She was transferred to [address] [place] [address] on  for VAT's on  with Dr. Angy Park. [address] team was consulted for medical management at that time. Pt s/p VATs with decortication on 18. Pt known to have depression, anxiety, PTSD, essential tremor. At the early morning of  developed Acute respiratory failure with hypoxia.  Transferre to ICU with bipap. Currently pt on high flow, but still high risk of decompensation. She is extremely anxious, on xanax 1mg q8 prn, but not help.       Interval history / Subjective:     Pt seen at bedside in the room, She denies any complaints today. Denies fevers or chills. She reports she wants to work with PT. Assessment & Plan:     Acute hypoxic Respiratory Failure - Improved  - multifactorial: PNA, empyema, copd exacerbation  - retained secretions with some basilar ATX.  Needs aggressive pulm toilet  - further management per pulmonologist  -  S/P Bronch by Pulmonary      RLL PNA  -S/P Right VATS and decortication on   - with dense consolidation and possible areas of necrosis.  Consolidation of LLL as well  -abx per pulmonologist    -S/P Bronch On  for Mucus Plugging  -Repeat cxr on  showing improving fields.  -On ABX zosyn started 8/12, Levaquin 8/17  -Augmentin at dc recommended by pulm        Streptococcal Right Sided Empyema  - per primary team  - s/p VATs with decortication on 8/13/18  -Surgery has signed off  _pulmonary following       Suspected underlying COPD - Acute exacerbation  - former smoker  -nebs and prednisone taper       NIDDM 2  - poc, ssi, diabetic diet  - A1c 7.4  - on  glipizide at 2.5mg bid  -follow   -Hypoglycemia Protocol      Anemia  -Could be related to acute illness and IVF  - 8/15 not iron deficient, d/c ivf      Asymptomatic hypotension  - s/p albumin, bp improved to low 100's from high 80's  - pt was receiving metoprolol bid, she only takes this at bedtime, this has been adjusted  As she reports she takes it for chronic tachycardia and not for blood pressure.      Pt reports she takes bb for tachycardia and not htn  - c/w metoprolol, pt takes at bedtime,  Check Thyroid panel -  Free t4 ok       Diarrhea on admission which has resolved      Constipation Opoid induced   - chronic opiates  - bowel regimen       Fibromyalgia  - on chronic opiates for back pain  - pain control, lidocaine patch      Obese   - bmi 34, counseled on weight loss      Hypothyroidism - c/w levothyroxine      Familial HLD - c/w vytorin      Depression/anxiety/PTSD - c/w cymbalta,  Seen By Psychiatry  Meds adjusted   Continue with xanax and minipress - not given for blood pressure, wrote on room board      Essential tremor - c/w primidone, phenobarbital    Hypokalemia  Replete Prn    Reports of pelvic bone pain and low back pain  Check Xr pelvis and l spine 8/24         Code status: Full  DVT prophylaxis: Lovneonx  Pt lives her , Home oxygen has been setup    Care Plan discussed with: Patient/Family and Nurse  Patient has given Verbal permission to discuss medical care with   persons present in the room and and also with contact as listed on face sheet.    Disposition: HH PT, OT, RN and on [facility] Problems  Date Reviewed: 8/17/2018          Codes Class Noted POA    Right-sided chest pain ICD-10-CM: R07.9  ICD-9-CM: 786.50  Unknown Unknown        Chronic pain syndrome ICD-10-CM: G89.4  ICD-9-CM: 338.4  Unknown Unknown        * (Principal)Empyema ([address] ICD-10-CM: J86.9  ICD-9-CM: 510.9  8/12/2018 Yes                Review of Systems:   A comprehensive review of systems was negative except for that written in the HPI. Vital Signs:    Last 24hrs VS reviewed since prior progress note. Most recent are:  Visit Vitals    /52 (BP 1 Location: Left arm, BP Patient Position: Sitting)    Pulse (!) 109    Temp 98.1 °F (36.7 °C)    Resp 18    Ht 5' 4\" (1.626 m)    Wt 81.1 kg (178 lb 12.7 oz)    SpO2 92%    BMI 30.69 kg/m2       No intake or output data in the 24 hours ending 08/25/18 1341     Physical Examination:             Constitutional:  + anxiety, cooperative, pleasant    ENT:  Oral mucous moist, oropharynx benign. Neck supple,    Resp:  Rhonchi noted bilaterally   CV:  Regular rhythm, normal rate, no murmurs, gallops, rubs    GI:  Soft, non distended, non tender. normoactive bowel sounds, no hepatosplenomegaly         Neurologic:  Moves all extremities. AAOx3, CN II-XII reviewed,Essential tremor noted of hands     Skin:  Good turgor, no rashes or ulcers       Data Review:    Review and/or order of clinical lab test  Review and/or order of tests in the radiology section of CPT  Review and/or order of tests in the medicine section of CPT      Labs:     Recent Labs      08/25/18 0311  08/23/18   0334   WBC  11.5*  11.2*   HGB  8.5*  7.8*   HCT  28.0*  25.9*   PLT  359  347     Recent Labs      08/25/18   0311  08/23/18   0334   NA  139  138   K  3.8  3.2*   CL  105  101   CO2  25  27   BUN  13  10   CREA  0.93  0.88   GLU  110*  104*   CA  8.6  8.8     No results for input(s): SGOT, GPT, ALT, AP, TBIL, TBILI, TP, ALB, GLOB, GGT, AML, LPSE in the last 72 hours.     No lab exists for component: AMYP, HLPSE  No results for input(s): INR, PTP, APTT in the last 72 hours. No lab exists for component: INREXT, INREXT   No results for input(s): FE, TIBC, PSAT, FERR in the last 72 hours. Lab Results   Component Value Date/Time    Folate 13.7 11/25/2014 03:16 PM      No results for input(s): PH, PCO2, PO2 in the last 72 hours. No results for input(s): CPK, CKNDX, TROIQ in the last 72 hours.     No lab exists for component: CPKMB  Lab Results   Component Value Date/Time    Cholesterol, total 254 (H) 01/19/2014 02:45 AM    HDL Cholesterol 97 01/19/2014 02:45 AM    LDL, calculated 121 (H) 01/19/2014 02:45 AM    Triglyceride 180 (H) 01/19/2014 02:45 AM    CHOL/HDL Ratio 2.6 01/19/2014 02:45 AM     Lab Results   Component Value Date/Time    Glucose (POC) 170 (H) 08/25/2018 11:13 AM    Glucose (POC) 147 (H) 08/25/2018 06:55 AM    Glucose (POC) 144 (H) 08/24/2018 09:14 PM    Glucose (POC) 140 (H) 08/24/2018 04:03 PM    Glucose (POC) 203 (H) 08/24/2018 11:33 AM     Lab Results   Component Value Date/Time    Color YELLOW/STRAW 08/21/2018 02:22 PM    Appearance TURBID (A) 08/21/2018 02:22 PM    Specific gravity 1.011 08/21/2018 02:22 PM    pH (UA) 7.0 08/21/2018 02:22 PM    Protein TRACE (A) 08/21/2018 02:22 PM    Glucose NEGATIVE  08/21/2018 02:22 PM    Ketone NEGATIVE  08/21/2018 02:22 PM    Bilirubin NEGATIVE  08/21/2018 02:22 PM    Urobilinogen 1.0 08/21/2018 02:22 PM    Nitrites NEGATIVE  08/21/2018 02:22 PM    Leukocyte Esterase SMALL (A) 08/21/2018 02:22 PM    Epithelial cells MODERATE (A) 08/21/2018 02:22 PM    Bacteria 1+ (A) 08/21/2018 02:22 PM    WBC 0-4 08/21/2018 02:22 PM    RBC 5-10 08/21/2018 02:22 PM         Medications Reviewed:     Current Facility-Administered Medications   Medication Dose Route Frequency    potassium chloride SR (KLOR-CON 10) tablet 20 mEq  20 mEq Oral BID    albuterol-ipratropium (DUO-NEB) 2.5 MG-0.5 MG/3 ML  3 mL Nebulization QID RT    naloxone (NARCAN) injection 0.4 mg  0.4 mg IntraVENous Multiple    flumazenil (ROMAZICON) 0.1 mg/mL injection 0.2 mg  0.2 mg IntraVENous Multiple    midazolam (VERSED) injection 0.25-5 mg  0.25-5 mg IntraVENous Multiple    acetylcysteine (MUCOMYST) 200 mg/mL (20 %) solution 400 mg  400 mg Nebulization QID RT    morphine injection 2 mg  2 mg IntraVENous Q3H PRN    ALPRAZolam (XANAX) tablet 1 mg  1 mg Oral Q6H PRN    phenol throat spray (CHLORASEPTIC) 1 Spray  1 Spray Oral PRN    guaiFENesin ER (MUCINEX) tablet 1,200 mg  1,200 mg Oral Q12H    albuterol-ipratropium (DUO-NEB) 2.5 MG-0.5 MG/3 ML  3 mL Nebulization Q4H PRN    glipiZIDE (GLUCOTROL) tablet 2.5 mg  2.5 mg Oral ACB&D    sodium chloride (NS) flush 5-10 mL  5-10 mL IntraVENous Q8H    sodium chloride (NS) flush 5-10 mL  5-10 mL IntraVENous PRN    bisacodyl (DULCOLAX) suppository 10 mg  10 mg Rectal DAILY PRN    polyethylene glycol (MIRALAX) packet 17 g  17 g Oral DAILY    predniSONE (DELTASONE) tablet 40 mg  40 mg Oral DAILY WITH BREAKFAST    vitamin b comp & c no.4 tab 1 Tab (Patient Supplied)  1 Tab Oral DAILY    insulin lispro (HUMALOG) injection   SubCUTAneous AC&HS    glucose chewable tablet 16 g  4 Tab Oral PRN    dextrose (D50W) injection syrg 12.5-25 g  12.5-25 g IntraVENous PRN    glucagon (GLUCAGEN) injection 1 mg  1 mg IntraMUSCular PRN    metoprolol tartrate (LOPRESSOR) tablet 25 mg  25 mg Oral QHS    senna-docusate (PERICOLACE) 8.6-50 mg per tablet 1 Tab  1 Tab Oral DAILY    nystatin (MYCOSTATIN) 100,000 unit/mL oral suspension 500,000 Units  500,000 Units Oral QID    zinc oxide-cod liver oil (DESITIN) 40 % paste (Patient Supplied)   Topical PRN    esomeprazole (NEXIUM) capsule 40 mg (Patient Supplied)  40 mg Oral ACB    promethazine (PHENERGAN) tablet 25 mg (Patient Supplied)  25 mg Oral Q6H PRN    prazosin (MINIPRESS) capsule 2 mg (Patient Supplied)  2 mg Oral BID    sodium chloride (NS) flush 5-10 mL  5-10 mL IntraVENous Q8H    sodium chloride (NS) flush 5-10 mL  5-10 mL IntraVENous PRN    naloxone (NARCAN) injection 0.4 mg  0.4 mg IntraVENous PRN    ondansetron (ZOFRAN) injection 4 mg  4 mg IntraVENous Q4H PRN    enoxaparin (LOVENOX) injection 40 mg  40 mg SubCUTAneous Q24H    arformoterol (BROVANA) neb solution 15 mcg  15 mcg Nebulization BID RT    And    budesonide (PULMICORT) 500 mcg/2 ml nebulizer suspension  500 mcg Nebulization BID RT    DULoxetine (CYMBALTA) capsule 40 mg  40 mg Oral DAILY    primidone (MYSOLINE) tablet 50 mg  50 mg Oral DAILY    primidone (MYSOLINE) tablet 150 mg  150 mg Oral QHS    furosemide (LASIX) tablet 40 mg  40 mg Oral DAILY    montelukast (SINGULAIR) tablet 10 mg  10 mg Oral QHS    ezetimibe/simvastatin (VYTORIN) 10/40 mg   Oral QPM    piperacillin-tazobactam (ZOSYN) 3.375 g in 0.9% sodium chloride (MBP/ADV) 100 mL  3.375 g IntraVENous Q8H    levothyroxine (SYNTHROID) tablet 200 mcg  200 mcg Oral ACB    lactobac ac& pc-s.therm-b.anim (ROBIN Q/RISAQUAD)  1 Cap Oral DAILY    oxyCODONE IR (ROXICODONE) tablet 15 mg  15 mg Oral Q4H PRN    PHENobarbital (LUMINAL) tablet 64.8 mg  64.8 mg Oral BID     ______________________________________________________________________  EXPECTED LENGTH OF STAY: 10d 0h  ACTUAL LENGTH OF STAY:          13                 Froylan Montiel MD

## 2018-08-25 NOTE — PROGRESS NOTES
Reviewed medical chart; note consult for home oxygen has already been addressed and sent to [address] [place] [address]. However, note that new oxygen SATS will need to be faxed to [address] [place] [address] closer to discharge (M#[phone]). Patient is still on 8 liters of oxygen and will need to be weaned per Pulmonary note. Per colleague's note, the care manager can call [phone] over the weekend if patient is discharged so arrangements can be made for the home 02 to be delivered. Care Management will continue to follow her disposition.    ISRAEL Nieto

## 2018-08-26 VITALS
DIASTOLIC BLOOD PRESSURE: 74 MMHG | HEIGHT: 64 IN | TEMPERATURE: 98 F | WEIGHT: 178.79 LBS | RESPIRATION RATE: 19 BRPM | OXYGEN SATURATION: 92 % | BODY MASS INDEX: 30.52 KG/M2 | SYSTOLIC BLOOD PRESSURE: 113 MMHG | HEART RATE: 110 BPM

## 2018-08-26 LAB
GLUCOSE BLD STRIP.AUTO-MCNC: 111 MG/DL (ref 65–100)
GLUCOSE BLD STRIP.AUTO-MCNC: 234 MG/DL (ref 65–100)
SERVICE CMNT-IMP: ABNORMAL
SERVICE CMNT-IMP: ABNORMAL

## 2018-08-26 PROCEDURE — 82962 GLUCOSE BLOOD TEST: CPT

## 2018-08-26 PROCEDURE — 94640 AIRWAY INHALATION TREATMENT: CPT

## 2018-08-26 PROCEDURE — 77010033678 HC OXYGEN DAILY

## 2018-08-26 PROCEDURE — 74011000250 HC RX REV CODE- 250: Performed by: NURSE PRACTITIONER

## 2018-08-26 PROCEDURE — 74011636637 HC RX REV CODE- 636/637: Performed by: NURSE PRACTITIONER

## 2018-08-26 PROCEDURE — 74011636637 HC RX REV CODE- 636/637: Performed by: HOSPITALIST

## 2018-08-26 PROCEDURE — 74011250637 HC RX REV CODE- 250/637: Performed by: INTERNAL MEDICINE

## 2018-08-26 PROCEDURE — 94760 N-INVAS EAR/PLS OXIMETRY 1: CPT

## 2018-08-26 PROCEDURE — 74011000250 HC RX REV CODE- 250: Performed by: PHYSICIAN ASSISTANT

## 2018-08-26 PROCEDURE — 74011000250 HC RX REV CODE- 250: Performed by: THORACIC SURGERY (CARDIOTHORACIC VASCULAR SURGERY)

## 2018-08-26 PROCEDURE — 74011250636 HC RX REV CODE- 250/636: Performed by: THORACIC SURGERY (CARDIOTHORACIC VASCULAR SURGERY)

## 2018-08-26 PROCEDURE — 74011000258 HC RX REV CODE- 258: Performed by: THORACIC SURGERY (CARDIOTHORACIC VASCULAR SURGERY)

## 2018-08-26 PROCEDURE — 74011250637 HC RX REV CODE- 250/637: Performed by: HOSPITALIST

## 2018-08-26 PROCEDURE — 74011250637 HC RX REV CODE- 250/637: Performed by: NURSE PRACTITIONER

## 2018-08-26 PROCEDURE — 74011250637 HC RX REV CODE- 250/637: Performed by: THORACIC SURGERY (CARDIOTHORACIC VASCULAR SURGERY)

## 2018-08-26 RX ORDER — POTASSIUM CHLORIDE 1500 MG/1
20 TABLET, FILM COATED, EXTENDED RELEASE ORAL 2 TIMES DAILY
Qty: 14 TAB | Refills: 0 | Status: SHIPPED | OUTPATIENT
Start: 2018-08-26

## 2018-08-26 RX ORDER — GLIPIZIDE 5 MG/1
5 TABLET ORAL
Status: DISCONTINUED | OUTPATIENT
Start: 2018-08-26 | End: 2018-08-26 | Stop reason: HOSPADM

## 2018-08-26 RX ORDER — LEVOFLOXACIN 750 MG/1
750 TABLET ORAL
Qty: 10 TAB | Refills: 0 | Status: SHIPPED | OUTPATIENT
Start: 2018-08-26 | End: 2019-02-11 | Stop reason: ALTCHOICE

## 2018-08-26 RX ORDER — PREDNISONE 10 MG/1
10 TABLET ORAL SEE ADMIN INSTRUCTIONS
Qty: 60 TAB | Refills: 0 | Status: SHIPPED | OUTPATIENT
Start: 2018-08-26 | End: 2019-02-11 | Stop reason: ALTCHOICE

## 2018-08-26 RX ORDER — GLIPIZIDE 5 MG/1
5 TABLET ORAL
Status: SHIPPED | COMMUNITY
Start: 2018-08-26 | End: 2019-02-11 | Stop reason: SDUPTHER

## 2018-08-26 RX ORDER — NYSTATIN 100000 [USP'U]/ML
1 SUSPENSION ORAL 4 TIMES DAILY
Qty: 200 ML | Refills: 0 | Status: SHIPPED | OUTPATIENT
Start: 2018-08-26 | End: 2018-09-05

## 2018-08-26 RX ADMIN — LEVOTHYROXINE SODIUM 200 MCG: 100 TABLET ORAL at 07:36

## 2018-08-26 RX ADMIN — INSULIN LISPRO 4 UNITS: 100 INJECTION, SOLUTION INTRAVENOUS; SUBCUTANEOUS at 11:31

## 2018-08-26 RX ADMIN — PROMETHAZINE HYDROCHLORIDE 25 MG: 25 TABLET ORAL at 11:20

## 2018-08-26 RX ADMIN — Medication 10 ML: at 07:43

## 2018-08-26 RX ADMIN — PRIMIDONE 50 MG: 50 TABLET ORAL at 07:36

## 2018-08-26 RX ADMIN — PIPERACILLIN AND TAZOBACTAM 3.38 G: 3; .375 INJECTION, POWDER, FOR SOLUTION INTRAVENOUS at 11:08

## 2018-08-26 RX ADMIN — FUROSEMIDE 40 MG: 40 TABLET ORAL at 09:14

## 2018-08-26 RX ADMIN — PROMETHAZINE HYDROCHLORIDE 25 MG: 25 TABLET ORAL at 07:38

## 2018-08-26 RX ADMIN — GLIPIZIDE 2.5 MG: 5 TABLET ORAL at 07:37

## 2018-08-26 RX ADMIN — NYSTATIN 500000 UNITS: 100000 SUSPENSION ORAL at 09:12

## 2018-08-26 RX ADMIN — PIPERACILLIN AND TAZOBACTAM 3.38 G: 3; .375 INJECTION, POWDER, FOR SOLUTION INTRAVENOUS at 03:16

## 2018-08-26 RX ADMIN — NYSTATIN 500000 UNITS: 100000 SUSPENSION ORAL at 14:24

## 2018-08-26 RX ADMIN — ESOMEPRAZOLE MAGNESIUM 40 MG: 40 CAPSULE, DELAYED RELEASE ORAL at 07:38

## 2018-08-26 RX ADMIN — IPRATROPIUM BROMIDE AND ALBUTEROL SULFATE 3 ML: .5; 3 SOLUTION RESPIRATORY (INHALATION) at 12:30

## 2018-08-26 RX ADMIN — PREDNISONE 30 MG: 20 TABLET ORAL at 07:37

## 2018-08-26 RX ADMIN — DULOXETINE HYDROCHLORIDE 40 MG: 20 CAPSULE, DELAYED RELEASE ORAL at 09:13

## 2018-08-26 RX ADMIN — BUDESONIDE 500 MCG: 0.5 INHALANT RESPIRATORY (INHALATION) at 09:17

## 2018-08-26 RX ADMIN — GUAIFENESIN 1200 MG: 600 TABLET, EXTENDED RELEASE ORAL at 09:13

## 2018-08-26 RX ADMIN — STANDARDIZED SENNA CONCENTRATE AND DOCUSATE SODIUM 1 TABLET: 8.6; 5 TABLET, FILM COATED ORAL at 09:17

## 2018-08-26 RX ADMIN — ARFORMOTEROL TARTRATE 15 MCG: 15 SOLUTION RESPIRATORY (INHALATION) at 09:17

## 2018-08-26 RX ADMIN — POTASSIUM CHLORIDE 20 MEQ: 750 TABLET, EXTENDED RELEASE ORAL at 09:13

## 2018-08-26 RX ADMIN — IPRATROPIUM BROMIDE AND ALBUTEROL SULFATE 3 ML: .5; 3 SOLUTION RESPIRATORY (INHALATION) at 09:17

## 2018-08-26 RX ADMIN — Medication 5 ML: at 14:00

## 2018-08-26 RX ADMIN — PRAZOSIN HYDROCHLORIDE 2 MG: 1 CAPSULE ORAL at 07:00

## 2018-08-26 RX ADMIN — OXYCODONE HYDROCHLORIDE 15 MG: 5 TABLET ORAL at 09:24

## 2018-08-26 RX ADMIN — Medication 1 CAPSULE: at 09:14

## 2018-08-26 RX ADMIN — Medication 1 TABLET: at 09:16

## 2018-08-26 RX ADMIN — OXYCODONE HYDROCHLORIDE 15 MG: 5 TABLET ORAL at 14:24

## 2018-08-26 RX ADMIN — PHENOBARBITAL 64.8 MG: 64.8 TABLET ORAL at 07:36

## 2018-08-26 RX ADMIN — ENOXAPARIN SODIUM 40 MG: 100 INJECTION SUBCUTANEOUS at 09:12

## 2018-08-26 RX ADMIN — Medication 10 ML: at 07:44

## 2018-08-26 NOTE — DISCHARGE SUMMARY
Discharge Summary       PATIENT ID: Kayla Mars  MRN: [id number]   YOB: 1955    DATE OF ADMISSION: 8/12/2018 12:35 PM    DATE OF DISCHARGE: 08/26/18  PRIMARY CARE PROVIDER: Rayna Melendrez MD       DISCHARGING PROVIDER: Vanna Comer MD    To contact this individual call [phone] and ask the  to page. If unavailable ask to be transferred the Adult Hospitalist Department. CONSULTATIONS: IP CONSULT TO HOSPITALIST  IP CONSULT TO HOSPITALIST  IP CONSULT TO PSYCHIATRY      PROCEDURES/SURGERIES: Procedure(s):  VIDEO ASSISTED THORACOSCOPY--RIGHT--WITH DECORTICATION    IMAGING  Xr Spine Lumb 2 Or 3 V    Result Date: 8/24/2018  IMPRESSION: 1. Osteopenia with degenerative changes of the lower lumbar spine. There is been no significant interval progression     Xr Chest Port    Result Date: 8/22/2018  IMPRESSION: 1. Persistent right pleural effusion and atelectasis in the right middle and right lower lobe. 2. Developing mild areas of atelectasis medially in left lower lobe    Xr Chest Port    Result Date: 8/21/2018  IMPRESSION: 1. No change right pleural effusion and right basilar atelectasis. Xr Chest Port    Result Date: 8/20/2018  IMPRESSION: Right pleural effusion and right basilar atelectasis or airspace disease unchanged. Xr Chest Port    Result Date: 8/19/2018  IMPRESSION: Unchanged right lower lobe airspace disease with small right pleural effusion. Xr Chest Port    Result Date: 8/18/2018  IMPRESSION: Stable right basilar atelectasis/effusion. Xr Chest Port    Result Date: 8/17/2018  IMPRESSION: No pneumothorax. Stable small right pleural effusion and right basilar opacity. Xr Chest Port    Result Date: 8/17/2018  IMPRESSION: Improved right basilar atelectasis/effusion. Xr Chest Port    Result Date: 8/16/2018  IMPRESSION: No interval change.      Xr Chest Port    Result Date: 8/15/2018  IMPRESSION: Increased right basilar parenchymal opacity and effusion. Right-sided pleural drain. Otherwise no change. Xr Chest Port    Result Date: 8/14/2018  IMPRESSION: Right pleural drains in place with trace right pleural gas and fluid. Atelectasis or residual pneumonia in the lower right lung. Xr Chest Port    Result Date: 8/13/2018  IMPRESSION: Right chest tubes are noted in position. There is question of a tiny right basilar pneumothorax laterally. There is a small right pleural effusion/pleural thickening. There is focal increased density right base which could represent atelectasis. Xr Hips Bi W Ap Pelv    Result Date: 8/24/2018  IMPRESSION:  Osteopenia. No significant degenerative change of the hips or evidence of acute fracture           ADMITTING DIAGNOSES  Per initial Surgical team  Ms. Jeremy Covarrubias is a pleasant 57 y/o lady with a past medical history significant for depression, anxiety and fibromyalgia who was admitted to [facility] with a RLL pneumonia. While admitted she developed a parapneumonic effusion. This was partially drained with a pigtail catheter. Cultures grew beta hemolytic strept. Transferred to [facility] for decortication of empyema. Initial Medical Note  Franny Palm a 58 y. o. female who presented with severe left sided chest pain on 7/31 to [facility].  Pt reported she was treated with doxycycline/prednisone in June for bronchitis. Ryan Jean-Baptiste was unable to complete doxycycline secondary to diarrhea which she still had on admission. Pt was admitted with acute hypoxic respiratory failure, severe sepsis 2/2 bilateral pna, Right Para pneumonic pleural effusion. Pt was evaluated by pulmonary and she underwent a thoracentesis with chest tube placement .  She was transferred to [address] [place] [address] on 8/12 for VAT's on 8/13 with Dr. Krueger Leader. [facility] team was consulted for medical management at that time. Pt s/p VATs with decortication on 8/13/18. Pt known to have depression, anxiety, PTSD, essential tremor.  At the early morning of 8/18 developed Acute respiratory failure with hypoxia.  Transferre to ICU with bipap. Currently pt on high flow, but still high risk of decompensation. She is extremely anxious, on xanax 1mg q8 prn, but not help. HOSPITAL COURSE:     Patient was admitted as a transfer to surgical service for VATS procedure. Was transferred to medical. Cleared by surgery and pulmonary for discharge. Acute hypoxic Respiratory Failure - Improved  - multifactorial: PNA, empyema, copd exacerbation  - retained secretions with some basilar ATX.  Needs aggressive pulm toilet  -  S/P Bronch by Pulmonary   - improved and Pt cleared by pulmonary for discharge      RLL PNA  -S/P Right VATS and decortication on 8/13  - with dense consolidation and possible areas of necrosis.  Consolidation of LLL as well  -abx per pulmonologist    -S/P Bronch On 8/21 for Mucus Plugging  -Repeat cxr on 8/22 showing improving fields.  -On ABX zosyn started 8/12, Levaquin 8/17  -Augmentin at dc recommended by pulm       Streptococcal Right Sided Empyema  - per primary team  - s/p VATs with decortication on 8/13/18  -Surgery has signed off  _pulmonary following       Suspected underlying COPD - Acute exacerbation  - former smoker  -nebs and prednisone taper       NIDDM 2  - poc, ssi, diabetic diet  - A1c 7.4  - on  glipizide at 2.5mg bid  -follow   -Hypoglycemia Protocol      Anemia  -Could be related to acute illness and IVF/Hemodilution  - 8/15 not iron deficient, d/c ivf      Asymptomatic hypotension  - s/p albumin, bp improved to low 100's from high 80's  - pt was receiving metoprolol bid, she only takes this at bedtime, this has been adjusted  As she reports she takes it for chronic tachycardia and not for blood pressure.       Pt reports she takes bb for tachycardia and not htn  - c/w metoprolol, pt takes at bedtime,  Check Thyroid panel -  Free t4 ok       Diarrhea   on admission which has resolved      Constipation Opoid induced - chronic opiates  - bowel regimen       Fibromyalgia  - on chronic opiates for back pain  - pain control, lidocaine patch      Obese   - bmi 34, counseled on weight loss      Hypothyroidism - c/w levothyroxine      Familial HLD - c/w vytorin      Depression/anxiety/PTSD - c/w cymbalta,  Seen By Psychiatry  Meds adjusted   Continue with xanax and minipress - not given for blood pressure      Essential tremor - c/w primidone, phenobarbital     Hypokalemia  Replete Prn     Reports of pelvic bone pain and low back pain  Check Xr pelvis and l spine 8/24 - negative for fractures    She didn't want Rehab or home gwendolyn. She has oxygen at home.         DISCHARGE DIAGNOSES / PLAN:      Patient Active Problem List   Diagnosis Code    COPD (chronic obstructive pulmonary disease) ([address] J44.9    PTSD (post-traumatic stress disorder) F43.10    PVC (premature ventricular contraction) I49.3    Other and unspecified hyperlipidemia E78.5    Tobacco use disorder F17.200    Family history of ischemic heart disease Z82.49    Chest pain with normal coronary angiography R07.9    Abnormal nuclear stress test R94.39    Acute respiratory failure (HCC) J96.00    Pneumonia, organism unspecified(486) J18.9    Unspecified hypothyroidism E03.9    Depression, major, recurrent (HCC) F33.9    Cervical post-laminectomy syndrome M96.1    Neck pain M54.2    Ataxia R27.0    B12 deficiency E53.8    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.42    Syncope and collapse R55    COPD with acute exacerbation (HCC) J44.1    Acute respiratory failure with hypoxia (HCC) J96.01    Chronic respiratory failure with hypoxia (HCC) J96.11    Benign familial tremor G25.0    Atelectasis J98.11    Allergic rhinitis J30.9    Sepsis (HCC) A41.9    Acute exacerbation of COPD with asthma (HCC) J44.1, J45.901    HTN (hypertension) I10    Hyperlipidemia E78.5    Hypothyroidism E03.9    Smoking F17.200    Diabetic peripheral neuropathy associated with type 2 diabetes mellitus (HCC) E11.42    Stenosis of both internal carotid arteries I65.23    Cervical radiculopathy due to degenerative joint disease of spine M47.22    Degenerative lumbar spinal stenosis M48.061    Vitamin D deficiency E55.9    Altered mental status, unspecified R41.82    Cerebral microvascular disease I67.9    Obesity (BMI 35.0-39.9 without comorbidity) E66.9    Fibromyalgia M79.7    Benign essential tremor syndrome G25.0    CAP (community acquired pneumonia) J18.9    Productive cough R05    Chest pain on breathing R07.1    Anxiety and depression F41.9, F32.9    Polypharmacy Z79.899    Shortness of breath R06.02    Drug-induced constipation K59.03    Empyema (HCC) J86.9    Right-sided chest pain R07.9    Chronic pain syndrome G89.4           FOLLOW UP APPOINTMENTS:    Follow-up Information     Follow up With Details Comments Contact Info    Mert Denis MD On 8/22/2018 Hospital f/u PCP appointment Wednesday, 8/22/18 @ 3:00 p.m.  [facility]  [phone]      [employer] will deliver O2 Tank. Call [phone] for any problems. [address] [place] [address],[address] [facility] [address]  [phone]           ADDITIONAL CARE RECOMMENDATIONS:     · It is important that you take the medication exactly as they are prescribed. · Keep your medication in the bottles provided by the pharmacist and keep a list of the medication names, dosages, and times to be taken in your wallet. · Do not take other medications without consulting your doctor. · No drinking alcohol or driving car or operating machinery if you are on narcotic pain medications. Donot take sedating mediations if you are sleepy or confused.    · Fall Precautions  · Keep Well Hydrated  · Report to your medical provider if you feel you have  developed allergies to medications  · Follow up with your PCP or Consultant for medication adjustments and refills  · Followup With CT surgery on followup      DIET: Cardiac Diet    ACTIVITY: Activity as tolerated and No driving while on analgesics        DISCHARGE MEDICATIONS:  Current Discharge Medication List      START taking these medications    Details   glipiZIDE (GLUCOTROL) 5 mg tablet Take 1 Tab by mouth Before breakfast and dinner. levoFLOXacin (LEVAQUIN) 750 mg tablet Take 1 Tab by mouth nightly. Qty: 10 Tab, Refills: 0      oxyCODONE IR (OXY-IR) 15 mg immediate release tablet Take 1 Tab by mouth every four (4) hours as needed for up to 7 days. Max Daily Amount: 90 mg. Indications: Pain  Qty: 35 Tab, Refills: 0    Associated Diagnoses: Pain      predniSONE (DELTASONE) 20 mg tablet Take 2 Tabs by mouth daily (with breakfast) for 14 days. Indications: sarcoidosis  Qty: 14 Tab, Refills: 0      naloxone (NARCAN) 4 mg/actuation nasal spray Use 1 spray intranasally, then discard. Repeat with new spray every 2 min as needed for opioid overdose symptoms, alternating nostrils. Qty: 1 Each, Refills: 0         CONTINUE these medications which have CHANGED    Details   guaiFENesin (MUCINEX) 1,200 mg Ta12 ER tablet Take 1 Tab by mouth two (2) times a day. Qty: 14 Tab, Refills: 0         CONTINUE these medications which have NOT CHANGED    Details   zinc oxide-cod liver oil (DESITIN) 40 % ointment Apply  to affected area as needed for Skin Irritation. prazosin (MINIPRESS) 2 mg capsule TAKE 1 CAPSULE BY MOUTH TWICE A DAY. Qty: 60 Cap, Refills: 2    Comments: Please use \"two tone brown cap\" per patient      DULoxetine 40 mg cpDR Take 40 mg by mouth daily. Qty: 30 Cap, Refills: 2      primidone (MYSOLINE) 50 mg tablet 1 po qam and 3 po qhs  Qty: 120 Tab, Refills: 11    Associated Diagnoses: Benign familial tremor; Diabetic peripheral neuropathy associated with type 2 diabetes mellitus ([facility]  Cervical post-laminectomy syndrome; Syncope and collapse; Stenosis of both internal carotid arteries; Cervical radiculopathy due to degenerative joint disease of spine; Degenerative lumbar spinal stenosis; Ataxia; B12 deficiency; Vitamin D deficiency; Benign essential tremor syndrome      ALPRAZolam (XANAX) 1 mg tablet Take 1 Tab by mouth three (3) times daily as needed for Anxiety. Max Daily Amount: 3 mg. Qty: 3 Tab, Refills: 0      promethazine (PHENERGAN) 25 mg tablet Take 1 Tab by mouth every six (6) hours as needed. Qty: 4 Tab, Refills: 0      furosemide (LASIX) 40 mg tablet Take 1 Tab by mouth daily. Qty: 20 Tab, Refills: 0      PHENobarbital (LUMINAL) 60 mg tablet Take 1 Tab by mouth two (2) times a day. Max Daily Amount: 120 mg.  Qty: 60 Tab, Refills: 1    Associated Diagnoses: Benign familial tremor; Diabetic peripheral neuropathy associated with type 2 diabetes mellitus ([facility] Cervical post-laminectomy syndrome; Syncope and collapse; Stenosis of both internal carotid arteries; Cervical radiculopathy due to degenerative joint disease of spine; Degenerative lumbar spinal stenosis; Ataxia; B12 deficiency; Vitamin D deficiency      montelukast (SINGULAIR) 10 mg tablet Take 10 mg by mouth daily. ezetimibe-simvastatin (VYTORIN 10/40) 10-40 mg per tablet Take 1 tablet by mouth nightly. metoprolol (LOPRESSOR) 25 mg tablet Take 1 Tab by mouth two (2) times a day. Qty: 60 Tab, Refills: 6      benzonatate (TESSALON) 200 mg capsule Take 1 Cap by mouth two (2) times daily as needed. Qty: 30 Cap, Refills: 0      albuterol-ipratropium (DUONEB) 2.5 mg-0.5 mg/3 ml nebulizer solution 3 mL by Nebulization route every six (6) hours as needed for Wheezing.      esomeprazole (NEXIUM) 40 mg capsule Take 40 mg by mouth daily. MAGOX 400 mg tablet TAKE ONE TABLET TWICE A DAY  Qty: 60 Tab, Refills: 3      aspirin 81 mg chewable tablet Take 81 mg by mouth daily. levothyroxine (SYNTHROID) 200 mcg tablet Take 200 mcg by mouth daily (before breakfast). budesonide-formoterol (SYMBICORT) 160-4.5 mcg/actuation HFAA Take 2 Puffs by inhalation two (2) times a day. acetaminophen-codeine (TYLENOL #3) 300-30 mg per tablet Take 1 Tab by mouth every eight (8) hours as needed. Max Daily Amount: 3 Tabs. Qty: 5 Tab, Refills: 0      cetirizine (ZYRTEC) 10 mg tablet Take 1 Tab by mouth daily. Qty: 10 Tab, Refills: 0      ibuprofen (MOTRIN) 800 mg tablet Take 1 Tab by mouth every eight (8) hours as needed. Qty: 20 Tab, Refills: 0      albuterol (VENTOLIN HFA) 90 mcg/actuation inhaler Take 2 Puffs by inhalation every four (4) hours as needed for Wheezing. dicyclomine (BENTYL) 10 mg capsule       glipiZIDE SR (GLUCOTROL) 5 mg CR tablet Take 5 mg by mouth two (2) times daily as needed (Patient monitors her BG levels and takes when she is also taking a steroid. ). nystatin (MYCOSTATIN) 100,000 unit/mL suspension Take 5 mL by mouth four (4) times daily. swish and spit  Qty: 180 mL, Refills: 0      methocarbamol (ROBAXIN) 750 mg tablet Take 750-1,500 mg by mouth four (4) times daily as needed. hyoscyamine SL (LEVSIN/SL) 0.125 mg SL tablet 0.125 mg by SubLINGual route three (3) times daily as needed for Cramping. vit B Cmplx 3-FA-Vit C-Biotin (NEPHRO SHREYA RX) 1- mg-mg-mcg tablet Take 1 Tab by mouth daily. NOTIFY YOUR PHYSICIAN FOR ANY OF THE FOLLOWING:   Fever over 101 degrees for 24 hours. Chest pain, shortness of breath, fever, chills, nausea, vomiting, diarrhea, change in mentation, falling, weakness, bleeding. Severe pain or pain not relieved by medications. Or, any other signs or symptoms that you may have questions about.     DISPOSITION:    Home With:   OT  PT  HH  RN       Long term SNF/Inpatient Rehab   X Independent/assisted living    Hospice    Other:       PATIENT CONDITION AT DISCHARGE:     Functional status    Poor     Deconditioned    X Independent      Cognition    X Lucid     Forgetful     Dementia      Catheters/lines (plus indication)    Ly     PICC     PEG    X None      Code status   X  Full code     DNR      PHYSICAL EXAMINATION AT DISCHARGE:  Gen:  No distress, alert  HEENT:  Normal cephalic atraumatic, extra-occular movements are intact. Neck:  Supple, No JVD  Lungs:  Clear bilaterally, no wheeze, no rales, normal effort  Heart:  Regular Rate and Rhythm, normal S1 and S2, no edema  Abdomen:  Soft, non tender, normal bowel sounds, no guarding.   Extremities:  Well perfused, no cyanosis or edema  Neurological:  Awake and alert, CN's are intact, normal strength throughout extremities  Skin:  No rashes or moles  Mental Status:  Normal thought process, does not appear anxious      CHRONIC MEDICAL DIAGNOSES:  Problem List as of 8/26/2018  Date Reviewed: 8/17/2018          Codes Class Noted - Resolved    Right-sided chest pain ICD-10-CM: R07.9  ICD-9-CM: 786.50  Unknown - Present        Chronic pain syndrome ICD-10-CM: G89.4  ICD-9-CM: 338.4  Unknown - Present        * (Principal)Empyema ([facility] ICD-10-CM: J86.9  ICD-9-CM: 510.9  8/12/2018 - Present        Shortness of breath ICD-10-CM: R06.02  ICD-9-CM: 786.05  8/6/2018 - Present        Drug-induced constipation ICD-10-CM: K59.03  ICD-9-CM: 564.09, E980.5  8/6/2018 - Present        Productive cough ICD-10-CM: R05  ICD-9-CM: 786.2  8/2/2018 - Present        Chest pain on breathing ICD-10-CM: R07.1  ICD-9-CM: 786.52  8/2/2018 - Present        Anxiety and depression ICD-10-CM: F41.9, F32.9  ICD-9-CM: 300.00, 311  8/2/2018 - Present        Polypharmacy ICD-10-CM: Z79.899  ICD-9-CM: V58.69  8/2/2018 - Present        CAP (community acquired pneumonia) ICD-10-CM: J18.9  ICD-9-CM: 828  7/31/2018 - Present        Benign essential tremor syndrome ICD-10-CM: G25.0  ICD-9-CM: 333.1  2/21/2018 - Present        Obesity (BMI 35.0-39.9 without comorbidity) ICD-10-CM: E66.9  ICD-9-CM: 278.00  5/27/2017 - Present        Fibromyalgia ICD-10-CM: M79.7  ICD-9-CM: 729.1  5/27/2017 - Present        Altered mental status, unspecified ICD-10-CM: R41.82  ICD-9-CM: 780.97  5/17/2017 - Present        Cerebral microvascular disease ICD-10-CM: I67.9  ICD-9-CM: 437.9  5/17/2017 - Present        Diabetic peripheral neuropathy associated with type 2 diabetes mellitus ([facility] ICD-10-CM: E11.42  ICD-9-CM: 250.60, 357.2  5/16/2017 - Present        Stenosis of both internal carotid arteries ICD-10-CM: I65.23  ICD-9-CM: 433.10, 433.30  5/16/2017 - Present        Cervical radiculopathy due to degenerative joint disease of spine ICD-10-CM: M47.22  ICD-9-CM: 721.0  5/16/2017 - Present        Degenerative lumbar spinal stenosis ICD-10-CM: M48.061  ICD-9-CM: 724.02  5/16/2017 - Present        Vitamin D deficiency ICD-10-CM: E55.9  ICD-9-CM: 268.9  5/16/2017 - Present        Atelectasis ICD-10-CM: J98.11  ICD-9-CM: 518.0  4/1/2015 - Present        Allergic rhinitis ICD-10-CM: J30.9  ICD-9-CM: 477.9  4/1/2015 - Present        Sepsis ([place] [address].) ICD-10-CM: A41.9  ICD-9-CM: 038.9, 995.91  4/1/2015 - Present        Acute exacerbation of COPD with asthma ([facility] ICD-10-CM: J44.1, J45.901  ICD-9-CM: 493.22  4/1/2015 - Present        HTN (hypertension) ICD-10-CM: I10  ICD-9-CM: 401.9  4/1/2015 - Present        Hyperlipidemia ICD-10-CM: E78.5  ICD-9-CM: 272.4  4/1/2015 - Present        Hypothyroidism ICD-10-CM: E03.9  ICD-9-CM: 244.9  4/1/2015 - Present        Smoking ICD-10-CM: F17.200  ICD-9-CM: 305.1  4/1/2015 - Present        COPD (chronic obstructive pulmonary disease) (HCC) (Chronic) ICD-10-CM: J44.9  ICD-9-CM: 496 Chronic 3/13/2015 - Present        COPD with acute exacerbation ([facility] ICD-10-CM: J44.1  ICD-9-CM: 491.21 Present on Admission 3/13/2015 - Present        Acute respiratory failure with hypoxia [facility]) ICD-10-CM: J96.01  ICD-9-CM: 518.81 Present on Admission 3/13/2015 - Present        Chronic respiratory failure with hypoxia (HCC) ICD-10-CM: J96.11  ICD-9-CM: 518.83, 799.02 Chronic 3/13/2015 - Present    Overview Signed 3/13/2015  6:20 PM by Clement Perez MD     Pt uses oxygen at home              Benign familial tremor ICD-10-CM: G25.0  ICD-9-CM: 333.1 Chronic 3/13/2015 - Present    Overview Signed 3/13/2015  6:21 PM by Kaaren Castleman, MD     Pt is on Phenobarbital for this condition              Syncope and collapse ICD-10-CM: R55  ICD-9-CM: 780.2  12/18/2014 - Present        Cervical post-laminectomy syndrome ICD-10-CM: M96.1  ICD-9-CM: 722.81  11/25/2014 - Present        Neck pain ICD-10-CM: M54.2  ICD-9-CM: 723.1  11/25/2014 - Present        Ataxia ICD-10-CM: R27.0  ICD-9-CM: 781.3  11/25/2014 - Present        B12 deficiency ICD-10-CM: E53.8  ICD-9-CM: 266.2  11/25/2014 - Present        Polyneuropathy in diabetes(357.2) ICD-10-CM: E11.42  ICD-9-CM: 357.2  11/25/2014 - Present        Depression, major, recurrent ([facility] ICD-10-CM: F33.9  ICD-9-CM: 296.30  10/13/2014 - Present        Unspecified hypothyroidism ICD-10-CM: E03.9  ICD-9-CM: 244.9  1/20/2014 - Present        Acute respiratory failure ([facility] ICD-10-CM: J96.00  ICD-9-CM: 518.81  1/16/2014 - Present        Pneumonia, organism unspecified(486) ICD-10-CM: J18.9  ICD-9-CM: 641  1/16/2014 - Present        Chest pain with normal coronary angiography ICD-10-CM: R07.9  ICD-9-CM: 786.50  7/11/2013 - Present        Abnormal nuclear stress test ICD-10-CM: R94.39  ICD-9-CM: 794.39  7/11/2013 - Present        PVC (premature ventricular contraction) ICD-10-CM: I49.3  ICD-9-CM: 427.69  6/21/2013 - Present        Other and unspecified hyperlipidemia ICD-10-CM: E78.5  ICD-9-CM: 272.4  6/21/2013 - Present        Tobacco use disorder ICD-10-CM: F17.200  ICD-9-CM: 305.1  6/21/2013 - Present        Family history of ischemic heart disease ICD-10-CM: Z82.49  ICD-9-CM: V17.3  6/21/2013 - Present        PTSD (post-traumatic stress disorder) ICD-10-CM: F43.10  ICD-9-CM: 309.81  3/26/2013 - Present                Discussed with patient and family. Explained the importance of following up, Compliance with medications and recommendations on discharge,Side effect profile of medications were explained.  Safety precautions at home and while taking pain medications also explained. All questions answered to the satisfaction of the patient/family. Discussed with consultant(s) who are agreeable to the discharge. Verbal and written instructions on discharge given.        Thank you Aric Olsen MD for taking care of your patient, Please call with any questions.       Greater than 37  minutes were spent with the patient on counseling and coordination of care    Signed:   Rachelle Hanson MD  8/26/2018  12:54 PM

## 2018-08-26 NOTE — PROGRESS NOTES
Patient is ready to discharge. Order entered for Home Health PT. Writer also faxed to [employer] recent SATS. Writer placed follow-up call to [employer] and spoke with Cameron Castillo, who confirmed receipt. They will have a tech go to the home to adjust compressor settings. Writer met with patient to introduce self and role. Patient advised that home health being ordered for PT. Patient advised that she needs to check on this, she has a 20% co-pay. She feels she can manage just fine at home with help from her . She has a small bungalow and has all of the equipment she needs. In addition to a emergency call necklace that [facility] has provided her. Patient declined home health PT at this time. Writer made MD aware and this is patients choice. Patient is scheduled to go home.  to transport. Writer looped back around to patient to go over the above. Advised that [employer] should be sending a tech out today, she has their contact phone number. Patient denies questions or needs at this time.  Stef[facility]ipC[facility], [phone]

## 2018-08-27 LAB
BACTERIA SPEC CULT: NORMAL
SERVICE CMNT-IMP: NORMAL

## 2018-08-27 NOTE — PROGRESS NOTES
3:30 PM  CM received a call from patient's spouse. CM informed patient was discharged home with a [facility] O2 tank and was supposed to be sent home with a tank provided and delivered to hospital from [place]. Spouse to bring [facility] O2 tank back to hospital at some point this week. Charge nurse notified. [employer] tank found and placed behind RN station to provide to family when they return with [facility] O2 tank.     ISRAEL Brock/MORALES

## 2018-08-28 ENCOUNTER — OFFICE VISIT (OUTPATIENT)
Dept: SURGERY | Age: 63
End: 2018-08-28

## 2018-08-28 ENCOUNTER — HOSPITAL ENCOUNTER (OUTPATIENT)
Dept: GENERAL RADIOLOGY | Age: 63
Discharge: HOME | End: 2018-08-28
Payer: MEDICARE

## 2018-08-28 VITALS
SYSTOLIC BLOOD PRESSURE: 113 MMHG | BODY MASS INDEX: 30.39 KG/M2 | HEART RATE: 111 BPM | HEIGHT: 64 IN | OXYGEN SATURATION: 91 % | DIASTOLIC BLOOD PRESSURE: 73 MMHG | WEIGHT: 178 LBS | RESPIRATION RATE: 20 BRPM

## 2018-08-28 DIAGNOSIS — J86.9 EMPYEMA LUNG (HCC): ICD-10-CM

## 2018-08-28 DIAGNOSIS — J86.9 EMPYEMA LUNG (HCC): Primary | ICD-10-CM

## 2018-08-28 PROCEDURE — 71046 X-RAY EXAM CHEST 2 VIEWS: CPT

## 2018-08-28 NOTE — MR AVS SNAPSHOT
[address] [facility] [address] [place] [address] 
[phone] Patient: Omie Apley MRN: [id number] :1955 Visit Information Date & Time Provider Department Dept. Phone Encounter #  
 2018 11:30 AM Wanda Ruth, [address] [place] [address] [facility] [phone] [id number] Your Appointments 2018 10:00 AM  
POST OP with Bin Hernandez  [address] [facility] [address] [place] [address]) Appt Note: po 2 week f/u 18 RF: VIDEO ASSISTED THORACOSCOPY--RIGHT--WITH [facility] [address] [facility] [address]  
[address] [address]  
  
    
 10/3/2018  2:00 PM  
Follow Up with Bacilio Hutton MD  
[facility] at [address]) Appt Note: f/u tremors, jrb 18  
 [facility], 
[address] [place] [address], [phone] [id number]  
[address], [address] [place] [address], [address] [phone] Upcoming Health Maintenance Date Due Hepatitis C Screening 1955 FOOT EXAM Q1 1965 EYE EXAM RETINAL OR DILATED Q1 1965 DTaP/Tdap/Td series (1 - Tdap) 1976 PAP AKA CERVICAL CYTOLOGY 1976 FOBT Q 1 YEAR AGE 50-75 2005 ZOSTER VACCINE AGE 60> 10/23/2015 BREAST CANCER SCRN MAMMOGRAM 2017 MEDICARE YEARLY EXAM 3/14/2018 Influenza Age 5 to Adult 2018 MICROALBUMIN Q1 2018 LIPID PANEL Q1 2018 HEMOGLOBIN A1C Q6M 2/15/2019 Allergies as of 2018  Review Complete On: 2018 By: Wanda Ruth MD  
  
 Severity Noted Reaction Type Reactions Latex  2010    Contact Dermatitis Dilaudid [Hydromorphone (Pf)]  10/29/2014   Systemic Other (comments) Feels like bugs are crawling all over her Lipitor [Atorvastatin]  06/21/2013    Other (comments) LIVER TROUBLE ON THIS MEDICATION Remeron [Mirtazapine]  08/15/2017    Other (comments) Tremors Sulfa (Sulfonamide Antibiotics)  12/13/2010    Itching Current Immunizations  Reviewed on 3/19/2015 Name Date Influenza Vaccine 9/1/2014, 9/1/2013 Pneumococcal Vaccine (Unspecified Type) 1/1/2010 Not reviewed this visit You Were Diagnosed With   
  
 Codes Comments [facility]    -  Primary ICD-10-CM: J86.9 ICD-9-CM: 510.9 Vitals BP Pulse Resp Height(growth percentile) Weight(growth percentile) SpO2  
 113/73 (BP 1 Location: Left arm, BP Patient Position: Sitting) (!) 111 20 5' 4\" (1.626 m) 178 lb (80.7 kg) 91% BMI OB Status Smoking Status 30.55 kg/m2 Menopause Former Smoker BMI and BSA Data Body Mass Index Body Surface Area 30.55 kg/m 2 1.91 m 2 Preferred Pharmacy Pharmacy Name Phone [address], [address] [place] [address] [phone] Your Updated Medication List  
  
   
This list is accurate as of 8/28/18  2:37 PM.  Always use your most recent med list.  
  
  
  
  
 acetaminophen-codeine 300-30 mg per tablet Commonly known as:  TYLENOL #3 Take 1 Tab by mouth every eight (8) hours as needed. Max Daily Amount: 3 Tabs. ALPRAZolam 1 mg tablet Commonly known as:  Rebbeca Lawn Take 1 Tab by mouth three (3) times daily as needed for Anxiety. Max Daily Amount: 3 mg.  
  
 aspirin 81 mg chewable tablet Take 81 mg by mouth daily. benzonatate 200 mg capsule Commonly known as:  TESSALON Take 1 Cap by mouth two (2) times daily as needed. cetirizine 10 mg tablet Commonly known as:  ZYRTEC Take 1 Tab by mouth daily. dicyclomine 10 mg capsule Commonly known as:  BENTYL DULoxetine 40 mg Cpdr  
Take 40 mg by mouth daily. DUONEB 2.5 mg-0.5 mg/3 ml Nebu Generic drug:  albuterol-ipratropium 3 mL by Nebulization route every six (6) hours as needed for Wheezing. furosemide 40 mg tablet Commonly known as:  LASIX Take 1 Tab by mouth daily. * glipiZIDE SR 5 mg CR tablet Commonly known as:  GLUCOTROL XL Take 5 mg by mouth two (2) times daily as needed (Patient monitors her BG levels and takes when she is also taking a steroid.). * glipiZIDE 5 mg tablet Commonly known as:  Kendra Garb Take 1 Tab by mouth Before breakfast and dinner. guaiFENesin 1,200 mg Ta12 ER tablet Commonly known as:  Edinson & Edinson Take 1 Tab by mouth two (2) times a day. ibuprofen 800 mg tablet Commonly known as:  MOTRIN Take 1 Tab by mouth every eight (8) hours as needed. levoFLOXacin 750 mg tablet Commonly known as:  [place] Take 1 Tab by mouth nightly. levothyroxine 200 mcg tablet Commonly known as:  SYNTHROID Take 200 mcg by mouth daily (before breakfast). LEVSIN/SL 0.125 mg SL tablet Generic drug:  hyoscyamine SL  
0.125 mg by SubLINGual route three (3) times daily as needed for Cramping. MAGOX 400 mg tablet Generic drug:  magnesium oxide TAKE ONE TABLET TWICE A DAY  
  
 methocarbamol 750 mg tablet Commonly known as:  ROBAXIN Take 750-1,500 mg by mouth four (4) times daily as needed. metoprolol tartrate 25 mg tablet Commonly known as:  LOPRESSOR Take 1 Tab by mouth two (2) times a day. montelukast 10 mg tablet Commonly known as:  SINGULAIR Take 10 mg by mouth daily. naloxone 4 mg/actuation nasal spray Commonly known as:  [employer] Use 1 spray intranasally, then discard. Repeat with new spray every 2 min as needed for opioid overdose symptoms, alternating nostrils. NexIUM 40 mg capsule Generic drug:  esomeprazole Take 40 mg by mouth daily. * nystatin 100,000 unit/mL suspension Commonly known as:  MYCOSTATIN Take 5 mL by mouth four (4) times daily. swish and spit * nystatin 100,000 unit/mL suspension Commonly known as:  MYCOSTATIN Take 5 mL by mouth four (4) times daily for 10 days. swish and spit PHENobarbital 60 mg tablet Commonly known as:  LUMINAL Take 1 Tab by mouth two (2) times a day. Max Daily Amount: 120 mg.  
  
 potassium chloride SR 20 mEq tablet Commonly known as:  K-TAB Take 1 Tab by mouth two (2) times a day. prazosin 2 mg capsule Commonly known as:  MINIPRESS  
TAKE 1 CAPSULE BY MOUTH TWICE A DAY. * predniSONE 20 mg tablet Commonly known as:  Mariah Greek Take 2 Tabs by mouth daily (with breakfast) for 14 days. Indications: sarcoidosis * predniSONE 10 mg tablet Commonly known as:  Mariah Greek Take 1 Tab by mouth See Admin Instructions. 3 tabs daily for  7 days then 3 tabs daily for 7 days then 2 tabs daily for 7 days and 1 tab daily for 7 days and stop  
  
 primidone 50 mg tablet Commonly known as: MYSOLINE  
1 po qam and 3 po qhs  
  
 promethazine 25 mg tablet Commonly known as:  PHENERGAN Take 1 Tab by mouth every six (6) hours as needed. SYMBICORT 160-4.5 mcg/actuation Hfaa Generic drug:  budesonide-formoterol Take 2 Puffs by inhalation two (2) times a day. VENTOLIN HFA 90 mcg/actuation inhaler Generic drug:  albuterol Take 2 Puffs by inhalation every four (4) hours as needed for Wheezing. vit B Cmplx 3-FA-Vit C-Biotin 1- mg-mg-mcg tablet Commonly known as:  NEPHRO SHREYA RX Take 1 Tab by mouth daily. Vytorin 10/40 10-40 mg per tablet Generic drug:  ezetimibe-simvastatin Take 1 tablet by mouth nightly. zinc oxide-cod liver oil 40 % ointment Commonly known as:  Lilly Leaver Apply  to affected area as needed for Skin Irritation. * Notice: This list has 6 medication(s) that are the same as other medications prescribed for you. Read the directions carefully, and ask your doctor or other care provider to review them with you. To-Do List   
 08/28/2018 Imaging:  XR CHEST PA LAT Introducing [facility]! Yanelis Russo introduces [email or web address] patient portal. Now you can access parts of your medical record, email your doctor's office, and request medication refills online. 1. In your internet browser, go to [email or web address] 2. Click on the First Time User? Click Here link in the Sign In box. You will see the New Member Sign Up page. 3. Enter your [email or web address] Access Code exactly as it appears below. You will not need to use this code after youve completed the sign-up process. If you do not sign up before the expiration date, you must request a new code. · [email or web address] Access Code: [email or web address] Expires: 9/16/2018  2:00 PM 
 
4. Enter the last four digits of your Social Security Number (xxxx) and Date of Birth (mm/dd/yyyy) as indicated and click Submit. You will be taken to the next sign-up page. 5. Create a [email or web address] ID. This will be your [email or web address] login ID and cannot be changed, so think of one that is secure and easy to remember. 6. Create a [email or web address] password. You can change your password at any time. 7. Enter your Password Reset Question and Answer. This can be used at a later time if you forget your password. 8. Enter your e-mail address. You will receive e-mail notification when new information is available in [address] 9. Click Sign Up. You can now view and download portions of your medical record. 10. Click the Download Summary menu link to download a portable copy of your medical information. If you have questions, please visit the Frequently Asked Questions section of the [email or web address] website. Remember, [email or web address] is NOT to be used for urgent needs. For medical emergencies, dial 911. Now available from your iPhone and Android! Please provide this summary of care documentation to your next provider. Your primary care clinician is listed as Cleveland Rosario.  If you have any questions after today's visit, please call [phone].

## 2018-08-28 NOTE — PROGRESS NOTES
Follow up for right VATS on 8/13/18. 1. Have you been to the ER, urgent care clinic since your last visit? Hospitalized since your last visit? Yes, on 7/31/18    2. Have you seen or consulted any other health care providers outside of the [facility] since your last visit? Include any pap smears or colon screening.  No

## 2018-08-28 NOTE — PROGRESS NOTES
Mrs. Melvina Johnson returns to clinic for a post op visit after her R VATS decortication on Aug 13th for a streptococcal empyema. She was recently discharged. She feels better and states that \"her strength is slowly returning\". She is on intermittent home O2. Pain well controlled. She and her  report a small amount of serosanguinous drainage from one of her chest tube sites. Afebrile  HD stable    Completing her course of Levaquin    On exam she is seated  Alert and oriented  Wearing O2, in no distress  Coarse breath sounds b/l  RRR  Incisions well healing except for medial CT site which has a small amount of separation. No erythema, no purulence, non tender    CXR- much improved appearance. Good expansion right lung. Mrs. Melvina Johnson is progressing well. No evidence of wound infection. Abx course per ID. Follow up with NP in two weeks for wound check.

## 2018-08-29 LAB
BACTERIA SPEC CULT: ABNORMAL
BACTERIA SPEC CULT: ABNORMAL
SERVICE CMNT-IMP: ABNORMAL

## 2018-09-07 DIAGNOSIS — J86.9 EMPYEMA (HCC): Primary | ICD-10-CM

## 2018-09-14 LAB
ACID FAST STN SPEC: NEGATIVE
M AVIUM CMPLX RRNA SPEC QL PROBE: POSITIVE
M GORDONAE RRNA SPEC QL PROBE: ABNORMAL
M KANSASII RRNA SPEC QL PROBE: ABNORMAL
M TB CMPLX RRNA SPEC QL PROBE: NEGATIVE
MYCOBACTERIUM SPEC QL CULT: POSITIVE
OTHER, RAFBI6: ABNORMAL
SPECIMEN PREPARATION: ABNORMAL
SPECIMEN SOURCE: ABNORMAL
SPECIMEN SOURCE: ABNORMAL
SUSCEPT TESTING, RAFBI7: ABNORMAL

## 2018-09-19 ENCOUNTER — TELEPHONE (OUTPATIENT)
Dept: SURGERY | Age: 63
End: 2018-09-19

## 2018-09-19 LAB
ACID FAST STN SPEC: NEGATIVE
MYCOBACTERIUM SPEC QL CULT: NEGATIVE
SPECIMEN PREPARATION: NORMAL
SPECIMEN SOURCE: NORMAL

## 2018-09-19 NOTE — TELEPHONE ENCOUNTER
Patient called stating she needed a letter from our office. I still did not understand what was needed in the letter. She stated she was a transfer from [facility], that she would have  and that it was a life threatening emergency. She spoke to her insurance company today and they advised her she needed documentation. She says she can be reached at home [phone] or cell [phone].

## 2018-09-19 NOTE — TELEPHONE ENCOUNTER
Returned patient's call. She stated that she received an ambulance bill for $1200.00 from when she was transferred from [facility] to [facility]. She stated that she needs Dr. Dalton Goodwin to write a letter to the insurance company stating that if she wasn't transferred, she would have . Medicare does not pay for ambulance bills. If they get a letter from Dr. Dalton Goodwin, they will make a determination. The patient stated that it needs to be sent to [facility] and Appeals Department. Fax # [phone]. I advised the patient that I would speak with Dr. Dalton Goodwin once he is done with his surgeries and call her back later this afternoon. The patient verbalized understanding.

## 2018-09-21 LAB
AMIKACIN ISLT MIC: ABNORMAL
CIPROFLOXACIN ISLT MIC: ABNORMAL
CLARITHRO ISLT MIC: ABNORMAL
ETHAMBUTOL ISLT MIC: ABNORMAL
LINEZOLID ISLT MIC: ABNORMAL
MICROORGANISM/AGENT SPEC: ABNORMAL
MOXIFLOXACIN ISLT MIC: ABNORMAL
RIFAMPIN ISLT MIC: ABNORMAL
SPECIMEN SOURCE: ABNORMAL
STREPTOMYCIN ISLT MIC: ABNORMAL

## 2018-09-21 NOTE — TELEPHONE ENCOUNTER
Spoke with patient and advised her that the letter she requested has been sent to [facility].     She verbalized understanding,

## 2018-09-24 RX ORDER — PRAZOSIN HYDROCHLORIDE 2 MG/1
CAPSULE ORAL
Qty: 60 CAP | Refills: 0 | Status: SHIPPED | OUTPATIENT
Start: 2018-09-24 | End: 2018-10-22 | Stop reason: SDUPTHER

## 2018-10-03 ENCOUNTER — OFFICE VISIT (OUTPATIENT)
Dept: NEUROLOGY | Age: 63
End: 2018-10-03

## 2018-10-03 VITALS
OXYGEN SATURATION: 90 % | RESPIRATION RATE: 12 BRPM | HEART RATE: 107 BPM | WEIGHT: 182 LBS | DIASTOLIC BLOOD PRESSURE: 78 MMHG | SYSTOLIC BLOOD PRESSURE: 136 MMHG | BODY MASS INDEX: 31.07 KG/M2 | HEIGHT: 64 IN

## 2018-10-03 DIAGNOSIS — R55 SYNCOPE AND COLLAPSE: ICD-10-CM

## 2018-10-03 DIAGNOSIS — G25.0 BENIGN FAMILIAL TREMOR: ICD-10-CM

## 2018-10-03 DIAGNOSIS — M79.7 FIBROMYALGIA: ICD-10-CM

## 2018-10-03 DIAGNOSIS — M96.1 CERVICAL POST-LAMINECTOMY SYNDROME: ICD-10-CM

## 2018-10-03 DIAGNOSIS — M47.22 CERVICAL RADICULOPATHY DUE TO DEGENERATIVE JOINT DISEASE OF SPINE: ICD-10-CM

## 2018-10-03 DIAGNOSIS — R41.82 ALTERED MENTAL STATUS, UNSPECIFIED ALTERED MENTAL STATUS TYPE: ICD-10-CM

## 2018-10-03 DIAGNOSIS — E55.9 VITAMIN D DEFICIENCY: ICD-10-CM

## 2018-10-03 DIAGNOSIS — R27.0 ATAXIA: ICD-10-CM

## 2018-10-03 DIAGNOSIS — E11.42 DIABETIC PERIPHERAL NEUROPATHY ASSOCIATED WITH TYPE 2 DIABETES MELLITUS (HCC): ICD-10-CM

## 2018-10-03 DIAGNOSIS — M48.061 DEGENERATIVE LUMBAR SPINAL STENOSIS: ICD-10-CM

## 2018-10-03 DIAGNOSIS — G25.0 BENIGN ESSENTIAL TREMOR SYNDROME: Primary | ICD-10-CM

## 2018-10-03 DIAGNOSIS — I67.89 CEREBRAL MICROVASCULAR DISEASE: ICD-10-CM

## 2018-10-03 DIAGNOSIS — I65.23 STENOSIS OF BOTH INTERNAL CAROTID ARTERIES: ICD-10-CM

## 2018-10-03 DIAGNOSIS — E53.8 B12 DEFICIENCY: ICD-10-CM

## 2018-10-03 RX ORDER — PRIMIDONE 50 MG/1
TABLET ORAL
Qty: 150 TAB | Refills: 11 | Status: SHIPPED | OUTPATIENT
Start: 2018-10-03 | End: 2019-10-07 | Stop reason: SDUPTHER

## 2018-10-03 RX ORDER — OXYCODONE HYDROCHLORIDE 15 MG/1
TABLET ORAL
COMMUNITY
Start: 2018-09-07 | End: 2019-02-11 | Stop reason: ALTCHOICE
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
## 2018-10-03 NOTE — PATIENT INSTRUCTIONS
Office Policies 
 
o Phone calls/patient messages: Please allow up to 24 hours for someone in the office to contact you about your call or message. Be mindful your provider may be out of the office or your message may require further review. We encourage you to use [email or web address] for your messages as this is a faster, more efficient way to communicate with our office 
 
o Medication Refills: 
Prescription medications require up to 48 business hours to process. We encourage you to use [email or web address] for your refills. For controlled medications: Please allow up to 72 business hours to process. Certain medications may require you to  a written prescription at our office. NO narcotic/controlled medications will be prescribed after 4pm Monday through Friday or on weekends 
 
o Form/Paperwork Completion: 
Please note there is a $25 fee for all paperwork completed by our providers. We ask that you allow 7-14 business days. Pre-payment is due prior to picking up/faxing the completed form. You may also download your forms to [email or web address] to have your doctor print off. A Healthy Lifestyle: Care Instructions Your Care Instructions A healthy lifestyle can help you feel good, stay at a healthy weight, and have plenty of energy for both work and play. A healthy lifestyle is something you can share with your whole family. A healthy lifestyle also can lower your risk for serious health problems, such as high blood pressure, heart disease, and diabetes. You can follow a few steps listed below to improve your health and the health of your family. Follow-up care is a key part of your treatment and safety. Be sure to make and go to all appointments, and call your doctor if you are having problems. It's also a good idea to know your test results and keep a list of the medicines you take. How can you care for yourself at home? · Do not eat too much sugar, fat, or fast foods.  You can still have dessert and treats now and then. The goal is moderation. · Start small to improve your eating habits. Pay attention to portion sizes, drink less juice and soda pop, and eat more fruits and vegetables. ¨ Eat a healthy amount of food. A 3-ounce serving of meat, for example, is about the size of a deck of cards. Fill the rest of your plate with vegetables and whole grains. ¨ Limit the amount of soda and sports drinks you have every day. Drink more water when you are thirsty. ¨ Eat at least 5 servings of fruits and vegetables every day. It may seem like a lot, but it is not hard to reach this goal. A serving or helping is 1 piece of fruit, 1 cup of vegetables, or 2 cups of leafy, raw vegetables. Have an apple or some carrot sticks as an afternoon snack instead of a candy bar. Try to have fruits and/or vegetables at every meal. 
· Make exercise part of your daily routine. You may want to start with simple activities, such as walking, bicycling, or slow swimming. Try to be active 30 to 60 minutes every day. You do not need to do all 30 to 60 minutes all at once. For example, you can exercise 3 times a day for 10 or 20 minutes. Moderate exercise is safe for most people, but it is always a good idea to talk to your doctor before starting an exercise program. 
· Keep moving. Harmeet Darcie the lawn, work in the garden, or [place]. Take the stairs instead of the elevator at work. · If you smoke, quit. People who smoke have an increased risk for heart attack, stroke, cancer, and other lung illnesses. Quitting is hard, but there are ways to boost your chance of quitting tobacco for good. ¨ Use nicotine gum, patches, or lozenges. ¨ Ask your doctor about stop-smoking programs and medicines. ¨ Keep trying.  
In addition to reducing your risk of diseases in the future, you will notice some benefits soon after you stop using tobacco. If you have shortness of breath or asthma symptoms, they will likely get better within a few weeks after you quit. · Limit how much alcohol you drink. Moderate amounts of alcohol (up to 2 drinks a day for men, 1 drink a day for women) are okay. But drinking too much can lead to liver problems, high blood pressure, and other health problems. Family health If you have a family, there are many things you can do together to improve your health. · Eat meals together as a family as often as possible. · Eat healthy foods. This includes fruits, vegetables, lean meats and dairy, and whole grains. · Include your family in your fitness plan. Most people think of activities such as jogging or tennis as the way to fitness, but there are many ways you and your family can be more active. Anything that makes you breathe hard and gets your heart pumping is exercise. Here are some tips: 
¨ Walk to do errands or to take your child to school or the bus. ¨ Go for a family bike ride after dinner instead of watching TV. Where can you learn more? Go to http://bhavya-omar.info/. Enter V361 in the search box to learn more about \"A Healthy Lifestyle: Care Instructions. \" Current as of: December 7, 2017 Content Version: 11.7 © 1885-2542 [employer]. Care instructions adapted under license by [employer] (which disclaims liability or warranty for this information). If you have questions about a medical condition or this instruction, always ask your healthcare professional. [place] [address] any warranty or liability for your use of this information.

## 2018-10-03 NOTE — MR AVS SNAPSHOT
[address] [place] [address], 
[facility], [address] [facility] 
[phone] Patient: Sisi Patel MRN: [id number] :1955 Visit Information Date & Time Provider Department Dept. Phone Encounter #  
 10/3/2018  2:00 PM Eva Millard MD [facility] [phone] [id number] Follow-up Instructions Return in about 6 months (around 4/3/2019). Upcoming Health Maintenance Date Due Hepatitis C Screening 1955 FOOT EXAM Q1 1965 EYE EXAM RETINAL OR DILATED Q1 1965 DTaP/Tdap/Td series (1 - Tdap) 1976 PAP AKA CERVICAL CYTOLOGY 1976 Shingrix Vaccine Age 50> (1 of 2) 2005 FOBT Q 1 YEAR AGE 50-75 2005 BREAST CANCER SCRN MAMMOGRAM 2017 MEDICARE YEARLY EXAM 3/14/2018 Influenza Age 5 to Adult 2018 MICROALBUMIN Q1 2018 LIPID PANEL Q1 2018 HEMOGLOBIN A1C Q6M 2/15/2019 Allergies as of 10/3/2018  Review Complete On: 10/3/2018 By: Suzette Dawkins Severity Noted Reaction Type Reactions Latex  2010    Contact Dermatitis Dilaudid [Hydromorphone (Pf)]  10/29/2014   Systemic Other (comments) Feels like bugs are crawling all over her Lipitor [Atorvastatin]  2013    Other (comments) LIVER TROUBLE ON THIS MEDICATION Remeron [Mirtazapine]  08/15/2017    Other (comments) Tremors Sulfa (Sulfonamide Antibiotics)  2010    Itching Current Immunizations  Reviewed on 3/19/2015 Name Date Influenza Vaccine 2014, 2013 Pneumococcal Vaccine (Unspecified Type) 2010 Not reviewed this visit You Were Diagnosed With   
  
 Codes Comments Benign essential tremor syndrome    -  Primary ICD-10-CM: G25.0 ICD-9-CM: 333.1 Cerebral microvascular disease     ICD-10-CM: I67.9 ICD-9-CM: 437.9 Diabetic peripheral neuropathy associated with type 2 diabetes mellitus (HCC)     ICD-10-CM: E11.42 
ICD-9-CM: 250.60, 357.2 Stenosis of both internal carotid arteries     ICD-10-CM: I65.23 ICD-9-CM: 433.10, 433.30 Cervical radiculopathy due to degenerative joint disease of spine     ICD-10-CM: M47.22 
ICD-9-CM: 721.0 Fibromyalgia     ICD-10-CM: M79.7 ICD-9-CM: 729.1 Altered mental status, unspecified altered mental status type     ICD-10-CM: R41.82 
ICD-9-CM: 780.97 Degenerative lumbar spinal stenosis     ICD-10-CM: M48.061 
ICD-9-CM: 724.02 Benign familial tremor     ICD-10-CM: G25.0 ICD-9-CM: 333.1 Cervical post-laminectomy syndrome     ICD-10-CM: M96.1 ICD-9-CM: 722.81 Syncope and collapse     ICD-10-CM: R55 
ICD-9-CM: 780.2 Ataxia     ICD-10-CM: R27.0 ICD-9-CM: 781.3 B12 deficiency     ICD-10-CM: E53.8 ICD-9-CM: 266.2 Vitamin D deficiency     ICD-10-CM: E55.9 ICD-9-CM: 268.9 Vitals BP Pulse Height(growth percentile) Weight(growth percentile) SpO2 BMI  
 136/78 (!) 107 5' 4\" (1.626 m) 182 lb (82.6 kg) 90% 31.24 kg/m2 OB Status Smoking Status Menopause Former Smoker BMI and BSA Data Body Mass Index Body Surface Area  
 31.24 kg/m 2 1.93 m 2 Preferred Pharmacy Pharmacy Name Phone [id number] [facility], [address] [place] [address] [phone] Your Updated Medication List  
  
   
This list is accurate as of 10/3/18  2:54 PM.  Always use your most recent med list.  
  
  
  
  
 acetaminophen-codeine 300-30 mg per tablet Commonly known as:  TYLENOL #3 Take 1 Tab by mouth every eight (8) hours as needed. Max Daily Amount: 3 Tabs. ALPRAZolam 1 mg tablet Commonly known as:  Sophia Kiang Take 1 Tab by mouth three (3) times daily as needed for Anxiety. Max Daily Amount: 3 mg.  
  
 aspirin 81 mg chewable tablet Take 81 mg by mouth daily. benzonatate 200 mg capsule Commonly known as:  TESSALON Take 1 Cap by mouth two (2) times daily as needed. cetirizine 10 mg tablet Commonly known as:  ZYRTEC Take 1 Tab by mouth daily. dicyclomine 10 mg capsule Commonly known as:  BENTYL  
daily as needed. DULoxetine 40 mg Cpdr  
Take 40 mg by mouth daily. DUONEB 2.5 mg-0.5 mg/3 ml Nebu Generic drug:  albuterol-ipratropium 3 mL by Nebulization route every six (6) hours as needed for Wheezing. furosemide 40 mg tablet Commonly known as:  LASIX Take 1 Tab by mouth daily. * glipiZIDE SR 5 mg CR tablet Commonly known as:  GLUCOTROL XL Take 5 mg by mouth two (2) times daily as needed (Patient monitors her BG levels and takes when she is also taking a steroid.). * glipiZIDE 5 mg tablet Commonly known as:  Meza Meo Take 1 Tab by mouth Before breakfast and dinner. guaiFENesin 1,200 mg Ta12 ER tablet Commonly known as:  Edinson & Edinson Take 1 Tab by mouth two (2) times a day. ibuprofen 800 mg tablet Commonly known as:  MOTRIN Take 1 Tab by mouth every eight (8) hours as needed. levoFLOXacin 750 mg tablet Commonly known as:  Marie Dowd Take 1 Tab by mouth nightly. levothyroxine 200 mcg tablet Commonly known as:  SYNTHROID Take 200 mcg by mouth daily (before breakfast). LEVSIN/SL 0.125 mg SL tablet Generic drug:  hyoscyamine SL  
0.125 mg by SubLINGual route three (3) times daily as needed for Cramping. MAGOX 400 mg tablet Generic drug:  magnesium oxide TAKE ONE TABLET TWICE A DAY  
  
 methocarbamol 750 mg tablet Commonly known as:  ROBAXIN Take 750-1,500 mg by mouth four (4) times daily as needed. metoprolol tartrate 25 mg tablet Commonly known as:  LOPRESSOR Take 1 Tab by mouth two (2) times a day. montelukast 10 mg tablet Commonly known as:  SINGULAIR Take 10 mg by mouth daily. naloxone 4 mg/actuation nasal spray Commonly known as:  [facility]  
 Use 1 spray intranasally, then discard. Repeat with new spray every 2 min as needed for opioid overdose symptoms, alternating nostrils. NexIUM 40 mg capsule Generic drug:  esomeprazole Take 40 mg by mouth daily. nystatin 100,000 unit/mL suspension Commonly known as:  MYCOSTATIN Take 5 mL by mouth four (4) times daily. swish and spit  
  
 oxyCODONE IR 15 mg immediate release tablet Commonly known as:  OXY-IR  
every four (4) hours as needed. PHENobarbital 60 mg tablet Commonly known as:  LUMINAL Take 1 Tab by mouth two (2) times a day. Max Daily Amount: 120 mg.  
  
 potassium chloride SR 20 mEq tablet Commonly known as:  K-TAB Take 1 Tab by mouth two (2) times a day. prazosin 2 mg capsule Commonly known as:  MINIPRESS  
TAKE 1 CAPSULE BY MOUTH TWICE A DAY. predniSONE 10 mg tablet Commonly known as:  Batres Haggis Take 1 Tab by mouth See Admin Instructions. 3 tabs daily for  7 days then 3 tabs daily for 7 days then 2 tabs daily for 7 days and 1 tab daily for 7 days and stop  
  
 primidone 50 mg tablet Commonly known as: MYSOLINE  
2 po qam and 3 po qhs  
  
 promethazine 25 mg tablet Commonly known as:  PHENERGAN Take 1 Tab by mouth every six (6) hours as needed. SUPER B COMPLEX PO Take  by mouth daily. SYMBICORT 160-4.5 mcg/actuation Hfaa Generic drug:  budesonide-formoterol Take 2 Puffs by inhalation two (2) times a day. VENTOLIN HFA 90 mcg/actuation inhaler Generic drug:  albuterol Take 2 Puffs by inhalation every four (4) hours as needed for Wheezing. vit B Cmplx 3-FA-Vit C-Biotin 1- mg-mg-mcg tablet Commonly known as:  NEPHRO SHREYA RX Take 1 Tab by mouth daily. Vytorin 10/40 10-40 mg per tablet Generic drug:  ezetimibe-simvastatin Take 1 tablet by mouth nightly. zinc oxide-cod liver oil 40 % ointment Commonly known as:  Lawrence Mirza Apply  to affected area as needed for Skin Irritation. * Notice: This list has 2 medication(s) that are the same as other medications prescribed for you. Read the directions carefully, and ask your doctor or other care provider to review them with you. Prescriptions Sent to Pharmacy Refills  
 primidone (MYSOLINE) 50 mg tablet 11 Si po qam and 3 po qhs  
 Class: Normal  
 Pharmacy: [id number] [facility], [address] [place] [address] #: [phone] We Performed the Following PHENOBARBITAL [id number] CPT(R)] PRIMIDONE (MYSOLINE) [id number] CPT(R)] Follow-up Instructions Return in about 6 months (around 4/3/2019). To-Do List   
 10/09/2018 Imaging:  DUPLEX CAROTID BILATERAL AMB NEURO Patient Instructions Office Policies 
 
o Phone calls/patient messages: Please allow up to 24 hours for someone in the office to contact you about your call or message. Be mindful your provider may be out of the office or your message may require further review. We encourage you to use [employer]oof for your messages as this is a faster, more efficient way to communicate with our office 
 
o Medication Refills: 
Prescription medications require up to 48 business hours to process. We encourage you to use [employer] for your refills. For controlled medications: Please allow up to 72 business hours to process. Certain medications may require you to  a written prescription at our office. NO narcotic/controlled medications will be prescribed after 4pm Monday through Friday or on weekends 
 
o Form/Paperwork Completion: 
Please note there is a $25 fee for all paperwork completed by our providers. We ask that you allow 7-14 business days. Pre-payment is due prior to picking up/faxing the completed form. You may also download your forms to [employer] to have your doctor print off. A Healthy Lifestyle: Care Instructions Your Care Instructions A healthy lifestyle can help you feel good, stay at a healthy weight, and have plenty of energy for both work and play. A healthy lifestyle is something you can share with your whole family. A healthy lifestyle also can lower your risk for serious health problems, such as high blood pressure, heart disease, and diabetes. You can follow a few steps listed below to improve your health and the health of your family. Follow-up care is a key part of your treatment and safety. Be sure to make and go to all appointments, and call your doctor if you are having problems. It's also a good idea to know your test results and keep a list of the medicines you take. How can you care for yourself at home? · Do not eat too much sugar, fat, or fast foods. You can still have dessert and treats now and then. The goal is moderation. · Start small to improve your eating habits. Pay attention to portion sizes, drink less juice and soda pop, and eat more fruits and vegetables. ¨ Eat a healthy amount of food. A 3-ounce serving of meat, for example, is about the size of a deck of cards. Fill the rest of your plate with vegetables and whole grains. ¨ Limit the amount of soda and sports drinks you have every day. Drink more water when you are thirsty. ¨ Eat at least 5 servings of fruits and vegetables every day. It may seem like a lot, but it is not hard to reach this goal. A serving or helping is 1 piece of fruit, 1 cup of vegetables, or 2 cups of leafy, raw vegetables. Have an apple or some carrot sticks as an afternoon snack instead of a candy bar. Try to have fruits and/or vegetables at every meal. 
· Make exercise part of your daily routine. You may want to start with simple activities, such as walking, bicycling, or slow swimming. Try to be active 30 to 60 minutes every day. You do not need to do all 30 to 60 minutes all at once.  For example, you can exercise 3 times a day for 10 or 20 minutes. Moderate exercise is safe for most people, but it is always a good idea to talk to your doctor before starting an exercise program. 
· Keep moving. Randi Comes the lawn, work in the garden, or [place]. Take the stairs instead of the elevator at work. · If you smoke, quit. People who smoke have an increased risk for heart attack, stroke, cancer, and other lung illnesses. Quitting is hard, but there are ways to boost your chance of quitting tobacco for good. ¨ Use nicotine gum, patches, or lozenges. ¨ Ask your doctor about stop-smoking programs and medicines. ¨ Keep trying. In addition to reducing your risk of diseases in the future, you will notice some benefits soon after you stop using tobacco. If you have shortness of breath or asthma symptoms, they will likely get better within a few weeks after you quit. · Limit how much alcohol you drink. Moderate amounts of alcohol (up to 2 drinks a day for men, 1 drink a day for women) are okay. But drinking too much can lead to liver problems, high blood pressure, and other health problems. Family health If you have a family, there are many things you can do together to improve your health. · Eat meals together as a family as often as possible. · Eat healthy foods. This includes fruits, vegetables, lean meats and dairy, and whole grains. · Include your family in your fitness plan. Most people think of activities such as jogging or tennis as the way to fitness, but there are many ways you and your family can be more active. Anything that makes you breathe hard and gets your heart pumping is exercise. Here are some tips: 
¨ Walk to do errands or to take your child to school or the bus. ¨ Go for a family bike ride after dinner instead of watching TV. Where can you learn more? Go to http://bhavya-omar.info/. Enter F202 in the search box to learn more about \"A Healthy Lifestyle: Care Instructions. \" Current as of: December 7, 2017 Content Version: 11.7 © 9336-4790 [employer], Whatâ€™s More Alive Than You. Care instructions adapted under license by [employer] (which disclaims liability or warranty for this information). If you have questions about a medical condition or this instruction, always ask your healthcare professional. [address] any warranty or liability for your use of this information. Introducing [address]! Noble Patel introduces [email or web address] patient portal. Now you can access parts of your medical record, email your doctor's office, and request medication refills online. 1. In your internet browser, go to [email or web address] 2. Click on the First Time User? Click Here link in the Sign In box. You will see the New Member Sign Up page. 3. Enter your [email or web address] Access Code exactly as it appears below. You will not need to use this code after youve completed the sign-up process. If you do not sign up before the expiration date, you must request a new code. · [email or web address] Access Code: [email or web address] Expires: 12/14/2018  5:22 AM 
 
4. Enter the last four digits of your Social Security Number (xxxx) and Date of Birth (mm/dd/yyyy) as indicated and click Submit. You will be taken to the next sign-up page. 5. Create a [email or web address] ID. This will be your [email or web address] login ID and cannot be changed, so think of one that is secure and easy to remember. 6. Create a [email or web address] password. You can change your password at any time. 7. Enter your Password Reset Question and Answer. This can be used at a later time if you forget your password. 8. Enter your e-mail address. You will receive e-mail notification when new information is available in [address] 9. Click Sign Up. You can now view and download portions of your medical record. 10. Click the Download Summary menu link to download a portable copy of your medical information. If you have questions, please visit the Frequently Asked Questions section of the [email or web address] website. Remember, [employer] is NOT to be used for urgent needs. For medical emergencies, dial 911. Now available from your iPhone and Android! Please provide this summary of care documentation to your next provider. Your primary care clinician is listed as Mary Jo Jeffery. If you have any questions after today's visit, please call [phone].
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
## 2018-10-05 LAB
PHENOBARB SERPL-MCNC: 23 UG/ML (ref 15–40)
PHENOBARB SERPL-MCNC: 23 UG/ML (ref 15–40)
PRIMIDONE SERPL-MCNC: 2.5 UG/ML (ref 5–12)

## 2018-10-08 RX ORDER — DULOXETINE 40 MG/1
CAPSULE, DELAYED RELEASE ORAL
Qty: 30 CAP | Refills: 2 | Status: SHIPPED | OUTPATIENT
Start: 2018-10-08 | End: 2019-01-02 | Stop reason: SDUPTHER

## 2018-10-22 RX ORDER — PRAZOSIN HYDROCHLORIDE 2 MG/1
CAPSULE ORAL
Qty: 60 CAP | Refills: 0 | Status: SHIPPED | OUTPATIENT
Start: 2018-10-22 | End: 2018-11-23 | Stop reason: SDUPTHER

## 2018-11-07 NOTE — PROGRESS NOTES
Anesthesia intubating pt CHIEF COMPLAINT:  Dee Dee Scherer is a 64 y.o. female and was seen today for follow-up of psychiatric condition and psychotropic medication management. HPI:    [facility] reports the following psychiatric symptoms:  depression and anxiety. The symptoms have been present for months and are of moderate/high severity. The symptoms occur daily. Pt tearful here today as she discussed frustration r/t past stay at rehab facility. Pt has been having increased trauma recollections and flashbacks since that experience. Pt reports a very slight improvement since starting Mirtazapine but she states \"things keep triggering me\". Pt here today to review current treatment plan. FAMILY/SOCIAL HX: daughter with sig MH symptoms    REVIEW OF SYSTEMS:  Psychiatric:  depression, anxiety  Appetite:decreased   Sleep: fitful and poor with DIMS (difficulty initiating & maintaining sleep)   Neuro: self reports [facility]    Visit Vitals    /78    Pulse 85    Ht 5' 4\" (1.626 m)    Wt 90.7 kg (200 lb)    BMI 34.33 kg/m2       Side Effects:  none    MENTAL STATUS EXAM:   Sensorium  oriented to time, place and person   Relations cooperative   Appearance:  age appropriate and casually dressed   Motor Behavior:  gait stable and within normal limits   Speech:  normal volume, thick cough and congestion   Thought Process: goal directed   Thought Content free of delusions and free of hallucinations   Suicidal ideations no intention   Homicidal ideations no intention   Mood:  anxious and depressed   Affect:  anxious and depressed, tearful   Memory recent  adequate   Memory remote:  adequate   Concentration:  adequate   Abstraction:  abstract   Insight:  fair   Reliability fair   Judgment:  fair     MEDICAL DECISION MAKING:  Problems addressed today:    ICD-10-CM ICD-9-CM    1. Major depressive disorder, recurrent episode, moderate (HCC) F33.1 296.32    2.  PTSD (post-traumatic stress disorder) F43.10 309.81        Assessment:   [facility] is not responding to treatment, symptoms are currently exacerbated. Pt reports PTSD activation while admitted to rehab facility. Pt has since not done well with current medications but states she may be noticing a slight improvement since starting mirtazapine. Reviewed meds in full and pt states she has remained on prazosin 2 mg bid for ptsd and now is on mirtazapine 15 mg for dep/ptsd. Reviewed options and will increase mirtazapine dose today. Reviewed treatment goals in full. Provided support and encouragement. Plan:   1. Current Outpatient Prescriptions   Medication Sig Dispense Refill    mirtazapine (REMERON) 30 mg tablet Take 1 Tab by mouth nightly. 30 Tab 1    acetaminophen-codeine (TYLENOL #3) 300-30 mg per tablet Take 1 Tab by mouth every eight (8) hours as needed. Max Daily Amount: 3 Tabs. 5 Tab 0    ALPRAZolam (XANAX) 1 mg tablet Take 1 Tab by mouth three (3) times daily as needed for Anxiety. Max Daily Amount: 3 mg. 3 Tab 0    cetirizine (ZYRTEC) 10 mg tablet Take 1 Tab by mouth daily. 10 Tab 0    promethazine (PHENERGAN) 25 mg tablet Take 1 Tab by mouth every six (6) hours as needed. 4 Tab 0    predniSONE (DELTASONE) 5 mg tablet Take 8 Tabs by mouth daily (with breakfast). 8 tab once a day x 2 days  7 tab once a day x 2 days  6 tab once a day x 2 days  5 tab once a day x 2 days  4 tab once a day x 2 days  3 tab once a day x 2 days  2 tab once a day x 2 days  Then 1 tab once a day x 2 days  Indications: COPD exacerbation 72 Tab 0    ibuprofen (MOTRIN) 800 mg tablet Take 1 Tab by mouth every eight (8) hours as needed. 20 Tab 0    guaiFENesin ER (MUCINEX) 1,200 mg Ta12 ER tablet Take 1 Tab by mouth two (2) times a day. 14 Tab 0    fluticasone-vilanterol (BREO ELLIPTA) 100-25 mcg/dose inhaler Take 1 Puff by inhalation daily. 1 Inhaler 0    risperiDONE (RISPERDAL) 0.25 mg tablet Take 1 Tab by mouth two (2) times a day. 14 Tab 0    furosemide (LASIX) 40 mg tablet Take 1 Tab by mouth daily.  20 Tab 0  PHENobarbital (LUMINAL) 60 mg tablet Take 1 Tab by mouth two (2) times a day. Max Daily Amount: 120 mg. 60 Tab 1    albuterol (VENTOLIN HFA) 90 mcg/actuation inhaler Take 2 Puffs by inhalation every four (4) hours as needed for Wheezing.  gabapentin (NEURONTIN) 600 mg tablet 600 mg two (2) times a day.  primidone (MYSOLINE) 50 mg tablet Take 2 Tabs by mouth nightly. 100 Tab 11    dicyclomine (BENTYL) 10 mg capsule       glipiZIDE SR (GLUCOTROL) 5 mg CR tablet Take 5 mg by mouth two (2) times daily as needed (Patient monitors her BG levels and takes when she is also taking a steroid. ).  montelukast (SINGULAIR) 10 mg tablet Take 10 mg by mouth daily.  ezetimibe-simvastatin (VYTORIN 10/40) 10-40 mg per tablet Take 1 tablet by mouth nightly.  metoprolol (LOPRESSOR) 25 mg tablet Take 1 Tab by mouth two (2) times a day. 60 Tab 6    benzonatate (TESSALON) 200 mg capsule Take 1 Cap by mouth two (2) times daily as needed. 30 Cap 0    nystatin (MYCOSTATIN) 100,000 unit/mL suspension Take 5 mL by mouth four (4) times daily. swish and spit (Patient taking differently: Take 500,000 Units by mouth four (4) times daily as needed. swish and spit) 180 mL 0    methocarbamol (ROBAXIN) 750 mg tablet Take 750-1,500 mg by mouth four (4) times daily as needed.  niacin  mg CR capsule Take 250 mg by mouth daily.  albuterol-ipratropium (DUONEB) 2.5 mg-0.5 mg/3 ml nebulizer solution 3 mL by Nebulization route every six (6) hours as needed for Wheezing.  hyoscyamine SL (LEVSIN/SL) 0.125 mg SL tablet 0.125 mg by SubLINGual route three (3) times daily as needed for Cramping.  esomeprazole (NEXIUM) 40 mg capsule Take 40 mg by mouth daily.  MAGOX 400 mg tablet TAKE ONE TABLET TWICE A DAY 60 Tab 3    vit B Cmplx 3-FA-Vit C-Biotin (NEPHRO SHREYA RX) 1- mg-mg-mcg tablet Take 1 Tab by mouth daily.  aspirin 81 mg chewable tablet Take 81 mg by mouth daily.       levothyroxine (SYNTHROID) 200 mcg tablet Take 200 mcg by mouth daily (before breakfast). medication changes made today: increase mirtazapine 30 mg for dep/ptsd, cont prazosin 2 mg bid, pt wants to keep risperidone 0.25 mg prn severe anxiety/ptsd     2. Counseling and coordination of care including instructions for treatment, risks/benefits, risk factor reduction and patient/family education. She agrees with the plan. Patient instructed to call with any side effects, questions or issues. 3.  Follow-up Disposition:  Return in about 6 weeks (around 9/19/2017).     4. Consider ind therapy    8/8/2017  Princess Rj NP

## 2018-11-23 RX ORDER — PRAZOSIN HYDROCHLORIDE 2 MG/1
CAPSULE ORAL
Qty: 60 CAP | Refills: 0 | Status: SHIPPED | OUTPATIENT
Start: 2018-11-23 | End: 2018-12-19 | Stop reason: SDUPTHER

## 2018-12-18 NOTE — PROGRESS NOTES
[address]        NAME: Tanvir Rivas is a 1 y o  male  : 2015    MRN: [id number]  DATE: 2018  TIME: 6:45 PM    Assessment and Plan   Injury of finger of right hand, initial encounter Bj Polanco  1  Injury of finger of right hand, initial encounter  XR finger right fourth digit-ring         Patient Instructions       Follow up with PCP in 3-5 days  Proceed to  ER if symptoms worsen  Chief Complaint     Chief Complaint   Patient presents with    Finger Injury     Pt had right ring finger slammed in car door on Saturday  Pt has bruising, swelling, pain, and oozing on tip of finger  History of Present Illness       Patient with right 4th finger injury which occurred 4 days ago  Patient's sister spine-the door on his finger  He became swollen and started bleeding at the base of nail  Mother has been applying Band-Aid so far  However patient is having lot of tenderness  Has noticed significant swelling at the base of the nail  Denies any purulent drainage  Review of Systems   Review of Systems   Constitutional: Negative  Musculoskeletal: Positive for joint swelling  Skin: Positive for wound  Current Medications       Current Outpatient Prescriptions:     PEDIATRIC VITAMINS PO, Take by mouth, Disp: , Rfl:     Current Allergies     Allergies as of 2018    (No Known Allergies)            The following portions of the patient's history were reviewed and updated as appropriate: allergies, current medications, past family history, past medical history, past social history, past surgical history and problem list      History reviewed  No pertinent past medical history  History reviewed  No pertinent surgical history  History reviewed  No pertinent family history  Medications have been verified          Objective   Pulse 99   Temp 98 8 °F (37 1 °C)   Resp 22   Ht 3' 2" (0 965 m)   Wt 17 2 kg (38 lb)   SpO2 98%   BMI 18 50 kg/m² PCU SHIFT NURSING NOTE      Bedside and Verbal shift change report given to Jay Chinchilla RN (oncoming nurse) by Lakshmi Forrest RN (offgoing nurse). Report included the following information SBAR, Kardex, ED Summary, Procedure Summary, Intake/Output and MAR. Shift Summary:   Received report and assumed care of patient. A/Ox4. Lungs bilaterally diminished, rhonchi, and expiratory wheezing. Right side more diminished than left with less air movement. Patient has RODRIGUEZ but moves well one assist from bed to chair. No reports of pain at this time. Skin is intact. Voiding and having BM without any issue per patient. /84 (BP 1 Location: Left arm, BP Patient Position: Sitting)  Pulse 86  Temp 98 °F (36.7 °C)  Resp 18  Ht 5' 4\" (1.626 m)  Wt 90.7 kg (200 lb)  SpO2 (!) 88%  BMI 34.33 kg/m2  Patient to be an EMTALA to [facility] for VAT procedure. 5235 Administered PRN Xanax to patient. Patient stated \"I am having a panic attack\". Tachypneic and tearful. 1000 Lucia with transfer service called to let me know patient has a bed at [facility]. Room 433 and that transport would arrive within the hour. 1013 Patient complaining of 7/10 back pain. Administered roxicodone per PRN order. 1030 Transport team arrived. 1040 Attempted to call report but nurse was unavailable. 1050 TRANSFER - OUT REPORT:    Verbal report given to 228  Ladonna Guerra RN (name) on Omie Apley  [facility] for ordered procedure       Report consisted of patients Situation, Background, Assessment and   Recommendations(SBAR). Information from the following report(s) SBAR, ED Summary, Procedure Summary, Intake/Output, MAR and Accordion was reviewed with the receiving nurse. Lines:   Peripheral IV 08/10/18 Right; Inner Wrist (Active)   Site Assessment Clean, dry, & intact 8/12/2018  7:29 AM   Phlebitis Assessment 0 8/12/2018  7:29 AM   Infiltration Assessment 0 8/12/2018  7:29 AM   Dressing Status Clean, dry, & intact 8/12/2018 7:29 AM   Dressing Type Transparent;Tape 8/12/2018  7:29 AM   Hub Color/Line Status Pink;Flushed;Patent 8/12/2018  7:29 AM   Action Taken Open ports on tubing capped 8/12/2018  7:29 AM   Alcohol Cap Used Yes 8/12/2018  7:29 AM        Opportunity for questions and clarification was provided. Patient transported with:   Monitor  O2 @ 3 liters  Patients medications from home   [facility] transport team.              Admission Date 7/31/2018   Admission Diagnosis CAP (community acquired pneumonia)  Acute respiratory failure ([facility] [address]   Consults IP CONSULT TO PULMONOLOGY  IP CONSULT TO PALLIATIVE CARE - PROVIDER        Consults   []PT   []OT   []Speech   [x]Case Management      [x] Palliative      Cardiac Monitoring Order   [x]Yes   []No     IV drips   []Yes    Drip:                            Dose:  Drip:                            Dose:  Drip:                            Dose:   [x]No     GI Prophylaxis   [x]Yes   []No         DVT Prophylaxis   SCDs:             Onel stockings:         [] Medication   []Contraindicated   []None      Activity Level Activity Level: Up with Assistance     Activity Assistance: Partial (one person)   Purposeful Rounding every 1-2 hour? [x]Yes   Zarate Score  Total Score: 3   Bed Alarm (If score 3 or >)   [x]Yes   [] Refused (See signed refusal form in chart)   Joseph Score  Joseph Score: 20   Joseph Score (if score 14 or less)   []PMT consult   []Wound Care consult      []Specialty bed   [] Nutrition consult          Needs prior to discharge:   Home O2 required:    []Yes   [x]No    If yes, how much O2 required? Other:    Last Bowel Movement: Last Bowel Movement Date: 08/12/18      Influenza Vaccine Received Flu Vaccine for Current Season (usually Sept-March): Not Flu Season        Pneumonia Vaccine           Diet Active Orders   Diet    DIET DIABETIC CONSISTENT CARB Regular      LDAs               Peripheral IV 08/10/18 Right; Inner Wrist (Active)   Site Assessment Clean, dry, & Physical Exam     Physical Exam   Musculoskeletal: He exhibits tenderness  Right upper extremity:  Right 4th finger reveals an area of maceration measuring approximately 4 millimeters with elevated border  Swollen  Distal PIP tender to touch  There is evidence of subungual hematoma  intact; Clean;Dry 8/11/2018  8:38 PM   Phlebitis Assessment 0 8/11/2018  8:38 PM   Infiltration Assessment 0 8/11/2018  8:38 PM   Dressing Status Clean, dry, & intact 8/11/2018  8:38 PM   Dressing Type Transparent 8/11/2018  8:38 PM   Hub Color/Line Status Pink; Infusing 8/11/2018  7:29 AM                      Urinary Catheter      Intake & Output   Date 08/11/18 0700 - 08/12/18 0659 08/12/18 0700 - 08/13/18 0659   Shift 2984-8358 3785-8502 24 Hour Total 0700-1859 1900-0659 24 Hour Total   I  N  T  A  K  E   P.O. 480  480         P. O. 480  480       I.V.  (mL/kg/hr) 200  (0.2)  200  (0.1)         Volume (piperacillin-tazobactam (ZOSYN) 3.375 g in 0.9% sodium chloride (MBP/ADV) 100 mL) 200  200       Shift Total  (mL/kg) 680  (7.5)  680  (7.5)      O  U  T  P  U  T   Urine  (mL/kg/hr) 1025  (0.9) 1300  (1.2) 2325  (1.1)         Urine Voided 1025 1300 2325         Urine Occurrence(s) 2 x  2 x       Stool            Stool Occurrence(s) 1 x  1 x       Chest Tube  100 100         Output (ml) (Chest Tube #1 08/07/18 Right)  100 100       Shift Total  (mL/kg) 1025  (11.3) 1400  (15.4) 2425  (26.7)      NET -391 [phone] -4505      Weight (kg) 90.7 90.7 90.7 90.7 90.7 90.7         Readmission Risk Assessment Tool Score High Risk            28       Total Score        3 Has Seen PCP in Last 6 Months (Yes=3, No=0)    3 Patient Length of Stay (>5 days = 3)    5 Pt. Coverage (Medicare=5 , Medicaid, or Self-Pay=4)    17 Charlson Comorbidity Score (Age + Comorbid Conditions)        Criteria that do not apply:    . Living with Significant Other. Assisted Living. LTAC. SNF.  or   Rehab    IP Visits Last 12 Months (1-3=4, 4=9, >4=11)       Expected Length of Stay 4d 21h   Actual Length of Stay 12

## 2018-12-19 RX ORDER — PRAZOSIN HYDROCHLORIDE 2 MG/1
CAPSULE ORAL
Qty: 60 CAP | Refills: 0 | Status: SHIPPED | OUTPATIENT
Start: 2018-12-19 | End: 2019-01-15 | Stop reason: SDUPTHER

## 2019-01-02 RX ORDER — DULOXETINE 40 MG/1
CAPSULE, DELAYED RELEASE ORAL
Qty: 30 CAP | Refills: 1 | Status: SHIPPED | OUTPATIENT
Start: 2019-01-02 | End: 2019-01-15 | Stop reason: CLARIF

## 2019-01-02 RX ORDER — DULOXETINE 40 MG/1
CAPSULE, DELAYED RELEASE ORAL
Qty: 30 CAP | Refills: 0 | OUTPATIENT
Start: 2019-01-02

## 2019-01-15 ENCOUNTER — OFFICE VISIT (OUTPATIENT)
Dept: BEHAVIORAL/MENTAL HEALTH CLINIC | Age: 64
End: 2019-01-15

## 2019-01-15 VITALS
HEART RATE: 107 BPM | HEIGHT: 65 IN | SYSTOLIC BLOOD PRESSURE: 122 MMHG | BODY MASS INDEX: 30.32 KG/M2 | WEIGHT: 182 LBS | DIASTOLIC BLOOD PRESSURE: 78 MMHG

## 2019-01-15 DIAGNOSIS — F33.1 MODERATE EPISODE OF RECURRENT MAJOR DEPRESSIVE DISORDER (HCC): Primary | ICD-10-CM

## 2019-01-15 DIAGNOSIS — F43.10 PTSD (POST-TRAUMATIC STRESS DISORDER): ICD-10-CM

## 2019-01-15 RX ORDER — PRAZOSIN HYDROCHLORIDE 2 MG/1
CAPSULE ORAL
Qty: 60 CAP | Refills: 3 | Status: SHIPPED | OUTPATIENT
Start: 2019-01-15 | End: 2019-05-15 | Stop reason: SDUPTHER

## 2019-01-15 RX ORDER — DULOXETIN HYDROCHLORIDE 30 MG/1
30 CAPSULE, DELAYED RELEASE ORAL DAILY
Qty: 30 CAP | Refills: 0
Start: 2019-01-15 | End: 2019-04-10 | Stop reason: CLARIF

## 2019-01-15 RX ORDER — DESVENLAFAXINE 25 MG/1
25 TABLET, EXTENDED RELEASE ORAL DAILY
Qty: 30 TAB | Refills: 1 | Status: SHIPPED | OUTPATIENT
Start: 2019-01-15 | End: 2019-03-11 | Stop reason: SDUPTHER

## 2019-01-15 NOTE — PROGRESS NOTES
CHIEF COMPLAINT:  hPilippe Rivera is a 61 y.o. female and was seen today for follow-up of psychiatric condition and psychotropic medication management. HPI:    Dimitry Soto reports the following psychiatric symptoms:  depression and anxiety. The symptoms have been present for months and are of moderate/high severity. The symptoms occur somewhat more severely since pt was hospitalized and dc'd. She reports some benefit from current medications but still has ongoing depression and anxiety. Pt here to review current treatment plan. FAMILY/SOCIAL HX: parent child stressors, health stressors    REVIEW OF SYSTEMS:  Psychiatric:  depression, anxiety  Appetite:decreased   Sleep: fitful   Neuro: chronic pain    Side Effects:  none    MENTAL STATUS EXAM:   Sensorium  oriented to time, place and person   Relations cooperative   Appearance:  age appropriate and casually dressed   Motor Behavior:  hypoactive, gait stable and within normal limits   Speech:  normal volume   Thought Process: goal directed   Thought Content free of delusions and free of hallucinations   Suicidal ideations no intention   Homicidal ideations no intention   Mood:  Depressed, anxious   Affect:  Depressed, anxious   Memory recent  adequate   Memory remote:  adequate   Concentration:  adequate   Abstraction:  abstract   Insight:  fair   Reliability fair   Judgment:  fair     MEDICAL DECISION MAKING:  Problems addressed today:    ICD-10-CM ICD-9-CM    1. Moderate episode of recurrent major depressive disorder (HCC) F33.1 296.32    2. PTSD (post-traumatic stress disorder) F43.10 309.81        Assessment:   Dimitry Soto is not responding to treatment at this time. Pt reports depression symptoms are currently exacerbated and she does not think she has benefited from current medications. Reviewed past trials and Factyleight test results. Discussed options including increasing cymbalta to 60 mg, using a trial of pristiq or using a trial of trintellix.  Pt would like to try pristiq due to failed trial of cymbalta and past failed trials of celexa and other medications. Reviewed weaning schedule and treatment goals. Will continue with prazosin which has helped PTSD nightmares and hyperarousal. Pt taking alprazolam rx'd by another provider. Pt discussed recent stressors. Provided support and reinforced healthy cognitions. Plan:   1. Current Outpatient Medications   Medication Sig Dispense Refill    DULoxetine (CYMBALTA) 30 mg capsule Take 1 Cap by mouth daily. 30 Cap 0    desvenlafaxine succinate (PRISTIQ) 25 mg ER tablet Take 1 Tab by mouth daily. 30 Tab 1    prazosin (MINIPRESS) 2 mg capsule TAKE 1 CAPSULE BY MOUTH TWICE A DAY. 60 Cap 3    ferrous fumarate/vit Bcomp,C (SUPER B COMPLEX PO) Take  by mouth daily.  oxyCODONE IR (OXY-IR) 15 mg immediate release tablet every four (4) hours as needed.  primidone (MYSOLINE) 50 mg tablet 2 po qam and 3 po qhs 150 Tab 11    glipiZIDE (GLUCOTROL) 5 mg tablet Take 1 Tab by mouth Before breakfast and dinner.  guaiFENesin (MUCINEX) 1,200 mg Ta12 ER tablet Take 1 Tab by mouth two (2) times a day. 14 Tab 0    levoFLOXacin (LEVAQUIN) 750 mg tablet Take 1 Tab by mouth nightly. 10 Tab 0    potassium chloride SR (K-TAB) 20 mEq tablet Take 1 Tab by mouth two (2) times a day. 14 Tab 0    predniSONE (DELTASONE) 10 mg tablet Take 1 Tab by mouth See Admin Instructions. 3 tabs daily for  7 days then  3 tabs daily for 7 days  then 2 tabs daily for 7 days  and 1 tab daily for 7 days and stop 60 Tab 0    naloxone (NARCAN) 4 mg/actuation nasal spray Use 1 spray intranasally, then discard. Repeat with new spray every 2 min as needed for opioid overdose symptoms, alternating nostrils. 1 Each 0    zinc oxide-cod liver oil (DESITIN) 40 % ointment Apply  to affected area as needed for Skin Irritation.  budesonide-formoterol (SYMBICORT) 160-4.5 mcg/actuation HFAA Take 2 Puffs by inhalation two (2) times a day.       acetaminophen-codeine (TYLENOL #3) 300-30 mg per tablet Take 1 Tab by mouth every eight (8) hours as needed. Max Daily Amount: 3 Tabs. 5 Tab 0    ALPRAZolam (XANAX) 1 mg tablet Take 1 Tab by mouth three (3) times daily as needed for Anxiety. Max Daily Amount: 3 mg. 3 Tab 0    cetirizine (ZYRTEC) 10 mg tablet Take 1 Tab by mouth daily. 10 Tab 0    promethazine (PHENERGAN) 25 mg tablet Take 1 Tab by mouth every six (6) hours as needed. 4 Tab 0    ibuprofen (MOTRIN) 800 mg tablet Take 1 Tab by mouth every eight (8) hours as needed. 20 Tab 0    furosemide (LASIX) 40 mg tablet Take 1 Tab by mouth daily. (Patient taking differently: Take 40 mg by mouth two (2) times a day.) 20 Tab 0    PHENobarbital (LUMINAL) 60 mg tablet Take 1 Tab by mouth two (2) times a day. Max Daily Amount: 120 mg. 60 Tab 1    albuterol (VENTOLIN HFA) 90 mcg/actuation inhaler Take 2 Puffs by inhalation every four (4) hours as needed for Wheezing.  dicyclomine (BENTYL) 10 mg capsule daily as needed.  glipiZIDE SR (GLUCOTROL) 5 mg CR tablet Take 5 mg by mouth two (2) times daily as needed (Patient monitors her BG levels and takes when she is also taking a steroid. ).  montelukast (SINGULAIR) 10 mg tablet Take 10 mg by mouth daily.  ezetimibe-simvastatin (VYTORIN 10/40) 10-40 mg per tablet Take 1 tablet by mouth nightly.  metoprolol (LOPRESSOR) 25 mg tablet Take 1 Tab by mouth two (2) times a day. (Patient taking differently: Take 25 mg by mouth nightly.) 60 Tab 6    benzonatate (TESSALON) 200 mg capsule Take 1 Cap by mouth two (2) times daily as needed. 30 Cap 0    nystatin (MYCOSTATIN) 100,000 unit/mL suspension Take 5 mL by mouth four (4) times daily. swish and spit (Patient taking differently: Take 500,000 Units by mouth four (4) times daily as needed. swish and spit) 180 mL 0    methocarbamol (ROBAXIN) 750 mg tablet Take 750-1,500 mg by mouth four (4) times daily as needed.       albuterol-ipratropium (DUONEB) 2.5 mg-0.5 mg/3 ml nebulizer solution 3 mL by Nebulization route every six (6) hours as needed for Wheezing.  hyoscyamine SL (LEVSIN/SL) 0.125 mg SL tablet 0.125 mg by SubLINGual route three (3) times daily as needed for Cramping.  esomeprazole (NEXIUM) 40 mg capsule Take 40 mg by mouth daily.  MAGOX 400 mg tablet TAKE ONE TABLET TWICE A DAY 60 Tab 3    vit B Cmplx 3-FA-Vit C-Biotin (NEPHRO SHREYA RX) 1- mg-mg-mcg tablet Take 1 Tab by mouth daily.  aspirin 81 mg chewable tablet Take 81 mg by mouth daily.  levothyroxine (SYNTHROID) 200 mcg tablet Take 200 mcg by mouth daily (before breakfast). medication changes made today: wean off of duloxetine by decreasing to 30 mg, once well tolerated start pristiq 25 mg and then after 10-14 days dc cymbalta, cont prazosin 2 mg bid    2. Counseling and coordination of care including instructions for treatment, risks/benefits, risk factor reduction and patient/family education. She agrees with the plan. Patient instructed to call with any side effects, questions or issues. 3.  Follow-up Disposition:  Return in about 3 weeks (around 2/5/2019).     4. Ind therapy prn    1/15/2019  Corby Cantrell NP

## 2019-02-11 ENCOUNTER — OFFICE VISIT (OUTPATIENT)
Dept: BEHAVIORAL/MENTAL HEALTH CLINIC | Age: 64
End: 2019-02-11

## 2019-02-11 ENCOUNTER — TELEPHONE (OUTPATIENT)
Dept: NEUROLOGY | Age: 64
End: 2019-02-11

## 2019-02-11 VITALS
WEIGHT: 182 LBS | BODY MASS INDEX: 30.32 KG/M2 | DIASTOLIC BLOOD PRESSURE: 74 MMHG | HEIGHT: 65 IN | SYSTOLIC BLOOD PRESSURE: 138 MMHG | HEART RATE: 105 BPM

## 2019-02-11 DIAGNOSIS — F43.10 PTSD (POST-TRAUMATIC STRESS DISORDER): ICD-10-CM

## 2019-02-11 DIAGNOSIS — F33.1 MODERATE EPISODE OF RECURRENT MAJOR DEPRESSIVE DISORDER (HCC): Primary | ICD-10-CM

## 2019-02-11 RX ORDER — HYDROCODONE BITARTRATE AND ACETAMINOPHEN 7.5; 325 MG/1; MG/1
1 TABLET ORAL
COMMUNITY
Start: 2019-02-04

## 2019-02-11 RX ORDER — TIZANIDINE 4 MG/1
4 TABLET ORAL
Status: ON HOLD | COMMUNITY
Start: 2019-01-22 | End: 2021-11-26

## 2019-02-11 RX ORDER — TRAMADOL HYDROCHLORIDE 50 MG/1
1 TABLET ORAL
COMMUNITY
Start: 2019-01-18

## 2019-02-11 NOTE — TELEPHONE ENCOUNTER
----- Message from Marco Chain sent at 2/11/2019 10:44 AM EST -----  Regarding: Dr. Moon Hernandez  Pt requested a f/up for 04/03/19. No appts available. Best contact number [phone].

## 2019-02-11 NOTE — PROGRESS NOTES
CHIEF COMPLAINT:  Soto Teran is a 61 y.o. female and was seen today for follow-up of psychiatric condition and psychotropic medication management. HPI:    Kacey George reports the following psychiatric symptoms:  depression and anxiety. The symptoms have been present for months and are of moderate/high severity. The symptoms occur somewhat less severely. Pt reports she has weaned off of cymbalta and she has started pristiq. She is tolerating the med change well. Pt reports a slight benefit from use of pristiq. Pt is here today to review current treatment plan. FAMILY/SOCIAL HX: marital conflict    REVIEW OF SYSTEMS:  Psychiatric:  depression, anxiety  Appetite:good overall  Sleep: poor with DIMS (difficulty initiating & maintaining sleep) a few nights lately  Neuro: chronic pain    Visit Vitals  /74   Pulse (!) 105   Ht 5' 5\" (1.651 m)   Wt 82.6 kg (182 lb)   BMI 30.29 kg/m²       Side Effects:  none, monitoring for tremors    MENTAL STATUS EXAM:   Sensorium  oriented to time, place and person   Relations cooperative   Appearance:  age appropriate and casually dressed   Motor Behavior:  gait unsteady and within normal limits, ambulates carefully and independently   Speech:  normal volume   Thought Process: goal directed   Thought Content free of delusions and free of hallucinations   Suicidal ideations no intention   Homicidal ideations no intention   Mood:  Sad/depressed   Affect:  Sad, joking and smiling at times   Memory recent  adequate   Memory remote:  adequate   Concentration:  adequate   Abstraction:  abstract   Insight:  fair   Reliability fair   Judgment:  fair     MEDICAL DECISION MAKING:  Problems addressed today:    ICD-10-CM ICD-9-CM    1. Moderate episode of recurrent major depressive disorder (HCC) F33.1 296.32    2. PTSD (post-traumatic stress disorder) F43.10 309.81        Assessment:   Kacey George is responding to treatment overall.  Currently depression and anxiety symptoms are stabilizing. She reports she was able to wean off of cymbalta without significant withdrawal symptoms and she thinks she has noticed a slight decrease in depression since starting pristiq. Discussed dosing options and pt has been on pristiq for approx 4 weeks. Agreed to hold off increasing dose at this time. Pt agrees to call if symptoms worsen. Reviewed use of prazosin and pt reports ongoing benefit for sleep/nightmares. Pt discussed recent stressors. Reviewed importance of self care. Provided support and reinforced importance of ind therapy. Plan:   1. Current Outpatient Medications   Medication Sig Dispense Refill    tiZANidine (ZANAFLEX) 4 mg tablet Take 4 mg by mouth every eight to twelve (8-12) hours as needed.  HYDROcodone-acetaminophen (NORCO) 7.5-325 mg per tablet Take 1 Tab by mouth three (3) times daily as needed.  traMADol (ULTRAM) 50 mg tablet Take 1 Tab by mouth three (3) times daily as needed.  desvenlafaxine succinate (PRISTIQ) 25 mg ER tablet Take 1 Tab by mouth daily. 30 Tab 1    prazosin (MINIPRESS) 2 mg capsule TAKE 1 CAPSULE BY MOUTH TWICE A DAY. 60 Cap 3    ferrous fumarate/vit Bcomp,C (SUPER B COMPLEX PO) Take  by mouth daily.  primidone (MYSOLINE) 50 mg tablet 2 po qam and 3 po qhs 150 Tab 11    guaiFENesin (MUCINEX) 1,200 mg Ta12 ER tablet Take 1 Tab by mouth two (2) times a day. 14 Tab 0    potassium chloride SR (K-TAB) 20 mEq tablet Take 1 Tab by mouth two (2) times a day. 14 Tab 0    budesonide-formoterol (SYMBICORT) 160-4.5 mcg/actuation HFAA Take 2 Puffs by inhalation two (2) times a day.  acetaminophen-codeine (TYLENOL #3) 300-30 mg per tablet Take 1 Tab by mouth every eight (8) hours as needed. Max Daily Amount: 3 Tabs. 5 Tab 0    ALPRAZolam (XANAX) 1 mg tablet Take 1 Tab by mouth three (3) times daily as needed for Anxiety. Max Daily Amount: 3 mg. 3 Tab 0    cetirizine (ZYRTEC) 10 mg tablet Take 1 Tab by mouth daily.  10 Tab 0    promethazine (PHENERGAN) 25 mg tablet Take 1 Tab by mouth every six (6) hours as needed. 4 Tab 0    ibuprofen (MOTRIN) 800 mg tablet Take 1 Tab by mouth every eight (8) hours as needed. 20 Tab 0    furosemide (LASIX) 40 mg tablet Take 1 Tab by mouth daily. (Patient taking differently: Take 40 mg by mouth two (2) times a day.) 20 Tab 0    PHENobarbital (LUMINAL) 60 mg tablet Take 1 Tab by mouth two (2) times a day. Max Daily Amount: 120 mg. 60 Tab 1    albuterol (VENTOLIN HFA) 90 mcg/actuation inhaler Take 2 Puffs by inhalation every four (4) hours as needed for Wheezing.  dicyclomine (BENTYL) 10 mg capsule daily as needed.  glipiZIDE SR (GLUCOTROL) 5 mg CR tablet Take 5 mg by mouth two (2) times daily as needed (Patient monitors her BG levels and takes when she is also taking a steroid. ).  montelukast (SINGULAIR) 10 mg tablet Take 10 mg by mouth daily.  ezetimibe-simvastatin (VYTORIN 10/40) 10-40 mg per tablet Take 1 tablet by mouth nightly.  metoprolol (LOPRESSOR) 25 mg tablet Take 1 Tab by mouth two (2) times a day. (Patient taking differently: Take 25 mg by mouth nightly.) 60 Tab 6    benzonatate (TESSALON) 200 mg capsule Take 1 Cap by mouth two (2) times daily as needed. 30 Cap 0    nystatin (MYCOSTATIN) 100,000 unit/mL suspension Take 5 mL by mouth four (4) times daily. swish and spit (Patient taking differently: Take 500,000 Units by mouth four (4) times daily as needed. swish and spit) 180 mL 0    albuterol-ipratropium (DUONEB) 2.5 mg-0.5 mg/3 ml nebulizer solution 3 mL by Nebulization route every six (6) hours as needed for Wheezing.  hyoscyamine SL (LEVSIN/SL) 0.125 mg SL tablet 0.125 mg by SubLINGual route three (3) times daily as needed for Cramping.  esomeprazole (NEXIUM) 40 mg capsule Take 40 mg by mouth daily.  MAGOX 400 mg tablet TAKE ONE TABLET TWICE A DAY 60 Tab 3    aspirin 81 mg chewable tablet Take 81 mg by mouth daily.       levothyroxine (SYNTHROID) 200 mcg tablet Take 200 mcg by mouth daily (before breakfast).  DULoxetine (CYMBALTA) 30 mg capsule Take 1 Cap by mouth daily. 30 Cap 0    naloxone (NARCAN) 4 mg/actuation nasal spray Use 1 spray intranasally, then discard. Repeat with new spray every 2 min as needed for opioid overdose symptoms, alternating nostrils. 1 Each 0          medication changes made today: cont pristiq 25 mg, cont prazosin 2 mg bid    2. Counseling and coordination of care including instructions for treatment, risks/benefits, risk factor reduction and patient/family education. She agrees with the plan. Patient instructed to call with any side effects, questions or issues. 3.  Follow-up Disposition:  Return in about 3 months (around 5/11/2019).      4. Ind therapy    2/11/2019  Crispin Mars NP

## 2019-03-11 RX ORDER — DESVENLAFAXINE SUCCINATE 50 MG/1
50 TABLET, EXTENDED RELEASE ORAL DAILY
Qty: 30 TAB | Refills: 1 | Status: SHIPPED | OUTPATIENT
Start: 2019-03-11 | End: 2019-04-10 | Stop reason: SDUPTHER

## 2019-03-11 RX ORDER — DESVENLAFAXINE 25 MG/1
25 TABLET, EXTENDED RELEASE ORAL DAILY
Qty: 30 TAB | Refills: 0 | Status: SHIPPED | OUTPATIENT
Start: 2019-03-11 | End: 2019-03-11 | Stop reason: SDUPTHER

## 2019-04-10 ENCOUNTER — OFFICE VISIT (OUTPATIENT)
Dept: BEHAVIORAL/MENTAL HEALTH CLINIC | Age: 64
End: 2019-04-10

## 2019-04-10 VITALS
WEIGHT: 180 LBS | DIASTOLIC BLOOD PRESSURE: 72 MMHG | HEIGHT: 65 IN | HEART RATE: 90 BPM | BODY MASS INDEX: 29.99 KG/M2 | SYSTOLIC BLOOD PRESSURE: 116 MMHG

## 2019-04-10 DIAGNOSIS — F33.1 MODERATE EPISODE OF RECURRENT MAJOR DEPRESSIVE DISORDER (HCC): Primary | ICD-10-CM

## 2019-04-10 DIAGNOSIS — F43.10 PTSD (POST-TRAUMATIC STRESS DISORDER): ICD-10-CM

## 2019-04-10 RX ORDER — DESVENLAFAXINE 100 MG/1
100 TABLET, EXTENDED RELEASE ORAL DAILY
Qty: 30 TAB | Refills: 2 | Status: SHIPPED | OUTPATIENT
Start: 2019-04-10 | End: 2019-06-03 | Stop reason: CLARIF

## 2019-04-10 NOTE — PROGRESS NOTES
CHIEF COMPLAINT:  Rosalie Olivas is a [place] 44 y.o. female and was seen today for follow-up of psychiatric condition and psychotropic medication management. HPI:    Sinan Plummer reports the following psychiatric symptoms:  depression and anxiety. The symptoms have been present for months and are of moderate/high severity. The symptoms occur more frequently and more severely overall. Pt reports she had a significant PTSD re-activator and as well as other stressors. She states PTSD symptoms became activated after an incident with her niece. Pt reports during this time she started taking Pristiq 50 mg and thinks it has been beneficial. Pt here today to review current treatment plan. FAMILY/SOCIAL HX: stressors r/t niece and daughter, financial stressors    REVIEW OF SYSTEMS:  Psychiatric:  depression, anxiety  Appetite:fair   Sleep: poor with DIMS (difficulty initiating & maintaining sleep)   Neuro: chronic pain    Visit Vitals  /72   Pulse 90   Ht 5' 5\" (1.651 m)   Wt 81.6 kg (180 lb)   BMI 29.95 kg/m²       Side Effects:  none    MENTAL STATUS EXAM:   Sensorium  oriented to time, place and person   Relations cooperative   Appearance:  age appropriate and casually dressed   Motor Behavior:  gait stable and within normal limits   Speech:  normal volume   Thought Process: goal directed   Thought Content free of delusions and free of hallucinations   Suicidal ideations no intention   Homicidal ideations no intention   Mood:  anxious and depressed   Affect:  anxious and depressed   Memory recent  adequate   Memory remote:  adequate   Concentration:  adequate   Abstraction:  abstract   Insight:  fair   Reliability fair and good   Judgment:  fair and good     MEDICAL DECISION MAKING:  Problems addressed today:    ICD-10-CM ICD-9-CM    1. Moderate episode of recurrent major depressive disorder (HCC) F33.1 296.32    2.  PTSD (post-traumatic stress disorder) F43.10 309.81        Assessment:   Sinan Plummer is responding to treatment somewhat. Reviewed current medications and dosages and she has had a slight improvement with higher dose of pristiq primarily noted by ability to come out severe hyperarousal/flashbacks. Discussed options and will increase today to 100 mg. Will re-assess in 8 weeks and if pt stable will continue with current plan. If symptoms are exacerbated will increase prazosin as long as BP is stable. Discussed healthy coping strategies and cognitions she has been working on. She has not been able to afford ind therapy so reviewed importance of self care to develop healthier boundaries. Provided support and reviewed treatment plan goals. Plan:   1. Current Outpatient Medications   Medication Sig Dispense Refill    desvenlafaxine succinate 100 mg Tb24 Take 1 Tab by mouth daily. 30 Tab 2    tiZANidine (ZANAFLEX) 4 mg tablet Take 4 mg by mouth every eight to twelve (8-12) hours as needed.  HYDROcodone-acetaminophen (NORCO) 7.5-325 mg per tablet Take 1 Tab by mouth three (3) times daily as needed.  traMADol (ULTRAM) 50 mg tablet Take 1 Tab by mouth three (3) times daily as needed.  prazosin (MINIPRESS) 2 mg capsule TAKE 1 CAPSULE BY MOUTH TWICE A DAY. 60 Cap 3    ferrous fumarate/vit Bcomp,C (SUPER B COMPLEX PO) Take  by mouth daily.  primidone (MYSOLINE) 50 mg tablet 2 po qam and 3 po qhs 150 Tab 11    guaiFENesin (MUCINEX) 1,200 mg Ta12 ER tablet Take 1 Tab by mouth two (2) times a day. 14 Tab 0    potassium chloride SR (K-TAB) 20 mEq tablet Take 1 Tab by mouth two (2) times a day. 14 Tab 0    naloxone (NARCAN) 4 mg/actuation nasal spray Use 1 spray intranasally, then discard. Repeat with new spray every 2 min as needed for opioid overdose symptoms, alternating nostrils. 1 Each 0    budesonide-formoterol (SYMBICORT) 160-4.5 mcg/actuation HFAA Take 2 Puffs by inhalation two (2) times a day.       acetaminophen-codeine (TYLENOL #3) 300-30 mg per tablet Take 1 Tab by mouth every eight (8) hours as needed. Max Daily Amount: 3 Tabs. 5 Tab 0    ALPRAZolam (XANAX) 1 mg tablet Take 1 Tab by mouth three (3) times daily as needed for Anxiety. Max Daily Amount: 3 mg. 3 Tab 0    cetirizine (ZYRTEC) 10 mg tablet Take 1 Tab by mouth daily. 10 Tab 0    promethazine (PHENERGAN) 25 mg tablet Take 1 Tab by mouth every six (6) hours as needed. 4 Tab 0    ibuprofen (MOTRIN) 800 mg tablet Take 1 Tab by mouth every eight (8) hours as needed. 20 Tab 0    furosemide (LASIX) 40 mg tablet Take 1 Tab by mouth daily. (Patient taking differently: Take 40 mg by mouth two (2) times a day.) 20 Tab 0    PHENobarbital (LUMINAL) 60 mg tablet Take 1 Tab by mouth two (2) times a day. Max Daily Amount: 120 mg. 60 Tab 1    albuterol (VENTOLIN HFA) 90 mcg/actuation inhaler Take 2 Puffs by inhalation every four (4) hours as needed for Wheezing.  dicyclomine (BENTYL) 10 mg capsule daily as needed.  glipiZIDE SR (GLUCOTROL) 5 mg CR tablet Take 5 mg by mouth two (2) times daily as needed (Patient monitors her BG levels and takes when she is also taking a steroid. ).  montelukast (SINGULAIR) 10 mg tablet Take 10 mg by mouth daily.  ezetimibe-simvastatin (VYTORIN 10/40) 10-40 mg per tablet Take 1 tablet by mouth nightly.  metoprolol (LOPRESSOR) 25 mg tablet Take 1 Tab by mouth two (2) times a day. (Patient taking differently: Take 25 mg by mouth nightly.) 60 Tab 6    benzonatate (TESSALON) 200 mg capsule Take 1 Cap by mouth two (2) times daily as needed. 30 Cap 0    nystatin (MYCOSTATIN) 100,000 unit/mL suspension Take 5 mL by mouth four (4) times daily. swish and spit (Patient taking differently: Take 500,000 Units by mouth four (4) times daily as needed. swish and spit) 180 mL 0    albuterol-ipratropium (DUONEB) 2.5 mg-0.5 mg/3 ml nebulizer solution 3 mL by Nebulization route every six (6) hours as needed for Wheezing.       hyoscyamine SL (LEVSIN/SL) 0.125 mg SL tablet 0.125 mg by SubLINGual route three (3) times daily as needed for Cramping.  esomeprazole (NEXIUM) 40 mg capsule Take 40 mg by mouth daily.  MAGOX 400 mg tablet TAKE ONE TABLET TWICE A DAY 60 Tab 3    aspirin 81 mg chewable tablet Take 81 mg by mouth daily.  levothyroxine (SYNTHROID) 200 mcg tablet Take 200 mcg by mouth daily (before breakfast). medication changes made today: increase pristiq 100 mg for dep/anx, cont prazosin 2 mg BID, pt taking alprazolam 1 mg TID rx'd by PCP    2. Counseling and coordination of care including instructions for treatment, risks/benefits, risk factor reduction and patient/family education. She agrees with the plan. Patient instructed to call with any side effects, questions or issues. 3.    Follow-up and Dispositions    · Return in about 8 weeks (around 6/5/2019).        4. Ind therapy prn    4/10/2019  Racheal Quijano NP

## 2019-05-15 RX ORDER — PRAZOSIN HYDROCHLORIDE 2 MG/1
CAPSULE ORAL
Qty: 60 CAP | Refills: 0 | Status: SHIPPED | OUTPATIENT
Start: 2019-05-15 | End: 2019-06-03 | Stop reason: SDUPTHER

## 2019-06-03 ENCOUNTER — OFFICE VISIT (OUTPATIENT)
Dept: BEHAVIORAL/MENTAL HEALTH CLINIC | Age: 64
End: 2019-06-03

## 2019-06-03 VITALS
WEIGHT: 181 LBS | DIASTOLIC BLOOD PRESSURE: 74 MMHG | BODY MASS INDEX: 30.16 KG/M2 | HEART RATE: 102 BPM | SYSTOLIC BLOOD PRESSURE: 117 MMHG | HEIGHT: 65 IN

## 2019-06-03 DIAGNOSIS — F33.1 MODERATE EPISODE OF RECURRENT MAJOR DEPRESSIVE DISORDER (HCC): Primary | ICD-10-CM

## 2019-06-03 DIAGNOSIS — F43.10 PTSD (POST-TRAUMATIC STRESS DISORDER): ICD-10-CM

## 2019-06-03 RX ORDER — CITALOPRAM 10 MG/1
10 TABLET ORAL DAILY
Qty: 30 TAB | Refills: 1 | Status: SHIPPED | OUTPATIENT
Start: 2019-06-03 | End: 2019-08-06

## 2019-06-03 RX ORDER — PRAZOSIN HYDROCHLORIDE 2 MG/1
2 CAPSULE ORAL 2 TIMES DAILY
Qty: 60 CAP | Refills: 1 | Status: SHIPPED | OUTPATIENT
Start: 2019-06-03 | End: 2019-08-16 | Stop reason: SDUPTHER

## 2019-06-03 RX ORDER — DESVENLAFAXINE SUCCINATE 50 MG/1
50 TABLET, EXTENDED RELEASE ORAL DAILY
Qty: 30 TAB | Refills: 0 | Status: SHIPPED | OUTPATIENT
Start: 2019-06-03 | End: 2019-08-06

## 2019-06-03 NOTE — PROGRESS NOTES
CHIEF COMPLAINT:  Moshe Lyons is a 61 y.o. female and was seen today for follow-up of psychiatric condition and psychotropic medication management. HPI:    Jazmín Reed reports the following psychiatric symptoms:  depression and anxiety. The symptoms have been present for months and are of moderate/high severity. Anxiety/PTSD and depression symptoms occur more frequently and more severely over the past 3 weeks. She reports experiencing progressive symptoms over the past 3 weeks. She is not sure if some of her symptoms are SE's from current medications. Pt is here today to review current treatment plan. FAMILY/SOCIAL HX: concerned about daughter's well-being    REVIEW OF SYSTEMS:  Psychiatric:  depression, anxiety  Appetite:fair   Sleep: poor with DIMS (difficulty initiating & maintaining sleep)   Neuro: tremors, chronic pain    Visit Vitals  /74   Pulse (!) 102   Ht 5' 5\" (1.651 m)   Wt 82.1 kg (181 lb)   BMI 30.12 kg/m²       Side Effects:  Tremors ? MENTAL STATUS EXAM:   Sensorium  oriented to time, place and person   Relations cooperative   Appearance:  age appropriate and casually dressed   Motor Behavior:  tremors and within normal limits   Speech:  normal volume and tremors   Thought Process: goal directed   Thought Content free of delusions and free of hallucinations   Suicidal ideations no intention   Homicidal ideations no intention   Mood:  anxious and depressed   Affect:  anxious and depressed   Memory recent  adequate   Memory remote:  adequate   Concentration:  adequate   Abstraction:  abstract   Insight:  fair   Reliability fair   Judgment:  fair     MEDICAL DECISION MAKING:  Problems addressed today:    ICD-10-CM ICD-9-CM    1. Moderate episode of recurrent major depressive disorder (HCC) F33.1 296.32    2. PTSD (post-traumatic stress disorder) F43.10 309.81        Assessment:   Jazmín Reed is responding to treatment somewhat.  She reports increased side effects from use of higher dose of pristiq. Reviewed pharmacogenomic results and med options. Will use a trial of citalopram 10 mg. Discussed weaning/cross taper schedule. Reviewed possible SE's and start of a new medication. Pt to monitor tremor now that she is weaning off of pristiq. Pt also to monitor BP. She reports drop in BP is a long standing issue she has had and is not due to use of prazosin. Reviewed risk vs benefit of use of prazosin and due to severe PTSD symptoms will continue at this time. Reviewed tx goals for depression, anxiety and PTSD as well as expected time frame for improvement. Provided support and answered questions. Plan:   1. Current Outpatient Medications   Medication Sig Dispense Refill    prazosin (MINIPRESS) 2 mg capsule Take 1 Cap by mouth two (2) times a day. 60 Cap 1    desvenlafaxine succinate (PRISTIQ) 50 mg ER tablet Take 1 Tab by mouth daily. 30 Tab 0    citalopram (CELEXA) 10 mg tablet Take 1 Tab by mouth daily. 30 Tab 1    tiZANidine (ZANAFLEX) 4 mg tablet Take 4 mg by mouth every eight to twelve (8-12) hours as needed.  HYDROcodone-acetaminophen (NORCO) 7.5-325 mg per tablet Take 1 Tab by mouth three (3) times daily as needed.  traMADol (ULTRAM) 50 mg tablet Take 1 Tab by mouth three (3) times daily as needed.  ferrous fumarate/vit Bcomp,C (SUPER B COMPLEX PO) Take  by mouth daily.  primidone (MYSOLINE) 50 mg tablet 2 po qam and 3 po qhs 150 Tab 11    guaiFENesin (MUCINEX) 1,200 mg Ta12 ER tablet Take 1 Tab by mouth two (2) times a day. 14 Tab 0    potassium chloride SR (K-TAB) 20 mEq tablet Take 1 Tab by mouth two (2) times a day. 14 Tab 0    naloxone (NARCAN) 4 mg/actuation nasal spray Use 1 spray intranasally, then discard. Repeat with new spray every 2 min as needed for opioid overdose symptoms, alternating nostrils. 1 Each 0    budesonide-formoterol (SYMBICORT) 160-4.5 mcg/actuation HFAA Take 2 Puffs by inhalation two (2) times a day.       acetaminophen-codeine (TYLENOL #3) 300-30 mg per tablet Take 1 Tab by mouth every eight (8) hours as needed. Max Daily Amount: 3 Tabs. 5 Tab 0    ALPRAZolam (XANAX) 1 mg tablet Take 1 Tab by mouth three (3) times daily as needed for Anxiety. Max Daily Amount: 3 mg. 3 Tab 0    cetirizine (ZYRTEC) 10 mg tablet Take 1 Tab by mouth daily. 10 Tab 0    promethazine (PHENERGAN) 25 mg tablet Take 1 Tab by mouth every six (6) hours as needed. 4 Tab 0    ibuprofen (MOTRIN) 800 mg tablet Take 1 Tab by mouth every eight (8) hours as needed. 20 Tab 0    furosemide (LASIX) 40 mg tablet Take 1 Tab by mouth daily. (Patient taking differently: Take 40 mg by mouth two (2) times a day.) 20 Tab 0    PHENobarbital (LUMINAL) 60 mg tablet Take 1 Tab by mouth two (2) times a day. Max Daily Amount: 120 mg. 60 Tab 1    albuterol (VENTOLIN HFA) 90 mcg/actuation inhaler Take 2 Puffs by inhalation every four (4) hours as needed for Wheezing.  dicyclomine (BENTYL) 10 mg capsule daily as needed.  glipiZIDE SR (GLUCOTROL) 5 mg CR tablet Take 5 mg by mouth two (2) times daily as needed (Patient monitors her BG levels and takes when she is also taking a steroid. ).  montelukast (SINGULAIR) 10 mg tablet Take 10 mg by mouth daily.  ezetimibe-simvastatin (VYTORIN 10/40) 10-40 mg per tablet Take 1 tablet by mouth nightly.  metoprolol (LOPRESSOR) 25 mg tablet Take 1 Tab by mouth two (2) times a day. (Patient taking differently: Take 25 mg by mouth nightly.) 60 Tab 6    benzonatate (TESSALON) 200 mg capsule Take 1 Cap by mouth two (2) times daily as needed. 30 Cap 0    nystatin (MYCOSTATIN) 100,000 unit/mL suspension Take 5 mL by mouth four (4) times daily. swish and spit (Patient taking differently: Take 500,000 Units by mouth four (4) times daily as needed. swish and spit) 180 mL 0    albuterol-ipratropium (DUONEB) 2.5 mg-0.5 mg/3 ml nebulizer solution 3 mL by Nebulization route every six (6) hours as needed for Wheezing.       hyoscyamine SL (LEVSIN/SL) 0.125 mg SL tablet 0.125 mg by SubLINGual route three (3) times daily as needed for Cramping.  esomeprazole (NEXIUM) 40 mg capsule Take 40 mg by mouth daily.  MAGOX 400 mg tablet TAKE ONE TABLET TWICE A DAY 60 Tab 3    aspirin 81 mg chewable tablet Take 81 mg by mouth daily.  levothyroxine (SYNTHROID) 200 mcg tablet Take 200 mcg by mouth daily (before breakfast). medication changes made today: decrease pristiq 50 mg for 2 weeks, after 1 week will add celexa 10 mg, ccont prazosin 2 mg BID, pt using alprazolam rx'd by PCP    2. Counseling and coordination of care including instructions for treatment, risks/benefits, risk factor reduction and patient/family education. She agrees with the plan. Patient instructed to call with any side effects, questions or issues. 3.    Follow-up and Dispositions    · Return in about 8 weeks (around 7/29/2019).        4. Ind therapy recommended    6/3/2019  Jayjay Reilly NP

## 2019-08-06 ENCOUNTER — TELEPHONE (OUTPATIENT)
Dept: BEHAVIORAL/MENTAL HEALTH CLINIC | Age: 64
End: 2019-08-06

## 2019-08-06 NOTE — TELEPHONE ENCOUNTER
Returned call. Reviewed recent response to celexa and [employer] results. Will dc celexa and will use a trial of trintellix.

## 2019-08-06 NOTE — TELEPHONE ENCOUNTER
Patient calls to let provider know she has stopped taking the citalopram because it was causing tardive dyskinesia in her calves and feet. She has been off the medication about a month because it was into the second month when it started. She had to cancel her last appointment due to bronchitis. Requesting to still speak with provider.

## 2019-08-07 ENCOUNTER — TELEPHONE (OUTPATIENT)
Dept: BEHAVIORAL/MENTAL HEALTH CLINIC | Age: 64
End: 2019-08-07

## 2019-08-07 RX ORDER — VILAZODONE HYDROCHLORIDE 10 MG/1
10 TABLET ORAL DAILY
Qty: 30 TAB | Refills: 1 | Status: SHIPPED | OUTPATIENT
Start: 2019-08-07 | End: 2019-10-10

## 2019-08-19 RX ORDER — PRAZOSIN HYDROCHLORIDE 2 MG/1
CAPSULE ORAL
Qty: 60 CAP | Refills: 1 | Status: SHIPPED | OUTPATIENT
Start: 2019-08-19 | End: 2019-10-21 | Stop reason: SDUPTHER

## 2019-10-07 ENCOUNTER — TELEPHONE (OUTPATIENT)
Dept: BEHAVIORAL/MENTAL HEALTH CLINIC | Age: 64
End: 2019-10-07

## 2019-10-07 DIAGNOSIS — I67.89 CEREBRAL MICROVASCULAR DISEASE: ICD-10-CM

## 2019-10-07 DIAGNOSIS — M48.061 DEGENERATIVE LUMBAR SPINAL STENOSIS: ICD-10-CM

## 2019-10-07 DIAGNOSIS — M96.1 CERVICAL POST-LAMINECTOMY SYNDROME: ICD-10-CM

## 2019-10-07 DIAGNOSIS — G25.0 BENIGN FAMILIAL TREMOR: ICD-10-CM

## 2019-10-07 DIAGNOSIS — R41.82 ALTERED MENTAL STATUS, UNSPECIFIED ALTERED MENTAL STATUS TYPE: ICD-10-CM

## 2019-10-07 DIAGNOSIS — M79.7 FIBROMYALGIA: ICD-10-CM

## 2019-10-07 DIAGNOSIS — E53.8 B12 DEFICIENCY: ICD-10-CM

## 2019-10-07 DIAGNOSIS — E55.9 VITAMIN D DEFICIENCY: ICD-10-CM

## 2019-10-07 DIAGNOSIS — G25.0 BENIGN ESSENTIAL TREMOR SYNDROME: ICD-10-CM

## 2019-10-07 DIAGNOSIS — R27.0 ATAXIA: ICD-10-CM

## 2019-10-07 DIAGNOSIS — I65.23 STENOSIS OF BOTH INTERNAL CAROTID ARTERIES: ICD-10-CM

## 2019-10-07 DIAGNOSIS — R55 SYNCOPE AND COLLAPSE: ICD-10-CM

## 2019-10-07 DIAGNOSIS — M47.22 CERVICAL RADICULOPATHY DUE TO DEGENERATIVE JOINT DISEASE OF SPINE: ICD-10-CM

## 2019-10-07 DIAGNOSIS — E11.42 DIABETIC PERIPHERAL NEUROPATHY ASSOCIATED WITH TYPE 2 DIABETES MELLITUS (HCC): ICD-10-CM

## 2019-10-07 RX ORDER — PRIMIDONE 50 MG/1
TABLET ORAL
Qty: 150 TAB | Refills: 0 | Status: SHIPPED | OUTPATIENT
Start: 2019-10-07 | End: 2019-11-04 | Stop reason: SDUPTHER

## 2019-10-07 NOTE — TELEPHONE ENCOUNTER
Pt calls asking for a call back from provider. I asked pt what it was in regards to. She said her  lost his job so they wont have money coming in. She said the viibryd is $44 a month and she cant afford it. Asking what she can do or an alternative possibly?     [phone]

## 2019-10-10 RX ORDER — AMITRIPTYLINE HYDROCHLORIDE 25 MG/1
25 TABLET, FILM COATED ORAL
Qty: 30 TAB | Refills: 2 | Status: SHIPPED | OUTPATIENT
Start: 2019-10-10 | End: 2020-01-14

## 2019-10-10 NOTE — TELEPHONE ENCOUNTER
Returned call. Reviewed options and will use a trial of amitriptyline. Reviewed start of a new med and answered questions.

## 2019-10-21 RX ORDER — PRAZOSIN HYDROCHLORIDE 2 MG/1
CAPSULE ORAL
Qty: 60 CAP | Refills: 0 | Status: SHIPPED | OUTPATIENT
Start: 2019-10-21 | End: 2019-11-18 | Stop reason: SDUPTHER

## 2019-11-04 DIAGNOSIS — E53.8 B12 DEFICIENCY: ICD-10-CM

## 2019-11-04 DIAGNOSIS — M48.061 DEGENERATIVE LUMBAR SPINAL STENOSIS: ICD-10-CM

## 2019-11-04 DIAGNOSIS — M96.1 CERVICAL POST-LAMINECTOMY SYNDROME: ICD-10-CM

## 2019-11-04 DIAGNOSIS — M79.7 FIBROMYALGIA: ICD-10-CM

## 2019-11-04 DIAGNOSIS — R55 SYNCOPE AND COLLAPSE: ICD-10-CM

## 2019-11-04 DIAGNOSIS — M47.22 CERVICAL RADICULOPATHY DUE TO DEGENERATIVE JOINT DISEASE OF SPINE: ICD-10-CM

## 2019-11-04 DIAGNOSIS — I65.23 STENOSIS OF BOTH INTERNAL CAROTID ARTERIES: ICD-10-CM

## 2019-11-04 DIAGNOSIS — R41.82 ALTERED MENTAL STATUS, UNSPECIFIED ALTERED MENTAL STATUS TYPE: ICD-10-CM

## 2019-11-04 DIAGNOSIS — G25.0 BENIGN ESSENTIAL TREMOR SYNDROME: ICD-10-CM

## 2019-11-04 DIAGNOSIS — E55.9 VITAMIN D DEFICIENCY: ICD-10-CM

## 2019-11-04 DIAGNOSIS — R27.0 ATAXIA: ICD-10-CM

## 2019-11-04 DIAGNOSIS — I67.89 CEREBRAL MICROVASCULAR DISEASE: ICD-10-CM

## 2019-11-04 DIAGNOSIS — G25.0 BENIGN FAMILIAL TREMOR: ICD-10-CM

## 2019-11-04 DIAGNOSIS — E11.42 DIABETIC PERIPHERAL NEUROPATHY ASSOCIATED WITH TYPE 2 DIABETES MELLITUS (HCC): ICD-10-CM

## 2019-11-04 RX ORDER — PRIMIDONE 50 MG/1
TABLET ORAL
Qty: 150 TAB | Refills: 0 | Status: SHIPPED | OUTPATIENT
Start: 2019-11-04 | End: 2019-12-02 | Stop reason: SDUPTHER

## 2019-12-02 DIAGNOSIS — I65.23 STENOSIS OF BOTH INTERNAL CAROTID ARTERIES: ICD-10-CM

## 2019-12-02 DIAGNOSIS — I67.89 CEREBRAL MICROVASCULAR DISEASE: ICD-10-CM

## 2019-12-02 DIAGNOSIS — R27.0 ATAXIA: ICD-10-CM

## 2019-12-02 DIAGNOSIS — M47.22 CERVICAL RADICULOPATHY DUE TO DEGENERATIVE JOINT DISEASE OF SPINE: ICD-10-CM

## 2019-12-02 DIAGNOSIS — E11.42 DIABETIC PERIPHERAL NEUROPATHY ASSOCIATED WITH TYPE 2 DIABETES MELLITUS (HCC): ICD-10-CM

## 2019-12-02 DIAGNOSIS — G25.0 BENIGN ESSENTIAL TREMOR SYNDROME: ICD-10-CM

## 2019-12-02 DIAGNOSIS — M96.1 CERVICAL POST-LAMINECTOMY SYNDROME: ICD-10-CM

## 2019-12-02 DIAGNOSIS — E55.9 VITAMIN D DEFICIENCY: ICD-10-CM

## 2019-12-02 DIAGNOSIS — R55 SYNCOPE AND COLLAPSE: ICD-10-CM

## 2019-12-02 DIAGNOSIS — M48.061 DEGENERATIVE LUMBAR SPINAL STENOSIS: ICD-10-CM

## 2019-12-02 DIAGNOSIS — R41.82 ALTERED MENTAL STATUS, UNSPECIFIED ALTERED MENTAL STATUS TYPE: ICD-10-CM

## 2019-12-02 DIAGNOSIS — E53.8 B12 DEFICIENCY: ICD-10-CM

## 2019-12-02 DIAGNOSIS — M79.7 FIBROMYALGIA: ICD-10-CM

## 2019-12-02 DIAGNOSIS — G25.0 BENIGN FAMILIAL TREMOR: ICD-10-CM

## 2019-12-02 RX ORDER — PRIMIDONE 50 MG/1
TABLET ORAL
Qty: 150 TAB | Refills: 0 | Status: SHIPPED | OUTPATIENT
Start: 2019-12-02 | End: 2019-12-30

## 2019-12-30 DIAGNOSIS — R27.0 ATAXIA: ICD-10-CM

## 2019-12-30 DIAGNOSIS — G25.0 BENIGN ESSENTIAL TREMOR SYNDROME: ICD-10-CM

## 2019-12-30 DIAGNOSIS — M47.22 CERVICAL RADICULOPATHY DUE TO DEGENERATIVE JOINT DISEASE OF SPINE: ICD-10-CM

## 2019-12-30 DIAGNOSIS — I67.89 CEREBRAL MICROVASCULAR DISEASE: ICD-10-CM

## 2019-12-30 DIAGNOSIS — M79.7 FIBROMYALGIA: ICD-10-CM

## 2019-12-30 DIAGNOSIS — E55.9 VITAMIN D DEFICIENCY: ICD-10-CM

## 2019-12-30 DIAGNOSIS — I65.23 STENOSIS OF BOTH INTERNAL CAROTID ARTERIES: ICD-10-CM

## 2019-12-30 DIAGNOSIS — R41.82 ALTERED MENTAL STATUS, UNSPECIFIED ALTERED MENTAL STATUS TYPE: ICD-10-CM

## 2019-12-30 DIAGNOSIS — E11.42 DIABETIC PERIPHERAL NEUROPATHY ASSOCIATED WITH TYPE 2 DIABETES MELLITUS (HCC): ICD-10-CM

## 2019-12-30 DIAGNOSIS — M96.1 CERVICAL POST-LAMINECTOMY SYNDROME: ICD-10-CM

## 2019-12-30 DIAGNOSIS — E53.8 B12 DEFICIENCY: ICD-10-CM

## 2019-12-30 DIAGNOSIS — G25.0 BENIGN FAMILIAL TREMOR: ICD-10-CM

## 2019-12-30 DIAGNOSIS — R55 SYNCOPE AND COLLAPSE: ICD-10-CM

## 2019-12-30 DIAGNOSIS — M48.061 DEGENERATIVE LUMBAR SPINAL STENOSIS: ICD-10-CM

## 2019-12-30 RX ORDER — PRIMIDONE 50 MG/1
TABLET ORAL
Qty: 150 TAB | Refills: 0 | Status: SHIPPED | OUTPATIENT
Start: 2019-12-30 | End: 2020-01-27

## 2020-01-14 RX ORDER — AMITRIPTYLINE HYDROCHLORIDE 25 MG/1
25 TABLET, FILM COATED ORAL
Qty: 30 TAB | Refills: 2 | Status: SHIPPED | OUTPATIENT
Start: 2020-01-14 | End: 2020-04-15

## 2020-01-27 DIAGNOSIS — G25.0 BENIGN FAMILIAL TREMOR: ICD-10-CM

## 2020-01-27 DIAGNOSIS — R55 SYNCOPE AND COLLAPSE: ICD-10-CM

## 2020-01-27 DIAGNOSIS — M48.061 DEGENERATIVE LUMBAR SPINAL STENOSIS: ICD-10-CM

## 2020-01-27 DIAGNOSIS — I65.23 STENOSIS OF BOTH INTERNAL CAROTID ARTERIES: ICD-10-CM

## 2020-01-27 DIAGNOSIS — M96.1 CERVICAL POST-LAMINECTOMY SYNDROME: ICD-10-CM

## 2020-01-27 DIAGNOSIS — R27.0 ATAXIA: ICD-10-CM

## 2020-01-27 DIAGNOSIS — E53.8 B12 DEFICIENCY: ICD-10-CM

## 2020-01-27 DIAGNOSIS — R41.82 ALTERED MENTAL STATUS, UNSPECIFIED ALTERED MENTAL STATUS TYPE: ICD-10-CM

## 2020-01-27 DIAGNOSIS — M47.22 CERVICAL RADICULOPATHY DUE TO DEGENERATIVE JOINT DISEASE OF SPINE: ICD-10-CM

## 2020-01-27 DIAGNOSIS — E55.9 VITAMIN D DEFICIENCY: ICD-10-CM

## 2020-01-27 DIAGNOSIS — G25.0 BENIGN ESSENTIAL TREMOR SYNDROME: ICD-10-CM

## 2020-01-27 DIAGNOSIS — E11.42 DIABETIC PERIPHERAL NEUROPATHY ASSOCIATED WITH TYPE 2 DIABETES MELLITUS (HCC): ICD-10-CM

## 2020-01-27 DIAGNOSIS — M79.7 FIBROMYALGIA: ICD-10-CM

## 2020-01-27 DIAGNOSIS — I67.89 CEREBRAL MICROVASCULAR DISEASE: ICD-10-CM

## 2020-01-27 RX ORDER — PRIMIDONE 50 MG/1
TABLET ORAL
Qty: 150 TAB | Refills: 0 | Status: SHIPPED | OUTPATIENT
Start: 2020-01-27 | End: 2020-03-02

## 2020-02-12 RX ORDER — PRAZOSIN HYDROCHLORIDE 2 MG/1
CAPSULE ORAL
Qty: 60 CAP | Refills: 0 | Status: SHIPPED | OUTPATIENT
Start: 2020-02-12 | End: 2020-03-17 | Stop reason: SDUPTHER

## 2020-03-02 DIAGNOSIS — M96.1 CERVICAL POST-LAMINECTOMY SYNDROME: ICD-10-CM

## 2020-03-02 DIAGNOSIS — G25.0 BENIGN ESSENTIAL TREMOR SYNDROME: ICD-10-CM

## 2020-03-02 DIAGNOSIS — G25.0 BENIGN FAMILIAL TREMOR: ICD-10-CM

## 2020-03-02 DIAGNOSIS — R55 SYNCOPE AND COLLAPSE: ICD-10-CM

## 2020-03-02 DIAGNOSIS — I65.23 STENOSIS OF BOTH INTERNAL CAROTID ARTERIES: ICD-10-CM

## 2020-03-02 DIAGNOSIS — M48.061 DEGENERATIVE LUMBAR SPINAL STENOSIS: ICD-10-CM

## 2020-03-02 DIAGNOSIS — E53.8 B12 DEFICIENCY: ICD-10-CM

## 2020-03-02 DIAGNOSIS — R41.82 ALTERED MENTAL STATUS, UNSPECIFIED ALTERED MENTAL STATUS TYPE: ICD-10-CM

## 2020-03-02 DIAGNOSIS — E11.42 DIABETIC PERIPHERAL NEUROPATHY ASSOCIATED WITH TYPE 2 DIABETES MELLITUS (HCC): ICD-10-CM

## 2020-03-02 DIAGNOSIS — E55.9 VITAMIN D DEFICIENCY: ICD-10-CM

## 2020-03-02 DIAGNOSIS — M47.22 CERVICAL RADICULOPATHY DUE TO DEGENERATIVE JOINT DISEASE OF SPINE: ICD-10-CM

## 2020-03-02 DIAGNOSIS — I67.89 CEREBRAL MICROVASCULAR DISEASE: ICD-10-CM

## 2020-03-02 DIAGNOSIS — R27.0 ATAXIA: ICD-10-CM

## 2020-03-02 DIAGNOSIS — M79.7 FIBROMYALGIA: ICD-10-CM

## 2020-03-02 RX ORDER — PRIMIDONE 50 MG/1
TABLET ORAL
Qty: 150 TAB | Refills: 0 | Status: SHIPPED | OUTPATIENT
Start: 2020-03-02 | End: 2020-04-01

## 2020-03-13 RX ORDER — PRAZOSIN HYDROCHLORIDE 2 MG/1
CAPSULE ORAL
Qty: 60 CAP | Refills: 0 | OUTPATIENT
Start: 2020-03-13

## 2020-03-17 ENCOUNTER — TELEPHONE (OUTPATIENT)
Dept: BEHAVIORAL/MENTAL HEALTH CLINIC | Age: 65
End: 2020-03-17

## 2020-03-17 RX ORDER — PRAZOSIN HYDROCHLORIDE 2 MG/1
CAPSULE ORAL
Qty: 60 CAP | Refills: 2 | Status: SHIPPED | OUTPATIENT
Start: 2020-03-17 | End: 2020-06-11

## 2020-03-17 NOTE — TELEPHONE ENCOUNTER
Pt called to schedule f/u as she has not been since since June last year and her med was denied, she is asking for a RF on her \"ptsd\" medicine.

## 2020-04-01 DIAGNOSIS — R41.82 ALTERED MENTAL STATUS, UNSPECIFIED ALTERED MENTAL STATUS TYPE: ICD-10-CM

## 2020-04-01 DIAGNOSIS — E11.42 DIABETIC PERIPHERAL NEUROPATHY ASSOCIATED WITH TYPE 2 DIABETES MELLITUS (HCC): ICD-10-CM

## 2020-04-01 DIAGNOSIS — E53.8 B12 DEFICIENCY: ICD-10-CM

## 2020-04-01 DIAGNOSIS — E55.9 VITAMIN D DEFICIENCY: ICD-10-CM

## 2020-04-01 DIAGNOSIS — I67.89 CEREBRAL MICROVASCULAR DISEASE: ICD-10-CM

## 2020-04-01 DIAGNOSIS — R55 SYNCOPE AND COLLAPSE: ICD-10-CM

## 2020-04-01 DIAGNOSIS — G25.0 BENIGN ESSENTIAL TREMOR SYNDROME: ICD-10-CM

## 2020-04-01 DIAGNOSIS — M79.7 FIBROMYALGIA: ICD-10-CM

## 2020-04-01 DIAGNOSIS — M47.22 CERVICAL RADICULOPATHY DUE TO DEGENERATIVE JOINT DISEASE OF SPINE: ICD-10-CM

## 2020-04-01 DIAGNOSIS — R27.0 ATAXIA: ICD-10-CM

## 2020-04-01 DIAGNOSIS — G25.0 BENIGN FAMILIAL TREMOR: ICD-10-CM

## 2020-04-01 DIAGNOSIS — M96.1 CERVICAL POST-LAMINECTOMY SYNDROME: ICD-10-CM

## 2020-04-01 DIAGNOSIS — I65.23 STENOSIS OF BOTH INTERNAL CAROTID ARTERIES: ICD-10-CM

## 2020-04-01 DIAGNOSIS — M48.061 DEGENERATIVE LUMBAR SPINAL STENOSIS: ICD-10-CM

## 2020-04-01 RX ORDER — PRIMIDONE 50 MG/1
TABLET ORAL
Qty: 150 TAB | Refills: 0 | Status: SHIPPED | OUTPATIENT
Start: 2020-04-01 | End: 2020-05-01

## 2020-04-15 RX ORDER — AMITRIPTYLINE HYDROCHLORIDE 25 MG/1
TABLET, FILM COATED ORAL
Qty: 30 TAB | Refills: 0 | Status: SHIPPED | OUTPATIENT
Start: 2020-04-15 | End: 2020-05-12 | Stop reason: SDUPTHER

## 2020-05-01 DIAGNOSIS — G25.0 BENIGN ESSENTIAL TREMOR SYNDROME: ICD-10-CM

## 2020-05-01 DIAGNOSIS — R27.0 ATAXIA: ICD-10-CM

## 2020-05-01 DIAGNOSIS — E55.9 VITAMIN D DEFICIENCY: ICD-10-CM

## 2020-05-01 DIAGNOSIS — R41.82 ALTERED MENTAL STATUS, UNSPECIFIED ALTERED MENTAL STATUS TYPE: ICD-10-CM

## 2020-05-01 DIAGNOSIS — I65.23 STENOSIS OF BOTH INTERNAL CAROTID ARTERIES: ICD-10-CM

## 2020-05-01 DIAGNOSIS — M96.1 CERVICAL POST-LAMINECTOMY SYNDROME: ICD-10-CM

## 2020-05-01 DIAGNOSIS — M47.22 CERVICAL RADICULOPATHY DUE TO DEGENERATIVE JOINT DISEASE OF SPINE: ICD-10-CM

## 2020-05-01 DIAGNOSIS — I67.89 CEREBRAL MICROVASCULAR DISEASE: ICD-10-CM

## 2020-05-01 DIAGNOSIS — G25.0 BENIGN FAMILIAL TREMOR: ICD-10-CM

## 2020-05-01 DIAGNOSIS — M48.061 DEGENERATIVE LUMBAR SPINAL STENOSIS: ICD-10-CM

## 2020-05-01 DIAGNOSIS — E53.8 B12 DEFICIENCY: ICD-10-CM

## 2020-05-01 DIAGNOSIS — E11.42 DIABETIC PERIPHERAL NEUROPATHY ASSOCIATED WITH TYPE 2 DIABETES MELLITUS (HCC): ICD-10-CM

## 2020-05-01 DIAGNOSIS — R55 SYNCOPE AND COLLAPSE: ICD-10-CM

## 2020-05-01 DIAGNOSIS — M79.7 FIBROMYALGIA: ICD-10-CM

## 2020-05-01 RX ORDER — PRIMIDONE 50 MG/1
TABLET ORAL
Qty: 150 TAB | Refills: 0 | Status: SHIPPED | OUTPATIENT
Start: 2020-05-01 | End: 2020-06-01

## 2020-05-12 ENCOUNTER — TELEPHONE (OUTPATIENT)
Dept: BEHAVIORAL/MENTAL HEALTH CLINIC | Age: 65
End: 2020-05-12

## 2020-05-12 ENCOUNTER — VIRTUAL VISIT (OUTPATIENT)
Dept: BEHAVIORAL/MENTAL HEALTH CLINIC | Age: 65
End: 2020-05-12

## 2020-05-12 DIAGNOSIS — F43.10 PTSD (POST-TRAUMATIC STRESS DISORDER): ICD-10-CM

## 2020-05-12 DIAGNOSIS — F33.1 MODERATE EPISODE OF RECURRENT MAJOR DEPRESSIVE DISORDER (HCC): Primary | ICD-10-CM

## 2020-05-12 RX ORDER — AMITRIPTYLINE HYDROCHLORIDE 50 MG/1
50 TABLET, FILM COATED ORAL
Qty: 30 TAB | Refills: 3 | Status: SHIPPED | OUTPATIENT
Start: 2020-05-12 | End: 2020-09-04

## 2020-05-12 RX ORDER — RISPERIDONE 0.25 MG/1
0.25 TABLET, FILM COATED ORAL
Qty: 30 TAB | Refills: 0 | Status: SHIPPED | OUTPATIENT
Start: 2020-05-12 | End: 2020-09-09

## 2020-05-12 NOTE — TELEPHONE ENCOUNTER
Pt calls to say she is having issues with the Amitriptyline   It is not working    She asks to change it   Her next visit with you is 6/23/20    Thanks

## 2020-06-01 DIAGNOSIS — G25.0 BENIGN FAMILIAL TREMOR: ICD-10-CM

## 2020-06-01 DIAGNOSIS — I65.23 STENOSIS OF BOTH INTERNAL CAROTID ARTERIES: ICD-10-CM

## 2020-06-01 DIAGNOSIS — M48.061 DEGENERATIVE LUMBAR SPINAL STENOSIS: ICD-10-CM

## 2020-06-01 DIAGNOSIS — G25.0 BENIGN ESSENTIAL TREMOR SYNDROME: ICD-10-CM

## 2020-06-01 DIAGNOSIS — M96.1 CERVICAL POST-LAMINECTOMY SYNDROME: ICD-10-CM

## 2020-06-01 DIAGNOSIS — E11.42 DIABETIC PERIPHERAL NEUROPATHY ASSOCIATED WITH TYPE 2 DIABETES MELLITUS (HCC): ICD-10-CM

## 2020-06-01 DIAGNOSIS — I67.89 CEREBRAL MICROVASCULAR DISEASE: ICD-10-CM

## 2020-06-01 DIAGNOSIS — E53.8 B12 DEFICIENCY: ICD-10-CM

## 2020-06-01 DIAGNOSIS — M79.7 FIBROMYALGIA: ICD-10-CM

## 2020-06-01 DIAGNOSIS — R55 SYNCOPE AND COLLAPSE: ICD-10-CM

## 2020-06-01 DIAGNOSIS — R41.82 ALTERED MENTAL STATUS, UNSPECIFIED ALTERED MENTAL STATUS TYPE: ICD-10-CM

## 2020-06-01 DIAGNOSIS — E55.9 VITAMIN D DEFICIENCY: ICD-10-CM

## 2020-06-01 DIAGNOSIS — M47.22 CERVICAL RADICULOPATHY DUE TO DEGENERATIVE JOINT DISEASE OF SPINE: ICD-10-CM

## 2020-06-01 DIAGNOSIS — R27.0 ATAXIA: ICD-10-CM

## 2020-06-01 RX ORDER — PRIMIDONE 50 MG/1
TABLET ORAL
Qty: 150 TAB | Refills: 0 | Status: SHIPPED | OUTPATIENT
Start: 2020-06-01 | End: 2020-06-29

## 2020-06-11 RX ORDER — PRAZOSIN HYDROCHLORIDE 2 MG/1
CAPSULE ORAL
Qty: 60 CAP | Refills: 0 | Status: SHIPPED | OUTPATIENT
Start: 2020-06-11 | End: 2020-07-08

## 2020-06-23 ENCOUNTER — VIRTUAL VISIT (OUTPATIENT)
Dept: BEHAVIORAL/MENTAL HEALTH CLINIC | Age: 65
End: 2020-06-23

## 2020-06-23 DIAGNOSIS — F43.10 PTSD (POST-TRAUMATIC STRESS DISORDER): ICD-10-CM

## 2020-06-23 DIAGNOSIS — F33.1 MODERATE EPISODE OF RECURRENT MAJOR DEPRESSIVE DISORDER (HCC): Primary | ICD-10-CM

## 2020-06-23 NOTE — PROGRESS NOTES
Demetrio Girard is a 59 y.o. female evaluated via audio only technology on 6/23/2020. Consent: She and/or her health care decision maker is aware that she may receive a bill for this audio only encounter, depending on her insurance coverage, and has provided verbal consent to proceed: Yes    I communicated with the patient and/or health care decision maker about the nature and details of the following:  Assessment & Plan:   management of depression and anxiety. Pt reports increased stressors and exacerbated PTSD symptoms. She is not able to start therapy due to finances. Reviewed meds and will increase prazosin to 2mg daily and 4 mg at bedtime. Pt checked BP while on call and it was 153/82 and 140/72. Pt aware if she gets dizzy or light headed to return to 2mg dose BID. Reviewed other med options and pt will try using PRN dose of risperdal which has helped with PTSD symptoms in the past.     12  Subjective:   Demetrio Girard is a 59 y.o. female who was seen for Medication Management      Prior to Admission medications    Medication Sig Start Date End Date Taking? Authorizing Provider   prazosin (MINIPRESS) 2 mg capsule TAKE 1 CAPSULE TWICE A DAY 6/11/20   Thania Caceres MD   primidone (MYSOLINE) 50 mg tablet TAKE 2 TABLETS EVERY MORNING AND 3 TABLETS AT BEDTIME 6/1/20   Claire Silver MD   amitriptyline (ELAVIL) 50 mg tablet Take 1 Tab by mouth nightly. 5/12/20   Sam Capellan NP   risperiDONE (RisperDAL) 0.25 mg tablet Take 1 Tab by mouth daily as needed (PTSD). 5/12/20   Sam Capellan NP   tiZANidine (ZANAFLEX) 4 mg tablet Take 4 mg by mouth every eight to twelve (8-12) hours as needed. 1/22/19   Provider, Historical   HYDROcodone-acetaminophen (NORCO) 7.5-325 mg per tablet Take 1 Tab by mouth three (3) times daily as needed. 2/4/19   Provider, Historical   traMADol (ULTRAM) 50 mg tablet Take 1 Tab by mouth three (3) times daily as needed.  1/18/19   Provider, Historical   ferrous fumarate/vit Bcomp,C (SUPER B COMPLEX PO) Take  by mouth daily. Provider, Historical   guaiFENesin (MUCINEX) 1,200 mg Ta12 ER tablet Take 1 Tab by mouth two (2) times a day. 8/26/18   Haider Garza MD   potassium chloride SR (K-TAB) 20 mEq tablet Take 1 Tab by mouth two (2) times a day. 8/26/18   Haider Garza MD   naloxone [facility]) 4 mg/actuation nasal spray Use 1 spray intranasally, then discard. Repeat with new spray every 2 min as needed for opioid overdose symptoms, alternating nostrils. 8/17/18   Emil Roth NP   budesonide-formoterol (SYMBICORT) 160-4.5 mcg/actuation HFAA Take 2 Puffs by inhalation two (2) times a day. Provider, Historical   acetaminophen-codeine (TYLENOL #3) 300-30 mg per tablet Take 1 Tab by mouth every eight (8) hours as needed. Max Daily Amount: 3 Tabs. 5/27/17   Chichi Simons NP   ALPRAZolam (XANAX) 1 mg tablet Take 1 Tab by mouth three (3) times daily as needed for Anxiety. Max Daily Amount: 3 mg. 5/27/17   Chichi Simons NP   cetirizine (ZYRTEC) 10 mg tablet Take 1 Tab by mouth daily. 5/27/17   Chichi Simons NP   promethazine (PHENERGAN) 25 mg tablet Take 1 Tab by mouth every six (6) hours as needed. 5/27/17   Chichi Simons NP   ibuprofen (MOTRIN) 800 mg tablet Take 1 Tab by mouth every eight (8) hours as needed. 5/27/17   Chichi Simons NP   furosemide (LASIX) 40 mg tablet Take 1 Tab by mouth daily. Patient taking differently: Take 40 mg by mouth two (2) times a day. 5/27/17   Chichi Simons NP   PHENobarbital (LUMINAL) 60 mg tablet Take 1 Tab by mouth two (2) times a day. Max Daily Amount: 120 mg. 5/27/17   Gail Meza MD   albuterol (VENTOLIN HFA) 90 mcg/actuation inhaler Take 2 Puffs by inhalation every four (4) hours as needed for Wheezing. Provider, Flako   dicyclomine (BENTYL) 10 mg capsule daily as needed.  2/13/15   Other, MD Nico   glipiZIDE SR (GLUCOTROL) 5 mg CR tablet Take 5 mg by mouth two (2) times daily as needed (Patient monitors her BG levels and takes when she is also taking a steroid. ). 2/16/15   Nico Martinez MD   montelukast (SINGULAIR) 10 mg tablet Take 10 mg by mouth daily. 2/16/15   Nico Martinez MD   ezetimibe-simvastatin (VYTORIN 10/40) 10-40 mg per tablet Take 1 tablet by mouth nightly. Provider, Historical   metoprolol (LOPRESSOR) 25 mg tablet Take 1 Tab by mouth two (2) times a day. Patient taking differently: Take 25 mg by mouth nightly. 2/6/14   Rere YOST NP   benzonatate (TESSALON) 200 mg capsule Take 1 Cap by mouth two (2) times daily as needed. 1/20/14   Andre Munroe MD   nystatin (MYCOSTATIN) 100,000 unit/mL suspension Take 5 mL by mouth four (4) times daily. swish and spit  Patient taking differently: Take 500,000 Units by mouth four (4) times daily as needed. swish and spit 1/20/14   Andre Munroe MD   albuterol-ipratropium (DUONEB) 2.5 mg-0.5 mg/3 ml nebulizer solution 3 mL by Nebulization route every six (6) hours as needed for Wheezing. Provider, Historical   hyoscyamine SL (LEVSIN/SL) 0.125 mg SL tablet 0.125 mg by SubLINGual route three (3) times daily as needed for Cramping. Provider, Historical   esomeprazole (NEXIUM) 40 mg capsule Take 40 mg by mouth daily. Provider, Historical   MAGOX 400 mg tablet TAKE ONE TABLET TWICE A DAY 11/4/13   Estrella Maldonado NP   aspirin 81 mg chewable tablet Take 81 mg by mouth daily. Other, MD Nico   levothyroxine (SYNTHROID) 200 mcg tablet Take 200 mcg by mouth daily (before breakfast). Nico Martinez MD     Allergies   Allergen Reactions    Latex Contact Dermatitis    Dilaudid [Hydromorphone (Pf)] Other (comments)     Feels like bugs are crawling all over her    Lipitor [Atorvastatin] Other (comments)     LIVER TROUBLE ON THIS MEDICATION    Remeron [Mirtazapine] Other (comments)     Tremors    Sulfa (Sulfonamide Antibiotics) Itching         ICD-10-CM ICD-9-CM    1. Moderate episode of recurrent major depressive disorder (HCC) F33.1 296.32    2.  PTSD (post-traumatic stress disorder) F43.10 309.81        ROS: exacerbated PTSD and depression symptoms, decreasewd sleep and appetite    Denies SE's of current medications    I affirm this is a Patient-Initiated Episode with a Patient who has not had a related appointment within my department in the past 7 days or scheduled within the next 24 hours.     Total Time: minutes: 11-20 minutes    Note: not billable if this call serves to triage the patient into an appointment for the relevant concern      Ivanna Yo NP

## 2020-06-24 ENCOUNTER — TELEPHONE (OUTPATIENT)
Dept: BEHAVIORAL/MENTAL HEALTH CLINIC | Age: 65
End: 2020-06-24

## 2020-06-29 DIAGNOSIS — M79.7 FIBROMYALGIA: ICD-10-CM

## 2020-06-29 DIAGNOSIS — R41.82 ALTERED MENTAL STATUS, UNSPECIFIED ALTERED MENTAL STATUS TYPE: ICD-10-CM

## 2020-06-29 DIAGNOSIS — E53.8 B12 DEFICIENCY: ICD-10-CM

## 2020-06-29 DIAGNOSIS — M47.22 CERVICAL RADICULOPATHY DUE TO DEGENERATIVE JOINT DISEASE OF SPINE: ICD-10-CM

## 2020-06-29 DIAGNOSIS — G25.0 BENIGN ESSENTIAL TREMOR SYNDROME: ICD-10-CM

## 2020-06-29 DIAGNOSIS — E11.42 DIABETIC PERIPHERAL NEUROPATHY ASSOCIATED WITH TYPE 2 DIABETES MELLITUS (HCC): ICD-10-CM

## 2020-06-29 DIAGNOSIS — R27.0 ATAXIA: ICD-10-CM

## 2020-06-29 DIAGNOSIS — I67.89 CEREBRAL MICROVASCULAR DISEASE: ICD-10-CM

## 2020-06-29 DIAGNOSIS — G25.0 BENIGN FAMILIAL TREMOR: ICD-10-CM

## 2020-06-29 DIAGNOSIS — M48.061 DEGENERATIVE LUMBAR SPINAL STENOSIS: ICD-10-CM

## 2020-06-29 DIAGNOSIS — I65.23 STENOSIS OF BOTH INTERNAL CAROTID ARTERIES: ICD-10-CM

## 2020-06-29 DIAGNOSIS — M96.1 CERVICAL POST-LAMINECTOMY SYNDROME: ICD-10-CM

## 2020-06-29 DIAGNOSIS — E55.9 VITAMIN D DEFICIENCY: ICD-10-CM

## 2020-06-29 DIAGNOSIS — R55 SYNCOPE AND COLLAPSE: ICD-10-CM

## 2020-06-29 RX ORDER — PRIMIDONE 50 MG/1
TABLET ORAL
Qty: 150 TAB | Refills: 0 | Status: SHIPPED | OUTPATIENT
Start: 2020-06-29 | End: 2020-07-27

## 2020-07-08 RX ORDER — PRAZOSIN HYDROCHLORIDE 2 MG/1
CAPSULE ORAL
Qty: 60 CAP | Refills: 2 | Status: SHIPPED | OUTPATIENT
Start: 2020-07-08 | End: 2020-09-22 | Stop reason: SDUPTHER

## 2020-07-27 DIAGNOSIS — I65.23 STENOSIS OF BOTH INTERNAL CAROTID ARTERIES: ICD-10-CM

## 2020-07-27 DIAGNOSIS — R27.0 ATAXIA: ICD-10-CM

## 2020-07-27 DIAGNOSIS — E53.8 B12 DEFICIENCY: ICD-10-CM

## 2020-07-27 DIAGNOSIS — G25.0 BENIGN FAMILIAL TREMOR: ICD-10-CM

## 2020-07-27 DIAGNOSIS — I67.89 CEREBRAL MICROVASCULAR DISEASE: ICD-10-CM

## 2020-07-27 DIAGNOSIS — E11.42 DIABETIC PERIPHERAL NEUROPATHY ASSOCIATED WITH TYPE 2 DIABETES MELLITUS (HCC): ICD-10-CM

## 2020-07-27 DIAGNOSIS — E55.9 VITAMIN D DEFICIENCY: ICD-10-CM

## 2020-07-27 DIAGNOSIS — M96.1 CERVICAL POST-LAMINECTOMY SYNDROME: ICD-10-CM

## 2020-07-27 DIAGNOSIS — M48.061 DEGENERATIVE LUMBAR SPINAL STENOSIS: ICD-10-CM

## 2020-07-27 DIAGNOSIS — M47.22 CERVICAL RADICULOPATHY DUE TO DEGENERATIVE JOINT DISEASE OF SPINE: ICD-10-CM

## 2020-07-27 DIAGNOSIS — G25.0 BENIGN ESSENTIAL TREMOR SYNDROME: ICD-10-CM

## 2020-07-27 DIAGNOSIS — R41.82 ALTERED MENTAL STATUS, UNSPECIFIED ALTERED MENTAL STATUS TYPE: ICD-10-CM

## 2020-07-27 DIAGNOSIS — M79.7 FIBROMYALGIA: ICD-10-CM

## 2020-07-27 DIAGNOSIS — R55 SYNCOPE AND COLLAPSE: ICD-10-CM

## 2020-07-27 RX ORDER — PRIMIDONE 50 MG/1
TABLET ORAL
Qty: 150 TAB | Refills: 0 | Status: SHIPPED | OUTPATIENT
Start: 2020-07-27 | End: 2020-08-26

## 2020-08-26 DIAGNOSIS — M96.1 CERVICAL POST-LAMINECTOMY SYNDROME: ICD-10-CM

## 2020-08-26 DIAGNOSIS — R55 SYNCOPE AND COLLAPSE: ICD-10-CM

## 2020-08-26 DIAGNOSIS — M47.22 CERVICAL RADICULOPATHY DUE TO DEGENERATIVE JOINT DISEASE OF SPINE: ICD-10-CM

## 2020-08-26 DIAGNOSIS — E11.42 DIABETIC PERIPHERAL NEUROPATHY ASSOCIATED WITH TYPE 2 DIABETES MELLITUS (HCC): ICD-10-CM

## 2020-08-26 DIAGNOSIS — R41.82 ALTERED MENTAL STATUS, UNSPECIFIED ALTERED MENTAL STATUS TYPE: ICD-10-CM

## 2020-08-26 DIAGNOSIS — I65.23 STENOSIS OF BOTH INTERNAL CAROTID ARTERIES: ICD-10-CM

## 2020-08-26 DIAGNOSIS — M48.061 DEGENERATIVE LUMBAR SPINAL STENOSIS: ICD-10-CM

## 2020-08-26 DIAGNOSIS — R27.0 ATAXIA: ICD-10-CM

## 2020-08-26 DIAGNOSIS — E55.9 VITAMIN D DEFICIENCY: ICD-10-CM

## 2020-08-26 DIAGNOSIS — G25.0 BENIGN FAMILIAL TREMOR: ICD-10-CM

## 2020-08-26 DIAGNOSIS — I67.89 CEREBRAL MICROVASCULAR DISEASE: ICD-10-CM

## 2020-08-26 DIAGNOSIS — M79.7 FIBROMYALGIA: ICD-10-CM

## 2020-08-26 DIAGNOSIS — E53.8 B12 DEFICIENCY: ICD-10-CM

## 2020-08-26 DIAGNOSIS — G25.0 BENIGN ESSENTIAL TREMOR SYNDROME: ICD-10-CM

## 2020-08-26 RX ORDER — PRIMIDONE 50 MG/1
TABLET ORAL
Qty: 150 TAB | Refills: 0 | Status: SHIPPED | OUTPATIENT
Start: 2020-08-26 | End: 2020-09-23

## 2020-09-04 RX ORDER — AMITRIPTYLINE HYDROCHLORIDE 50 MG/1
TABLET, FILM COATED ORAL
Qty: 30 TAB | Refills: 0 | Status: SHIPPED | OUTPATIENT
Start: 2020-09-04 | End: 2020-09-22 | Stop reason: SDUPTHER

## 2020-09-09 RX ORDER — RISPERIDONE 0.25 MG/1
TABLET, FILM COATED ORAL
Qty: 30 TAB | Refills: 0 | Status: SHIPPED | OUTPATIENT
Start: 2020-09-09 | End: 2020-09-22 | Stop reason: SDUPTHER

## 2020-09-22 ENCOUNTER — VIRTUAL VISIT (OUTPATIENT)
Dept: BEHAVIORAL/MENTAL HEALTH CLINIC | Age: 65
End: 2020-09-22
Payer: MEDICARE

## 2020-09-22 DIAGNOSIS — F33.1 MODERATE EPISODE OF RECURRENT MAJOR DEPRESSIVE DISORDER (HCC): Primary | ICD-10-CM

## 2020-09-22 DIAGNOSIS — F43.10 PTSD (POST-TRAUMATIC STRESS DISORDER): ICD-10-CM

## 2020-09-22 PROCEDURE — 99442 PR PHYS/QHP TELEPHONE EVALUATION 11-20 MIN: CPT | Performed by: NURSE PRACTITIONER

## 2020-09-22 RX ORDER — PRAZOSIN HYDROCHLORIDE 2 MG/1
2 CAPSULE ORAL 2 TIMES DAILY
Qty: 60 CAP | Refills: 2 | Status: SHIPPED | OUTPATIENT
Start: 2020-09-22 | End: 2021-02-25 | Stop reason: SDUPTHER

## 2020-09-22 RX ORDER — AMITRIPTYLINE HYDROCHLORIDE 75 MG/1
75 TABLET, FILM COATED ORAL
Qty: 30 TAB | Refills: 2 | Status: SHIPPED | OUTPATIENT
Start: 2020-09-22 | End: 2020-12-21

## 2020-09-22 RX ORDER — RISPERIDONE 0.25 MG/1
TABLET, FILM COATED ORAL
Qty: 30 TAB | Refills: 0 | Status: SHIPPED | OUTPATIENT
Start: 2020-09-22 | End: 2021-03-23 | Stop reason: SDUPTHER

## 2020-09-22 NOTE — PROGRESS NOTES
Eva Bartlett is a 59 y.o. female, evaluated via audio-only technology on 9/22/2020 for Medication Management  . Assessment & Plan:   Management of depression, anxiety and PTSD. Pt has tolerated current medications fairly well overall. She has a long hx of poor tolerability. Reviewed current meds and dosages. Will increase amitriptyline to 75 mg. Pt also cautioned against using risperdal daily as she did not tolerate it well in the past.     12  Subjective:   Pt reports ongoing stressors at home and she states her depression, PTSD and anxiety are exacerbated. Pt states she has had significant hyperarousal and nightmares. She has been taking medication as prescribed and she has been using prn risperdal daily. Prior to Admission medications    Medication Sig Start Date End Date Taking? Authorizing Provider   risperiDONE (RisperDAL) 0.25 mg tablet TAKE ONE TABLET BY MOUTH EVERY DAY AS NEEDED. 9/9/20   Fermín Capellan NP   amitriptyline (ELAVIL) 50 mg tablet TAKE ONE TABLET BY MOUTH EVERY NIGHT 9/4/20   Fermín Capellan NP   primidone (MYSOLINE) 50 mg tablet TAKE 2 TABLETS EVERY MORNING AND 3 TABLETS AT BEDTIME 8/26/20   Swapnil Tolentino MD   prazosin (MINIPRESS) 2 mg capsule TAKE 1 CAPSULE TWICE A DAY 7/8/20   Fermín Capellan NP   tiZANidine (ZANAFLEX) 4 mg tablet Take 4 mg by mouth every eight to twelve (8-12) hours as needed. 1/22/19   Provider, Historical   HYDROcodone-acetaminophen (NORCO) 7.5-325 mg per tablet Take 1 Tab by mouth three (3) times daily as needed. 2/4/19   Provider, Historical   traMADol (ULTRAM) 50 mg tablet Take 1 Tab by mouth three (3) times daily as needed. 1/18/19   Provider, Historical   ferrous fumarate/vit Bcomp,C (SUPER B COMPLEX PO) Take  by mouth daily. Provider, Historical   guaiFENesin (MUCINEX) 1,200 mg Ta12 ER tablet Take 1 Tab by mouth two (2) times a day.  8/26/18   Karen Jackson MD   potassium chloride SR (K-TAB) 20 mEq tablet Take 1 Tab by mouth two (2) times a day. 8/26/18   Caitie Reid MD   naloxone [facility]) 4 mg/actuation nasal spray Use 1 spray intranasally, then discard. Repeat with new spray every 2 min as needed for opioid overdose symptoms, alternating nostrils. 8/17/18   Karrie Kendrick NP   budesonide-formoterol (SYMBICORT) 160-4.5 mcg/actuation HFAA Take 2 Puffs by inhalation two (2) times a day. Provider, Historical   acetaminophen-codeine (TYLENOL #3) 300-30 mg per tablet Take 1 Tab by mouth every eight (8) hours as needed. Max Daily Amount: 3 Tabs. 5/27/17   Chichi Simons NP   ALPRAZolam (XANAX) 1 mg tablet Take 1 Tab by mouth three (3) times daily as needed for Anxiety. Max Daily Amount: 3 mg. 5/27/17   Chichi Simons NP   cetirizine (ZYRTEC) 10 mg tablet Take 1 Tab by mouth daily. 5/27/17   Chichi Simons NP   promethazine (PHENERGAN) 25 mg tablet Take 1 Tab by mouth every six (6) hours as needed. 5/27/17   Chichi Simons NP   ibuprofen (MOTRIN) 800 mg tablet Take 1 Tab by mouth every eight (8) hours as needed. 5/27/17   Chichi Simons NP   furosemide (LASIX) 40 mg tablet Take 1 Tab by mouth daily. Patient taking differently: Take 40 mg by mouth two (2) times a day. 5/27/17   Chichi Simons NP   albuterol (VENTOLIN HFA) 90 mcg/actuation inhaler Take 2 Puffs by inhalation every four (4) hours as needed for Wheezing. Provider, Historical   dicyclomine (BENTYL) 10 mg capsule daily as needed. 2/13/15   Juan, MD Nico   glipiZIDE SR (GLUCOTROL) 5 mg CR tablet Take 5 mg by mouth two (2) times daily as needed (Patient monitors her BG levels and takes when she is also taking a steroid. ). 2/16/15   Juan, MD Nico   montelukast (SINGULAIR) 10 mg tablet Take 10 mg by mouth daily. 2/16/15   JuanNico MD   ezetimibe-simvastatin (VYTORIN 10/40) 10-40 mg per tablet Take 1 tablet by mouth nightly. Provider, Historical   metoprolol (LOPRESSOR) 25 mg tablet Take 1 Tab by mouth two (2) times a day.   Patient taking differently: Take 25 mg by mouth nightly. 2/6/14   Gavin YOST NP   benzonatate (TESSALON) 200 mg capsule Take 1 Cap by mouth two (2) times daily as needed. 1/20/14   Jose Raul Baldwin MD   nystatin (MYCOSTATIN) 100,000 unit/mL suspension Take 5 mL by mouth four (4) times daily. swish and spit  Patient taking differently: Take 500,000 Units by mouth four (4) times daily as needed. swish and spit 1/20/14   Jose Raul Baldwin MD   albuterol-ipratropium (DUONEB) 2.5 mg-0.5 mg/3 ml nebulizer solution 3 mL by Nebulization route every six (6) hours as needed for Wheezing. Provider, Historical   hyoscyamine SL (LEVSIN/SL) 0.125 mg SL tablet 0.125 mg by SubLINGual route three (3) times daily as needed for Cramping. Provider, Historical   esomeprazole (NEXIUM) 40 mg capsule Take 40 mg by mouth daily. Provider, Historical   MAGOX 400 mg tablet TAKE ONE TABLET TWICE A DAY 11/4/13   Ava Greenwood NP   aspirin 81 mg chewable tablet Take 81 mg by mouth daily. Other, MD Nico   levothyroxine (SYNTHROID) 200 mcg tablet Take 200 mcg by mouth daily (before breakfast). Other, MD Nico       ICD-10-CM ICD-9-CM    1. Moderate episode of recurrent major depressive disorder (HCC)  F33.1 296.32    2. PTSD (post-traumatic stress disorder)  F43.10 309.81        ROS: depression, hyperarousal and anxiety is moderate to moderate high severity    No flowsheet data found. Zack Lane, who was evaluated through a patient-initiated, synchronous (real-time) audio only encounter, and/or her healthcare decision maker, is aware that it is a billable service, with coverage as determined by her insurance carrier. She provided verbal consent to proceed: Yes. She has not had a related appointment within my department in the past 7 days or scheduled within the next 24 hours.       Total Time: minutes: 11-20 minutes    Lori Mcgregor NP

## 2020-09-23 DIAGNOSIS — G25.0 BENIGN ESSENTIAL TREMOR SYNDROME: ICD-10-CM

## 2020-09-23 DIAGNOSIS — R55 SYNCOPE AND COLLAPSE: ICD-10-CM

## 2020-09-23 DIAGNOSIS — E53.8 B12 DEFICIENCY: ICD-10-CM

## 2020-09-23 DIAGNOSIS — E55.9 VITAMIN D DEFICIENCY: ICD-10-CM

## 2020-09-23 DIAGNOSIS — M48.061 DEGENERATIVE LUMBAR SPINAL STENOSIS: ICD-10-CM

## 2020-09-23 DIAGNOSIS — I67.89 CEREBRAL MICROVASCULAR DISEASE: ICD-10-CM

## 2020-09-23 DIAGNOSIS — I65.23 STENOSIS OF BOTH INTERNAL CAROTID ARTERIES: ICD-10-CM

## 2020-09-23 DIAGNOSIS — R41.82 ALTERED MENTAL STATUS, UNSPECIFIED ALTERED MENTAL STATUS TYPE: ICD-10-CM

## 2020-09-23 DIAGNOSIS — G25.0 BENIGN FAMILIAL TREMOR: ICD-10-CM

## 2020-09-23 DIAGNOSIS — M79.7 FIBROMYALGIA: ICD-10-CM

## 2020-09-23 DIAGNOSIS — R27.0 ATAXIA: ICD-10-CM

## 2020-09-23 DIAGNOSIS — M96.1 CERVICAL POST-LAMINECTOMY SYNDROME: ICD-10-CM

## 2020-09-23 DIAGNOSIS — E11.42 DIABETIC PERIPHERAL NEUROPATHY ASSOCIATED WITH TYPE 2 DIABETES MELLITUS (HCC): ICD-10-CM

## 2020-09-23 DIAGNOSIS — M47.22 CERVICAL RADICULOPATHY DUE TO DEGENERATIVE JOINT DISEASE OF SPINE: ICD-10-CM

## 2020-09-23 RX ORDER — PRIMIDONE 50 MG/1
TABLET ORAL
Qty: 150 TAB | Refills: 0 | Status: SHIPPED | OUTPATIENT
Start: 2020-09-23 | End: 2020-10-23

## 2020-10-23 DIAGNOSIS — E53.8 B12 DEFICIENCY: ICD-10-CM

## 2020-10-23 DIAGNOSIS — I65.23 STENOSIS OF BOTH INTERNAL CAROTID ARTERIES: ICD-10-CM

## 2020-10-23 DIAGNOSIS — I67.89 CEREBRAL MICROVASCULAR DISEASE: ICD-10-CM

## 2020-10-23 DIAGNOSIS — E55.9 VITAMIN D DEFICIENCY: ICD-10-CM

## 2020-10-23 DIAGNOSIS — M96.1 CERVICAL POST-LAMINECTOMY SYNDROME: ICD-10-CM

## 2020-10-23 DIAGNOSIS — M79.7 FIBROMYALGIA: ICD-10-CM

## 2020-10-23 DIAGNOSIS — M47.22 CERVICAL RADICULOPATHY DUE TO DEGENERATIVE JOINT DISEASE OF SPINE: ICD-10-CM

## 2020-10-23 DIAGNOSIS — R55 SYNCOPE AND COLLAPSE: ICD-10-CM

## 2020-10-23 DIAGNOSIS — R27.0 ATAXIA: ICD-10-CM

## 2020-10-23 DIAGNOSIS — R41.82 ALTERED MENTAL STATUS, UNSPECIFIED ALTERED MENTAL STATUS TYPE: ICD-10-CM

## 2020-10-23 DIAGNOSIS — M48.061 DEGENERATIVE LUMBAR SPINAL STENOSIS: ICD-10-CM

## 2020-10-23 DIAGNOSIS — E11.42 DIABETIC PERIPHERAL NEUROPATHY ASSOCIATED WITH TYPE 2 DIABETES MELLITUS (HCC): ICD-10-CM

## 2020-10-23 DIAGNOSIS — G25.0 BENIGN ESSENTIAL TREMOR SYNDROME: ICD-10-CM

## 2020-10-23 DIAGNOSIS — G25.0 BENIGN FAMILIAL TREMOR: ICD-10-CM

## 2020-10-23 RX ORDER — PRIMIDONE 50 MG/1
TABLET ORAL
Qty: 150 TAB | Refills: 0 | Status: SHIPPED | OUTPATIENT
Start: 2020-10-23 | End: 2020-11-25

## 2020-11-25 DIAGNOSIS — I65.23 STENOSIS OF BOTH INTERNAL CAROTID ARTERIES: ICD-10-CM

## 2020-11-25 DIAGNOSIS — G25.0 BENIGN ESSENTIAL TREMOR SYNDROME: ICD-10-CM

## 2020-11-25 DIAGNOSIS — R55 SYNCOPE AND COLLAPSE: ICD-10-CM

## 2020-11-25 DIAGNOSIS — E11.42 DIABETIC PERIPHERAL NEUROPATHY ASSOCIATED WITH TYPE 2 DIABETES MELLITUS (HCC): ICD-10-CM

## 2020-11-25 DIAGNOSIS — R41.82 ALTERED MENTAL STATUS, UNSPECIFIED ALTERED MENTAL STATUS TYPE: ICD-10-CM

## 2020-11-25 DIAGNOSIS — G25.0 BENIGN FAMILIAL TREMOR: ICD-10-CM

## 2020-11-25 DIAGNOSIS — I67.89 CEREBRAL MICROVASCULAR DISEASE: ICD-10-CM

## 2020-11-25 DIAGNOSIS — M47.22 CERVICAL RADICULOPATHY DUE TO DEGENERATIVE JOINT DISEASE OF SPINE: ICD-10-CM

## 2020-11-25 DIAGNOSIS — R27.0 ATAXIA: ICD-10-CM

## 2020-11-25 DIAGNOSIS — M48.061 DEGENERATIVE LUMBAR SPINAL STENOSIS: ICD-10-CM

## 2020-11-25 DIAGNOSIS — E55.9 VITAMIN D DEFICIENCY: ICD-10-CM

## 2020-11-25 DIAGNOSIS — M96.1 CERVICAL POST-LAMINECTOMY SYNDROME: ICD-10-CM

## 2020-11-25 DIAGNOSIS — E53.8 B12 DEFICIENCY: ICD-10-CM

## 2020-11-25 DIAGNOSIS — M79.7 FIBROMYALGIA: ICD-10-CM

## 2020-11-25 RX ORDER — PRIMIDONE 50 MG/1
TABLET ORAL
Qty: 150 TAB | Refills: 0 | Status: SHIPPED | OUTPATIENT
Start: 2020-11-25 | End: 2020-12-21

## 2020-12-21 DIAGNOSIS — G25.0 BENIGN ESSENTIAL TREMOR SYNDROME: ICD-10-CM

## 2020-12-21 DIAGNOSIS — M96.1 CERVICAL POST-LAMINECTOMY SYNDROME: ICD-10-CM

## 2020-12-21 DIAGNOSIS — G25.0 BENIGN FAMILIAL TREMOR: ICD-10-CM

## 2020-12-21 DIAGNOSIS — E11.42 DIABETIC PERIPHERAL NEUROPATHY ASSOCIATED WITH TYPE 2 DIABETES MELLITUS (HCC): ICD-10-CM

## 2020-12-21 DIAGNOSIS — E55.9 VITAMIN D DEFICIENCY: ICD-10-CM

## 2020-12-21 DIAGNOSIS — M48.061 DEGENERATIVE LUMBAR SPINAL STENOSIS: ICD-10-CM

## 2020-12-21 DIAGNOSIS — R55 SYNCOPE AND COLLAPSE: ICD-10-CM

## 2020-12-21 DIAGNOSIS — R41.82 ALTERED MENTAL STATUS, UNSPECIFIED ALTERED MENTAL STATUS TYPE: ICD-10-CM

## 2020-12-21 DIAGNOSIS — M79.7 FIBROMYALGIA: ICD-10-CM

## 2020-12-21 DIAGNOSIS — R27.0 ATAXIA: ICD-10-CM

## 2020-12-21 DIAGNOSIS — M47.22 CERVICAL RADICULOPATHY DUE TO DEGENERATIVE JOINT DISEASE OF SPINE: ICD-10-CM

## 2020-12-21 DIAGNOSIS — E53.8 B12 DEFICIENCY: ICD-10-CM

## 2020-12-21 DIAGNOSIS — I67.89 CEREBRAL MICROVASCULAR DISEASE: ICD-10-CM

## 2020-12-21 DIAGNOSIS — I65.23 STENOSIS OF BOTH INTERNAL CAROTID ARTERIES: ICD-10-CM

## 2020-12-21 RX ORDER — AMITRIPTYLINE HYDROCHLORIDE 75 MG/1
TABLET, FILM COATED ORAL
Qty: 30 TAB | Refills: 0 | Status: SHIPPED | OUTPATIENT
Start: 2020-12-21 | End: 2021-06-23

## 2020-12-21 RX ORDER — PRIMIDONE 50 MG/1
TABLET ORAL
Qty: 150 TAB | Refills: 0 | Status: SHIPPED | OUTPATIENT
Start: 2020-12-21 | End: 2021-01-21

## 2020-12-21 NOTE — TELEPHONE ENCOUNTER
Requested Prescriptions     Pending Prescriptions Disp Refills    amitriptyline (ELAVIL) 75 mg tablet [Pharmacy Med Name: AMITRIPTYLINE HCL 75 MG TAB] 30 Tab 0     Sig: TAKE ONE TABLET BY MOUTH EVERY NIGHT     Last appointment: 09.22.20    Next appointment: Left voice mail to schedule follow up appointment.

## 2021-01-21 DIAGNOSIS — M79.7 FIBROMYALGIA: ICD-10-CM

## 2021-01-21 DIAGNOSIS — M96.1 CERVICAL POST-LAMINECTOMY SYNDROME: ICD-10-CM

## 2021-01-21 DIAGNOSIS — E53.8 B12 DEFICIENCY: ICD-10-CM

## 2021-01-21 DIAGNOSIS — M47.22 CERVICAL RADICULOPATHY DUE TO DEGENERATIVE JOINT DISEASE OF SPINE: ICD-10-CM

## 2021-01-21 DIAGNOSIS — R27.0 ATAXIA: ICD-10-CM

## 2021-01-21 DIAGNOSIS — R41.82 ALTERED MENTAL STATUS, UNSPECIFIED ALTERED MENTAL STATUS TYPE: ICD-10-CM

## 2021-01-21 DIAGNOSIS — M48.061 DEGENERATIVE LUMBAR SPINAL STENOSIS: ICD-10-CM

## 2021-01-21 DIAGNOSIS — R55 SYNCOPE AND COLLAPSE: ICD-10-CM

## 2021-01-21 DIAGNOSIS — E11.42 DIABETIC PERIPHERAL NEUROPATHY ASSOCIATED WITH TYPE 2 DIABETES MELLITUS (HCC): ICD-10-CM

## 2021-01-21 DIAGNOSIS — E55.9 VITAMIN D DEFICIENCY: ICD-10-CM

## 2021-01-21 DIAGNOSIS — I67.89 CEREBRAL MICROVASCULAR DISEASE: ICD-10-CM

## 2021-01-21 DIAGNOSIS — G25.0 BENIGN ESSENTIAL TREMOR SYNDROME: ICD-10-CM

## 2021-01-21 DIAGNOSIS — G25.0 BENIGN FAMILIAL TREMOR: ICD-10-CM

## 2021-01-21 DIAGNOSIS — I65.23 STENOSIS OF BOTH INTERNAL CAROTID ARTERIES: ICD-10-CM

## 2021-01-21 RX ORDER — PRIMIDONE 50 MG/1
TABLET ORAL
Qty: 150 TAB | Refills: 0 | Status: SHIPPED | OUTPATIENT
Start: 2021-01-21 | End: 2021-02-17

## 2021-02-17 DIAGNOSIS — I65.23 STENOSIS OF BOTH INTERNAL CAROTID ARTERIES: ICD-10-CM

## 2021-02-17 DIAGNOSIS — M79.7 FIBROMYALGIA: ICD-10-CM

## 2021-02-17 DIAGNOSIS — E55.9 VITAMIN D DEFICIENCY: ICD-10-CM

## 2021-02-17 DIAGNOSIS — G25.0 BENIGN ESSENTIAL TREMOR SYNDROME: ICD-10-CM

## 2021-02-17 DIAGNOSIS — M48.061 DEGENERATIVE LUMBAR SPINAL STENOSIS: ICD-10-CM

## 2021-02-17 DIAGNOSIS — R55 SYNCOPE AND COLLAPSE: ICD-10-CM

## 2021-02-17 DIAGNOSIS — M47.22 CERVICAL RADICULOPATHY DUE TO DEGENERATIVE JOINT DISEASE OF SPINE: ICD-10-CM

## 2021-02-17 DIAGNOSIS — G25.0 BENIGN FAMILIAL TREMOR: ICD-10-CM

## 2021-02-17 DIAGNOSIS — R27.0 ATAXIA: ICD-10-CM

## 2021-02-17 DIAGNOSIS — M96.1 CERVICAL POST-LAMINECTOMY SYNDROME: ICD-10-CM

## 2021-02-17 DIAGNOSIS — R41.82 ALTERED MENTAL STATUS, UNSPECIFIED ALTERED MENTAL STATUS TYPE: ICD-10-CM

## 2021-02-17 DIAGNOSIS — E11.42 DIABETIC PERIPHERAL NEUROPATHY ASSOCIATED WITH TYPE 2 DIABETES MELLITUS (HCC): ICD-10-CM

## 2021-02-17 DIAGNOSIS — I67.89 CEREBRAL MICROVASCULAR DISEASE: ICD-10-CM

## 2021-02-17 DIAGNOSIS — E53.8 B12 DEFICIENCY: ICD-10-CM

## 2021-02-17 RX ORDER — PRAZOSIN HYDROCHLORIDE 2 MG/1
CAPSULE ORAL
Qty: 60 CAP | Refills: 0 | OUTPATIENT
Start: 2021-02-17

## 2021-02-17 RX ORDER — PRIMIDONE 50 MG/1
TABLET ORAL
Qty: 150 TAB | Refills: 0 | Status: SHIPPED | OUTPATIENT
Start: 2021-02-17 | End: 2021-03-29

## 2021-02-25 RX ORDER — PRAZOSIN HYDROCHLORIDE 2 MG/1
2 CAPSULE ORAL 2 TIMES DAILY
Qty: 60 CAP | Refills: 0 | Status: SHIPPED | OUTPATIENT
Start: 2021-02-25 | End: 2021-03-23 | Stop reason: SDUPTHER

## 2021-02-25 NOTE — TELEPHONE ENCOUNTER
Requested Prescriptions     Pending Prescriptions Disp Refills    prazosin (MINIPRESS) 2 mg capsule 60 Cap 0     Sig: Take 1 Cap by mouth two (2) times a day.      Refused Prescriptions Disp Refills    prazosin (MINIPRESS) 2 mg capsule [Pharmacy Med Name: PRAZOSIN 2 MG CAPSULE] 60 Cap 0     Sig: TAKE ONE CAPSULE BY MOUTH 2 TIMES A DAY     Refused By: Angie Burch     Reason for Refusal: Appt required, please call patient     Last visit: 09/22/20    Next visit: 03.23.21

## 2021-02-25 NOTE — TELEPHONE ENCOUNTER
Requested Prescriptions     Pending Prescriptions Disp Refills    prazosin (MINIPRESS) 2 mg capsule 60 Cap 2     Sig: Take 1 Cap by mouth two (2) times a day.      Refused Prescriptions Disp Refills    prazosin (MINIPRESS) 2 mg capsule [Pharmacy Med Name: PRAZOSIN 2 MG CAPSULE] 60 Cap 0     Sig: TAKE ONE CAPSULE BY MOUTH 2 TIMES A DAY     Refused By: Angie Burch     Reason for Refusal: Appt required, please call patient     Appt is scheduled for 3/23

## 2021-03-04 ENCOUNTER — TRANSCRIBE ORDER (OUTPATIENT)
Dept: SCHEDULING | Age: 66
End: 2021-03-04

## 2021-03-04 DIAGNOSIS — M54.2 NECK PAIN: Primary | ICD-10-CM

## 2021-03-04 DIAGNOSIS — M50.30 DEGENERATION OF CERVICAL INTERVERTEBRAL DISC: ICD-10-CM

## 2021-03-04 DIAGNOSIS — M47.812 CERVICAL SPONDYLOSIS WITHOUT MYELOPATHY: ICD-10-CM

## 2021-03-20 ENCOUNTER — HOSPITAL ENCOUNTER (OUTPATIENT)
Dept: MRI IMAGING | Age: 66
Discharge: HOME | End: 2021-03-20
Attending: NURSE PRACTITIONER
Payer: MEDICARE

## 2021-03-20 DIAGNOSIS — M50.30 DEGENERATION OF CERVICAL INTERVERTEBRAL DISC: ICD-10-CM

## 2021-03-20 DIAGNOSIS — M54.2 NECK PAIN: ICD-10-CM

## 2021-03-20 DIAGNOSIS — M47.812 CERVICAL SPONDYLOSIS WITHOUT MYELOPATHY: ICD-10-CM

## 2021-03-20 PROCEDURE — 72141 MRI NECK SPINE W/O DYE: CPT

## 2021-03-23 ENCOUNTER — VIRTUAL VISIT (OUTPATIENT)
Dept: BEHAVIORAL/MENTAL HEALTH CLINIC | Age: 66
End: 2021-03-23
Payer: MEDICARE

## 2021-03-23 DIAGNOSIS — F33.1 MODERATE EPISODE OF RECURRENT MAJOR DEPRESSIVE DISORDER (HCC): Primary | ICD-10-CM

## 2021-03-23 DIAGNOSIS — F43.10 PTSD (POST-TRAUMATIC STRESS DISORDER): ICD-10-CM

## 2021-03-23 PROCEDURE — 99213 OFFICE O/P EST LOW 20 MIN: CPT | Performed by: NURSE PRACTITIONER

## 2021-03-23 RX ORDER — PRAZOSIN HYDROCHLORIDE 2 MG/1
2 CAPSULE ORAL 2 TIMES DAILY
Qty: 60 CAP | Refills: 2 | Status: SHIPPED | OUTPATIENT
Start: 2021-03-23 | End: 2021-06-23

## 2021-03-23 RX ORDER — RISPERIDONE 0.25 MG/1
TABLET, FILM COATED ORAL
Qty: 30 TAB | Refills: 2 | Status: SHIPPED | OUTPATIENT
Start: 2021-03-23

## 2021-03-23 RX ORDER — ARIPIPRAZOLE 2 MG/1
2 TABLET ORAL EVERY MORNING
Qty: 30 TAB | Refills: 2 | Status: SHIPPED | OUTPATIENT
Start: 2021-03-23 | End: 2021-06-23 | Stop reason: SDUPTHER

## 2021-03-23 NOTE — PROGRESS NOTES
CHIEF COMPLAINT:  Judith Tafoya is a 72 y.o. female and was seen today for follow-up of psychiatric condition and psychotropic medication management. HPI:    Annabel Adan reports the following psychiatric symptoms by hx:  depression and anxiety. Overall symptoms have been present for months. Currently symptoms are of moderate to mod/high severity. The symptoms occur daily. Pt reports medications are of limited benefit. Met with pt via video telehelath for appt today. Pt provided verbal consent for Priscila Ford RN, NP student, to participate in session today. FAMILY/SOCIAL HX: ongoing stressors    REVIEW OF SYSTEMS:  Psychiatric:  depression, anxiety  Appetite:good   Sleep: good   Neuro: chronic pain      Side Effects:  none    MENTAL STATUS EXAM:   Sensorium  oriented to time, place and person   Relations cooperative   Appearance:  age appropriate and casually dressed   Motor Behavior:  gait stable and within normal limits   Speech:  normal volume   Thought Process: goal directed   Thought Content free of delusions and free of hallucinations   Suicidal ideations no intention   Homicidal ideations no intention   Mood:  anxious and depressed   Affect:  anxious and depressed   Memory recent  adequate   Memory remote:  adequate   Concentration:  adequate   Abstraction:  abstract   Insight:  good   Reliability good   Judgment:  good     MEDICAL DECISION MAKING:  Problems addressed today:    ICD-10-CM ICD-9-CM    1. Moderate episode of recurrent major depressive disorder (HCC)  F33.1 296.32    2. PTSD (post-traumatic stress disorder)  F43.10 309.81 risperiDONE (RisperDAL) 0.25 mg tablet       Assessment:   Annabel Adan is responding to treatment. Symptoms are exacerbated. Discussed current medications and dosages. Pt reports she stopped amitriptyline due to perception that it was not effective. Reviewed past med trials and [facility] results. Will use a trial of abilify.  If well tolerated will dc prn use of risperdal. Reviewed treatment goals and target symptoms to monitor for. Plan:   1. Current Outpatient Medications   Medication Sig Dispense Refill    ARIPiprazole (ABILIFY) 2 mg tablet Take 1 Tab by mouth Every morning. 30 Tab 2    risperiDONE (RisperDAL) 0.25 mg tablet TAKE ONE TABLET BY MOUTH EVERY DAY AS NEEDED. 30 Tab 2    prazosin (MINIPRESS) 2 mg capsule Take 1 Cap by mouth two (2) times a day. 60 Cap 2    primidone (MYSOLINE) 50 mg tablet TAKE 2 TABLETS EVERY MORNING AND 3 TABLETS AT BEDTIME 150 Tab 0    amitriptyline (ELAVIL) 75 mg tablet TAKE ONE TABLET BY MOUTH EVERY NIGHT 30 Tab 0    tiZANidine (ZANAFLEX) 4 mg tablet Take 4 mg by mouth every eight to twelve (8-12) hours as needed.  HYDROcodone-acetaminophen (NORCO) 7.5-325 mg per tablet Take 1 Tab by mouth three (3) times daily as needed.  traMADol (ULTRAM) 50 mg tablet Take 1 Tab by mouth three (3) times daily as needed.  ferrous fumarate/vit Bcomp,C (SUPER B COMPLEX PO) Take  by mouth daily.  guaiFENesin (MUCINEX) 1,200 mg Ta12 ER tablet Take 1 Tab by mouth two (2) times a day. 14 Tab 0    potassium chloride SR (K-TAB) 20 mEq tablet Take 1 Tab by mouth two (2) times a day. 14 Tab 0    naloxone (NARCAN) 4 mg/actuation nasal spray Use 1 spray intranasally, then discard. Repeat with new spray every 2 min as needed for opioid overdose symptoms, alternating nostrils. 1 Each 0    budesonide-formoterol (SYMBICORT) 160-4.5 mcg/actuation HFAA Take 2 Puffs by inhalation two (2) times a day.  acetaminophen-codeine (TYLENOL #3) 300-30 mg per tablet Take 1 Tab by mouth every eight (8) hours as needed. Max Daily Amount: 3 Tabs. 5 Tab 0    ALPRAZolam (XANAX) 1 mg tablet Take 1 Tab by mouth three (3) times daily as needed for Anxiety. Max Daily Amount: 3 mg. 3 Tab 0    cetirizine (ZYRTEC) 10 mg tablet Take 1 Tab by mouth daily. 10 Tab 0    promethazine (PHENERGAN) 25 mg tablet Take 1 Tab by mouth every six (6) hours as needed.  4 Tab 0  ibuprofen (MOTRIN) 800 mg tablet Take 1 Tab by mouth every eight (8) hours as needed. 20 Tab 0    furosemide (LASIX) 40 mg tablet Take 1 Tab by mouth daily. (Patient taking differently: Take 40 mg by mouth two (2) times a day.) 20 Tab 0    albuterol (VENTOLIN HFA) 90 mcg/actuation inhaler Take 2 Puffs by inhalation every four (4) hours as needed for Wheezing.  dicyclomine (BENTYL) 10 mg capsule daily as needed.  glipiZIDE SR (GLUCOTROL) 5 mg CR tablet Take 5 mg by mouth two (2) times daily as needed (Patient monitors her BG levels and takes when she is also taking a steroid. ).  montelukast (SINGULAIR) 10 mg tablet Take 10 mg by mouth daily.  ezetimibe-simvastatin (VYTORIN 10/40) 10-40 mg per tablet Take 1 tablet by mouth nightly.  metoprolol (LOPRESSOR) 25 mg tablet Take 1 Tab by mouth two (2) times a day. (Patient taking differently: Take 25 mg by mouth nightly.) 60 Tab 6    benzonatate (TESSALON) 200 mg capsule Take 1 Cap by mouth two (2) times daily as needed. 30 Cap 0    nystatin (MYCOSTATIN) 100,000 unit/mL suspension Take 5 mL by mouth four (4) times daily. swish and spit (Patient taking differently: Take 500,000 Units by mouth four (4) times daily as needed. swish and spit) 180 mL 0    albuterol-ipratropium (DUONEB) 2.5 mg-0.5 mg/3 ml nebulizer solution 3 mL by Nebulization route every six (6) hours as needed for Wheezing.  hyoscyamine SL (LEVSIN/SL) 0.125 mg SL tablet 0.125 mg by SubLINGual route three (3) times daily as needed for Cramping.  esomeprazole (NEXIUM) 40 mg capsule Take 40 mg by mouth daily.  MAGOX 400 mg tablet TAKE ONE TABLET TWICE A DAY 60 Tab 3    aspirin 81 mg chewable tablet Take 81 mg by mouth daily.  levothyroxine (SYNTHROID) 200 mcg tablet Take 200 mcg by mouth daily (before breakfast). medication changes made today: cont prazosin, start abilify, cont risperdal 0.25 mg, dc amitriptyline    2.   Counseling and coordination of care including instructions for treatment, risks/benefits, risk factor reduction and patient/family education. She agrees with the plan. Patient instructed to call with any side effects, questions or issues. 3.    Follow-up and Dispositions    · Return in about 2 months (around 5/23/2021). Fab Lei, who was evaluated through a synchronous (real-time) audio-video encounter, and/or her healthcare decision maker, is aware that it is a billable service, with coverage as determined by her insurance carrier. She provided verbal consent to proceed: Yes, and patient identification was verified. This visit was conducted pursuant to the emergency declaration under the [facility], [address] [place] [address] authority and the [email or web address] and [employer] General Act. A caregiver was present when appropriate. Ability to conduct physical exam was limited. The patient was located in a state where the provider was credentialed to provide care.        3/23/2021  Alex Pena NP

## 2021-03-29 DIAGNOSIS — I67.89 CEREBRAL MICROVASCULAR DISEASE: ICD-10-CM

## 2021-03-29 DIAGNOSIS — M47.22 CERVICAL RADICULOPATHY DUE TO DEGENERATIVE JOINT DISEASE OF SPINE: ICD-10-CM

## 2021-03-29 DIAGNOSIS — R41.82 ALTERED MENTAL STATUS, UNSPECIFIED ALTERED MENTAL STATUS TYPE: ICD-10-CM

## 2021-03-29 DIAGNOSIS — E53.8 B12 DEFICIENCY: ICD-10-CM

## 2021-03-29 DIAGNOSIS — M48.061 DEGENERATIVE LUMBAR SPINAL STENOSIS: ICD-10-CM

## 2021-03-29 DIAGNOSIS — R27.0 ATAXIA: ICD-10-CM

## 2021-03-29 DIAGNOSIS — E11.42 DIABETIC PERIPHERAL NEUROPATHY ASSOCIATED WITH TYPE 2 DIABETES MELLITUS (HCC): ICD-10-CM

## 2021-03-29 DIAGNOSIS — M79.7 FIBROMYALGIA: ICD-10-CM

## 2021-03-29 DIAGNOSIS — G25.0 BENIGN ESSENTIAL TREMOR SYNDROME: ICD-10-CM

## 2021-03-29 DIAGNOSIS — M96.1 CERVICAL POST-LAMINECTOMY SYNDROME: ICD-10-CM

## 2021-03-29 DIAGNOSIS — G25.0 BENIGN FAMILIAL TREMOR: ICD-10-CM

## 2021-03-29 DIAGNOSIS — R55 SYNCOPE AND COLLAPSE: ICD-10-CM

## 2021-03-29 DIAGNOSIS — E55.9 VITAMIN D DEFICIENCY: ICD-10-CM

## 2021-03-29 DIAGNOSIS — I65.23 STENOSIS OF BOTH INTERNAL CAROTID ARTERIES: ICD-10-CM

## 2021-03-29 RX ORDER — PRIMIDONE 50 MG/1
TABLET ORAL
Qty: 150 TAB | Refills: 0 | Status: SHIPPED | OUTPATIENT
Start: 2021-03-29 | End: 2021-05-18

## 2021-05-18 DIAGNOSIS — E55.9 VITAMIN D DEFICIENCY: ICD-10-CM

## 2021-05-18 DIAGNOSIS — I67.89 CEREBRAL MICROVASCULAR DISEASE: ICD-10-CM

## 2021-05-18 DIAGNOSIS — M79.7 FIBROMYALGIA: ICD-10-CM

## 2021-05-18 DIAGNOSIS — G25.0 BENIGN ESSENTIAL TREMOR SYNDROME: ICD-10-CM

## 2021-05-18 DIAGNOSIS — R55 SYNCOPE AND COLLAPSE: ICD-10-CM

## 2021-05-18 DIAGNOSIS — R41.82 ALTERED MENTAL STATUS, UNSPECIFIED ALTERED MENTAL STATUS TYPE: ICD-10-CM

## 2021-05-18 DIAGNOSIS — I65.23 STENOSIS OF BOTH INTERNAL CAROTID ARTERIES: ICD-10-CM

## 2021-05-18 DIAGNOSIS — M48.061 DEGENERATIVE LUMBAR SPINAL STENOSIS: ICD-10-CM

## 2021-05-18 DIAGNOSIS — M47.22 CERVICAL RADICULOPATHY DUE TO DEGENERATIVE JOINT DISEASE OF SPINE: ICD-10-CM

## 2021-05-18 DIAGNOSIS — M96.1 CERVICAL POST-LAMINECTOMY SYNDROME: ICD-10-CM

## 2021-05-18 DIAGNOSIS — E11.42 DIABETIC PERIPHERAL NEUROPATHY ASSOCIATED WITH TYPE 2 DIABETES MELLITUS (HCC): ICD-10-CM

## 2021-05-18 DIAGNOSIS — E53.8 B12 DEFICIENCY: ICD-10-CM

## 2021-05-18 DIAGNOSIS — R27.0 ATAXIA: ICD-10-CM

## 2021-05-18 DIAGNOSIS — G25.0 BENIGN FAMILIAL TREMOR: ICD-10-CM

## 2021-05-18 RX ORDER — PRIMIDONE 50 MG/1
TABLET ORAL
Qty: 150 TAB | Refills: 0 | Status: SHIPPED | OUTPATIENT
Start: 2021-05-18 | End: 2021-06-23

## 2021-06-23 DIAGNOSIS — M79.7 FIBROMYALGIA: ICD-10-CM

## 2021-06-23 DIAGNOSIS — R27.0 ATAXIA: ICD-10-CM

## 2021-06-23 DIAGNOSIS — M47.22 CERVICAL RADICULOPATHY DUE TO DEGENERATIVE JOINT DISEASE OF SPINE: ICD-10-CM

## 2021-06-23 DIAGNOSIS — R41.82 ALTERED MENTAL STATUS, UNSPECIFIED ALTERED MENTAL STATUS TYPE: ICD-10-CM

## 2021-06-23 DIAGNOSIS — G25.0 BENIGN ESSENTIAL TREMOR SYNDROME: ICD-10-CM

## 2021-06-23 DIAGNOSIS — I65.23 STENOSIS OF BOTH INTERNAL CAROTID ARTERIES: ICD-10-CM

## 2021-06-23 DIAGNOSIS — M96.1 CERVICAL POST-LAMINECTOMY SYNDROME: ICD-10-CM

## 2021-06-23 DIAGNOSIS — M48.061 DEGENERATIVE LUMBAR SPINAL STENOSIS: ICD-10-CM

## 2021-06-23 DIAGNOSIS — E11.42 DIABETIC PERIPHERAL NEUROPATHY ASSOCIATED WITH TYPE 2 DIABETES MELLITUS (HCC): ICD-10-CM

## 2021-06-23 DIAGNOSIS — R55 SYNCOPE AND COLLAPSE: ICD-10-CM

## 2021-06-23 DIAGNOSIS — E53.8 B12 DEFICIENCY: ICD-10-CM

## 2021-06-23 DIAGNOSIS — G25.0 BENIGN FAMILIAL TREMOR: ICD-10-CM

## 2021-06-23 DIAGNOSIS — E55.9 VITAMIN D DEFICIENCY: ICD-10-CM

## 2021-06-23 DIAGNOSIS — I67.89 CEREBRAL MICROVASCULAR DISEASE: ICD-10-CM

## 2021-06-23 RX ORDER — PRAZOSIN HYDROCHLORIDE 2 MG/1
CAPSULE ORAL
Qty: 60 CAPSULE | Refills: 0 | Status: SHIPPED | OUTPATIENT
Start: 2021-06-23 | End: 2021-07-20

## 2021-06-23 RX ORDER — PRIMIDONE 50 MG/1
TABLET ORAL
Qty: 150 TABLET | Refills: 0 | Status: SHIPPED | OUTPATIENT
Start: 2021-06-23 | End: 2021-07-20

## 2021-07-20 DIAGNOSIS — E53.8 B12 DEFICIENCY: ICD-10-CM

## 2021-07-20 DIAGNOSIS — R41.82 ALTERED MENTAL STATUS, UNSPECIFIED ALTERED MENTAL STATUS TYPE: ICD-10-CM

## 2021-07-20 DIAGNOSIS — R27.0 ATAXIA: ICD-10-CM

## 2021-07-20 DIAGNOSIS — R55 SYNCOPE AND COLLAPSE: ICD-10-CM

## 2021-07-20 DIAGNOSIS — M96.1 CERVICAL POST-LAMINECTOMY SYNDROME: ICD-10-CM

## 2021-07-20 DIAGNOSIS — E11.42 DIABETIC PERIPHERAL NEUROPATHY ASSOCIATED WITH TYPE 2 DIABETES MELLITUS (HCC): ICD-10-CM

## 2021-07-20 DIAGNOSIS — E55.9 VITAMIN D DEFICIENCY: ICD-10-CM

## 2021-07-20 DIAGNOSIS — M47.22 CERVICAL RADICULOPATHY DUE TO DEGENERATIVE JOINT DISEASE OF SPINE: ICD-10-CM

## 2021-07-20 DIAGNOSIS — M79.7 FIBROMYALGIA: ICD-10-CM

## 2021-07-20 DIAGNOSIS — G25.0 BENIGN ESSENTIAL TREMOR SYNDROME: ICD-10-CM

## 2021-07-20 DIAGNOSIS — G25.0 BENIGN FAMILIAL TREMOR: ICD-10-CM

## 2021-07-20 DIAGNOSIS — I67.89 CEREBRAL MICROVASCULAR DISEASE: ICD-10-CM

## 2021-07-20 DIAGNOSIS — I65.23 STENOSIS OF BOTH INTERNAL CAROTID ARTERIES: ICD-10-CM

## 2021-07-20 DIAGNOSIS — M48.061 DEGENERATIVE LUMBAR SPINAL STENOSIS: ICD-10-CM

## 2021-07-20 RX ORDER — PRIMIDONE 50 MG/1
TABLET ORAL
Qty: 150 TABLET | Refills: 0 | Status: SHIPPED | OUTPATIENT
Start: 2021-07-20 | End: 2021-08-17

## 2021-07-20 RX ORDER — PRAZOSIN HYDROCHLORIDE 2 MG/1
CAPSULE ORAL
Qty: 60 CAPSULE | Refills: 2 | Status: SHIPPED | OUTPATIENT
Start: 2021-07-20 | End: 2021-10-25

## 2021-08-17 DIAGNOSIS — E53.8 B12 DEFICIENCY: ICD-10-CM

## 2021-08-17 DIAGNOSIS — G25.0 BENIGN ESSENTIAL TREMOR SYNDROME: ICD-10-CM

## 2021-08-17 DIAGNOSIS — R41.82 ALTERED MENTAL STATUS, UNSPECIFIED ALTERED MENTAL STATUS TYPE: ICD-10-CM

## 2021-08-17 DIAGNOSIS — R27.0 ATAXIA: ICD-10-CM

## 2021-08-17 DIAGNOSIS — M79.7 FIBROMYALGIA: ICD-10-CM

## 2021-08-17 DIAGNOSIS — I65.23 STENOSIS OF BOTH INTERNAL CAROTID ARTERIES: ICD-10-CM

## 2021-08-17 DIAGNOSIS — E55.9 VITAMIN D DEFICIENCY: ICD-10-CM

## 2021-08-17 DIAGNOSIS — R55 SYNCOPE AND COLLAPSE: ICD-10-CM

## 2021-08-17 DIAGNOSIS — M47.22 CERVICAL RADICULOPATHY DUE TO DEGENERATIVE JOINT DISEASE OF SPINE: ICD-10-CM

## 2021-08-17 DIAGNOSIS — M48.061 DEGENERATIVE LUMBAR SPINAL STENOSIS: ICD-10-CM

## 2021-08-17 DIAGNOSIS — G25.0 BENIGN FAMILIAL TREMOR: ICD-10-CM

## 2021-08-17 DIAGNOSIS — E11.42 DIABETIC PERIPHERAL NEUROPATHY ASSOCIATED WITH TYPE 2 DIABETES MELLITUS (HCC): ICD-10-CM

## 2021-08-17 DIAGNOSIS — M96.1 CERVICAL POST-LAMINECTOMY SYNDROME: ICD-10-CM

## 2021-08-17 DIAGNOSIS — I67.89 CEREBRAL MICROVASCULAR DISEASE: ICD-10-CM

## 2021-08-17 RX ORDER — PRIMIDONE 50 MG/1
TABLET ORAL
Qty: 150 TABLET | Refills: 0 | Status: SHIPPED | OUTPATIENT
Start: 2021-08-17 | End: 2021-09-17

## 2021-08-17 RX ORDER — DOXEPIN HYDROCHLORIDE 10 MG/1
CAPSULE ORAL
Qty: 30 CAPSULE | Refills: 0 | Status: SHIPPED | OUTPATIENT
Start: 2021-08-17 | End: 2021-09-21

## 2021-08-24 ENCOUNTER — TRANSCRIBE ORDER (OUTPATIENT)
Dept: SCHEDULING | Age: 66
End: 2021-08-24

## 2021-08-24 DIAGNOSIS — M51.36 DDD (DEGENERATIVE DISC DISEASE), LUMBAR: Primary | ICD-10-CM

## 2021-08-24 DIAGNOSIS — M47.816 OSTEOARTHRITIS OF LUMBAR SPINE, UNSPECIFIED SPINAL OSTEOARTHRITIS COMPLICATION STATUS: ICD-10-CM

## 2021-09-16 DIAGNOSIS — I65.23 STENOSIS OF BOTH INTERNAL CAROTID ARTERIES: ICD-10-CM

## 2021-09-16 DIAGNOSIS — E55.9 VITAMIN D DEFICIENCY: ICD-10-CM

## 2021-09-16 DIAGNOSIS — E53.8 B12 DEFICIENCY: ICD-10-CM

## 2021-09-16 DIAGNOSIS — E11.42 DIABETIC PERIPHERAL NEUROPATHY ASSOCIATED WITH TYPE 2 DIABETES MELLITUS (HCC): ICD-10-CM

## 2021-09-16 DIAGNOSIS — I67.89 CEREBRAL MICROVASCULAR DISEASE: ICD-10-CM

## 2021-09-16 DIAGNOSIS — G25.0 BENIGN ESSENTIAL TREMOR SYNDROME: ICD-10-CM

## 2021-09-16 DIAGNOSIS — M79.7 FIBROMYALGIA: ICD-10-CM

## 2021-09-16 DIAGNOSIS — R55 SYNCOPE AND COLLAPSE: ICD-10-CM

## 2021-09-16 DIAGNOSIS — G25.0 BENIGN FAMILIAL TREMOR: ICD-10-CM

## 2021-09-16 DIAGNOSIS — M47.22 CERVICAL RADICULOPATHY DUE TO DEGENERATIVE JOINT DISEASE OF SPINE: ICD-10-CM

## 2021-09-16 DIAGNOSIS — R41.82 ALTERED MENTAL STATUS, UNSPECIFIED ALTERED MENTAL STATUS TYPE: ICD-10-CM

## 2021-09-16 DIAGNOSIS — M48.061 DEGENERATIVE LUMBAR SPINAL STENOSIS: ICD-10-CM

## 2021-09-16 DIAGNOSIS — M96.1 CERVICAL POST-LAMINECTOMY SYNDROME: ICD-10-CM

## 2021-09-16 DIAGNOSIS — R27.0 ATAXIA: ICD-10-CM

## 2021-09-17 RX ORDER — PRIMIDONE 50 MG/1
TABLET ORAL
Qty: 150 TABLET | Refills: 0 | Status: SHIPPED | OUTPATIENT
Start: 2021-09-17 | End: 2021-10-13

## 2021-09-21 RX ORDER — DOXEPIN HYDROCHLORIDE 10 MG/1
CAPSULE ORAL
Qty: 30 CAPSULE | Refills: 1 | Status: SHIPPED | OUTPATIENT
Start: 2021-09-21 | End: 2021-11-18

## 2021-09-21 RX ORDER — ARIPIPRAZOLE 5 MG/1
TABLET ORAL
Qty: 30 TABLET | Refills: 1 | Status: SHIPPED | OUTPATIENT
Start: 2021-09-21 | End: 2021-11-18

## 2021-10-06 ENCOUNTER — HOSPITAL ENCOUNTER (OUTPATIENT)
Dept: MRI IMAGING | Age: 66
Discharge: HOME | End: 2021-10-06
Attending: PHYSICAL MEDICINE & REHABILITATION
Payer: MEDICARE

## 2021-10-06 DIAGNOSIS — M47.816 OSTEOARTHRITIS OF LUMBAR SPINE, UNSPECIFIED SPINAL OSTEOARTHRITIS COMPLICATION STATUS: ICD-10-CM

## 2021-10-06 DIAGNOSIS — M51.36 DDD (DEGENERATIVE DISC DISEASE), LUMBAR: ICD-10-CM

## 2021-10-06 PROCEDURE — 72148 MRI LUMBAR SPINE W/O DYE: CPT

## 2021-10-07 NOTE — PROGRESS NOTES
9/13/2021 Patient is a 52 year old  or  female who presents on referral from Dr. Jose Pardo MD for ERCP consultation. A copy of the note will be sent to the referring provider. Patient's daughter served as . Patient is currently being seen by oncologist Dr. Pardo in [place] for breast cancer and referred her here for ERCP consultation due to biliary obstruction. She does c/o bloating and weight loss. She also notices slightly yellow sclera of eyes. She has been eating a mostly liquid diet as solid foods cause her bloating and dyspepsia. She is schedule for MRI tomorrow.  10/7/2021 Patient presents for a follow up office visit. Patient's daughter served as . ERCP on 10/5/2021 showed dilated intrahepatic duct. Plastic stent was replaced by 10 mm x 80 mm metal stent. Patient c/o abdominal pain and cramping every 30 minutes. Denies nausea, fever, or chills. Her appetite is normal. Patient prefers to treat her abdominal pain as outpatient instead of going to the ER. She is not allergic to any medications. She sees Dr. Keller for chemotherapy. [facility]  Pulmonary, Critical Care, and Sleep Medicine    Name: Mustapha Paula MRN: [id number]   : 1955 Hospital: [facility]   Date: 8/15/2018        IMPRESSION:   · Right lower lobe pneumonia with dense consolidation and possible areas of necrosis. Also noted to have consolidation of the left lower lobe. · Right sided empyema, s/p right VATS with decortication on 18  · Acute chest pain due to severe pneumonia, more difficult to treat due to her chronic use of opiates  · Suspected underlying COPD- acute exacerbation   · NIDDM  · HTN  · Fibromyalgia/Depression/Anxiety, PTSD/ chronic back pain on chronic opiates and benzos. · Essential tremor. · Ex Smoker, quit 1 year ago. · Hypothyroidism. · Obese      RECOMMENDATIONS:   · O2 - wean as tolerated  · Bronchodilators  · CT per thoracic   · Zosyn and levaquin. Strept in pleural fluid    · Steroids, taper as indicated    · Pulmicort/brovana  · lovenox proph      Subjective/History:   BP better     Past Medical History:   Diagnosis Date    Anxiety     Bone spur     NECK    COPD     Depression     Endocrine disease     hypothyroidism    Fibromyalgia     Fusion of spine of cervical region     Gastrointestinal disorder     gerd    Hyperlipidemia     Hypothyroid     Morbid obesity ([employer] [facility] [address]     Osteoarthritis     PUD (peptic ulcer disease)     Tobacco abuse       Past Surgical History:   Procedure Laterality Date    BRONCHOSCOPY-FIBER/THERAPY  4/3/2015         CARDIAC CATHETERIZATION  2013         COLONOSCOPY,DIAGNOSTIC  10/30/2014         HX CATARACT REMOVAL      bilateral    HX GYN          HX ORTHOPAEDIC      Ruptured disc, knuckles on right hand    UPPER GI ENDOSCOPY,BIOPSY  10/30/2014         UPPER GI ENDOSCOPY,DILATN W GUIDE  10/30/2014           Prior to Admission medications    Medication Sig Start Date End Date Taking?  Authorizing Provider   zinc oxide-cod liver oil (DESITIN) 40 % ointment Apply  to affected area as needed for Skin Irritation. Yes Historical Provider   prazosin (MINIPRESS) 2 mg capsule TAKE 1 CAPSULE BY MOUTH TWICE A DAY. 6/18/18  Yes Francis Hogan NP   DULoxetine 40 mg cpDR Take 40 mg by mouth daily. 6/18/18  Yes Francis Hogan NP   primidone (MYSOLINE) 50 mg tablet 1 po qam and 3 po qhs 2/21/18  Yes Ann Mckee MD   ALPRAZolam Jasmin Sanes) 1 mg tablet Take 1 Tab by mouth three (3) times daily as needed for Anxiety. Max Daily Amount: 3 mg. 5/27/17  Yes Chichi Simons NP   promethazine (PHENERGAN) 25 mg tablet Take 1 Tab by mouth every six (6) hours as needed. 5/27/17  Yes Chichi Simons NP   guaiFENesin ER (MUCINEX) 1,200 mg Ta12 ER tablet Take 1 Tab by mouth two (2) times a day. 5/27/17  Yes Chichi Simons NP   furosemide (LASIX) 40 mg tablet Take 1 Tab by mouth daily. Patient taking differently: Take 40 mg by mouth daily as needed for Other (LE edema). 5/27/17  Yes Chichi Simons NP   PHENobarbital (LUMINAL) 60 mg tablet Take 1 Tab by mouth two (2) times a day. Max Daily Amount: 120 mg. 5/27/17  Yes Claribel Patel MD   montelukast (SINGULAIR) 10 mg tablet Take 10 mg by mouth daily. 2/16/15  Yes Nico Martinez MD   ezetimibe-simvastatin (VYTORIN 10/40) 10-40 mg per tablet Take 1 tablet by mouth nightly. Yes Historical Provider   metoprolol (LOPRESSOR) 25 mg tablet Take 1 Tab by mouth two (2) times a day. Patient taking differently: Take 25 mg by mouth nightly. 2/6/14  Yes Chan Batres NP   benzonatate (TESSALON) 200 mg capsule Take 1 Cap by mouth two (2) times daily as needed. 1/20/14  Yes Claribel Patel MD   albuterol-ipratropium (DUONEB) 2.5 mg-0.5 mg/3 ml nebulizer solution 3 mL by Nebulization route every six (6) hours as needed for Wheezing. Yes Historical Provider   esomeprazole (NEXIUM) 40 mg capsule Take 40 mg by mouth daily.    Yes Historical Provider   MAGOX 400 mg tablet TAKE ONE TABLET TWICE A DAY 11/4/13  Yes Miguel Angel Stauffer, ELVI   aspirin 81 mg chewable tablet Take 81 mg by mouth daily. Yes Nico Martinez MD   levothyroxine (SYNTHROID) 200 mcg tablet Take 200 mcg by mouth daily (before breakfast). Yes Nico Martinez MD   budesonide-formoterol (SYMBICORT) 160-4.5 mcg/actuation HFAA Take 2 Puffs by inhalation two (2) times a day. Historical Provider   acetaminophen-codeine (TYLENOL #3) 300-30 mg per tablet Take 1 Tab by mouth every eight (8) hours as needed. Max Daily Amount: 3 Tabs. 5/27/17   Chichi Simons NP   cetirizine (ZYRTEC) 10 mg tablet Take 1 Tab by mouth daily. 5/27/17   Chichi Simons NP   ibuprofen (MOTRIN) 800 mg tablet Take 1 Tab by mouth every eight (8) hours as needed. 5/27/17   Chichi Simons NP   albuterol (VENTOLIN HFA) 90 mcg/actuation inhaler Take 2 Puffs by inhalation every four (4) hours as needed for Wheezing. Historical Provider   dicyclomine (BENTYL) 10 mg capsule  2/13/15   Nico Martinez MD   glipiZIDE SR (GLUCOTROL) 5 mg CR tablet Take 5 mg by mouth two (2) times daily as needed (Patient monitors her BG levels and takes when she is also taking a steroid. ). 2/16/15   Nico Martinez MD   nystatin (MYCOSTATIN) 100,000 unit/mL suspension Take 5 mL by mouth four (4) times daily. swish and spit  Patient taking differently: Take 500,000 Units by mouth four (4) times daily as needed. swish and spit 1/20/14   Pretty Patiño MD   methocarbamol (ROBAXIN) 750 mg tablet Take 750-1,500 mg by mouth four (4) times daily as needed. Historical Provider   hyoscyamine SL (LEVSIN/SL) 0.125 mg SL tablet 0.125 mg by SubLINGual route three (3) times daily as needed for Cramping. Historical Provider   vit B Cmplx 3-FA-Vit C-Biotin (NEPHRO SHREYA RX) 1- mg-mg-mcg tablet Take 1 Tab by mouth daily.     Historical Provider     Current Facility-Administered Medications   Medication Dose Route Frequency    polyethylene glycol (MIRALAX) packet 17 g  17 g Oral DAILY    vitamin b comp & c no.4 tab 1 Tab  1 Tab Oral DAILY    insulin lispro (HUMALOG) injection SubCUTAneous AC&HS    metoprolol tartrate (LOPRESSOR) tablet 25 mg  25 mg Oral QHS    senna-docusate (PERICOLACE) 8.6-50 mg per tablet 1 Tab  1 Tab Oral DAILY    nystatin (MYCOSTATIN) 100,000 unit/mL oral suspension 500,000 Units  500,000 Units Oral QID    esomeprazole (NEXIUM) capsule 40 mg (Patient Supplied)  40 mg Oral ACB    prazosin (MINIPRESS) capsule 2 mg (Patient Supplied)  2 mg Oral BID    lactated Ringers infusion  20 mL/hr IntraVENous CONTINUOUS    fentaNYL (PF)  mcg/30 ml (ADULT)   IntraVENous TITRATE    sodium chloride (NS) flush 5-10 mL  5-10 mL IntraVENous Q8H    ketorolac (TORADOL) injection 30 mg  30 mg IntraVENous Q6H    enoxaparin (LOVENOX) injection 40 mg  40 mg SubCUTAneous Q24H    arformoterol (BROVANA) neb solution 15 mcg  15 mcg Nebulization BID RT    And    budesonide (PULMICORT) 500 mcg/2 ml nebulizer suspension  500 mcg Nebulization BID RT    DULoxetine (CYMBALTA) capsule 40 mg  40 mg Oral DAILY    primidone (MYSOLINE) tablet 50 mg  50 mg Oral DAILY    primidone (MYSOLINE) tablet 150 mg  150 mg Oral QHS    furosemide (LASIX) tablet 40 mg  40 mg Oral DAILY    montelukast (SINGULAIR) tablet 10 mg  10 mg Oral QHS    ezetimibe/simvastatin (VYTORIN) 10/40 mg   Oral QPM    levoFLOXacin (LEVAQUIN) tablet 750 mg  750 mg Oral QHS    albuterol-ipratropium (DUO-NEB) 2.5 MG-0.5 MG/3 ML  3 mL Nebulization QID RT    predniSONE (DELTASONE) tablet 60 mg  60 mg Oral DAILY WITH BREAKFAST    piperacillin-tazobactam (ZOSYN) 3.375 g in 0.9% sodium chloride (MBP/ADV) 100 mL  3.375 g IntraVENous Q8H    levothyroxine (SYNTHROID) tablet 200 mcg  200 mcg Oral ACB    lactobac ac& pc-s.therm-b.anim (ROBIN Q/RISAQUAD)  1 Cap Oral DAILY    PHENobarbital (LUMINAL) tablet 64.8 mg  64.8 mg Oral BID     Allergies   Allergen Reactions    Latex Contact Dermatitis    Dilaudid [Hydromorphone (Pf)] Other (comments)     Feels like bugs are crawling all over her    Lipitor [Atorvastatin] Other (comments)     LIVER TROUBLE ON THIS MEDICATION    Remeron [Mirtazapine] Other (comments)     Tremors    Sulfa (Sulfonamide Antibiotics) Itching      Social History   Substance Use Topics    Smoking status: Former Smoker     Packs/day: 1.00     Years: 30.00    Smokeless tobacco: Never Used    Alcohol use Yes      Comment: occasional          Objective:   Vital Signs:    Visit Vitals    /58 (BP 1 Location: Right arm, BP Patient Position: Sitting)    Pulse 97    Temp 98.1 °F (36.7 °C)    Resp 17    Ht 5' 4\" (1.626 m)    Wt 87.9 kg (193 lb 12.6 oz)    SpO2 97%    BMI 33.26 kg/m2       O2 Device: Nasal cannula   O2 Flow Rate (L/min): 2 l/min   Temp (24hrs), Av °F (36.7 °C), Min:97.7 °F (36.5 °C), Max:98.4 °F (36.9 °C)       Intake/Output:   Last shift:         Last 3 shifts:  1901 - 08/15 0700  In: 1061.7 [P.O.:660; I.V.:401.7]  Out: 2765 [Urine:2325]    Intake/Output Summary (Last 24 hours) at 08/15/18 0954  Last data filed at 08/15/18 0359   Gross per 24 hour   Intake                0 ml   Output             1745 ml   Net            -1745 ml     Physical Exam:    General:  Alert, cooperative, no distress. Head:  Normocephalic, without obvious abnormality, atraumatic. Eyes:  Conjunctivae/corneas clear. Nose: Nares normal. Septum midline. Mucosa normal.     Throat: Lips, mucosa, and tongue normal. Teeth and gums normal.   Neck: Supple, symmetrical, trachea midline    Back:   Symmetric, no curvature. ROM normal.   Lungs:   Mild ronchi, decreased breath sounds in the right base   Chest wall:  No tenderness or deformity. right chest tube in place   Heart:  Regular rate and rhythm    Abdomen:   Soft, non-tender. Bowel sounds normal.     Extremities: Extremities normal, atraumatic, no cyanosis, +edema   Skin: Skin color, texture, turgor normal. No rashes or lesions   Neurologic: Grossly nonfocal, has tremors of the LUE and RUE. Motor is grossly intact. Psych: NO anxiety or depression. Data:   Labs:  Recent Labs      08/15/18   0430  08/14/18   0415   WBC  15.5*  25.1*   HGB  8.8*  9.1*   HCT  28.4*  29.6*   PLT  353  378     Recent Labs      08/15/18   0430   NA  139   K  4.0   CL  101   CO2  29   GLU  137*   BUN  18   CREA  1.13*   CA  8.9     No results for input(s): PH, PCO2, PO2, HCO3, FIO2 in the last 72 hours.     Imaging:  I have personally reviewed the patients radiographs:CT   None today        ROME Kerns

## 2021-10-13 DIAGNOSIS — E55.9 VITAMIN D DEFICIENCY: ICD-10-CM

## 2021-10-13 DIAGNOSIS — M48.061 DEGENERATIVE LUMBAR SPINAL STENOSIS: ICD-10-CM

## 2021-10-13 DIAGNOSIS — G25.0 BENIGN FAMILIAL TREMOR: ICD-10-CM

## 2021-10-13 DIAGNOSIS — R55 SYNCOPE AND COLLAPSE: ICD-10-CM

## 2021-10-13 DIAGNOSIS — I67.89 CEREBRAL MICROVASCULAR DISEASE: ICD-10-CM

## 2021-10-13 DIAGNOSIS — G25.0 BENIGN ESSENTIAL TREMOR SYNDROME: ICD-10-CM

## 2021-10-13 DIAGNOSIS — E11.42 DIABETIC PERIPHERAL NEUROPATHY ASSOCIATED WITH TYPE 2 DIABETES MELLITUS (HCC): ICD-10-CM

## 2021-10-13 DIAGNOSIS — I65.23 STENOSIS OF BOTH INTERNAL CAROTID ARTERIES: ICD-10-CM

## 2021-10-13 DIAGNOSIS — R27.0 ATAXIA: ICD-10-CM

## 2021-10-13 DIAGNOSIS — M96.1 CERVICAL POST-LAMINECTOMY SYNDROME: ICD-10-CM

## 2021-10-13 DIAGNOSIS — M79.7 FIBROMYALGIA: ICD-10-CM

## 2021-10-13 DIAGNOSIS — R41.82 ALTERED MENTAL STATUS, UNSPECIFIED ALTERED MENTAL STATUS TYPE: ICD-10-CM

## 2021-10-13 DIAGNOSIS — E53.8 B12 DEFICIENCY: ICD-10-CM

## 2021-10-13 DIAGNOSIS — M47.22 CERVICAL RADICULOPATHY DUE TO DEGENERATIVE JOINT DISEASE OF SPINE: ICD-10-CM

## 2021-10-13 RX ORDER — PRIMIDONE 50 MG/1
TABLET ORAL
Qty: 150 TABLET | Refills: 0 | Status: SHIPPED | OUTPATIENT
Start: 2021-10-13 | End: 2021-11-18

## 2021-10-25 RX ORDER — PRAZOSIN HYDROCHLORIDE 2 MG/1
CAPSULE ORAL
Qty: 60 CAPSULE | Refills: 0 | Status: SHIPPED | OUTPATIENT
Start: 2021-10-25 | End: 2021-12-09 | Stop reason: SDUPTHER

## 2021-11-18 ENCOUNTER — HOSPITAL ENCOUNTER (EMERGENCY)
Age: 66
Discharge: LEFT BEFORE DISCHARGE | End: 2021-11-18
Attending: EMERGENCY MEDICINE
Payer: MEDICARE

## 2021-11-18 VITALS
HEIGHT: 64 IN | BODY MASS INDEX: 33.8 KG/M2 | WEIGHT: 198 LBS | SYSTOLIC BLOOD PRESSURE: 104 MMHG | OXYGEN SATURATION: 94 % | RESPIRATION RATE: 20 BRPM | TEMPERATURE: 97.7 F | DIASTOLIC BLOOD PRESSURE: 70 MMHG | HEART RATE: 88 BPM

## 2021-11-18 DIAGNOSIS — M79.7 FIBROMYALGIA: ICD-10-CM

## 2021-11-18 DIAGNOSIS — I65.23 STENOSIS OF BOTH INTERNAL CAROTID ARTERIES: ICD-10-CM

## 2021-11-18 DIAGNOSIS — G25.0 BENIGN ESSENTIAL TREMOR SYNDROME: ICD-10-CM

## 2021-11-18 DIAGNOSIS — R27.0 ATAXIA: ICD-10-CM

## 2021-11-18 DIAGNOSIS — M96.1 CERVICAL POST-LAMINECTOMY SYNDROME: ICD-10-CM

## 2021-11-18 DIAGNOSIS — M47.22 CERVICAL RADICULOPATHY DUE TO DEGENERATIVE JOINT DISEASE OF SPINE: ICD-10-CM

## 2021-11-18 DIAGNOSIS — E53.8 B12 DEFICIENCY: ICD-10-CM

## 2021-11-18 DIAGNOSIS — R55 SYNCOPE AND COLLAPSE: ICD-10-CM

## 2021-11-18 DIAGNOSIS — E55.9 VITAMIN D DEFICIENCY: ICD-10-CM

## 2021-11-18 DIAGNOSIS — E11.42 DIABETIC PERIPHERAL NEUROPATHY ASSOCIATED WITH TYPE 2 DIABETES MELLITUS (HCC): ICD-10-CM

## 2021-11-18 DIAGNOSIS — R41.82 ALTERED MENTAL STATUS, UNSPECIFIED ALTERED MENTAL STATUS TYPE: ICD-10-CM

## 2021-11-18 DIAGNOSIS — M48.061 DEGENERATIVE LUMBAR SPINAL STENOSIS: ICD-10-CM

## 2021-11-18 DIAGNOSIS — G25.0 BENIGN FAMILIAL TREMOR: ICD-10-CM

## 2021-11-18 DIAGNOSIS — I67.89 CEREBRAL MICROVASCULAR DISEASE: ICD-10-CM

## 2021-11-18 LAB
ALBUMIN SERPL-MCNC: 3.5 G/DL (ref 3.5–5)
ALBUMIN/GLOB SERPL: 0.9 {RATIO} (ref 1.1–2.2)
ALP SERPL-CCNC: 127 U/L (ref 45–117)
ALT SERPL-CCNC: 33 U/L (ref 12–78)
ANION GAP SERPL CALC-SCNC: 2 MMOL/L (ref 5–15)
AST SERPL-CCNC: 16 U/L (ref 15–37)
BASOPHILS # BLD: 0.1 K/UL (ref 0–0.1)
BASOPHILS NFR BLD: 1 % (ref 0–1)
BILIRUB SERPL-MCNC: 0.3 MG/DL (ref 0.2–1)
BNP SERPL-MCNC: 751 PG/ML
BUN SERPL-MCNC: 13 MG/DL (ref 6–20)
BUN/CREAT SERPL: 16 (ref 12–20)
CALCIUM SERPL-MCNC: 9.1 MG/DL (ref 8.5–10.1)
CHLORIDE SERPL-SCNC: 95 MMOL/L (ref 97–108)
CO2 SERPL-SCNC: 35 MMOL/L (ref 21–32)
CREAT SERPL-MCNC: 0.79 MG/DL (ref 0.55–1.02)
DIFFERENTIAL METHOD BLD: ABNORMAL
EOSINOPHIL # BLD: 0 K/UL (ref 0–0.4)
EOSINOPHIL NFR BLD: 0 % (ref 0–7)
ERYTHROCYTE [DISTWIDTH] IN BLOOD BY AUTOMATED COUNT: 16.2 % (ref 11.5–14.5)
GLOBULIN SER CALC-MCNC: 3.9 G/DL (ref 2–4)
GLUCOSE SERPL-MCNC: 88 MG/DL (ref 65–100)
HCT VFR BLD AUTO: 43.6 % (ref 35–47)
HGB BLD-MCNC: 13.1 G/DL (ref 11.5–16)
IMM GRANULOCYTES # BLD AUTO: 0 K/UL (ref 0–0.04)
IMM GRANULOCYTES NFR BLD AUTO: 0 % (ref 0–0.5)
LYMPHOCYTES # BLD: 3 K/UL (ref 0.8–3.5)
LYMPHOCYTES NFR BLD: 30 % (ref 12–49)
MAGNESIUM SERPL-MCNC: 2 MG/DL (ref 1.6–2.4)
MCH RBC QN AUTO: 27.1 PG (ref 26–34)
MCHC RBC AUTO-ENTMCNC: 30 G/DL (ref 30–36.5)
MCV RBC AUTO: 90.1 FL (ref 80–99)
MONOCYTES # BLD: 0.8 K/UL (ref 0–1)
MONOCYTES NFR BLD: 8 % (ref 5–13)
NEUTS SEG # BLD: 6 K/UL (ref 1.8–8)
NEUTS SEG NFR BLD: 61 % (ref 32–75)
NRBC # BLD: 0 K/UL (ref 0–0.01)
NRBC BLD-RTO: 0 PER 100 WBC
PLATELET # BLD AUTO: 375 K/UL (ref 150–400)
PMV BLD AUTO: 9.3 FL (ref 8.9–12.9)
POTASSIUM SERPL-SCNC: 3.9 MMOL/L (ref 3.5–5.1)
PROCALCITONIN SERPL-MCNC: <0.05 NG/ML
PROT SERPL-MCNC: 7.4 G/DL (ref 6.4–8.2)
RBC # BLD AUTO: 4.84 M/UL (ref 3.8–5.2)
SODIUM SERPL-SCNC: 132 MMOL/L (ref 136–145)
TROPONIN-HIGH SENSITIVITY: 30 NG/L (ref 0–51)
WBC # BLD AUTO: 9.9 K/UL (ref 3.6–11)

## 2021-11-18 PROCEDURE — 83880 ASSAY OF NATRIURETIC PEPTIDE: CPT

## 2021-11-18 PROCEDURE — 85025 COMPLETE CBC W/AUTO DIFF WBC: CPT

## 2021-11-18 PROCEDURE — 80053 COMPREHEN METABOLIC PANEL: CPT

## 2021-11-18 PROCEDURE — 75810000275 HC EMERGENCY DEPT VISIT NO LEVEL OF CARE

## 2021-11-18 PROCEDURE — 83735 ASSAY OF MAGNESIUM: CPT

## 2021-11-18 PROCEDURE — 84145 PROCALCITONIN (PCT): CPT

## 2021-11-18 PROCEDURE — 84484 ASSAY OF TROPONIN QUANT: CPT

## 2021-11-18 PROCEDURE — 93005 ELECTROCARDIOGRAM TRACING: CPT

## 2021-11-18 RX ORDER — ARIPIPRAZOLE 5 MG/1
TABLET ORAL
Qty: 30 TABLET | Refills: 2 | Status: SHIPPED | OUTPATIENT
Start: 2021-11-18 | End: 2021-12-09 | Stop reason: ALTCHOICE

## 2021-11-18 RX ORDER — PREDNISONE 20 MG/1
60 TABLET ORAL
Status: DISCONTINUED | OUTPATIENT
Start: 2021-11-18 | End: 2021-11-18 | Stop reason: HOSPADM

## 2021-11-18 RX ORDER — DOXEPIN HYDROCHLORIDE 10 MG/1
CAPSULE ORAL
Qty: 30 CAPSULE | Refills: 2 | Status: SHIPPED | OUTPATIENT
Start: 2021-11-18 | End: 2021-12-09

## 2021-11-18 RX ORDER — PRIMIDONE 50 MG/1
TABLET ORAL
Qty: 150 TABLET | Refills: 0 | Status: SHIPPED | OUTPATIENT
Start: 2021-11-18 | End: 2021-12-20

## 2021-11-18 RX ORDER — IPRATROPIUM BROMIDE AND ALBUTEROL SULFATE 2.5; .5 MG/3ML; MG/3ML
3 SOLUTION RESPIRATORY (INHALATION)
Status: DISCONTINUED | OUTPATIENT
Start: 2021-11-18 | End: 2021-11-18 | Stop reason: HOSPADM

## 2021-11-18 NOTE — ED TRIAGE NOTES
Went to pain management appointment today and O2 sats found to be in mid 80's. Hx COPD but not on home O2. Placed on oxygen with improvement in sats. Reports chronic cough with phlegm. Uses nebulizers at home.

## 2021-11-19 LAB
ATRIAL RATE: 88 BPM
CALCULATED P AXIS, ECG09: 70 DEGREES
CALCULATED R AXIS, ECG10: 100 DEGREES
CALCULATED T AXIS, ECG11: 1 DEGREES
DIAGNOSIS, 93000: NORMAL
P-R INTERVAL, ECG05: 142 MS
Q-T INTERVAL, ECG07: 342 MS
QRS DURATION, ECG06: 88 MS
QTC CALCULATION (BEZET), ECG08: 413 MS
VENTRICULAR RATE, ECG03: 88 BPM

## 2021-11-19 NOTE — ED NOTES
Patient did not want to wait any longer. Decided to leave ED. States she has pulse ox at home and will monitor herself and follow up with MD tomorrow. Going home with family.

## 2021-11-25 ENCOUNTER — HOSPITAL ENCOUNTER (INPATIENT)
Age: 66
LOS: 11 days | Discharge: HOME HEALTH | DRG: 208 | End: 2021-12-06
Attending: EMERGENCY MEDICINE | Admitting: INTERNAL MEDICINE
Payer: MEDICARE

## 2021-11-25 ENCOUNTER — APPOINTMENT (OUTPATIENT)
Dept: GENERAL RADIOLOGY | Age: 66
DRG: 208 | End: 2021-11-25
Attending: NURSE PRACTITIONER
Payer: MEDICARE

## 2021-11-25 ENCOUNTER — APPOINTMENT (OUTPATIENT)
Dept: CT IMAGING | Age: 66
DRG: 208 | End: 2021-11-25
Attending: NURSE PRACTITIONER
Payer: MEDICARE

## 2021-11-25 ENCOUNTER — APPOINTMENT (OUTPATIENT)
Dept: GENERAL RADIOLOGY | Age: 66
DRG: 208 | End: 2021-11-25
Attending: EMERGENCY MEDICINE
Payer: MEDICARE

## 2021-11-25 DIAGNOSIS — J96.22 ACUTE ON CHRONIC RESPIRATORY FAILURE WITH HYPOXIA AND HYPERCAPNIA (HCC): Primary | ICD-10-CM

## 2021-11-25 DIAGNOSIS — J96.21 ACUTE ON CHRONIC RESPIRATORY FAILURE WITH HYPOXIA AND HYPERCAPNIA (HCC): Primary | ICD-10-CM

## 2021-11-25 DIAGNOSIS — R65.10 SIRS (SYSTEMIC INFLAMMATORY RESPONSE SYNDROME) (HCC): ICD-10-CM

## 2021-11-25 DIAGNOSIS — E87.1 HYPONATREMIA: ICD-10-CM

## 2021-11-25 DIAGNOSIS — E87.29 RESPIRATORY ACIDOSIS: ICD-10-CM

## 2021-11-25 PROBLEM — J96.92 HYPERCAPNIC RESPIRATORY FAILURE (HCC): Status: ACTIVE | Noted: 2021-11-25

## 2021-11-25 LAB
ALBUMIN SERPL-MCNC: 3.3 G/DL (ref 3.5–5)
ALBUMIN/GLOB SERPL: 0.8 {RATIO} (ref 1.1–2.2)
ALP SERPL-CCNC: 117 U/L (ref 45–117)
ALT SERPL-CCNC: 64 U/L (ref 12–78)
ANION GAP SERPL CALC-SCNC: 3 MMOL/L (ref 5–15)
APTT PPP: 23.4 SEC (ref 22.1–31)
ARTERIAL PATENCY WRIST A: YES
AST SERPL-CCNC: 42 U/L (ref 15–37)
BASOPHILS # BLD: 0.1 K/UL (ref 0–0.1)
BASOPHILS # BLD: 0.1 K/UL (ref 0–0.1)
BASOPHILS NFR BLD: 1 % (ref 0–1)
BASOPHILS NFR BLD: 1 % (ref 0–1)
BDY SITE: ABNORMAL
BILIRUB SERPL-MCNC: 0.2 MG/DL (ref 0.2–1)
BNP SERPL-MCNC: 4531 PG/ML
BUN SERPL-MCNC: 15 MG/DL (ref 6–20)
BUN/CREAT SERPL: 22 (ref 12–20)
CALCIUM SERPL-MCNC: 10.4 MG/DL (ref 8.5–10.1)
CHLORIDE SERPL-SCNC: 90 MMOL/L (ref 97–108)
CK SERPL-CCNC: 50 U/L (ref 26–192)
CO2 SERPL-SCNC: 33 MMOL/L (ref 21–32)
COVID-19 RAPID TEST, COVR: NOT DETECTED
CREAT SERPL-MCNC: 0.69 MG/DL (ref 0.55–1.02)
DIFFERENTIAL METHOD BLD: ABNORMAL
DIFFERENTIAL METHOD BLD: ABNORMAL
EOSINOPHIL # BLD: 0 K/UL (ref 0–0.4)
EOSINOPHIL # BLD: 0 K/UL (ref 0–0.4)
EOSINOPHIL NFR BLD: 0 % (ref 0–7)
EOSINOPHIL NFR BLD: 0 % (ref 0–7)
ERYTHROCYTE [DISTWIDTH] IN BLOOD BY AUTOMATED COUNT: 16.8 % (ref 11.5–14.5)
ERYTHROCYTE [DISTWIDTH] IN BLOOD BY AUTOMATED COUNT: 16.8 % (ref 11.5–14.5)
FIO2 ON VENT: 70 %
GLOBULIN SER CALC-MCNC: 4 G/DL (ref 2–4)
GLUCOSE SERPL-MCNC: 156 MG/DL (ref 65–100)
HCT VFR BLD AUTO: 44.4 % (ref 35–47)
HCT VFR BLD AUTO: 45.2 % (ref 35–47)
HGB BLD-MCNC: 13.1 G/DL (ref 11.5–16)
HGB BLD-MCNC: 13.1 G/DL (ref 11.5–16)
IMM GRANULOCYTES # BLD AUTO: 0.1 K/UL (ref 0–0.04)
IMM GRANULOCYTES # BLD AUTO: 0.1 K/UL (ref 0–0.04)
IMM GRANULOCYTES NFR BLD AUTO: 1 % (ref 0–0.5)
IMM GRANULOCYTES NFR BLD AUTO: 1 % (ref 0–0.5)
IPAP/PIP, IPAPIP: 20
LACTATE SERPL-SCNC: 1 MMOL/L (ref 0.4–2)
LYMPHOCYTES # BLD: 2.4 K/UL (ref 0.8–3.5)
LYMPHOCYTES # BLD: 3.5 K/UL (ref 0.8–3.5)
LYMPHOCYTES NFR BLD: 15 % (ref 12–49)
LYMPHOCYTES NFR BLD: 20 % (ref 12–49)
MCH RBC QN AUTO: 26.1 PG (ref 26–34)
MCH RBC QN AUTO: 26.2 PG (ref 26–34)
MCHC RBC AUTO-ENTMCNC: 29 G/DL (ref 30–36.5)
MCHC RBC AUTO-ENTMCNC: 29.5 G/DL (ref 30–36.5)
MCV RBC AUTO: 88.8 FL (ref 80–99)
MCV RBC AUTO: 90.2 FL (ref 80–99)
MONOCYTES # BLD: 1.3 K/UL (ref 0–1)
MONOCYTES # BLD: 1.6 K/UL (ref 0–1)
MONOCYTES NFR BLD: 8 % (ref 5–13)
MONOCYTES NFR BLD: 9 % (ref 5–13)
NEUTS SEG # BLD: 12.3 K/UL (ref 1.8–8)
NEUTS SEG # BLD: 12.7 K/UL (ref 1.8–8)
NEUTS SEG NFR BLD: 69 % (ref 32–75)
NEUTS SEG NFR BLD: 75 % (ref 32–75)
NRBC # BLD: 0.06 K/UL (ref 0–0.01)
NRBC # BLD: 0.12 K/UL (ref 0–0.01)
NRBC BLD-RTO: 0.4 PER 100 WBC
NRBC BLD-RTO: 0.7 PER 100 WBC
PCO2 BLDA: >95 MMHG (ref 35–45)
PEEP RESPIRATORY: 6 CM[H2O]
PH BLDA: 7.17 [PH] (ref 7.35–7.45)
PLATELET # BLD AUTO: 445 K/UL (ref 150–400)
PLATELET # BLD AUTO: 445 K/UL (ref 150–400)
PMV BLD AUTO: 10 FL (ref 8.9–12.9)
PMV BLD AUTO: 9.5 FL (ref 8.9–12.9)
PO2 BLDA: 103 MMHG (ref 80–100)
POTASSIUM SERPL-SCNC: 5 MMOL/L (ref 3.5–5.1)
PROCALCITONIN SERPL-MCNC: <0.05 NG/ML
PROT SERPL-MCNC: 7.3 G/DL (ref 6.4–8.2)
RBC # BLD AUTO: 5 M/UL (ref 3.8–5.2)
RBC # BLD AUTO: 5.01 M/UL (ref 3.8–5.2)
SAO2% DEVICE SAO2% SENSOR NAME: ABNORMAL
SERVICE CMNT-IMP: ABNORMAL
SODIUM SERPL-SCNC: 126 MMOL/L (ref 136–145)
SOURCE, COVRS: NORMAL
SPECIMEN SITE: ABNORMAL
THERAPEUTIC RANGE,PTTT: NORMAL SECS (ref 58–77)
TROPONIN-HIGH SENSITIVITY: 166 NG/L (ref 0–51)
WBC # BLD AUTO: 16.2 K/UL (ref 3.6–11)
WBC # BLD AUTO: 18.1 K/UL (ref 3.6–11)

## 2021-11-25 PROCEDURE — 96375 TX/PRO/DX INJ NEW DRUG ADDON: CPT

## 2021-11-25 PROCEDURE — 94660 CPAP INITIATION&MGMT: CPT

## 2021-11-25 PROCEDURE — 94640 AIRWAY INHALATION TREATMENT: CPT

## 2021-11-25 PROCEDURE — 80053 COMPREHEN METABOLIC PANEL: CPT

## 2021-11-25 PROCEDURE — 31500 INSERT EMERGENCY AIRWAY: CPT

## 2021-11-25 PROCEDURE — 83880 ASSAY OF NATRIURETIC PEPTIDE: CPT

## 2021-11-25 PROCEDURE — 74011000636 HC RX REV CODE- 636: Performed by: INTERNAL MEDICINE

## 2021-11-25 PROCEDURE — 74011000250 HC RX REV CODE- 250: Performed by: EMERGENCY MEDICINE

## 2021-11-25 PROCEDURE — 74011000258 HC RX REV CODE- 258: Performed by: EMERGENCY MEDICINE

## 2021-11-25 PROCEDURE — 94002 VENT MGMT INPAT INIT DAY: CPT

## 2021-11-25 PROCEDURE — 82550 ASSAY OF CK (CPK): CPT

## 2021-11-25 PROCEDURE — 74011250636 HC RX REV CODE- 250/636: Performed by: NURSE PRACTITIONER

## 2021-11-25 PROCEDURE — 94644 CONT INHLJ TX 1ST HOUR: CPT

## 2021-11-25 PROCEDURE — 94762 N-INVAS EAR/PLS OXIMTRY CONT: CPT

## 2021-11-25 PROCEDURE — 36600 WITHDRAWAL OF ARTERIAL BLOOD: CPT

## 2021-11-25 PROCEDURE — 96365 THER/PROPH/DIAG IV INF INIT: CPT

## 2021-11-25 PROCEDURE — 85025 COMPLETE CBC W/AUTO DIFF WBC: CPT

## 2021-11-25 PROCEDURE — 84145 PROCALCITONIN (PCT): CPT

## 2021-11-25 PROCEDURE — 74011000250 HC RX REV CODE- 250: Performed by: NURSE PRACTITIONER

## 2021-11-25 PROCEDURE — 74011250637 HC RX REV CODE- 250/637: Performed by: EMERGENCY MEDICINE

## 2021-11-25 PROCEDURE — 87635 SARS-COV-2 COVID-19 AMP PRB: CPT

## 2021-11-25 PROCEDURE — 82947 ASSAY GLUCOSE BLOOD QUANT: CPT

## 2021-11-25 PROCEDURE — 36415 COLL VENOUS BLD VENIPUNCTURE: CPT

## 2021-11-25 PROCEDURE — 82803 BLOOD GASES ANY COMBINATION: CPT

## 2021-11-25 PROCEDURE — 87040 BLOOD CULTURE FOR BACTERIA: CPT

## 2021-11-25 PROCEDURE — 85730 THROMBOPLASTIN TIME PARTIAL: CPT

## 2021-11-25 PROCEDURE — 71045 X-RAY EXAM CHEST 1 VIEW: CPT

## 2021-11-25 PROCEDURE — 83605 ASSAY OF LACTIC ACID: CPT

## 2021-11-25 PROCEDURE — 5A1935Z RESPIRATORY VENTILATION, LESS THAN 24 CONSECUTIVE HOURS: ICD-10-PCS | Performed by: GENERAL ACUTE CARE HOSPITAL

## 2021-11-25 PROCEDURE — 65610000006 HC RM INTENSIVE CARE

## 2021-11-25 PROCEDURE — 99285 EMERGENCY DEPT VISIT HI MDM: CPT

## 2021-11-25 PROCEDURE — 74011250636 HC RX REV CODE- 250/636: Performed by: EMERGENCY MEDICINE

## 2021-11-25 PROCEDURE — 96374 THER/PROPH/DIAG INJ IV PUSH: CPT

## 2021-11-25 PROCEDURE — 84484 ASSAY OF TROPONIN QUANT: CPT

## 2021-11-25 PROCEDURE — 93005 ELECTROCARDIOGRAM TRACING: CPT

## 2021-11-25 PROCEDURE — 71275 CT ANGIOGRAPHY CHEST: CPT

## 2021-11-25 PROCEDURE — 0BH17EZ INSERTION OF ENDOTRACHEAL AIRWAY INTO TRACHEA, VIA NATURAL OR ARTIFICIAL OPENING: ICD-10-PCS | Performed by: NURSE PRACTITIONER

## 2021-11-25 RX ORDER — ALBUTEROL SULFATE 0.83 MG/ML
2.5 SOLUTION RESPIRATORY (INHALATION)
Status: DISCONTINUED | OUTPATIENT
Start: 2021-11-25 | End: 2021-12-06 | Stop reason: HOSPADM

## 2021-11-25 RX ORDER — MAGNESIUM SULFATE 100 %
4 CRYSTALS MISCELLANEOUS AS NEEDED
Status: DISCONTINUED | OUTPATIENT
Start: 2021-11-25 | End: 2021-12-06 | Stop reason: HOSPADM

## 2021-11-25 RX ORDER — INSULIN LISPRO 100 [IU]/ML
INJECTION, SOLUTION INTRAVENOUS; SUBCUTANEOUS
Status: DISCONTINUED | OUTPATIENT
Start: 2021-11-26 | End: 2021-12-06 | Stop reason: HOSPADM

## 2021-11-25 RX ORDER — HEPARIN SODIUM 10000 [USP'U]/100ML
10-25 INJECTION, SOLUTION INTRAVENOUS
Status: DISCONTINUED | OUTPATIENT
Start: 2021-11-25 | End: 2021-11-26

## 2021-11-25 RX ORDER — MIDAZOLAM HYDROCHLORIDE 1 MG/ML
2 INJECTION, SOLUTION INTRAMUSCULAR; INTRAVENOUS ONCE
Status: COMPLETED | OUTPATIENT
Start: 2021-11-25 | End: 2021-11-25

## 2021-11-25 RX ORDER — ACETAMINOPHEN 325 MG/1
650 TABLET ORAL
Status: DISCONTINUED | OUTPATIENT
Start: 2021-11-25 | End: 2021-11-29

## 2021-11-25 RX ORDER — DEXTROSE 50 % IN WATER (D50W) INTRAVENOUS SYRINGE
25-50 AS NEEDED
Status: DISCONTINUED | OUTPATIENT
Start: 2021-11-25 | End: 2021-12-06 | Stop reason: HOSPADM

## 2021-11-25 RX ORDER — FUROSEMIDE 40 MG/1
40 TABLET ORAL DAILY
Status: DISCONTINUED | OUTPATIENT
Start: 2021-11-26 | End: 2021-12-06 | Stop reason: HOSPADM

## 2021-11-25 RX ORDER — HEPARIN SODIUM 1000 [USP'U]/ML
4000 INJECTION, SOLUTION INTRAVENOUS; SUBCUTANEOUS ONCE
Status: COMPLETED | OUTPATIENT
Start: 2021-11-25 | End: 2021-11-26

## 2021-11-25 RX ORDER — ACETAMINOPHEN 650 MG/1
650 SUPPOSITORY RECTAL
Status: DISCONTINUED | OUTPATIENT
Start: 2021-11-25 | End: 2021-11-29

## 2021-11-25 RX ORDER — NITROGLYCERIN 0.4 MG/1
0.4 TABLET SUBLINGUAL
Status: DISCONTINUED | OUTPATIENT
Start: 2021-11-25 | End: 2021-12-06 | Stop reason: HOSPADM

## 2021-11-25 RX ORDER — FUROSEMIDE 10 MG/ML
60 INJECTION INTRAMUSCULAR; INTRAVENOUS
Status: COMPLETED | OUTPATIENT
Start: 2021-11-25 | End: 2021-11-25

## 2021-11-25 RX ORDER — NITROGLYCERIN 20 MG/100ML
0-200 INJECTION INTRAVENOUS
Status: DISCONTINUED | OUTPATIENT
Start: 2021-11-25 | End: 2021-11-25

## 2021-11-25 RX ORDER — ROCURONIUM BROMIDE 10 MG/ML
50 INJECTION, SOLUTION INTRAVENOUS
Status: COMPLETED | OUTPATIENT
Start: 2021-11-25 | End: 2021-11-25

## 2021-11-25 RX ORDER — NOREPINEPHRINE BITARTRATE/D5W 8 MG/250ML
.5-3 PLASTIC BAG, INJECTION (ML) INTRAVENOUS
Status: DISCONTINUED | OUTPATIENT
Start: 2021-11-25 | End: 2021-11-26

## 2021-11-25 RX ORDER — IPRATROPIUM BROMIDE AND ALBUTEROL SULFATE 2.5; .5 MG/3ML; MG/3ML
3 SOLUTION RESPIRATORY (INHALATION)
Status: DISCONTINUED | OUTPATIENT
Start: 2021-11-26 | End: 2021-12-02

## 2021-11-25 RX ORDER — GUAIFENESIN 100 MG/5ML
81 LIQUID (ML) ORAL DAILY
Status: DISCONTINUED | OUTPATIENT
Start: 2021-11-26 | End: 2021-12-06 | Stop reason: HOSPADM

## 2021-11-25 RX ORDER — ALBUTEROL SULFATE 0.83 MG/ML
10 SOLUTION RESPIRATORY (INHALATION)
Status: COMPLETED | OUTPATIENT
Start: 2021-11-25 | End: 2021-11-25

## 2021-11-25 RX ORDER — LEVOTHYROXINE SODIUM 100 UG/1
200 TABLET ORAL
Status: DISCONTINUED | OUTPATIENT
Start: 2021-11-26 | End: 2021-12-06 | Stop reason: HOSPADM

## 2021-11-25 RX ORDER — ETOMIDATE 2 MG/ML
20 INJECTION INTRAVENOUS ONCE
Status: COMPLETED | OUTPATIENT
Start: 2021-11-25 | End: 2021-11-25

## 2021-11-25 RX ORDER — MAGNESIUM SULFATE HEPTAHYDRATE 40 MG/ML
2 INJECTION, SOLUTION INTRAVENOUS ONCE
Status: COMPLETED | OUTPATIENT
Start: 2021-11-25 | End: 2021-11-25

## 2021-11-25 RX ORDER — SODIUM CHLORIDE 0.9 % (FLUSH) 0.9 %
5-40 SYRINGE (ML) INJECTION EVERY 8 HOURS
Status: DISCONTINUED | OUTPATIENT
Start: 2021-11-25 | End: 2021-12-06 | Stop reason: HOSPADM

## 2021-11-25 RX ORDER — EPINEPHRINE 0.1 MG/ML
INJECTION INTRACARDIAC; INTRAVENOUS
Status: DISCONTINUED
Start: 2021-11-25 | End: 2021-11-26

## 2021-11-25 RX ORDER — ARIPIPRAZOLE 5 MG/1
5 TABLET ORAL DAILY
Status: DISCONTINUED | OUTPATIENT
Start: 2021-11-26 | End: 2021-11-29

## 2021-11-25 RX ORDER — SODIUM CHLORIDE 0.9 % (FLUSH) 0.9 %
5-40 SYRINGE (ML) INJECTION AS NEEDED
Status: DISCONTINUED | OUTPATIENT
Start: 2021-11-25 | End: 2021-12-06 | Stop reason: HOSPADM

## 2021-11-25 RX ADMIN — Medication 50 MCG/HR: at 23:54

## 2021-11-25 RX ADMIN — MAGNESIUM SULFATE HEPTAHYDRATE 2 G: 2 INJECTION, SOLUTION INTRAVENOUS at 20:58

## 2021-11-25 RX ADMIN — METHYLPREDNISOLONE SODIUM SUCCINATE 125 MG: 125 INJECTION, POWDER, FOR SOLUTION INTRAMUSCULAR; INTRAVENOUS at 21:43

## 2021-11-25 RX ADMIN — NITROGLYCERIN 0.4 MG: 0.4 TABLET, ORALLY DISINTEGRATING SUBLINGUAL at 20:50

## 2021-11-25 RX ADMIN — IOPAMIDOL 80 ML: 755 INJECTION, SOLUTION INTRAVENOUS at 22:48

## 2021-11-25 RX ADMIN — Medication 50 MG: at 23:24

## 2021-11-25 RX ADMIN — AZITHROMYCIN MONOHYDRATE 500 MG: 500 INJECTION, POWDER, LYOPHILIZED, FOR SOLUTION INTRAVENOUS at 22:27

## 2021-11-25 RX ADMIN — CEFTRIAXONE 1 G: 1 INJECTION, POWDER, FOR SOLUTION INTRAMUSCULAR; INTRAVENOUS at 21:47

## 2021-11-25 RX ADMIN — MIDAZOLAM HYDROCHLORIDE 2 MG: 1 INJECTION, SOLUTION INTRAMUSCULAR; INTRAVENOUS at 23:47

## 2021-11-25 RX ADMIN — ARFORMOTEROL TARTRATE: 15 SOLUTION RESPIRATORY (INHALATION) at 23:51

## 2021-11-25 RX ADMIN — FUROSEMIDE 60 MG: 10 INJECTION, SOLUTION INTRAMUSCULAR; INTRAVENOUS at 20:51

## 2021-11-25 RX ADMIN — Medication 10 ML: at 23:44

## 2021-11-25 RX ADMIN — ETOMIDATE 16 MG: 2 INJECTION, SOLUTION INTRAVENOUS at 23:24

## 2021-11-25 RX ADMIN — ALBUTEROL SULFATE 10 MG: 2.5 SOLUTION RESPIRATORY (INHALATION) at 21:07

## 2021-11-25 RX ADMIN — PHENYLEPHRINE HYDROCHLORIDE 120 MCG: 10 INJECTION INTRAVENOUS at 23:24

## 2021-11-26 ENCOUNTER — APPOINTMENT (OUTPATIENT)
Dept: GENERAL RADIOLOGY | Age: 66
DRG: 208 | End: 2021-11-26
Attending: INTERNAL MEDICINE
Payer: MEDICARE

## 2021-11-26 ENCOUNTER — APPOINTMENT (OUTPATIENT)
Dept: NON INVASIVE DIAGNOSTICS | Age: 66
DRG: 208 | End: 2021-11-26
Attending: NURSE PRACTITIONER
Payer: MEDICARE

## 2021-11-26 LAB
ALBUMIN SERPL-MCNC: 2.8 G/DL (ref 3.5–5)
ALBUMIN/GLOB SERPL: 0.8 {RATIO} (ref 1.1–2.2)
ALP SERPL-CCNC: 105 U/L (ref 45–117)
ALT SERPL-CCNC: 55 U/L (ref 12–78)
ANION GAP SERPL CALC-SCNC: 5 MMOL/L (ref 5–15)
ARTERIAL PATENCY WRIST A: ABNORMAL
AST SERPL-CCNC: 29 U/L (ref 15–37)
ATRIAL RATE: 104 BPM
BASE EXCESS BLDA CALC-SCNC: 2.8 MMOL/L
BASOPHILS # BLD: 0 K/UL (ref 0–0.1)
BASOPHILS NFR BLD: 0 % (ref 0–1)
BDY SITE: ABNORMAL
BILIRUB DIRECT SERPL-MCNC: 0.1 MG/DL (ref 0–0.2)
BILIRUB SERPL-MCNC: 0.3 MG/DL (ref 0.2–1)
BUN SERPL-MCNC: 15 MG/DL (ref 6–20)
BUN/CREAT SERPL: 19 (ref 12–20)
CALCIUM SERPL-MCNC: 10.1 MG/DL (ref 8.5–10.1)
CALCULATED P AXIS, ECG09: 65 DEGREES
CALCULATED R AXIS, ECG10: 105 DEGREES
CALCULATED T AXIS, ECG11: 36 DEGREES
CHLORIDE SERPL-SCNC: 89 MMOL/L (ref 97–108)
CO2 SERPL-SCNC: 32 MMOL/L (ref 21–32)
CREAT SERPL-MCNC: 0.77 MG/DL (ref 0.55–1.02)
DIAGNOSIS, 93000: NORMAL
DIFFERENTIAL METHOD BLD: ABNORMAL
ECHO AO ROOT DIAM: 2.94 CM
ECHO AV AREA PEAK VELOCITY: 2.05 CM2
ECHO AV AREA/BSA PEAK VELOCITY: 1 CM2/M2
ECHO AV MEAN GRADIENT: 2.55 MMHG
ECHO AV PEAK GRADIENT: 4.33 MMHG
ECHO AV PEAK VELOCITY: 103.67 CM/S
ECHO AV VTI: 16.74 CM
ECHO EST RA PRESSURE: 10 MMHG
ECHO LA AREA 4C: 14.43 CM2
ECHO LA MAJOR AXIS: 3.35 CM
ECHO LA MINOR AXIS: 1.69 CM
ECHO LA VOL 4C: 33.14 ML (ref 22–52)
ECHO LA VOLUME INDEX A4C: 16.74 ML/M2 (ref 16–28)
ECHO LV E' LATERAL VELOCITY: 13.38 CM/S
ECHO LV E' SEPTAL VELOCITY: 7.47 CM/S
ECHO LV INTERNAL DIMENSION DIASTOLIC: 4.6 CM (ref 3.9–5.3)
ECHO LV INTERNAL DIMENSION SYSTOLIC: 3.48 CM
ECHO LV IVSD: 1.04 CM (ref 0.6–0.9)
ECHO LV MASS 2D: 143.9 G (ref 67–162)
ECHO LV MASS INDEX 2D: 72.7 G/M2 (ref 43–95)
ECHO LV POSTERIOR WALL DIASTOLIC: 0.82 CM (ref 0.6–0.9)
ECHO LVOT DIAM: 1.91 CM
ECHO LVOT PEAK GRADIENT: 2.21 MMHG
ECHO LVOT PEAK VELOCITY: 74.25 CM/S
ECHO MV A VELOCITY: 92.67 CM/S
ECHO MV E DECELERATION TIME (DT): 152.4 MS
ECHO MV E VELOCITY: 81.39 CM/S
ECHO MV E/A RATIO: 0.88
ECHO MV E/E' LATERAL: 6.08
ECHO MV E/E' RATIO (AVERAGED): 8.49
ECHO MV E/E' SEPTAL: 10.9
ECHO MV MAX VELOCITY: 93.96 CM/S
ECHO MV MEAN GRADIENT: 1.87 MMHG
ECHO MV PEAK GRADIENT: 3.53 MMHG
ECHO MV VTI: 20.38 CM
ECHO PV MAX VELOCITY: 106.52 CM/S
ECHO PV PEAK INSTANTANEOUS GRADIENT SYSTOLIC: 4.54 MMHG
ECHO RA AREA 4C: 18.36 CM2
ECHO RIGHT VENTRICULAR SYSTOLIC PRESSURE (RVSP): 50.05 MMHG
ECHO TV REGURGITANT MAX VELOCITY: 316.14 CM/S
ECHO TV REGURGITANT PEAK GRADIENT: 40.05 MMHG
EOSINOPHIL # BLD: 0 K/UL (ref 0–0.4)
EOSINOPHIL NFR BLD: 0 % (ref 0–7)
ERYTHROCYTE [DISTWIDTH] IN BLOOD BY AUTOMATED COUNT: 17.2 % (ref 11.5–14.5)
EST. AVERAGE GLUCOSE BLD GHB EST-MCNC: 143 MG/DL
FIO2 ON VENT: 70 %
GAS FLOW.O2 SETTING OXYMISER: 24 L/MIN
GLOBULIN SER CALC-MCNC: 3.5 G/DL (ref 2–4)
GLUCOSE BLD STRIP.AUTO-MCNC: 118 MG/DL (ref 65–117)
GLUCOSE BLD STRIP.AUTO-MCNC: 125 MG/DL (ref 65–117)
GLUCOSE BLD STRIP.AUTO-MCNC: 130 MG/DL (ref 65–117)
GLUCOSE BLD STRIP.AUTO-MCNC: 155 MG/DL (ref 65–117)
GLUCOSE SERPL-MCNC: 172 MG/DL (ref 65–100)
HBA1C MFR BLD: 6.6 % (ref 4–5.6)
HCO3 BLDA-SCNC: 31 MMOL/L (ref 22–26)
HCT VFR BLD AUTO: 39.6 % (ref 35–47)
HGB BLD-MCNC: 12.1 G/DL (ref 11.5–16)
IMM GRANULOCYTES # BLD AUTO: 0.1 K/UL (ref 0–0.04)
IMM GRANULOCYTES NFR BLD AUTO: 1 % (ref 0–0.5)
LYMPHOCYTES # BLD: 3.5 K/UL (ref 0.8–3.5)
LYMPHOCYTES NFR BLD: 22 % (ref 12–49)
MAGNESIUM SERPL-MCNC: 2 MG/DL (ref 1.6–2.4)
MCH RBC QN AUTO: 25.8 PG (ref 26–34)
MCHC RBC AUTO-ENTMCNC: 30.6 G/DL (ref 30–36.5)
MCV RBC AUTO: 84.4 FL (ref 80–99)
MONOCYTES # BLD: 1.3 K/UL (ref 0–1)
MONOCYTES NFR BLD: 8 % (ref 5–13)
NEUTS SEG # BLD: 11 K/UL (ref 1.8–8)
NEUTS SEG NFR BLD: 69 % (ref 32–75)
NRBC # BLD: 0.11 K/UL (ref 0–0.01)
NRBC BLD-RTO: 0.7 PER 100 WBC
P-R INTERVAL, ECG05: 138 MS
PCO2 BLDA: 60 MMHG (ref 35–45)
PEEP RESPIRATORY: 8 CM[H2O]
PH BLDA: 7.33 [PH] (ref 7.35–7.45)
PHOSPHATE SERPL-MCNC: 3.7 MG/DL (ref 2.6–4.7)
PLATELET # BLD AUTO: 293 K/UL (ref 150–400)
PMV BLD AUTO: 9.5 FL (ref 8.9–12.9)
PO2 BLDA: 133 MMHG (ref 80–100)
POTASSIUM SERPL-SCNC: 4.4 MMOL/L (ref 3.5–5.1)
PROT SERPL-MCNC: 6.3 G/DL (ref 6.4–8.2)
Q-T INTERVAL, ECG07: 296 MS
QRS DURATION, ECG06: 84 MS
QTC CALCULATION (BEZET), ECG08: 389 MS
RBC # BLD AUTO: 4.69 M/UL (ref 3.8–5.2)
SAO2 % BLD: 98 % (ref 92–97)
SAO2% DEVICE SAO2% SENSOR NAME: ABNORMAL
SERVICE CMNT-IMP: ABNORMAL
SODIUM SERPL-SCNC: 126 MMOL/L (ref 136–145)
SPECIMEN SITE: ABNORMAL
TROPONIN-HIGH SENSITIVITY: 193 NG/L (ref 0–51)
TSH SERPL DL<=0.05 MIU/L-ACNC: 1.5 UIU/ML (ref 0.36–3.74)
VENTRICULAR RATE, ECG03: 104 BPM
VT SETTING VENT: 375 ML
WBC # BLD AUTO: 16 K/UL (ref 3.6–11)

## 2021-11-26 PROCEDURE — 77030027138 HC INCENT SPIROMETER -A

## 2021-11-26 PROCEDURE — 74018 RADEX ABDOMEN 1 VIEW: CPT

## 2021-11-26 PROCEDURE — 84443 ASSAY THYROID STIM HORMONE: CPT

## 2021-11-26 PROCEDURE — 85025 COMPLETE CBC W/AUTO DIFF WBC: CPT

## 2021-11-26 PROCEDURE — 74011250637 HC RX REV CODE- 250/637: Performed by: INTERNAL MEDICINE

## 2021-11-26 PROCEDURE — 94003 VENT MGMT INPAT SUBQ DAY: CPT

## 2021-11-26 PROCEDURE — 36600 WITHDRAWAL OF ARTERIAL BLOOD: CPT

## 2021-11-26 PROCEDURE — 65610000006 HC RM INTENSIVE CARE

## 2021-11-26 PROCEDURE — 74011250636 HC RX REV CODE- 250/636

## 2021-11-26 PROCEDURE — 83735 ASSAY OF MAGNESIUM: CPT

## 2021-11-26 PROCEDURE — 5A09557 ASSISTANCE WITH RESPIRATORY VENTILATION, GREATER THAN 96 CONSECUTIVE HOURS, CONTINUOUS POSITIVE AIRWAY PRESSURE: ICD-10-PCS | Performed by: GENERAL ACUTE CARE HOSPITAL

## 2021-11-26 PROCEDURE — 77030012793 HC CIRC VNTLTR FISP -B

## 2021-11-26 PROCEDURE — 51702 INSERT TEMP BLADDER CATH: CPT

## 2021-11-26 PROCEDURE — 77030013140 HC MSK NEB VYRM -A

## 2021-11-26 PROCEDURE — 84100 ASSAY OF PHOSPHORUS: CPT

## 2021-11-26 PROCEDURE — 77030013038 HC MSK CPAP FISP -A

## 2021-11-26 PROCEDURE — 2709999900 HC NON-CHARGEABLE SUPPLY

## 2021-11-26 PROCEDURE — 93306 TTE W/DOPPLER COMPLETE: CPT | Performed by: INTERNAL MEDICINE

## 2021-11-26 PROCEDURE — 74011636637 HC RX REV CODE- 636/637: Performed by: NURSE PRACTITIONER

## 2021-11-26 PROCEDURE — 74011250636 HC RX REV CODE- 250/636: Performed by: EMERGENCY MEDICINE

## 2021-11-26 PROCEDURE — 80076 HEPATIC FUNCTION PANEL: CPT

## 2021-11-26 PROCEDURE — 94660 CPAP INITIATION&MGMT: CPT

## 2021-11-26 PROCEDURE — 80048 BASIC METABOLIC PNL TOTAL CA: CPT

## 2021-11-26 PROCEDURE — 74011250637 HC RX REV CODE- 250/637: Performed by: NURSE PRACTITIONER

## 2021-11-26 PROCEDURE — 74011000258 HC RX REV CODE- 258: Performed by: INTERNAL MEDICINE

## 2021-11-26 PROCEDURE — 36415 COLL VENOUS BLD VENIPUNCTURE: CPT

## 2021-11-26 PROCEDURE — P9045 ALBUMIN (HUMAN), 5%, 250 ML: HCPCS

## 2021-11-26 PROCEDURE — 74011000250 HC RX REV CODE- 250: Performed by: NURSE PRACTITIONER

## 2021-11-26 PROCEDURE — 84484 ASSAY OF TROPONIN QUANT: CPT

## 2021-11-26 PROCEDURE — 74011000258 HC RX REV CODE- 258: Performed by: NURSE PRACTITIONER

## 2021-11-26 PROCEDURE — 82803 BLOOD GASES ANY COMBINATION: CPT

## 2021-11-26 PROCEDURE — 93306 TTE W/DOPPLER COMPLETE: CPT

## 2021-11-26 PROCEDURE — 74011250636 HC RX REV CODE- 250/636: Performed by: NURSE PRACTITIONER

## 2021-11-26 PROCEDURE — 94640 AIRWAY INHALATION TREATMENT: CPT

## 2021-11-26 PROCEDURE — 77030008683 HC TU ET CUF COVD -A

## 2021-11-26 PROCEDURE — 74011250636 HC RX REV CODE- 250/636: Performed by: INTERNAL MEDICINE

## 2021-11-26 PROCEDURE — 83036 HEMOGLOBIN GLYCOSYLATED A1C: CPT

## 2021-11-26 PROCEDURE — 77010033678 HC OXYGEN DAILY

## 2021-11-26 PROCEDURE — C9113 INJ PANTOPRAZOLE SODIUM, VIA: HCPCS | Performed by: NURSE PRACTITIONER

## 2021-11-26 PROCEDURE — 77030040361 HC SLV COMPR DVT MDII -B

## 2021-11-26 PROCEDURE — 82962 GLUCOSE BLOOD TEST: CPT

## 2021-11-26 RX ORDER — BUMETANIDE 2 MG/1
2 TABLET ORAL 2 TIMES DAILY
COMMUNITY
End: 2021-12-06

## 2021-11-26 RX ORDER — PHENYLEPHRINE HYDROCHLORIDE 10 MG/ML
200 INJECTION INTRAVENOUS ONCE
Status: COMPLETED | OUTPATIENT
Start: 2021-11-26 | End: 2021-11-25

## 2021-11-26 RX ORDER — ALBUMIN HUMAN 50 G/1000ML
12.5 SOLUTION INTRAVENOUS ONCE
Status: COMPLETED | OUTPATIENT
Start: 2021-11-26 | End: 2021-11-26

## 2021-11-26 RX ORDER — ALBUMIN HUMAN 50 G/1000ML
SOLUTION INTRAVENOUS
Status: COMPLETED
Start: 2021-11-26 | End: 2021-11-26

## 2021-11-26 RX ORDER — IBUPROFEN 200 MG
1 TABLET ORAL DAILY
Status: DISCONTINUED | OUTPATIENT
Start: 2021-11-26 | End: 2021-12-06 | Stop reason: HOSPADM

## 2021-11-26 RX ORDER — CHLORHEXIDINE GLUCONATE 0.12 MG/ML
15 RINSE ORAL EVERY 12 HOURS
Status: DISCONTINUED | OUTPATIENT
Start: 2021-11-26 | End: 2021-11-26

## 2021-11-26 RX ORDER — MIDAZOLAM HYDROCHLORIDE 1 MG/ML
1-2 INJECTION, SOLUTION INTRAMUSCULAR; INTRAVENOUS
Status: DISCONTINUED | OUTPATIENT
Start: 2021-11-26 | End: 2021-11-27

## 2021-11-26 RX ORDER — DEXMEDETOMIDINE HYDROCHLORIDE 4 UG/ML
.1-1.5 INJECTION, SOLUTION INTRAVENOUS
Status: DISCONTINUED | OUTPATIENT
Start: 2021-11-26 | End: 2021-11-28

## 2021-11-26 RX ORDER — HYDRALAZINE HYDROCHLORIDE 20 MG/ML
10 INJECTION INTRAMUSCULAR; INTRAVENOUS
Status: DISCONTINUED | OUTPATIENT
Start: 2021-11-26 | End: 2021-12-06 | Stop reason: HOSPADM

## 2021-11-26 RX ORDER — ERGOCALCIFEROL 1.25 MG/1
50000 CAPSULE ORAL
COMMUNITY

## 2021-11-26 RX ORDER — BENZONATATE 100 MG/1
200 CAPSULE ORAL
COMMUNITY

## 2021-11-26 RX ORDER — SIMETHICONE 125 MG
125 CAPSULE ORAL
COMMUNITY

## 2021-11-26 RX ORDER — METHOCARBAMOL 750 MG/1
750 TABLET, FILM COATED ORAL
COMMUNITY

## 2021-11-26 RX ORDER — NOREPINEPHRINE BITARTRATE/D5W 8 MG/250ML
.5-2 PLASTIC BAG, INJECTION (ML) INTRAVENOUS
Status: DISPENSED | OUTPATIENT
Start: 2021-11-26 | End: 2021-11-27

## 2021-11-26 RX ORDER — PHENOBARBITAL 60 MG/1
60 TABLET ORAL 2 TIMES DAILY
COMMUNITY

## 2021-11-26 RX ORDER — ALPRAZOLAM 0.5 MG/1
1 TABLET ORAL
Status: DISCONTINUED | OUTPATIENT
Start: 2021-11-26 | End: 2021-12-06 | Stop reason: HOSPADM

## 2021-11-26 RX ORDER — CARVEDILOL 3.12 MG/1
3.12 TABLET ORAL 2 TIMES DAILY WITH MEALS
Status: DISCONTINUED | OUTPATIENT
Start: 2021-11-26 | End: 2021-11-29

## 2021-11-26 RX ORDER — LEVOFLOXACIN 500 MG/1
500 TABLET, FILM COATED ORAL DAILY
COMMUNITY
End: 2021-12-06

## 2021-11-26 RX ORDER — PROPOFOL 10 MG/ML
0-50 VIAL (ML) INTRAVENOUS
Status: DISCONTINUED | OUTPATIENT
Start: 2021-11-26 | End: 2021-11-26

## 2021-11-26 RX ORDER — SODIUM CHLORIDE 0.65 %
2 DROPS NASAL
COMMUNITY

## 2021-11-26 RX ORDER — PROPOFOL 10 MG/ML
INJECTION, EMULSION INTRAVENOUS
Status: COMPLETED
Start: 2021-11-26 | End: 2021-11-26

## 2021-11-26 RX ORDER — MIDAZOLAM HYDROCHLORIDE 1 MG/ML
2 INJECTION, SOLUTION INTRAMUSCULAR; INTRAVENOUS ONCE
Status: COMPLETED | OUTPATIENT
Start: 2021-11-26 | End: 2021-11-26

## 2021-11-26 RX ORDER — DIPHENHYDRAMINE HCL 25 MG
25 TABLET ORAL
COMMUNITY

## 2021-11-26 RX ORDER — LEVOFLOXACIN 750 MG/1
750 TABLET ORAL DAILY
Status: ON HOLD | COMMUNITY
End: 2021-11-26

## 2021-11-26 RX ADMIN — LEVOTHYROXINE SODIUM 200 MCG: 0.15 TABLET ORAL at 08:14

## 2021-11-26 RX ADMIN — IPRATROPIUM BROMIDE AND ALBUTEROL SULFATE 3 ML: .5; 3 SOLUTION RESPIRATORY (INHALATION) at 13:19

## 2021-11-26 RX ADMIN — ARFORMOTEROL TARTRATE: 15 SOLUTION RESPIRATORY (INHALATION) at 07:55

## 2021-11-26 RX ADMIN — AZITHROMYCIN MONOHYDRATE 500 MG: 500 INJECTION, POWDER, LYOPHILIZED, FOR SOLUTION INTRAVENOUS at 11:03

## 2021-11-26 RX ADMIN — Medication 10 ML: at 21:22

## 2021-11-26 RX ADMIN — INSULIN LISPRO 2 UNITS: 100 INJECTION, SOLUTION INTRAVENOUS; SUBCUTANEOUS at 11:47

## 2021-11-26 RX ADMIN — IPRATROPIUM BROMIDE AND ALBUTEROL SULFATE 3 ML: .5; 3 SOLUTION RESPIRATORY (INHALATION) at 20:14

## 2021-11-26 RX ADMIN — Medication 1 AMPULE: at 21:22

## 2021-11-26 RX ADMIN — MIDAZOLAM HYDROCHLORIDE 2 MG: 1 INJECTION, SOLUTION INTRAMUSCULAR; INTRAVENOUS at 00:34

## 2021-11-26 RX ADMIN — ALBUMIN HUMAN 12.5 G: 50 SOLUTION INTRAVENOUS at 02:55

## 2021-11-26 RX ADMIN — SODIUM CHLORIDE 40 MG: 9 INJECTION, SOLUTION INTRAMUSCULAR; INTRAVENOUS; SUBCUTANEOUS at 08:17

## 2021-11-26 RX ADMIN — IPRATROPIUM BROMIDE AND ALBUTEROL SULFATE 3 ML: .5; 3 SOLUTION RESPIRATORY (INHALATION) at 07:52

## 2021-11-26 RX ADMIN — ASPIRIN 81 MG CHEWABLE TABLET 81 MG: 81 TABLET CHEWABLE at 08:13

## 2021-11-26 RX ADMIN — HYDRALAZINE HYDROCHLORIDE 10 MG: 20 INJECTION INTRAMUSCULAR; INTRAVENOUS at 11:38

## 2021-11-26 RX ADMIN — CHLORHEXIDINE GLUCONATE 15 ML: 0.12 RINSE ORAL at 02:21

## 2021-11-26 RX ADMIN — Medication 10 ML: at 13:45

## 2021-11-26 RX ADMIN — CHLORHEXIDINE GLUCONATE 15 ML: 0.12 RINSE ORAL at 08:17

## 2021-11-26 RX ADMIN — PROPOFOL 20 MCG/KG/MIN: 10 INJECTION, EMULSION INTRAVENOUS at 01:06

## 2021-11-26 RX ADMIN — METHYLPREDNISOLONE SODIUM SUCCINATE 60 MG: 40 INJECTION, POWDER, FOR SOLUTION INTRAMUSCULAR; INTRAVENOUS at 08:16

## 2021-11-26 RX ADMIN — IPRATROPIUM BROMIDE AND ALBUTEROL SULFATE 3 ML: .5; 3 SOLUTION RESPIRATORY (INHALATION) at 01:36

## 2021-11-26 RX ADMIN — FUROSEMIDE 40 MG: 40 TABLET ORAL at 08:15

## 2021-11-26 RX ADMIN — HEPARIN SODIUM AND DEXTROSE 10 UNITS/KG/HR: 10000; 5 INJECTION INTRAVENOUS at 00:12

## 2021-11-26 RX ADMIN — HEPARIN SODIUM 4000 UNITS: 1000 INJECTION INTRAVENOUS; SUBCUTANEOUS at 00:11

## 2021-11-26 RX ADMIN — CEFTRIAXONE 1 G: 1 INJECTION, POWDER, FOR SOLUTION INTRAMUSCULAR; INTRAVENOUS at 10:41

## 2021-11-26 RX ADMIN — Medication 10 ML: at 06:30

## 2021-11-26 RX ADMIN — ALPRAZOLAM 1 MG: 0.5 TABLET ORAL at 11:37

## 2021-11-26 RX ADMIN — Medication 1 AMPULE: at 02:21

## 2021-11-26 RX ADMIN — MIDAZOLAM 2 MG: 1 INJECTION INTRAMUSCULAR; INTRAVENOUS at 06:29

## 2021-11-26 RX ADMIN — PROPOFOL 15 MCG/KG/MIN: 10 INJECTION, EMULSION INTRAVENOUS at 00:57

## 2021-11-26 RX ADMIN — ARIPIPRAZOLE 5 MG: 5 TABLET ORAL at 08:15

## 2021-11-26 RX ADMIN — NOREPINEPHRINE BITARTRATE 5 MCG/MIN: 1 INJECTION, SOLUTION, CONCENTRATE INTRAVENOUS at 02:55

## 2021-11-26 RX ADMIN — ALBUMIN (HUMAN) 12.5 G: 12.5 INJECTION, SOLUTION INTRAVENOUS at 02:55

## 2021-11-26 RX ADMIN — Medication 1 AMPULE: at 08:12

## 2021-11-26 RX ADMIN — CARVEDILOL 3.12 MG: 3.12 TABLET, FILM COATED ORAL at 11:37

## 2021-11-26 NOTE — PROGRESS NOTES
Pharmacy Medication Reconciliation     The patient was interviewed regarding current PTA medication list, use and drug allergies;  patient's  present in room and obtained permission from patient to discuss drug regimen with visitor(s) present. The patient was questioned regarding use of any other inhalers, topical products, over the counter medications, herbal medications, vitamin products or ophthalmic/nasal/otic medication use. Allergy Update: Latex, Dilaudid [hydromorphone (pf)], Lipitor [atorvastatin], Remeron [mirtazapine], and Sulfa (sulfonamide antibiotics)    Recommendations/Findings: The following amendments were made to the patient's active medication list on file at [facility]:   1) Additions:   +bumetanide, robaxin, vit D, levofloxacin, saline nasal spray, phenobarbital    2) Deletions:   Furosemide, metoprolol tartrate    3) Changes:   Pertinent Findings:   -patient was on steroid taper pta  -patient needs to use home supply of super b complex and prazosin     Clarified PTA med list with rx query, medication bottles. PTA medication list was corrected to the following:     Prior to Admission Medications   Prescriptions Last Dose Informant Taking? ALPRAZolam (XANAX) 1 mg tablet 11/19/2021 at Unknown time Other Yes   Sig: Take 1 Tab by mouth three (3) times daily as needed for Anxiety. Max Daily Amount: 3 mg. ARIPiprazole (ABILIFY) 5 mg tablet 11/19/2021 at Unknown time Other Yes   Sig: TAKE ONE TABLET BY MOUTH EVERY MORNING   HYDROcodone-acetaminophen (NORCO) 7.5-325 mg per tablet 11/19/2021 at Unknown time Other Yes   Sig: Take 1 Tab by mouth three (3) times daily as needed. MAGOX 400 mg tablet 11/19/2021 at Unknown time Other Yes   Sig: TAKE ONE TABLET TWICE A DAY   PHENobarbitaL (LUMINAL) 60 mg tablet 11/19/2021 at Unknown time Other Yes   Sig: Take 60 mg by mouth two (2) times a day.  tremors   acetaminophen-codeine (TYLENOL #3) 300-30 mg per tablet 11/19/2021 at Unknown time Other Yes Sig: Take 1 Tab by mouth every eight (8) hours as needed. Max Daily Amount: 3 Tabs. albuterol (VENTOLIN HFA) 90 mcg/actuation inhaler 11/19/2021 at Unknown time Other Yes   Sig: Take 2 Puffs by inhalation every four (4) hours as needed for Wheezing. albuterol-ipratropium (DUONEB) 2.5 mg-0.5 mg/3 ml nebulizer solution 11/19/2021 at Unknown time Other Yes   Sig: 3 mL by Nebulization route every six (6) hours as needed for Wheezing. aspirin 81 mg chewable tablet 11/19/2021 at Unknown time Other Yes   Sig: Take 81 mg by mouth daily. benzonatate (Tessalon Perles) 100 mg capsule 10/26/2021 at Unknown time Other Yes   Sig: Take 200 mg by mouth three (3) times daily as needed for Cough. budesonide-formoterol (SYMBICORT) 160-4.5 mcg/actuation HFAA 11/19/2021 at Unknown time Other Yes   Sig: Take 2 Puffs by inhalation two (2) times a day. bumetanide (BUMEX) 2 mg tablet 11/19/2021 at Unknown time Other Yes   Sig: Take 2 mg by mouth two (2) times a day. cetirizine (ZYRTEC) 10 mg tablet 11/19/2021 at Unknown time Other Yes   Sig: Take 1 Tab by mouth daily. dicyclomine (BENTYL) 10 mg capsule 11/19/2021 at Unknown time Other Yes   Sig: Take 10 mg by mouth daily as needed. diphenhydrAMINE (Benadryl Allergy) 25 mg tablet 10/26/2021 at Unknown time Other Yes   Sig: Take 25 mg by mouth every six (6) hours as needed. doxepin (SINEquan) 10 mg capsule 11/19/2021 at Unknown time Other Yes   Sig: TAKE ONE CAPSULE BY MOUTH EVERY NIGHT   ergocalciferol (Vitamin D2) 1,250 mcg (50,000 unit) capsule 11/19/2021 at Unknown time Other Yes   Sig: Take 50,000 Units by mouth Every Friday. esomeprazole (NEXIUM) 40 mg capsule 11/19/2021 at Unknown time Other Yes   Sig: Take 40 mg by mouth daily. ezetimibe-simvastatin (VYTORIN 10/40) 10-40 mg per tablet 11/19/2021 at Unknown time Other Yes   Sig: Take 1 tablet by mouth nightly.    ferrous fumarate/vit Bcomp,C (SUPER B COMPLEX PO) 11/19/2021 at Unknown time Other Yes   Sig: Take by mouth daily. glipiZIDE SR (GLUCOTROL) 5 mg CR tablet 11/19/2021 at Unknown time Other Yes   Sig: Take 5 mg by mouth two (2) times daily as needed (Patient monitors her BG levels and takes when she is also taking a steroid. ). guaiFENesin (MUCINEX) 1,200 mg Ta12 ER tablet 11/19/2021 at Unknown time Other Yes   Sig: Take 1 Tab by mouth two (2) times a day. ibuprofen (MOTRIN) 800 mg tablet 11/19/2021 at Unknown time Other Yes   Sig: Take 1 Tab by mouth every eight (8) hours as needed. levoFLOXacin (LEVAQUIN) 500 mg tablet 11/19/2021 at Unknown time Other Yes   Sig: Take 500 mg by mouth daily. levothyroxine (SYNTHROID) 200 mcg tablet 11/19/2021 at Unknown time Other Yes   Sig: Take 200 mcg by mouth daily (before breakfast). methocarbamoL (ROBAXIN) 750 mg tablet 11/19/2021 at Unknown time Other Yes   Sig: Take 750 mg by mouth four (4) times daily. montelukast (SINGULAIR) 10 mg tablet 11/19/2021 at Unknown time Other Yes   Sig: Take 10 mg by mouth daily. naloxone (NARCAN) 4 mg/actuation nasal spray  Other Yes   Sig: Use 1 spray intranasally, then discard. Repeat with new spray every 2 min as needed for opioid overdose symptoms, alternating nostrils. potassium chloride SR (K-TAB) 20 mEq tablet 11/19/2021 at Unknown time Other Yes   Sig: Take 1 Tab by mouth two (2) times a day. prazosin (MINIPRESS) 2 mg capsule 11/19/2021 at Unknown time Other Yes   Sig: TAKE ONE CAPSULE BY MOUTH 2 TIMES A DAY   primidone (MYSOLINE) 50 mg tablet 11/19/2021 at Unknown time Other Yes   Sig: TAKE 2 TABLETS EVERY MORNING AND 3 TABLETS AT BEDTIME   promethazine (PHENERGAN) 25 mg tablet 11/19/2021 at Unknown time Other Yes   Sig: Take 1 Tab by mouth every six (6) hours as needed. risperiDONE (RisperDAL) 0.25 mg tablet 11/19/2021 at Unknown time Other Yes   Sig: TAKE ONE TABLET BY MOUTH EVERY DAY AS NEEDED.    sennosides 25 mg tab 10/26/2021 at Unknown time Other Yes   Sig: Take 25 mg by mouth three (3) times daily as needed. simethicone (GAS-X) 125 mg capsule 2021 at Unknown time Other Yes   Sig: Take 125 mg by mouth four (4) times daily as needed for Flatulence. sodium chloride ([place] Saline) 0.65 % drop 10/26/2021 at Unknown time Other Yes   Si Drops by Both Nostrils route every two (2) hours as needed for Congestion. traMADol (ULTRAM) 50 mg tablet 2021 at Unknown time Other Yes   Sig: Take 1 Tab by mouth three (3) times daily as needed.       Facility-Administered Medications: None        Thank you,  ANTONIO Dumont

## 2021-11-26 NOTE — ED PROVIDER NOTES
EMERGENCY DEPARTMENT HISTORY AND PHYSICAL EXAM      Date: 11/25/2021  Patient Name: Vero Jensen    Please note that this dictation was completed with [employer], the computer voice recognition software. Quite often unanticipated grammatical, syntax, homophones, and other interpretive errors are inadvertently transcribed by the computer software. Please disregard these errors. Please excuse any errors that have escaped final proofreading. History of Presenting Illness     Chief Complaint   Patient presents with    Respiratory Distress     Arrived via EMS on NRB       History Provided By: Patient, EMS HPI and review of systems limited secondary to clinical condition (acuity)    HPI: Vero Jensen, 72 y.o. female, with a past medical history significant for prior empyema, COPD presenting the emergency department in respiratory distress. Found to be hypoxic on room air with pulse oxygen in the 60s. Started on nonrebreather and given a neb prehospital.  They report that she was initially altered, became more awake and transport. Patient not able to provide much history on arrival as she minimally responsive per EMS and in severe respiratory distress on arrival to the ED, speaking in one-word sentences    PCP: Elise Leonard MD    No current facility-administered medications on file prior to encounter. Current Outpatient Medications on File Prior to Encounter   Medication Sig Dispense Refill    doxepin (SINEquan) 10 mg capsule TAKE ONE CAPSULE BY MOUTH EVERY NIGHT 30 Capsule 2    ARIPiprazole (ABILIFY) 5 mg tablet TAKE ONE TABLET BY MOUTH EVERY MORNING 30 Tablet 2    primidone (MYSOLINE) 50 mg tablet TAKE 2 TABLETS EVERY MORNING AND 3 TABLETS AT BEDTIME 150 Tablet 0    prazosin (MINIPRESS) 2 mg capsule TAKE ONE CAPSULE BY MOUTH 2 TIMES A DAY 60 Capsule 0    risperiDONE (RisperDAL) 0.25 mg tablet TAKE ONE TABLET BY MOUTH EVERY DAY AS NEEDED.  30 Tab 2    tiZANidine (ZANAFLEX) 4 mg tablet Take 4 mg by mouth every eight to twelve (8-12) hours as needed.  HYDROcodone-acetaminophen (NORCO) 7.5-325 mg per tablet Take 1 Tab by mouth three (3) times daily as needed.  traMADol (ULTRAM) 50 mg tablet Take 1 Tab by mouth three (3) times daily as needed.  ferrous fumarate/vit Bcomp,C (SUPER B COMPLEX PO) Take  by mouth daily.  guaiFENesin (MUCINEX) 1,200 mg Ta12 ER tablet Take 1 Tab by mouth two (2) times a day. 14 Tab 0    potassium chloride SR (K-TAB) 20 mEq tablet Take 1 Tab by mouth two (2) times a day. 14 Tab 0    naloxone (NARCAN) 4 mg/actuation nasal spray Use 1 spray intranasally, then discard. Repeat with new spray every 2 min as needed for opioid overdose symptoms, alternating nostrils. 1 Each 0    budesonide-formoterol (SYMBICORT) 160-4.5 mcg/actuation HFAA Take 2 Puffs by inhalation two (2) times a day.  acetaminophen-codeine (TYLENOL #3) 300-30 mg per tablet Take 1 Tab by mouth every eight (8) hours as needed. Max Daily Amount: 3 Tabs. 5 Tab 0    ALPRAZolam (XANAX) 1 mg tablet Take 1 Tab by mouth three (3) times daily as needed for Anxiety. Max Daily Amount: 3 mg. 3 Tab 0    cetirizine (ZYRTEC) 10 mg tablet Take 1 Tab by mouth daily. 10 Tab 0    promethazine (PHENERGAN) 25 mg tablet Take 1 Tab by mouth every six (6) hours as needed. 4 Tab 0    ibuprofen (MOTRIN) 800 mg tablet Take 1 Tab by mouth every eight (8) hours as needed. 20 Tab 0    furosemide (LASIX) 40 mg tablet Take 1 Tab by mouth daily. (Patient taking differently: Take 40 mg by mouth two (2) times a day.) 20 Tab 0    albuterol (VENTOLIN HFA) 90 mcg/actuation inhaler Take 2 Puffs by inhalation every four (4) hours as needed for Wheezing.  dicyclomine (BENTYL) 10 mg capsule daily as needed.  glipiZIDE SR (GLUCOTROL) 5 mg CR tablet Take 5 mg by mouth two (2) times daily as needed (Patient monitors her BG levels and takes when she is also taking a steroid. ).       montelukast (SINGULAIR) 10 mg tablet Take 10 mg by mouth daily.  ezetimibe-simvastatin (VYTORIN 10/40) 10-40 mg per tablet Take 1 tablet by mouth nightly.  metoprolol (LOPRESSOR) 25 mg tablet Take 1 Tab by mouth two (2) times a day. (Patient taking differently: Take 25 mg by mouth nightly.) 60 Tab 6    benzonatate (TESSALON) 200 mg capsule Take 1 Cap by mouth two (2) times daily as needed. 30 Cap 0    nystatin (MYCOSTATIN) 100,000 unit/mL suspension Take 5 mL by mouth four (4) times daily. swish and spit (Patient taking differently: Take 500,000 Units by mouth four (4) times daily as needed. swish and spit) 180 mL 0    albuterol-ipratropium (DUONEB) 2.5 mg-0.5 mg/3 ml nebulizer solution 3 mL by Nebulization route every six (6) hours as needed for Wheezing.  hyoscyamine SL (LEVSIN/SL) 0.125 mg SL tablet 0.125 mg by SubLINGual route three (3) times daily as needed for Cramping.  esomeprazole (NEXIUM) 40 mg capsule Take 40 mg by mouth daily.  MAGOX 400 mg tablet TAKE ONE TABLET TWICE A DAY 60 Tab 3    aspirin 81 mg chewable tablet Take 81 mg by mouth daily.  levothyroxine (SYNTHROID) 200 mcg tablet Take 200 mcg by mouth daily (before breakfast).          Past History     Past Medical History:  Past Medical History:   Diagnosis Date    Anxiety     Bone spur     NECK    COPD     Depression     Endocrine disease     hypothyroidism    Fibromyalgia     Fusion of spine of cervical region     Gastrointestinal disorder     gerd    Hyperlipidemia     Hypothyroid     Morbid obesity ([address]     Osteoarthritis     PUD (peptic ulcer disease)     Tobacco abuse        Past Surgical History:  Past Surgical History:   Procedure Laterality Date    BRONCHOSCOPY-FIBER/THERAPY  4/3/2015         CARDIAC CATHETERIZATION  2013         COLONOSCOPY,DIAGNOSTIC  10/30/2014         HX CATARACT REMOVAL      bilateral    HX GYN          HX ORTHOPAEDIC      Ruptured disc, knuckles on right hand    UPPER GI ENDOSCOPY,BIOPSY 10/30/2014         UPPER GI ENDOSCOPY,GAYLE W GUIDE  10/30/2014            Family History:  Family History   Problem Relation Age of Onset    Heart Disease Mother     Dementia Mother     Thyroid Disease Mother     Heart Disease Father     Alcohol abuse Father     Psychiatric Disorder Father     Dementia Father     Bipolar Disorder Father     Psychiatric Disorder Sister     Suicide Sister     Psychiatric Disorder Brother     Suicide Brother     Psychiatric Disorder Other     Psychiatric Disorder Daughter     Bipolar Disorder Daughter     Coronary Art Dis Other         multiple family members in 52's       Social History:  Social History     Tobacco Use    Smoking status: Former Smoker     Packs/day: 1.00     Years: 30.00     Pack years: 30.00    Smokeless tobacco: Never Used   Substance Use Topics    Alcohol use: Yes     Comment: occasional    Drug use: No       Allergies: Allergies   Allergen Reactions    Latex Contact Dermatitis    Dilaudid [Hydromorphone (Pf)] Other (comments)     Feels like bugs are crawling all over her    Lipitor [Atorvastatin] Other (comments)     LIVER TROUBLE ON THIS MEDICATION    Remeron [Mirtazapine] Other (comments)     Tremors    Sulfa (Sulfonamide Antibiotics) Itching         Review of Systems   Review of Systems   Unable to perform ROS: Severe respiratory distress       Physical Exam   Physical Exam  Vitals and nursing note reviewed. Constitutional:       General: She is in acute distress. Appearance: She is well-developed. She is ill-appearing. HENT:      Head: Normocephalic and atraumatic. Eyes:      General:         Right eye: No discharge. Left eye: No discharge. Conjunctiva/sclera: Conjunctivae normal.      Pupils: Pupils are equal, round, and reactive to light. Neck:      Trachea: No tracheal deviation. Comments: No JVD  Cardiovascular:      Rate and Rhythm: Regular rhythm. Tachycardia present.       Heart sounds: Normal heart sounds. No murmur heard. Pulmonary:      Effort: Respiratory distress present. Breath sounds: Wheezing and rales present. Abdominal:      General: Bowel sounds are normal.      Palpations: Abdomen is soft. Tenderness: There is no abdominal tenderness. There is no guarding or rebound. Musculoskeletal:         General: No tenderness or deformity. Normal range of motion. Cervical back: Normal range of motion and neck supple. Right lower leg: Edema present. Left lower leg: Edema present. Skin:     General: Skin is warm and dry. Findings: No erythema or rash. Neurological:      Mental Status: She is alert and oriented to person, place, and time.    Psychiatric:         Behavior: Behavior normal.         Diagnostic Study Results     Labs -     Recent Results (from the past 12 hour(s))   EKG, 12 LEAD, INITIAL    Collection Time: 11/25/21  8:42 PM   Result Value Ref Range    Ventricular Rate 104 BPM    Atrial Rate 104 BPM    P-R Interval 138 ms    QRS Duration 84 ms    Q-T Interval 296 ms    QTC Calculation (Bezet) 389 ms    Calculated P Axis 65 degrees    Calculated R Axis 105 degrees    Calculated T Axis 36 degrees    Diagnosis       Sinus tachycardia  Possible Left atrial enlargement  Rightward axis  Septal infarct (cited on or before 18-NOV-2021)  When compared with ECG of 18-NOV-2021 18:34,  Nonspecific T wave abnormality has replaced inverted T waves in Inferior   leads     CBC WITH AUTOMATED DIFF    Collection Time: 11/25/21  8:49 PM   Result Value Ref Range    WBC 18.1 (H) 3.6 - 11.0 K/uL    RBC 5.00 3.80 - 5.20 M/uL    HGB 13.1 11.5 - 16.0 g/dL    HCT 44.4 35.0 - 47.0 %    MCV 88.8 80.0 - 99.0 FL    MCH 26.2 26.0 - 34.0 PG    MCHC 29.5 (L) 30.0 - 36.5 g/dL    RDW 16.8 (H) 11.5 - 14.5 %    PLATELET 668 (H) 350 - 400 K/uL    MPV 9.5 8.9 - 12.9 FL    NRBC 0.7 (H) 0  WBC    ABSOLUTE NRBC 0.12 (H) 0.00 - 0.01 K/uL    NEUTROPHILS 69 32 - 75 %    LYMPHOCYTES 20 12 - 49 %    MONOCYTES 9 5 - 13 %    EOSINOPHILS 0 0 - 7 %    BASOPHILS 1 0 - 1 %    IMMATURE GRANULOCYTES 1 (H) 0.0 - 0.5 %    ABS. NEUTROPHILS 12.7 (H) 1.8 - 8.0 K/UL    ABS. LYMPHOCYTES 3.5 0.8 - 3.5 K/UL    ABS. MONOCYTES 1.6 (H) 0.0 - 1.0 K/UL    ABS. EOSINOPHILS 0.0 0.0 - 0.4 K/UL    ABS. BASOPHILS 0.1 0.0 - 0.1 K/UL    ABS. IMM. GRANS. 0.1 (H) 0.00 - 0.04 K/UL    DF AUTOMATED     METABOLIC PANEL, COMPREHENSIVE    Collection Time: 11/25/21  8:49 PM   Result Value Ref Range    Sodium 126 (L) 136 - 145 mmol/L    Potassium 5.0 3.5 - 5.1 mmol/L    Chloride 90 (L) 97 - 108 mmol/L    CO2 33 (H) 21 - 32 mmol/L    Anion gap 3 (L) 5 - 15 mmol/L    Glucose 156 (H) 65 - 100 mg/dL    BUN 15 6 - 20 MG/DL    Creatinine 0.69 0.55 - 1.02 MG/DL    BUN/Creatinine ratio 22 (H) 12 - 20      GFR est AA >60 >60 ml/min/1.73m2    GFR est non-AA >60 >60 ml/min/1.73m2    Calcium 10.4 (H) 8.5 - 10.1 MG/DL    Bilirubin, total 0.2 0.2 - 1.0 MG/DL    ALT (SGPT) 64 12 - 78 U/L    AST (SGOT) 42 (H) 15 - 37 U/L    Alk. phosphatase 117 45 - 117 U/L    Protein, total 7.3 6.4 - 8.2 g/dL    Albumin 3.3 (L) 3.5 - 5.0 g/dL    Globulin 4.0 2.0 - 4.0 g/dL    A-G Ratio 0.8 (L) 1.1 - 2.2     CK W/ REFLX CKMB    Collection Time: 11/25/21  8:49 PM   Result Value Ref Range    CK 50 26 - 192 U/L   TROPONIN-HIGH SENSITIVITY    Collection Time: 11/25/21  8:49 PM   Result Value Ref Range    Troponin-High Sensitivity 166 (HH) 0 - 51 ng/L   NT-PRO BNP    Collection Time: 11/25/21  8:49 PM   Result Value Ref Range    NT pro-BNP 4,531 (H) <125 PG/ML   COVID-19 RAPID TEST    Collection Time: 11/25/21  9:02 PM   Result Value Ref Range    Specimen source Nasopharyngeal      COVID-19 rapid test Not detected NOTD         Radiologic Studies -   XR CHEST PORT   Final Result   No acute infiltrate. CT Results  (Last 48 hours)    None        CXR Results  (Last 48 hours)               11/25/21 2107  XR CHEST PORT Final result    Impression:  No acute infiltrate. Narrative:  Clinical indication: Congestive heart failure. Portable AP semierect view of the chest obtained. Comparison August 28, 2018. The heart size is normal. There is no focal infiltrate. Medical Decision Making   I am the first provider for this patient. I reviewed the vital signs, available nursing notes, past medical history, past surgical history, family history and social history. Vital Signs-Reviewed the patient's vital signs. Patient Vitals for the past 12 hrs:   Temp Pulse Resp BP SpO2   11/25/21 2051 -- (!) 102 -- (!) 181/83 --   11/25/21 2050 -- (!) 103 -- (!) 181/83 --   11/25/21 2042 (!) 96 °F (35.6 °C) (!) 105 (!) 31 -- 90 %       EKG interpretation: (Preliminary)  EKG shows sinus tachycardia, rate 104. Normal axis. Normal intervals. No evidence of acute ischemic ST or T wave changes. Interpreted by me    Records Reviewed:   Nursing notes, Prior visits     Provider Notes (Medical Decision Making):   Patient presenting respiratory failure. She is hypercapnic with a pH of 7.1 and a PCO2 of 91. Likely etiology COPD. Bedside ultrasound did not show significant pulmonary edema. There is some increased bilateral interstitial infiltrates/vascular crowding on chest x-ray on my interpretation. I think this is likely a mixed picture with COPD and may be some developing pulmonary edema. We will start with noninvasive positive pressure lung elation, will give nebs, steroids, will give dose diuretics. ED Course:   Initial assessment performed. The patients presenting problems have been discussed, and they are in agreement with the care plan formulated and outlined with them. I have encouraged them to ask questions as they arise throughout their visit. 9:11 PM  On arrival the patient is in severe respiratory distress, but she is able to lift her hand to her face, she is able to follow directions. She denies any nausea or vomiting.   She is oriented to place and situation. I think that at this time she is appropriate for noninvasive positive pressure ventilation. I do have high concern that the patient will decompensate and may require intubation, but will try noninvasive positive pressure ventilation for intubation sparing      9:41 PM  Patient found to have a elevated white count, she is tachycardic and tachypneic. I still am not convinced that there is a an acute septic process. She certainly should not get a 30 cc/kg fluid bolus given that she may have some volume overload contributing to her respiratory failure. I have given some IV diuretics. I will cover her with IV antibiotics in case there is an underlying infection. I ordered a pro calcitonin to help delineate the need for ongoing antibiotics upon her hospitalization. 9:45 PM  Patient now much more awake, conversant. Clinically she seems to be improving. No need for intubation at this time. Will repeat blood gas soon      Critical Care Time:   I have spent 45 minutes of critical care time in evaluating and treating this patient. This includes time spent at bedside, time with family and decision makers, documentation, review of labs and imaging, and/or consultation with specialists. It does not include time spent on separately billed procedures. This patient presents with a critical illness or injury that acutely impairs one or more vital organ systems such that there is a high probability of imminent or life threatening deterioration in the patient's condition. This case involved decision making of high complexity to assess, manipulate, and support vital organ system failure and/or to prevent further life threatening deterioration of the patient's condition. Failure to initiate these interventions on an urgent basis would likely result in sudden, clinically significant or life threatening deterioration in the patient's condition.     Abnormal findings supporting critical care: Respiratory acidosis, hypercapnic respiratory failure  Interventions to support critical care: BiPAP, bronchodilators, sublingual nitro, IV diuretics, IV Lasix, IV antibiotics, frequent reassessment, ICU admission  Failure to intervene may result in: Worsening respiratory failure, need for intubation, endorgan damage, death    Disposition:  Patient is being admitted to the hospital by Aleksandar Harrell NP for the ICU,  the results of their tests and reasons for their admission have been discussed with them and/or available family. They convey agreement and understanding for the need to be admitted and for their admission diagnosis. Consultation has been made with the inpatient physician specialist for hospitalization. PLAN:  1. Current Discharge Medication List        2. Follow-up Information    None         Return to ED if worse     Diagnosis     Clinical Impression:   1. Acute on chronic respiratory failure with hypoxia and hypercapnia (HCC)    2. Respiratory acidosis    3. Hyponatremia    4. SIRS (systemic inflammatory response syndrome) (HCC)        Attestations:   This note was completed by Gerard Or,

## 2021-11-26 NOTE — PROCEDURES
[facility]      Procedure Note - Intubation:   Performed by Roberto Castro NP . Supervising MD: Michelet Xiao    Diagnosis: hypercapnic respiratory failure    Immediately prior to the procedure, the patient was reevaluated and found suitable for the planned procedure and any planned medications. Immediately prior to the procedure a time out was called to verify the correct patient, procedure, equipment, staff, and marking as appropriate. Medications given were etomidate and rocuronium (Zemuron). A number 7.5 cuffed   ETT was placed to 22 cm at the teeth. Placement was evaluated by noting bilateral, symmetric breath sounds, good end-tidal CO2 detector color change , no breath sounds over stomach and chest x-ray visualization. Attempts required: 1. Complications: none. The procedure was tolerated well. Bilateral Helical Rim Advancement Flap Text: The defect edges were debeveled with a #15 blade scalpel.  Given the location of the defect and the proximity to free margins (helical rim) a bilateral helical rim advancement flap was deemed most appropriate.  Using a sterile surgical marker, the appropriate advancement flaps were drawn incorporating the defect and placing the expected incisions between the helical rim and antihelix where possible.  The area thus outlined was incised through and through with a #15 scalpel blade.  With a skin hook and iris scissors, the flaps were gently and sharply undermined and freed up.

## 2021-11-26 NOTE — H&P
[facility]    ICU TEAM H&P Note    Name: Nemo Tineo   : 1955   MRN: [id number]   Date: 2021      Subjective:   Progress Note: 2021      71 y/o F pt with PMH of COPD, + smoker, prior lung empyema, hypothyrodism, and DM2 BIBEMS for SOB and hypoxia (SPO2 60% upon EMS arrival). Upon arrival to the ED pt minimally responsive in severe respiratory distress, complaining of chest pain. ABG done which showed a pH of 7.1 and pCO2 of 92. Placed on Bipap, given steroids and albuterol with improvement in mentation and respiratory status. Labs also significant for pro-BNP 4,531 and high sensitivity trop of 166. EKG without ST elevations. ICU consulted for admission. Upon my arrival, pt lethargic but A&Ox2. HDS.  SPO2 94% on Bipap. I did POCUS of heart which was significant for RV enlargemement with positive D sign in parasternal short axis view and RV>LV in apical 4 chamber. Will order CTA chest to r/o pulmonary embolism. Pt will be admitted to the ICU for further management of hypercapnic and hypoxic respiratory failure. Repeat ABG after 1.5 hours of Bipap=  7.17,>95, 103. Pt reassessed and noted to be more obtunded. Intubated without complication for hypercapnic respiratory failure.     Active Problem List:     Problem List  Date Reviewed: 2021          Codes Class    COPD with acute exacerbation ([facility] [address] ICD-10-CM: J44.1  ICD-9-CM: 491.21 Present on Admission        Acute respiratory failure with hypoxia ([facility] [address] ICD-10-CM: J96.01  ICD-9-CM: 518.81 Present on Admission        Acute respiratory failure ([facility] [address] ICD-10-CM: J96.00  ICD-9-CM: 518.81         Pneumonia, organism unspecified(486) ICD-10-CM: J18.9  ICD-9-CM: 486         Hypercapnic respiratory failure (HCC) ICD-10-CM: J96.92  ICD-9-CM: 518.81         Right-sided chest pain ICD-10-CM: R07.9  ICD-9-CM: 786.50         Chronic pain syndrome ICD-10-CM: G89.4  ICD-9-CM: 338.4         Empyema (HCC) ICD-10-CM: J86.9  ICD-9-CM: 510.9         Shortness of breath ICD-10-CM: R06.02  ICD-9-CM: 786.05         Drug-induced constipation ICD-10-CM: K59.03  ICD-9-CM: 564.09, E980.5         Productive cough ICD-10-CM: R05.8  ICD-9-CM: 694. 2         Chest pain on breathing ICD-10-CM: R07.1  ICD-9-CM: 786.52         Anxiety and depression ICD-10-CM: F41.9, F32. A  ICD-9-CM: 300.00, 311         Polypharmacy ICD-10-CM: Z79.899  ICD-9-CM: V58.69         CAP (community acquired pneumonia) ICD-10-CM: J18.9  ICD-9-CM: 5         Benign essential tremor syndrome ICD-10-CM: G25.0  ICD-9-CM: 333.1         Obesity (BMI 35.0-39.9 without comorbidity) ICD-10-CM: E66.9  ICD-9-CM: 278.00         Fibromyalgia ICD-10-CM: M79.7  ICD-9-CM: 729.1         Altered mental status, unspecified ICD-10-CM: R41.82  ICD-9-CM: 780.97         Cerebral microvascular disease ICD-10-CM: I67.89  ICD-9-CM: 437.8         Diabetic peripheral neuropathy associated with type 2 diabetes mellitus (HCC) ICD-10-CM: E11.42  ICD-9-CM: 250.60, 357.2         Stenosis of both internal carotid arteries ICD-10-CM: I65.23  ICD-9-CM: 433.10, 433.30         Cervical radiculopathy due to degenerative joint disease of spine ICD-10-CM: M47.22  ICD-9-CM: 721.0         Degenerative lumbar spinal stenosis ICD-10-CM: M48.061  ICD-9-CM: 724.02         Vitamin D deficiency ICD-10-CM: E55.9  ICD-9-CM: 268.9         Atelectasis ICD-10-CM: J98.11  ICD-9-CM: 518.0         Allergic rhinitis ICD-10-CM: J30.9  ICD-9-CM: 477.9         Sepsis (HCC) ICD-10-CM: A41.9  ICD-9-CM: 038.9, 995.91         Acute exacerbation of COPD with asthma (HCC) ICD-10-CM: J44.1, J45.901  ICD-9-CM: 493.22         HTN (hypertension) ICD-10-CM: I10  ICD-9-CM: 401.9         Hyperlipidemia ICD-10-CM: E78.5  ICD-9-CM: 272.4         Hypothyroidism ICD-10-CM: E03.9  ICD-9-CM: 244.9         Smoking ICD-10-CM: F17.200  ICD-9-CM: 305.1         COPD (chronic obstructive pulmonary disease) (HCC) (Chronic) ICD-10-CM: J44.9  ICD-9-CM: 496 Chronic Chronic respiratory failure with hypoxia [facility]) ICD-10-CM: J96.11  ICD-9-CM: 518.83, 799.02 Chronic    Overview Signed 3/13/2015  6:20 PM by Garland Zabala MD     Pt uses oxygen at home              Benign familial tremor ICD-10-CM: G25.0  ICD-9-CM: 333.1 Chronic    Overview Signed 3/13/2015  6:21 PM by Garland Zabala MD     Pt is on Phenobarbital for this condition              Syncope and collapse ICD-10-CM: R55  ICD-9-CM: 780.2         Cervical post-laminectomy syndrome ICD-10-CM: M96.1  ICD-9-CM: 722.81         Neck pain ICD-10-CM: M54.2  ICD-9-CM: 723.1         Ataxia ICD-10-CM: R27.0  ICD-9-CM: 781.3         Polyneuropathy in diabetes(357.2) ICD-10-CM: E11.42  ICD-9-CM: 357.2         Depression, major, recurrent (HCC) ICD-10-CM: F33.9  ICD-9-CM: 296.30         Chest pain with normal coronary angiography ICD-10-CM: R07.9  ICD-9-CM: 786.50         Abnormal nuclear stress test ICD-10-CM: R94.39  ICD-9-CM: 794.39         PVC (premature ventricular contraction) ICD-10-CM: I49.3  ICD-9-CM: 427.69         Tobacco use disorder ICD-10-CM: F17.200  ICD-9-CM: 305.1         Family history of ischemic heart disease ICD-10-CM: Z82.49  ICD-9-CM: V17.3         PTSD (post-traumatic stress disorder) ICD-10-CM: F43.10  ICD-9-CM: 309.81               Past Medical History:      has a past medical history of Anxiety, Bone spur, COPD, Depression, Endocrine disease, Fibromyalgia, Fusion of spine of cervical region, Gastrointestinal disorder, Hyperlipidemia, Hypothyroid, Morbid obesity ([address] Osteoarthritis, PUD (peptic ulcer disease), and Tobacco abuse. Past Surgical History:      has a past surgical history that includes hx gyn; hx orthopaedic; cardiac catheterization (7/11/2013); hx cataract removal; upper gi endoscopy,biopsy (10/30/2014); upper gi endoscopy,dilatn w guide (10/30/2014); colonoscopy,biopsy (10/30/2014); and bronchoscopy-fiber/therapy (4/3/2015).     Home Medications:     Prior to Admission medications Medication Sig Start Date End Date Taking? Authorizing Provider   doxepin (SINEquan) 10 mg capsule TAKE ONE CAPSULE BY MOUTH EVERY NIGHT 11/18/21   Isabell Capellan NP   ARIPiprazole (ABILIFY) 5 mg tablet TAKE ONE TABLET BY MOUTH EVERY MORNING 11/18/21   Isabell Capellan NP   primidone (MYSOLINE) 50 mg tablet TAKE 2 TABLETS EVERY MORNING AND 3 TABLETS AT BEDTIME 11/18/21   Jayme Mariee MD   prazosin (MINIPRESS) 2 mg capsule TAKE ONE CAPSULE BY MOUTH 2 TIMES A DAY 10/25/21   Isabell Capellan NP   risperiDONE (RisperDAL) 0.25 mg tablet TAKE ONE TABLET BY MOUTH EVERY DAY AS NEEDED. 3/23/21   Isabell Capellan NP   tiZANidine (ZANAFLEX) 4 mg tablet Take 4 mg by mouth every eight to twelve (8-12) hours as needed. 1/22/19   Provider, Historical   HYDROcodone-acetaminophen (NORCO) 7.5-325 mg per tablet Take 1 Tab by mouth three (3) times daily as needed. 2/4/19   Provider, Historical   traMADol (ULTRAM) 50 mg tablet Take 1 Tab by mouth three (3) times daily as needed. 1/18/19   Provider, Historical   ferrous fumarate/vit Bcomp,C (SUPER B COMPLEX PO) Take  by mouth daily. Provider, Historical   guaiFENesin (MUCINEX) 1,200 mg Ta12 ER tablet Take 1 Tab by mouth two (2) times a day. 8/26/18   Radha Obrien MD   potassium chloride SR (K-TAB) 20 mEq tablet Take 1 Tab by mouth two (2) times a day. 8/26/18   Radha Obrien MD   naloxone [facility]) 4 mg/actuation nasal spray Use 1 spray intranasally, then discard. Repeat with new spray every 2 min as needed for opioid overdose symptoms, alternating nostrils. 8/17/18   Maranda Escobar NP   budesonide-formoterol (SYMBICORT) 160-4.5 mcg/actuation HFAA Take 2 Puffs by inhalation two (2) times a day. Provider, Historical   acetaminophen-codeine (TYLENOL #3) 300-30 mg per tablet Take 1 Tab by mouth every eight (8) hours as needed. Max Daily Amount: 3 Tabs.  5/27/17   Chichi Simons NP   ALPRAZolam Monika Aure) 1 mg tablet Take 1 Tab by mouth three (3) times daily as needed for Anxiety. Max Daily Amount: 3 mg. 5/27/17   Chichi Simons NP   cetirizine (ZYRTEC) 10 mg tablet Take 1 Tab by mouth daily. 5/27/17   Chichi Simons NP   promethazine (PHENERGAN) 25 mg tablet Take 1 Tab by mouth every six (6) hours as needed. 5/27/17   Chichi Simons NP   ibuprofen (MOTRIN) 800 mg tablet Take 1 Tab by mouth every eight (8) hours as needed. 5/27/17   Chichi Simons NP   furosemide (LASIX) 40 mg tablet Take 1 Tab by mouth daily. Patient taking differently: Take 40 mg by mouth two (2) times a day. 5/27/17   Chichi Simons NP   albuterol (VENTOLIN HFA) 90 mcg/actuation inhaler Take 2 Puffs by inhalation every four (4) hours as needed for Wheezing. Provider, Historical   dicyclomine (BENTYL) 10 mg capsule daily as needed. 2/13/15   Other, MD Nico   glipiZIDE SR (GLUCOTROL) 5 mg CR tablet Take 5 mg by mouth two (2) times daily as needed (Patient monitors her BG levels and takes when she is also taking a steroid. ). 2/16/15   Nico Martinez MD   montelukast (SINGULAIR) 10 mg tablet Take 10 mg by mouth daily. 2/16/15   Juan, MD Nico   ezetimibe-simvastatin (VYTORIN 10/40) 10-40 mg per tablet Take 1 tablet by mouth nightly. Provider, Historical   metoprolol (LOPRESSOR) 25 mg tablet Take 1 Tab by mouth two (2) times a day. Patient taking differently: Take 25 mg by mouth nightly. 2/6/14   Kate YOST NP   benzonatate (TESSALON) 200 mg capsule Take 1 Cap by mouth two (2) times daily as needed. 1/20/14   Almas Burnett MD   nystatin (MYCOSTATIN) 100,000 unit/mL suspension Take 5 mL by mouth four (4) times daily. swish and spit  Patient taking differently: Take 500,000 Units by mouth four (4) times daily as needed. swish and spit 1/20/14   Almas Burnett MD   albuterol-ipratropium (DUONEB) 2.5 mg-0.5 mg/3 ml nebulizer solution 3 mL by Nebulization route every six (6) hours as needed for Wheezing.     Provider, Historical   hyoscyamine SL (LEVSIN/SL) 0.125 mg SL tablet 0.125 mg by SubLINGual route three (3) times daily as needed for Cramping. Provider, Historical   esomeprazole (NEXIUM) 40 mg capsule Take 40 mg by mouth daily. Provider, Historical   MAGOX 400 mg tablet TAKE ONE TABLET TWICE A DAY 13   Pepito Zimmer NP   aspirin 81 mg chewable tablet Take 81 mg by mouth daily. Other, MD Nico   levothyroxine (SYNTHROID) 200 mcg tablet Take 200 mcg by mouth daily (before breakfast). Other, MD Nico       Allergies/Social/Family History: Allergies   Allergen Reactions    Latex Contact Dermatitis    Dilaudid [Hydromorphone (Pf)] Other (comments)     Feels like bugs are crawling all over her    Lipitor [Atorvastatin] Other (comments)     LIVER TROUBLE ON THIS MEDICATION    Remeron [Mirtazapine] Other (comments)     Tremors    Sulfa (Sulfonamide Antibiotics) Itching      Social History     Tobacco Use    Smoking status: Former Smoker     Packs/day: 1.00     Years: 30.00     Pack years: 30.00    Smokeless tobacco: Never Used   Substance Use Topics    Alcohol use: Yes     Comment: occasional      Family History   Problem Relation Age of Onset    Heart Disease Mother     Dementia Mother     Thyroid Disease Mother     Heart Disease Father     Alcohol abuse Father     Psychiatric Disorder Father     Dementia Father     Bipolar Disorder Father     Psychiatric Disorder Sister     Suicide Sister     Psychiatric Disorder Brother     Suicide Brother     Psychiatric Disorder Other     Psychiatric Disorder Daughter     Bipolar Disorder Daughter     Coronary Art Dis Other         multiple family members in 52's       Review of Systems:     Review of systems not obtained due to patient factors.     Objective:   Vital Signs:  Visit Vitals  BP (!) 181/83   Pulse (!) 102   Temp (!) 96 °F (35.6 °C)   Resp (!) 31   Ht 5' 4\" (1.626 m)   Wt 99.3 kg (218 lb 14.7 oz)   SpO2 90%   BMI 37.58 kg/m²      O2 Device: BIPAP Temp (24hrs), Av °F (35.6 °C), Min:96 °F (35.6 °C), Max:96 °F (35.6 °C)           Intake/Output:     Intake/Output Summary (Last 24 hours) at 11/25/2021 2227  Last data filed at 11/25/2021 2227  Gross per 24 hour   Intake 50 ml   Output --   Net 50 ml       Physical Exam:    General:  slowed mentation, lethargic  Eye:  conjunctivae/corneas clear. PERRL,   Neurologic:  no focal deficits  Lymphatic:  Cervical, supraclavicular, and axillary nodes normal.   Neck:  normal and no erythema or exudates noted. Lungs:  clear to auscultation bilaterally  Heart:  regular rate and rhythm, S1, S2 normal, no murmur, click, rub or gallop  Abdomen:  soft, non-tender. Bowel sounds normal. No masses,  no organomegaly  Cardiovascular:  Regular rate and rhythm, S1S2 present, without murmur or extra heart sounds, pedal pulses normal and no edema  Skin:  Normal  Extremities: Lower extremities with bilateral pitting edema    LABS AND  DATA: Personally reviewed  Recent Labs     11/25/21 2049   WBC 18.1*   HGB 13.1   HCT 44.4   *     Recent Labs     11/25/21 2049   *   K 5.0   CL 90*   CO2 33*   BUN 15   CREA 0.69   *   CA 10.4*     Recent Labs     11/25/21 2049      TP 7.3   ALB 3.3*   GLOB 4.0     No results for input(s): INR, PTP, APTT, INREXT in the last 72 hours. No results for input(s): PHI, PCO2I, PO2I, FIO2I in the last 72 hours. No results for input(s): CPK, CKMB, TROIQ, BNPP in the last 72 hours. Hemodynamics:   PAP:   CO:     Wedge:   CI:     CVP:    SVR:       PVR:       Ventilator Settings:  Mode Rate Tidal Volume Pressure FiO2 PEEP            70 %       Peak airway pressure:      Minute ventilation: 8.4 l/min        MEDS: Reviewed    Chest X-Ray: personally reviewed and report checked      Assessment and Plan:     Discussed Plan of Care (goals of care):  Yes  Addressed Code Status: Full Code  NOK: Vero Gonzalez (spouse)    Acute hypoxic and hypercapnic respiratory failure- ventilator dependent due to COPD exacerbation +/- PE  -CTA to r/o PE since cardiac enzymes elevated and RV enlargement seen on POCUS  -official TTE in am  -heparin gtt for possible PE (can discontinue if CTA negative for PE)  -solumedrol 60mg daily for COPD exacerbation  -duo nebs Q 6hr  -cont home symbicort   -CXR without inflitrate    Vent Goals:   Chlorhexidine   Optimize PEEP/Ventilation/Oxygenation  Goal Tidal Volume 6 cc/kg based on IBW  Aim for lung protective ventilation  Head of bed > 30 degrees   SPO2 Goal: > 92%    DM2  -insulin sliding scale    Hypothyroidism  -cont home synthroid    ID/antibiotics  -none at this time, given ceftriaxone and azithromycin in ED but procalcitonin <0.05  -f/u BC      DVT Prophylaxis (if no, list reason): Heparin   GI Prophylaxis: Protonix (pantoprazole)     ABCDEF Bundle/Checklist  Pain Medications: Fentanyl  Target RASS: 0 - Alert & Calm - Spontaneously pays attention to caregiver  Sedation Medications: None  CAM-ICU:  Positive  Mobility: Bedrest  Restraints: None needed at this time    T/L/D  Tubes: ETT  Lines: Peripheral IV  Drains: Ly Catheter    SPECIAL EQUIPMENT  None    DISPOSITION  Stay in ICU    CRITICAL CARE CONSULTANT NOTE  I had a face to face encounter with the patient, reviewed and interpreted patient data including clinical events, labs, images, vital signs, I/O's, and examined patient. I have discussed the case and the plan and management of the patient's care with the consulting services, the bedside nurses and the respiratory therapist.      NOTE OF PERSONAL INVOLVEMENT IN CARE   This patient has a high probability of imminent, clinically significant deterioration, which requires the highest level of preparedness to intervene urgently. I participated in the decision-making and personally managed or directed the management of the following life and organ supporting interventions that required my frequent assessment to treat or prevent imminent deterioration. I personally spent 60 minutes of critical care time.   This is time spent at this critically ill patient's bedside actively involved in patient care as well as the coordination of care and discussions with the patient's family. This does not include any procedural time which has been billed separately.     Joyceann Lanes, NP  [facility]  11/25/2021

## 2021-11-26 NOTE — PROGRESS NOTES
11/26/21 0758   ABCDEF Bundle   SBT Trial Passed No   SBT Trial Reason for Failure Oxygen saturation < 88%;  Respiratory distress; RSBI>105

## 2021-11-26 NOTE — PROGRESS NOTES
Physician Progress Note      PATIENT:               Mukesh Vences  CSN #:                  [id number]  :                       1955  ADMIT DATE:       2021 8:39 PM  [address] [place] DATE:  RESPONDING  PROVIDER #:        Anam Morris MD          QUERY TEXT:    Pt admitted with resp failure. Pt noted to have lethargy, obtunded. If possible, please document in the progress notes and discharge summary if you are evaluating and / or treating any of the following: The medical record reflects the following:  Risk Factors:  PMH of COPD, + smoker, prior lung empyema  Clinical Indicators:H&P-Upon my arrival, pt lethargic but A&Ox2. Repeat ABG after 1.5 hours of Bipap=  7.17,>95, 103. Pt reassessed and noted to be more obtunded  pn:  Placed on Bipap, given steroids and albuterol with improvement in mentation and respiratory status  Treatment: vent, solumedrol, cta chest, abgs  Thanks,  Celia Blanco RN/CDI   Options provided:  -- Anoxic encephalopathy  -- Metabolic encephalopathy  -- Toxic encephalopathy  -- Toxic metabolic encephalopathy  -- Other - I will add my own diagnosis  -- Disagree - Not applicable / Not valid  -- Disagree - Clinically unable to determine / Unknown  -- Refer to Clinical Documentation Reviewer    PROVIDER RESPONSE TEXT:    Provider disagreed with this query.     Query created by: Autumn Moreno on 2021 12:02 PM      Electronically signed by:  Anam Morris MD 2021 12:09 PM

## 2021-11-26 NOTE — PROGRESS NOTES
Transition of Care Plan:    RUR: 13%  Disposition: home with spouse vs HH  Follow up appointments: PCP, Specialists  DME needed: Pt owns a rollator and cane. Transportation at Discharge: spouse  Keys or means to access home:      yes  IM Medicare Letter: needed at d/c  Is patient a BCPI-A Bundle:        n/a   If yes, was Bundle Letter given?:     Caregiver Contact:  Tyrese Blake [phone]  Discharge Caregiver contacted prior to discharge? CM will contact prior to d/c if pt desires. Reason for Admission:  Hypercapnic Respiratory Failure                     RUR Score:          13%           Plan for utilizing home health:      As needed    PCP: First and Last name:  Addie aTriq MD     Name of Practice:    Are you a current patient: Yes/No: yes   Approximate date of last visit: last Friday   Can you participate in a virtual visit with your PCP: yes                    Current Advanced Directive/Advance Care Plan: Full Code    [address] (ACP) Conversation      Date of Conversation: 11/26/21  Conducted with: Patient with Decision Making Capacity and Healthcare Decision Maker: Next of Kin by law (only applies in absence of a Healthcare Power of  or Legal Guardian)    Healthcare Decision Maker:   No healthcare decision makers have been documented. Click here to complete [address] including selection of the Healthcare Decision Maker Relationship (ie \"Primary\")      Content/Action Overview: Has ACP document(s) NOT on file - requested patient to provide  Reviewed DNR/DNI and patient elects Full Code (Attempt Resuscitation)    Length of Voluntary ACP Conversation in minutes:  <16 minutes (Non-Billable)    Dat Profit                                Transition of Care Plan:  Home with spouse vs     CM spoke with pt' spouse, Tyrese Blake, to introduce self/role, verify demographics, insurance and PCP. CM also discussed d/c plan.       Pt is a 71 yo, ,  female who was admitted to [facility] on 11/25/21 with a dx of Hypercapnic Respiratory Failure. Pt sees her PCP monthly. Pt uses [facility]. Pt lives with his spouse in a one fl home with 4 MEDINA. Pt has a supportive daughter. Pt has a rollator and cane. Pt does not drive. Pt can complete her ADLs/IADLs independently at baseline. Pt does not have a hx of HH. Pt has been in [facility]. Pt does not have a hx of IPR. Pt's spouse will transport at d/c. CM will continue to assess for d/c needs. Care Management Interventions  PCP Verified by CM: Yes (Pt sees Dr. Rosana Rocha. )  Palliative Care Criteria Met (RRAT>21 & CHF Dx)?: No  Mode of Transport at Discharge:  Other (see comment) (spouse)  Transition of Care Consult (CM Consult): Discharge Planning  MyChart Signup:  (Pt has a rollator. )  Discharge Durable Medical Equipment: No  Physical Therapy Consult: No  Occupational Therapy Consult: No  Speech Therapy Consult: No  Support Systems: Spouse/Significant Other  Confirm Follow Up Transport: Family  Discharge Location  Discharge Placement: Home with family assistance    Elvie Lefort, MSW  Care Management, [facility]

## 2021-11-26 NOTE — ED NOTES
PT to room by EMS from home. Pt here today with complaints of SOB. Per EMS when they arrived on scene she was o2sat in the 60s and they gave her a neb and put her on 15L NRB and were able to get her sats in the 90s. PT sat in the 90s upon arrival to room. Per EMS  states that she has been having trouble breathing over the past few days and has been requiring more neb treatments.

## 2021-11-26 NOTE — PROGRESS NOTES
0040 TRANSFER - IN REPORT:    Verbal report received from MARIOLA Altamirano(name) on Leona Elio  being received from ED(unit) for routine progression of care      Report consisted of patients Situation, Background, Assessment and   Recommendations(SBAR). Information from the following report(s) SBAR, Kardex, ED Summary, Intake/Output, MAR and Recent Results was reviewed with the receiving nurse. Opportunity for questions and clarification was provided. 0200 Assessment and CHG bath completed upon patients arrival to unit and care assumed. 1643-5390 SBP dropped to low 80s despite  Interventions. ELVI Muller notified. POCUS done at bedside. Levo gtt and albumin ordered. Troponin/ BMP sent. Will continue to monitor. 0630 PRN versed given d/t ETT discomfort/agitation        End of Shift Note    Bedside shift change report given to Shelby Hobbs (oncoming nurse) by Dar Perez RN (offgoing nurse). Report included the following information SBAR, Kardex, ED Summary, Intake/Output, MAR and Recent Results    Shift worked:  7p-7a     Shift summary and any significant changes:     see above      Concerns for physician to address:  stat ECHO this am     Zone phone for oncoming shift:   [phone]       Activity:  Activity Level: Bed Rest  Number times ambulated in hallways past shift: 0  Number of times OOB to chair past shift: 0    Cardiac:   Cardiac Monitoring: Yes      Cardiac Rhythm: Sinus Rhythm    Access:   Current line(s): PIV     Genitourinary:   Urinary status: giordano    Respiratory:   O2 Device: Endotracheal tube, Ventilator  Chronic home O2 use?: NO  Incentive spirometer at bedside: NO     GI:     Current diet:  DIET NPO  Passing flatus: YES  Tolerating current diet: YES       Pain Management:   Patient states pain is manageable on current regimen: YES    Skin:  Joseph Score: 14  Interventions: speciality bed    Patient Safety:  Fall Score:  Total Score: 3  Interventions: assistive device (walker, cane, etc)  High Fall Risk: Yes    Length of Stay:  Expected LOS: - - -  Actual LOS: Sheba Marks RN

## 2021-11-27 LAB
ANION GAP SERPL CALC-SCNC: 3 MMOL/L (ref 5–15)
BASOPHILS # BLD: 0.1 K/UL (ref 0–0.1)
BASOPHILS NFR BLD: 0 % (ref 0–1)
BUN SERPL-MCNC: 18 MG/DL (ref 6–20)
BUN/CREAT SERPL: 29 (ref 12–20)
CALCIUM SERPL-MCNC: 9.5 MG/DL (ref 8.5–10.1)
CHLORIDE SERPL-SCNC: 94 MMOL/L (ref 97–108)
CO2 SERPL-SCNC: 37 MMOL/L (ref 21–32)
CREAT SERPL-MCNC: 0.62 MG/DL (ref 0.55–1.02)
DIFFERENTIAL METHOD BLD: ABNORMAL
EOSINOPHIL # BLD: 0 K/UL (ref 0–0.4)
EOSINOPHIL NFR BLD: 0 % (ref 0–7)
ERYTHROCYTE [DISTWIDTH] IN BLOOD BY AUTOMATED COUNT: 16.7 % (ref 11.5–14.5)
GLUCOSE BLD STRIP.AUTO-MCNC: 101 MG/DL (ref 65–117)
GLUCOSE BLD STRIP.AUTO-MCNC: 104 MG/DL (ref 65–117)
GLUCOSE BLD STRIP.AUTO-MCNC: 131 MG/DL (ref 65–117)
GLUCOSE BLD STRIP.AUTO-MCNC: 145 MG/DL (ref 65–117)
GLUCOSE BLD STRIP.AUTO-MCNC: 87 MG/DL (ref 65–117)
GLUCOSE SERPL-MCNC: 98 MG/DL (ref 65–100)
HCT VFR BLD AUTO: 43.4 % (ref 35–47)
HGB BLD-MCNC: 12.7 G/DL (ref 11.5–16)
IMM GRANULOCYTES # BLD AUTO: 0.1 K/UL (ref 0–0.04)
IMM GRANULOCYTES NFR BLD AUTO: 1 % (ref 0–0.5)
LYMPHOCYTES # BLD: 2.4 K/UL (ref 0.8–3.5)
LYMPHOCYTES NFR BLD: 18 % (ref 12–49)
MAGNESIUM SERPL-MCNC: 2 MG/DL (ref 1.6–2.4)
MCH RBC QN AUTO: 26 PG (ref 26–34)
MCHC RBC AUTO-ENTMCNC: 29.3 G/DL (ref 30–36.5)
MCV RBC AUTO: 88.8 FL (ref 80–99)
MONOCYTES # BLD: 1.5 K/UL (ref 0–1)
MONOCYTES NFR BLD: 11 % (ref 5–13)
NEUTS SEG # BLD: 9.2 K/UL (ref 1.8–8)
NEUTS SEG NFR BLD: 70 % (ref 32–75)
NRBC # BLD: 0.02 K/UL (ref 0–0.01)
NRBC BLD-RTO: 0.2 PER 100 WBC
PHOSPHATE SERPL-MCNC: 3.8 MG/DL (ref 2.6–4.7)
PLATELET # BLD AUTO: 299 K/UL (ref 150–400)
PMV BLD AUTO: 9 FL (ref 8.9–12.9)
POTASSIUM SERPL-SCNC: 3.9 MMOL/L (ref 3.5–5.1)
RBC # BLD AUTO: 4.89 M/UL (ref 3.8–5.2)
SERVICE CMNT-IMP: ABNORMAL
SERVICE CMNT-IMP: ABNORMAL
SERVICE CMNT-IMP: NORMAL
SODIUM SERPL-SCNC: 134 MMOL/L (ref 136–145)
WBC # BLD AUTO: 13.2 K/UL (ref 3.6–11)

## 2021-11-27 PROCEDURE — 74011250637 HC RX REV CODE- 250/637: Performed by: NURSE PRACTITIONER

## 2021-11-27 PROCEDURE — 82962 GLUCOSE BLOOD TEST: CPT

## 2021-11-27 PROCEDURE — 80048 BASIC METABOLIC PNL TOTAL CA: CPT

## 2021-11-27 PROCEDURE — C9113 INJ PANTOPRAZOLE SODIUM, VIA: HCPCS | Performed by: NURSE PRACTITIONER

## 2021-11-27 PROCEDURE — 84100 ASSAY OF PHOSPHORUS: CPT

## 2021-11-27 PROCEDURE — 74011250637 HC RX REV CODE- 250/637: Performed by: INTERNAL MEDICINE

## 2021-11-27 PROCEDURE — 65610000006 HC RM INTENSIVE CARE

## 2021-11-27 PROCEDURE — 74011250636 HC RX REV CODE- 250/636: Performed by: INTERNAL MEDICINE

## 2021-11-27 PROCEDURE — 94640 AIRWAY INHALATION TREATMENT: CPT

## 2021-11-27 PROCEDURE — 74011250636 HC RX REV CODE- 250/636: Performed by: NURSE PRACTITIONER

## 2021-11-27 PROCEDURE — 85025 COMPLETE CBC W/AUTO DIFF WBC: CPT

## 2021-11-27 PROCEDURE — 94660 CPAP INITIATION&MGMT: CPT

## 2021-11-27 PROCEDURE — 83735 ASSAY OF MAGNESIUM: CPT

## 2021-11-27 PROCEDURE — 77010033678 HC OXYGEN DAILY

## 2021-11-27 PROCEDURE — 36415 COLL VENOUS BLD VENIPUNCTURE: CPT

## 2021-11-27 PROCEDURE — 74011000250 HC RX REV CODE- 250: Performed by: NURSE PRACTITIONER

## 2021-11-27 PROCEDURE — 74011000258 HC RX REV CODE- 258: Performed by: INTERNAL MEDICINE

## 2021-11-27 RX ORDER — TRAMADOL HYDROCHLORIDE 50 MG/1
50 TABLET ORAL
Status: DISCONTINUED | OUTPATIENT
Start: 2021-11-27 | End: 2021-11-29

## 2021-11-27 RX ORDER — PRAZOSIN HYDROCHLORIDE 1 MG/1
2 CAPSULE ORAL 2 TIMES DAILY
Status: DISCONTINUED | OUTPATIENT
Start: 2021-11-28 | End: 2021-11-27

## 2021-11-27 RX ORDER — PRAZOSIN HYDROCHLORIDE 2 MG/1
2 CAPSULE ORAL 2 TIMES DAILY
Status: DISCONTINUED | OUTPATIENT
Start: 2021-11-28 | End: 2021-12-06 | Stop reason: HOSPADM

## 2021-11-27 RX ORDER — PHENOBARBITAL 32.4 MG/1
64.8 TABLET ORAL EVERY EVENING
Status: DISCONTINUED | OUTPATIENT
Start: 2021-11-27 | End: 2021-12-01

## 2021-11-27 RX ORDER — PRAZOSIN HYDROCHLORIDE 1 MG/1
2 CAPSULE ORAL 2 TIMES DAILY
Status: DISCONTINUED | OUTPATIENT
Start: 2021-11-27 | End: 2021-11-27

## 2021-11-27 RX ORDER — PRIMIDONE 50 MG/1
150 TABLET ORAL
Status: DISCONTINUED | OUTPATIENT
Start: 2021-11-27 | End: 2021-12-06 | Stop reason: HOSPADM

## 2021-11-27 RX ADMIN — Medication 10 ML: at 05:45

## 2021-11-27 RX ADMIN — Medication 10 ML: at 16:55

## 2021-11-27 RX ADMIN — Medication 1 AMPULE: at 11:32

## 2021-11-27 RX ADMIN — CARVEDILOL 3.12 MG: 3.12 TABLET, FILM COATED ORAL at 16:54

## 2021-11-27 RX ADMIN — FUROSEMIDE 40 MG: 40 TABLET ORAL at 09:15

## 2021-11-27 RX ADMIN — LEVOTHYROXINE SODIUM 200 MCG: 0.15 TABLET ORAL at 09:15

## 2021-11-27 RX ADMIN — AZITHROMYCIN MONOHYDRATE 500 MG: 500 INJECTION, POWDER, LYOPHILIZED, FOR SOLUTION INTRAVENOUS at 10:43

## 2021-11-27 RX ADMIN — ARIPIPRAZOLE 5 MG: 5 TABLET ORAL at 09:15

## 2021-11-27 RX ADMIN — CARVEDILOL 3.12 MG: 3.12 TABLET, FILM COATED ORAL at 09:15

## 2021-11-27 RX ADMIN — PHENOBARBITAL 64.8 MG: 32.4 TABLET ORAL at 22:47

## 2021-11-27 RX ADMIN — Medication 10 ML: at 22:01

## 2021-11-27 RX ADMIN — Medication 1 AMPULE: at 20:47

## 2021-11-27 RX ADMIN — ASPIRIN 81 MG CHEWABLE TABLET 81 MG: 81 TABLET CHEWABLE at 09:15

## 2021-11-27 RX ADMIN — IPRATROPIUM BROMIDE AND ALBUTEROL SULFATE 3 ML: .5; 3 SOLUTION RESPIRATORY (INHALATION) at 01:58

## 2021-11-27 RX ADMIN — PRIMIDONE 150 MG: 50 TABLET ORAL at 22:48

## 2021-11-27 RX ADMIN — CEFTRIAXONE 1 G: 1 INJECTION, POWDER, FOR SOLUTION INTRAMUSCULAR; INTRAVENOUS at 09:14

## 2021-11-27 RX ADMIN — TRAMADOL HYDROCHLORIDE 50 MG: 50 TABLET, COATED ORAL at 22:47

## 2021-11-27 RX ADMIN — IPRATROPIUM BROMIDE AND ALBUTEROL SULFATE 3 ML: .5; 3 SOLUTION RESPIRATORY (INHALATION) at 21:06

## 2021-11-27 RX ADMIN — PRAZOSIN HYDROCHLORIDE 2 MG: 2 CAPSULE ORAL at 23:44

## 2021-11-27 RX ADMIN — ALPRAZOLAM 1 MG: 0.5 TABLET ORAL at 16:54

## 2021-11-27 RX ADMIN — IPRATROPIUM BROMIDE AND ALBUTEROL SULFATE 3 ML: .5; 3 SOLUTION RESPIRATORY (INHALATION) at 12:53

## 2021-11-27 RX ADMIN — METHYLPREDNISOLONE SODIUM SUCCINATE 60 MG: 40 INJECTION, POWDER, FOR SOLUTION INTRAMUSCULAR; INTRAVENOUS at 09:14

## 2021-11-27 RX ADMIN — SODIUM CHLORIDE 40 MG: 9 INJECTION, SOLUTION INTRAMUSCULAR; INTRAVENOUS; SUBCUTANEOUS at 09:15

## 2021-11-27 NOTE — PROGRESS NOTES
0730 Bedside shift change report given to [address] (oncoming nurse) by Opal Burns RN (offgoing nurse). Report included the following information SBAR, Kardex, ED Summary, MAR, Recent Results and Cardiac Rhythm NSR. End of Shift Note    Bedside shift change report given to Jesi GARNETT (oncoming nurse) by Cam Tanner (offgoing nurse). Report included the following information SBAR, Kardex, ED Summary, MAR, Recent Results and Cardiac Rhythm NSR    Shift worked:  [id number]     Shift summary and any significant changes:    Patient currently NPO; pending speech evaluation. Prazosin added per MD. Medication brought in by patient's . Pending pharmacy verification. Concerns for physician to address:  None      Zone phone for oncoming shift:          Activity:  Activity Level: Bed Rest  Number times ambulated in hallways past shift: 0  Number of times OOB to chair past shift: 0    Cardiac:   Cardiac Monitoring: Yes      Cardiac Rhythm: Sinus Rhythm    Access:   Current line(s): PIV     Genitourinary:   Urinary status: giordano    Respiratory:   O2 Device: Ventimask  Chronic home O2 use?: NO  Incentive spirometer at bedside: YES  Actual Volume (ml): 500 ml  GI:     Current diet:  ADULT DIET Regular; 3 carb choices (45 gm/meal); Low Fat/Low Chol/High Fiber/BHARGAVI; No Salt Added (3-4 gm); GI [place] (GERD/Peptic Ulcer)  Passing flatus: YES  Tolerating current diet: NO       Pain Management:   Patient states pain is manageable on current regimen: N/A    Skin:  Joseph Score: 14  Interventions: speciality bed, float heels and internal/external urinary devices    Patient Safety:  Fall Score:  Total Score: 2  Interventions: bed/chair alarm, gripper socks and pt to call before getting OOB  High Fall Risk: Yes    Length of Stay:  Expected LOS: 4d 21h  Actual LOS: [address]

## 2021-11-27 NOTE — PROGRESS NOTES
ICU Progress Note        Subjective: Overnight events noted. Currently on venti mask 31% Fio2. Alert and oriented,  on bedside. No resp distress. Wants to eat. Vital Signs:    Visit Vitals  /76   Pulse 85   Temp 97.7 °F (36.5 °C)   Resp 21   Ht 5' 4\" (1.626 m)   Wt 93.7 kg (206 lb 9.1 oz)   SpO2 93%   BMI 35.46 kg/m²       O2 Device: Venturi-mask   O2 Flow Rate (L/min): 12 l/min   Temp (24hrs), Av °F (36.7 °C), Min:97.6 °F (36.4 °C), Max:99 °F (37.2 °C)       Intake/Output:   Last shift:       07 - 1900  In: -   Out: 225 [Urine:225]  Last 3 shifts: 1901 -  0700  In: 679.5 [P.O.:240; I.V.:439.5]  Out: 4595 [Urine:4595]    Intake/Output Summary (Last 24 hours) at 2021 1040  Last data filed at 2021 0936  Gross per 24 hour   Intake 540 ml   Output 3350 ml   Net -2810 ml       Ventilator Settings:  Ventilator Mode: Assist control  Respiratory Rate  Back-Up Rate: 24  Insp Flow (l/min): 60 l/min  I:E Ratio: 1:2.0  Ventilator Volumes  Vt Set (ml): 375 ml  Vt Exhaled (Machine Breath) (ml): 388 ml  Vt Spont (ml): 497 ml  Ve Observed (l/min): 7.5 l/min  Ventilator Pressures  PIP Observed (cm H2O): 35 cm H2O  Plateau Pressure (cm H2O): 18 cm H2O  MAP (cm H2O): 14  PEEP/VENT (cm H2O): 6 cm H20  Auto PEEP Observed (cm H2O): 0 cm H2O    Physical Exam:    General: Alert, awake. Not in acute distress  HEENT:  Anicteric sclerae; pink palpebral conjunctivae; mucosa moist  Resp:  Bilateral air entry +, no crackles or wheeze, Expiratory phase is longer. CV:  S1, S2 present  GI:  Abdomen soft, non-tender; (+) active bowel sounds  Extremities:  +2 pulses on all extremities; no edema/ cyanosis/ clubbing noted  Skin:  Warm; no rashes/ lesions noted  Neurologic:  Non-focal, follows commands and appropriately answers all the questions and also directing concerns despite being on vent.      DATA:     Current Facility-Administered Medications   Medication Dose Route Frequency    alcohol 62% (NOZIN) nasal  1 Ampule  1 Ampule Topical Q12H    midazolam (VERSED) injection 1-2 mg  1-2 mg IntraVENous Q2H PRN    fentaNYL (PF) 1,500 mcg/30 mL (50 mcg/mL) infusion  0-200 mcg/hr IntraVENous TITRATE    dexmedeTOMidine in 0.9 % NaCl (PRECEDEX) 400 mcg/100 mL (4 mcg/mL) infusion soln  0.1-1.5 mcg/kg/hr IntraVENous TITRATE    azithromycin (ZITHROMAX) 500 mg in 0.9% sodium chloride 250 mL (VIAL-MATE)  500 mg IntraVENous Q24H    cefTRIAXone (ROCEPHIN) 1 g in 0.9% sodium chloride (MBP/ADV) 50 mL MBP  1 g IntraVENous Q24H    nicotine (NICODERM CQ) 21 mg/24 hr patch 1 Patch  1 Patch TransDERmal DAILY    hydrALAZINE (APRESOLINE) 20 mg/mL injection 10 mg  10 mg IntraVENous Q6H PRN    carvediloL (COREG) tablet 3.125 mg  3.125 mg Oral BID WITH MEALS    ALPRAZolam (XANAX) tablet 1 mg  1 mg Oral TID PRN    influenza vaccine 2021-22 (6 mos+)(PF) (FLUARIX/FLULAVAL/FLUZONE QUAD) injection 0.5 mL  1 Each IntraMUSCular PRIOR TO DISCHARGE    nitroglycerin (NITROSTAT) tablet 0.4 mg  0.4 mg SubLINGual Q5MIN PRN    sodium chloride (NS) flush 5-40 mL  5-40 mL IntraVENous Q8H    sodium chloride (NS) flush 5-40 mL  5-40 mL IntraVENous PRN    acetaminophen (TYLENOL) tablet 650 mg  650 mg Oral Q6H PRN    Or    acetaminophen (TYLENOL) suppository 650 mg  650 mg Rectal Q6H PRN    albuterol-ipratropium (DUO-NEB) 2.5 MG-0.5 MG/3 ML  3 mL Nebulization Q6H RT    methylPREDNISolone (PF) (SOLU-MEDROL) injection 60 mg  60 mg IntraVENous DAILY    ARIPiprazole (ABILIFY) tablet 5 mg  5 mg Oral DAILY    aspirin chewable tablet 81 mg  81 mg Oral DAILY    furosemide (LASIX) tablet 40 mg  40 mg Oral DAILY    levothyroxine (SYNTHROID) tablet 200 mcg  200 mcg Oral ACB    glucose chewable tablet 16 g  4 Tablet Oral PRN    dextrose (D50W) injection syrg 12.5-25 g  25-50 mL IntraVENous PRN    glucagon (GLUCAGEN) injection 1 mg  1 mg IntraMUSCular PRN    insulin lispro (HUMALOG) injection   SubCUTAneous AC&HS    pantoprazole (PROTONIX) 40 mg in 0.9% sodium chloride 10 mL injection  40 mg IntraVENous DAILY    albuterol (PROVENTIL VENTOLIN) nebulizer solution 2.5 mg  2.5 mg Nebulization Q4H PRN         Labs: Results:       Chemistry Recent Labs     11/27/21 0448 11/26/21 0239 11/25/21 2049   GLU 98 172* 156*   * 126* 126*   K 3.9 4.4 5.0   CL 94* 89* 90*   CO2 37* 32 33*   BUN 18 15 15   CREA 0.62 0.77 0.69   CA 9.5 10.1 10.4*   AGAP 3* 5 3*   BUCR 29* 19 22*   AP  --  105 117   TP  --  6.3* 7.3   ALB  --  2.8* 3.3*   GLOB  --  3.5 4.0   AGRAT  --  0.8* 0.8*      CBC w/Diff Recent Labs     11/27/21 0448 11/26/21 0439 11/25/21 2217   WBC 13.2* 16.0* 16.2*   RBC 4.89 4.69 5.01   HGB 12.7 12.1 13.1   HCT 43.4 39.6 45.2    293 445*   GRANS 70 69 75   LYMPH 18 22 15   EOS 0 0 0      Coagulation Recent Labs     11/25/21 2217   APTT 23.4       Liver Enzymes Recent Labs     11/26/21 0239   TP 6.3*   ALB 2.8*         ABG No results found for: PH, PHI, PCO2, PCO2I, PO2, PO2I, HCO3, HCO3I, FIO2, FIO2I   Microbiology Recent Labs     11/25/21 2132   CULT NO GROWTH 2 DAYS        maging:  CXR Results  (Last 48 hours)               11/26/21 0013  XR CHEST PORT Final result    Impression:  ET tube in place. Narrative:  Clinical indication: Intubation. Portable AP supine view of the chest obtained, comparison November 25, 2021. ET tube in place well above the prasad. No focal consolidation. 11/25/21 2107  XR CHEST PORT Final result    Impression:  No acute infiltrate. Narrative:  Clinical indication: Congestive heart failure. Portable AP semierect view of the chest obtained. Comparison August 28, 2018. The heart size is normal. There is no focal infiltrate. CT Results  (Last 48 hours)               11/25/21 2249  CTA CHEST W OR W WO CONT Final result    Impression:   impression: No pulmonary emboli. Bibasilar infiltrates.        Narrative:  Clinical indication: Hypoxia. Localizer obtained without contrast at the left lobe pulmonary arteries. Fast   injection rate of 80 cc of Isovue-370 with review of the raw data and MIP   reconstructions. CT dose reduction was achieved through the use of a standardized protocol   tailored for this examination and automatic exposure control for dose modulation   . Yandel Crain There is no evidence for pulmonary emboli. Pericardial or pleural effusion. No shift or thorax. There is atelectasis at both lung bases with patchy infiltrates and a small   consolidation at the left lung base. Assessment and Plan: 73 y/o F pt with PMH of COPD, + smoker, prior lung empyema, hypothyrodism, and DM2 BIBEMS for SOB and hypoxia (SPO2 60% upon EMS arrival). Upon arrival to the ED pt minimally responsive in severe respiratory distress, complaining of chest pain. ABG done which showed a pH of 7.1 and pCO2 of 92. Placed on Bipap, given steroids and albuterol with improvement in mentation and respiratory status. Labs also significant for pro-BNP 4,531 and high sensitivity trop of 166. EKG without ST elevations. Due to lack of improvement in pCO2 after BiPAP she was intubated. ICU consulted for admission. Acute hypoxic and hypercapnic respiratory failure - likely COPD exacerbation. Pulmonary embolism ruled out with CTA. Her pCO2 is much corrected now. Extubated on 11/26. Cont. Bronchodilators, antibiotics and steroids. Cont. Supplemental oxygen to keep saturation 88-92%. Pulmonary hypertension - this would not be surprising, her RA/RV looks bigger on CT chest but no pulmonary embolism. This could be WHO group III PH due to COPD. Will need formal evaluation later. Hyponatremia - with elevated BNP, could be volume overload. On lasix with  Negative 1 liter overnight. Cont. Lasix, continue to monitor BMP. No neurological symptoms.       Atelectasis  - noted on CT chest. Likely mucus plugging. Cont.  Suction while on ventilator and encourage chest PT afterwards. Repeat CT chest in 3 months to document resolution. DM2  -insulin sliding scale     Hypothyroidism  -cont home synthroid     Patient starting choking on food per nursing staff, will keep NPO until speech evaluation. CCM time - 40 minutes.          Antwan Lua MD

## 2021-11-27 NOTE — PROGRESS NOTES
1900 - Verbal shift change report given to MARIOLA Dennison (oncoming nurse) by London Dominguez RN (offgoing nurse). Report included the following information SBAR, Kardex, Intake/Output, MAR, Recent Results, Cardiac Rhythm normal sinus rhythm, and dual skin assessment at the bedside. 2000 - Assessment complete. Receiving oxygen via Venti-Mask 12L/50%. Bipap 14/6 70% at night. CHG bath complete. Lines:  PIV: x1,     0000 - Reassessment complete. VSS. Oral care complete. 0400 - Reassessment complete. VSS. Oral care complete. Labs sent. End of Shift Note    Bedside shift change report given to LIDIA PARK RN (oncoming nurse) by Aimee Mcclellan RN (offgoing nurse). Report included the following information SBAR, Kardex, ED Summary, Intake/Output, MAR and Recent Results    Shift worked:  7p-7a     Shift summary and any significant changes:     see above     Concerns for physician to address:  none     Zone phone for oncoming shift:   [phone]       Activity:  Activity Level: Bed Rest  Number times ambulated in hallways past shift: 0  Number of times OOB to chair past shift: 0    Cardiac:   Cardiac Monitoring: Yes      Cardiac Rhythm: Sinus Rhythm    Access:   Current line(s): PIV     Genitourinary:   Urinary status: giordano    Respiratory:   O2 Device: BIPAP  Chronic home O2 use?: NO  Incentive spirometer at bedside: YES  Actual Volume (ml): 500 ml  GI:     Current diet:  ADULT DIET Regular; 3 carb choices (45 gm/meal); Low Fat/Low Chol/High Fiber/BHARGAVI; No Salt Added (3-4 gm); GI [place] (GERD/Peptic Ulcer)  Passing flatus: YES  Tolerating current diet: YES       Pain Management:   Patient states pain is manageable on current regimen: YES    Skin:  Joseph Score: 14  Interventions: speciality bed    Patient Safety:  Fall Score:  Total Score: 2  Interventions: bed/chair alarm  High Fall Risk: Yes    Length of Stay:  Expected LOS: 4d 21h  Actual LOS: 2      Aimee Mcclellan RN

## 2021-11-28 ENCOUNTER — APPOINTMENT (OUTPATIENT)
Dept: GENERAL RADIOLOGY | Age: 66
DRG: 208 | End: 2021-11-28
Attending: HOSPITALIST
Payer: MEDICARE

## 2021-11-28 LAB
ANION GAP SERPL CALC-SCNC: 2 MMOL/L (ref 5–15)
BASOPHILS # BLD: 0 K/UL (ref 0–0.1)
BASOPHILS NFR BLD: 0 % (ref 0–1)
BUN SERPL-MCNC: 15 MG/DL (ref 6–20)
BUN/CREAT SERPL: 30 (ref 12–20)
CALCIUM SERPL-MCNC: 9.6 MG/DL (ref 8.5–10.1)
CHLORIDE SERPL-SCNC: 94 MMOL/L (ref 97–108)
CO2 SERPL-SCNC: 39 MMOL/L (ref 21–32)
CREAT SERPL-MCNC: 0.5 MG/DL (ref 0.55–1.02)
DIFFERENTIAL METHOD BLD: ABNORMAL
EOSINOPHIL # BLD: 0 K/UL (ref 0–0.4)
EOSINOPHIL NFR BLD: 0 % (ref 0–7)
ERYTHROCYTE [DISTWIDTH] IN BLOOD BY AUTOMATED COUNT: 16.8 % (ref 11.5–14.5)
GLUCOSE BLD STRIP.AUTO-MCNC: 115 MG/DL (ref 65–117)
GLUCOSE BLD STRIP.AUTO-MCNC: 73 MG/DL (ref 65–117)
GLUCOSE BLD STRIP.AUTO-MCNC: 84 MG/DL (ref 65–117)
GLUCOSE BLD STRIP.AUTO-MCNC: 97 MG/DL (ref 65–117)
GLUCOSE SERPL-MCNC: 107 MG/DL (ref 65–100)
HCT VFR BLD AUTO: 39.5 % (ref 35–47)
HGB BLD-MCNC: 11.6 G/DL (ref 11.5–16)
IMM GRANULOCYTES # BLD AUTO: 0 K/UL (ref 0–0.04)
IMM GRANULOCYTES NFR BLD AUTO: 0 % (ref 0–0.5)
LYMPHOCYTES # BLD: 1.7 K/UL (ref 0.8–3.5)
LYMPHOCYTES NFR BLD: 16 % (ref 12–49)
MAGNESIUM SERPL-MCNC: 1.9 MG/DL (ref 1.6–2.4)
MCH RBC QN AUTO: 25.9 PG (ref 26–34)
MCHC RBC AUTO-ENTMCNC: 29.4 G/DL (ref 30–36.5)
MCV RBC AUTO: 88.2 FL (ref 80–99)
MONOCYTES # BLD: 1.1 K/UL (ref 0–1)
MONOCYTES NFR BLD: 10 % (ref 5–13)
NEUTS SEG # BLD: 7.5 K/UL (ref 1.8–8)
NEUTS SEG NFR BLD: 74 % (ref 32–75)
NRBC # BLD: 0 K/UL (ref 0–0.01)
NRBC BLD-RTO: 0 PER 100 WBC
PHOSPHATE SERPL-MCNC: 3.8 MG/DL (ref 2.6–4.7)
PLATELET # BLD AUTO: 239 K/UL (ref 150–400)
PMV BLD AUTO: 9.5 FL (ref 8.9–12.9)
POTASSIUM SERPL-SCNC: 3.5 MMOL/L (ref 3.5–5.1)
RBC # BLD AUTO: 4.48 M/UL (ref 3.8–5.2)
SERVICE CMNT-IMP: NORMAL
SODIUM SERPL-SCNC: 135 MMOL/L (ref 136–145)
WBC # BLD AUTO: 10.3 K/UL (ref 3.6–11)

## 2021-11-28 PROCEDURE — 83735 ASSAY OF MAGNESIUM: CPT

## 2021-11-28 PROCEDURE — 2709999900 HC NON-CHARGEABLE SUPPLY

## 2021-11-28 PROCEDURE — 74011000258 HC RX REV CODE- 258: Performed by: INTERNAL MEDICINE

## 2021-11-28 PROCEDURE — 82962 GLUCOSE BLOOD TEST: CPT

## 2021-11-28 PROCEDURE — 94660 CPAP INITIATION&MGMT: CPT

## 2021-11-28 PROCEDURE — 84100 ASSAY OF PHOSPHORUS: CPT

## 2021-11-28 PROCEDURE — 31720 CLEARANCE OF AIRWAYS: CPT

## 2021-11-28 PROCEDURE — 74011250637 HC RX REV CODE- 250/637: Performed by: NURSE PRACTITIONER

## 2021-11-28 PROCEDURE — 65610000006 HC RM INTENSIVE CARE

## 2021-11-28 PROCEDURE — C9113 INJ PANTOPRAZOLE SODIUM, VIA: HCPCS | Performed by: NURSE PRACTITIONER

## 2021-11-28 PROCEDURE — 74011250637 HC RX REV CODE- 250/637: Performed by: INTERNAL MEDICINE

## 2021-11-28 PROCEDURE — 77010033711 HC HIGH FLOW OXYGEN

## 2021-11-28 PROCEDURE — 94640 AIRWAY INHALATION TREATMENT: CPT

## 2021-11-28 PROCEDURE — 80048 BASIC METABOLIC PNL TOTAL CA: CPT

## 2021-11-28 PROCEDURE — 74011000250 HC RX REV CODE- 250: Performed by: NURSE PRACTITIONER

## 2021-11-28 PROCEDURE — 71045 X-RAY EXAM CHEST 1 VIEW: CPT

## 2021-11-28 PROCEDURE — 74011250636 HC RX REV CODE- 250/636: Performed by: INTERNAL MEDICINE

## 2021-11-28 PROCEDURE — 85025 COMPLETE CBC W/AUTO DIFF WBC: CPT

## 2021-11-28 PROCEDURE — 74011250636 HC RX REV CODE- 250/636: Performed by: NURSE PRACTITIONER

## 2021-11-28 PROCEDURE — 36415 COLL VENOUS BLD VENIPUNCTURE: CPT

## 2021-11-28 RX ORDER — ACETAMINOPHEN 325 MG/1
650 TABLET ORAL ONCE
Status: DISCONTINUED | OUTPATIENT
Start: 2021-11-28 | End: 2021-11-28

## 2021-11-28 RX ORDER — ACETAMINOPHEN AND CODEINE PHOSPHATE 300; 30 MG/1; MG/1
1 TABLET ORAL ONCE
Status: COMPLETED | OUTPATIENT
Start: 2021-11-28 | End: 2021-11-28

## 2021-11-28 RX ADMIN — IPRATROPIUM BROMIDE AND ALBUTEROL SULFATE 3 ML: .5; 3 SOLUTION RESPIRATORY (INHALATION) at 14:22

## 2021-11-28 RX ADMIN — AZITHROMYCIN MONOHYDRATE 500 MG: 500 INJECTION, POWDER, LYOPHILIZED, FOR SOLUTION INTRAVENOUS at 09:30

## 2021-11-28 RX ADMIN — PRAZOSIN HYDROCHLORIDE 2 MG: 2 CAPSULE ORAL at 08:53

## 2021-11-28 RX ADMIN — METHYLPREDNISOLONE SODIUM SUCCINATE 60 MG: 40 INJECTION, POWDER, FOR SOLUTION INTRAMUSCULAR; INTRAVENOUS at 08:53

## 2021-11-28 RX ADMIN — CARVEDILOL 3.12 MG: 3.12 TABLET, FILM COATED ORAL at 08:53

## 2021-11-28 RX ADMIN — SODIUM CHLORIDE 40 MG: 9 INJECTION, SOLUTION INTRAMUSCULAR; INTRAVENOUS; SUBCUTANEOUS at 08:56

## 2021-11-28 RX ADMIN — LEVOTHYROXINE SODIUM 200 MCG: 0.15 TABLET ORAL at 08:55

## 2021-11-28 RX ADMIN — ACETAMINOPHEN AND CODEINE PHOSPHATE 1 TABLET: 300; 30 TABLET ORAL at 22:27

## 2021-11-28 RX ADMIN — PHENOBARBITAL 64.8 MG: 32.4 TABLET ORAL at 17:00

## 2021-11-28 RX ADMIN — Medication 10 ML: at 22:34

## 2021-11-28 RX ADMIN — IPRATROPIUM BROMIDE AND ALBUTEROL SULFATE 3 ML: .5; 3 SOLUTION RESPIRATORY (INHALATION) at 20:40

## 2021-11-28 RX ADMIN — PRAZOSIN HYDROCHLORIDE 2 MG: 2 CAPSULE ORAL at 17:00

## 2021-11-28 RX ADMIN — FUROSEMIDE 40 MG: 40 TABLET ORAL at 08:55

## 2021-11-28 RX ADMIN — PRIMIDONE 150 MG: 50 TABLET ORAL at 22:27

## 2021-11-28 RX ADMIN — Medication 10 ML: at 05:17

## 2021-11-28 RX ADMIN — CEFTRIAXONE 1 G: 1 INJECTION, POWDER, FOR SOLUTION INTRAMUSCULAR; INTRAVENOUS at 10:40

## 2021-11-28 RX ADMIN — IPRATROPIUM BROMIDE AND ALBUTEROL SULFATE 3 ML: .5; 3 SOLUTION RESPIRATORY (INHALATION) at 08:06

## 2021-11-28 RX ADMIN — ASPIRIN 81 MG CHEWABLE TABLET 81 MG: 81 TABLET CHEWABLE at 08:55

## 2021-11-28 RX ADMIN — Medication 1 AMPULE: at 08:54

## 2021-11-28 RX ADMIN — Medication 10 ML: at 14:16

## 2021-11-28 RX ADMIN — Medication 1 AMPULE: at 20:10

## 2021-11-28 RX ADMIN — CARVEDILOL 3.12 MG: 3.12 TABLET, FILM COATED ORAL at 16:58

## 2021-11-28 RX ADMIN — IPRATROPIUM BROMIDE AND ALBUTEROL SULFATE 3 ML: .5; 3 SOLUTION RESPIRATORY (INHALATION) at 02:30

## 2021-11-28 NOTE — PROGRESS NOTES
0700- Report given to Dee Dee Choudhury RN by off going nurse. 0900- SATS down to 80% on Venti. Also tried 15 L HF. SATS also fell to low 80's. Pt placed back on bipap. RT aware. 1145- Ly removed. Purewick placed. Pt placed on 25L HHF by RT. SATS 95%.  at bedside. 1900- Report given to oncoming nurse by Dee Dee Choudhury RN.

## 2021-11-28 NOTE — PROGRESS NOTES
Pt seen in ICU as a potential to PCU on 11/28. She was admitted on 11/26 for COPD exacerbation, s/p intubation x1 day. Pt extubated to bipap and subsequently on ventimask. She received IV steroids, abx and lasix. Pt was placed on hiflow from bipap during my encounter. On exam, pt still has coarse bs b/l with scattered wheezing. Later discussed with Dr Yg Richardson, pt desats and may required to go back on bipap. Will keep pt in ICU today. Care transfer back to Dr Yg Richardson.

## 2021-11-28 NOTE — PROGRESS NOTES
Occupational Therapy note:    Order acknowledged and chart reviewed. Noted patient's O2 desatted into 80s while on venti mask and 15L HF O2. Patient placed back on BiPAP to maintain O2 sats >90%. Will defer and continue to follow when patient medically appropriate for OT consult.     Rossana Veliz OTR/L

## 2021-11-28 NOTE — PROGRESS NOTES
ICU Progress Note        Subjective/hospital course:     : Overnight events noted. Currently on venti mask 31% Fio2. Alert and oriented,  on bedside. No resp distress. Wants to eat. : Today patient had some worsening hypoxia, yesterday she was on venti mask and has been requiring BiPAP, try to switch to HHF. Vital Signs:    Visit Vitals  /66 (BP 1 Location: Right upper arm, BP Patient Position: At rest)   Pulse 84   Temp 97.8 °F (36.6 °C)   Resp 21   Ht 5' 4\" (1.626 m)   Wt 93.7 kg (206 lb 9.1 oz)   SpO2 95%   BMI 35.46 kg/m²       O2 Device: Heated, Hi flow nasal cannula   O2 Flow Rate (L/min): 25 l/min   Temp (24hrs), Av.8 °F (36.6 °C), Min:97.2 °F (36.2 °C), Max:98.4 °F (36.9 °C)       Intake/Output:   Last shift:       07 - 1900  In: -   Out: 575 [Urine:575]  Last 3 shifts:  190 -  0700  In: -   Out: 3767 [Urine:3520]    Intake/Output Summary (Last 24 hours) at 2021 1324  Last data filed at 2021 1132  Gross per 24 hour   Intake --   Output 1465 ml   Net -1465 ml       Ventilator Settings:  Ventilator Mode: Assist control  Respiratory Rate  Back-Up Rate: 24  Insp Flow (l/min): 60 l/min  I:E Ratio: 1:2.0  Ventilator Volumes  Vt Set (ml): 375 ml  Vt Exhaled (Machine Breath) (ml): 388 ml  Vt Spont (ml): 343 ml  Ve Observed (l/min): 5.9 l/min  Ventilator Pressures  PIP Observed (cm H2O): 35 cm H2O  Plateau Pressure (cm H2O): 18 cm H2O  MAP (cm H2O): 14  PEEP/VENT (cm H2O): 6 cm H20  Auto PEEP Observed (cm H2O): 0 cm H2O    Physical Exam:    General: Alert, awake. Not in acute distress  HEENT:  Anicteric sclerae; pink palpebral conjunctivae; mucosa moist  Resp:  Bilateral air entry +, no crackles or wheeze, Expiratory phase is longer.    CV:  S1, S2 present  GI:  Abdomen soft, non-tender; (+) active bowel sounds  Extremities:  +2 pulses on all extremities; no edema/ cyanosis/ clubbing noted  Skin:  Warm; no rashes/ lesions noted  Neurologic: Non-focal, follows commands and appropriately answers all the questions and also directing concerns despite being on vent.      DATA:     Current Facility-Administered Medications   Medication Dose Route Frequency    Super B100 Balanced B Complex (Patient Supplied)  1 Tablet Oral DAILY    primidone (MYSOLINE) tablet 150 mg  150 mg Oral QHS    PHENobarbitaL (LUMINAL) tablet 64.8 mg  64.8 mg Oral QPM    traMADoL (ULTRAM) tablet 50 mg  50 mg Oral Q6H PRN    prazosin (MINIPRESS) capsule 2 mg (Patient Supplied)  2 mg Oral BID    alcohol 62% (NOZIN) nasal  1 Ampule  1 Ampule Topical Q12H    azithromycin (ZITHROMAX) 500 mg in 0.9% sodium chloride 250 mL (VIAL-MATE)  500 mg IntraVENous Q24H    cefTRIAXone (ROCEPHIN) 1 g in 0.9% sodium chloride (MBP/ADV) 50 mL MBP  1 g IntraVENous Q24H    nicotine (NICODERM CQ) 21 mg/24 hr patch 1 Patch  1 Patch TransDERmal DAILY    hydrALAZINE (APRESOLINE) 20 mg/mL injection 10 mg  10 mg IntraVENous Q6H PRN    carvediloL (COREG) tablet 3.125 mg  3.125 mg Oral BID WITH MEALS    ALPRAZolam (XANAX) tablet 1 mg  1 mg Oral TID PRN    influenza vaccine 2021-22 (6 mos+)(PF) (FLUARIX/FLULAVAL/FLUZONE QUAD) injection 0.5 mL  1 Each IntraMUSCular PRIOR TO DISCHARGE    nitroglycerin (NITROSTAT) tablet 0.4 mg  0.4 mg SubLINGual Q5MIN PRN    sodium chloride (NS) flush 5-40 mL  5-40 mL IntraVENous Q8H    sodium chloride (NS) flush 5-40 mL  5-40 mL IntraVENous PRN    acetaminophen (TYLENOL) tablet 650 mg  650 mg Oral Q6H PRN    Or    acetaminophen (TYLENOL) suppository 650 mg  650 mg Rectal Q6H PRN    albuterol-ipratropium (DUO-NEB) 2.5 MG-0.5 MG/3 ML  3 mL Nebulization Q6H RT    methylPREDNISolone (PF) (SOLU-MEDROL) injection 60 mg  60 mg IntraVENous DAILY    ARIPiprazole (ABILIFY) tablet 5 mg  5 mg Oral DAILY    aspirin chewable tablet 81 mg  81 mg Oral DAILY    furosemide (LASIX) tablet 40 mg  40 mg Oral DAILY    levothyroxine (SYNTHROID) tablet 200 mcg  200 mcg Oral ACB  glucose chewable tablet 16 g  4 Tablet Oral PRN    dextrose (D50W) injection syrg 12.5-25 g  25-50 mL IntraVENous PRN    glucagon (GLUCAGEN) injection 1 mg  1 mg IntraMUSCular PRN    insulin lispro (HUMALOG) injection   SubCUTAneous AC&HS    pantoprazole (PROTONIX) 40 mg in 0.9% sodium chloride 10 mL injection  40 mg IntraVENous DAILY    albuterol (PROVENTIL VENTOLIN) nebulizer solution 2.5 mg  2.5 mg Nebulization Q4H PRN         Labs: Results:       Chemistry Recent Labs     11/28/21 0529 11/27/21 0448 11/26/21 0239 11/25/21 2049 11/25/21 2049   * 98 172*   < > 156*   * 134* 126*   < > 126*   K 3.5 3.9 4.4   < > 5.0   CL 94* 94* 89*   < > 90*   CO2 39* 37* 32   < > 33*   BUN 15 18 15   < > 15   CREA 0.50* 0.62 0.77   < > 0.69   CA 9.6 9.5 10.1   < > 10.4*   AGAP 2* 3* 5   < > 3*   BUCR 30* 29* 19   < > 22*   AP  --   --  105  --  117   TP  --   --  6.3*  --  7.3   ALB  --   --  2.8*  --  3.3*   GLOB  --   --  3.5  --  4.0   AGRAT  --   --  0.8*  --  0.8*    < > = values in this interval not displayed. CBC w/Diff Recent Labs     11/28/21 0529 11/27/21 0448 11/26/21 0439   WBC 10.3 13.2* 16.0*   RBC 4.48 4.89 4.69   HGB 11.6 12.7 12.1   HCT 39.5 43.4 39.6    299 293   GRANS 74 70 69   LYMPH 16 18 22   EOS 0 0 0      Coagulation Recent Labs     11/25/21 2217   APTT 23.4       Liver Enzymes Recent Labs     11/26/21 0239   TP 6.3*   ALB 2.8*         ABG No results found for: PH, PHI, PCO2, PCO2I, PO2, PO2I, HCO3, HCO3I, FIO2, FIO2I   Microbiology Recent Labs     11/25/21 2132   CULT NO GROWTH 3 DAYS        maging:  CXR Results  (Last 48 hours)               11/28/21 1114  XR CHEST PORT Final result    Impression:  Interval extubation. New right-sided interstitial process could   represent asymmetric interstitial edema or interstitial pneumonia.        Narrative:  EXAM: XR CHEST PORT       INDICATION: acute respiratory failure       COMPARISON: 11/26/2021       FINDINGS: A portable AP radiograph of the chest was obtained at 1109 hours. The   patient is on a cardiac monitor. The endotracheal tube has been removed. There   is an asymmetric right-sided interstitial process that is new as compared to the   prior study. CT Results  (Last 48 hours)    None               Assessment and Plan: 73 y/o F pt with PMH of COPD, + smoker, prior lung empyema, hypothyrodism, and DM2 BIBEMS for SOB and hypoxia (SPO2 60% upon EMS arrival). Upon arrival to the ED pt minimally responsive in severe respiratory distress, complaining of chest pain. ABG done which showed a pH of 7.1 and pCO2 of 92. Placed on Bipap, given steroids and albuterol with improvement in mentation and respiratory status. Labs also significant for pro-BNP 4,531 and high sensitivity trop of 166. EKG without ST elevations. Due to lack of improvement in pCO2 after BiPAP she was intubated. ICU consulted for admission. Acute hypoxic and hypercapnic respiratory failure - likely COPD exacerbation. Pulmonary embolism ruled out with CTA. Her pCO2 is much corrected now. Extubated on 11/26. Cont. Bronchodilators, antibiotics and steroids. Cont. Supplemental oxygen to keep saturation 88-92%. Pulmonary hypertension - this would not be surprising, her RA/RV looks bigger on CT chest but no pulmonary embolism. This could be WHO group III PH due to COPD. Will need formal evaluation later. Hyponatremia - with elevated BNP, could be volume overload. On lasix with  Negative 1 liter overnight. Cont. Lasix, continue to monitor BMP. No neurological symptoms.       Atelectasis  - noted on CT chest. Likely mucus plugging. Cont. Suction while on ventilator and encourage chest PT afterwards. Repeat CT chest in 3 months to document resolution. DM2  -insulin sliding scale     Hypothyroidism  -cont home synthroid     Patient starting choking on food per nursing staff, will keep NPO until speech evaluation.  I initially call medicine team for transfer but later she had worsening hypoxia, so canceled the transfer. Her hypoxic event is likely from mucous plug and also pulmonary edema. Continue diuresis and frequent suctioning. Continue care in ICU and canceled transfer. [facility] time - 40 minutes.          Rama Correa MD

## 2021-11-28 NOTE — PROGRESS NOTES
1900 - Verbal shift change report given to MARIOLA Dennison (oncoming nurse) by Dano Prabhakar RN (offgoing nurse). Report included the following information SBAR, Kardex, Intake/Output, MAR, Recent Results, Cardiac Rhythm normal sinus rhythm, and dual skin assessment at the bedside. 2000 - Assessment complete. Receiving oxygen via Venti-Mask 12L/50%. Bipap at night 14/6 70%. Oral care complete. Lines:  PIV: x1,. draining Giordano,     2130 Bath and linen change complete. Applied new cream/lotion/powder. Tolerated well. 0000 - Reassessment complete. VSS.     0400 - Reassessment complete. VSS. Labs sent. End of Shift Note    Bedside shift change report given to [address] (oncoming nurse) by Aimee Mcclellan RN (offgoing nurse). Report included the following information SBAR, Kardex, ED Summary, MAR and Recent Results    Shift worked:  7p-7a     Shift summary and any significant changes:     see above     Concerns for physician to address:  none     Zone phone for oncoming shift:   [phone]       Activity:  Activity Level: Bed Rest  Number times ambulated in hallways past shift: 0  Number of times OOB to chair past shift: 0    Cardiac:   Cardiac Monitoring: Yes      Cardiac Rhythm: Sinus Rhythm    Access:   Current line(s): PIV     Genitourinary:   Urinary status: giordano    Respiratory:   O2 Device: BIPAP  Chronic home O2 use?: NO  Incentive spirometer at bedside: YES  Actual Volume (ml): 500 ml  GI:     Current diet:  DIET NPO  Passing flatus: YES  Tolerating current diet: YES       Pain Management:   Patient states pain is manageable on current regimen: YES    Skin:  Joseph Score: 14  Interventions: speciality bed    Patient Safety:  Fall Score:  Total Score: 3  Interventions: bed/chair alarm  High Fall Risk: Yes    Length of Stay:  Expected LOS: 4d 21h  Actual LOS: 3      Aimee Mcclellan RN

## 2021-11-28 NOTE — PROGRESS NOTES
Order received and acknowledged. PT sats down on 80% venti and now on BIPAP. Not appropriate for PT eval at this time. Will continue to follow as appropriate.

## 2021-11-29 LAB
ANION GAP SERPL CALC-SCNC: 5 MMOL/L (ref 5–15)
BASE EXCESS BLD CALC-SCNC: 4.3 MMOL/L
BASOPHILS # BLD: 0.1 K/UL (ref 0–0.1)
BASOPHILS NFR BLD: 1 % (ref 0–1)
BUN SERPL-MCNC: 14 MG/DL (ref 6–20)
BUN/CREAT SERPL: 29 (ref 12–20)
CALCIUM SERPL-MCNC: 9.4 MG/DL (ref 8.5–10.1)
CHLORIDE BLD-SCNC: 87 MMOL/L (ref 100–108)
CHLORIDE SERPL-SCNC: 94 MMOL/L (ref 97–108)
CO2 BLD-SCNC: 39 MMOL/L (ref 19–24)
CO2 SERPL-SCNC: 41 MMOL/L (ref 21–32)
CREAT SERPL-MCNC: 0.49 MG/DL (ref 0.55–1.02)
CREAT UR-MCNC: 0.7 MG/DL (ref 0.6–1.3)
DIFFERENTIAL METHOD BLD: ABNORMAL
EOSINOPHIL # BLD: 0 K/UL (ref 0–0.4)
EOSINOPHIL NFR BLD: 0 % (ref 0–7)
ERYTHROCYTE [DISTWIDTH] IN BLOOD BY AUTOMATED COUNT: 16.9 % (ref 11.5–14.5)
GLUCOSE BLD STRIP.AUTO-MCNC: 100 MG/DL (ref 65–117)
GLUCOSE BLD STRIP.AUTO-MCNC: 123 MG/DL (ref 65–117)
GLUCOSE BLD STRIP.AUTO-MCNC: 165 MG/DL (ref 74–106)
GLUCOSE BLD STRIP.AUTO-MCNC: 76 MG/DL (ref 65–117)
GLUCOSE BLD STRIP.AUTO-MCNC: 78 MG/DL (ref 65–117)
GLUCOSE SERPL-MCNC: 76 MG/DL (ref 65–100)
HCO3 BLDA-SCNC: 38 MMOL/L
HCT VFR BLD AUTO: 38.7 % (ref 35–47)
HGB BLD-MCNC: 11.2 G/DL (ref 11.5–16)
IMM GRANULOCYTES # BLD AUTO: 0 K/UL (ref 0–0.04)
IMM GRANULOCYTES NFR BLD AUTO: 0 % (ref 0–0.5)
LACTATE BLD-SCNC: 1.2 MMOL/L (ref 0.4–2)
LYMPHOCYTES # BLD: 2 K/UL (ref 0.8–3.5)
LYMPHOCYTES NFR BLD: 17 % (ref 12–49)
MAGNESIUM SERPL-MCNC: 1.8 MG/DL (ref 1.6–2.4)
MCH RBC QN AUTO: 25.6 PG (ref 26–34)
MCHC RBC AUTO-ENTMCNC: 28.9 G/DL (ref 30–36.5)
MCV RBC AUTO: 88.4 FL (ref 80–99)
MONOCYTES # BLD: 1.3 K/UL (ref 0–1)
MONOCYTES NFR BLD: 11 % (ref 5–13)
NEUTS SEG # BLD: 8.2 K/UL (ref 1.8–8)
NEUTS SEG NFR BLD: 71 % (ref 32–75)
NRBC # BLD: 0 K/UL (ref 0–0.01)
NRBC BLD-RTO: 0 PER 100 WBC
PCO2 BLDV: 102.2 MMHG (ref 41–51)
PH BLDV: 7.18 [PH] (ref 7.32–7.42)
PHOSPHATE SERPL-MCNC: 3.4 MG/DL (ref 2.6–4.7)
PLATELET # BLD AUTO: 234 K/UL (ref 150–400)
PMV BLD AUTO: 9.5 FL (ref 8.9–12.9)
PO2 BLDV: 53 MMHG (ref 25–40)
POTASSIUM BLD-SCNC: 5.3 MMOL/L (ref 3.5–5.5)
POTASSIUM SERPL-SCNC: 3.6 MMOL/L (ref 3.5–5.1)
RBC # BLD AUTO: 4.38 M/UL (ref 3.8–5.2)
RBC MORPH BLD: ABNORMAL
SERVICE CMNT-IMP: ABNORMAL
SERVICE CMNT-IMP: ABNORMAL
SERVICE CMNT-IMP: NORMAL
SODIUM BLD-SCNC: 131 MMOL/L (ref 136–145)
SODIUM SERPL-SCNC: 140 MMOL/L (ref 136–145)
SPECIMEN SITE: ABNORMAL
WBC # BLD AUTO: 11.6 K/UL (ref 3.6–11)

## 2021-11-29 PROCEDURE — 97161 PT EVAL LOW COMPLEX 20 MIN: CPT

## 2021-11-29 PROCEDURE — 92610 EVALUATE SWALLOWING FUNCTION: CPT | Performed by: SPEECH-LANGUAGE PATHOLOGIST

## 2021-11-29 PROCEDURE — 36415 COLL VENOUS BLD VENIPUNCTURE: CPT

## 2021-11-29 PROCEDURE — 74011250636 HC RX REV CODE- 250/636: Performed by: NURSE PRACTITIONER

## 2021-11-29 PROCEDURE — 74011250636 HC RX REV CODE- 250/636: Performed by: INTERNAL MEDICINE

## 2021-11-29 PROCEDURE — 77010033711 HC HIGH FLOW OXYGEN

## 2021-11-29 PROCEDURE — C9113 INJ PANTOPRAZOLE SODIUM, VIA: HCPCS | Performed by: NURSE PRACTITIONER

## 2021-11-29 PROCEDURE — 83735 ASSAY OF MAGNESIUM: CPT

## 2021-11-29 PROCEDURE — 97535 SELF CARE MNGMENT TRAINING: CPT

## 2021-11-29 PROCEDURE — 74011250637 HC RX REV CODE- 250/637: Performed by: NURSE PRACTITIONER

## 2021-11-29 PROCEDURE — 97530 THERAPEUTIC ACTIVITIES: CPT

## 2021-11-29 PROCEDURE — 84100 ASSAY OF PHOSPHORUS: CPT

## 2021-11-29 PROCEDURE — 82962 GLUCOSE BLOOD TEST: CPT

## 2021-11-29 PROCEDURE — 74011250637 HC RX REV CODE- 250/637: Performed by: INTERNAL MEDICINE

## 2021-11-29 PROCEDURE — 85025 COMPLETE CBC W/AUTO DIFF WBC: CPT

## 2021-11-29 PROCEDURE — 74011000258 HC RX REV CODE- 258: Performed by: INTERNAL MEDICINE

## 2021-11-29 PROCEDURE — 74011000250 HC RX REV CODE- 250: Performed by: INTERNAL MEDICINE

## 2021-11-29 PROCEDURE — 80048 BASIC METABOLIC PNL TOTAL CA: CPT

## 2021-11-29 PROCEDURE — 94640 AIRWAY INHALATION TREATMENT: CPT

## 2021-11-29 PROCEDURE — 97116 GAIT TRAINING THERAPY: CPT

## 2021-11-29 PROCEDURE — 97165 OT EVAL LOW COMPLEX 30 MIN: CPT

## 2021-11-29 PROCEDURE — 74011000250 HC RX REV CODE- 250: Performed by: NURSE PRACTITIONER

## 2021-11-29 PROCEDURE — 65610000006 HC RM INTENSIVE CARE

## 2021-11-29 RX ORDER — ACETAMINOPHEN 325 MG/1
650 TABLET ORAL EVERY 8 HOURS
Status: DISCONTINUED | OUTPATIENT
Start: 2021-11-29 | End: 2021-12-06 | Stop reason: HOSPADM

## 2021-11-29 RX ORDER — ENOXAPARIN SODIUM 100 MG/ML
40 INJECTION SUBCUTANEOUS EVERY 24 HOURS
Status: DISCONTINUED | OUTPATIENT
Start: 2021-11-29 | End: 2021-12-06 | Stop reason: HOSPADM

## 2021-11-29 RX ORDER — TRAMADOL HYDROCHLORIDE 50 MG/1
50 TABLET ORAL EVERY 8 HOURS
Status: DISCONTINUED | OUTPATIENT
Start: 2021-11-29 | End: 2021-12-06 | Stop reason: HOSPADM

## 2021-11-29 RX ORDER — TRAMADOL HYDROCHLORIDE 50 MG/1
50 TABLET ORAL
Status: DISCONTINUED | OUTPATIENT
Start: 2021-11-29 | End: 2021-12-06 | Stop reason: HOSPADM

## 2021-11-29 RX ORDER — IBUPROFEN 400 MG/1
400 TABLET ORAL
Status: DISCONTINUED | OUTPATIENT
Start: 2021-11-29 | End: 2021-12-06 | Stop reason: HOSPADM

## 2021-11-29 RX ORDER — METOPROLOL TARTRATE 25 MG/1
12.5 TABLET, FILM COATED ORAL EVERY 12 HOURS
Status: DISCONTINUED | OUTPATIENT
Start: 2021-11-29 | End: 2021-12-06 | Stop reason: HOSPADM

## 2021-11-29 RX ORDER — ARIPIPRAZOLE 5 MG/1
10 TABLET ORAL DAILY
Status: DISCONTINUED | OUTPATIENT
Start: 2021-11-30 | End: 2021-12-06 | Stop reason: HOSPADM

## 2021-11-29 RX ORDER — BUDESONIDE 0.25 MG/2ML
250 INHALANT ORAL
Status: DISCONTINUED | OUTPATIENT
Start: 2021-11-29 | End: 2021-12-06 | Stop reason: HOSPADM

## 2021-11-29 RX ORDER — ACETAZOLAMIDE 500 MG/1
500 CAPSULE, EXTENDED RELEASE ORAL ONCE
Status: COMPLETED | OUTPATIENT
Start: 2021-11-29 | End: 2021-11-29

## 2021-11-29 RX ORDER — CODEINE SULFATE 30 MG/1
30 TABLET ORAL
Status: DISCONTINUED | OUTPATIENT
Start: 2021-11-29 | End: 2021-12-06 | Stop reason: HOSPADM

## 2021-11-29 RX ADMIN — Medication 1 AMPULE: at 20:54

## 2021-11-29 RX ADMIN — AZITHROMYCIN MONOHYDRATE 500 MG: 500 INJECTION, POWDER, LYOPHILIZED, FOR SOLUTION INTRAVENOUS at 11:06

## 2021-11-29 RX ADMIN — IPRATROPIUM BROMIDE AND ALBUTEROL SULFATE 3 ML: .5; 3 SOLUTION RESPIRATORY (INHALATION) at 02:09

## 2021-11-29 RX ADMIN — ACETAMINOPHEN 650 MG: 325 TABLET ORAL at 18:03

## 2021-11-29 RX ADMIN — BUDESONIDE 250 MCG: 0.25 INHALANT RESPIRATORY (INHALATION) at 12:28

## 2021-11-29 RX ADMIN — METHYLPREDNISOLONE SODIUM SUCCINATE 60 MG: 40 INJECTION, POWDER, FOR SOLUTION INTRAMUSCULAR; INTRAVENOUS at 08:20

## 2021-11-29 RX ADMIN — CARVEDILOL 3.12 MG: 3.12 TABLET, FILM COATED ORAL at 08:17

## 2021-11-29 RX ADMIN — ASPIRIN 81 MG CHEWABLE TABLET 81 MG: 81 TABLET CHEWABLE at 08:20

## 2021-11-29 RX ADMIN — Medication 10 ML: at 06:00

## 2021-11-29 RX ADMIN — LEVOTHYROXINE SODIUM 200 MCG: 0.15 TABLET ORAL at 08:20

## 2021-11-29 RX ADMIN — METOPROLOL TARTRATE 12.5 MG: 25 TABLET, FILM COATED ORAL at 10:44

## 2021-11-29 RX ADMIN — ARIPIPRAZOLE 5 MG: 5 TABLET ORAL at 09:00

## 2021-11-29 RX ADMIN — PRAZOSIN HYDROCHLORIDE 2 MG: 2 CAPSULE ORAL at 10:08

## 2021-11-29 RX ADMIN — Medication 1 AMPULE: at 09:51

## 2021-11-29 RX ADMIN — FUROSEMIDE 40 MG: 40 TABLET ORAL at 08:18

## 2021-11-29 RX ADMIN — ALPRAZOLAM 1 MG: 0.5 TABLET ORAL at 01:44

## 2021-11-29 RX ADMIN — CODEINE SULFATE 30 MG: 30 TABLET ORAL at 10:11

## 2021-11-29 RX ADMIN — ACETAMINOPHEN 650 MG: 325 TABLET ORAL at 09:53

## 2021-11-29 RX ADMIN — IPRATROPIUM BROMIDE AND ALBUTEROL SULFATE 3 ML: .5; 3 SOLUTION RESPIRATORY (INHALATION) at 08:17

## 2021-11-29 RX ADMIN — PHENOBARBITAL 64.8 MG: 32.4 TABLET ORAL at 18:03

## 2021-11-29 RX ADMIN — TRAMADOL HYDROCHLORIDE 50 MG: 50 TABLET, COATED ORAL at 08:23

## 2021-11-29 RX ADMIN — CEFTRIAXONE 1 G: 1 INJECTION, POWDER, FOR SOLUTION INTRAMUSCULAR; INTRAVENOUS at 09:48

## 2021-11-29 RX ADMIN — ACETAZOLAMIDE 500 MG: 500 CAPSULE, EXTENDED RELEASE ORAL at 09:51

## 2021-11-29 RX ADMIN — TRAMADOL HYDROCHLORIDE 50 MG: 50 TABLET, COATED ORAL at 18:03

## 2021-11-29 RX ADMIN — BUDESONIDE 250 MCG: 0.25 INHALANT RESPIRATORY (INHALATION) at 20:10

## 2021-11-29 RX ADMIN — Medication 10 ML: at 21:41

## 2021-11-29 RX ADMIN — POTASSIUM BICARBONATE 40 MEQ: 782 TABLET, EFFERVESCENT ORAL at 19:56

## 2021-11-29 RX ADMIN — ALPRAZOLAM 1 MG: 0.5 TABLET ORAL at 19:56

## 2021-11-29 RX ADMIN — METHYLPREDNISOLONE SODIUM SUCCINATE 40 MG: 40 INJECTION, POWDER, FOR SOLUTION INTRAMUSCULAR; INTRAVENOUS at 20:55

## 2021-11-29 RX ADMIN — PRIMIDONE 150 MG: 50 TABLET ORAL at 21:40

## 2021-11-29 RX ADMIN — IPRATROPIUM BROMIDE AND ALBUTEROL SULFATE 3 ML: .5; 3 SOLUTION RESPIRATORY (INHALATION) at 14:59

## 2021-11-29 RX ADMIN — IPRATROPIUM BROMIDE AND ALBUTEROL SULFATE 3 ML: .5; 3 SOLUTION RESPIRATORY (INHALATION) at 20:10

## 2021-11-29 RX ADMIN — SODIUM CHLORIDE 40 MG: 9 INJECTION, SOLUTION INTRAMUSCULAR; INTRAVENOUS; SUBCUTANEOUS at 08:20

## 2021-11-29 RX ADMIN — POTASSIUM BICARBONATE 40 MEQ: 782 TABLET, EFFERVESCENT ORAL at 16:57

## 2021-11-29 RX ADMIN — ENOXAPARIN SODIUM 40 MG: 100 INJECTION SUBCUTANEOUS at 09:51

## 2021-11-29 RX ADMIN — Medication 10 ML: at 13:49

## 2021-11-29 RX ADMIN — PRAZOSIN HYDROCHLORIDE 2 MG: 2 CAPSULE ORAL at 18:04

## 2021-11-29 NOTE — PROGRESS NOTES
Bedside shift change report given to Soheila Dos Santos (oncoming nurse) by Lawrence Bell RN (offgoing nurse). Report included the following information SBAR, Kardex, ED Summary, Intake/Output, MAR and Recent Results. 2013-2130-initial assessment complete, see flowsheet. Pt complains of pain from fibromyalgia, states she takes Tylenol #3 at home and that the Tramadol ordered does not work. Discussed with Leia Izquierdo NP, orders placed see [facility]     2137-Message sent to pharmacy asking to send Tylenol #3 asap. 2150-call placed to pharmacy asking for medication to be sent to unit, as it is not supplied in the pyxis. Informed pt.     2227-Tylenol #3 given, see MAR. Pt states she does not understand why it takes it so long to get medication. Informed pt that this medication was not ordered during this admission. 0000-reassessment complete, see flowsheet. 0135-pt insists on taking HHFNC off to blow nose, informed her that she needs to keep it on as sats drop to 60-70's without it. 0200-CHG bath complete. 0400-reassessment complete, see flowsheet, pt resting comfortably, labs sent. 0557-call received from lab stating CO2 at 39, Leia Izquierdo NP notified, no new orders at this time. 0700-Bedside shift change report given to Rowan Argueta RN (oncoming nurse) by Chen Ramirez RN (offgoing nurse). Report included the following information SBAR, Kardex, ED Summary, [facility] and Recent Results.
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
## 2021-11-29 NOTE — PROGRESS NOTES
11/29/21 1218   Oxygen Therapy   O2 Device Heated;  Hi flow nasal cannula   O2 Flow Rate (L/min) 35 l/min   FIO2 (%) 70 %   weaned FI02 from 90% to 70% SATS 98%

## 2021-11-29 NOTE — PROGRESS NOTES
ICU Progress Note        Subjective/hospital course:     : Overnight events noted. Currently on venti mask 31% Fio2. Alert and oriented,  on bedside. No resp distress. Wants to eat. : Today patient had some worsening hypoxia, yesterday she was on venti mask and has been requiring BiPAP, try to switch to [facility].   C/O pain all over (attributed to fibromyalgia syndrome). On  [facility] [address]. No overt respiratory distress. Cognition intact. Worsening metabolic alkalosis. Furosemide held and single dose of acetazolamide given      Vital Signs:    Visit Vitals  /62 (BP 1 Location: Right upper arm, BP Patient Position: At rest)   Pulse 81   Temp 98.9 °F (37.2 °C)   Resp 16   Ht 5' 4\" (1.626 m)   Wt 93.7 kg (206 lb 9.1 oz)   SpO2 98%   BMI 35.46 kg/m²       O2 Device: Heated, Hi flow nasal cannula   O2 Flow Rate (L/min): 35 l/min   Temp (24hrs), Av.5 °F (36.9 °C), Min:98 °F (36.7 °C), Max:98.9 °F (37.2 °C)       Intake/Output:   Last shift:      701 - 1900  In: -   Out: 3400 [GFQNY:4265]  Last 3 shifts: 1901 -  07  In: 400 [P.O.:100; I.V.:300]  Out: 1465 [Urine:1465]    Intake/Output Summary (Last 24 hours) at 2021 1249  Last data filed at 2021 1200  Gross per 24 hour   Intake 350 ml   Output 1600 ml   Net -1250 ml       Ventilator Settings:  Ventilator Mode: Assist control  Respiratory Rate  Back-Up Rate: 24  Insp Flow (l/min): 60 l/min  I:E Ratio: 1:2.0  Ventilator Volumes  Vt Set (ml): 375 ml  Vt Exhaled (Machine Breath) (ml): 388 ml  Vt Spont (ml): 343 ml  Ve Observed (l/min): 5.9 l/min  Ventilator Pressures  PIP Observed (cm H2O): 35 cm H2O  Plateau Pressure (cm H2O): 18 cm H2O  MAP (cm H2O): 14  PEEP/VENT (cm H2O): 6 cm H20  Auto PEEP Observed (cm H2O): 0 cm H2O    Physical Exam:    General: RASS 0.  Crying out in pain whenever touched lightly  HEENT:  Anicteric sclerae; pink palpebral conjunctivae; mucosa moist  Resp:  Diffuse coarse expiratory wheezes   CV: RRR, no M   GI:  Abdomen soft, non-tender; (+) active bowel sounds  Extremities:  Symmetric LE edema  Skin:  Warm; no rashes/ lesions noted  Neurologic:  CNs intact, no focal deficits     DATA:     Current Facility-Administered Medications   Medication Dose Route Frequency    budesonide (PULMICORT) 250 mcg/2ml nebulizer susp  250 mcg Nebulization BID RT    metoprolol tartrate (LOPRESSOR) tablet 12.5 mg  12.5 mg Oral Q12H    methylPREDNISolone (PF) (SOLU-MEDROL) injection 40 mg  40 mg IntraVENous Q12H    acetaminophen (TYLENOL) tablet 650 mg  650 mg Oral Q8H    traMADoL (ULTRAM) tablet 50 mg  50 mg Oral Q8H    traMADoL (ULTRAM) tablet 50 mg  50 mg Oral Q4H PRN    codeine sulfate tablet 30 mg  30 mg Oral Q4H PRN    enoxaparin (LOVENOX) injection 40 mg  40 mg SubCUTAneous Q24H    [START ON 11/30/2021] ARIPiprazole (ABILIFY) tablet 10 mg  10 mg Oral DAILY    ibuprofen (MOTRIN) tablet 400 mg  400 mg Oral Q6H PRN    Super B100 Balanced B Complex (Patient Supplied)  1 Tablet Oral DAILY    primidone (MYSOLINE) tablet 150 mg  150 mg Oral QHS    PHENobarbitaL (LUMINAL) tablet 64.8 mg  64.8 mg Oral QPM    prazosin (MINIPRESS) capsule 2 mg (Patient Supplied)  2 mg Oral BID    alcohol 62% (NOZIN) nasal  1 Ampule  1 Ampule Topical Q12H    nicotine (NICODERM CQ) 21 mg/24 hr patch 1 Patch  1 Patch TransDERmal DAILY    hydrALAZINE (APRESOLINE) 20 mg/mL injection 10 mg  10 mg IntraVENous Q6H PRN    ALPRAZolam (XANAX) tablet 1 mg  1 mg Oral TID PRN    influenza vaccine 2021-22 (6 mos+)(PF) (FLUARIX/FLULAVAL/FLUZONE QUAD) injection 0.5 mL  1 Each IntraMUSCular PRIOR TO DISCHARGE    nitroglycerin (NITROSTAT) tablet 0.4 mg  0.4 mg SubLINGual Q5MIN PRN    sodium chloride (NS) flush 5-40 mL  5-40 mL IntraVENous Q8H    sodium chloride (NS) flush 5-40 mL  5-40 mL IntraVENous PRN    albuterol-ipratropium (DUO-NEB) 2.5 MG-0.5 MG/3 ML  3 mL Nebulization Q6H RT    aspirin chewable tablet 81 mg  81 mg Oral DAILY  [Held by provider] furosemide (LASIX) tablet 40 mg  40 mg Oral DAILY    levothyroxine (SYNTHROID) tablet 200 mcg  200 mcg Oral ACB    glucose chewable tablet 16 g  4 Tablet Oral PRN    dextrose (D50W) injection syrg 12.5-25 g  25-50 mL IntraVENous PRN    glucagon (GLUCAGEN) injection 1 mg  1 mg IntraMUSCular PRN    insulin lispro (HUMALOG) injection   SubCUTAneous AC&HS    albuterol (PROVENTIL VENTOLIN) nebulizer solution 2.5 mg  2.5 mg Nebulization Q4H PRN         Labs: Results:       Chemistry Recent Labs     11/29/21  0505 11/28/21 0529 11/27/21 0448   GLU 76 107* 98    135* 134*   K 3.6 3.5 3.9   CL 94* 94* 94*   CO2 41* 39* 37*   BUN 14 15 18   CREA 0.49* 0.50* 0.62   CA 9.4 9.6 9.5   AGAP 5 2* 3*   BUCR 29* 30* 29*      CBC w/Diff Recent Labs     11/29/21  0505 11/28/21 0529 11/27/21 0448   WBC 11.6* 10.3 13.2*   RBC 4.38 4.48 4.89   HGB 11.2* 11.6 12.7   HCT 38.7 39.5 43.4    239 299   GRANS 71 74 70   LYMPH 17 16 18   EOS 0 0 0      Coagulation No results for input(s): PTP, INR, APTT, INREXT, INREXT in the last 72 hours. Liver Enzymes No results for input(s): TP, ALB, TBIL, AP in the last 72 hours. No lab exists for component: SGOT, GPT, DBIL   ABG No results found for: PH, PHI, PCO2, PCO2I, PO2, PO2I, HCO3, HCO3I, FIO2, FIO2I   Microbiology No results for input(s): CULT in the last 72 hours. maging:  CXR Results  (Last 48 hours)               11/28/21 1114  XR CHEST PORT Final result    Impression:  Interval extubation. New right-sided interstitial process could   represent asymmetric interstitial edema or interstitial pneumonia. Narrative:  EXAM: XR CHEST PORT       INDICATION: acute respiratory failure       COMPARISON: 11/26/2021       FINDINGS: A portable AP radiograph of the chest was obtained at 1109 hours. The   patient is on a cardiac monitor. The endotracheal tube has been removed.  There   is an asymmetric right-sided interstitial process that is new as compared to the   prior study. CT Results  (Last 48 hours)    None               Assessment and Plan: 72 F smoker with PMH of COPD, prior lung empyema, hypothyrodism, and DM2 BIBEMS for SOB and hypoxia (SPO2 60% upon EMS arrival). Upon arrival to the ED pt minimally responsive in severe respiratory distress, complaining of chest pain. ABG done which showed a pH of 7.1 and pCO2 of 92. Placed on Bipap, given steroids and albuterol with improvement in mentation and respiratory status. Labs also significant for pro-BNP 4,531 and high sensitivity trop of 166. EKG without ST elevations. Due to lack of improvement in pCO2 after BiPAP she was intubated. ICU consulted for admission. Acute on chronic hypoxic and hypercapnic respiratory failure - likely COPD exacerbation. Pulmonary embolism ruled out with CTA. Her pCO2 is much corrected now. Extubated on 11/26. Completed 5 days azithro/ceftriaxone. Cont nebulized bronchodilators. Nebulized steroids added 11/29. Methylpred dose adjusted 11/29. Cont supplemental oxygen to keep saturation 88-92%. 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 No specific treatment indicated presently. Consider further eval after discharge    Hyponatremia, resolved    Metabolic alkalosis - compensatory + loop diuresis. Single dose of acetazolamide ordered 11/29. Replace K+ aggressively. Hold furosemide    Atelectasis      DM2  -insulin sliding scale     Hypothyroidism  -cont home synthroid     Concern for dysphagia - episode of choking while eating 11/28. SLP to evaluate    CCM time - 40 minutes.      Rachelle Daniels, [address] [facility]  [phone]  11/29/2021

## 2021-11-29 NOTE — PROGRESS NOTES
Care Management:    Transition of Care Plan:     RUR: 13%  Disposition: home with spouse vs HH  Follow up appointments: PCP, Specialists  DME needed: Pt owns a rollator and cane. Transportation at Discharge: spouse  Keys or means to access home:      yes  IM Medicare Letter: needed at d/c  Is patient a BCPI-A Bundle:        n/a              If yes, was Bundle Letter given?:     Caregiver Contact:  Vero Gonzalez [phone]  Discharge Caregiver contacted prior to discharge? CM will contact prior to d/c if pt desires.      Reason for Admission:  Hypercapnic Respiratory Failure. She is currently being treated in the ICU. Requiring hi flow 02. She is taking po. She is working with PT/OT/ ST      Pt sees her PCP monthly. Pt uses Xenia Benson. Pt lives with his spouse in a one fl home with 4 MEDINA. Pt has a supportive daughter. Pt has a rollator and cane. Pt does not drive. Pt can complete her ADL's/IADL's independently at baseline. Pt does not have a hx of HH. Pt has been in [facility]. Pt does not have a hx of IPR. Pt's spouse will transport at d/c. Chart reviewed and we will cont to follow for discharge needs as appropriate.     Nicholas Costa RN [facility] 2099

## 2021-11-29 NOTE — PROGRESS NOTES
0730: Received verbal; report from Rodríguez Harden, [facility].     0830: Shift assessment complete. See flowsheet for details. Lung sounds; coarse, crackles, gurgled. Oral secretions; thick and clear. Heated high flow 25 L/min, saturations 95%. 1000: PT/OT at bedside. Patient moved to chair. 1145: Speech therapy at bedside to evaluate patient. 1200: Reassessment complete. No changes noted. 1600: Reassessment complete. No changes noted. 0900:Verbal shift change report given to Rodríguez Harden RN (oncoming nurse) by Juana Eaton RN (offgoing nurse). Report included the following information SBAR, Intake/Output and MAR.

## 2021-11-29 NOTE — PROGRESS NOTES
Participated in IDT on CCU where patient was discussed.   Karina Woodard M.Div, [facility] Paging Service [phone])
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
## 2021-11-29 NOTE — PROGRESS NOTES
Problem: Dysphagia (Adult)  Goal: *Acute Goals and Plan of Care (Insert Text)  Description: Speech Therapy Goals  Initiated 11/29/2021  1. Patient will tolerate easy to chew diet, thin liquids without overt s/s aspiration within 7 days. Outcome: Not Met       SPEECH LANGUAGE PATHOLOGY BEDSIDE SWALLOW EVALUATION  Patient: Isabelle Bright (62 y.o. female)  Date: 11/29/2021  Primary Diagnosis: Hypercapnic respiratory failure (HCC) [J96.92]        Precautions: Aspiration       ASSESSMENT :  Based on the objective data described below, the patient presents with grossly intact oropharyngeal swallow. Patient tolerated all PO trials without overt s/s aspiration and with stable vital signs. Patient with desat to 89% x 1 while talking. Patient at increased risk for aspiration given respiratory status and COPD. Further  reports patient with impaired recall and therefore will need assistance for use of safe swallow precautions. Given bedside presentation feel patient is safe for easy to chew diet, thin liquids with supervision. Patient will benefit from skilled intervention to address the above impairments. Patients rehabilitation potential is considered to be Good     PLAN :  Recommendations and Planned Interventions:  Easy to chew diet  Thin liquids  Sit up for meals  Small bites and sips  Stop eating and drinking if s/s aspiration, SpO2 < 90% or RR > 30  Supervision with PO with cues for use of safe swallow strategies  Frequency/Duration: Patient will be followed by speech-language pathology 3 times a week to address goals. Discharge Recommendations: To Be Determined     SUBJECTIVE:   Patient stated I haven't had anything to eat. . Patient's  at bedside. RN reports patient demonstrated coughing when eating. HFNC in place.   100% Fi02, 40L     OBJECTIVE:     Past Medical History:   Diagnosis Date    Anxiety     Bone spur     NECK    COPD     Depression     Endocrine disease     hypothyroidism Fibromyalgia     Fusion of spine of cervical region     Gastrointestinal disorder     gerd    Hyperlipidemia     Hypothyroid     Morbid obesity ([facility] [address]     Osteoarthritis     PUD (peptic ulcer disease)     Tobacco abuse      Past Surgical History:   Procedure Laterality Date    BRONCHOSCOPY-FIBER/THERAPY  4/3/2015         CARDIAC CATHETERIZATION  2013         COLONOSCOPY,DIAGNOSTIC  10/30/2014         HX CATARACT REMOVAL      bilateral    HX GYN          HX ORTHOPAEDIC      Ruptured disc, knuckles on right hand    UPPER GI ENDOSCOPY,BIOPSY  10/30/2014         UPPER GI ENDOSCOPY,DILATN W GUIDE  10/30/2014          Prior Level of Function/Home Situation:   Home Situation  Home Environment: Private residence  # Steps to Enter: 4  Rails to Enter: Yes  Hand Rails : Bilateral  One/Two Story Residence: One story  Living Alone: No  Support Systems: Spouse/Significant Other  Patient Expects to be Discharged to[de-identified] [employer]  Current DME Used/Available at Home: [address] [facility], [facility], [address] [facility], Grab bars, Shower chair, Walker, rollator, Walker, rolling  Tub or Shower Type: Tub/Shower combination  Diet prior to admission: Regular diet, thin liquids  Current Diet:  NPO   Cognitive and Communication Status:  Neurologic State: Alert  Orientation Level: Oriented to person  Cognition: Follows commands  Perception: Appears intact  Perseveration: No perseveration noted  Safety/Judgement: Decreased insight into deficits  Oral Assessment:  Oral Assessment  Labial: No impairment  Dentition: Natural; Poor  Oral Hygiene: Moist oral mucosa  Lingual: No impairment  Velum: No impairment  Mandible: No impairment  P.O. Trials:  Patient Position: Up in chair  Vocal quality prior to P.O.: No impairment ( reports lower volume lately)  Consistency Presented: Ice chips; Puree; Solid;  Thin liquid  How Presented: Self-fed/presented; Cup/sip; Spoon; Straw; Successive swallows     Bolus Acceptance: No impairment  Bolus Formation/Control: No impairment     Propulsion: No impairment  Oral Residue: None  Initiation of Swallow: No impairment  Laryngeal Elevation: Functional  Aspiration Signs/Symptoms: None  Pharyngeal Phase Characteristics: No impairment, issues, or problems              Oral Phase Severity: No impairment  Pharyngeal Phase Severity : No impairment    NOMS:   The NOMS functional outcome measure was used to quantify this patient's level of swallowing impairment. Based on the NOMS, the patient was determined to be at level 5 for swallow function       NOMS Swallowing Levels:  Level 1 (CN): NPO  Level 2 (CM): NPO but takes consistency in therapy  Level 3 (CL): Takes less than 50% of nutrition p.o. and continues with nonoral feedings; and/or safe with mod cues; and/or max diet restriction  Level 4 (CK): Safe swallow but needs mod cues; and/or mod diet restriction; and/or still requires some nonoral feeding/supplements  Level 5 (CJ): Safe swallow with min diet restriction; and/or needs min cues  Level 6 (CI): Independent with p.o.; rare cues; usually self cues; may need to avoid some foods or needs extra time  Level 7 ([address] [place] [address]): Independent for all p.o.  ELODIA. (2003). National Outcomes Measurement System (NOMS): Adult Speech-Language Pathology User's Guide. After treatment:   Patient left in no apparent distress sitting up in chair, Call bell within reach, Nursing notified, and Caregiver / family present    COMMUNICATION/EDUCATION:   Patient was educated regarding role of SLP, diet, swallow precautions and POC. Patient nodded.  indicated understanding as well. The patient's plan of care including recommendations, planned interventions, and recommended diet changes were discussed with: Registered nurse. Patient/family have participated as able in goal setting and plan of care. Patient/family agree to work toward stated goals and plan of care.     Thank you for this referral.  Lieutenant Talbot, SLP  Time Calculation: 18 mins

## 2021-11-30 ENCOUNTER — APPOINTMENT (OUTPATIENT)
Dept: GENERAL RADIOLOGY | Age: 66
DRG: 208 | End: 2021-11-30
Attending: INTERNAL MEDICINE
Payer: MEDICARE

## 2021-11-30 ENCOUNTER — TELEPHONE (OUTPATIENT)
Dept: BEHAVIORAL/MENTAL HEALTH CLINIC | Age: 66
End: 2021-11-30

## 2021-11-30 LAB
ANION GAP SERPL CALC-SCNC: 4 MMOL/L (ref 5–15)
BACTERIA SPEC CULT: NORMAL
BASOPHILS # BLD: 0 K/UL (ref 0–0.1)
BASOPHILS NFR BLD: 0 % (ref 0–1)
BUN SERPL-MCNC: 15 MG/DL (ref 6–20)
BUN/CREAT SERPL: 25 (ref 12–20)
CALCIUM SERPL-MCNC: 9.5 MG/DL (ref 8.5–10.1)
CHLORIDE SERPL-SCNC: 91 MMOL/L (ref 97–108)
CO2 SERPL-SCNC: 41 MMOL/L (ref 21–32)
CREAT SERPL-MCNC: 0.61 MG/DL (ref 0.55–1.02)
DIFFERENTIAL METHOD BLD: ABNORMAL
EOSINOPHIL # BLD: 0 K/UL (ref 0–0.4)
EOSINOPHIL NFR BLD: 0 % (ref 0–7)
ERYTHROCYTE [DISTWIDTH] IN BLOOD BY AUTOMATED COUNT: 17 % (ref 11.5–14.5)
GLUCOSE BLD STRIP.AUTO-MCNC: 110 MG/DL (ref 65–117)
GLUCOSE BLD STRIP.AUTO-MCNC: 127 MG/DL (ref 65–117)
GLUCOSE BLD STRIP.AUTO-MCNC: 221 MG/DL (ref 65–117)
GLUCOSE BLD STRIP.AUTO-MCNC: 99 MG/DL (ref 65–117)
GLUCOSE SERPL-MCNC: 118 MG/DL (ref 65–100)
HCT VFR BLD AUTO: 39.8 % (ref 35–47)
HGB BLD-MCNC: 11.2 G/DL (ref 11.5–16)
IMM GRANULOCYTES # BLD AUTO: 0 K/UL (ref 0–0.04)
IMM GRANULOCYTES NFR BLD AUTO: 0 % (ref 0–0.5)
LYMPHOCYTES # BLD: 1.9 K/UL (ref 0.8–3.5)
LYMPHOCYTES NFR BLD: 19 % (ref 12–49)
MAGNESIUM SERPL-MCNC: 1.9 MG/DL (ref 1.6–2.4)
MCH RBC QN AUTO: 25.5 PG (ref 26–34)
MCHC RBC AUTO-ENTMCNC: 28.1 G/DL (ref 30–36.5)
MCV RBC AUTO: 90.5 FL (ref 80–99)
MONOCYTES # BLD: 0.9 K/UL (ref 0–1)
MONOCYTES NFR BLD: 9 % (ref 5–13)
NEUTS SEG # BLD: 7.1 K/UL (ref 1.8–8)
NEUTS SEG NFR BLD: 72 % (ref 32–75)
NRBC # BLD: 0.02 K/UL (ref 0–0.01)
NRBC BLD-RTO: 0.2 PER 100 WBC
PHOSPHATE SERPL-MCNC: 3 MG/DL (ref 2.6–4.7)
PLATELET # BLD AUTO: 245 K/UL (ref 150–400)
PMV BLD AUTO: 9.8 FL (ref 8.9–12.9)
POTASSIUM SERPL-SCNC: 4.4 MMOL/L (ref 3.5–5.1)
RBC # BLD AUTO: 4.4 M/UL (ref 3.8–5.2)
RBC MORPH BLD: ABNORMAL
SERVICE CMNT-IMP: ABNORMAL
SERVICE CMNT-IMP: ABNORMAL
SERVICE CMNT-IMP: NORMAL
SODIUM SERPL-SCNC: 136 MMOL/L (ref 136–145)
WBC # BLD AUTO: 9.9 K/UL (ref 3.6–11)

## 2021-11-30 PROCEDURE — 85025 COMPLETE CBC W/AUTO DIFF WBC: CPT

## 2021-11-30 PROCEDURE — 74011250636 HC RX REV CODE- 250/636: Performed by: INTERNAL MEDICINE

## 2021-11-30 PROCEDURE — 65610000006 HC RM INTENSIVE CARE

## 2021-11-30 PROCEDURE — 74011636637 HC RX REV CODE- 636/637: Performed by: NURSE PRACTITIONER

## 2021-11-30 PROCEDURE — 83735 ASSAY OF MAGNESIUM: CPT

## 2021-11-30 PROCEDURE — 84100 ASSAY OF PHOSPHORUS: CPT

## 2021-11-30 PROCEDURE — 74011000250 HC RX REV CODE- 250: Performed by: NURSE PRACTITIONER

## 2021-11-30 PROCEDURE — 74011250637 HC RX REV CODE- 250/637: Performed by: INTERNAL MEDICINE

## 2021-11-30 PROCEDURE — 94640 AIRWAY INHALATION TREATMENT: CPT

## 2021-11-30 PROCEDURE — 80048 BASIC METABOLIC PNL TOTAL CA: CPT

## 2021-11-30 PROCEDURE — 74011000250 HC RX REV CODE- 250: Performed by: INTERNAL MEDICINE

## 2021-11-30 PROCEDURE — 74011250637 HC RX REV CODE- 250/637: Performed by: HOSPITALIST

## 2021-11-30 PROCEDURE — 74011250637 HC RX REV CODE- 250/637: Performed by: NURSE PRACTITIONER

## 2021-11-30 PROCEDURE — 77010033711 HC HIGH FLOW OXYGEN

## 2021-11-30 PROCEDURE — 71045 X-RAY EXAM CHEST 1 VIEW: CPT

## 2021-11-30 PROCEDURE — 74011000250 HC RX REV CODE- 250: Performed by: HOSPITALIST

## 2021-11-30 PROCEDURE — 36415 COLL VENOUS BLD VENIPUNCTURE: CPT

## 2021-11-30 PROCEDURE — 74011250636 HC RX REV CODE- 250/636: Performed by: HOSPITALIST

## 2021-11-30 PROCEDURE — 92526 ORAL FUNCTION THERAPY: CPT

## 2021-11-30 PROCEDURE — 82962 GLUCOSE BLOOD TEST: CPT

## 2021-11-30 RX ORDER — DIPHENHYDRAMINE HCL 25 MG
25 CAPSULE ORAL
Status: DISCONTINUED | OUTPATIENT
Start: 2021-11-30 | End: 2021-12-06 | Stop reason: HOSPADM

## 2021-11-30 RX ADMIN — ALPRAZOLAM 1 MG: 0.5 TABLET ORAL at 19:30

## 2021-11-30 RX ADMIN — ENOXAPARIN SODIUM 40 MG: 100 INJECTION SUBCUTANEOUS at 09:49

## 2021-11-30 RX ADMIN — PHENOBARBITAL 64.8 MG: 32.4 TABLET ORAL at 17:58

## 2021-11-30 RX ADMIN — ARIPIPRAZOLE 10 MG: 5 TABLET ORAL at 08:23

## 2021-11-30 RX ADMIN — HYDRALAZINE HYDROCHLORIDE 10 MG: 20 INJECTION INTRAMUSCULAR; INTRAVENOUS at 04:34

## 2021-11-30 RX ADMIN — Medication 1 AMPULE: at 20:48

## 2021-11-30 RX ADMIN — Medication 10 ML: at 22:28

## 2021-11-30 RX ADMIN — METHYLPREDNISOLONE SODIUM SUCCINATE 40 MG: 40 INJECTION, POWDER, FOR SOLUTION INTRAMUSCULAR; INTRAVENOUS at 20:57

## 2021-11-30 RX ADMIN — ASPIRIN 81 MG CHEWABLE TABLET 81 MG: 81 TABLET CHEWABLE at 08:23

## 2021-11-30 RX ADMIN — TRAMADOL HYDROCHLORIDE 50 MG: 50 TABLET, COATED ORAL at 09:48

## 2021-11-30 RX ADMIN — IPRATROPIUM BROMIDE AND ALBUTEROL SULFATE 3 ML: .5; 3 SOLUTION RESPIRATORY (INHALATION) at 20:23

## 2021-11-30 RX ADMIN — ACETAMINOPHEN 650 MG: 325 TABLET ORAL at 09:48

## 2021-11-30 RX ADMIN — LEVOTHYROXINE SODIUM 200 MCG: 0.15 TABLET ORAL at 08:23

## 2021-11-30 RX ADMIN — IPRATROPIUM BROMIDE AND ALBUTEROL SULFATE 3 ML: .5; 3 SOLUTION RESPIRATORY (INHALATION) at 14:58

## 2021-11-30 RX ADMIN — INSULIN LISPRO 3 UNITS: 100 INJECTION, SOLUTION INTRAVENOUS; SUBCUTANEOUS at 11:43

## 2021-11-30 RX ADMIN — DIPHENHYDRAMINE HYDROCHLORIDE 25 MG: 25 CAPSULE ORAL at 13:36

## 2021-11-30 RX ADMIN — CODEINE SULFATE 30 MG: 30 TABLET ORAL at 21:05

## 2021-11-30 RX ADMIN — Medication 10 ML: at 13:38

## 2021-11-30 RX ADMIN — PRIMIDONE 150 MG: 50 TABLET ORAL at 22:28

## 2021-11-30 RX ADMIN — Medication 1 AMPULE: at 08:36

## 2021-11-30 RX ADMIN — Medication 10 ML: at 06:00

## 2021-11-30 RX ADMIN — PRAZOSIN HYDROCHLORIDE 2 MG: 2 CAPSULE ORAL at 18:19

## 2021-11-30 RX ADMIN — BUDESONIDE 250 MCG: 0.25 INHALANT RESPIRATORY (INHALATION) at 20:23

## 2021-11-30 RX ADMIN — METHYLPREDNISOLONE SODIUM SUCCINATE 40 MG: 40 INJECTION, POWDER, FOR SOLUTION INTRAMUSCULAR; INTRAVENOUS at 08:23

## 2021-11-30 RX ADMIN — BUDESONIDE 250 MCG: 0.25 INHALANT RESPIRATORY (INHALATION) at 07:58

## 2021-11-30 RX ADMIN — ALPRAZOLAM 1 MG: 0.5 TABLET ORAL at 12:31

## 2021-11-30 RX ADMIN — IPRATROPIUM BROMIDE AND ALBUTEROL SULFATE 3 ML: .5; 3 SOLUTION RESPIRATORY (INHALATION) at 01:53

## 2021-11-30 RX ADMIN — PRAZOSIN HYDROCHLORIDE 2 MG: 2 CAPSULE ORAL at 08:30

## 2021-11-30 RX ADMIN — IPRATROPIUM BROMIDE AND ALBUTEROL SULFATE 3 ML: .5; 3 SOLUTION RESPIRATORY (INHALATION) at 07:58

## 2021-11-30 RX ADMIN — METOPROLOL TARTRATE 12.5 MG: 25 TABLET, FILM COATED ORAL at 08:24

## 2021-11-30 RX ADMIN — ACETAZOLAMIDE SODIUM 500 MG: 500 INJECTION, POWDER, LYOPHILIZED, FOR SOLUTION INTRAVENOUS at 12:23

## 2021-11-30 RX ADMIN — ACETAMINOPHEN 650 MG: 325 TABLET ORAL at 17:58

## 2021-11-30 RX ADMIN — METOPROLOL TARTRATE 12.5 MG: 25 TABLET, FILM COATED ORAL at 20:48

## 2021-11-30 RX ADMIN — TRAMADOL HYDROCHLORIDE 50 MG: 50 TABLET, COATED ORAL at 17:58

## 2021-11-30 NOTE — PROGRESS NOTES
0730: Received verbal report from Cinda Williamson RN.     0830: Shift assessment complete. See flowsheet for details. 1200: Reassessment complete. No changes noted. 1500: RT at bedside. Patient is now on nasal cannula 15 L/min. Oxygen saturations 100.     1600: Reassessment complete. No changes noted. 1915: Verbal shift change report given to Cnida Williamson RN (oncoming nurse) by Noah Webb RN (offgoing nurse).  Report included the following information SBAR, Kardex, Intake/Output, MAR and Cardiac Rhythm SR.

## 2021-11-30 NOTE — PROGRESS NOTES
ICU Progress Note        Subjective/hospital course:     : Overnight events noted. Currently on venti mask 31% Fio2. Alert and oriented,  on bedside. No resp distress. Wants to eat. : Today patient had some worsening hypoxia, yesterday she was on venti mask and has been requiring BiPAP, try to switch to [facility].   C/O pain all over (attributed to fibromyalgia syndrome). On  [place]. No overt respiratory distress. Cognition intact. Worsening metabolic alkalosis. Furosemide held and single dose of acetazolamide given  : Patient reports feeling a bit better but feeling anxious. On high flow oxygen. No acute events overnight. Vital Signs:    Visit Vitals  BP (!) 122/56 (BP 1 Location: Right upper arm, BP Patient Position: At rest)   Pulse 75   Temp 98.3 °F (36.8 °C)   Resp 14   Ht 5' 4\" (1.626 m)   Wt 93.7 kg (206 lb 9.1 oz)   SpO2 94%   BMI 35.46 kg/m²       O2 Device: Heated, Hi flow nasal cannula   O2 Flow Rate (L/min): 30 l/min   Temp (24hrs), Av.4 °F (36.9 °C), Min:98.1 °F (36.7 °C), Max:98.5 °F (36.9 °C)       Intake/Output:   Last shift:       07 - 1900  In: -   Out: 900 [Urine:900]  Last 3 shifts: 1901 -  0700  In: 295 [P.O.:295]  Out: 2550 [Urine:2550]    Intake/Output Summary (Last 24 hours) at 2021 1241  Last data filed at 2021 1200  Gross per 24 hour   Intake 295 ml   Output 2150 ml   Net -1855 ml       Ventilator Settings:  Ventilator Mode: Assist control  Respiratory Rate  Back-Up Rate: 24  Insp Flow (l/min): 60 l/min  I:E Ratio: 1:2.0  Ventilator Volumes  Vt Set (ml): 375 ml  Vt Exhaled (Machine Breath) (ml): 388 ml  Vt Spont (ml): 343 ml  Ve Observed (l/min): 5.9 l/min  Ventilator Pressures  PIP Observed (cm H2O): 35 cm H2O  Plateau Pressure (cm H2O): 18 cm H2O  MAP (cm H2O): 14  PEEP/VENT (cm H2O): 6 cm H20  Auto PEEP Observed (cm H2O): 0 cm H2O    Physical Exam:    General: AAOx4.   HEENT:  Anicteric sclerae; pink palpebral conjunctivae; mucosa moist  Resp:  Diffuse coarse expiratory wheezes   CV:  RRR, no M   GI:  Abdomen soft, non-tender; (+) active bowel sounds  Extremities:  Symmetric LE edema  Skin:  Warm; no rashes/ lesions noted  Neurologic:  CNs intact, no focal deficits     DATA:     Current Facility-Administered Medications   Medication Dose Route Frequency    diphenhydrAMINE (BENADRYL) tablet 25 mg  25 mg Oral Q6H PRN    budesonide (PULMICORT) 250 mcg/2ml nebulizer susp  250 mcg Nebulization BID RT    metoprolol tartrate (LOPRESSOR) tablet 12.5 mg  12.5 mg Oral Q12H    methylPREDNISolone (PF) (SOLU-MEDROL) injection 40 mg  40 mg IntraVENous Q12H    acetaminophen (TYLENOL) tablet 650 mg  650 mg Oral Q8H    traMADoL (ULTRAM) tablet 50 mg  50 mg Oral Q8H    traMADoL (ULTRAM) tablet 50 mg  50 mg Oral Q4H PRN    codeine sulfate tablet 30 mg  30 mg Oral Q4H PRN    enoxaparin (LOVENOX) injection 40 mg  40 mg SubCUTAneous Q24H    ARIPiprazole (ABILIFY) tablet 10 mg  10 mg Oral DAILY    ibuprofen (MOTRIN) tablet 400 mg  400 mg Oral Q6H PRN    Super B100 Balanced B Complex (Patient Supplied)  1 Tablet Oral DAILY    primidone (MYSOLINE) tablet 150 mg  150 mg Oral QHS    PHENobarbitaL (LUMINAL) tablet 64.8 mg  64.8 mg Oral QPM    prazosin (MINIPRESS) capsule 2 mg (Patient Supplied)  2 mg Oral BID    alcohol 62% (NOZIN) nasal  1 Ampule  1 Ampule Topical Q12H    nicotine (NICODERM CQ) 21 mg/24 hr patch 1 Patch  1 Patch TransDERmal DAILY    hydrALAZINE (APRESOLINE) 20 mg/mL injection 10 mg  10 mg IntraVENous Q6H PRN    ALPRAZolam (XANAX) tablet 1 mg  1 mg Oral TID PRN    influenza vaccine 2021-22 (6 mos+)(PF) (FLUARIX/FLULAVAL/FLUZONE QUAD) injection 0.5 mL  1 Each IntraMUSCular PRIOR TO DISCHARGE    nitroglycerin (NITROSTAT) tablet 0.4 mg  0.4 mg SubLINGual Q5MIN PRN    sodium chloride (NS) flush 5-40 mL  5-40 mL IntraVENous Q8H    sodium chloride (NS) flush 5-40 mL  5-40 mL IntraVENous PRN    albuterol-ipratropium (DUO-NEB) 2.5 MG-0.5 MG/3 ML  3 mL Nebulization Q6H RT    aspirin chewable tablet 81 mg  81 mg Oral DAILY    [Held by provider] furosemide (LASIX) tablet 40 mg  40 mg Oral DAILY    levothyroxine (SYNTHROID) tablet 200 mcg  200 mcg Oral ACB    glucose chewable tablet 16 g  4 Tablet Oral PRN    dextrose (D50W) injection syrg 12.5-25 g  25-50 mL IntraVENous PRN    glucagon (GLUCAGEN) injection 1 mg  1 mg IntraMUSCular PRN    insulin lispro (HUMALOG) injection   SubCUTAneous AC&HS    albuterol (PROVENTIL VENTOLIN) nebulizer solution 2.5 mg  2.5 mg Nebulization Q4H PRN         Labs: Results:       Chemistry Recent Labs     11/30/21 0347 11/29/21  0505 11/28/21  0529   * 76 107*    140 135*   K 4.4 3.6 3.5   CL 91* 94* 94*   CO2 41* 41* 39*   BUN 15 14 15   CREA 0.61 0.49* 0.50*   CA 9.5 9.4 9.6   AGAP 4* 5 2*   BUCR 25* 29* 30*      CBC w/Diff Recent Labs     11/30/21 0347 11/29/21  0505 11/28/21  0529   WBC 9.9 11.6* 10.3   RBC 4.40 4.38 4.48   HGB 11.2* 11.2* 11.6   HCT 39.8 38.7 39.5    234 239   GRANS 72 71 74   LYMPH 19 17 16   EOS 0 0 0      Coagulation No results for input(s): PTP, INR, APTT, INREXT, INREXT in the last 72 hours. Liver Enzymes No results for input(s): TP, ALB, TBIL, AP in the last 72 hours. No lab exists for component: SGOT, GPT, DBIL   ABG No results found for: PH, PHI, PCO2, PCO2I, PO2, PO2I, HCO3, HCO3I, FIO2, FIO2I   Microbiology No results for input(s): CULT in the last 72 hours. maging:  CXR Results  (Last 48 hours)               11/30/21 0459  XR CHEST PORT Final result    Impression:  No change. Narrative:  Clinical indication: Hypoxia. Portable AP semiupright view of the chest is obtained, comparison November 28. Blunting of the costophrenic angles and increased interstitial markings mainly   at the right lung base appear stable.                      CT Results  (Last 48 hours)    None               Assessment and Plan: 72 F smoker with PMH of COPD, prior lung empyema, hypothyrodism, and DM2 BIBEMS for SOB and hypoxia (SPO2 60% upon EMS arrival). Upon arrival to the ED pt minimally responsive in severe respiratory distress, complaining of chest pain. ABG done which showed a pH of 7.1 and pCO2 of 92. Placed on Bipap, given steroids and albuterol with improvement in mentation and respiratory status. Labs also significant for pro-BNP 4,531 and high sensitivity trop of 166. EKG without ST elevations. Due to lack of improvement in pCO2 after BiPAP she was intubated. ICU consulted for admission. Acute on chronic hypoxic and hypercapnic respiratory failure - likely COPD exacerbation. Pulmonary embolism ruled out with CTA. Her pCO2 is much corrected now. Extubated on 11/26. Completed 5 days azithro/ceftriaxone. Cont nebulized bronchodilators. Nebulized steroids added 11/29. Methylpred dose adjusted 11/29. Cont supplemental oxygen to keep saturation 88-92%. Wean to midflow oxygen today. 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 No specific treatment indicated presently. Consider further eval after discharge    Hyponatremia, resolved    Metabolic alkalosis - compensatory + loop diuresis. Single dose of acetazolamide ordered 11/29. Replace K+ aggressively. Hold furosemide    Atelectasis      DM2  -insulin sliding scale     Hypothyroidism  -cont home synthroid     Concern for dysphagia - evaluated by speech therapy. Cleared for regular diet.         Moira Chiang, [facility]  [phone]  11/30/2021

## 2021-11-30 NOTE — PROGRESS NOTES
Attempted OT session. Patient had just returned to bed with RN and SLP at bedside. Will check back at later time.

## 2021-11-30 NOTE — PROGRESS NOTES
Problem: Dysphagia (Adult)  Goal: *Acute Goals and Plan of Care (Insert Text)  Description: Speech Therapy Goals  Initiated 11/29/2021  1. Patient will tolerate easy to chew diet, thin liquids without overt s/s aspiration within 7 days. Goal met 11/30. Upgraded to regular      Outcome: Resolved/Met   SPEECH LANGUAGE PATHOLOGY DYSPHAGIA TREATMENT/DISCHARGE  Patient: Elizabeth Lucas (52 y.o. female)  Date: 11/30/2021  Diagnosis: Hypercapnic respiratory failure (HCC) [J96.92]   <principal problem not specified>       Precautions:       ASSESSMENT:  Patient seen today for swallowing therapy. She is sitting upright eating sweet potato pie her  brought in for her because she missed Thanksgiving at home. She is taking sips of water with no difficulty. Cued her to take small sips and bites due to her respiratory compromise. She is on 40L 50% HFNC. No overt s/s of aspiration. She is high risk to aspirate. PLAN:  Will upgrade to regular and thins  Patient will be discharged from acute skilled speech therapy at this time. Rationale for discharge:  Goals achieved    Discharge Recommendations:  None     SUBJECTIVE:   Patient said nothing when asked if she would quit smoking after being here and not able to smoke for a week. OBJECTIVE:   Cognitive and Communication Status:  Neurologic State: Alert  Orientation Level: Oriented X4  Cognition: Appropriate decision making, Appropriate for age attention/concentration, Appropriate safety awareness, Follows commands    Perception: Appears intact    Perseveration: No perseveration noted    Safety/Judgement: Decreased insight into deficits  Dysphagia Treatment:  Oral Assessment:     P.O. Trials:  Patient Position: upright in bed  Vocal quality prior to P.O.: No impairment  Consistency Presented:  Thin liquid; Mechanical soft  How Presented: Self-fed/presented; Straw     Bolus Acceptance: No impairment  Bolus Formation/Control: No impairment     Propulsion: No impairment  Oral Residue: None  Initiation of Swallow: No impairment  Laryngeal Elevation: Functional  Aspiration Signs/Symptoms: None  Pharyngeal Phase Characteristics: No impairment, issues, or problems            Oral Phase Severity: No impairment  Pharyngeal Phase Severity : No impairment  Exercises:  Laryngeal Exercises:                                                                                                                                     NOMS:   The NOMS functional outcome measure was used to quantify this patient's level of swallowing impairment. Based on the NOMS, the patient was determined to be at level 6 for swallow function     NOMS Swallowing Levels:  Level 1 (CN): NPO  Level 2 (CM): NPO but takes consistency in therapy  Level 3 (CL): Takes less than 50% of nutrition p.o. and continues with nonoral feedings; and/or safe with mod cues; and/or max diet restriction  Level 4 (CK): Safe swallow but needs mod cues; and/or mod diet restriction; and/or still requires some nonoral feeding/supplements  Level 5 (CJ): Safe swallow with min diet restriction; and/or needs min cues  Level 6 (CI): Independent with p.o.; rare cues; usually self cues; may need to avoid some foods or needs extra time  Level 7 ([address] [place] [address]): Independent for all p.o.  ELODIA. (2003). National Outcomes Measurement System (NOMS): Adult Speech-Language Pathology User's Guide. Pain:  Pain Scale 1: Numeric (0 - 10)  Pain Intensity 1: 0       After treatment:   Patient left in no apparent distress in bed    COMMUNICATION/EDUCATION:   Patient was educated regarding her deficit(s) of     The patient's plan of care including recommendations, planned interventions, and recommended diet changes were discussed with: Registered nurse.      EHSAN Sewell  Time Calculation: 20 mins

## 2021-11-30 NOTE — PROGRESS NOTES
Spiritual Care Assessment/Progress Note  [facility]      NAME: Papa Garcia      MRN: [id number]  AGE: 72 y.o. SEX: female  Buddhist Affiliation: No Jewish   Language: English     11/30/2021     Total Time (in minutes): 15     Spiritual Assessment begun in [facility] CRITICAL CARE 2 through conversation with:         [x]Patient        [x] Family    [] Friend(s)        Reason for Consult: Initial/Spiritual assessment, critical care     Spiritual beliefs: (Please include comment if needed)     [] Identifies with a narinder tradition:         [] Supported by a narinder community:            [x] Claims no spiritual orientation:           [] Seeking spiritual identity:                [] Adheres to an individual form of spirituality:           [] Not able to assess:                           Identified resources for coping:      [] Prayer                               [] Music                  [] Guided Imagery     [x] Family/friends                 [] Pet visits     [] Devotional reading                         [] Unknown     [] Other:                                               Interventions offered during this visit: (See comments for more details)    Patient Interventions: Coping skills reviewed/reinforced, Life review/legacy           Plan of Care:     [] Support spiritual and/or cultural needs    [] Support AMD and/or advance care planning process      [] Support grieving process   [] Coordinate Rites and/or Rituals    [] Coordination with community clergy   [x] No spiritual needs identified at this time   [] Detailed Plan of Care below (See Comments)  [] Make referral to Music Therapy  [] Make referral to Pet Therapy     [] Make referral to Addiction services  [] Make referral to [facility]  [] Make referral to Spiritual Care Partner  [] No future visits requested        [] Contact Spiritual Care for further referrals     Comments: Initial spiritual assessment with pt in 2532.  Pt sitting up awake and alert while eating lunch. Accompanied by her . Pt introduced self and role and engaged in conversation with pt and . Pt jovial throughout visit making jokes and sharing life experiences and stories. No issues or concerns identified. Hope to be moving to another unit and home soon. Thanked  for visit. Contact Spiritual Care for any further referrals.   Ander Lozada M.Div, [facility]   Paging Service [phone])

## 2021-11-30 NOTE — PROGRESS NOTES
1900-Bedside shift change report given to Ladarius Oden, RN (oncoming nurse) by Willem Batista, RN and Ha Machado RN (offgoing nurse). Report included the following information SBAR, Kardex, Intake/Output, MAR and Recent Results. 2000-initial assessment complete, see flowsheet. 2055-Okay to hold PO metoprolol per Stephani Mclean NP as pt's BP is 98/45, HR 84. Will notify Stephani Melaraer, NP if pt becomes tachycardic.     2200-pt resting comfortably. 0000-reassessment complete, see flowsheet. Pt resting comfortably. 0113-attempted to give pt scheduled dose of Tramadol. Pt sleeping, easily arousable, but quickly drifts back to sleep. Will hold tramadol and continue to monitor pt for pain. 0200-pt resting comfortably. 0499-3530-kb's /103 HR 81, PRN Hydralazine given, see MAR. Will continue to monitor. 0500-/53, HR 85, lab called with critical lab for CO2 of 41, Stephani Melaraer, NP notified. Pt lungs coarse, O2 sats 95% Stephani Melaraer, NP notified. Will continue to monitor. CHG bath complete. 0600- pt resting comfortably. 0700-Bedside shift change report given to Willem Batista RN and Ha Machado RN (oncoming nurse) by Ladarius Oden RN (offgoing nurse). Report included the following information SBAR, Kardex, ED Summary, [facility] and Recent Results.

## 2021-12-01 LAB
ANION GAP SERPL CALC-SCNC: 4 MMOL/L (ref 5–15)
BASOPHILS # BLD: 0.1 K/UL (ref 0–0.1)
BASOPHILS NFR BLD: 1 % (ref 0–1)
BUN SERPL-MCNC: 11 MG/DL (ref 6–20)
BUN/CREAT SERPL: 22 (ref 12–20)
CALCIUM SERPL-MCNC: 9.5 MG/DL (ref 8.5–10.1)
CHLORIDE SERPL-SCNC: 96 MMOL/L (ref 97–108)
CO2 SERPL-SCNC: 33 MMOL/L (ref 21–32)
CREAT SERPL-MCNC: 0.5 MG/DL (ref 0.55–1.02)
DIFFERENTIAL METHOD BLD: ABNORMAL
EOSINOPHIL # BLD: 0 K/UL (ref 0–0.4)
EOSINOPHIL NFR BLD: 0 % (ref 0–7)
ERYTHROCYTE [DISTWIDTH] IN BLOOD BY AUTOMATED COUNT: 17.5 % (ref 11.5–14.5)
GLUCOSE BLD STRIP.AUTO-MCNC: 106 MG/DL (ref 65–117)
GLUCOSE BLD STRIP.AUTO-MCNC: 120 MG/DL (ref 65–117)
GLUCOSE BLD STRIP.AUTO-MCNC: 152 MG/DL (ref 65–117)
GLUCOSE BLD STRIP.AUTO-MCNC: 188 MG/DL (ref 65–117)
GLUCOSE SERPL-MCNC: 131 MG/DL (ref 65–100)
HCT VFR BLD AUTO: 39.4 % (ref 35–47)
HGB BLD-MCNC: 11.6 G/DL (ref 11.5–16)
IMM GRANULOCYTES # BLD AUTO: 0 K/UL (ref 0–0.04)
IMM GRANULOCYTES NFR BLD AUTO: 0 % (ref 0–0.5)
LYMPHOCYTES # BLD: 2.6 K/UL (ref 0.8–3.5)
LYMPHOCYTES NFR BLD: 31 % (ref 12–49)
MAGNESIUM SERPL-MCNC: 1.9 MG/DL (ref 1.6–2.4)
MCH RBC QN AUTO: 25.4 PG (ref 26–34)
MCHC RBC AUTO-ENTMCNC: 29.4 G/DL (ref 30–36.5)
MCV RBC AUTO: 86.4 FL (ref 80–99)
MONOCYTES # BLD: 0.8 K/UL (ref 0–1)
MONOCYTES NFR BLD: 9 % (ref 5–13)
NEUTS SEG # BLD: 5 K/UL (ref 1.8–8)
NEUTS SEG NFR BLD: 60 % (ref 32–75)
NRBC # BLD: 0 K/UL (ref 0–0.01)
NRBC BLD-RTO: 0 PER 100 WBC
PHOSPHATE SERPL-MCNC: 2.6 MG/DL (ref 2.6–4.7)
PLATELET # BLD AUTO: 226 K/UL (ref 150–400)
PMV BLD AUTO: 9.7 FL (ref 8.9–12.9)
POTASSIUM SERPL-SCNC: 3.6 MMOL/L (ref 3.5–5.1)
RBC # BLD AUTO: 4.56 M/UL (ref 3.8–5.2)
SERVICE CMNT-IMP: ABNORMAL
SERVICE CMNT-IMP: NORMAL
SODIUM SERPL-SCNC: 133 MMOL/L (ref 136–145)
WBC # BLD AUTO: 8.5 K/UL (ref 3.6–11)

## 2021-12-01 PROCEDURE — 74011250637 HC RX REV CODE- 250/637: Performed by: NURSE PRACTITIONER

## 2021-12-01 PROCEDURE — 74011250637 HC RX REV CODE- 250/637: Performed by: INTERNAL MEDICINE

## 2021-12-01 PROCEDURE — 74011250636 HC RX REV CODE- 250/636: Performed by: INTERNAL MEDICINE

## 2021-12-01 PROCEDURE — 74011636637 HC RX REV CODE- 636/637: Performed by: NURSE PRACTITIONER

## 2021-12-01 PROCEDURE — 77030038269 HC DRN EXT URIN PURWCK BARD -A

## 2021-12-01 PROCEDURE — 74011000250 HC RX REV CODE- 250: Performed by: INTERNAL MEDICINE

## 2021-12-01 PROCEDURE — 97530 THERAPEUTIC ACTIVITIES: CPT | Performed by: PHYSICAL THERAPIST

## 2021-12-01 PROCEDURE — 83735 ASSAY OF MAGNESIUM: CPT

## 2021-12-01 PROCEDURE — 97116 GAIT TRAINING THERAPY: CPT | Performed by: PHYSICAL THERAPIST

## 2021-12-01 PROCEDURE — 77010033711 HC HIGH FLOW OXYGEN

## 2021-12-01 PROCEDURE — 85025 COMPLETE CBC W/AUTO DIFF WBC: CPT

## 2021-12-01 PROCEDURE — 36415 COLL VENOUS BLD VENIPUNCTURE: CPT

## 2021-12-01 PROCEDURE — 84100 ASSAY OF PHOSPHORUS: CPT

## 2021-12-01 PROCEDURE — 2709999900 HC NON-CHARGEABLE SUPPLY

## 2021-12-01 PROCEDURE — 74011250636 HC RX REV CODE- 250/636: Performed by: HOSPITALIST

## 2021-12-01 PROCEDURE — 77010033678 HC OXYGEN DAILY

## 2021-12-01 PROCEDURE — 94640 AIRWAY INHALATION TREATMENT: CPT

## 2021-12-01 PROCEDURE — 82962 GLUCOSE BLOOD TEST: CPT

## 2021-12-01 PROCEDURE — 74011000250 HC RX REV CODE- 250: Performed by: HOSPITALIST

## 2021-12-01 PROCEDURE — 74011250637 HC RX REV CODE- 250/637: Performed by: HOSPITALIST

## 2021-12-01 PROCEDURE — 74011000250 HC RX REV CODE- 250: Performed by: NURSE PRACTITIONER

## 2021-12-01 PROCEDURE — 65660000000 HC RM CCU STEPDOWN

## 2021-12-01 PROCEDURE — 80048 BASIC METABOLIC PNL TOTAL CA: CPT

## 2021-12-01 PROCEDURE — 97535 SELF CARE MNGMENT TRAINING: CPT

## 2021-12-01 PROCEDURE — 97530 THERAPEUTIC ACTIVITIES: CPT

## 2021-12-01 RX ORDER — POTASSIUM CHLORIDE 750 MG/1
40 TABLET, FILM COATED, EXTENDED RELEASE ORAL
Status: COMPLETED | OUTPATIENT
Start: 2021-12-01 | End: 2021-12-01

## 2021-12-01 RX ORDER — PHENOBARBITAL 32.4 MG/1
60 TABLET ORAL 2 TIMES DAILY
Status: DISCONTINUED | OUTPATIENT
Start: 2021-12-01 | End: 2021-12-06 | Stop reason: HOSPADM

## 2021-12-01 RX ORDER — FUROSEMIDE 10 MG/ML
40 INJECTION INTRAMUSCULAR; INTRAVENOUS ONCE
Status: COMPLETED | OUTPATIENT
Start: 2021-12-01 | End: 2021-12-01

## 2021-12-01 RX ORDER — POLYETHYLENE GLYCOL 3350 17 G/17G
17 POWDER, FOR SOLUTION ORAL DAILY PRN
Status: DISCONTINUED | OUTPATIENT
Start: 2021-12-01 | End: 2021-12-06 | Stop reason: HOSPADM

## 2021-12-01 RX ORDER — PRIMIDONE 50 MG/1
100 TABLET ORAL DAILY
Status: DISCONTINUED | OUTPATIENT
Start: 2021-12-01 | End: 2021-12-06 | Stop reason: HOSPADM

## 2021-12-01 RX ADMIN — IPRATROPIUM BROMIDE AND ALBUTEROL SULFATE 3 ML: .5; 3 SOLUTION RESPIRATORY (INHALATION) at 08:53

## 2021-12-01 RX ADMIN — Medication 1 AMPULE: at 20:24

## 2021-12-01 RX ADMIN — FUROSEMIDE 40 MG: 10 INJECTION, SOLUTION INTRAMUSCULAR; INTRAVENOUS at 11:30

## 2021-12-01 RX ADMIN — CODEINE SULFATE 30 MG: 30 TABLET ORAL at 16:40

## 2021-12-01 RX ADMIN — PHENOBARBITAL 64.8 MG: 32.4 TABLET ORAL at 11:29

## 2021-12-01 RX ADMIN — TRAMADOL HYDROCHLORIDE 50 MG: 50 TABLET, COATED ORAL at 02:13

## 2021-12-01 RX ADMIN — ALPRAZOLAM 1 MG: 0.5 TABLET ORAL at 19:36

## 2021-12-01 RX ADMIN — METOPROLOL TARTRATE 12.5 MG: 25 TABLET, FILM COATED ORAL at 08:34

## 2021-12-01 RX ADMIN — METOPROLOL TARTRATE 12.5 MG: 25 TABLET, FILM COATED ORAL at 20:24

## 2021-12-01 RX ADMIN — ALPRAZOLAM 1 MG: 0.5 TABLET ORAL at 11:29

## 2021-12-01 RX ADMIN — LEVOTHYROXINE SODIUM 200 MCG: 0.15 TABLET ORAL at 07:41

## 2021-12-01 RX ADMIN — ACETAMINOPHEN 650 MG: 325 TABLET ORAL at 19:36

## 2021-12-01 RX ADMIN — IPRATROPIUM BROMIDE AND ALBUTEROL SULFATE 3 ML: .5; 3 SOLUTION RESPIRATORY (INHALATION) at 19:14

## 2021-12-01 RX ADMIN — ENOXAPARIN SODIUM 40 MG: 100 INJECTION SUBCUTANEOUS at 10:15

## 2021-12-01 RX ADMIN — BUDESONIDE 250 MCG: 0.25 INHALANT RESPIRATORY (INHALATION) at 19:15

## 2021-12-01 RX ADMIN — Medication 10 ML: at 13:41

## 2021-12-01 RX ADMIN — ARIPIPRAZOLE 10 MG: 5 TABLET ORAL at 08:34

## 2021-12-01 RX ADMIN — IPRATROPIUM BROMIDE AND ALBUTEROL SULFATE 3 ML: .5; 3 SOLUTION RESPIRATORY (INHALATION) at 02:57

## 2021-12-01 RX ADMIN — POTASSIUM CHLORIDE 40 MEQ: 750 TABLET, FILM COATED, EXTENDED RELEASE ORAL at 11:30

## 2021-12-01 RX ADMIN — PRIMIDONE 150 MG: 50 TABLET ORAL at 21:03

## 2021-12-01 RX ADMIN — METHYLPREDNISOLONE SODIUM SUCCINATE 40 MG: 40 INJECTION, POWDER, FOR SOLUTION INTRAMUSCULAR; INTRAVENOUS at 08:34

## 2021-12-01 RX ADMIN — Medication 1 AMPULE: at 08:35

## 2021-12-01 RX ADMIN — ACETAMINOPHEN 650 MG: 325 TABLET ORAL at 02:13

## 2021-12-01 RX ADMIN — ACETAZOLAMIDE SODIUM 250 MG: 500 INJECTION, POWDER, LYOPHILIZED, FOR SOLUTION INTRAVENOUS at 12:16

## 2021-12-01 RX ADMIN — POTASSIUM BICARBONATE 40 MEQ: 782 TABLET, EFFERVESCENT ORAL at 05:38

## 2021-12-01 RX ADMIN — PHENOBARBITAL 64.8 MG: 32.4 TABLET ORAL at 17:59

## 2021-12-01 RX ADMIN — BUDESONIDE 250 MCG: 0.25 INHALANT RESPIRATORY (INHALATION) at 08:53

## 2021-12-01 RX ADMIN — IPRATROPIUM BROMIDE AND ALBUTEROL SULFATE 3 ML: .5; 3 SOLUTION RESPIRATORY (INHALATION) at 13:42

## 2021-12-01 RX ADMIN — PRIMIDONE 100 MG: 50 TABLET ORAL at 11:34

## 2021-12-01 RX ADMIN — CODEINE SULFATE 30 MG: 30 TABLET ORAL at 20:24

## 2021-12-01 RX ADMIN — PRAZOSIN HYDROCHLORIDE 2 MG: 2 CAPSULE ORAL at 18:15

## 2021-12-01 RX ADMIN — Medication 10 ML: at 21:03

## 2021-12-01 RX ADMIN — Medication 10 ML: at 06:11

## 2021-12-01 RX ADMIN — PRAZOSIN HYDROCHLORIDE 2 MG: 2 CAPSULE ORAL at 08:36

## 2021-12-01 RX ADMIN — INSULIN LISPRO 2 UNITS: 100 INJECTION, SOLUTION INTRAVENOUS; SUBCUTANEOUS at 12:15

## 2021-12-01 RX ADMIN — TRAMADOL HYDROCHLORIDE 50 MG: 50 TABLET, COATED ORAL at 19:36

## 2021-12-01 RX ADMIN — TRAMADOL HYDROCHLORIDE 50 MG: 50 TABLET, COATED ORAL at 11:29

## 2021-12-01 RX ADMIN — ACETAMINOPHEN 650 MG: 325 TABLET ORAL at 11:30

## 2021-12-01 RX ADMIN — ASPIRIN 81 MG CHEWABLE TABLET 81 MG: 81 TABLET CHEWABLE at 08:34

## 2021-12-01 NOTE — PROGRESS NOTES
Hospitalist Progress Note    NAME: Isabelle Bright   :  1955   MRN:  [id number]       Assessment / Plan:  Acute hypoxic hypercarbic respiratory failure secondary to COPD exacerbation, s/p extubation   Pulmonary HTN  Pt on 10 L NC - wean as tolerated  PT/OT evals  Continue inhalers    DMII  FS monitoring, SS    Hypothyroidism  Fibromyalgia  HTN  HLD  Depression  PSTD  Resume home meds    30.0 - 39.9 Obese / Body mass index is 35.46 kg/m². Estimated discharge date: December 3  Barriers:    Code status: Full  Prophylaxis: Lovenox  Recommended Disposition:  PT, OT, RN     Subjective:     Chief Complaint / Reason for Physician Visit  Feeling better. Discussed with RN events overnight. Review of Systems:  Symptom Y/N Comments  Symptom Y/N Comments   Fever/Chills    Chest Pain     Poor Appetite    Edema     Cough    Abdominal Pain     Sputum    Joint Pain     SOB/RODRIGUEZ    Pruritis/Rash     Nausea/vomit    Tolerating PT/OT     Diarrhea    Tolerating Diet     Constipation    Other       Could NOT obtain due to:      Objective:     VITALS:   Last 24hrs VS reviewed since prior progress note.  Most recent are:  Patient Vitals for the past 24 hrs:   Temp Pulse Resp BP SpO2   21 1300 -- 91 16 112/76 91 %   21 1200 97.5 °F (36.4 °C) (!) 106 18 -- 94 %   21 1100 -- 84 14 -- 92 %   21 1000 -- 70 13 -- --   21 0900 -- 86 17 -- 92 %   21 0853 -- -- -- -- 93 %   21 0800 98.6 °F (37 °C) -- -- -- --   21 0600 -- 80 14 118/61 92 %   21 0500 -- 83 14 128/72 90 %   21 0400 98.2 °F (36.8 °C) 70 13 (!) 150/122 91 %   21 0300 -- 81 13 134/70 93 %   21 0257 -- -- -- -- 95 %   21 0200 -- 78 11 (!) 106/55 94 %   21 0100 -- 78 19 120/66 93 %   21 0000 98.7 °F (37.1 °C) 74 14 132/60 92 %   21 -- 71 13 (!) 113/57 --   21 -- 77 12 (!) 123/55 93 %   21 -- 82 15 (!) 134/55 97 %   21 -- -- -- -- 95 % 11/30/21 2000 97.9 °F (36.6 °C) 90 20 (!) 155/63 94 %   11/30/21 1900 -- 96 14 135/80 90 %   11/30/21 1800 -- 95 17 -- (!) 87 %   11/30/21 1708 -- 83 13 (!) 136/95 95 %   11/30/21 1700 -- 85 15 -- 96 %   11/30/21 1600 98.4 °F (36.9 °C) 87 14 (!) 156/79 99 %   11/30/21 1500 -- 86 15 137/76 98 %   11/30/21 1458 -- -- -- -- 100 %       Intake/Output Summary (Last 24 hours) at 12/1/2021 1405  Last data filed at 12/1/2021 1300  Gross per 24 hour   Intake 600 ml   Output 3400 ml   Net -2800 ml        I had a face to face encounter and independently examined this patient on 12/1/2021, as outlined below:  PHYSICAL EXAM:  General: Alert, cooperative, no acute distress    EENT:  EOMI. Anicteric sclerae. MMM  Resp:  On 10L NC  CV:  Regular  rhythm,  No edema  GI:  Soft, Non distended, Non tender. +Bowel sounds  Neurologic:  Alert and oriented X 3, normal speech,   Psych:   Good insight. Not anxious nor agitated  Skin:  No rashes. No jaundice    Reviewed most current lab test results and cultures  YES  Reviewed most current radiology test results   YES  Review and summation of old records today    NO  Reviewed patient's current orders and MAR    YES  PMH/SH reviewed - no change compared to H&P  ________________________________________________________________________  Care Plan discussed with:    Comments   Patient x    Family      RN x    Care Manager     Consultant                        Multidiciplinary team rounds were held today with , nursing, pharmacist and clinical coordinator. Patient's plan of care was discussed; medications were reviewed and discharge planning was addressed.      ________________________________________________________________________  Total NON critical care TIME:  35   Minutes    Total CRITICAL CARE TIME Spent:   Minutes non procedure based      Comments   >50% of visit spent in counseling and coordination of care ________________________________________________________________________  Jazmín Chand MD     Procedures: see electronic medical records for all procedures/Xrays and details which were not copied into this note but were reviewed prior to creation of Plan. LABS:  I reviewed today's most current labs and imaging studies.   Pertinent labs include:  Recent Labs     12/01/21 0420 11/30/21  0347 11/29/21  0505   WBC 8.5 9.9 11.6*   HGB 11.6 11.2* 11.2*   HCT 39.4 39.8 38.7    245 234     Recent Labs     12/01/21 0420 11/30/21  0347 11/29/21  0505   * 136 140   K 3.6 4.4 3.6   CL 96* 91* 94*   CO2 33* 41* 41*   * 118* 76   BUN 11 15 14   CREA 0.50* 0.61 0.49*   CA 9.5 9.5 9.4   MG 1.9 1.9 1.8   PHOS 2.6 3.0 3.4       Signed: Jazmín Chand MD

## 2021-12-01 NOTE — PROGRESS NOTES
Problem: Self Care Deficits Care Plan (Adult)  Goal: *Acute Goals and Plan of Care (Insert Text)  Description:   FUNCTIONAL STATUS PRIOR TO ADMISSION: Patient was modified independent using a rollator for functional mobility. HOME SUPPORT: The patient lived with  but did not require assist. Per  he has been assisting her for the last 2 weeks. Occupational Therapy Goals  Initiated 11/29/2021  1. Patient will perform grooming at the sink with supervision/set-up within 7 day(s). 2.  Patient will perform bathing at the sink with rest breaks, with minimal assistance/contact guard assist within 7 day(s). 3.  Patient will perform lower body dressing with minimal assistance/contact guard assist within 7 day(s). 4.  Patient will perform toilet transfers with modified independence within 7 day(s). 5.  Patient will perform all aspects of toileting with independence within 7 day(s). 6.  Patient will participate in upper extremity therapeutic exercise/activities with supervision/set-up for 5 minutes within 7 day(s). 7.  Patient will utilize energy conservation techniques during functional activities with verbal cues within 7 day(s). Outcome: Progressing Towards Goal   OCCUPATIONAL THERAPY TREATMENT  Patient: Johny Stoll (65 y.o. female)  Date: 12/1/2021  Diagnosis: [facility] [J96.92]   <principal problem not specified>       Precautions:    Chart, occupational therapy assessment, plan of care, and goals were reviewed. ASSESSMENT  Patient continues with skilled OT services and is progressing towards goals. Pt was sitting up in chair and on 10 liter of HHF. She was able to stand with CGA, and with use of RW, she walked to sink in room to brush her teeth, wash her face, and hands. Pt was CGA for walking to sink and CGA for standing for grooming. She was max to total for donning  depends . Pt is moving better today and more motivated.   She was min to CGa for sit to supine for bed mobility. Pt was left sitting up in bed and stated she had to go to bed after taking her Xanax . Current Level of Function Impacting Discharge (ADLs): max for LB ADLs and setup for UB             PLAN :  Patient continues to benefit from skilled intervention to address the above impairments. Continue treatment per established plan of care to address goals. Recommend with staff: Iza Soriano for meal    Recommend next OT session: work on standing endurance and LB dressing     Recommendation for discharge: (in order for the patient to meet his/her long term goals)  Pending progress, home with home care OT and 24/7 assist.     This discharge recommendation:  Has been made in collaboration with the attending provider and/or case management    IF patient discharges home will need the following DME: tbd       SUBJECTIVE:   Patient stated I want to get back to bed now that I took my Xanax.     OBJECTIVE DATA SUMMARY:   Cognitive/Behavioral Status:  Neurologic State: Alert  Orientation Level: Oriented to person; Oriented to place; Oriented to time  Cognition: Follows commands; Memory loss  Perception: Appears intact  Perseveration: No perseveration noted  Safety/Judgement: Awareness of environment    Functional Mobility and Transfers for ADLs:  Bed Mobility:       Transfers:     Functional Transfers  Toilet Transfer : Contact guard assistance       Balance:  Sitting: Intact  Standing: Impaired;  Without support  Standing - Static: Fair; Good; Constant support  Standing - Dynamic : Fair; Good; Constant support    ADL Intervention:  Feeding  Feeding Assistance: Independent    Grooming  Grooming Assistance: Contact guard assistance  Position Performed: Standing  Washing Face: Stand-by assistance  Washing Hands: Stand-by assistance  Brushing Teeth: Stand-by assistance    Upper Body Bathing  Bathing Assistance: Set-up  Position Performed: Seated in chair    Lower Body Bathing  Bathing Assistance: Maximum assistance; Minimum assistance  Perineal  : Maximum assistance; Minimum assistance  Position Performed: Standing              Toileting  Toileting Assistance: Contact guard assistance; Minimum assistance  Bladder Hygiene: Minimum assistance; Contact guard assistance  Bowel Hygiene: Minimum assistance    Cognitive Retraining  Safety/Judgement: Awareness of environment          Pain:  none    Activity Tolerance:   Fair    After treatment patient left in no apparent distress:   Supine in bed and Call bell within reach    COMMUNICATION/COLLABORATION:   The patients plan of care was discussed with: Physical therapist and Registered nurse.      Shanique Suarez OT  Time Calculation: 44 mins

## 2021-12-01 NOTE — PROGRESS NOTES
ICU Progress Note        Subjective/hospital course:     : Overnight events noted. Currently on venti mask 31% Fio2. Alert and oriented,  on bedside. No resp distress. Wants to eat. : Today patient had some worsening hypoxia, yesterday she was on venti mask and has been requiring BiPAP, try to switch to [facility].   C/O pain all over (attributed to fibromyalgia syndrome). On  [facility] by [employer]. No overt respiratory distress. Cognition intact. Worsening metabolic alkalosis. Furosemide held and single dose of acetazolamide given  : Patient reports feeling a bit better but feeling anxious. On high flow oxygen. No acute events overnight. : Patient reports feeling better than yesterday, asking to increase her home phenobarb and primidone to her home doses. On 10lit oxygen. Has worsening hypoxia when she is asleep, has not been evaluated for DEE.     Vital Signs:    Visit Vitals  /61   Pulse 70   Temp 98.6 °F (37 °C)   Resp 13   Ht 5' 4\" (1.626 m)   Wt 93.7 kg (206 lb 9.1 oz)   SpO2 92%   BMI 35.46 kg/m²       O2 Device: Nasal cannula   O2 Flow Rate (L/min): 10 l/min   Temp (24hrs), Av.4 °F (36.9 °C), Min:97.9 °F (36.6 °C), Max:98.7 °F (37.1 °C)       Intake/Output:   Last shift:      701 - 1900  In: -   Out: 500 [Urine:500]  Last 3 shifts: 1901 - 700  In: 600 [P.O.:600]  Out: 4200 [Urine:4200]    Intake/Output Summary (Last 24 hours) at 2021 1100  Last data filed at 2021 0900  Gross per 24 hour   Intake 600 ml   Output 3950 ml   Net -3350 ml       Ventilator Settings:  Ventilator Mode: Assist control  Respiratory Rate  Back-Up Rate: 24  Insp Flow (l/min): 60 l/min  I:E Ratio: 1:2.0  Ventilator Volumes  Vt Set (ml): 375 ml  Vt Exhaled (Machine Breath) (ml): 388 ml  Vt Spont (ml): 343 ml  Ve Observed (l/min): 5.9 l/min  Ventilator Pressures  PIP Observed (cm H2O): 35 cm H2O  Plateau Pressure (cm H2O): 18 cm H2O  MAP (cm H2O): 14  PEEP/VENT (cm H2O): 6 cm H20  Auto PEEP Observed (cm H2O): 0 cm H2O    Physical Exam:    General: AAOx4.   HEENT:  Anicteric sclerae; pink palpebral conjunctivae; mucosa moist  Resp:  Diffuse coarse expiratory wheezes   CV:  RRR, no M   GI:  Abdomen soft, non-tender; (+) active bowel sounds  Extremities:  Symmetric LE edema  Skin:  Warm; no rashes/ lesions noted  Neurologic:  CNs intact, no focal deficits     DATA:     Current Facility-Administered Medications   Medication Dose Route Frequency    polyethylene glycol (MIRALAX) packet 17 g  17 g Oral DAILY PRN    PHENobarbitaL (LUMINAL) tablet 64.8 mg  64.8 mg Oral BID    primidone (MYSOLINE) tablet 100 mg  100 mg Oral DAILY    diphenhydrAMINE (BENADRYL) capsule 25 mg  25 mg Oral Q6H PRN    budesonide (PULMICORT) 250 mcg/2ml nebulizer susp  250 mcg Nebulization BID RT    metoprolol tartrate (LOPRESSOR) tablet 12.5 mg  12.5 mg Oral Q12H    acetaminophen (TYLENOL) tablet 650 mg  650 mg Oral Q8H    traMADoL (ULTRAM) tablet 50 mg  50 mg Oral Q8H    traMADoL (ULTRAM) tablet 50 mg  50 mg Oral Q4H PRN    codeine sulfate tablet 30 mg  30 mg Oral Q4H PRN    enoxaparin (LOVENOX) injection 40 mg  40 mg SubCUTAneous Q24H    ARIPiprazole (ABILIFY) tablet 10 mg  10 mg Oral DAILY    ibuprofen (MOTRIN) tablet 400 mg  400 mg Oral Q6H PRN    Super B100 Balanced B Complex (Patient Supplied)  1 Tablet Oral DAILY    primidone (MYSOLINE) tablet 150 mg  150 mg Oral QHS    prazosin (MINIPRESS) capsule 2 mg (Patient Supplied)  2 mg Oral BID    alcohol 62% (NOZIN) nasal  1 Ampule  1 Ampule Topical Q12H    nicotine (NICODERM CQ) 21 mg/24 hr patch 1 Patch  1 Patch TransDERmal DAILY    hydrALAZINE (APRESOLINE) 20 mg/mL injection 10 mg  10 mg IntraVENous Q6H PRN    ALPRAZolam (XANAX) tablet 1 mg  1 mg Oral TID PRN    influenza vaccine 2021-22 (6 mos+)(PF) (FLUARIX/FLULAVAL/FLUZONE QUAD) injection 0.5 mL  1 Each IntraMUSCular PRIOR TO DISCHARGE    nitroglycerin (NITROSTAT) tablet 0.4 mg  0.4 mg SubLINGual Q5MIN PRN    sodium chloride (NS) flush 5-40 mL  5-40 mL IntraVENous Q8H    sodium chloride (NS) flush 5-40 mL  5-40 mL IntraVENous PRN    albuterol-ipratropium (DUO-NEB) 2.5 MG-0.5 MG/3 ML  3 mL Nebulization Q6H RT    aspirin chewable tablet 81 mg  81 mg Oral DAILY    [Held by provider] furosemide (LASIX) tablet 40 mg  40 mg Oral DAILY    levothyroxine (SYNTHROID) tablet 200 mcg  200 mcg Oral ACB    glucose chewable tablet 16 g  4 Tablet Oral PRN    dextrose (D50W) injection syrg 12.5-25 g  25-50 mL IntraVENous PRN    glucagon (GLUCAGEN) injection 1 mg  1 mg IntraMUSCular PRN    insulin lispro (HUMALOG) injection   SubCUTAneous AC&HS    albuterol (PROVENTIL VENTOLIN) nebulizer solution 2.5 mg  2.5 mg Nebulization Q4H PRN         Labs: Results:       Chemistry Recent Labs     12/01/21  0420 11/30/21  0347 11/29/21  0505   * 118* 76   * 136 140   K 3.6 4.4 3.6   CL 96* 91* 94*   CO2 33* 41* 41*   BUN 11 15 14   CREA 0.50* 0.61 0.49*   CA 9.5 9.5 9.4   AGAP 4* 4* 5   BUCR 22* 25* 29*      CBC w/Diff Recent Labs     12/01/21  0420 11/30/21  0347 11/29/21  0505   WBC 8.5 9.9 11.6*   RBC 4.56 4.40 4.38   HGB 11.6 11.2* 11.2*   HCT 39.4 39.8 38.7    245 234   GRANS 60 72 71   LYMPH 31 19 17   EOS 0 0 0      Coagulation No results for input(s): PTP, INR, APTT, INREXT, INREXT in the last 72 hours. Liver Enzymes No results for input(s): TP, ALB, TBIL, AP in the last 72 hours. No lab exists for component: SGOT, GPT, DBIL   ABG No results found for: PH, PHI, PCO2, PCO2I, PO2, PO2I, HCO3, HCO3I, FIO2, FIO2I   Microbiology No results for input(s): CULT in the last 72 hours. maging:  CXR Results  (Last 48 hours)               11/30/21 0459  XR CHEST PORT Final result    Impression:  No change. Narrative:  Clinical indication: Hypoxia. Portable AP semiupright view of the chest is obtained, comparison November 28.    Blunting of the costophrenic angles and increased interstitial markings mainly   at the right lung base appear stable. CT Results  (Last 48 hours)    None               Assessment and Plan: 72 F smoker with PMH of COPD, prior lung empyema, hypothyrodism, and DM2 BIBEMS for SOB and hypoxia (SPO2 60% upon EMS arrival). Upon arrival to the ED pt minimally responsive in severe respiratory distress, complaining of chest pain. ABG done which showed a pH of 7.1 and pCO2 of 92. Placed on Bipap, given steroids and albuterol with improvement in mentation and respiratory status. Labs also significant for pro-BNP 4,531 and high sensitivity trop of 166. EKG without ST elevations. Due to lack of improvement in pCO2 after BiPAP she was intubated. ICU consulted for admission. Acute on chronic hypoxic and hypercapnic respiratory failure - likely COPD exacerbation. Pulmonary embolism ruled out with CTA. Her pCO2 is much corrected now. Extubated on 11/26. Completed 5 days azithro/ceftriaxone. Cont nebulized bronchodilators. Nebulized steroids added 11/29. Methylpred dose adjusted 11/29. Cont supplemental oxygen to keep saturation 88-92%. Wean to midflow oxygen today. 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 No specific treatment indicated presently. Consider further eval after discharge    Hyponatremia, resolved    Metabolic alkalosis - compensatory + loop diuresis. Single dose of acetazolamide ordered 11/29, 11/30 and give today as well. Replace K+ aggressively. Diuresis as tolerated. Atelectasis    Incentive spirometry and acapella Rx. DM2  -insulin sliding scale     Hypothyroidism  -cont home synthroid     Concern for dysphagia - evaluated by speech therapy. Cleared for regular diet. Transfer out of ICU to stepdown floor.     Rama Correa, [address] [facility]  [phone]  12/1/2021
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
## 2021-12-01 NOTE — PROGRESS NOTES
0730: Received report from Sarabjit Hernandez RN.     0800: Shift assessment complete. See flowsheet for details. 1200: Reassessment completed. No changes noted. 1600: Reassessment completed. No changes noted. See flowsheet for details. 1900: Verbal shift change report given to Sarabjit Hernandez RN (oncoming nurse) by Bishnu Izquierdo RN (offgoing nurse). Report included the following information SBAR, Kardex, Intake/Output, MAR, Cardiac Rhythm SR and Quality Measures.

## 2021-12-01 NOTE — PROGRESS NOTES
1915: Bedside and Verbal shift change report given to [address] (oncoming nurse) by Willem Batista RN (offgoing nurse). Report included the following information SBAR, Kardex, ED Summary, Intake/Output, Recent Results, Med Rec Status and Cardiac Rhythm NSR.    1930: patient hit call bell requesting xanax while receiving report. PRN xanax given    2000: shift assessment complete. See flowhsheet for details. Pt A+Ox4, resting quietly on 10L NC. Pt denies acute pain and not currently requesting anything for chronic generalized pain. Pt stated Carla Reji would try to wait until later\" Allowed pt to have purse however I removed the OTC nasal sprays (x2), tums and inhaler. Items placed in bag and placed in the  in the room. 2100: pt now rating pain 8/10 and requesting PRN codene. 0000: Reassessment complete, no acute changes noted. 6894: Pt had uneventful night. Sats remained above 92% on 10L NC.  UO 1250 mL/12hr    0715: Bedside and Verbal shift change report given to [address] RN (oncoming nurse) by [address] (offgoing nurse). Report included the following information SBAR, Intake/Output, Recent Results and Cardiac Rhythm Sinus Rhythm.

## 2021-12-02 LAB
ANION GAP SERPL CALC-SCNC: 3 MMOL/L (ref 5–15)
BASOPHILS # BLD: 0.1 K/UL (ref 0–0.1)
BASOPHILS NFR BLD: 1 % (ref 0–1)
BUN SERPL-MCNC: 10 MG/DL (ref 6–20)
BUN/CREAT SERPL: 16 (ref 12–20)
CALCIUM SERPL-MCNC: 9.9 MG/DL (ref 8.5–10.1)
CHLORIDE SERPL-SCNC: 98 MMOL/L (ref 97–108)
CO2 SERPL-SCNC: 32 MMOL/L (ref 21–32)
CREAT SERPL-MCNC: 0.63 MG/DL (ref 0.55–1.02)
DIFFERENTIAL METHOD BLD: ABNORMAL
EOSINOPHIL # BLD: 0 K/UL (ref 0–0.4)
EOSINOPHIL NFR BLD: 0 % (ref 0–7)
ERYTHROCYTE [DISTWIDTH] IN BLOOD BY AUTOMATED COUNT: 17.2 % (ref 11.5–14.5)
GLUCOSE BLD STRIP.AUTO-MCNC: 119 MG/DL (ref 65–117)
GLUCOSE BLD STRIP.AUTO-MCNC: 183 MG/DL (ref 65–117)
GLUCOSE BLD STRIP.AUTO-MCNC: 78 MG/DL (ref 65–117)
GLUCOSE BLD STRIP.AUTO-MCNC: 90 MG/DL (ref 65–117)
GLUCOSE SERPL-MCNC: 121 MG/DL (ref 65–100)
HCT VFR BLD AUTO: 42 % (ref 35–47)
HGB BLD-MCNC: 11.9 G/DL (ref 11.5–16)
IMM GRANULOCYTES # BLD AUTO: 0 K/UL (ref 0–0.04)
IMM GRANULOCYTES NFR BLD AUTO: 0 % (ref 0–0.5)
LYMPHOCYTES # BLD: 2.5 K/UL (ref 0.8–3.5)
LYMPHOCYTES NFR BLD: 32 % (ref 12–49)
MAGNESIUM SERPL-MCNC: 2.1 MG/DL (ref 1.6–2.4)
MCH RBC QN AUTO: 25.2 PG (ref 26–34)
MCHC RBC AUTO-ENTMCNC: 28.3 G/DL (ref 30–36.5)
MCV RBC AUTO: 89 FL (ref 80–99)
MONOCYTES # BLD: 0.8 K/UL (ref 0–1)
MONOCYTES NFR BLD: 10 % (ref 5–13)
NEUTS SEG # BLD: 4.4 K/UL (ref 1.8–8)
NEUTS SEG NFR BLD: 57 % (ref 32–75)
NRBC # BLD: 0 K/UL (ref 0–0.01)
NRBC BLD-RTO: 0 PER 100 WBC
PHOSPHATE SERPL-MCNC: 2.7 MG/DL (ref 2.6–4.7)
PLATELET # BLD AUTO: 228 K/UL (ref 150–400)
PMV BLD AUTO: 9.6 FL (ref 8.9–12.9)
POTASSIUM SERPL-SCNC: 4 MMOL/L (ref 3.5–5.1)
RBC # BLD AUTO: 4.72 M/UL (ref 3.8–5.2)
SERVICE CMNT-IMP: ABNORMAL
SERVICE CMNT-IMP: ABNORMAL
SERVICE CMNT-IMP: NORMAL
SERVICE CMNT-IMP: NORMAL
SODIUM SERPL-SCNC: 133 MMOL/L (ref 136–145)
WBC # BLD AUTO: 7.8 K/UL (ref 3.6–11)

## 2021-12-02 PROCEDURE — 74011250636 HC RX REV CODE- 250/636: Performed by: INTERNAL MEDICINE

## 2021-12-02 PROCEDURE — 74011250637 HC RX REV CODE- 250/637: Performed by: INTERNAL MEDICINE

## 2021-12-02 PROCEDURE — 74011000250 HC RX REV CODE- 250: Performed by: GENERAL ACUTE CARE HOSPITAL

## 2021-12-02 PROCEDURE — 36415 COLL VENOUS BLD VENIPUNCTURE: CPT

## 2021-12-02 PROCEDURE — 74011636637 HC RX REV CODE- 636/637: Performed by: NURSE PRACTITIONER

## 2021-12-02 PROCEDURE — 82962 GLUCOSE BLOOD TEST: CPT

## 2021-12-02 PROCEDURE — 94640 AIRWAY INHALATION TREATMENT: CPT

## 2021-12-02 PROCEDURE — 83735 ASSAY OF MAGNESIUM: CPT

## 2021-12-02 PROCEDURE — 77010033711 HC HIGH FLOW OXYGEN

## 2021-12-02 PROCEDURE — 74011250637 HC RX REV CODE- 250/637: Performed by: HOSPITALIST

## 2021-12-02 PROCEDURE — 74011250637 HC RX REV CODE- 250/637: Performed by: NURSE PRACTITIONER

## 2021-12-02 PROCEDURE — 65660000000 HC RM CCU STEPDOWN

## 2021-12-02 PROCEDURE — 80048 BASIC METABOLIC PNL TOTAL CA: CPT

## 2021-12-02 PROCEDURE — 2709999900 HC NON-CHARGEABLE SUPPLY

## 2021-12-02 PROCEDURE — 84100 ASSAY OF PHOSPHORUS: CPT

## 2021-12-02 PROCEDURE — 85025 COMPLETE CBC W/AUTO DIFF WBC: CPT

## 2021-12-02 PROCEDURE — 74011000250 HC RX REV CODE- 250: Performed by: INTERNAL MEDICINE

## 2021-12-02 RX ORDER — IPRATROPIUM BROMIDE AND ALBUTEROL SULFATE 2.5; .5 MG/3ML; MG/3ML
3 SOLUTION RESPIRATORY (INHALATION)
Status: DISCONTINUED | OUTPATIENT
Start: 2021-12-02 | End: 2021-12-05

## 2021-12-02 RX ADMIN — INSULIN LISPRO 2 UNITS: 100 INJECTION, SOLUTION INTRAVENOUS; SUBCUTANEOUS at 13:17

## 2021-12-02 RX ADMIN — PRIMIDONE 100 MG: 50 TABLET ORAL at 10:01

## 2021-12-02 RX ADMIN — ACETAMINOPHEN 650 MG: 325 TABLET ORAL at 20:14

## 2021-12-02 RX ADMIN — Medication 1 AMPULE: at 13:18

## 2021-12-02 RX ADMIN — TRAMADOL HYDROCHLORIDE 50 MG: 50 TABLET, COATED ORAL at 13:18

## 2021-12-02 RX ADMIN — PRAZOSIN HYDROCHLORIDE 2 MG: 2 CAPSULE ORAL at 17:29

## 2021-12-02 RX ADMIN — ASPIRIN 81 MG CHEWABLE TABLET 81 MG: 81 TABLET CHEWABLE at 09:59

## 2021-12-02 RX ADMIN — IPRATROPIUM BROMIDE AND ALBUTEROL SULFATE 3 ML: .5; 3 SOLUTION RESPIRATORY (INHALATION) at 09:06

## 2021-12-02 RX ADMIN — BUDESONIDE 250 MCG: 0.25 INHALANT RESPIRATORY (INHALATION) at 21:26

## 2021-12-02 RX ADMIN — BUDESONIDE 250 MCG: 0.25 INHALANT RESPIRATORY (INHALATION) at 09:06

## 2021-12-02 RX ADMIN — DIPHENHYDRAMINE HYDROCHLORIDE 25 MG: 25 CAPSULE ORAL at 00:29

## 2021-12-02 RX ADMIN — ACETAMINOPHEN 650 MG: 325 TABLET ORAL at 04:33

## 2021-12-02 RX ADMIN — Medication 10 ML: at 21:10

## 2021-12-02 RX ADMIN — ACETAMINOPHEN 650 MG: 325 TABLET ORAL at 13:18

## 2021-12-02 RX ADMIN — IPRATROPIUM BROMIDE AND ALBUTEROL SULFATE 3 ML: .5; 3 SOLUTION RESPIRATORY (INHALATION) at 21:26

## 2021-12-02 RX ADMIN — PHENOBARBITAL 64.8 MG: 32.4 TABLET ORAL at 17:28

## 2021-12-02 RX ADMIN — ARIPIPRAZOLE 10 MG: 5 TABLET ORAL at 09:59

## 2021-12-02 RX ADMIN — PRAZOSIN HYDROCHLORIDE 2 MG: 2 CAPSULE ORAL at 09:59

## 2021-12-02 RX ADMIN — Medication 10 ML: at 16:08

## 2021-12-02 RX ADMIN — ALPRAZOLAM 1 MG: 0.5 TABLET ORAL at 17:45

## 2021-12-02 RX ADMIN — TRAMADOL HYDROCHLORIDE 50 MG: 50 TABLET, COATED ORAL at 17:28

## 2021-12-02 RX ADMIN — Medication 1 AMPULE: at 20:17

## 2021-12-02 RX ADMIN — METOPROLOL TARTRATE 12.5 MG: 25 TABLET, FILM COATED ORAL at 09:59

## 2021-12-02 RX ADMIN — Medication 10 ML: at 04:33

## 2021-12-02 RX ADMIN — LEVOTHYROXINE SODIUM 200 MCG: 0.15 TABLET ORAL at 10:00

## 2021-12-02 RX ADMIN — METOPROLOL TARTRATE 12.5 MG: 25 TABLET, FILM COATED ORAL at 20:14

## 2021-12-02 RX ADMIN — IBUPROFEN 400 MG: 400 TABLET, FILM COATED ORAL at 17:45

## 2021-12-02 RX ADMIN — PHENOBARBITAL 64.8 MG: 32.4 TABLET ORAL at 09:59

## 2021-12-02 RX ADMIN — CODEINE SULFATE 30 MG: 30 TABLET ORAL at 00:29

## 2021-12-02 RX ADMIN — ENOXAPARIN SODIUM 40 MG: 100 INJECTION SUBCUTANEOUS at 10:00

## 2021-12-02 RX ADMIN — IPRATROPIUM BROMIDE AND ALBUTEROL SULFATE 3 ML: .5; 3 SOLUTION RESPIRATORY (INHALATION) at 14:59

## 2021-12-02 RX ADMIN — TRAMADOL HYDROCHLORIDE 50 MG: 50 TABLET, COATED ORAL at 20:13

## 2021-12-02 RX ADMIN — TRAMADOL HYDROCHLORIDE 50 MG: 50 TABLET, COATED ORAL at 04:32

## 2021-12-02 NOTE — PROGRESS NOTES
6931 .Bedside and Verbal shift change report given to Perez Lentz (oncoming nurse) by Dmitriy Rivera (offgoing nurse). Report included the following information SBAR, Kardex, Intake/Output and MAR. .End of Shift Note    Bedside shift change report given to Ashlieroz Enrique (oncoming nurse) by Chanelle Wallace (offgoing nurse). Report included the following information SBAR, Kardex, Intake/Output and MAR    Shift worked:  0700 - 1900     Shift summary and any significant changes:     Pain management   Anxiety      Concerns for physician to address:  . .. Zone phone for oncoming shift:   . Kevin Llanes Activity:  Activity Level: Up with Assistance  Number times ambulated in hallways past shift: 0  Number of times OOB to chair past shift: 0    Cardiac:   Cardiac Monitoring: Yes      Cardiac Rhythm: Sinus Rhythm    Access:   Current line(s): PIV     Genitourinary:   Urinary status: voiding    Respiratory:   O2 Device: Hi flow nasal cannula  Chronic home O2 use?: YES  Incentive spirometer at bedside: YES  Actual Volume (ml): 500 ml  GI:     Current diet:  ADULT DIET Regular; 4 carb choices (60 gm/meal)  Passing flatus: YES  Tolerating current diet: YES       Pain Management:   Patient states pain is manageable on current regimen: YES    Skin:  Joseph Score: 16  Interventions: increase time out of bed and PT/OT consult    Patient Safety:  Fall Score:  Total Score: 4  Interventions: bed/chair alarm, assistive device (walker, cane, etc), gripper socks and pt to call before getting OOB  High Fall Risk: Yes    Length of Stay:  Expected LOS: 4d 21h  Actual LOS: 7      Laverle Noon

## 2021-12-02 NOTE — PROGRESS NOTES
Hospitalist Progress Note    NAME: Karla Morales   :  1955   MRN:  [id number]       Assessment / Plan:  Acute hypoxic hypercarbic respiratory failure secondary to COPD exacerbation, s/p extubation   Pulmonary HTN  Pt on 10 L NC - wean as tolerated  PT/OT evals  Continue inhalers    :  Weaned down to 6L NC - continue to wean as tolerated  PT/OT evals - rec is for [facility] PT    DMII  FS monitoring, SS    Hypothyroidism  Fibromyalgia  HTN  HLD  Depression  PSTD  Resume home meds    30.0 - 39.9 Obese / Body mass index is 35.46 kg/m². Estimated discharge date: December 3  Barriers:    Code status: Full  Prophylaxis: Lovenox  Recommended Disposition:  PT, OT, RN     Subjective:     Chief Complaint / Reason for Physician Visit  Feeling better. Discussed with RN events overnight. Review of Systems:  Symptom Y/N Comments  Symptom Y/N Comments   Fever/Chills    Chest Pain     Poor Appetite    Edema     Cough    Abdominal Pain     Sputum    Joint Pain     SOB/RODRIGUEZ    Pruritis/Rash     Nausea/vomit    Tolerating PT/OT     Diarrhea    Tolerating Diet     Constipation    Other       Could NOT obtain due to:      Objective:     VITALS:   Last 24hrs VS reviewed since prior progress note.  Most recent are:  Patient Vitals for the past 24 hrs:   Temp Pulse Resp BP SpO2   21 1151 98.4 °F (36.9 °C) 78 14 122/66 93 %   21 0915 -- -- -- -- 90 %   21 0906 -- -- -- -- 92 %   21 0757 98.2 °F (36.8 °C) 73 12 116/71 97 %   21 0400 97.9 °F (36.6 °C) 65 12 106/60 96 %   21 2200 98 °F (36.7 °C) 74 13 (!) 110/46 100 %   21 2000 99.1 °F (37.3 °C) 92 15 115/66 94 %   21 1900 -- 88 14 123/60 94 %   21 1800 -- 89 16 127/62 --   21 1700 -- 88 17 125/65 (!) 89 %   21 1600 98.6 °F (37 °C) 87 16 (!) 110/52 91 %   21 1500 -- 77 14 (!) 111/50 100 %   21 1400 -- 91 16 (!) 120/55 95 %       Intake/Output Summary (Last 24 hours) at 2021 1350  Last data filed at 12/1/2021 1600  Gross per 24 hour   Intake 200 ml   Output --   Net 200 ml        I had a face to face encounter and independently examined this patient on 12/2/2021, as outlined below:  PHYSICAL EXAM:  General: Alert, cooperative, no acute distress    EENT:  EOMI. Anicteric sclerae. MMM  Resp:  On 10L NC  CV:  Regular  rhythm,  No edema  GI:  Soft, Non distended, Non tender. +Bowel sounds  Neurologic:  Alert and oriented X 3, normal speech,   Psych:   Good insight. Not anxious nor agitated  Skin:  No rashes. No jaundice    Reviewed most current lab test results and cultures  YES  Reviewed most current radiology test results   YES  Review and summation of old records today    NO  Reviewed patient's current orders and MAR    YES  PMH/SH reviewed - no change compared to H&P  ________________________________________________________________________  Care Plan discussed with:    Comments   Patient x    Family      RN x    Care Manager     Consultant                        Multidiciplinary team rounds were held today with , nursing, pharmacist and clinical coordinator. Patient's plan of care was discussed; medications were reviewed and discharge planning was addressed. ________________________________________________________________________  Total NON critical care TIME:  35   Minutes    Total CRITICAL CARE TIME Spent:   Minutes non procedure based      Comments   >50% of visit spent in counseling and coordination of care     ________________________________________________________________________  Rubens Alonzo MD     Procedures: see electronic medical records for all procedures/Xrays and details which were not copied into this note but were reviewed prior to creation of Plan. LABS:  I reviewed today's most current labs and imaging studies.   Pertinent labs include:  Recent Labs     12/02/21  0431 12/01/21  0420 11/30/21  0347   WBC 7.8 8.5 9.9   HGB 11.9 11.6 11.2*   HCT 42.0 39.4 39.8  226 245     Recent Labs     12/02/21  0431 12/01/21  0420 11/30/21  0347   * 133* 136   K 4.0 3.6 4.4   CL 98 96* 91*   CO2 32 33* 41*   * 131* 118*   BUN 10 11 15   CREA 0.63 0.50* 0.61   CA 9.9 9.5 9.5   MG 2.1 1.9 1.9   PHOS 2.7 2.6 3.0       Signed: Angie Vaughan MD

## 2021-12-02 NOTE — PROGRESS NOTES
1915: Bedside and Verbal shift change report given to [address] (oncoming nurse) by Tani Chapman RN (offgoing nurse). Report included the following information SBAR, Kardex, Intake/Output, MAR, Recent Results and Cardiac Rhythm NSR.     2000: Shift assessment complete. See flowsheet for details. Pt is A+Ox4 ON 10L high flow NC. Pt is anxious and reporting pain a 9/10. PRN xanax and codene given. Pt is upset and stating that she did not get cleaned up today. Day shift did clean her up and changed her linens; however I reassured pt that we would get her cleaned again before transfer. She is also requesting phenobarbital for her tremors. She has already received two doses today. Will give scheduled primidone and speak with NP    TRANSFER - OUT REPORT:    Verbal report given to MARIOLA Higgins(name) on [employer]  being transferred to [facility] for routine progression of care       Report consisted of patients Situation, Background, Assessment and   Recommendations(SBAR). Information from the following report(s) SBAR, Kardex, ED Summary, Intake/Output, MAR and Cardiac Rhythm NSR was reviewed with the receiving nurse. Lines:   Peripheral IV 11/29/21 Left Antecubital (Active)   Site Assessment Clean, dry, & intact 12/01/21 2000   Phlebitis Assessment 0 12/01/21 2000   Infiltration Assessment 0 12/01/21 2000   Dressing Status Clean, dry, & intact 12/01/21 2000   Dressing Type Transparent 12/01/21 2000   Hub Color/Line Status Blue; Capped 12/01/21 2000   Action Taken Open ports on tubing capped 12/01/21 2000   Alcohol Cap Used Yes 12/01/21 2000        Opportunity for questions and clarification was provided.       Patient transported with:   Monitor  O2 @ 10 liters  Patient's medications from home  Registered Nurse  Tech

## 2021-12-02 NOTE — PROGRESS NOTES
ADULT PROTOCOL: JET AEROSOL ASSESSMENT    Patient  Tico Teixeira     72 y.o.   female     12/2/2021  12:39 AM    Breath Sounds Pre Procedure: Right Breath Sounds: Coarse, Wet                               Left Breath Sounds: Coarse, Wet    Breath Sounds Post Procedure: Right Breath Sounds: Coarse, Wet                                 Left Breath Sounds: Coarse, Wet    Breathing pattern: Pre procedure Breathing Pattern: Regular          Post procedure Breathing Pattern: Regular    Heart Rate: Pre procedure Pulse: 86           Post procedure Pulse: 84    Resp Rate: Pre procedure Respirations: 14           Post procedure Respirations: 18    Cough: Pre procedure Cough: Non-productive               Post procedure Cough: Non-productive, Congested, Moist    Suctioned: NO    Sputum: Pre procedure                   Post procedure      Oxygen: O2 Device: Hi flow nasal cannula   O2 Flow Rate (L/min): 10 l/min     Changed: NO    SpO2: Pre procedure SpO2: 93 %   with oxygen              Post procedure SpO2: 95 %  with oxygen    Nebulizer Therapy: Current medications Aerosolized Medications: DuoNeb, Pulmicort      Changed: YES. No wheezing, or increased WOB.     Smoking History:   Social History     Tobacco Use   Smoking Status Former Smoker    Packs/day: 1.00    Years: 30.00    Pack years: 30.00   Smokeless Tobacco Never Used       Problem List:   Patient Active Problem List   Diagnosis Code    COPD (chronic obstructive pulmonary disease) (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.42    Syncope and collapse R55    COPD with acute exacerbation (HCC) J44.1    Acute respiratory failure with hypoxia (HCC) J96.01    Chronic respiratory failure with hypoxia (HCC) J96.11    Benign familial tremor G25.0    Atelectasis J98.11    Allergic rhinitis J30.9    Sepsis (HCC) A41.9    Acute exacerbation of COPD with asthma ([facility]) J44.1, J45.901    HTN (hypertension) I10    Hyperlipidemia E78.5    Hypothyroidism E03.9    Smoking F17.200    Diabetic peripheral neuropathy associated with type 2 diabetes mellitus (HCC) E11.42    Stenosis of both internal carotid arteries I65.23    Cervical radiculopathy due to degenerative joint disease of spine M47.22    Degenerative lumbar spinal stenosis M48.061    Vitamin D deficiency E55.9    Altered mental status, unspecified R41.82    Cerebral microvascular disease I67.89    Obesity (BMI 35.0-39.9 without comorbidity) E66.9    Fibromyalgia M79.7    Benign essential tremor syndrome G25.0    CAP (community acquired pneumonia) J18.9    Productive cough R05.8    Chest pain on breathing R07.1    Anxiety and depression F41.9, F32. A    Polypharmacy Z79.899    Shortness of breath R06.02    Drug-induced constipation K59.03    Empyema ([facility]) J86.9    Right-sided chest pain R07.9    Chronic pain syndrome G89.4    Hypercapnic respiratory failure [facility]) J96.92       Respiratory Therapist: Jonathan Arredondo RT

## 2021-12-02 NOTE — PROGRESS NOTES
ADULT PROTOCOL: JET AEROSOL ASSESSMENT    Patient  Maggie Madrigal     72 y.o.   female     12/2/2021  10:49 AM    Breath Sounds Pre Procedure: Right Breath Sounds: Diminished                               Left Breath Sounds: Diminished    Breath Sounds Post Procedure: Right Breath Sounds: Diminished                                 Left Breath Sounds: Diminished    Breathing pattern: Pre procedure Breathing Pattern: Regular          Post procedure Breathing Pattern: Regular    Heart Rate: Pre procedure Pulse: 87           Post procedure Pulse: 90    Resp Rate: Pre procedure Respirations: 18           Post procedure Respirations: 18      Incentive Spirometry:  Actual Volume (ml): 500 ml          Cough: Pre procedure Cough: Congested               Post procedure Cough: Non-productive, Congested, Moist      Oxygen: O2 Device: Hi flow nasal cannula   6L         SpO2: Pre procedure SpO2: 92 %               Post procedure SpO2: 90 %    Nebulizer Therapy: Current medications Aerosolized Medications: DuoNeb, Pulmicort          Smoking History:  Former    Problem List:   Patient Active Problem List   Diagnosis Code    [facility] J44.[facility]    PTSD (post-traumatic stress disorder) F43.10    PVC (premature ventricular contraction) I49.3    Tobacco use disorder F17.200    Family history of ischemic heart disease Z82.49    Chest pain with normal coronary angiography R07.9    Abnormal nuclear stress test R94.39    Acute respiratory failure (HCC) J96.00    Pneumonia, organism unspecified(486) J18.9    Depression, major, recurrent (HCC) F33.9    Cervical post-laminectomy syndrome M96.1    Neck pain M54.2    Ataxia R27.0    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.42    Syncope and collapse R55    COPD with acute exacerbation ([facility] J44.1    Acute respiratory failure with hypoxia (HCC) J96.01    Chronic respiratory failure with hypoxia (HCC) J96.11    Benign familial tremor G25.0    Atelectasis J98.11    Allergic rhinitis J30.9    Sepsis ([address] A41.9    Acute exacerbation of COPD with asthma ([facility]) J44.1, J45.901    HTN (hypertension) I10    Hyperlipidemia E78.5    Hypothyroidism E03.9    Smoking F17.200    Diabetic peripheral neuropathy associated with type 2 diabetes mellitus (HCC) E11.42    Stenosis of both internal carotid arteries I65.23    Cervical radiculopathy due to degenerative joint disease of spine M47.22    Degenerative lumbar spinal stenosis M48.061    Vitamin D deficiency E55.9    Altered mental status, unspecified R41.82    Cerebral microvascular disease I67.89    Obesity (BMI 35.0-39.9 without comorbidity) E66.9    Fibromyalgia M79.7    Benign essential tremor syndrome G25.0    CAP (community acquired pneumonia) J18.9    Productive cough R05.8    Chest pain on breathing R07.1    Anxiety and depression F41.9, F32. A    Polypharmacy Z79.899    Shortness of breath R06.02    Drug-induced constipation K59.03    Empyema ([facility]) J86.9    Right-sided chest pain R07.9    Chronic pain syndrome G89.4    Hypercapnic respiratory failure [facility]) J96.92       Respiratory Therapist: Carina Uribe V RT

## 2021-12-03 LAB
ANION GAP SERPL CALC-SCNC: 6 MMOL/L (ref 5–15)
BASOPHILS # BLD: 0.1 K/UL (ref 0–0.1)
BASOPHILS NFR BLD: 1 % (ref 0–1)
BUN SERPL-MCNC: 8 MG/DL (ref 6–20)
BUN/CREAT SERPL: 15 (ref 12–20)
CALCIUM SERPL-MCNC: 9.7 MG/DL (ref 8.5–10.1)
CHLORIDE SERPL-SCNC: 101 MMOL/L (ref 97–108)
CO2 SERPL-SCNC: 21 MMOL/L (ref 21–32)
CREAT SERPL-MCNC: 0.55 MG/DL (ref 0.55–1.02)
DIFFERENTIAL METHOD BLD: ABNORMAL
EOSINOPHIL # BLD: 0 K/UL (ref 0–0.4)
EOSINOPHIL NFR BLD: 0 % (ref 0–7)
ERYTHROCYTE [DISTWIDTH] IN BLOOD BY AUTOMATED COUNT: 17.1 % (ref 11.5–14.5)
GLUCOSE BLD STRIP.AUTO-MCNC: 105 MG/DL (ref 65–117)
GLUCOSE BLD STRIP.AUTO-MCNC: 149 MG/DL (ref 65–117)
GLUCOSE BLD STRIP.AUTO-MCNC: 88 MG/DL (ref 65–117)
GLUCOSE BLD STRIP.AUTO-MCNC: 97 MG/DL (ref 65–117)
GLUCOSE SERPL-MCNC: 106 MG/DL (ref 65–100)
HCT VFR BLD AUTO: 41.2 % (ref 35–47)
HGB BLD-MCNC: 11.9 G/DL (ref 11.5–16)
IMM GRANULOCYTES # BLD AUTO: 0 K/UL (ref 0–0.04)
IMM GRANULOCYTES NFR BLD AUTO: 0 % (ref 0–0.5)
LYMPHOCYTES # BLD: 2 K/UL (ref 0.8–3.5)
LYMPHOCYTES NFR BLD: 23 % (ref 12–49)
MAGNESIUM SERPL-MCNC: 2 MG/DL (ref 1.6–2.4)
MCH RBC QN AUTO: 25.4 PG (ref 26–34)
MCHC RBC AUTO-ENTMCNC: 28.9 G/DL (ref 30–36.5)
MCV RBC AUTO: 88 FL (ref 80–99)
MONOCYTES # BLD: 0.8 K/UL (ref 0–1)
MONOCYTES NFR BLD: 9 % (ref 5–13)
NEUTS SEG # BLD: 5.6 K/UL (ref 1.8–8)
NEUTS SEG NFR BLD: 67 % (ref 32–75)
NRBC # BLD: 0 K/UL (ref 0–0.01)
NRBC BLD-RTO: 0 PER 100 WBC
PHOSPHATE SERPL-MCNC: 2.7 MG/DL (ref 2.6–4.7)
PLATELET # BLD AUTO: 186 K/UL (ref 150–400)
PMV BLD AUTO: 10 FL (ref 8.9–12.9)
POTASSIUM SERPL-SCNC: 4 MMOL/L (ref 3.5–5.1)
RBC # BLD AUTO: 4.68 M/UL (ref 3.8–5.2)
RBC MORPH BLD: ABNORMAL
RBC MORPH BLD: ABNORMAL
SERVICE CMNT-IMP: ABNORMAL
SERVICE CMNT-IMP: NORMAL
SODIUM SERPL-SCNC: 128 MMOL/L (ref 136–145)
WBC # BLD AUTO: 8.5 K/UL (ref 3.6–11)

## 2021-12-03 PROCEDURE — 97116 GAIT TRAINING THERAPY: CPT

## 2021-12-03 PROCEDURE — 74011000250 HC RX REV CODE- 250: Performed by: GENERAL ACUTE CARE HOSPITAL

## 2021-12-03 PROCEDURE — 97110 THERAPEUTIC EXERCISES: CPT

## 2021-12-03 PROCEDURE — 74011250636 HC RX REV CODE- 250/636: Performed by: INTERNAL MEDICINE

## 2021-12-03 PROCEDURE — 77010033678 HC OXYGEN DAILY

## 2021-12-03 PROCEDURE — 74011250637 HC RX REV CODE- 250/637: Performed by: INTERNAL MEDICINE

## 2021-12-03 PROCEDURE — 74011250637 HC RX REV CODE- 250/637: Performed by: HOSPITALIST

## 2021-12-03 PROCEDURE — 85025 COMPLETE CBC W/AUTO DIFF WBC: CPT

## 2021-12-03 PROCEDURE — 82962 GLUCOSE BLOOD TEST: CPT

## 2021-12-03 PROCEDURE — 94640 AIRWAY INHALATION TREATMENT: CPT

## 2021-12-03 PROCEDURE — 83735 ASSAY OF MAGNESIUM: CPT

## 2021-12-03 PROCEDURE — 80048 BASIC METABOLIC PNL TOTAL CA: CPT

## 2021-12-03 PROCEDURE — 36415 COLL VENOUS BLD VENIPUNCTURE: CPT

## 2021-12-03 PROCEDURE — 74011636637 HC RX REV CODE- 636/637: Performed by: NURSE PRACTITIONER

## 2021-12-03 PROCEDURE — 74011250637 HC RX REV CODE- 250/637: Performed by: NURSE PRACTITIONER

## 2021-12-03 PROCEDURE — 74011000250 HC RX REV CODE- 250: Performed by: INTERNAL MEDICINE

## 2021-12-03 PROCEDURE — 65660000000 HC RM CCU STEPDOWN

## 2021-12-03 PROCEDURE — 84100 ASSAY OF PHOSPHORUS: CPT

## 2021-12-03 RX ADMIN — IPRATROPIUM BROMIDE AND ALBUTEROL SULFATE 3 ML: .5; 3 SOLUTION RESPIRATORY (INHALATION) at 19:48

## 2021-12-03 RX ADMIN — PRIMIDONE 150 MG: 50 TABLET ORAL at 17:13

## 2021-12-03 RX ADMIN — PHENOBARBITAL 64.8 MG: 32.4 TABLET ORAL at 17:06

## 2021-12-03 RX ADMIN — BUDESONIDE 250 MCG: 0.25 INHALANT RESPIRATORY (INHALATION) at 19:54

## 2021-12-03 RX ADMIN — LEVOTHYROXINE SODIUM 200 MCG: 0.15 TABLET ORAL at 09:24

## 2021-12-03 RX ADMIN — ACETAMINOPHEN 650 MG: 325 TABLET ORAL at 04:14

## 2021-12-03 RX ADMIN — CODEINE SULFATE 30 MG: 30 TABLET ORAL at 11:49

## 2021-12-03 RX ADMIN — TRAMADOL HYDROCHLORIDE 50 MG: 50 TABLET, COATED ORAL at 20:24

## 2021-12-03 RX ADMIN — INSULIN LISPRO 2 UNITS: 100 INJECTION, SOLUTION INTRAVENOUS; SUBCUTANEOUS at 09:37

## 2021-12-03 RX ADMIN — BUDESONIDE 250 MCG: 0.25 INHALANT RESPIRATORY (INHALATION) at 07:54

## 2021-12-03 RX ADMIN — Medication 1 AMPULE: at 09:00

## 2021-12-03 RX ADMIN — ASPIRIN 81 MG CHEWABLE TABLET 81 MG: 81 TABLET CHEWABLE at 09:24

## 2021-12-03 RX ADMIN — TRAMADOL HYDROCHLORIDE 50 MG: 50 TABLET, COATED ORAL at 04:14

## 2021-12-03 RX ADMIN — Medication 1 AMPULE: at 22:25

## 2021-12-03 RX ADMIN — Medication 10 ML: at 14:00

## 2021-12-03 RX ADMIN — METOPROLOL TARTRATE 12.5 MG: 25 TABLET, FILM COATED ORAL at 20:24

## 2021-12-03 RX ADMIN — IPRATROPIUM BROMIDE AND ALBUTEROL SULFATE 3 ML: .5; 3 SOLUTION RESPIRATORY (INHALATION) at 15:42

## 2021-12-03 RX ADMIN — IPRATROPIUM BROMIDE AND ALBUTEROL SULFATE 3 ML: .5; 3 SOLUTION RESPIRATORY (INHALATION) at 11:24

## 2021-12-03 RX ADMIN — PRAZOSIN HYDROCHLORIDE 2 MG: 2 CAPSULE ORAL at 09:27

## 2021-12-03 RX ADMIN — ARIPIPRAZOLE 10 MG: 5 TABLET ORAL at 09:24

## 2021-12-03 RX ADMIN — PHENOBARBITAL 64.8 MG: 32.4 TABLET ORAL at 09:24

## 2021-12-03 RX ADMIN — ENOXAPARIN SODIUM 40 MG: 100 INJECTION SUBCUTANEOUS at 09:24

## 2021-12-03 RX ADMIN — METOPROLOL TARTRATE 12.5 MG: 25 TABLET, FILM COATED ORAL at 09:25

## 2021-12-03 RX ADMIN — Medication 10 ML: at 22:24

## 2021-12-03 RX ADMIN — PRAZOSIN HYDROCHLORIDE 2 MG: 2 CAPSULE ORAL at 17:07

## 2021-12-03 RX ADMIN — ACETAMINOPHEN 650 MG: 325 TABLET ORAL at 20:24

## 2021-12-03 RX ADMIN — IBUPROFEN 400 MG: 400 TABLET, FILM COATED ORAL at 09:36

## 2021-12-03 RX ADMIN — ALPRAZOLAM 1 MG: 0.5 TABLET ORAL at 20:32

## 2021-12-03 RX ADMIN — ALPRAZOLAM 1 MG: 0.5 TABLET ORAL at 13:39

## 2021-12-03 RX ADMIN — TRAMADOL HYDROCHLORIDE 50 MG: 50 TABLET, COATED ORAL at 09:36

## 2021-12-03 RX ADMIN — PRIMIDONE 100 MG: 50 TABLET ORAL at 09:24

## 2021-12-03 RX ADMIN — IPRATROPIUM BROMIDE AND ALBUTEROL SULFATE 3 ML: .5; 3 SOLUTION RESPIRATORY (INHALATION) at 07:54

## 2021-12-03 NOTE — PROGRESS NOTES
Hospitalist Progress Note    NAME: Suresh Walker   :  1955   MRN:  [id number]       Assessment / Plan:  Acute hypoxic hypercarbic respiratory failure secondary to COPD exacerbation, s/p extubation   Pulmonary HTN  Pt on 10 L NC - wean as tolerated  PT/OT evals  Continue inhalers    12/3:  Weaned down to 3L NC - continue to wean as tolerated  PT/OT repeat eval please  Transfer off PCU    DMII  FS monitoring, SS    Hypothyroidism  Fibromyalgia  HTN  HLD  Depression  PSTD  Resume home meds    30.0 - 39.9 Obese / Body mass index is 35.46 kg/m². Estimated discharge date: December 3  Barriers:    Code status: Full  Prophylaxis: Lovenox  Recommended Disposition:  PT, OT, RN     Subjective:     Chief Complaint / Reason for Physician Visit  Feeling better. Complaining of sinus congestion. Discussed with RN events overnight. Review of Systems:  Symptom Y/N Comments  Symptom Y/N Comments   Fever/Chills    Chest Pain     Poor Appetite    Edema     Cough    Abdominal Pain     Sputum    Joint Pain     SOB/RODRIGUEZ    Pruritis/Rash     Nausea/vomit    Tolerating PT/OT     Diarrhea    Tolerating Diet     Constipation    Other       Could NOT obtain due to:      Objective:     VITALS:   Last 24hrs VS reviewed since prior progress note.  Most recent are:  Patient Vitals for the past 24 hrs:   Temp Pulse Resp BP SpO2   21 1124 -- -- -- -- 94 %   21 1121 98.1 °F (36.7 °C) 71 17 123/64 91 %   21 0810 98.7 °F (37.1 °C) 72 -- 134/68 92 %   21 0754 -- -- -- -- 92 %   21 0417 -- 67 -- 110/61 --   21 0346 97.3 °F (36.3 °C) 67 -- 111/64 --   21 2337 98 °F (36.7 °C) 60 -- (!) 93/51 100 %   21 2126 -- -- -- -- 98 %   21 98.6 °F (37 °C) 79 -- (!) 152/65 99 %   21 1605 98.6 °F (37 °C) 68 13 124/74 96 %       Intake/Output Summary (Last 24 hours) at 12/3/2021 1358  Last data filed at 12/3/2021 1210  Gross per 24 hour   Intake --   Output 2010 ml   Net -2010 ml I had a face to face encounter and independently examined this patient on 12/3/2021, as outlined below:  PHYSICAL EXAM:  General: Alert, cooperative, no acute distress    EENT:  EOMI. Anicteric sclerae. MMM  Resp:  On 3L NC  CV:  Regular  rhythm,  No edema  GI:  Soft, Non distended, Non tender. +Bowel sounds  Neurologic:  Alert and oriented X 3, normal speech,   Psych:   Good insight. Not anxious nor agitated  Skin:  No rashes. No jaundice    Reviewed most current lab test results and cultures  YES  Reviewed most current radiology test results   YES  Review and summation of old records today    NO  Reviewed patient's current orders and MAR    YES  PMH/SH reviewed - no change compared to H&P  ________________________________________________________________________  Care Plan discussed with:    Comments   Patient x    Family      RN x    Care Manager     Consultant                        Multidiciplinary team rounds were held today with , nursing, pharmacist and clinical coordinator. Patient's plan of care was discussed; medications were reviewed and discharge planning was addressed. ________________________________________________________________________  Total NON critical care TIME:  35   Minutes    Total CRITICAL CARE TIME Spent:   Minutes non procedure based      Comments   >50% of visit spent in counseling and coordination of care     ________________________________________________________________________  Enrique Zhang MD     Procedures: see electronic medical records for all procedures/Xrays and details which were not copied into this note but were reviewed prior to creation of Plan. LABS:  I reviewed today's most current labs and imaging studies.   Pertinent labs include:  Recent Labs     12/03/21  0420 12/02/21  0431 12/01/21  0420   WBC 8.5 7.8 8.5   HGB 11.9 11.9 11.6   HCT 41.2 42.0 39.4    228 226     Recent Labs     12/03/21  0420 12/02/21  0431 12/01/21  0420   NA 128* 133* 133*   K 4.0 4.0 3.6    98 96*   CO2 21 32 33*   * 121* 131*   BUN 8 10 11   CREA 0.55 0.63 0.50*   CA 9.7 9.9 9.5   MG 2.0 2.1 1.9   PHOS 2.7 2.7 2.6       Signed: Claude Thomas MD

## 2021-12-03 NOTE — PROGRESS NOTES
Comprehensive Nutrition Assessment    Type and Reason for Visit: Initial, RD nutrition re-screen/LOS    Nutrition Recommendations/Plan:   · Continue CCD/60 g CHO/meal.  · Please document % meals and supplements consumed in flowsheet I/O's under intake. Nutrition Assessment:     12/3: Chart reviewed; med noted for hypercapnic resp failure. Hx of PTSD. Noted pt able to transfer off PCU as O2 levels improving. Continues on a CCD for BG management. LOS screen. PO intake trending in the positive direction. No acute nutrition needs identified at this time. Patient Vitals for the past 168 hrs:   % Diet Eaten   12/01/21 1200 51 - 75%   12/01/21 0834 1 - 25%   11/29/21 1345 76 - 100%   11/28/21 1820 0%   11/28/21 1230 0%   11/28/21 0900 0%     Last Weight Metric  Weight Loss Metrics 11/26/2021 11/18/2021 6/3/2019 4/10/2019 2/11/2019 1/15/2019 10/3/2018   Today's Wt 206 lb 9.1 oz 198 lb 181 lb 180 lb 182 lb 182 lb 182 lb   BMI 35.46 kg/m2 33.99 kg/m2 30.12 kg/m2 29.95 kg/m2 30.29 kg/m2 30.29 kg/m2 31.24 kg/m2     Estimated Daily Nutrient Needs:  Energy (kcal): 1760 (BMR 1467 x 1. 2AF); Weight Used for Energy Requirements: Current  Protein (g): 94 (1.0 g/kg bw); Weight Used for Protein Requirements: Current  Fluid (ml/day): 1302-1354 ml/day; Method Used for Fluid Requirements: 1 ml/kcal    Nutrition Related Findings:  BM: none documented; Labs: Na+ 128; Meds: reviewed      Wounds:    None       Current Nutrition Therapies:  ADULT DIET Regular; 4 carb choices (60 gm/meal)    Anthropometric Measures:  · Height:  5' 4\" (162.6 cm)  · Current Body Wt:  93.7 kg (206 lb 9.1 oz)   · Ideal Body Wt:  120 lbs:  172.1 %   · BMI Category:  Obese class 2 (BMI 35.0-39. 9)       Nutrition Diagnosis:   No nutrition diagnosis at this time     Nutrition Interventions:   Food and/or Nutrient Delivery: Continue current diet  Nutrition Education and Counseling: No recommendations at this time  Coordination of Nutrition Care: Continue to monitor while inpatient    Goals:  Trend PO intake >50% of meals next 5-7 days       Nutrition Monitoring and Evaluation:   Behavioral-Environmental Outcomes: None identified  Food/Nutrient Intake Outcomes: Food and nutrient intake  Physical Signs/Symptoms Outcomes: Biochemical data, Weight, Skin    Discharge Planning:    Continue current diet     Electronically signed by Dominick Abdullahi RD on 12/3/2021 at 4:22 PM

## 2021-12-03 NOTE — PROGRESS NOTES
Problem: Falls - Risk of  Goal: *Absence of Falls  Description: Document Shelly Olson Fall Risk and appropriate interventions in the flowsheet.   Outcome: Progressing Towards Goal  Note: Fall Risk Interventions:  Mobility Interventions: Assess mobility with egress test, Bed/chair exit alarm, Patient to call before getting OOB, PT Consult for mobility concerns, Strengthening exercises (ROM-active/passive), Utilize walker, cane, or other assistive device    Mentation Interventions: Adequate sleep, hydration, pain control, Door open when patient unattended, Evaluate medications/consider consulting pharmacy, Eyeglasses and hearing aids, Increase mobility, More frequent rounding, Reorient patient, Toileting rounds    Medication Interventions: Assess postural VS orthostatic hypotension, Bed/chair exit alarm, Evaluate medications/consider consulting pharmacy, Patient to call before getting OOB, Teach patient to arise slowly    Elimination Interventions: Bed/chair exit alarm, Call light in reach, Patient to call for help with toileting needs, Stay With Me (per policy), Toileting schedule/hourly rounds, Toilet paper/wipes in reach    History of Falls Interventions: Bed/chair exit alarm
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
## 2021-12-03 NOTE — PROGRESS NOTES
Bedside and Verbal shift change report given to Jarod Frazier (oncoming nurse) by Karla Sun (offgoing nurse). Report included the following information SBAR, Kardex, Intake/Output and MAR.    7230: Patient complaining of generalized pain. 50mg of prn tramadol given. 1030: Patient transferred to chair x1 assist. O2 saturation stable during transfer. 1149: Patient complaining of 8/10 pain. Codeine 30mg given. 1339: Patient anxious and tearful. PRN Xanax given. 1900: Bedside report given to [facility] Report included the following information SBAR, Kardex, Intake/Output and MAR.     Allie Middleton RN

## 2021-12-04 LAB
ANION GAP SERPL CALC-SCNC: 5 MMOL/L (ref 5–15)
BASOPHILS # BLD: 0.1 K/UL (ref 0–0.1)
BASOPHILS NFR BLD: 0 % (ref 0–1)
BUN SERPL-MCNC: 10 MG/DL (ref 6–20)
BUN/CREAT SERPL: 19 (ref 12–20)
CALCIUM SERPL-MCNC: 10.2 MG/DL (ref 8.5–10.1)
CHLORIDE SERPL-SCNC: 101 MMOL/L (ref 97–108)
CO2 SERPL-SCNC: 29 MMOL/L (ref 21–32)
CREAT SERPL-MCNC: 0.53 MG/DL (ref 0.55–1.02)
DIFFERENTIAL METHOD BLD: ABNORMAL
EOSINOPHIL # BLD: 0 K/UL (ref 0–0.4)
EOSINOPHIL NFR BLD: 0 % (ref 0–7)
ERYTHROCYTE [DISTWIDTH] IN BLOOD BY AUTOMATED COUNT: 17.1 % (ref 11.5–14.5)
GLUCOSE BLD STRIP.AUTO-MCNC: 110 MG/DL (ref 65–117)
GLUCOSE BLD STRIP.AUTO-MCNC: 114 MG/DL (ref 65–117)
GLUCOSE BLD STRIP.AUTO-MCNC: 131 MG/DL (ref 65–117)
GLUCOSE BLD STRIP.AUTO-MCNC: 78 MG/DL (ref 65–117)
GLUCOSE SERPL-MCNC: 116 MG/DL (ref 65–100)
HCT VFR BLD AUTO: 41.4 % (ref 35–47)
HGB BLD-MCNC: 12.3 G/DL (ref 11.5–16)
IMM GRANULOCYTES # BLD AUTO: 0 K/UL (ref 0–0.04)
IMM GRANULOCYTES NFR BLD AUTO: 0 % (ref 0–0.5)
LYMPHOCYTES # BLD: 2.1 K/UL (ref 0.8–3.5)
LYMPHOCYTES NFR BLD: 19 % (ref 12–49)
MAGNESIUM SERPL-MCNC: 2.1 MG/DL (ref 1.6–2.4)
MCH RBC QN AUTO: 25.6 PG (ref 26–34)
MCHC RBC AUTO-ENTMCNC: 29.7 G/DL (ref 30–36.5)
MCV RBC AUTO: 86.1 FL (ref 80–99)
MONOCYTES # BLD: 1 K/UL (ref 0–1)
MONOCYTES NFR BLD: 9 % (ref 5–13)
NEUTS SEG # BLD: 8 K/UL (ref 1.8–8)
NEUTS SEG NFR BLD: 72 % (ref 32–75)
NRBC # BLD: 0 K/UL (ref 0–0.01)
NRBC BLD-RTO: 0 PER 100 WBC
PHOSPHATE SERPL-MCNC: 2.1 MG/DL (ref 2.6–4.7)
PLATELET # BLD AUTO: 238 K/UL (ref 150–400)
PMV BLD AUTO: 10.8 FL (ref 8.9–12.9)
POTASSIUM SERPL-SCNC: 3.7 MMOL/L (ref 3.5–5.1)
RBC # BLD AUTO: 4.81 M/UL (ref 3.8–5.2)
SERVICE CMNT-IMP: ABNORMAL
SERVICE CMNT-IMP: NORMAL
SODIUM SERPL-SCNC: 135 MMOL/L (ref 136–145)
WBC # BLD AUTO: 11.1 K/UL (ref 3.6–11)

## 2021-12-04 PROCEDURE — 94761 N-INVAS EAR/PLS OXIMETRY MLT: CPT

## 2021-12-04 PROCEDURE — 74011250637 HC RX REV CODE- 250/637: Performed by: INTERNAL MEDICINE

## 2021-12-04 PROCEDURE — 74011000250 HC RX REV CODE- 250: Performed by: GENERAL ACUTE CARE HOSPITAL

## 2021-12-04 PROCEDURE — 82962 GLUCOSE BLOOD TEST: CPT

## 2021-12-04 PROCEDURE — 74011250637 HC RX REV CODE- 250/637: Performed by: HOSPITALIST

## 2021-12-04 PROCEDURE — 83735 ASSAY OF MAGNESIUM: CPT

## 2021-12-04 PROCEDURE — 77010033678 HC OXYGEN DAILY

## 2021-12-04 PROCEDURE — 85025 COMPLETE CBC W/AUTO DIFF WBC: CPT

## 2021-12-04 PROCEDURE — 84100 ASSAY OF PHOSPHORUS: CPT

## 2021-12-04 PROCEDURE — 94640 AIRWAY INHALATION TREATMENT: CPT

## 2021-12-04 PROCEDURE — 65660000000 HC RM CCU STEPDOWN

## 2021-12-04 PROCEDURE — 74011000250 HC RX REV CODE- 250: Performed by: NURSE PRACTITIONER

## 2021-12-04 PROCEDURE — 74011250637 HC RX REV CODE- 250/637: Performed by: NURSE PRACTITIONER

## 2021-12-04 PROCEDURE — 80048 BASIC METABOLIC PNL TOTAL CA: CPT

## 2021-12-04 PROCEDURE — 74011250636 HC RX REV CODE- 250/636: Performed by: INTERNAL MEDICINE

## 2021-12-04 PROCEDURE — 74011000250 HC RX REV CODE- 250: Performed by: INTERNAL MEDICINE

## 2021-12-04 PROCEDURE — 36415 COLL VENOUS BLD VENIPUNCTURE: CPT

## 2021-12-04 RX ADMIN — ALPRAZOLAM 1 MG: 0.5 TABLET ORAL at 18:04

## 2021-12-04 RX ADMIN — ACETAMINOPHEN 650 MG: 325 TABLET ORAL at 12:46

## 2021-12-04 RX ADMIN — Medication 10 ML: at 22:20

## 2021-12-04 RX ADMIN — IPRATROPIUM BROMIDE AND ALBUTEROL SULFATE 3 ML: .5; 3 SOLUTION RESPIRATORY (INHALATION) at 19:26

## 2021-12-04 RX ADMIN — Medication 10 ML: at 06:21

## 2021-12-04 RX ADMIN — ARIPIPRAZOLE 10 MG: 5 TABLET ORAL at 09:00

## 2021-12-04 RX ADMIN — CODEINE SULFATE 30 MG: 30 TABLET ORAL at 09:41

## 2021-12-04 RX ADMIN — METOPROLOL TARTRATE 12.5 MG: 25 TABLET, FILM COATED ORAL at 08:59

## 2021-12-04 RX ADMIN — BUDESONIDE 250 MCG: 0.25 INHALANT RESPIRATORY (INHALATION) at 07:35

## 2021-12-04 RX ADMIN — IPRATROPIUM BROMIDE AND ALBUTEROL SULFATE 3 ML: .5; 3 SOLUTION RESPIRATORY (INHALATION) at 16:18

## 2021-12-04 RX ADMIN — TRAMADOL HYDROCHLORIDE 50 MG: 50 TABLET, COATED ORAL at 22:20

## 2021-12-04 RX ADMIN — IPRATROPIUM BROMIDE AND ALBUTEROL SULFATE 3 ML: .5; 3 SOLUTION RESPIRATORY (INHALATION) at 11:42

## 2021-12-04 RX ADMIN — PRIMIDONE 100 MG: 50 TABLET ORAL at 08:59

## 2021-12-04 RX ADMIN — ACETAMINOPHEN 650 MG: 325 TABLET ORAL at 04:01

## 2021-12-04 RX ADMIN — BUDESONIDE 250 MCG: 0.25 INHALANT RESPIRATORY (INHALATION) at 19:26

## 2021-12-04 RX ADMIN — PHENOBARBITAL 64.8 MG: 32.4 TABLET ORAL at 09:00

## 2021-12-04 RX ADMIN — TRAMADOL HYDROCHLORIDE 50 MG: 50 TABLET, COATED ORAL at 04:01

## 2021-12-04 RX ADMIN — LEVOTHYROXINE SODIUM 200 MCG: 0.15 TABLET ORAL at 09:00

## 2021-12-04 RX ADMIN — Medication 1 AMPULE: at 09:00

## 2021-12-04 RX ADMIN — PRIMIDONE 150 MG: 50 TABLET ORAL at 17:37

## 2021-12-04 RX ADMIN — PRAZOSIN HYDROCHLORIDE 2 MG: 2 CAPSULE ORAL at 09:00

## 2021-12-04 RX ADMIN — ALPRAZOLAM 1 MG: 0.5 TABLET ORAL at 12:48

## 2021-12-04 RX ADMIN — CODEINE SULFATE 30 MG: 30 TABLET ORAL at 01:53

## 2021-12-04 RX ADMIN — Medication 10 ML: at 14:00

## 2021-12-04 RX ADMIN — TRAMADOL HYDROCHLORIDE 50 MG: 50 TABLET, COATED ORAL at 12:46

## 2021-12-04 RX ADMIN — ALBUTEROL SULFATE 2.5 MG: 2.5 SOLUTION RESPIRATORY (INHALATION) at 04:14

## 2021-12-04 RX ADMIN — ACETAMINOPHEN 650 MG: 325 TABLET ORAL at 22:20

## 2021-12-04 RX ADMIN — Medication 1 AMPULE: at 22:20

## 2021-12-04 RX ADMIN — METOPROLOL TARTRATE 12.5 MG: 25 TABLET, FILM COATED ORAL at 22:21

## 2021-12-04 RX ADMIN — ALPRAZOLAM 1 MG: 0.5 TABLET ORAL at 04:02

## 2021-12-04 RX ADMIN — PRAZOSIN HYDROCHLORIDE 2 MG: 2 CAPSULE ORAL at 17:34

## 2021-12-04 RX ADMIN — IBUPROFEN 400 MG: 400 TABLET, FILM COATED ORAL at 09:41

## 2021-12-04 RX ADMIN — PHENOBARBITAL 64.8 MG: 32.4 TABLET ORAL at 17:34

## 2021-12-04 RX ADMIN — ENOXAPARIN SODIUM 40 MG: 100 INJECTION SUBCUTANEOUS at 09:41

## 2021-12-04 RX ADMIN — ASPIRIN 81 MG CHEWABLE TABLET 81 MG: 81 TABLET CHEWABLE at 08:59

## 2021-12-04 RX ADMIN — IPRATROPIUM BROMIDE AND ALBUTEROL SULFATE 3 ML: .5; 3 SOLUTION RESPIRATORY (INHALATION) at 07:35

## 2021-12-04 NOTE — PROGRESS NOTES
Problem: Falls - Risk of  Goal: *Absence of Falls  Description: Document Jozef Sol Fall Risk and appropriate interventions in the flowsheet. Outcome: Progressing Towards Goal  Note: Fall Risk Interventions:  Mobility Interventions: Bed/chair exit alarm, OT consult for ADLs, PT Consult for mobility concerns, PT Consult for assist device competence, Communicate number of staff needed for ambulation/transfer, Patient to call before getting OOB    Mentation Interventions: Adequate sleep, hydration, pain control, Bed/chair exit alarm, Evaluate medications/consider consulting pharmacy, Eyeglasses and hearing aids, Increase mobility, More frequent rounding, Toileting rounds    Medication Interventions: Assess postural VS orthostatic hypotension, Bed/chair exit alarm, Teach patient to arise slowly, Patient to call before getting OOB    Elimination Interventions: Bed/chair exit alarm, Call light in reach, Patient to call for help with toileting needs, Toilet paper/wipes in reach, Toileting schedule/hourly rounds    History of Falls Interventions: Bed/chair exit alarm, Door open when patient unattended, Room close to nurse's station     Problem: Patient Education: Go to Patient Education Activity  Goal: Patient/Family Education  Outcome: Progressing Towards Goal     Problem: Diabetes Self-Management  Goal: *Disease process and treatment process  Description: Define diabetes and identify own type of diabetes; list 3 options for treating diabetes. Outcome: Progressing Towards Goal  Goal: *Incorporating nutritional management into lifestyle  Description: Describe effect of type, amount and timing of food on blood glucose; list 3 methods for planning meals. Outcome: Progressing Towards Goal  Goal: *Incorporating physical activity into lifestyle  Description: State effect of exercise on blood glucose levels.   Outcome: Progressing Towards Goal  Goal: *Developing strategies to promote health/change behavior  Description: Define the ABC's of diabetes; identify appropriate screenings, schedule and personal plan for screenings. Outcome: Progressing Towards Goal  Goal: *Using medications safely  Description: State effect of diabetes medications on diabetes; name diabetes medication taking, action and side effects. Outcome: Progressing Towards Goal  Goal: *Monitoring blood glucose, interpreting and using results  Description: Identify recommended blood glucose targets  and personal targets. Outcome: Progressing Towards Goal  Goal: *Prevention, detection, treatment of acute complications  Description: List symptoms of hyper- and hypoglycemia; describe how to treat low blood sugar and actions for lowering  high blood glucose level. Outcome: Progressing Towards Goal  Goal: *Prevention, detection and treatment of chronic complications  Description: Define the natural course of diabetes and describe the relationship of blood glucose levels to long term complications of diabetes. Outcome: Progressing Towards Goal  Goal: *Developing strategies to address psychosocial issues  Description: Describe feelings about living with diabetes; identify support needed and support network  Outcome: Progressing Towards Goal  Goal: *Insulin pump training  Outcome: Progressing Towards Goal  Goal: *Sick day guidelines  Outcome: Progressing Towards Goal  Goal: *Patient Specific Goal (EDIT GOAL, INSERT TEXT)  Outcome: Progressing Towards Goal     Problem: Patient Education: Go to Patient Education Activity  Goal: Patient/Family Education  Outcome: Progressing Towards Goal     Problem: Pressure Injury - Risk of  Goal: *Prevention of pressure injury  Description: Document Joseph Scale and appropriate interventions in the flowsheet.   Outcome: Progressing Towards Goal  Note: Pressure Injury Interventions:  Sensory Interventions: Assess changes in LOC, Check visual cues for pain, Keep linens dry and wrinkle-free, Float heels, Maintain/enhance activity level, Monitor skin under medical devices, Pad between skin to skin, Turn and reposition approx. every two hours (pillows and wedges if needed), Pressure redistribution bed/mattress (bed type)    Moisture Interventions: Absorbent underpads, Apply protective barrier, creams and emollients, Limit adult briefs, Minimize layers    Activity Interventions: Assess need for specialty bed, PT/OT evaluation, Pressure redistribution bed/mattress(bed type), Increase time out of bed    Mobility Interventions: Float heels, HOB 30 degrees or less, PT/OT evaluation, Pressure redistribution bed/mattress (bed type), Turn and reposition approx.  every two hours(pillow and wedges)    Nutrition Interventions: Document food/fluid/supplement intake    Friction and Shear Interventions: Apply protective barrier, creams and emollients, Feet elevated on foot rest, HOB 30 degrees or less     Problem: Patient Education: Go to Patient Education Activity  Goal: Patient/Family Education  Outcome: Progressing Towards Goal     Problem: Patient Education: Go to Patient Education Activity  Goal: Patient/Family Education  Outcome: Progressing Towards Goal     Problem: Patient Education: Go to Patient Education Activity  Goal: Patient/Family Education  Outcome: Progressing Towards Goal     Problem: Patient Education: Go to Patient Education Activity  Goal: Patient/Family Education  Outcome: Progressing Towards Goal

## 2021-12-04 NOTE — PROGRESS NOTES
Hospitalist Progress Note    NAME: Maggie Madrigal   :  1955   MRN:  [id number]       Assessment / Plan:  Acute hypoxic hypercarbic respiratory failure secondary to COPD exacerbation, s/p extubation   Pulmonary HTN  Pt on 10 L NC - wean as tolerated  PT/OT evals  Continue inhalers    :  Weaned down to 2-4L NC - continue to wean as tolerated  Transfer off PCU  Pt will need O2 challenge test and O2 ordered for likely discharge tomorrow    DMII  FS monitoring, SS    Hypothyroidism  Fibromyalgia  HTN  HLD  Depression  PSTD  Resume home meds    30.0 - 39.9 Obese / Body mass index is 35.46 kg/m². Estimated discharge date: December 3  Barriers:    Code status: Full  Prophylaxis: Lovenox  Recommended Disposition:  PT, OT, RN     Subjective:     Chief Complaint / Reason for Physician Visit  Feeling better. Complaining of sinus congestion. Discussed with RN events overnight. Review of Systems:  Symptom Y/N Comments  Symptom Y/N Comments   Fever/Chills    Chest Pain     Poor Appetite    Edema     Cough    Abdominal Pain     Sputum    Joint Pain     SOB/RODRIGUEZ    Pruritis/Rash     Nausea/vomit    Tolerating PT/OT     Diarrhea    Tolerating Diet     Constipation    Other       Could NOT obtain due to:      Objective:     VITALS:   Last 24hrs VS reviewed since prior progress note.  Most recent are:  Patient Vitals for the past 24 hrs:   Temp Pulse Resp BP SpO2   21 0750 98.1 °F (36.7 °C) 93 17 133/80 94 %   21 0736 -- -- -- -- 96 %   21 0415 -- -- -- -- 90 %   21 0348 97.8 °F (36.6 °C) 85 16 134/67 (!) 88 %   21 2301 98 °F (36.7 °C) 75 18 110/67 91 %   21 98.5 °F (36.9 °C) 93 -- (!) 121/54 92 %   21 1948 -- -- -- -- 90 %   21 1705 98.1 °F (36.7 °C) 85 16 105/81 96 %   21 1542 -- -- -- -- 90 %   21 1124 -- -- -- -- 94 %   21 1121 98.1 °F (36.7 °C) 71 17 123/64 91 %       Intake/Output Summary (Last 24 hours) at 2021 1032  Last data filed at 12/3/2021 2301  Gross per 24 hour   Intake --   Output 1200 ml   Net -1200 ml        I had a face to face encounter and independently examined this patient on 12/4/2021, as outlined below:  PHYSICAL EXAM:  General: Alert, cooperative, no acute distress    EENT:  EOMI. Anicteric sclerae. MMM  Resp:  On 3L NC  CV:  Regular  rhythm,  No edema  GI:  Soft, Non distended, Non tender. +Bowel sounds  Neurologic:  Alert and oriented X 3, normal speech,   Psych:   Good insight. Not anxious nor agitated  Skin:  No rashes. No jaundice    Reviewed most current lab test results and cultures  YES  Reviewed most current radiology test results   YES  Review and summation of old records today    NO  Reviewed patient's current orders and MAR    YES  PMH/ reviewed - no change compared to H&P  ________________________________________________________________________  Care Plan discussed with:    Comments   Patient x    Family      RN x    Care Manager     Consultant                        Multidiciplinary team rounds were held today with , nursing, pharmacist and clinical coordinator. Patient's plan of care was discussed; medications were reviewed and discharge planning was addressed. ________________________________________________________________________  Total NON critical care TIME:  35   Minutes    Total CRITICAL CARE TIME Spent:   Minutes non procedure based      Comments   >50% of visit spent in counseling and coordination of care     ________________________________________________________________________  Nico Gaines MD     Procedures: see electronic medical records for all procedures/Xrays and details which were not copied into this note but were reviewed prior to creation of Plan. LABS:  I reviewed today's most current labs and imaging studies.   Pertinent labs include:  Recent Labs     12/04/21  0315 12/03/21  0420 12/02/21  0431   WBC 11.1* 8.5 7.8   HGB 12.3 11.9 11.9   HCT 41.4 41.2 42.0  186 228     Recent Labs     12/04/21  0315 12/03/21  0420 12/02/21  0431   * 128* 133*   K 3.7 4.0 4.0    101 98   CO2 29 21 32   * 106* 121*   BUN 10 8 10   CREA 0.53* 0.55 0.63   CA 10.2* 9.7 9.9   MG 2.1 2.0 2.1   PHOS 2.1* 2.7 2.7       Signed: Emelia Barahona MD

## 2021-12-04 NOTE — PROGRESS NOTES
Bedside and verbal report given to Remington Cota (oncoming nurse) from ZAC Zimmer (offgoing nurse). Report included SBAR, MAR, Kardex, I/Os, Recent Results, and Cardiac Rhythm. 5912: Patient having generalized chronic pain. Codeine and Motrin prn given. 1030: Patient up to chair. 1200: Patient ambulated around room. 1248: Patient having moderate anxiety. Xanax prn given. 1800: Paged Dr. Bee Comment asking if Lasix should be unheld. Activity:  Activity Level: Up with Assistance  Number times ambulated in hallways past shift: 0  Number of times OOB to chair past shift: 0     Cardiac:   Cardiac Monitoring: Yes      Cardiac Rhythm: Sinus Rhythm     Access:   Current line(s): PIV      Genitourinary:   Urinary status: voiding     Respiratory:   O2 Device: Nasal cannula 3-4L  Chronic home O2 use?: YES  Incentive spirometer at bedside: YES    GI:  Current diet:  ADULT DIET Regular; 4 carb choices (60 gm/meal)  Passing flatus: YES  Tolerating current diet: YES     Pain Management:   Patient states pain is manageable on current regimen: YES     Skin:  Joseph Score: 18  Interventions: increase time out of bed and PT/OT consult    Patient Safety:  Fall Score: Total Score: 4  Interventions: bed/chair alarm, assistive device (walker, cane, etc), gripper socks and pt to call before getting OOB  High Fall Risk:  Yes     Length of Stay:  Expected LOS: 4d 21h  Actual LOS: 9

## 2021-12-04 NOTE — PROGRESS NOTES
Problem: Diabetes Self-Management  Goal: *Disease process and treatment process  Description: Define diabetes and identify own type of diabetes; list 3 options for treating diabetes. Outcome: Progressing Towards Goal     Problem: Pressure Injury - Risk of  Goal: *Prevention of pressure injury  Description: Document Joseph Scale and appropriate interventions in the flowsheet. Outcome: Progressing Towards Goal  Note: Pressure Injury Interventions:  Sensory Interventions: Assess changes in LOC, Check visual cues for pain, Keep linens dry and wrinkle-free, Float heels, Maintain/enhance activity level, Monitor skin under medical devices, Pad between skin to skin, Turn and reposition approx. every two hours (pillows and wedges if needed), Pressure redistribution bed/mattress (bed type)    Moisture Interventions: Absorbent underpads, Apply protective barrier, creams and emollients, Check for incontinence Q2 hours and as needed, Internal/External urinary devices, Moisture barrier, Maintain skin hydration (lotion/cream)    Activity Interventions: Assess need for specialty bed, Chair cushion, Increase time out of bed, Pressure redistribution bed/mattress(bed type), PT/OT evaluation    Mobility Interventions: Assess need for specialty bed, Chair cushion, Float heels, HOB 30 degrees or less, Pressure redistribution bed/mattress (bed type), Turn and reposition approx.  every two hours(pillow and wedges), PT/OT evaluation    Nutrition Interventions: Document food/fluid/supplement intake    Friction and Shear Interventions: Apply protective barrier, creams and emollients, Feet elevated on foot rest, Foam dressings/transparent film/skin sealants, HOB 30 degrees or less, Lift sheet, Minimize layers

## 2021-12-05 LAB
GLUCOSE BLD STRIP.AUTO-MCNC: 102 MG/DL (ref 65–117)
GLUCOSE BLD STRIP.AUTO-MCNC: 107 MG/DL (ref 65–117)
GLUCOSE BLD STRIP.AUTO-MCNC: 109 MG/DL (ref 65–117)
GLUCOSE BLD STRIP.AUTO-MCNC: 76 MG/DL (ref 65–117)
MAGNESIUM SERPL-MCNC: 2 MG/DL (ref 1.6–2.4)
PHOSPHATE SERPL-MCNC: 2.7 MG/DL (ref 2.6–4.7)
SERVICE CMNT-IMP: NORMAL

## 2021-12-05 PROCEDURE — 74011250637 HC RX REV CODE- 250/637: Performed by: HOSPITALIST

## 2021-12-05 PROCEDURE — 65660000000 HC RM CCU STEPDOWN

## 2021-12-05 PROCEDURE — 82962 GLUCOSE BLOOD TEST: CPT

## 2021-12-05 PROCEDURE — 74011000250 HC RX REV CODE- 250: Performed by: INTERNAL MEDICINE

## 2021-12-05 PROCEDURE — 74011250637 HC RX REV CODE- 250/637: Performed by: INTERNAL MEDICINE

## 2021-12-05 PROCEDURE — 77010033678 HC OXYGEN DAILY

## 2021-12-05 PROCEDURE — 84100 ASSAY OF PHOSPHORUS: CPT

## 2021-12-05 PROCEDURE — 74011000250 HC RX REV CODE- 250: Performed by: GENERAL ACUTE CARE HOSPITAL

## 2021-12-05 PROCEDURE — 74011000250 HC RX REV CODE- 250: Performed by: NURSE PRACTITIONER

## 2021-12-05 PROCEDURE — 74011250636 HC RX REV CODE- 250/636: Performed by: INTERNAL MEDICINE

## 2021-12-05 PROCEDURE — 83735 ASSAY OF MAGNESIUM: CPT

## 2021-12-05 PROCEDURE — 74011250637 HC RX REV CODE- 250/637: Performed by: NURSE PRACTITIONER

## 2021-12-05 PROCEDURE — 94640 AIRWAY INHALATION TREATMENT: CPT

## 2021-12-05 PROCEDURE — 36415 COLL VENOUS BLD VENIPUNCTURE: CPT

## 2021-12-05 RX ORDER — FUROSEMIDE 40 MG/1
40 TABLET ORAL DAILY
Qty: 30 TABLET | Refills: 0 | Status: SHIPPED | OUTPATIENT
Start: 2021-12-05

## 2021-12-05 RX ORDER — IPRATROPIUM BROMIDE AND ALBUTEROL SULFATE 2.5; .5 MG/3ML; MG/3ML
3 SOLUTION RESPIRATORY (INHALATION)
Status: DISCONTINUED | OUTPATIENT
Start: 2021-12-05 | End: 2021-12-06 | Stop reason: HOSPADM

## 2021-12-05 RX ORDER — METOPROLOL TARTRATE 25 MG/1
12.5 TABLET, FILM COATED ORAL EVERY 12 HOURS
Qty: 30 TABLET | Refills: 0 | Status: SHIPPED | OUTPATIENT
Start: 2021-12-05

## 2021-12-05 RX ADMIN — ASPIRIN 81 MG CHEWABLE TABLET 81 MG: 81 TABLET CHEWABLE at 08:33

## 2021-12-05 RX ADMIN — ALPRAZOLAM 1 MG: 0.5 TABLET ORAL at 11:05

## 2021-12-05 RX ADMIN — LEVOTHYROXINE SODIUM 200 MCG: 0.15 TABLET ORAL at 06:51

## 2021-12-05 RX ADMIN — CODEINE SULFATE 30 MG: 30 TABLET ORAL at 11:05

## 2021-12-05 RX ADMIN — PHENOBARBITAL 64.8 MG: 32.4 TABLET ORAL at 17:45

## 2021-12-05 RX ADMIN — ACETAMINOPHEN 650 MG: 325 TABLET ORAL at 06:51

## 2021-12-05 RX ADMIN — PRAZOSIN HYDROCHLORIDE 2 MG: 2 CAPSULE ORAL at 17:45

## 2021-12-05 RX ADMIN — PRIMIDONE 150 MG: 50 TABLET ORAL at 17:46

## 2021-12-05 RX ADMIN — IBUPROFEN 400 MG: 400 TABLET, FILM COATED ORAL at 11:05

## 2021-12-05 RX ADMIN — IPRATROPIUM BROMIDE AND ALBUTEROL SULFATE 3 ML: .5; 3 SOLUTION RESPIRATORY (INHALATION) at 07:40

## 2021-12-05 RX ADMIN — ACETAMINOPHEN 650 MG: 325 TABLET ORAL at 12:48

## 2021-12-05 RX ADMIN — Medication 1 AMPULE: at 09:00

## 2021-12-05 RX ADMIN — TRAMADOL HYDROCHLORIDE 50 MG: 50 TABLET, COATED ORAL at 06:51

## 2021-12-05 RX ADMIN — METOPROLOL TARTRATE 12.5 MG: 25 TABLET, FILM COATED ORAL at 08:33

## 2021-12-05 RX ADMIN — BUDESONIDE 250 MCG: 0.25 INHALANT RESPIRATORY (INHALATION) at 20:09

## 2021-12-05 RX ADMIN — BUDESONIDE 250 MCG: 0.25 INHALANT RESPIRATORY (INHALATION) at 07:40

## 2021-12-05 RX ADMIN — ALBUTEROL SULFATE 2.5 MG: 2.5 SOLUTION RESPIRATORY (INHALATION) at 04:13

## 2021-12-05 RX ADMIN — Medication 1 AMPULE: at 21:56

## 2021-12-05 RX ADMIN — PRAZOSIN HYDROCHLORIDE 2 MG: 2 CAPSULE ORAL at 08:34

## 2021-12-05 RX ADMIN — Medication 10 ML: at 06:52

## 2021-12-05 RX ADMIN — IPRATROPIUM BROMIDE AND ALBUTEROL SULFATE 3 ML: .5; 3 SOLUTION RESPIRATORY (INHALATION) at 13:34

## 2021-12-05 RX ADMIN — ACETAMINOPHEN 650 MG: 325 TABLET ORAL at 20:25

## 2021-12-05 RX ADMIN — PHENOBARBITAL 64.8 MG: 32.4 TABLET ORAL at 08:33

## 2021-12-05 RX ADMIN — ALPRAZOLAM 1 MG: 0.5 TABLET ORAL at 17:45

## 2021-12-05 RX ADMIN — TRAMADOL HYDROCHLORIDE 50 MG: 50 TABLET, COATED ORAL at 20:24

## 2021-12-05 RX ADMIN — PRIMIDONE 100 MG: 50 TABLET ORAL at 08:33

## 2021-12-05 RX ADMIN — FUROSEMIDE 40 MG: 40 TABLET ORAL at 11:10

## 2021-12-05 RX ADMIN — ARIPIPRAZOLE 10 MG: 5 TABLET ORAL at 08:33

## 2021-12-05 RX ADMIN — IPRATROPIUM BROMIDE AND ALBUTEROL SULFATE 3 ML: .5; 3 SOLUTION RESPIRATORY (INHALATION) at 20:09

## 2021-12-05 RX ADMIN — METOPROLOL TARTRATE 12.5 MG: 25 TABLET, FILM COATED ORAL at 20:25

## 2021-12-05 RX ADMIN — TRAMADOL HYDROCHLORIDE 50 MG: 50 TABLET, COATED ORAL at 12:48

## 2021-12-05 RX ADMIN — ENOXAPARIN SODIUM 40 MG: 100 INJECTION SUBCUTANEOUS at 11:05

## 2021-12-05 NOTE — PROGRESS NOTES
Problem: Falls - Risk of  Goal: *Absence of Falls  Description: Document Ramila Blood Fall Risk and appropriate interventions in the flowsheet. Outcome: Progressing Towards Goal  Note: Fall Risk Interventions:  Mobility Interventions: Bed/chair exit alarm, OT consult for ADLs, PT Consult for mobility concerns, PT Consult for assist device competence, Communicate number of staff needed for ambulation/transfer, Patient to call before getting OOB    Mentation Interventions: Adequate sleep, hydration, pain control, Bed/chair exit alarm, Evaluate medications/consider consulting pharmacy, Eyeglasses and hearing aids, Increase mobility, More frequent rounding, Toileting rounds    Medication Interventions: Assess postural VS orthostatic hypotension, Bed/chair exit alarm, Teach patient to arise slowly, Patient to call before getting OOB    Elimination Interventions: Bed/chair exit alarm, Call light in reach, Patient to call for help with toileting needs, Toilet paper/wipes in reach, Toileting schedule/hourly rounds    History of Falls Interventions: Bed/chair exit alarm, Door open when patient unattended, Room close to nurse's station         Problem: Patient Education: Go to Patient Education Activity  Goal: Patient/Family Education  Outcome: Progressing Towards Goal     Problem: Diabetes Self-Management  Goal: *Disease process and treatment process  Description: Define diabetes and identify own type of diabetes; list 3 options for treating diabetes. Outcome: Progressing Towards Goal  Goal: *Incorporating nutritional management into lifestyle  Description: Describe effect of type, amount and timing of food on blood glucose; list 3 methods for planning meals. Outcome: Progressing Towards Goal  Goal: *Incorporating physical activity into lifestyle  Description: State effect of exercise on blood glucose levels.   Outcome: Progressing Towards Goal  Goal: *Developing strategies to promote health/change behavior  Description: Define the ABC's of diabetes; identify appropriate screenings, schedule and personal plan for screenings. Outcome: Progressing Towards Goal  Goal: *Using medications safely  Description: State effect of diabetes medications on diabetes; name diabetes medication taking, action and side effects. Outcome: Progressing Towards Goal  Goal: *Monitoring blood glucose, interpreting and using results  Description: Identify recommended blood glucose targets  and personal targets. Outcome: Progressing Towards Goal  Goal: *Prevention, detection, treatment of acute complications  Description: List symptoms of hyper- and hypoglycemia; describe how to treat low blood sugar and actions for lowering  high blood glucose level. Outcome: Progressing Towards Goal  Goal: *Prevention, detection and treatment of chronic complications  Description: Define the natural course of diabetes and describe the relationship of blood glucose levels to long term complications of diabetes. Outcome: Progressing Towards Goal  Goal: *Developing strategies to address psychosocial issues  Description: Describe feelings about living with diabetes; identify support needed and support network  Outcome: Progressing Towards Goal  Goal: *Insulin pump training  Outcome: Progressing Towards Goal  Goal: *Sick day guidelines  Outcome: Progressing Towards Goal  Goal: *Patient Specific Goal (EDIT GOAL, INSERT TEXT)  Outcome: Progressing Towards Goal     Problem: Patient Education: Go to Patient Education Activity  Goal: Patient/Family Education  Outcome: Progressing Towards Goal     Problem: Pressure Injury - Risk of  Goal: *Prevention of pressure injury  Description: Document Joseph Scale and appropriate interventions in the flowsheet.   Outcome: Progressing Towards Goal  Note: Pressure Injury Interventions:  Sensory Interventions: Assess changes in LOC, Check visual cues for pain, Keep linens dry and wrinkle-free, Float heels, Maintain/enhance activity level, Monitor skin under medical devices, Pad between skin to skin, Turn and reposition approx. every two hours (pillows and wedges if needed), Pressure redistribution bed/mattress (bed type)    Moisture Interventions: Absorbent underpads, Apply protective barrier, creams and emollients, Limit adult briefs, Minimize layers    Activity Interventions: Assess need for specialty bed, PT/OT evaluation, Pressure redistribution bed/mattress(bed type), Increase time out of bed    Mobility Interventions: Float heels, HOB 30 degrees or less, PT/OT evaluation, Pressure redistribution bed/mattress (bed type), Turn and reposition approx. every two hours(pillow and wedges)    Nutrition Interventions: Document food/fluid/supplement intake    Friction and Shear Interventions: Apply protective barrier, creams and emollients, Feet elevated on foot rest, HOB 30 degrees or less                Problem: Patient Education: Go to Patient Education Activity  Goal: Patient/Family Education  Outcome: Progressing Towards Goal     Problem: Patient Education: Go to Patient Education Activity  Goal: Patient/Family Education  Outcome: Progressing Towards Goal     Problem: Patient Education: Go to Patient Education Activity  Goal: Patient/Family Education  Outcome: Progressing Towards Goal     Problem: Patient Education: Go to Patient Education Activity  Goal: Patient/Family Education  Outcome: Progressing Towards Goal     Problem: Diabetes Self-Management  Goal: *Disease process and treatment process  Description: Define diabetes and identify own type of diabetes; list 3 options for treating diabetes. Outcome: Progressing Towards Goal     Problem: Diabetes Self-Management  Goal: *Incorporating nutritional management into lifestyle  Description: Describe effect of type, amount and timing of food on blood glucose; list 3 methods for planning meals.   Outcome: Progressing Towards Goal     Problem: Diabetes Self-Management  Goal: *Prevention, detection, treatment of acute complications  Description: List symptoms of hyper- and hypoglycemia; describe how to treat low blood sugar and actions for lowering  high blood glucose level. Outcome: Progressing Towards Goal     Problem: Diabetes Self-Management  Goal: *Developing strategies to address psychosocial issues  Description: Describe feelings about living with diabetes; identify support needed and support network  Outcome: Progressing Towards Goal     Problem: Pressure Injury - Risk of  Goal: *Prevention of pressure injury  Description: Document Joseph Scale and appropriate interventions in the flowsheet. Outcome: Progressing Towards Goal  Note: Pressure Injury Interventions:  Sensory Interventions: Assess changes in LOC, Check visual cues for pain, Keep linens dry and wrinkle-free, Float heels, Maintain/enhance activity level, Monitor skin under medical devices, Pad between skin to skin, Turn and reposition approx. every two hours (pillows and wedges if needed), Pressure redistribution bed/mattress (bed type)    Moisture Interventions: Absorbent underpads, Apply protective barrier, creams and emollients, Limit adult briefs, Minimize layers    Activity Interventions: Assess need for specialty bed, PT/OT evaluation, Pressure redistribution bed/mattress(bed type), Increase time out of bed    Mobility Interventions: Float heels, HOB 30 degrees or less, PT/OT evaluation, Pressure redistribution bed/mattress (bed type), Turn and reposition approx.  every two hours(pillow and wedges)    Nutrition Interventions: Document food/fluid/supplement intake    Friction and Shear Interventions: Apply protective barrier, creams and emollients, Feet elevated on foot rest, HOB 30 degrees or less

## 2021-12-05 NOTE — PROGRESS NOTES
CM was alerted that the patient will require home O2 set-up prior to d/c.  has sent the patient's home O2 referral to [facility] for review. 3:55  [facility] is reviewing the patient's referral for home O2 set-up. As of 3:55 PM,  has been unable to confirm that [facility] is willing to delivery the patient a portable tank to bedside.      Laura Lees [address], [address]

## 2021-12-05 NOTE — DISCHARGE SUMMARY
Hospitalist Discharge Summary     Patient ID:  Luda Elizondo  [id number]  72 y.o.  1955 11/25/2021    PCP on record: Viviana Resendiz MD    Admit date: 11/25/2021  Discharge date and time: 12/5/2021    DISCHARGE DIAGNOSIS:  See below    CONSULTATIONS:  None    Excerpted HPI from H&P of Barba Schilder, MD:  71 y/o F pt with PMH of COPD, + smoker, prior lung empyema, hypothyrodism, and DM2 BIBEMS for SOB and hypoxia (SPO2 60% upon EMS arrival). Upon arrival to the ED pt minimally responsive in severe respiratory distress, complaining of chest pain. ABG done which showed a pH of 7.1 and pCO2 of 92. Placed on Bipap, given steroids and albuterol with improvement in mentation and respiratory status. Labs also significant for pro-BNP 4,531 and high sensitivity trop of 166. EKG without ST elevations. ICU consulted for admission. ______________________________________________________________________  DISCHARGE SUMMARY/HOSPITAL COURSE:  for full details see H&P, daily progress notes, labs, consult notes. Acute hypoxic hypercarbic respiratory failure secondary to COPD exacerbation, s/p extubation 11/26  Pulmonary HTN  Pt on 10 L NC - wean as tolerated  PT/OT evals  Continue inhalers     12/4:  Weaned down to 2-4L NC - continue to wean as tolerated  Transfer off PCU  Pt will need O2 challenge test and O2 ordered for likely discharge tomorrow    12/5:  Pt stable for discharge home on 2L NC    DMII  FS monitoring, SS    Hypothyroidism  Fibromyalgia  HTN  HLD  Depression  PTSD  Significant and frankly heartbreaking childhood issues leading to significant anxiety/depression/PTSD. Continue home meds        _______________________________________________________________________  Patient seen and examined by me on discharge day. Pertinent Findings:  Gen:    Not in distress  Chest: Clear lungs, on 2L NC  CVS:   Regular rhythm.   No edema  Abd:  Soft, not distended, not tender  Neuro:  Alert, oriented x3  _______________________________________________________________________  DISCHARGE MEDICATIONS:   Current Discharge Medication List      START taking these medications    Details   metoprolol tartrate (LOPRESSOR) 25 mg tablet Take 0.5 Tablets by mouth every twelve (12) hours. Qty: 30 Tablet, Refills: 0  Start date: 12/5/2021         CONTINUE these medications which have CHANGED    Details   furosemide (LASIX) 40 mg tablet Take 1 Tablet by mouth daily. Qty: 30 Tablet, Refills: 0  Start date: 12/5/2021         CONTINUE these medications which have NOT CHANGED    Details   ergocalciferol (Vitamin D2) 1,250 mcg (50,000 unit) capsule Take 50,000 Units by mouth Every Friday. methocarbamoL (ROBAXIN) 750 mg tablet Take 750 mg by mouth four (4) times daily. PHENobarbitaL (LUMINAL) 60 mg tablet Take 60 mg by mouth two (2) times a day. tremors      simethicone (GAS-X) 125 mg capsule Take 125 mg by mouth four (4) times daily as needed for Flatulence. sennosides 25 mg tab Take 25 mg by mouth three (3) times daily as needed. diphenhydrAMINE (Benadryl Allergy) 25 mg tablet Take 25 mg by mouth every six (6) hours as needed. benzonatate (Tessalon Perles) 100 mg capsule Take 200 mg by mouth three (3) times daily as needed for Cough. sodium chloride ([place] Saline) 0.65 % drop 2 Drops by Both Nostrils route every two (2) hours as needed for Congestion. doxepin (SINEquan) 10 mg capsule TAKE ONE CAPSULE BY MOUTH EVERY NIGHT  Qty: 30 Capsule, Refills: 2      ARIPiprazole (ABILIFY) 5 mg tablet TAKE ONE TABLET BY MOUTH EVERY MORNING  Qty: 30 Tablet, Refills: 2      primidone (MYSOLINE) 50 mg tablet TAKE 2 TABLETS EVERY MORNING AND 3 TABLETS AT BEDTIME  Qty: 150 Tablet, Refills: 0    Associated Diagnoses: Benign familial tremor; Diabetic peripheral neuropathy associated with type 2 diabetes mellitus ([facility]  Cervical post-laminectomy syndrome; Syncope and collapse; Stenosis of both internal carotid arteries; Cervical radiculopathy due to degenerative joint disease of spine; Degenerative lumbar spinal stenosis; Ataxia; B12 deficiency; Vitamin D deficiency; Benign essential tremor syndrome; Cerebral microvascular disease; Fibromyalgia; Altered mental status, unspecified altered mental status type      prazosin (MINIPRESS) 2 mg capsule TAKE ONE CAPSULE BY MOUTH 2 TIMES A DAY  Qty: 60 Capsule, Refills: 0      risperiDONE (RisperDAL) 0.25 mg tablet TAKE ONE TABLET BY MOUTH EVERY DAY AS NEEDED. Qty: 30 Tab, Refills: 2    Associated Diagnoses: PTSD (post-traumatic stress disorder)      HYDROcodone-acetaminophen (NORCO) 7.5-325 mg per tablet Take 1 Tab by mouth three (3) times daily as needed. traMADol (ULTRAM) 50 mg tablet Take 1 Tab by mouth three (3) times daily as needed. ferrous fumarate/vit Bcomp,C (SUPER B COMPLEX PO) Take  by mouth daily. Associated Diagnoses: Benign essential tremor syndrome; Cerebral microvascular disease; Diabetic peripheral neuropathy associated with type 2 diabetes mellitus ([facility] [address] Stenosis of both internal carotid arteries; Cervical radiculopathy due to degenerative joint disease of spine; Fibromyalgia; Altered mental status, unspecified altered mental status type; Degenerative lumbar spinal stenosis; Benign familial tremor; Cervical post-laminectomy syndrome; Syncope and collapse; Ataxia; B12 deficiency; Vitamin D deficiency      guaiFENesin (MUCINEX) 1,200 mg Ta12 ER tablet Take 1 Tab by mouth two (2) times a day. Qty: 14 Tab, Refills: 0      potassium chloride SR (K-TAB) 20 mEq tablet Take 1 Tab by mouth two (2) times a day. Qty: 14 Tab, Refills: 0      naloxone (NARCAN) 4 mg/actuation nasal spray Use 1 spray intranasally, then discard. Repeat with new spray every 2 min as needed for opioid overdose symptoms, alternating nostrils. Qty: 1 Each, Refills: 0      budesonide-formoterol (SYMBICORT) 160-4.5 mcg/actuation HFAA Take 2 Puffs by inhalation two (2) times a day. acetaminophen-codeine (TYLENOL #3) 300-30 mg per tablet Take 1 Tab by mouth every eight (8) hours as needed. Max Daily Amount: 3 Tabs. Qty: 5 Tab, Refills: 0      ALPRAZolam (XANAX) 1 mg tablet Take 1 Tab by mouth three (3) times daily as needed for Anxiety. Max Daily Amount: 3 mg. Qty: 3 Tab, Refills: 0      cetirizine (ZYRTEC) 10 mg tablet Take 1 Tab by mouth daily. Qty: 10 Tab, Refills: 0      promethazine (PHENERGAN) 25 mg tablet Take 1 Tab by mouth every six (6) hours as needed. Qty: 4 Tab, Refills: 0      ibuprofen (MOTRIN) 800 mg tablet Take 1 Tab by mouth every eight (8) hours as needed. Qty: 20 Tab, Refills: 0      albuterol (VENTOLIN HFA) 90 mcg/actuation inhaler Take 2 Puffs by inhalation every four (4) hours as needed for Wheezing. dicyclomine (BENTYL) 10 mg capsule Take 10 mg by mouth daily as needed. glipiZIDE SR (GLUCOTROL) 5 mg CR tablet Take 5 mg by mouth two (2) times daily as needed (Patient monitors her BG levels and takes when she is also taking a steroid. ).      montelukast (SINGULAIR) 10 mg tablet Take 10 mg by mouth daily. ezetimibe-simvastatin (VYTORIN 10/40) 10-40 mg per tablet Take 1 tablet by mouth nightly. albuterol-ipratropium (DUONEB) 2.5 mg-0.5 mg/3 ml nebulizer solution 3 mL by Nebulization route every six (6) hours as needed for Wheezing.      esomeprazole (NEXIUM) 40 mg capsule Take 40 mg by mouth daily. MAGOX 400 mg tablet TAKE ONE TABLET TWICE A DAY  Qty: 60 Tab, Refills: 3      aspirin 81 mg chewable tablet Take 81 mg by mouth daily. levothyroxine (SYNTHROID) 200 mcg tablet Take 200 mcg by mouth daily (before breakfast). STOP taking these medications       bumetanide (BUMEX) 2 mg tablet Comments:   Reason for Stopping:         levoFLOXacin (LEVAQUIN) 500 mg tablet Comments:   Reason for Stopping:                 Patient Follow Up Instructions:    Activity: Activity as tolerated  Diet: Resume previous diet  Wound Care: None needed      Follow-up Information     Follow up With Specialties Details Why Contact Info    Patient's Own Medication is located in the Patient's:  [address]            ________________________________________________________________    Risk of deterioration: Moderate    Condition at Discharge:  Stable  __________________________________________________________________    Disposition  Home with family and home health services    ____________________________________________________________________    Code Status: Full Code  ___________________________________________________________________      Total time in minutes spent coordinating this discharge (includes going over instructions, follow-up, prescriptions, and preparing report for sign off to her PCP) :  >30 minutes    Signed:  Varsha Lomeli MD

## 2021-12-05 NOTE — PROGRESS NOTES
1931: Bedside and Verbal shift change report given to Alessandra Ambrose RN (oncoming nurse) by Gabriella Louis RN (offgoing nurse). Report included the following information SBAR, Kardex, Intake/Output, MAR, Accordion, Recent Results, Med Rec Status and Cardiac Rhythm NSR.     0710 12/05/2021: Bedside and Verbal shift change report given to Jack Pérez RN (oncoming nurse) by Alessandra Ambrose RN (offgoing nurse). Report included the following information SBAR, Kardex, Intake/Output, MAR, Accordion, Recent Results, Med Rec Status and Cardiac Rhythm NSR. No acute changes at this time.

## 2021-12-05 NOTE — PROGRESS NOTES
ADULT PROTOCOL: JET AEROSOL  REASSESSMENT    Patient  Jayshree Mendoza     72 y.o.   female     12/5/2021  9:41 AM    Breath Sounds Pre Procedure: Right Breath Sounds: Diminished                               Left Breath Sounds: Diminished    Breath Sounds Post Procedure: Right Breath Sounds: Diminished                                 Left Breath Sounds: Diminished    Breathing pattern: Pre procedure Breathing Pattern: Regular          Post procedure Breathing Pattern: Regular    Heart Rate: Pre procedure Pulse: 84           Post procedure Pulse: 80    Resp Rate: Pre procedure Respirations: 18           Post procedure Respirations: 18      Incentive Spirometry:  Actual Volume (ml): 500 ml          Cough: Pre procedure Cough: Non-productive               Post procedure Cough: Non-productive      Oxygen: O2 Device: Nasal cannula   2L         SpO2: Pre procedure SpO2: 92 %                 Post procedure SpO2: 93 %      Nebulizer Therapy: Current medications Aerosolized Medications: DuoNeb, Pulmicort       Smoking History:  Former    Problem List:   Patient Active Problem List   Diagnosis Code    COPD (chronic obstructive pulmonary disease) ([facility] J44.9    PTSD (post-traumatic stress disorder) F43.10    PVC (premature ventricular contraction) I49.3    Tobacco use disorder F17.200    Family history of ischemic heart disease Z82.49    Chest pain with normal coronary angiography R07.9    Abnormal nuclear stress test R94.39    Acute respiratory failure (HCC) J96.00    Pneumonia, organism unspecified(486) J18.9    Depression, major, recurrent (HCC) F33.9    Cervical post-laminectomy syndrome M96.1    Neck pain M54.2    Ataxia R27.0    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.42    Syncope and collapse R55    COPD with [facility] exacerbation ([facility] J44.1    Acute respiratory failure with hypoxia (HCC) J96.01    Chronic respiratory failure with hypoxia (HCC) J96.11    Benign familial tremor G25.0    Atelectasis J98.11  Allergic rhinitis J30.9    Sepsis ([facility] [address] A41.9    Acute exacerbation of COPD with asthma ([facility] [address] J44.1, J45.901    HTN (hypertension) I10    Hyperlipidemia E78.5    Hypothyroidism E03.9    Smoking F17.200    Diabetic peripheral neuropathy associated with type 2 diabetes mellitus (HCC) E11.42    Stenosis of both internal carotid arteries I65.23    Cervical radiculopathy due to degenerative joint disease of spine M47.22    Degenerative lumbar spinal stenosis M48.061    Vitamin D deficiency E55.9    Altered mental status, unspecified R41.82    Cerebral microvascular disease I67.89    Obesity (BMI 35.0-39.9 without comorbidity) E66.9    Fibromyalgia M79.7    Benign essential tremor syndrome G25.0    CAP (community acquired pneumonia) J18.9    Productive cough R05.8    Chest pain on breathing R07.1    Anxiety and depression F41.9, F32. A    Polypharmacy Z79.899    Shortness of breath R06.02    Drug-induced constipation K59.03    Empyema (HCC) J86.9    Right-sided chest pain R07.9    Chronic pain syndrome G89.4    Hypercapnic respiratory failure [facility]) J96.92       Respiratory Therapist: RT Yanet

## 2021-12-05 NOTE — PROGRESS NOTES
Date of test: 12/05/21  Time of test: 0845    Sa02 88% on room air at rest  Sa02 87% on room air during ambulation  Sa02 92% on 2L NC during ambulation  Sa02 93% on 2L at rest/after ambulation    [facility]

## 2021-12-06 ENCOUNTER — TELEPHONE (OUTPATIENT)
Dept: BEHAVIORAL/MENTAL HEALTH CLINIC | Age: 66
End: 2021-12-06

## 2021-12-06 ENCOUNTER — HOME HEALTH ADMISSION (OUTPATIENT)
Dept: HOME HEALTH SERVICES | Facility: HOME HEALTH | Age: 66
End: 2021-12-06
Payer: MEDICARE

## 2021-12-06 VITALS
TEMPERATURE: 98.5 F | HEIGHT: 64 IN | OXYGEN SATURATION: 92 % | SYSTOLIC BLOOD PRESSURE: 137 MMHG | RESPIRATION RATE: 18 BRPM | WEIGHT: 206.57 LBS | BODY MASS INDEX: 35.27 KG/M2 | HEART RATE: 98 BPM | DIASTOLIC BLOOD PRESSURE: 67 MMHG

## 2021-12-06 LAB
GLUCOSE BLD STRIP.AUTO-MCNC: 101 MG/DL (ref 65–117)
GLUCOSE BLD STRIP.AUTO-MCNC: 107 MG/DL (ref 65–117)
GLUCOSE BLD STRIP.AUTO-MCNC: 108 MG/DL (ref 65–117)
SERVICE CMNT-IMP: NORMAL

## 2021-12-06 PROCEDURE — 90471 IMMUNIZATION ADMIN: CPT

## 2021-12-06 PROCEDURE — 74011250637 HC RX REV CODE- 250/637: Performed by: NURSE PRACTITIONER

## 2021-12-06 PROCEDURE — 74011250637 HC RX REV CODE- 250/637: Performed by: INTERNAL MEDICINE

## 2021-12-06 PROCEDURE — 74011250636 HC RX REV CODE- 250/636: Performed by: INTERNAL MEDICINE

## 2021-12-06 PROCEDURE — 74011000250 HC RX REV CODE- 250: Performed by: INTERNAL MEDICINE

## 2021-12-06 PROCEDURE — 77010033678 HC OXYGEN DAILY

## 2021-12-06 PROCEDURE — 74011000250 HC RX REV CODE- 250: Performed by: NURSE PRACTITIONER

## 2021-12-06 PROCEDURE — 94640 AIRWAY INHALATION TREATMENT: CPT

## 2021-12-06 PROCEDURE — 74011000250 HC RX REV CODE- 250: Performed by: GENERAL ACUTE CARE HOSPITAL

## 2021-12-06 PROCEDURE — 90686 IIV4 VACC NO PRSV 0.5 ML IM: CPT | Performed by: INTERNAL MEDICINE

## 2021-12-06 PROCEDURE — 74011250637 HC RX REV CODE- 250/637: Performed by: HOSPITALIST

## 2021-12-06 PROCEDURE — 82962 GLUCOSE BLOOD TEST: CPT

## 2021-12-06 RX ADMIN — ASPIRIN 81 MG CHEWABLE TABLET 81 MG: 81 TABLET CHEWABLE at 08:51

## 2021-12-06 RX ADMIN — FUROSEMIDE 40 MG: 40 TABLET ORAL at 08:51

## 2021-12-06 RX ADMIN — PHENOBARBITAL 64.8 MG: 32.4 TABLET ORAL at 08:51

## 2021-12-06 RX ADMIN — METOPROLOL TARTRATE 12.5 MG: 25 TABLET, FILM COATED ORAL at 08:51

## 2021-12-06 RX ADMIN — TRAMADOL HYDROCHLORIDE 50 MG: 50 TABLET, COATED ORAL at 12:25

## 2021-12-06 RX ADMIN — ALBUTEROL SULFATE 2.5 MG: 2.5 SOLUTION RESPIRATORY (INHALATION) at 05:20

## 2021-12-06 RX ADMIN — TRAMADOL HYDROCHLORIDE 50 MG: 50 TABLET, COATED ORAL at 05:36

## 2021-12-06 RX ADMIN — IPRATROPIUM BROMIDE AND ALBUTEROL SULFATE 3 ML: .5; 3 SOLUTION RESPIRATORY (INHALATION) at 08:06

## 2021-12-06 RX ADMIN — ACETAMINOPHEN 650 MG: 325 TABLET ORAL at 05:36

## 2021-12-06 RX ADMIN — ALPRAZOLAM 1 MG: 0.5 TABLET ORAL at 08:51

## 2021-12-06 RX ADMIN — LEVOTHYROXINE SODIUM 200 MCG: 0.15 TABLET ORAL at 05:36

## 2021-12-06 RX ADMIN — ENOXAPARIN SODIUM 40 MG: 100 INJECTION SUBCUTANEOUS at 11:07

## 2021-12-06 RX ADMIN — BUDESONIDE 250 MCG: 0.25 INHALANT RESPIRATORY (INHALATION) at 08:06

## 2021-12-06 RX ADMIN — PRIMIDONE 100 MG: 50 TABLET ORAL at 08:51

## 2021-12-06 RX ADMIN — ACETAMINOPHEN 650 MG: 325 TABLET ORAL at 12:25

## 2021-12-06 RX ADMIN — PRAZOSIN HYDROCHLORIDE 2 MG: 2 CAPSULE ORAL at 10:54

## 2021-12-06 RX ADMIN — INFLUENZA VIRUS VACCINE 0.5 ML: 15; 15; 15; 15 SUSPENSION INTRAMUSCULAR at 12:25

## 2021-12-06 RX ADMIN — ARIPIPRAZOLE 10 MG: 5 TABLET ORAL at 08:51

## 2021-12-06 RX ADMIN — Medication 1 AMPULE: at 09:00

## 2021-12-06 NOTE — PROGRESS NOTES
1900: Bedside shift change report given to [facility] (oncoming nurse) by Angelica Go (offgoing nurse). Report included the following information SBAR, Kardex, Intake/Output, MAR, Recent Results and Cardiac Rhythm NSR.     2000: Family present and frustrated that O2 wasn't delivered but patient was told that she could go home today. This RN explained that the home O2 was handled through case management and they are no longer here anymore. Pt's  inquired if there was a chance that it could be delivered tonight. Discussed with family that there was a better chance of it getting delivered in the morning since CM had difficulties getting in touch with the company earlier in the day. Family said they would be back in the morning.     0700: End of Shift Note    Bedside shift change report given to [address] (oncoming nurse) by Deann Weller RN (offgoing nurse). Report included the following information SBAR, Kardex, Intake/Output, MAR, Recent Results and Cardiac Rhythm NSR    Shift worked:  [id number]     Shift summary and any significant changes:     PRN neb given 1x. See above notes regarding home O2 needs. Concerns for physician to address:  Waiting on home O2 to be delivered so patient can d/c. [facility]          Activity:  Activity Level:  Up with Assistance  Number times ambulated in hallways past shift: 0  Number of times OOB to chair past shift: 2    Cardiac:   Cardiac Monitoring: Yes      Cardiac Rhythm: Sinus Rhythm    Access:   Current line(s): no access     Genitourinary:   Urinary status: voiding    Respiratory:   O2 Device: Nasal cannula  Chronic home O2 use?: YES  Incentive spirometer at bedside: YES  Actual Volume (ml): 500 ml  GI:     Current diet:  ADULT DIET Regular; 4 carb choices (60 gm/meal)  DIET ONE TIME MESSAGE  Passing flatus: YES  Tolerating current diet: YES       Pain Management:   Patient states pain is manageable on current regimen: YES    Skin:  Joseph Score: 19  Interventions: increase time out of bed    Patient Safety:  Fall Score:  Total Score: 3  Interventions: gripper socks and pt to call before getting OOB  High Fall Risk: Yes    Length of Stay:  Expected LOS: 4d 21h  Actual LOS: [address] [address] [address], RN

## 2021-12-06 NOTE — TELEPHONE ENCOUNTER
Yazmin Snehal calls to tell provider that she is set for discharge today. She is tearful and is very anxious.

## 2021-12-06 NOTE — PROGRESS NOTES
Bedside and verbal report given to [address] (oncoming nurse) by Santiago Armenta RN (offgoing nurse). Report included SBAR, MAR, Kardex, I/Os, Recent Results, and Cardiac Rhythm. 0845: o2 challenge completed. Patient needing 2L NC on discharge. 1500: Case management following up on O2 delivery for discharge.      1900: Report given to [facility]

## 2021-12-06 NOTE — DISCHARGE SUMMARY
Hospitalist Discharge Summary     Patient ID:  Gary Zuniga  [id number]  72 y.o.  1955 11/25/2021    PCP on record: Itzel Manrique MD    Admit date: 11/25/2021  Discharge date and time: 12/6/2021    DISCHARGE DIAGNOSIS:  See below    CONSULTATIONS:  None    Excerpted HPI from H&P of Herman Dumont MD:  71 y/o F pt with PMH of COPD, + smoker, prior lung empyema, hypothyrodism, and DM2 BIBEMS for SOB and hypoxia (SPO2 60% upon EMS arrival). Upon arrival to the ED pt minimally responsive in severe respiratory distress, complaining of chest pain. ABG done which showed a pH of 7.1 and pCO2 of 92. Placed on Bipap, given steroids and albuterol with improvement in mentation and respiratory status. Labs also significant for pro-BNP 4,531 and high sensitivity trop of 166. EKG without ST elevations. ICU consulted for admission. ______________________________________________________________________  DISCHARGE SUMMARY/HOSPITAL COURSE:  for full details see H&P, daily progress notes, labs, consult notes. Acute hypoxic hypercarbic respiratory failure secondary to COPD exacerbation, s/p extubation 11/26  Pulmonary HTN  Pt on 10 L NC - wean as tolerated  PT/OT evals  Continue inhalers     12/4:  Weaned down to 2-4L NC - continue to wean as tolerated  Transfer off PCU  Pt will need O2 challenge test and O2 ordered for likely discharge tomorrow    12/6:  Pt stable for discharge home on 2L NC  Home O2 to be delivered     DMII  FS monitoring, SS    Hypothyroidism  Fibromyalgia  HTN  HLD  Depression  PTSD  Significant and frankly heartbreaking childhood issues leading to significant anxiety/depression/PTSD. Continue home meds  _______________________________________________________________________  Patient seen and examined by me on discharge day. Pertinent Findings:  Gen:    Not in distress  Chest: Clear lungs, on 2L NC  CVS:   Regular rhythm.   No edema  Abd:  Soft, not distended, not tender  Neuro: Alert, oriented x3  _______________________________________________________________________  DISCHARGE MEDICATIONS:   Current Discharge Medication List      START taking these medications    Details   metoprolol tartrate (LOPRESSOR) 25 mg tablet Take 0.5 Tablets by mouth every twelve (12) hours. Qty: 30 Tablet, Refills: 0  Start date: 12/5/2021         CONTINUE these medications which have CHANGED    Details   furosemide (LASIX) 40 mg tablet Take 1 Tablet by mouth daily. Qty: 30 Tablet, Refills: 0  Start date: 12/5/2021         CONTINUE these medications which have NOT CHANGED    Details   ergocalciferol (Vitamin D2) 1,250 mcg (50,000 unit) capsule Take 50,000 Units by mouth Every Friday. methocarbamoL (ROBAXIN) 750 mg tablet Take 750 mg by mouth four (4) times daily. PHENobarbitaL (LUMINAL) 60 mg tablet Take 60 mg by mouth two (2) times a day. tremors      simethicone (GAS-X) 125 mg capsule Take 125 mg by mouth four (4) times daily as needed for Flatulence. sennosides 25 mg tab Take 25 mg by mouth three (3) times daily as needed. diphenhydrAMINE (Benadryl Allergy) 25 mg tablet Take 25 mg by mouth every six (6) hours as needed. benzonatate (Tessalon Perles) 100 mg capsule Take 200 mg by mouth three (3) times daily as needed for Cough. sodium chloride (Ayr Saline) 0.65 % drop 2 Drops by Both Nostrils route every two (2) hours as needed for Congestion. doxepin (SINEquan) 10 mg capsule TAKE ONE CAPSULE BY MOUTH EVERY NIGHT  Qty: 30 Capsule, Refills: 2      ARIPiprazole (ABILIFY) 5 mg tablet TAKE ONE TABLET BY MOUTH EVERY MORNING  Qty: 30 Tablet, Refills: 2      primidone (MYSOLINE) 50 mg tablet TAKE 2 TABLETS EVERY MORNING AND 3 TABLETS AT BEDTIME  Qty: 150 Tablet, Refills: 0    Associated Diagnoses: Benign familial tremor; Diabetic peripheral neuropathy associated with type 2 diabetes mellitus ([address]  Cervical post-laminectomy syndrome; Syncope and collapse; Stenosis of both internal carotid arteries; Cervical radiculopathy due to degenerative joint disease of spine; Degenerative lumbar spinal stenosis; Ataxia; B12 deficiency; Vitamin D deficiency; Benign essential tremor syndrome; Cerebral microvascular disease; Fibromyalgia; Altered mental status, unspecified altered mental status type      prazosin (MINIPRESS) 2 mg capsule TAKE ONE CAPSULE BY MOUTH 2 TIMES A DAY  Qty: 60 Capsule, Refills: 0      risperiDONE (RisperDAL) 0.25 mg tablet TAKE ONE TABLET BY MOUTH EVERY DAY AS NEEDED. Qty: 30 Tab, Refills: 2    Associated Diagnoses: PTSD (post-traumatic stress disorder)      HYDROcodone-acetaminophen (NORCO) 7.5-325 mg per tablet Take 1 Tab by mouth three (3) times daily as needed. traMADol (ULTRAM) 50 mg tablet Take 1 Tab by mouth three (3) times daily as needed. ferrous fumarate/vit Bcomp,C (SUPER B COMPLEX PO) Take  by mouth daily. Associated Diagnoses: Benign essential tremor syndrome; Cerebral microvascular disease; Diabetic peripheral neuropathy associated with type 2 diabetes mellitus ([facility] [address] Stenosis of both internal carotid arteries; Cervical radiculopathy due to degenerative joint disease of spine; Fibromyalgia; Altered mental status, unspecified altered mental status type; Degenerative lumbar spinal stenosis; Benign familial tremor; Cervical post-laminectomy syndrome; Syncope and collapse; Ataxia; B12 deficiency; Vitamin D deficiency      guaiFENesin (MUCINEX) 1,200 mg Ta12 ER tablet Take 1 Tab by mouth two (2) times a day. Qty: 14 Tab, Refills: 0      potassium chloride SR (K-TAB) 20 mEq tablet Take 1 Tab by mouth two (2) times a day. Qty: 14 Tab, Refills: 0      naloxone (NARCAN) 4 mg/actuation nasal spray Use 1 spray intranasally, then discard. Repeat with new spray every 2 min as needed for opioid overdose symptoms, alternating nostrils.   Qty: 1 Each, Refills: 0      budesonide-formoterol (SYMBICORT) 160-4.5 mcg/actuation HFAA Take 2 Puffs by inhalation two (2) times a day. acetaminophen-codeine (TYLENOL #3) 300-30 mg per tablet Take 1 Tab by mouth every eight (8) hours as needed. Max Daily Amount: 3 Tabs. Qty: 5 Tab, Refills: 0      ALPRAZolam (XANAX) 1 mg tablet Take 1 Tab by mouth three (3) times daily as needed for Anxiety. Max Daily Amount: 3 mg. Qty: 3 Tab, Refills: 0      cetirizine (ZYRTEC) 10 mg tablet Take 1 Tab by mouth daily. Qty: 10 Tab, Refills: 0      promethazine (PHENERGAN) 25 mg tablet Take 1 Tab by mouth every six (6) hours as needed. Qty: 4 Tab, Refills: 0      ibuprofen (MOTRIN) 800 mg tablet Take 1 Tab by mouth every eight (8) hours as needed. Qty: 20 Tab, Refills: 0      albuterol (VENTOLIN HFA) 90 mcg/actuation inhaler Take 2 Puffs by inhalation every four (4) hours as needed for Wheezing. dicyclomine (BENTYL) 10 mg capsule Take 10 mg by mouth daily as needed. glipiZIDE SR (GLUCOTROL) 5 mg CR tablet Take 5 mg by mouth two (2) times daily as needed (Patient monitors her BG levels and takes when she is also taking a steroid. ).      montelukast (SINGULAIR) 10 mg tablet Take 10 mg by mouth daily. ezetimibe-simvastatin (VYTORIN 10/40) 10-40 mg per tablet Take 1 tablet by mouth nightly. albuterol-ipratropium (DUONEB) 2.5 mg-0.5 mg/3 ml nebulizer solution 3 mL by Nebulization route every six (6) hours as needed for Wheezing.      esomeprazole (NEXIUM) 40 mg capsule Take 40 mg by mouth daily. MAGOX 400 mg tablet TAKE ONE TABLET TWICE A DAY  Qty: 60 Tab, Refills: 3      aspirin 81 mg chewable tablet Take 81 mg by mouth daily. levothyroxine (SYNTHROID) 200 mcg tablet Take 200 mcg by mouth daily (before breakfast). STOP taking these medications       bumetanide (BUMEX) 2 mg tablet Comments:   Reason for Stopping:         levoFLOXacin (LEVAQUIN) 500 mg tablet Comments:   Reason for Stopping:                 Patient Follow Up Instructions:    Activity: Activity as tolerated  Diet: Resume previous diet  Wound Care: None needed      Follow-up Information     Follow up With Specialties Details Why Contact Info    Patient's Own Medication is located in the Patient's:  Janel Barnes MD Family Medicine On 12/10/2021 Your appointment is scheduled for Dec 10th at 1130am and to arrive 15 min early. There is a 24 hour cancellation fee and wear a mask [address] [place] [address]  [address] [address] [phone]      [facility]   This is your oxygen company and you are to call them prior to leaving the hospital so they can meet you to set up your home oxygen.  [phone]        ________________________________________________________________    Risk of deterioration: Moderate    Condition at Discharge:  Stable  __________________________________________________________________    Disposition  Home with family and home health services    ____________________________________________________________________    Code Status: Full Code  ___________________________________________________________________      Total time in minutes spent coordinating this discharge (includes going over instructions, follow-up, prescriptions, and preparing report for sign off to her PCP) :  >30 minutes    Signed:  Vania Hines MD

## 2021-12-06 NOTE — PROGRESS NOTES
Transition of Care Plan: Patient's  is in the room to transport her home when discharged today.     RUR: 13%    Disposition: home with spouse and home health services    Follow up appointments: Placed on AVS    DME needed: Pt owns a rollator and cane.     Transportation at [address] or means to access home: Spouse has cevallos      IM Medicare Wendi Godinez to patient on 12/6/21    Is patient a BCPI-A Bundle:        n/a                If yes, was [phone]    Discharge Caregiver contacted prior to discharge?   Caregiver aware. Patient is being discharged today per md. Referral sent to [facility] with medical since [place] Few states they did not receive anything from weekend CM. Tank given to patient by liason and nursing aware. Informed  to call [facility] prior to leaving the hospital so they can meet them at home to set up the home oxygen. Spoke with them about home health services and they are in agreement. Referral sent to [facility] . [facility] can accept once they get confirmation that md will accept. FOC obtained and placed in chart.           Sun Donato  RN BSN CRM        [phone]

## 2021-12-06 NOTE — PROGRESS NOTES
Hospitalist Progress Note    NAME: Nima Mota   :  1955   MRN:  [id number]       Assessment / Plan:  Acute hypoxic hypercarbic respiratory failure secondary to COPD exacerbation, s/p extubation   Pulmonary HTN  Pt on 10 L NC - wean as tolerated  PT/OT evals  Continue inhalers    :  Weaned down to 2-4L NC - continue to wean as tolerated  Transfer off PCU  Pt will need O2 challenge test and O2 ordered for likely discharge tomorrow    DMII  FS monitoring, SS    Hypothyroidism  Fibromyalgia  HTN  HLD  Depression  PSTD  Resume home meds    30.0 - 39.9 Obese / Body mass index is 35.46 kg/m². Estimated discharge date: December 3  Barriers:    Code status: Full  Prophylaxis: Lovenox  Recommended Disposition:  PT, OT, RN     Subjective:     Chief Complaint / Reason for Physician Visit  Feeling better. Complaining of sinus congestion. Discussed with RN events overnight. Review of Systems:  Symptom Y/N Comments  Symptom Y/N Comments   Fever/Chills    Chest Pain     Poor Appetite    Edema     Cough    Abdominal Pain     Sputum    Joint Pain     SOB/RODRIGUEZ    Pruritis/Rash     Nausea/vomit    Tolerating PT/OT     Diarrhea    Tolerating Diet     Constipation    Other       Could NOT obtain due to:      Objective:     VITALS:   Last 24hrs VS reviewed since prior progress note.  Most recent are:  Patient Vitals for the past 24 hrs:   Temp Pulse Resp BP SpO2   21 0806 -- -- -- -- 92 %   21 0543 98.5 °F (36.9 °C) 92 19 124/63 91 %   21 0520 -- -- -- -- (!) 88 %   21 0058 -- 78 21 (!) 96/55 95 %   21 98.5 °F (36.9 °C) 87 19 107/67 90 %   21 -- -- -- -- 92 %   21 1525 98.3 °F (36.8 °C) 89 20 127/85 91 %   21 1334 -- -- -- -- 93 %   21 1155 98 °F (36.7 °C) 86 18 (!) 122/54 90 %       Intake/Output Summary (Last 24 hours) at 2021 1143  Last data filed at 2021  Gross per 24 hour   Intake 250 ml   Output --   Net 250 ml        I had a face to face encounter and independently examined this patient on 12/6/2021, as outlined below:  PHYSICAL EXAM:  General: Alert, cooperative, no acute distress    EENT:  EOMI. Anicteric sclerae. MMM  Resp:  On 3L NC  CV:  Regular  rhythm,  No edema  GI:  Soft, Non distended, Non tender. +Bowel sounds  Neurologic:  Alert and oriented X 3, normal speech,   Psych:   Good insight. Not anxious nor agitated  Skin:  No rashes. No jaundice    Reviewed most current lab test results and cultures  YES  Reviewed most current radiology test results   YES  Review and summation of old records today    NO  Reviewed patient's current orders and MAR    YES  PMH/ reviewed - no change compared to H&P  ________________________________________________________________________  Care Plan discussed with:    Comments   Patient x    Family      RN x    Care Manager     Consultant                        Multidiciplinary team rounds were held today with , nursing, pharmacist and clinical coordinator. Patient's plan of care was discussed; medications were reviewed and discharge planning was addressed. ________________________________________________________________________  Total NON critical care TIME:  35   Minutes    Total CRITICAL CARE TIME Spent:   Minutes non procedure based      Comments   >50% of visit spent in counseling and coordination of care     ________________________________________________________________________  Julio Arenas MD     Procedures: see electronic medical records for all procedures/Xrays and details which were not copied into this note but were reviewed prior to creation of Plan. LABS:  I reviewed today's most current labs and imaging studies.   Pertinent labs include:  Recent Labs     12/04/21 0315   WBC 11.1*   HGB 12.3   HCT 41.4        Recent Labs     12/05/21 0328 12/04/21 0315   NA  --  135*   K  --  3.7   CL  --  101   CO2  --  29   GLU  --  116*   BUN  --  10   CREA  -- 0.53*   CA  --  10.2*   MG 2.0 2.1   PHOS 2.7 2.1*       Signed: Enrique Zhang MD

## 2021-12-07 ENCOUNTER — HOME CARE VISIT (OUTPATIENT)
Dept: SCHEDULING | Facility: HOME HEALTH | Age: 66
End: 2021-12-07
Payer: MEDICARE

## 2021-12-07 VITALS
OXYGEN SATURATION: 92 % | DIASTOLIC BLOOD PRESSURE: 62 MMHG | RESPIRATION RATE: 18 BRPM | TEMPERATURE: 97.3 F | SYSTOLIC BLOOD PRESSURE: 108 MMHG | HEART RATE: 77 BPM

## 2021-12-07 PROCEDURE — G0299 HHS/HOSPICE OF RN EA 15 MIN: HCPCS

## 2021-12-07 PROCEDURE — 400018 HH-NO PAY CLAIM PROCEDURE

## 2021-12-07 PROCEDURE — 3331090002 HH PPS REVENUE DEBIT

## 2021-12-07 PROCEDURE — 400013 HH SOC

## 2021-12-07 PROCEDURE — 3331090001 HH PPS REVENUE CREDIT

## 2021-12-07 NOTE — PROGRESS NOTES
Patient and spouse given discharge instructions. Opportunity to ask questions and express concerns. Patient and spouse verbalized understanding; confirmed how to get in contact with providers if questions arise. Patient is alert and oriented; home oxygen tank at bedside; pt is anxious to go home. Wheeled off the unit.

## 2021-12-08 ENCOUNTER — HOME CARE VISIT (OUTPATIENT)
Dept: HOME HEALTH SERVICES | Facility: HOME HEALTH | Age: 66
End: 2021-12-08
Payer: MEDICARE

## 2021-12-08 ENCOUNTER — HOME CARE VISIT (OUTPATIENT)
Dept: SCHEDULING | Facility: HOME HEALTH | Age: 66
End: 2021-12-08
Payer: MEDICARE

## 2021-12-08 VITALS
TEMPERATURE: 97.8 F | RESPIRATION RATE: 17 BRPM | HEART RATE: 70 BPM | DIASTOLIC BLOOD PRESSURE: 76 MMHG | SYSTOLIC BLOOD PRESSURE: 130 MMHG | OXYGEN SATURATION: 95 %

## 2021-12-08 PROCEDURE — 3331090001 HH PPS REVENUE CREDIT

## 2021-12-08 PROCEDURE — G0151 HHCP-SERV OF PT,EA 15 MIN: HCPCS

## 2021-12-08 PROCEDURE — 3331090002 HH PPS REVENUE DEBIT

## 2021-12-09 ENCOUNTER — HOME CARE VISIT (OUTPATIENT)
Dept: SCHEDULING | Facility: HOME HEALTH | Age: 66
End: 2021-12-09
Payer: MEDICARE

## 2021-12-09 ENCOUNTER — VIRTUAL VISIT (OUTPATIENT)
Dept: BEHAVIORAL/MENTAL HEALTH CLINIC | Age: 66
End: 2021-12-09
Payer: MEDICARE

## 2021-12-09 VITALS
SYSTOLIC BLOOD PRESSURE: 112 MMHG | DIASTOLIC BLOOD PRESSURE: 64 MMHG | RESPIRATION RATE: 16 BRPM | OXYGEN SATURATION: 93 % | HEART RATE: 74 BPM | TEMPERATURE: 98 F

## 2021-12-09 DIAGNOSIS — F43.10 PTSD (POST-TRAUMATIC STRESS DISORDER): ICD-10-CM

## 2021-12-09 DIAGNOSIS — F33.1 MODERATE EPISODE OF RECURRENT MAJOR DEPRESSIVE DISORDER (HCC): Primary | ICD-10-CM

## 2021-12-09 PROCEDURE — 3331090002 HH PPS REVENUE DEBIT

## 2021-12-09 PROCEDURE — 99443 PR PHYS/QHP TELEPHONE EVALUATION 21-30 MIN: CPT | Performed by: NURSE PRACTITIONER

## 2021-12-09 PROCEDURE — G0299 HHS/HOSPICE OF RN EA 15 MIN: HCPCS

## 2021-12-09 PROCEDURE — 3331090001 HH PPS REVENUE CREDIT

## 2021-12-09 RX ORDER — DULOXETIN HYDROCHLORIDE 30 MG/1
30 CAPSULE, DELAYED RELEASE ORAL DAILY
Qty: 30 CAPSULE | Refills: 3 | Status: SHIPPED | OUTPATIENT
Start: 2021-12-09 | End: 2022-04-04

## 2021-12-09 RX ORDER — PRAZOSIN HYDROCHLORIDE 2 MG/1
CAPSULE ORAL
Qty: 60 CAPSULE | Refills: 3 | Status: SHIPPED | OUTPATIENT
Start: 2021-12-09 | End: 2022-04-07 | Stop reason: SDUPTHER

## 2021-12-09 NOTE — PROGRESS NOTES
CHIEF COMPLAINT:  Jayshree Mendoza is a 72 y.o. female and was seen today for follow-up of psychiatric condition and psychotropic medication management. HPI:    Tabby Zimmer reports the following psychiatric symptoms by hx:  depression and anxiety. Overall symptoms have been present for months. Currently symptoms is of moderate/high severity. The symptoms occur more frequently and severely with current stressors. Pt reports medications are of limited benefit. Met with pt via audio telehealth for appt today to review current treatment plan. FAMILY/SOCIAL HX: psychosocial stressors    REVIEW OF SYSTEMS:  Psychiatric symptoms being monitored for:  depression, anxiety  Appetite:good   Sleep: improved   Neuro: tremors    There were no vitals taken for this visit.: virtual    Side Effects:  none    MENTAL STATUS EXAM:   Sensorium  oriented to time, place and person   Relations cooperative   Appearance:  Not assessed   Motor Behavior:  not assessed   Speech:  normal volume   Thought Process: goal directed   Thought Content free of delusions and free of hallucinations   Suicidal ideations no intention   Homicidal ideations no intention   Mood:  anxious and depressed   Affect:  Not assessed   Memory recent  adequate   Memory remote:  adequate   Concentration:  adequate   Abstraction:  abstract   Insight:  good   Reliability good   Judgment:  good     MEDICAL DECISION MAKING:  Problems addressed today:    ICD-10-CM ICD-9-CM    1. Moderate episode of recurrent major depressive disorder (HCC)  F33.1 296.32    2. PTSD (post-traumatic stress disorder)  F43.10 309.81        Assessment:   Tabby Zimmer is responding to treatment. Symptoms are exacerbated. She reports she was having ptsd activation and then she required medical hospitalization which continued the exacerbation. Discussed current medications and dosages. Pt states she has been off of doxepin and abilify. Reviewed options for dep/anx management. Will re-start cymbalta. Reviewed treatment goals and target symptoms to monitor for. Reviewed ptsd management. Plan:   1. Current Outpatient Medications   Medication Sig Dispense Refill    prazosin (MINIPRESS) 2 mg capsule TAKE ONE CAPSULE BY MOUTH 2 TIMES A DAY 60 Capsule 3    DULoxetine (CYMBALTA) 30 mg capsule Take 1 Capsule by mouth daily. 30 Capsule 3    guaiFENesin (ORGANIDIN) 400 mg tablet Take 400 mg by mouth every four (4) hours as needed for Congestion.  vit B Cmplx 3-FA-Vit C-Biotin (NEPHRO SHREYA RX) 1- mg-mg-mcg tablet Take 1 Tablet by mouth daily.  OXYGEN-AIR DELIVERY SYSTEMS 2-4 L/min by Nasal route continuous.  metoprolol tartrate (LOPRESSOR) 25 mg tablet Take 0.5 Tablets by mouth every twelve (12) hours. 30 Tablet 0    furosemide (LASIX) 40 mg tablet Take 1 Tablet by mouth daily. 30 Tablet 0    ergocalciferol (Vitamin D2) 1,250 mcg (50,000 unit) capsule Take 50,000 Units by mouth Every Friday.  methocarbamoL (ROBAXIN) 750 mg tablet Take 750 mg by mouth four (4) times daily.  PHENobarbitaL (LUMINAL) 60 mg tablet Take 60 mg by mouth two (2) times a day. tremors      simethicone (GAS-X) 125 mg capsule Take 125 mg by mouth four (4) times daily as needed for Flatulence.  sennosides 25 mg tab Take 25 mg by mouth three (3) times daily as needed for PRN Reason (Other) (constipation).  diphenhydrAMINE (Benadryl Allergy) 25 mg tablet Take 25 mg by mouth every six (6) hours as needed.  benzonatate (Tessalon Perles) 100 mg capsule Take 200 mg by mouth three (3) times daily as needed for Cough.  sodium chloride (Ayr Saline) 0.65 % drop 2 Drops by Both Nostrils route every two (2) hours as needed for Congestion.  primidone (MYSOLINE) 50 mg tablet TAKE 2 TABLETS EVERY MORNING AND 3 TABLETS AT BEDTIME 150 Tablet 0    risperiDONE (RisperDAL) 0.25 mg tablet TAKE ONE TABLET BY MOUTH EVERY DAY AS NEEDED.  (Patient taking differently: Take 0.25 mg by mouth daily as needed for PRN Reason (Other) (anxiety). TAKE ONE TABLET BY MOUTH EVERY DAY AS NEEDED.) 30 Tab 2    HYDROcodone-acetaminophen (NORCO) 7.5-325 mg per tablet Take 1 Tab by mouth three (3) times daily as needed.  traMADol (ULTRAM) 50 mg tablet Take 1 Tablet by mouth three (3) times daily as needed for Pain.  ferrous fumarate/vit Bcomp,C (SUPER B COMPLEX PO) Take 1 Tablet by mouth daily.  guaiFENesin (MUCINEX) 1,200 mg Ta12 ER tablet Take 1 Tab by mouth two (2) times a day. 14 Tab 0    potassium chloride SR (K-TAB) 20 mEq tablet Take 1 Tab by mouth two (2) times a day. (Patient taking differently: Take 40 mEq by mouth two (2) times a day. take 2 tablets by mouth twice a day) 14 Tab 0    naloxone (NARCAN) 4 mg/actuation nasal spray Use 1 spray intranasally, then discard. Repeat with new spray every 2 min as needed for opioid overdose symptoms, alternating nostrils. 1 Each 0    budesonide-formoterol (SYMBICORT) 160-4.5 mcg/actuation HFAA Take 2 Puffs by inhalation two (2) times a day.  acetaminophen-codeine (TYLENOL #3) 300-30 mg per tablet Take 1 Tab by mouth every eight (8) hours as needed. Max Daily Amount: 3 Tabs. (Patient taking differently: Take 1 Tablet by mouth every eight (8) hours as needed for Pain.) 5 Tab 0    ALPRAZolam (XANAX) 1 mg tablet Take 1 Tab by mouth three (3) times daily as needed for Anxiety. Max Daily Amount: 3 mg. 3 Tab 0    cetirizine (ZYRTEC) 10 mg tablet Take 1 Tab by mouth daily. 10 Tab 0    promethazine (PHENERGAN) 25 mg tablet Take 1 Tab by mouth every six (6) hours as needed. (Patient taking differently: Take 25 mg by mouth every six (6) hours as needed for Nausea.) 4 Tab 0    ibuprofen (MOTRIN) 800 mg tablet Take 1 Tab by mouth every eight (8) hours as needed.  (Patient taking differently: Take 800 mg by mouth every eight (8) hours as needed for Pain.) 20 Tab 0    albuterol (VENTOLIN HFA) 90 mcg/actuation inhaler Take 2 Puffs by inhalation every four (4) hours as needed for Wheezing.  dicyclomine (BENTYL) 10 mg capsule Take 10 mg by mouth daily as needed.  glipiZIDE SR (GLUCOTROL) 5 mg CR tablet Take 5 mg by mouth two (2) times a day.  montelukast (SINGULAIR) 10 mg tablet Take 10 mg by mouth daily.  ezetimibe-simvastatin (VYTORIN 10/40) 10-40 mg per tablet Take 1 tablet by mouth nightly.  albuterol-ipratropium (DUONEB) 2.5 mg-0.5 mg/3 ml nebulizer solution 3 mL by Nebulization route every six (6) hours as needed for Wheezing.  esomeprazole (NEXIUM) 40 mg capsule Take 40 mg by mouth daily.  MAGOX 400 mg tablet TAKE ONE TABLET TWICE A DAY 60 Tab 3    aspirin 81 mg chewable tablet Take 81 mg by mouth daily.  levothyroxine (SYNTHROID) 200 mcg tablet Take 200 mcg by mouth daily (before breakfast). medication changes made today: cont prazosin and prn risperidone, re-start cymbalta    2. Counseling and coordination of care including instructions for treatment, risks/benefits, risk factor reduction and patient/family education. She agrees with the plan. Patient instructed to call with any side effects, questions or issues. 3.    Follow-up and Dispositions    · Return in about 3 months (around 3/9/2022). Karla Morales is a 72 y.o. female, evaluated via audio-only technology on 12/9/2021 for Medication Management      Karla Morales, who was evaluated through a patient-initiated, synchronous (real-time) audio only encounter, and/or her healthcare decision maker, is aware that it is a billable service, with coverage as determined by her insurance carrier. She provided verbal consent to proceed: Yes. She has not had a related appointment within my department in the past 7 days or scheduled within the next 24 hours.     On this date 12/09/2021 I have spent 25/30 minutes reviewing previous notes, test results and face to face (virtual) with the patient discussing the diagnosis and importance of compliance with the treatment plan as well as documenting on the day of the visit.         12/9/2021  Devan Clark, NP

## 2021-12-10 PROCEDURE — 3331090001 HH PPS REVENUE CREDIT

## 2021-12-10 PROCEDURE — 3331090002 HH PPS REVENUE DEBIT

## 2021-12-11 PROCEDURE — 3331090002 HH PPS REVENUE DEBIT

## 2021-12-11 PROCEDURE — 3331090001 HH PPS REVENUE CREDIT

## 2021-12-12 PROCEDURE — 3331090001 HH PPS REVENUE CREDIT

## 2021-12-12 PROCEDURE — 3331090002 HH PPS REVENUE DEBIT

## 2021-12-13 ENCOUNTER — HOME CARE VISIT (OUTPATIENT)
Dept: HOME HEALTH SERVICES | Facility: HOME HEALTH | Age: 66
End: 2021-12-13
Payer: MEDICARE

## 2021-12-13 ENCOUNTER — HOME CARE VISIT (OUTPATIENT)
Dept: SCHEDULING | Facility: HOME HEALTH | Age: 66
End: 2021-12-13
Payer: MEDICARE

## 2021-12-13 VITALS
HEART RATE: 67 BPM | DIASTOLIC BLOOD PRESSURE: 60 MMHG | OXYGEN SATURATION: 92 % | RESPIRATION RATE: 16 BRPM | TEMPERATURE: 98.1 F | SYSTOLIC BLOOD PRESSURE: 96 MMHG

## 2021-12-13 PROCEDURE — 3331090001 HH PPS REVENUE CREDIT

## 2021-12-13 PROCEDURE — 3331090002 HH PPS REVENUE DEBIT

## 2021-12-13 PROCEDURE — G0151 HHCP-SERV OF PT,EA 15 MIN: HCPCS

## 2021-12-13 PROCEDURE — G0299 HHS/HOSPICE OF RN EA 15 MIN: HCPCS

## 2021-12-14 ENCOUNTER — HOME CARE VISIT (OUTPATIENT)
Dept: HOME HEALTH SERVICES | Facility: HOME HEALTH | Age: 66
End: 2021-12-14
Payer: MEDICARE

## 2021-12-14 ENCOUNTER — HOME CARE VISIT (OUTPATIENT)
Dept: SCHEDULING | Facility: HOME HEALTH | Age: 66
End: 2021-12-14
Payer: MEDICARE

## 2021-12-14 VITALS
RESPIRATION RATE: 18 BRPM | HEART RATE: 71 BPM | DIASTOLIC BLOOD PRESSURE: 60 MMHG | TEMPERATURE: 96.6 F | SYSTOLIC BLOOD PRESSURE: 90 MMHG | OXYGEN SATURATION: 92 %

## 2021-12-14 PROCEDURE — G0299 HHS/HOSPICE OF RN EA 15 MIN: HCPCS

## 2021-12-14 PROCEDURE — 3331090001 HH PPS REVENUE CREDIT

## 2021-12-14 PROCEDURE — 3331090002 HH PPS REVENUE DEBIT

## 2021-12-15 PROCEDURE — 3331090002 HH PPS REVENUE DEBIT

## 2021-12-15 PROCEDURE — 3331090001 HH PPS REVENUE CREDIT

## 2021-12-16 PROCEDURE — 3331090001 HH PPS REVENUE CREDIT

## 2021-12-16 PROCEDURE — 3331090002 HH PPS REVENUE DEBIT

## 2021-12-17 ENCOUNTER — HOME CARE VISIT (OUTPATIENT)
Dept: SCHEDULING | Facility: HOME HEALTH | Age: 66
End: 2021-12-17
Payer: MEDICARE

## 2021-12-17 VITALS
RESPIRATION RATE: 18 BRPM | HEART RATE: 90 BPM | OXYGEN SATURATION: 92 % | DIASTOLIC BLOOD PRESSURE: 64 MMHG | SYSTOLIC BLOOD PRESSURE: 102 MMHG | TEMPERATURE: 98.5 F

## 2021-12-17 VITALS
HEART RATE: 96 BPM | DIASTOLIC BLOOD PRESSURE: 60 MMHG | RESPIRATION RATE: 18 BRPM | OXYGEN SATURATION: 92 % | TEMPERATURE: 97.2 F | SYSTOLIC BLOOD PRESSURE: 100 MMHG

## 2021-12-17 PROCEDURE — 3331090002 HH PPS REVENUE DEBIT

## 2021-12-17 PROCEDURE — G0151 HHCP-SERV OF PT,EA 15 MIN: HCPCS

## 2021-12-17 PROCEDURE — 3331090001 HH PPS REVENUE CREDIT

## 2021-12-17 PROCEDURE — G0299 HHS/HOSPICE OF RN EA 15 MIN: HCPCS

## 2021-12-18 PROCEDURE — 3331090002 HH PPS REVENUE DEBIT

## 2021-12-18 PROCEDURE — 3331090001 HH PPS REVENUE CREDIT

## 2021-12-19 VITALS
TEMPERATURE: 97 F | DIASTOLIC BLOOD PRESSURE: 58 MMHG | SYSTOLIC BLOOD PRESSURE: 118 MMHG | OXYGEN SATURATION: 93 % | HEART RATE: 91 BPM

## 2021-12-19 PROCEDURE — 3331090002 HH PPS REVENUE DEBIT

## 2021-12-19 PROCEDURE — 3331090001 HH PPS REVENUE CREDIT

## 2021-12-20 ENCOUNTER — HOME CARE VISIT (OUTPATIENT)
Dept: HOME HEALTH SERVICES | Facility: HOME HEALTH | Age: 66
End: 2021-12-20
Payer: MEDICARE

## 2021-12-20 DIAGNOSIS — R55 SYNCOPE AND COLLAPSE: ICD-10-CM

## 2021-12-20 DIAGNOSIS — M47.22 CERVICAL RADICULOPATHY DUE TO DEGENERATIVE JOINT DISEASE OF SPINE: ICD-10-CM

## 2021-12-20 DIAGNOSIS — M48.061 DEGENERATIVE LUMBAR SPINAL STENOSIS: ICD-10-CM

## 2021-12-20 DIAGNOSIS — I65.23 STENOSIS OF BOTH INTERNAL CAROTID ARTERIES: ICD-10-CM

## 2021-12-20 DIAGNOSIS — E55.9 VITAMIN D DEFICIENCY: ICD-10-CM

## 2021-12-20 DIAGNOSIS — I67.89 CEREBRAL MICROVASCULAR DISEASE: ICD-10-CM

## 2021-12-20 DIAGNOSIS — R27.0 ATAXIA: ICD-10-CM

## 2021-12-20 DIAGNOSIS — E53.8 B12 DEFICIENCY: ICD-10-CM

## 2021-12-20 DIAGNOSIS — G25.0 BENIGN ESSENTIAL TREMOR SYNDROME: ICD-10-CM

## 2021-12-20 DIAGNOSIS — E11.42 DIABETIC PERIPHERAL NEUROPATHY ASSOCIATED WITH TYPE 2 DIABETES MELLITUS (HCC): ICD-10-CM

## 2021-12-20 DIAGNOSIS — R41.82 ALTERED MENTAL STATUS, UNSPECIFIED ALTERED MENTAL STATUS TYPE: ICD-10-CM

## 2021-12-20 DIAGNOSIS — M96.1 CERVICAL POST-LAMINECTOMY SYNDROME: ICD-10-CM

## 2021-12-20 DIAGNOSIS — M79.7 FIBROMYALGIA: ICD-10-CM

## 2021-12-20 DIAGNOSIS — G25.0 BENIGN FAMILIAL TREMOR: ICD-10-CM

## 2021-12-20 PROCEDURE — 3331090001 HH PPS REVENUE CREDIT

## 2021-12-20 PROCEDURE — 3331090002 HH PPS REVENUE DEBIT

## 2021-12-20 RX ORDER — PRIMIDONE 50 MG/1
TABLET ORAL
Qty: 150 TABLET | Refills: 0 | Status: SHIPPED | OUTPATIENT
Start: 2021-12-20 | End: 2022-01-18

## 2021-12-21 ENCOUNTER — HOME CARE VISIT (OUTPATIENT)
Dept: SCHEDULING | Facility: HOME HEALTH | Age: 66
End: 2021-12-21
Payer: MEDICARE

## 2021-12-21 PROCEDURE — 3331090002 HH PPS REVENUE DEBIT

## 2021-12-21 PROCEDURE — 3331090001 HH PPS REVENUE CREDIT

## 2021-12-22 ENCOUNTER — HOME CARE VISIT (OUTPATIENT)
Dept: SCHEDULING | Facility: HOME HEALTH | Age: 66
End: 2021-12-22
Payer: MEDICARE

## 2021-12-22 VITALS
TEMPERATURE: 97.5 F | DIASTOLIC BLOOD PRESSURE: 60 MMHG | OXYGEN SATURATION: 92 % | RESPIRATION RATE: 18 BRPM | SYSTOLIC BLOOD PRESSURE: 110 MMHG | HEART RATE: 90 BPM

## 2021-12-22 PROCEDURE — 3331090001 HH PPS REVENUE CREDIT

## 2021-12-22 PROCEDURE — 3331090002 HH PPS REVENUE DEBIT

## 2021-12-22 PROCEDURE — G0151 HHCP-SERV OF PT,EA 15 MIN: HCPCS

## 2021-12-23 ENCOUNTER — HOME CARE VISIT (OUTPATIENT)
Dept: SCHEDULING | Facility: HOME HEALTH | Age: 66
End: 2021-12-23
Payer: MEDICARE

## 2021-12-23 VITALS
RESPIRATION RATE: 18 BRPM | DIASTOLIC BLOOD PRESSURE: 70 MMHG | SYSTOLIC BLOOD PRESSURE: 108 MMHG | OXYGEN SATURATION: 90 % | HEART RATE: 90 BPM | TEMPERATURE: 97.9 F

## 2021-12-23 PROCEDURE — G0151 HHCP-SERV OF PT,EA 15 MIN: HCPCS

## 2021-12-23 PROCEDURE — 3331090002 HH PPS REVENUE DEBIT

## 2021-12-23 PROCEDURE — 3331090001 HH PPS REVENUE CREDIT

## 2021-12-23 PROCEDURE — G0300 HHS/HOSPICE OF LPN EA 15 MIN: HCPCS

## 2021-12-24 VITALS
OXYGEN SATURATION: 92 % | HEART RATE: 81 BPM | DIASTOLIC BLOOD PRESSURE: 62 MMHG | SYSTOLIC BLOOD PRESSURE: 110 MMHG | TEMPERATURE: 98.1 F

## 2021-12-24 PROCEDURE — 3331090001 HH PPS REVENUE CREDIT

## 2021-12-24 PROCEDURE — 3331090002 HH PPS REVENUE DEBIT

## 2021-12-25 PROCEDURE — 3331090001 HH PPS REVENUE CREDIT

## 2021-12-25 PROCEDURE — 3331090002 HH PPS REVENUE DEBIT

## 2021-12-26 PROCEDURE — 3331090002 HH PPS REVENUE DEBIT

## 2021-12-26 PROCEDURE — 3331090001 HH PPS REVENUE CREDIT

## 2021-12-27 ENCOUNTER — HOME CARE VISIT (OUTPATIENT)
Dept: SCHEDULING | Facility: HOME HEALTH | Age: 66
End: 2021-12-27
Payer: MEDICARE

## 2021-12-27 PROCEDURE — 3331090002 HH PPS REVENUE DEBIT

## 2021-12-27 PROCEDURE — G0299 HHS/HOSPICE OF RN EA 15 MIN: HCPCS

## 2021-12-27 PROCEDURE — 3331090001 HH PPS REVENUE CREDIT

## 2021-12-28 PROCEDURE — 3331090002 HH PPS REVENUE DEBIT

## 2021-12-28 PROCEDURE — 3331090001 HH PPS REVENUE CREDIT

## 2021-12-29 PROCEDURE — 3331090001 HH PPS REVENUE CREDIT

## 2021-12-29 PROCEDURE — 3331090002 HH PPS REVENUE DEBIT

## 2021-12-30 ENCOUNTER — HOME CARE VISIT (OUTPATIENT)
Dept: SCHEDULING | Facility: HOME HEALTH | Age: 66
End: 2021-12-30
Payer: MEDICARE

## 2021-12-30 VITALS
OXYGEN SATURATION: 98 % | TEMPERATURE: 96.9 F | HEART RATE: 60 BPM | DIASTOLIC BLOOD PRESSURE: 78 MMHG | SYSTOLIC BLOOD PRESSURE: 100 MMHG

## 2021-12-30 PROCEDURE — 3331090001 HH PPS REVENUE CREDIT

## 2021-12-30 PROCEDURE — 3331090002 HH PPS REVENUE DEBIT

## 2021-12-31 PROCEDURE — 3331090002 HH PPS REVENUE DEBIT

## 2021-12-31 PROCEDURE — 3331090001 HH PPS REVENUE CREDIT

## 2022-01-01 PROCEDURE — 3331090001 HH PPS REVENUE CREDIT

## 2022-01-01 PROCEDURE — 3331090002 HH PPS REVENUE DEBIT

## 2022-01-02 PROCEDURE — 3331090002 HH PPS REVENUE DEBIT

## 2022-01-02 PROCEDURE — 3331090001 HH PPS REVENUE CREDIT

## 2022-01-03 PROCEDURE — 3331090001 HH PPS REVENUE CREDIT

## 2022-01-03 PROCEDURE — 3331090002 HH PPS REVENUE DEBIT

## 2022-01-04 ENCOUNTER — HOME CARE VISIT (OUTPATIENT)
Dept: SCHEDULING | Facility: HOME HEALTH | Age: 67
End: 2022-01-04
Payer: MEDICARE

## 2022-01-04 VITALS
RESPIRATION RATE: 17 BRPM | DIASTOLIC BLOOD PRESSURE: 75 MMHG | OXYGEN SATURATION: 96 % | SYSTOLIC BLOOD PRESSURE: 110 MMHG | TEMPERATURE: 98.1 F | HEART RATE: 76 BPM

## 2022-01-04 PROCEDURE — 3331090001 HH PPS REVENUE CREDIT

## 2022-01-04 PROCEDURE — 3331090002 HH PPS REVENUE DEBIT

## 2022-01-04 PROCEDURE — G0151 HHCP-SERV OF PT,EA 15 MIN: HCPCS

## 2022-01-05 ENCOUNTER — HOME CARE VISIT (OUTPATIENT)
Dept: HOME HEALTH SERVICES | Facility: HOME HEALTH | Age: 67
End: 2022-01-05
Payer: MEDICARE

## 2022-01-05 PROCEDURE — 3331090001 HH PPS REVENUE CREDIT

## 2022-01-05 PROCEDURE — 3331090003 HH PPS REVENUE ADJ

## 2022-01-05 PROCEDURE — 3331090002 HH PPS REVENUE DEBIT

## 2022-01-06 ENCOUNTER — HOME CARE VISIT (OUTPATIENT)
Dept: SCHEDULING | Facility: HOME HEALTH | Age: 67
End: 2022-01-06
Payer: MEDICARE

## 2022-01-06 VITALS
OXYGEN SATURATION: 90 % | RESPIRATION RATE: 18 BRPM | SYSTOLIC BLOOD PRESSURE: 104 MMHG | TEMPERATURE: 97.2 F | HEART RATE: 90 BPM | DIASTOLIC BLOOD PRESSURE: 70 MMHG

## 2022-01-06 PROCEDURE — G0151 HHCP-SERV OF PT,EA 15 MIN: HCPCS

## 2022-01-06 PROCEDURE — 400013 HH SOC

## 2022-01-06 PROCEDURE — 3331090002 HH PPS REVENUE DEBIT

## 2022-01-06 PROCEDURE — 3331090001 HH PPS REVENUE CREDIT

## 2022-01-06 PROCEDURE — 400018 HH-NO PAY CLAIM PROCEDURE

## 2022-01-07 PROCEDURE — 3331090002 HH PPS REVENUE DEBIT

## 2022-01-07 PROCEDURE — 3331090001 HH PPS REVENUE CREDIT

## 2022-01-08 PROCEDURE — 3331090002 HH PPS REVENUE DEBIT

## 2022-01-08 PROCEDURE — 3331090001 HH PPS REVENUE CREDIT

## 2022-01-09 PROCEDURE — 3331090002 HH PPS REVENUE DEBIT

## 2022-01-09 PROCEDURE — 3331090001 HH PPS REVENUE CREDIT

## 2022-01-10 PROCEDURE — 3331090002 HH PPS REVENUE DEBIT

## 2022-01-10 PROCEDURE — 3331090001 HH PPS REVENUE CREDIT

## 2022-01-11 ENCOUNTER — HOME CARE VISIT (OUTPATIENT)
Dept: SCHEDULING | Facility: HOME HEALTH | Age: 67
End: 2022-01-11
Payer: MEDICARE

## 2022-01-11 VITALS
SYSTOLIC BLOOD PRESSURE: 120 MMHG | DIASTOLIC BLOOD PRESSURE: 80 MMHG | OXYGEN SATURATION: 93 % | RESPIRATION RATE: 18 BRPM | HEART RATE: 90 BPM | TEMPERATURE: 97.4 F

## 2022-01-11 PROCEDURE — G0151 HHCP-SERV OF PT,EA 15 MIN: HCPCS

## 2022-01-11 PROCEDURE — 3331090002 HH PPS REVENUE DEBIT

## 2022-01-11 PROCEDURE — 3331090001 HH PPS REVENUE CREDIT

## 2022-01-12 PROCEDURE — 3331090002 HH PPS REVENUE DEBIT

## 2022-01-12 PROCEDURE — 3331090001 HH PPS REVENUE CREDIT

## 2022-01-13 ENCOUNTER — HOME CARE VISIT (OUTPATIENT)
Dept: SCHEDULING | Facility: HOME HEALTH | Age: 67
End: 2022-01-13
Payer: MEDICARE

## 2022-01-13 VITALS
RESPIRATION RATE: 18 BRPM | OXYGEN SATURATION: 96 % | DIASTOLIC BLOOD PRESSURE: 80 MMHG | HEART RATE: 86 BPM | SYSTOLIC BLOOD PRESSURE: 119 MMHG | TEMPERATURE: 97.4 F

## 2022-01-13 PROCEDURE — G0151 HHCP-SERV OF PT,EA 15 MIN: HCPCS

## 2022-01-13 PROCEDURE — 3331090002 HH PPS REVENUE DEBIT

## 2022-01-13 PROCEDURE — 3331090001 HH PPS REVENUE CREDIT

## 2022-01-14 ENCOUNTER — HOME CARE VISIT (OUTPATIENT)
Dept: SCHEDULING | Facility: HOME HEALTH | Age: 67
End: 2022-01-14
Payer: MEDICARE

## 2022-01-14 VITALS
TEMPERATURE: 97.8 F | DIASTOLIC BLOOD PRESSURE: 62 MMHG | OXYGEN SATURATION: 92 % | RESPIRATION RATE: 18 BRPM | HEART RATE: 84 BPM | SYSTOLIC BLOOD PRESSURE: 98 MMHG

## 2022-01-14 PROCEDURE — 3331090001 HH PPS REVENUE CREDIT

## 2022-01-14 PROCEDURE — 3331090002 HH PPS REVENUE DEBIT

## 2022-01-14 PROCEDURE — G0299 HHS/HOSPICE OF RN EA 15 MIN: HCPCS

## 2022-01-15 PROCEDURE — 3331090001 HH PPS REVENUE CREDIT

## 2022-01-15 PROCEDURE — 3331090002 HH PPS REVENUE DEBIT

## 2022-01-16 PROCEDURE — 3331090002 HH PPS REVENUE DEBIT

## 2022-01-16 PROCEDURE — 3331090001 HH PPS REVENUE CREDIT

## 2022-01-17 PROCEDURE — 3331090002 HH PPS REVENUE DEBIT

## 2022-01-17 PROCEDURE — 3331090001 HH PPS REVENUE CREDIT

## 2022-01-18 DIAGNOSIS — R41.82 ALTERED MENTAL STATUS, UNSPECIFIED ALTERED MENTAL STATUS TYPE: ICD-10-CM

## 2022-01-18 DIAGNOSIS — R55 SYNCOPE AND COLLAPSE: ICD-10-CM

## 2022-01-18 DIAGNOSIS — G25.0 BENIGN FAMILIAL TREMOR: ICD-10-CM

## 2022-01-18 DIAGNOSIS — R27.0 ATAXIA: ICD-10-CM

## 2022-01-18 DIAGNOSIS — E11.42 DIABETIC PERIPHERAL NEUROPATHY ASSOCIATED WITH TYPE 2 DIABETES MELLITUS (HCC): ICD-10-CM

## 2022-01-18 DIAGNOSIS — I67.89 CEREBRAL MICROVASCULAR DISEASE: ICD-10-CM

## 2022-01-18 DIAGNOSIS — M79.7 FIBROMYALGIA: ICD-10-CM

## 2022-01-18 DIAGNOSIS — I65.23 STENOSIS OF BOTH INTERNAL CAROTID ARTERIES: ICD-10-CM

## 2022-01-18 DIAGNOSIS — M48.061 DEGENERATIVE LUMBAR SPINAL STENOSIS: ICD-10-CM

## 2022-01-18 DIAGNOSIS — E55.9 VITAMIN D DEFICIENCY: ICD-10-CM

## 2022-01-18 DIAGNOSIS — G25.0 BENIGN ESSENTIAL TREMOR SYNDROME: ICD-10-CM

## 2022-01-18 DIAGNOSIS — M47.22 CERVICAL RADICULOPATHY DUE TO DEGENERATIVE JOINT DISEASE OF SPINE: ICD-10-CM

## 2022-01-18 DIAGNOSIS — E53.8 B12 DEFICIENCY: ICD-10-CM

## 2022-01-18 DIAGNOSIS — M96.1 CERVICAL POST-LAMINECTOMY SYNDROME: ICD-10-CM

## 2022-01-18 PROCEDURE — 3331090002 HH PPS REVENUE DEBIT

## 2022-01-18 PROCEDURE — 3331090001 HH PPS REVENUE CREDIT

## 2022-01-18 RX ORDER — PRIMIDONE 50 MG/1
TABLET ORAL
Qty: 150 TABLET | Refills: 0 | Status: SHIPPED | OUTPATIENT
Start: 2022-01-18 | End: 2022-02-16

## 2022-01-19 PROCEDURE — 3331090001 HH PPS REVENUE CREDIT

## 2022-01-19 PROCEDURE — 3331090002 HH PPS REVENUE DEBIT

## 2022-01-20 ENCOUNTER — HOME CARE VISIT (OUTPATIENT)
Dept: SCHEDULING | Facility: HOME HEALTH | Age: 67
End: 2022-01-20
Payer: MEDICARE

## 2022-01-20 VITALS
OXYGEN SATURATION: 91 % | DIASTOLIC BLOOD PRESSURE: 52 MMHG | RESPIRATION RATE: 18 BRPM | TEMPERATURE: 97.2 F | SYSTOLIC BLOOD PRESSURE: 87 MMHG | HEART RATE: 102 BPM

## 2022-01-20 PROCEDURE — G0151 HHCP-SERV OF PT,EA 15 MIN: HCPCS

## 2022-01-20 PROCEDURE — 3331090002 HH PPS REVENUE DEBIT

## 2022-01-20 PROCEDURE — G0299 HHS/HOSPICE OF RN EA 15 MIN: HCPCS

## 2022-01-20 PROCEDURE — 3331090001 HH PPS REVENUE CREDIT

## 2022-01-21 ENCOUNTER — HOME CARE VISIT (OUTPATIENT)
Dept: SCHEDULING | Facility: HOME HEALTH | Age: 67
End: 2022-01-21
Payer: MEDICARE

## 2022-01-21 VITALS
DIASTOLIC BLOOD PRESSURE: 58 MMHG | OXYGEN SATURATION: 99 % | SYSTOLIC BLOOD PRESSURE: 82 MMHG | HEART RATE: 80 BPM | TEMPERATURE: 96.8 F

## 2022-01-21 PROCEDURE — G0151 HHCP-SERV OF PT,EA 15 MIN: HCPCS

## 2022-01-21 PROCEDURE — 3331090001 HH PPS REVENUE CREDIT

## 2022-01-21 PROCEDURE — 3331090002 HH PPS REVENUE DEBIT

## 2022-01-22 VITALS
HEART RATE: 74 BPM | RESPIRATION RATE: 18 BRPM | DIASTOLIC BLOOD PRESSURE: 65 MMHG | SYSTOLIC BLOOD PRESSURE: 94 MMHG | TEMPERATURE: 97.9 F

## 2022-01-22 PROCEDURE — 3331090002 HH PPS REVENUE DEBIT

## 2022-01-22 PROCEDURE — 3331090001 HH PPS REVENUE CREDIT

## 2022-01-23 PROCEDURE — 3331090002 HH PPS REVENUE DEBIT

## 2022-01-23 PROCEDURE — 3331090001 HH PPS REVENUE CREDIT

## 2022-01-24 ENCOUNTER — HOME CARE VISIT (OUTPATIENT)
Dept: SCHEDULING | Facility: HOME HEALTH | Age: 67
End: 2022-01-24
Payer: MEDICARE

## 2022-01-24 PROCEDURE — 3331090002 HH PPS REVENUE DEBIT

## 2022-01-24 PROCEDURE — 3331090001 HH PPS REVENUE CREDIT

## 2022-01-25 ENCOUNTER — HOME CARE VISIT (OUTPATIENT)
Dept: HOME HEALTH SERVICES | Facility: HOME HEALTH | Age: 67
End: 2022-01-25
Payer: MEDICARE

## 2022-01-25 PROCEDURE — 3331090002 HH PPS REVENUE DEBIT

## 2022-01-25 PROCEDURE — 3331090001 HH PPS REVENUE CREDIT

## 2022-01-25 NOTE — PROGRESS NOTES
Physical Therapy  Attempted PT session. Patient had just returned to bed with RN and SLP at bedside. Will check back at later time.   Clari Win, PT, DPT Mahad Franklin(Resident)

## 2022-01-26 ENCOUNTER — HOME CARE VISIT (OUTPATIENT)
Dept: SCHEDULING | Facility: HOME HEALTH | Age: 67
End: 2022-01-26
Payer: MEDICARE

## 2022-01-26 PROCEDURE — G0299 HHS/HOSPICE OF RN EA 15 MIN: HCPCS

## 2022-01-26 PROCEDURE — 3331090001 HH PPS REVENUE CREDIT

## 2022-01-26 PROCEDURE — 3331090002 HH PPS REVENUE DEBIT

## 2022-01-27 ENCOUNTER — HOME CARE VISIT (OUTPATIENT)
Dept: SCHEDULING | Facility: HOME HEALTH | Age: 67
End: 2022-01-27
Payer: MEDICARE

## 2022-01-27 VITALS
OXYGEN SATURATION: 93 % | DIASTOLIC BLOOD PRESSURE: 83 MMHG | SYSTOLIC BLOOD PRESSURE: 128 MMHG | HEART RATE: 78 BPM | RESPIRATION RATE: 18 BRPM | TEMPERATURE: 97.1 F

## 2022-01-27 PROCEDURE — G0151 HHCP-SERV OF PT,EA 15 MIN: HCPCS

## 2022-01-27 PROCEDURE — 3331090002 HH PPS REVENUE DEBIT

## 2022-01-27 PROCEDURE — 3331090001 HH PPS REVENUE CREDIT

## 2022-01-28 VITALS
TEMPERATURE: 98 F | SYSTOLIC BLOOD PRESSURE: 110 MMHG | HEART RATE: 88 BPM | OXYGEN SATURATION: 100 % | DIASTOLIC BLOOD PRESSURE: 62 MMHG

## 2022-01-28 PROCEDURE — 3331090002 HH PPS REVENUE DEBIT

## 2022-01-28 PROCEDURE — 3331090001 HH PPS REVENUE CREDIT

## 2022-01-29 PROCEDURE — 3331090002 HH PPS REVENUE DEBIT

## 2022-01-29 PROCEDURE — 3331090001 HH PPS REVENUE CREDIT

## 2022-01-30 PROCEDURE — 3331090001 HH PPS REVENUE CREDIT

## 2022-01-30 PROCEDURE — 3331090002 HH PPS REVENUE DEBIT

## 2022-01-31 PROCEDURE — 3331090002 HH PPS REVENUE DEBIT

## 2022-01-31 PROCEDURE — 3331090001 HH PPS REVENUE CREDIT

## 2022-02-01 PROCEDURE — 3331090002 HH PPS REVENUE DEBIT

## 2022-02-01 PROCEDURE — 3331090001 HH PPS REVENUE CREDIT

## 2022-02-02 ENCOUNTER — HOME CARE VISIT (OUTPATIENT)
Dept: SCHEDULING | Facility: HOME HEALTH | Age: 67
End: 2022-02-02
Payer: MEDICARE

## 2022-02-02 VITALS
SYSTOLIC BLOOD PRESSURE: 98 MMHG | HEART RATE: 88 BPM | RESPIRATION RATE: 18 BRPM | DIASTOLIC BLOOD PRESSURE: 60 MMHG | TEMPERATURE: 97.6 F | OXYGEN SATURATION: 92 %

## 2022-02-02 PROCEDURE — 3331090002 HH PPS REVENUE DEBIT

## 2022-02-02 PROCEDURE — 3331090001 HH PPS REVENUE CREDIT

## 2022-02-02 PROCEDURE — G0299 HHS/HOSPICE OF RN EA 15 MIN: HCPCS

## 2022-02-03 PROCEDURE — 3331090002 HH PPS REVENUE DEBIT

## 2022-02-03 PROCEDURE — 3331090001 HH PPS REVENUE CREDIT

## 2022-02-04 PROCEDURE — 3331090002 HH PPS REVENUE DEBIT

## 2022-02-04 PROCEDURE — 3331090001 HH PPS REVENUE CREDIT

## 2022-02-05 PROCEDURE — 3331090002 HH PPS REVENUE DEBIT

## 2022-02-05 PROCEDURE — 3331090001 HH PPS REVENUE CREDIT

## 2022-02-06 PROCEDURE — 3331090002 HH PPS REVENUE DEBIT

## 2022-02-06 PROCEDURE — 3331090001 HH PPS REVENUE CREDIT

## 2022-02-07 PROCEDURE — 3331090001 HH PPS REVENUE CREDIT

## 2022-02-07 PROCEDURE — 3331090002 HH PPS REVENUE DEBIT

## 2022-02-08 PROCEDURE — 3331090002 HH PPS REVENUE DEBIT

## 2022-02-08 PROCEDURE — 3331090001 HH PPS REVENUE CREDIT

## 2022-02-09 ENCOUNTER — HOME CARE VISIT (OUTPATIENT)
Dept: SCHEDULING | Facility: HOME HEALTH | Age: 67
End: 2022-02-09
Payer: MEDICARE

## 2022-02-09 PROCEDURE — 3331090001 HH PPS REVENUE CREDIT

## 2022-02-09 PROCEDURE — 3331090002 HH PPS REVENUE DEBIT

## 2022-02-09 PROCEDURE — G0299 HHS/HOSPICE OF RN EA 15 MIN: HCPCS

## 2022-02-09 PROCEDURE — 400014 HH F/U

## 2022-02-10 PROCEDURE — 3331090002 HH PPS REVENUE DEBIT

## 2022-02-10 PROCEDURE — 3331090001 HH PPS REVENUE CREDIT

## 2022-02-11 VITALS
OXYGEN SATURATION: 93 % | SYSTOLIC BLOOD PRESSURE: 108 MMHG | TEMPERATURE: 97.5 F | HEART RATE: 78 BPM | DIASTOLIC BLOOD PRESSURE: 62 MMHG

## 2022-02-11 PROCEDURE — 3331090001 HH PPS REVENUE CREDIT

## 2022-02-11 PROCEDURE — 3331090002 HH PPS REVENUE DEBIT

## 2022-02-12 PROCEDURE — 3331090002 HH PPS REVENUE DEBIT

## 2022-02-12 PROCEDURE — 3331090001 HH PPS REVENUE CREDIT

## 2022-02-13 PROCEDURE — 3331090001 HH PPS REVENUE CREDIT

## 2022-02-13 PROCEDURE — 3331090002 HH PPS REVENUE DEBIT

## 2022-02-14 PROCEDURE — 3331090002 HH PPS REVENUE DEBIT

## 2022-02-14 PROCEDURE — 3331090001 HH PPS REVENUE CREDIT

## 2022-02-15 PROCEDURE — 3331090002 HH PPS REVENUE DEBIT

## 2022-02-15 PROCEDURE — 3331090001 HH PPS REVENUE CREDIT

## 2022-02-16 ENCOUNTER — HOME CARE VISIT (OUTPATIENT)
Dept: SCHEDULING | Facility: HOME HEALTH | Age: 67
End: 2022-02-16
Payer: MEDICARE

## 2022-02-16 ENCOUNTER — OFFICE VISIT (OUTPATIENT)
Dept: CARDIOLOGY CLINIC | Age: 67
End: 2022-02-16
Payer: MEDICARE

## 2022-02-16 VITALS
RESPIRATION RATE: 18 BRPM | HEART RATE: 114 BPM | OXYGEN SATURATION: 96 % | SYSTOLIC BLOOD PRESSURE: 100 MMHG | DIASTOLIC BLOOD PRESSURE: 70 MMHG | BODY MASS INDEX: 29.88 KG/M2 | HEIGHT: 64 IN | WEIGHT: 175 LBS

## 2022-02-16 DIAGNOSIS — G25.0 BENIGN FAMILIAL TREMOR: ICD-10-CM

## 2022-02-16 DIAGNOSIS — M79.7 FIBROMYALGIA: ICD-10-CM

## 2022-02-16 DIAGNOSIS — E78.2 MIXED HYPERLIPIDEMIA: ICD-10-CM

## 2022-02-16 DIAGNOSIS — R55 SYNCOPE AND COLLAPSE: ICD-10-CM

## 2022-02-16 DIAGNOSIS — I65.23 STENOSIS OF BOTH INTERNAL CAROTID ARTERIES: ICD-10-CM

## 2022-02-16 DIAGNOSIS — R60.0 BILATERAL EDEMA OF LOWER EXTREMITY: ICD-10-CM

## 2022-02-16 DIAGNOSIS — M96.1 CERVICAL POST-LAMINECTOMY SYNDROME: ICD-10-CM

## 2022-02-16 DIAGNOSIS — E55.9 VITAMIN D DEFICIENCY: ICD-10-CM

## 2022-02-16 DIAGNOSIS — M48.061 DEGENERATIVE LUMBAR SPINAL STENOSIS: ICD-10-CM

## 2022-02-16 DIAGNOSIS — E11.42 DIABETIC PERIPHERAL NEUROPATHY ASSOCIATED WITH TYPE 2 DIABETES MELLITUS (HCC): ICD-10-CM

## 2022-02-16 DIAGNOSIS — I27.81 COR PULMONALE (CHRONIC) (HCC): Primary | ICD-10-CM

## 2022-02-16 DIAGNOSIS — R41.82 ALTERED MENTAL STATUS, UNSPECIFIED ALTERED MENTAL STATUS TYPE: ICD-10-CM

## 2022-02-16 DIAGNOSIS — I67.89 CEREBRAL MICROVASCULAR DISEASE: ICD-10-CM

## 2022-02-16 DIAGNOSIS — I10 HYPERTENSION, UNSPECIFIED TYPE: ICD-10-CM

## 2022-02-16 DIAGNOSIS — G25.0 BENIGN ESSENTIAL TREMOR SYNDROME: ICD-10-CM

## 2022-02-16 DIAGNOSIS — M47.22 CERVICAL RADICULOPATHY DUE TO DEGENERATIVE JOINT DISEASE OF SPINE: ICD-10-CM

## 2022-02-16 DIAGNOSIS — J44.1 COPD WITH ACUTE EXACERBATION (HCC): ICD-10-CM

## 2022-02-16 DIAGNOSIS — E53.8 B12 DEFICIENCY: ICD-10-CM

## 2022-02-16 DIAGNOSIS — R27.0 ATAXIA: ICD-10-CM

## 2022-02-16 PROCEDURE — G8752 SYS BP LESS 140: HCPCS | Performed by: INTERNAL MEDICINE

## 2022-02-16 PROCEDURE — 99214 OFFICE O/P EST MOD 30 MIN: CPT | Performed by: INTERNAL MEDICINE

## 2022-02-16 PROCEDURE — G8417 CALC BMI ABV UP PARAM F/U: HCPCS | Performed by: INTERNAL MEDICINE

## 2022-02-16 PROCEDURE — 1101F PT FALLS ASSESS-DOCD LE1/YR: CPT | Performed by: INTERNAL MEDICINE

## 2022-02-16 PROCEDURE — 3331090001 HH PPS REVENUE CREDIT

## 2022-02-16 PROCEDURE — 3046F HEMOGLOBIN A1C LEVEL >9.0%: CPT | Performed by: INTERNAL MEDICINE

## 2022-02-16 PROCEDURE — G8400 PT W/DXA NO RESULTS DOC: HCPCS | Performed by: INTERNAL MEDICINE

## 2022-02-16 PROCEDURE — G8754 DIAS BP LESS 90: HCPCS | Performed by: INTERNAL MEDICINE

## 2022-02-16 PROCEDURE — G8427 DOCREV CUR MEDS BY ELIG CLIN: HCPCS | Performed by: INTERNAL MEDICINE

## 2022-02-16 PROCEDURE — 3331090002 HH PPS REVENUE DEBIT

## 2022-02-16 PROCEDURE — G9717 DOC PT DX DEP/BP F/U NT REQ: HCPCS | Performed by: INTERNAL MEDICINE

## 2022-02-16 PROCEDURE — 93000 ELECTROCARDIOGRAM COMPLETE: CPT | Performed by: INTERNAL MEDICINE

## 2022-02-16 PROCEDURE — 3017F COLORECTAL CA SCREEN DOC REV: CPT | Performed by: INTERNAL MEDICINE

## 2022-02-16 PROCEDURE — G8536 NO DOC ELDER MAL SCRN: HCPCS | Performed by: INTERNAL MEDICINE

## 2022-02-16 PROCEDURE — 2022F DILAT RTA XM EVC RTNOPTHY: CPT | Performed by: INTERNAL MEDICINE

## 2022-02-16 PROCEDURE — 1090F PRES/ABSN URINE INCON ASSESS: CPT | Performed by: INTERNAL MEDICINE

## 2022-02-16 RX ORDER — PRIMIDONE 50 MG/1
TABLET ORAL
Qty: 150 TABLET | Refills: 0 | Status: SHIPPED | OUTPATIENT
Start: 2022-02-16 | End: 2022-03-15

## 2022-02-16 NOTE — LETTER
2/16/2022    Patient: Rhea Hamilton   YOB: 1955   Date of Visit: 2/16/2022     Myla Santos MD  [address] [place] [address]  Via Fax: [phone]    Dear Myla Santos MD,      Thank you for referring Ms. Adriana Riddle to [facility] for evaluation. My notes for this consultation are attached. If you have questions, please do not hesitate to call me. I look forward to following your patient along with you.       Sincerely,    Cara Tobias MD

## 2022-02-16 NOTE — PROGRESS NOTES
1. Have you been to the ER, urgent care clinic since your last visit? Hospitalized since your last visit? No    2. Have you seen or consulted any other health care providers outside of the [address] [place] [address] since your last visit? Include any pap smears or colon screening.  No     Chief Complaint   Patient presents with    New Patient     blood pressure flucuation    Leg Swelling     both legs

## 2022-02-16 NOTE — PROGRESS NOTES
2 [address], [address] [facility]  [phone]     Subjective:      Elisa Vera is a 77 y.o. female is here to reestablish care. She was last seen by me in 2013. She had a recent hospital admission for aspiration pneumonia with COPD exacerbation requiring intubation and was discharged in early December 2022:      Acute hypoxic hypercarbic respiratory failure secondary to COPD exacerbation, s/p extubation 11/26  Pulmonary HTN  Pt on 10 L NC - wean as tolerated  PT/OT evals  Continue inhalers     12/4:  Weaned down to 2-4L NC - continue to wean as tolerated  Transfer off PCU  Pt will need O2 challenge test and O2 ordered for likely discharge tomorrow     12/6:  Pt stable for discharge home on 2L NC  Home O2 to be delivered     DMII  FS monitoring, SS    Hypothyroidism  Fibromyalgia  HTN  HLD  Depression  PTSD  Significant and frankly heartbreaking childhood issues leading to significant anxiety/depression/PTSD. Continue home meds    She notes that in the hospital her blood pressure was high so they restarted a small dose of metoprolol which had previously been stopped due to low blood pressure. She had echo on 11/26/2021 showing the following:  · LV: Estimated LVEF is 55 - 60%. Visually measured ejection fraction. Normal cavity size and wall thickness. · PA: Moderate pulmonary hypertension. Pulmonary arterial systolic pressure is 54 mmHg. · TV: Mild to moderate tricuspid valve regurgitation is present. · RV: Not well visualized. Dilated right ventricle. Moderately reduced systolic function. · RA: Moderately dilated right atrium. · IVC: Severely elevated central venous pressure (15 mmHg); IVC diameter is larger than 21 mm and collapses less than 50% with respiration. The patient denies chest pain/ shortness of breath, orthopnea, PND, LE edema, palpitations, syncope, or presyncope.        Patient Active Problem List    Diagnosis Date Noted    COPD with acute exacerbation ([facility] 03/13/2015    Acute respiratory failure with hypoxia (HCC) 03/13/2015    Acute respiratory failure ([facility] 01/16/2014    Pneumonia, organism unspecified(486) 01/16/2014    Hypercapnic respiratory failure ([facility] 11/25/2021    Right-sided chest pain     Chronic pain syndrome     Empyema (HCC) 08/12/2018    Shortness of breath 08/06/2018    Drug-induced constipation 08/06/2018    Productive cough 08/02/2018    Chest pain on breathing 08/02/2018    Anxiety and depression 08/02/2018    Polypharmacy 08/02/2018    CAP (community acquired pneumonia) 07/31/2018    Benign essential tremor syndrome 02/21/2018    Obesity (BMI 35.0-39.9 without comorbidity) 05/27/2017    Fibromyalgia 05/27/2017    Altered mental status, unspecified 05/17/2017    Cerebral microvascular disease 05/17/2017    Diabetic peripheral neuropathy associated with type 2 diabetes mellitus ([facility] 05/16/2017    Stenosis of both internal carotid arteries 05/16/2017    Cervical radiculopathy due to degenerative joint disease of spine 05/16/2017    Degenerative lumbar spinal stenosis 05/16/2017    Vitamin D deficiency 05/16/2017    Atelectasis 04/01/2015    Allergic rhinitis 04/01/2015    Sepsis ([facility] 04/01/2015    Acute exacerbation of COPD with asthma ([facility] 04/01/2015    HTN (hypertension) 04/01/2015    Hyperlipidemia 04/01/2015    Hypothyroidism 04/01/2015    Smoking 04/01/2015    COPD (chronic obstructive pulmonary disease) ([facility] 03/13/2015    Chronic respiratory failure with hypoxia ([facility] 03/13/2015    Benign familial tremor 03/13/2015    Syncope and collapse 12/18/2014    Cervical post-laminectomy syndrome 11/25/2014    Neck pain 11/25/2014    Ataxia 11/25/2014    Polyneuropathy in diabetes(357.2) 11/25/2014    Depression, major, recurrent ([facility] 10/13/2014    Chest pain with normal coronary angiography 07/11/2013    Abnormal nuclear stress test 07/11/2013    PVC (premature ventricular contraction) 2013    Tobacco use disorder 2013    Family history of ischemic heart disease 2013    PTSD (post-traumatic stress disorder) 2013      Tonja Murcia MD  Past Medical History:   Diagnosis Date    Anxiety     Asthma     Bone spur     NECK    COPD     Depression     Diabetes ([facility]     Endocrine disease     hypothyroidism    Essential hypertension     Fibromyalgia     Fusion of spine of cervical region     Gastrointestinal disorder     gerd    Hyperlipidemia     Hypothyroid     Morbid obesity ([facility]     Osteoarthritis     PUD (peptic ulcer disease)     Syncope     Tobacco abuse       Past Surgical History:   Procedure Laterality Date    BRONCHOSCOPY-FIBER/THERAPY  4/3/2015         CARDIAC CATHETERIZATION  2013         COLONOSCOPY,DIAGNOSTIC  10/30/2014         HX CATARACT REMOVAL      bilateral    HX GYN          HX ORTHOPAEDIC      Ruptured disc, knuckles on right hand    UPPER GI ENDOSCOPY,BIOPSY  10/30/2014         UPPER GI ENDOSCOPY,DILATN W GUIDE  10/30/2014          Allergies   Allergen Reactions    Latex Contact Dermatitis    Dilaudid [Hydromorphone (Pf)] Other (comments)     Feels like bugs are crawling all over her    Lipitor [Atorvastatin] Other (comments)     LIVER TROUBLE ON THIS MEDICATION    Remeron [Mirtazapine] Other (comments)     Tremors    Sulfa (Sulfonamide Antibiotics) Itching      Family History   Problem Relation Age of Onset    Heart Disease Mother     Dementia Mother     Thyroid Disease Mother     Heart Disease Father     Alcohol abuse Father     Psychiatric Disorder Father     Dementia Father     Bipolar Disorder Father     Psychiatric Disorder Sister     Suicide Sister     Psychiatric Disorder Brother     Suicide Brother     Psychiatric Disorder Other     Psychiatric Disorder Daughter     Bipolar Disorder Daughter     Coronary Art Dis Other         multiple family members in 52's      Social History Socioeconomic History    Marital status:      Spouse name: Not on file    Number of children: Not on file    Years of education: Not on file    Highest education level: Not on file   Occupational History    Not on file   Tobacco Use    Smoking status: Former Smoker     Packs/day: 1.00     Years: 30.00     Pack years: 30.00    Smokeless tobacco: Never Used   Substance and Sexual Activity    Alcohol use: Yes     Comment: occasional    Drug use: No    Sexual activity: Not on file   Other Topics Concern    Not on file   Social History Narrative    Not on file     Social Determinants of Health     Financial Resource Strain:     Difficulty of Paying Living Expenses: Not on file   Food Insecurity:     Worried About Running Out of Food in the Last Year: Not on file    Chauncey of Food in the Last Year: Not on file   Transportation Needs:     Lack of Transportation (Medical): Not on file    Lack of Transportation (Non-Medical):  Not on file   Physical Activity:     Days of Exercise per Week: Not on file    Minutes of Exercise per Session: Not on file   Stress:     Feeling of Stress : Not on file   Social Connections:     Frequency of Communication with Friends and Family: Not on file    Frequency of Social Gatherings with Friends and Family: Not on file    Attends Jehovah's witness Services: Not on file    Active Member of [address] [place] [address] [employer] or Organizations: Not on file    Attends Club or Organization Meetings: Not on file    Marital Status: Not on file   Intimate Partner Violence:     Fear of Current or Ex-Partner: Not on file    Emotionally Abused: Not on file    Physically Abused: Not on file    Sexually Abused: Not on file   Housing Stability:     Unable to Pay for Housing in the Last Year: Not on file    Number of [place] in the Last Year: Not on file    Unstable Housing in the Last Year: Not on file      Current Outpatient Medications   Medication Sig    primidone (MYSOLINE) 50 mg tablet TAKE 2 TABLETS EVERY MORNING AND 3 TABLETS AT BEDTIME (Patient taking differently: TAKE 2 TABLETS by mouth EVERY MORNING AND 3 TABLETS AT BEDTIME)    nicotine (NICODERM CQ) 14 mg/24 hr patch 1 Patch by TransDERmal route every twenty-four (24) hours.  prazosin (MINIPRESS) 2 mg capsule TAKE ONE CAPSULE BY MOUTH 2 TIMES A DAY    DULoxetine (CYMBALTA) 30 mg capsule Take 1 Capsule by mouth daily.  guaiFENesin (ORGANIDIN) 400 mg tablet Take 400 mg by mouth every four (4) hours as needed for Congestion.  vit B Cmplx 3-FA-Vit C-Biotin (NEPHRO SHREYA RX) 1- mg-mg-mcg tablet Take 1 Tablet by mouth daily.  OXYGEN-AIR DELIVERY SYSTEMS 2-4 L/min by Nasal route continuous.  furosemide (LASIX) 40 mg tablet Take 1 Tablet by mouth daily. (Patient taking differently: Take 80 mg by mouth daily.)    ergocalciferol (Vitamin D2) 1,250 mcg (50,000 unit) capsule Take 50,000 Units by mouth Every Friday.  methocarbamoL (ROBAXIN) 750 mg tablet Take 750 mg by mouth six (6) times daily.  PHENobarbitaL (LUMINAL) 60 mg tablet Take 60 mg by mouth two (2) times a day. tremors    simethicone (GAS-X) 125 mg capsule Take 125 mg by mouth four (4) times daily as needed for Flatulence.  diphenhydrAMINE (Benadryl Allergy) 25 mg tablet Take 25 mg by mouth every six (6) hours as needed for Allergies.  benzonatate (Tessalon Perles) 100 mg capsule Take 200 mg by mouth three (3) times daily as needed for Cough.  sodium chloride (Ayr Saline) 0.65 % drop 2 Drops by Both Nostrils route every two (2) hours as needed for Congestion.  risperiDONE (RisperDAL) 0.25 mg tablet TAKE ONE TABLET BY MOUTH EVERY DAY AS NEEDED. (Patient taking differently: Take 0.25 mg by mouth daily as needed for PRN Reason (Other) (anxiety). TAKE ONE TABLET BY MOUTH EVERY DAY AS NEEDED.)    HYDROcodone-acetaminophen (NORCO) 7.5-325 mg per tablet Take 1 Tab by mouth three (3) times daily as needed.     traMADol (ULTRAM) 50 mg tablet Take 1 Tablet by mouth three (3) times daily as needed for Pain. take 1 tablet by mouth 3 times/daily for moderate to severe pain.  ferrous fumarate/vit Bcomp,C (SUPER B COMPLEX PO) Take 1 Tablet by mouth daily.  guaiFENesin (MUCINEX) 1,200 mg Ta12 ER tablet Take 1 Tab by mouth two (2) times a day.  potassium chloride SR (K-TAB) 20 mEq tablet Take 1 Tab by mouth two (2) times a day. (Patient taking differently: Take 40 mEq by mouth two (2) times a day. take 2 tablets by mouth twice a day)    acetaminophen-codeine (TYLENOL #3) 300-30 mg per tablet Take 1 Tab by mouth every eight (8) hours as needed. Max Daily Amount: 3 Tabs. (Patient taking differently: Take 1 Tablet by mouth every eight (8) hours as needed for Pain. take 1 tablet by mouth every 8 hours as needed for moderate to severe pain)    ALPRAZolam (XANAX) 1 mg tablet Take 1 Tab by mouth three (3) times daily as needed for Anxiety. Max Daily Amount: 3 mg.  cetirizine (ZYRTEC) 10 mg tablet Take 1 Tab by mouth daily.  promethazine (PHENERGAN) 25 mg tablet Take 1 Tab by mouth every six (6) hours as needed. (Patient taking differently: Take 25 mg by mouth every six (6) hours as needed for Nausea.)    ibuprofen (MOTRIN) 800 mg tablet Take 1 Tab by mouth every eight (8) hours as needed. (Patient taking differently: Take 800 mg by mouth every eight (8) hours as needed for Pain.)    albuterol (VENTOLIN HFA) 90 mcg/actuation inhaler Take 2 Puffs by inhalation every four (4) hours as needed for Wheezing.  dicyclomine (BENTYL) 10 mg capsule Take 10 mg by mouth daily as needed.  glipiZIDE SR (GLUCOTROL) 5 mg CR tablet Take 5 mg by mouth two (2) times a day. Pt states prn    montelukast (SINGULAIR) 10 mg tablet Take 10 mg by mouth daily.  ezetimibe-simvastatin (VYTORIN 10/40) 10-40 mg per tablet Take 1 tablet by mouth nightly.     albuterol-ipratropium (DUONEB) 2.5 mg-0.5 mg/3 ml nebulizer solution 3 mL by Nebulization route every six (6) hours as needed for Wheezing.  esomeprazole (NEXIUM) 40 mg capsule Take 40 mg by mouth daily.  MAGOX 400 mg tablet TAKE ONE TABLET TWICE A DAY    aspirin 81 mg chewable tablet Take 81 mg by mouth daily.  levothyroxine (SYNTHROID) 200 mcg tablet Take 200 mcg by mouth daily (before breakfast).  metoprolol tartrate (LOPRESSOR) 25 mg tablet Take 0.5 Tablets by mouth every twelve (12) hours. (Patient not taking: Reported on 2/16/2022)    sennosides 25 mg tab Take 25 mg by mouth three (3) times daily as needed for PRN Reason (Other) (constipation). (Patient not taking: Reported on 2/16/2022)    naloxone [facility]) 4 mg/actuation nasal spray Use 1 spray intranasally, then discard. Repeat with new spray every 2 min as needed for opioid overdose symptoms, alternating nostrils. (Patient not taking: Reported on 2/16/2022)    budesonide-formoterol (SYMBICORT) 160-4.5 mcg/actuation HFAA Take 2 Puffs by inhalation two (2) times a day. (Patient not taking: Reported on 2/16/2022)     No current facility-administered medications for this visit. Review of Symptoms:  11 systems reviewed, negative other than as stated in the HPI    Physical ExamPhysical Exam:    Vitals:    02/16/22 1407 02/16/22 1432   BP: 110/70 100/70   Pulse: (!) 103 (!) 114   Resp: 18    SpO2: 96%    Weight: 175 lb (79.4 kg)    Height: 5' 4\" (1.626 m)      Body mass index is 30.04 kg/m². General PE  Gen:  NAD, on oxygen, with Rollator in room  Mental Status - Alert. General Appearance - Not in acute distress. HEENT:  PERRL, no carotid bruits or JVD  Chest and Lung Exam   Inspection: Accessory muscles - No use of accessory muscles in breathing. Auscultation:   Breath sounds: - Normal.   Cardiovascular   Inspection: Jugular vein - Bilateral - Inspection Normal.   Palpation/Percussion:   Apical Impulse: - Normal.   Auscultation: Rhythm - Regular. Heart Sounds - S1 WNL and S2 WNL. No S3 or S4. Murmurs & Other Heart Sounds:  Auscultation of the heart reveals - No Murmurs. Peripheral Vascular   Upper Extremity: Inspection - Bilateral - No Cyanotic nailbeds or Digital clubbing. Lower Extremity:   Palpation: Edema - Bilateral -trace ankle edema bilaterally   abdomen:   Soft, non-tender, bowel sounds are active. Neuro: A&O times 3, CN and motor grossly WNL    Labs:   Lab Results   Component Value Date/Time    Cholesterol, total 254 (H) 01/19/2014 02:45 AM    Cholesterol, total 260 (H) 01/18/2014 03:40 AM    Cholesterol, total 149 06/24/2013 12:14 PM    HDL Cholesterol 97 01/19/2014 02:45 AM    HDL Cholesterol 103 01/18/2014 03:40 AM    HDL Cholesterol 75 06/24/2013 12:14 PM    LDL, calculated 121 (H) 01/19/2014 02:45 AM    LDL, calculated 128 (H) 01/18/2014 03:40 AM    LDL, calculated 62 06/24/2013 12:14 PM    Triglyceride 180 (H) 01/19/2014 02:45 AM    Triglyceride 145 01/18/2014 03:40 AM    Triglyceride 61 06/24/2013 12:14 PM    CHOL/HDL Ratio 2.6 01/19/2014 02:45 AM    CHOL/HDL Ratio 2.5 01/18/2014 03:40 AM     Lab Results   Component Value Date/Time    CK 42 03/31/2015 09:50 PM     Lab Results   Component Value Date/Time    Sodium 135 (L) 12/04/2021 03:15 AM    Potassium 3.7 12/04/2021 03:15 AM    Chloride 101 12/04/2021 03:15 AM    CO2 29 12/04/2021 03:15 AM    Anion gap 5 12/04/2021 03:15 AM    Glucose 116 (H) 12/04/2021 03:15 AM    BUN 10 12/04/2021 03:15 AM    Creatinine 0.53 (L) 12/04/2021 03:15 AM    BUN/Creatinine ratio 19 12/04/2021 03:15 AM    GFR est AA >60 12/04/2021 03:15 AM    GFR est non-AA >60 12/04/2021 03:15 AM    Calcium 10.2 (H) 12/04/2021 03:15 AM    Bilirubin, total 0.3 11/26/2021 02:39 AM    Alk. phosphatase 105 11/26/2021 02:39 AM    Protein, total 6.3 (L) 11/26/2021 02:39 AM    Albumin 2.8 (L) 11/26/2021 02:39 AM    Globulin 3.5 11/26/2021 02:39 AM    A-G Ratio 0.8 (L) 11/26/2021 02:39 AM    ALT (SGPT) 55 11/26/2021 02:39 AM       EKG:  NSR      Assessment:        ICD-10-CM ICD-9-CM    1.  Cor pulmonale (chronic) (HCC)  I27.81 416.9 ECHO ADULT COMPLETE   2. Hypertension, unspecified type  I10 401.9 AMB POC EKG ROUTINE W/ 12 LEADS, INTER & REP      ECHO ADULT COMPLETE   3. Bilateral edema of lower extremity  R60.0 782.3 ECHO ADULT COMPLETE   4. Mixed hyperlipidemia  E78.2 272.2 ECHO ADULT COMPLETE   5. COPD with acute exacerbation (HCC)  J44.1 491.21 ECHO ADULT COMPLETE   6. Diabetic peripheral neuropathy associated with type 2 diabetes mellitus (HCC)  E11.42 250.60 ECHO ADULT COMPLETE     357.2        Orders Placed This Encounter    AMB POC EKG ROUTINE W/ 12 LEADS, INTER & REP     Order Specific Question:   Reason for Exam:     Answer:   routine        Plan:     77year-old with hospitalization December 2021 for aspiration pneumonia with COPD. Currently feeling better on oxygen. Echo showed evidence of cor pulmonale. Cor pulmonale due to COPD with aspiration pneumonia December 2021:  Otherwise trace to 1+ bilateral ankle edema controlled  Recheck echo in 6 months at CAD office prior to follow-up. Continue treatment of underlying lung disease and diuretic. Creatinine 0.5 at hospital discharge. Mixed hyperlipidemia/cardiac risk factors:  Continue Vytorin, aspirin, and beta-blocker. No symptoms to indicate need for stress testing at this point. Lipids followed by PCP.     Type 2 diabetes:  As per PCP    Blood pressure controlled    Continue current care and check echo at [facility] office prior to 6-month follow-up    Nancy Koehler MD

## 2022-02-17 PROCEDURE — 3331090002 HH PPS REVENUE DEBIT

## 2022-02-17 PROCEDURE — 3331090001 HH PPS REVENUE CREDIT

## 2022-02-18 PROCEDURE — 3331090001 HH PPS REVENUE CREDIT

## 2022-02-18 PROCEDURE — 3331090002 HH PPS REVENUE DEBIT

## 2022-02-19 PROCEDURE — 3331090002 HH PPS REVENUE DEBIT

## 2022-02-19 PROCEDURE — 3331090001 HH PPS REVENUE CREDIT

## 2022-02-20 PROCEDURE — 3331090001 HH PPS REVENUE CREDIT

## 2022-02-20 PROCEDURE — 3331090002 HH PPS REVENUE DEBIT

## 2022-02-21 PROCEDURE — 3331090002 HH PPS REVENUE DEBIT

## 2022-02-21 PROCEDURE — 3331090001 HH PPS REVENUE CREDIT

## 2022-02-22 PROCEDURE — 3331090001 HH PPS REVENUE CREDIT

## 2022-02-22 PROCEDURE — 3331090002 HH PPS REVENUE DEBIT

## 2022-02-23 ENCOUNTER — HOME CARE VISIT (OUTPATIENT)
Dept: SCHEDULING | Facility: HOME HEALTH | Age: 67
End: 2022-02-23
Payer: MEDICARE

## 2022-02-23 PROCEDURE — 3331090002 HH PPS REVENUE DEBIT

## 2022-02-23 PROCEDURE — G0299 HHS/HOSPICE OF RN EA 15 MIN: HCPCS

## 2022-02-23 PROCEDURE — 3331090001 HH PPS REVENUE CREDIT

## 2022-02-24 PROCEDURE — 3331090002 HH PPS REVENUE DEBIT

## 2022-02-24 PROCEDURE — 3331090001 HH PPS REVENUE CREDIT

## 2022-02-25 VITALS
SYSTOLIC BLOOD PRESSURE: 108 MMHG | TEMPERATURE: 97 F | HEART RATE: 80 BPM | DIASTOLIC BLOOD PRESSURE: 70 MMHG | OXYGEN SATURATION: 95 %

## 2022-02-25 PROCEDURE — 3331090001 HH PPS REVENUE CREDIT

## 2022-02-25 PROCEDURE — 3331090002 HH PPS REVENUE DEBIT

## 2022-02-26 PROCEDURE — 3331090002 HH PPS REVENUE DEBIT

## 2022-02-26 PROCEDURE — 3331090001 HH PPS REVENUE CREDIT

## 2022-02-27 PROCEDURE — 3331090002 HH PPS REVENUE DEBIT

## 2022-02-27 PROCEDURE — 3331090001 HH PPS REVENUE CREDIT

## 2022-02-28 PROCEDURE — 3331090002 HH PPS REVENUE DEBIT

## 2022-02-28 PROCEDURE — 3331090001 HH PPS REVENUE CREDIT

## 2022-03-01 ENCOUNTER — HOME CARE VISIT (OUTPATIENT)
Dept: SCHEDULING | Facility: HOME HEALTH | Age: 67
End: 2022-03-01
Payer: MEDICARE

## 2022-03-01 PROCEDURE — G0299 HHS/HOSPICE OF RN EA 15 MIN: HCPCS

## 2022-03-01 PROCEDURE — 3331090002 HH PPS REVENUE DEBIT

## 2022-03-01 PROCEDURE — 3331090001 HH PPS REVENUE CREDIT

## 2022-03-02 VITALS
SYSTOLIC BLOOD PRESSURE: 122 MMHG | RESPIRATION RATE: 20 BRPM | DIASTOLIC BLOOD PRESSURE: 64 MMHG | OXYGEN SATURATION: 95 % | HEART RATE: 85 BPM | TEMPERATURE: 97.8 F

## 2022-03-02 PROCEDURE — 3331090001 HH PPS REVENUE CREDIT

## 2022-03-02 PROCEDURE — 3331090002 HH PPS REVENUE DEBIT

## 2022-03-15 DIAGNOSIS — G25.0 BENIGN ESSENTIAL TREMOR SYNDROME: ICD-10-CM

## 2022-03-15 DIAGNOSIS — M96.1 CERVICAL POST-LAMINECTOMY SYNDROME: ICD-10-CM

## 2022-03-15 DIAGNOSIS — I65.23 STENOSIS OF BOTH INTERNAL CAROTID ARTERIES: ICD-10-CM

## 2022-03-15 DIAGNOSIS — G25.0 BENIGN FAMILIAL TREMOR: ICD-10-CM

## 2022-03-15 DIAGNOSIS — R27.0 ATAXIA: ICD-10-CM

## 2022-03-15 DIAGNOSIS — R41.82 ALTERED MENTAL STATUS, UNSPECIFIED ALTERED MENTAL STATUS TYPE: ICD-10-CM

## 2022-03-15 DIAGNOSIS — R55 SYNCOPE AND COLLAPSE: ICD-10-CM

## 2022-03-15 DIAGNOSIS — M47.22 CERVICAL RADICULOPATHY DUE TO DEGENERATIVE JOINT DISEASE OF SPINE: ICD-10-CM

## 2022-03-15 DIAGNOSIS — E55.9 VITAMIN D DEFICIENCY: ICD-10-CM

## 2022-03-15 DIAGNOSIS — M79.7 FIBROMYALGIA: ICD-10-CM

## 2022-03-15 DIAGNOSIS — M48.061 DEGENERATIVE LUMBAR SPINAL STENOSIS: ICD-10-CM

## 2022-03-15 DIAGNOSIS — E11.42 DIABETIC PERIPHERAL NEUROPATHY ASSOCIATED WITH TYPE 2 DIABETES MELLITUS (HCC): ICD-10-CM

## 2022-03-15 DIAGNOSIS — E53.8 B12 DEFICIENCY: ICD-10-CM

## 2022-03-15 DIAGNOSIS — I67.89 CEREBRAL MICROVASCULAR DISEASE: ICD-10-CM

## 2022-03-15 RX ORDER — PRIMIDONE 50 MG/1
TABLET ORAL
Qty: 150 TABLET | Refills: 0 | Status: SHIPPED | OUTPATIENT
Start: 2022-03-15 | End: 2022-04-13

## 2022-03-18 PROBLEM — R05.8 PRODUCTIVE COUGH: Status: ACTIVE | Noted: 2018-08-02

## 2022-03-19 PROBLEM — F32.A ANXIETY AND DEPRESSION: Status: ACTIVE | Noted: 2018-08-02

## 2022-03-19 PROBLEM — Z79.899 POLYPHARMACY: Status: ACTIVE | Noted: 2018-08-02

## 2022-03-19 PROBLEM — E55.9 VITAMIN D DEFICIENCY: Status: ACTIVE | Noted: 2017-05-16

## 2022-03-19 PROBLEM — K59.03 DRUG-INDUCED CONSTIPATION: Status: ACTIVE | Noted: 2018-08-06

## 2022-03-19 PROBLEM — J86.9 EMPYEMA (HCC): Status: ACTIVE | Noted: 2018-08-12

## 2022-03-19 PROBLEM — F41.9 ANXIETY AND DEPRESSION: Status: ACTIVE | Noted: 2018-08-02

## 2022-03-19 PROBLEM — M79.7 FIBROMYALGIA: Status: ACTIVE | Noted: 2017-05-27

## 2022-03-19 PROBLEM — E66.9 OBESITY (BMI 35.0-39.9 WITHOUT COMORBIDITY): Status: ACTIVE | Noted: 2017-05-27

## 2022-03-19 PROBLEM — I67.89 CEREBRAL MICROVASCULAR DISEASE: Status: ACTIVE | Noted: 2017-05-17

## 2022-03-19 PROBLEM — R06.02 SHORTNESS OF BREATH: Status: ACTIVE | Noted: 2018-08-06

## 2022-03-19 PROBLEM — J18.9 CAP (COMMUNITY ACQUIRED PNEUMONIA): Status: ACTIVE | Noted: 2018-07-31

## 2022-03-19 PROBLEM — M47.22 CERVICAL RADICULOPATHY DUE TO DEGENERATIVE JOINT DISEASE OF SPINE: Status: ACTIVE | Noted: 2017-05-16

## 2022-03-19 PROBLEM — E11.42 DIABETIC PERIPHERAL NEUROPATHY ASSOCIATED WITH TYPE 2 DIABETES MELLITUS (HCC): Status: ACTIVE | Noted: 2017-05-16

## 2022-03-19 PROBLEM — M48.061 DEGENERATIVE LUMBAR SPINAL STENOSIS: Status: ACTIVE | Noted: 2017-05-16

## 2022-03-20 PROBLEM — I65.23 STENOSIS OF BOTH INTERNAL CAROTID ARTERIES: Status: ACTIVE | Noted: 2017-05-16

## 2022-03-20 PROBLEM — J96.92 HYPERCAPNIC RESPIRATORY FAILURE (HCC): Status: ACTIVE | Noted: 2021-11-25

## 2022-03-20 PROBLEM — R41.82 ALTERED MENTAL STATUS, UNSPECIFIED: Status: ACTIVE | Noted: 2017-05-17

## 2022-03-20 PROBLEM — G25.0 BENIGN ESSENTIAL TREMOR SYNDROME: Status: ACTIVE | Noted: 2018-02-21

## 2022-03-20 PROBLEM — R07.1 CHEST PAIN ON BREATHING: Status: ACTIVE | Noted: 2018-08-02

## 2022-04-04 RX ORDER — DULOXETIN HYDROCHLORIDE 30 MG/1
CAPSULE, DELAYED RELEASE ORAL
Qty: 30 CAPSULE | Refills: 5 | Status: SHIPPED | OUTPATIENT
Start: 2022-04-04

## 2022-04-07 ENCOUNTER — TELEPHONE (OUTPATIENT)
Dept: BEHAVIORAL/MENTAL HEALTH CLINIC | Age: 67
End: 2022-04-07

## 2022-04-07 RX ORDER — PRAZOSIN HYDROCHLORIDE 2 MG/1
CAPSULE ORAL
Qty: 60 CAPSULE | Refills: 3 | Status: SHIPPED | OUTPATIENT
Start: 2022-04-07

## 2022-04-07 NOTE — TELEPHONE ENCOUNTER
Patient called requesting follow up with provider. Patient informed provider is leaving in June. No availability at this time.  Patient informed she will be added to the cancellation list.

## 2022-04-13 DIAGNOSIS — M79.7 FIBROMYALGIA: ICD-10-CM

## 2022-04-13 DIAGNOSIS — G25.0 BENIGN ESSENTIAL TREMOR SYNDROME: ICD-10-CM

## 2022-04-13 DIAGNOSIS — E55.9 VITAMIN D DEFICIENCY: ICD-10-CM

## 2022-04-13 DIAGNOSIS — M96.1 CERVICAL POST-LAMINECTOMY SYNDROME: ICD-10-CM

## 2022-04-13 DIAGNOSIS — E11.42 DIABETIC PERIPHERAL NEUROPATHY ASSOCIATED WITH TYPE 2 DIABETES MELLITUS (HCC): ICD-10-CM

## 2022-04-13 DIAGNOSIS — G25.0 BENIGN FAMILIAL TREMOR: ICD-10-CM

## 2022-04-13 DIAGNOSIS — R55 SYNCOPE AND COLLAPSE: ICD-10-CM

## 2022-04-13 DIAGNOSIS — I65.23 STENOSIS OF BOTH INTERNAL CAROTID ARTERIES: ICD-10-CM

## 2022-04-13 DIAGNOSIS — M48.061 DEGENERATIVE LUMBAR SPINAL STENOSIS: ICD-10-CM

## 2022-04-13 DIAGNOSIS — R27.0 ATAXIA: ICD-10-CM

## 2022-04-13 DIAGNOSIS — R41.82 ALTERED MENTAL STATUS, UNSPECIFIED ALTERED MENTAL STATUS TYPE: ICD-10-CM

## 2022-04-13 DIAGNOSIS — M47.22 CERVICAL RADICULOPATHY DUE TO DEGENERATIVE JOINT DISEASE OF SPINE: ICD-10-CM

## 2022-04-13 DIAGNOSIS — E53.8 B12 DEFICIENCY: ICD-10-CM

## 2022-04-13 DIAGNOSIS — I67.89 CEREBRAL MICROVASCULAR DISEASE: ICD-10-CM

## 2022-04-13 RX ORDER — PRIMIDONE 50 MG/1
TABLET ORAL
Qty: 150 TABLET | Refills: 0 | Status: SHIPPED | OUTPATIENT
Start: 2022-04-13 | End: 2022-05-11

## 2022-05-11 DIAGNOSIS — R27.0 ATAXIA: ICD-10-CM

## 2022-05-11 DIAGNOSIS — G25.0 BENIGN ESSENTIAL TREMOR SYNDROME: ICD-10-CM

## 2022-05-11 DIAGNOSIS — M47.22 CERVICAL RADICULOPATHY DUE TO DEGENERATIVE JOINT DISEASE OF SPINE: ICD-10-CM

## 2022-05-11 DIAGNOSIS — R41.82 ALTERED MENTAL STATUS, UNSPECIFIED ALTERED MENTAL STATUS TYPE: ICD-10-CM

## 2022-05-11 DIAGNOSIS — E53.8 B12 DEFICIENCY: ICD-10-CM

## 2022-05-11 DIAGNOSIS — M79.7 FIBROMYALGIA: ICD-10-CM

## 2022-05-11 DIAGNOSIS — I67.89 CEREBRAL MICROVASCULAR DISEASE: ICD-10-CM

## 2022-05-11 DIAGNOSIS — M96.1 CERVICAL POST-LAMINECTOMY SYNDROME: ICD-10-CM

## 2022-05-11 DIAGNOSIS — E55.9 VITAMIN D DEFICIENCY: ICD-10-CM

## 2022-05-11 DIAGNOSIS — G25.0 BENIGN FAMILIAL TREMOR: ICD-10-CM

## 2022-05-11 DIAGNOSIS — R55 SYNCOPE AND COLLAPSE: ICD-10-CM

## 2022-05-11 DIAGNOSIS — M48.061 DEGENERATIVE LUMBAR SPINAL STENOSIS: ICD-10-CM

## 2022-05-11 DIAGNOSIS — I65.23 STENOSIS OF BOTH INTERNAL CAROTID ARTERIES: ICD-10-CM

## 2022-05-11 DIAGNOSIS — E11.42 DIABETIC PERIPHERAL NEUROPATHY ASSOCIATED WITH TYPE 2 DIABETES MELLITUS (HCC): ICD-10-CM

## 2022-05-11 RX ORDER — PRIMIDONE 50 MG/1
TABLET ORAL
Qty: 150 TABLET | Refills: 0 | Status: SHIPPED | OUTPATIENT
Start: 2022-05-11 | End: 2022-06-10

## 2022-06-10 DIAGNOSIS — M96.1 CERVICAL POST-LAMINECTOMY SYNDROME: ICD-10-CM

## 2022-06-10 DIAGNOSIS — I65.23 STENOSIS OF BOTH INTERNAL CAROTID ARTERIES: ICD-10-CM

## 2022-06-10 DIAGNOSIS — G25.0 BENIGN FAMILIAL TREMOR: ICD-10-CM

## 2022-06-10 DIAGNOSIS — E53.8 B12 DEFICIENCY: ICD-10-CM

## 2022-06-10 DIAGNOSIS — R41.82 ALTERED MENTAL STATUS, UNSPECIFIED ALTERED MENTAL STATUS TYPE: ICD-10-CM

## 2022-06-10 DIAGNOSIS — M79.7 FIBROMYALGIA: ICD-10-CM

## 2022-06-10 DIAGNOSIS — M48.061 DEGENERATIVE LUMBAR SPINAL STENOSIS: ICD-10-CM

## 2022-06-10 DIAGNOSIS — E11.42 DIABETIC PERIPHERAL NEUROPATHY ASSOCIATED WITH TYPE 2 DIABETES MELLITUS (HCC): ICD-10-CM

## 2022-06-10 DIAGNOSIS — R27.0 ATAXIA: ICD-10-CM

## 2022-06-10 DIAGNOSIS — R55 SYNCOPE AND COLLAPSE: ICD-10-CM

## 2022-06-10 DIAGNOSIS — M47.22 CERVICAL RADICULOPATHY DUE TO DEGENERATIVE JOINT DISEASE OF SPINE: ICD-10-CM

## 2022-06-10 DIAGNOSIS — I67.89 CEREBRAL MICROVASCULAR DISEASE: ICD-10-CM

## 2022-06-10 DIAGNOSIS — G25.0 BENIGN ESSENTIAL TREMOR SYNDROME: ICD-10-CM

## 2022-06-10 DIAGNOSIS — E55.9 VITAMIN D DEFICIENCY: ICD-10-CM

## 2022-06-10 RX ORDER — PRIMIDONE 50 MG/1
TABLET ORAL
Qty: 150 TABLET | Refills: 0 | Status: SHIPPED | OUTPATIENT
Start: 2022-06-10 | End: 2022-07-03

## 2022-07-02 DIAGNOSIS — E11.42 DIABETIC PERIPHERAL NEUROPATHY ASSOCIATED WITH TYPE 2 DIABETES MELLITUS (HCC): ICD-10-CM

## 2022-07-02 DIAGNOSIS — M48.061 DEGENERATIVE LUMBAR SPINAL STENOSIS: ICD-10-CM

## 2022-07-02 DIAGNOSIS — I67.89 CEREBRAL MICROVASCULAR DISEASE: ICD-10-CM

## 2022-07-02 DIAGNOSIS — M96.1 CERVICAL POST-LAMINECTOMY SYNDROME: ICD-10-CM

## 2022-07-02 DIAGNOSIS — R41.82 ALTERED MENTAL STATUS, UNSPECIFIED ALTERED MENTAL STATUS TYPE: ICD-10-CM

## 2022-07-02 DIAGNOSIS — I65.23 STENOSIS OF BOTH INTERNAL CAROTID ARTERIES: ICD-10-CM

## 2022-07-02 DIAGNOSIS — G25.0 BENIGN FAMILIAL TREMOR: ICD-10-CM

## 2022-07-02 DIAGNOSIS — M79.7 FIBROMYALGIA: ICD-10-CM

## 2022-07-02 DIAGNOSIS — M47.22 CERVICAL RADICULOPATHY DUE TO DEGENERATIVE JOINT DISEASE OF SPINE: ICD-10-CM

## 2022-07-02 DIAGNOSIS — G25.0 BENIGN ESSENTIAL TREMOR SYNDROME: ICD-10-CM

## 2022-07-02 DIAGNOSIS — R55 SYNCOPE AND COLLAPSE: ICD-10-CM

## 2022-07-02 DIAGNOSIS — E55.9 VITAMIN D DEFICIENCY: ICD-10-CM

## 2022-07-02 DIAGNOSIS — R27.0 ATAXIA: ICD-10-CM

## 2022-07-02 DIAGNOSIS — E53.8 B12 DEFICIENCY: ICD-10-CM

## 2022-07-03 RX ORDER — PRIMIDONE 50 MG/1
TABLET ORAL
Qty: 150 TABLET | Refills: 0 | Status: SHIPPED | OUTPATIENT
Start: 2022-07-03 | End: 2022-07-08

## 2022-07-08 DIAGNOSIS — R27.0 ATAXIA: ICD-10-CM

## 2022-07-08 DIAGNOSIS — R41.82 ALTERED MENTAL STATUS, UNSPECIFIED ALTERED MENTAL STATUS TYPE: ICD-10-CM

## 2022-07-08 DIAGNOSIS — R55 SYNCOPE AND COLLAPSE: ICD-10-CM

## 2022-07-08 DIAGNOSIS — G25.0 BENIGN ESSENTIAL TREMOR SYNDROME: ICD-10-CM

## 2022-07-08 DIAGNOSIS — M79.7 FIBROMYALGIA: ICD-10-CM

## 2022-07-08 DIAGNOSIS — I65.23 STENOSIS OF BOTH INTERNAL CAROTID ARTERIES: ICD-10-CM

## 2022-07-08 DIAGNOSIS — E53.8 B12 DEFICIENCY: ICD-10-CM

## 2022-07-08 DIAGNOSIS — E11.42 DIABETIC PERIPHERAL NEUROPATHY ASSOCIATED WITH TYPE 2 DIABETES MELLITUS (HCC): ICD-10-CM

## 2022-07-08 DIAGNOSIS — M48.061 DEGENERATIVE LUMBAR SPINAL STENOSIS: ICD-10-CM

## 2022-07-08 DIAGNOSIS — M96.1 CERVICAL POST-LAMINECTOMY SYNDROME: ICD-10-CM

## 2022-07-08 DIAGNOSIS — I67.89 CEREBRAL MICROVASCULAR DISEASE: ICD-10-CM

## 2022-07-08 DIAGNOSIS — E55.9 VITAMIN D DEFICIENCY: ICD-10-CM

## 2022-07-08 DIAGNOSIS — G25.0 BENIGN FAMILIAL TREMOR: ICD-10-CM

## 2022-07-08 DIAGNOSIS — M47.22 CERVICAL RADICULOPATHY DUE TO DEGENERATIVE JOINT DISEASE OF SPINE: ICD-10-CM

## 2022-07-08 RX ORDER — PRIMIDONE 50 MG/1
TABLET ORAL
Qty: 150 TABLET | Refills: 0 | Status: SHIPPED | OUTPATIENT
Start: 2022-07-08 | End: 2022-09-19

## 2022-09-19 DIAGNOSIS — E55.9 VITAMIN D DEFICIENCY: ICD-10-CM

## 2022-09-19 DIAGNOSIS — M48.061 DEGENERATIVE LUMBAR SPINAL STENOSIS: ICD-10-CM

## 2022-09-19 DIAGNOSIS — G25.0 BENIGN FAMILIAL TREMOR: ICD-10-CM

## 2022-09-19 DIAGNOSIS — M79.7 FIBROMYALGIA: ICD-10-CM

## 2022-09-19 DIAGNOSIS — R55 SYNCOPE AND COLLAPSE: ICD-10-CM

## 2022-09-19 DIAGNOSIS — R41.82 ALTERED MENTAL STATUS, UNSPECIFIED ALTERED MENTAL STATUS TYPE: ICD-10-CM

## 2022-09-19 DIAGNOSIS — E53.8 B12 DEFICIENCY: ICD-10-CM

## 2022-09-19 DIAGNOSIS — M96.1 CERVICAL POST-LAMINECTOMY SYNDROME: ICD-10-CM

## 2022-09-19 DIAGNOSIS — I65.23 STENOSIS OF BOTH INTERNAL CAROTID ARTERIES: ICD-10-CM

## 2022-09-19 DIAGNOSIS — E11.42 DIABETIC PERIPHERAL NEUROPATHY ASSOCIATED WITH TYPE 2 DIABETES MELLITUS (HCC): ICD-10-CM

## 2022-09-19 DIAGNOSIS — R27.0 ATAXIA: ICD-10-CM

## 2022-09-19 DIAGNOSIS — G25.0 BENIGN ESSENTIAL TREMOR SYNDROME: ICD-10-CM

## 2022-09-19 DIAGNOSIS — I67.89 CEREBRAL MICROVASCULAR DISEASE: ICD-10-CM

## 2022-09-19 DIAGNOSIS — M47.22 CERVICAL RADICULOPATHY DUE TO DEGENERATIVE JOINT DISEASE OF SPINE: ICD-10-CM

## 2022-09-19 RX ORDER — PRIMIDONE 50 MG/1
TABLET ORAL
Qty: 150 TABLET | Refills: 0 | Status: SHIPPED | OUTPATIENT
Start: 2022-09-19 | End: 2022-10-24

## 2022-10-24 DIAGNOSIS — E55.9 VITAMIN D DEFICIENCY: ICD-10-CM

## 2022-10-24 DIAGNOSIS — I67.89 CEREBRAL MICROVASCULAR DISEASE: ICD-10-CM

## 2022-10-24 DIAGNOSIS — R55 SYNCOPE AND COLLAPSE: ICD-10-CM

## 2022-10-24 DIAGNOSIS — G25.0 BENIGN ESSENTIAL TREMOR SYNDROME: ICD-10-CM

## 2022-10-24 DIAGNOSIS — M96.1 CERVICAL POST-LAMINECTOMY SYNDROME: ICD-10-CM

## 2022-10-24 DIAGNOSIS — E11.42 DIABETIC PERIPHERAL NEUROPATHY ASSOCIATED WITH TYPE 2 DIABETES MELLITUS (HCC): ICD-10-CM

## 2022-10-24 DIAGNOSIS — G25.0 BENIGN FAMILIAL TREMOR: ICD-10-CM

## 2022-10-24 DIAGNOSIS — R41.82 ALTERED MENTAL STATUS, UNSPECIFIED ALTERED MENTAL STATUS TYPE: ICD-10-CM

## 2022-10-24 DIAGNOSIS — M47.22 CERVICAL RADICULOPATHY DUE TO DEGENERATIVE JOINT DISEASE OF SPINE: ICD-10-CM

## 2022-10-24 DIAGNOSIS — R27.0 ATAXIA: ICD-10-CM

## 2022-10-24 DIAGNOSIS — M79.7 FIBROMYALGIA: ICD-10-CM

## 2022-10-24 DIAGNOSIS — M48.061 DEGENERATIVE LUMBAR SPINAL STENOSIS: ICD-10-CM

## 2022-10-24 DIAGNOSIS — I65.23 STENOSIS OF BOTH INTERNAL CAROTID ARTERIES: ICD-10-CM

## 2022-10-24 DIAGNOSIS — E53.8 B12 DEFICIENCY: ICD-10-CM

## 2022-10-24 RX ORDER — PRIMIDONE 50 MG/1
TABLET ORAL
Qty: 150 TABLET | Refills: 0 | Status: SHIPPED | OUTPATIENT
Start: 2022-10-24 | End: 2022-11-21

## 2022-11-21 DIAGNOSIS — M96.1 CERVICAL POST-LAMINECTOMY SYNDROME: ICD-10-CM

## 2022-11-21 DIAGNOSIS — R41.82 ALTERED MENTAL STATUS, UNSPECIFIED ALTERED MENTAL STATUS TYPE: ICD-10-CM

## 2022-11-21 DIAGNOSIS — G25.0 BENIGN FAMILIAL TREMOR: ICD-10-CM

## 2022-11-21 DIAGNOSIS — E55.9 VITAMIN D DEFICIENCY: ICD-10-CM

## 2022-11-21 DIAGNOSIS — I67.89 CEREBRAL MICROVASCULAR DISEASE: ICD-10-CM

## 2022-11-21 DIAGNOSIS — I65.23 STENOSIS OF BOTH INTERNAL CAROTID ARTERIES: ICD-10-CM

## 2022-11-21 DIAGNOSIS — M79.7 FIBROMYALGIA: ICD-10-CM

## 2022-11-21 DIAGNOSIS — E11.42 DIABETIC PERIPHERAL NEUROPATHY ASSOCIATED WITH TYPE 2 DIABETES MELLITUS (HCC): ICD-10-CM

## 2022-11-21 DIAGNOSIS — E53.8 B12 DEFICIENCY: ICD-10-CM

## 2022-11-21 DIAGNOSIS — R27.0 ATAXIA: ICD-10-CM

## 2022-11-21 DIAGNOSIS — M47.22 CERVICAL RADICULOPATHY DUE TO DEGENERATIVE JOINT DISEASE OF SPINE: ICD-10-CM

## 2022-11-21 DIAGNOSIS — G25.0 BENIGN ESSENTIAL TREMOR SYNDROME: ICD-10-CM

## 2022-11-21 DIAGNOSIS — R55 SYNCOPE AND COLLAPSE: ICD-10-CM

## 2022-11-21 DIAGNOSIS — M48.061 DEGENERATIVE LUMBAR SPINAL STENOSIS: ICD-10-CM

## 2022-11-21 RX ORDER — PRIMIDONE 50 MG/1
TABLET ORAL
Qty: 150 TABLET | Refills: 0 | Status: SHIPPED | OUTPATIENT
Start: 2022-11-21

## 2022-12-24 DIAGNOSIS — M47.22 CERVICAL RADICULOPATHY DUE TO DEGENERATIVE JOINT DISEASE OF SPINE: ICD-10-CM

## 2022-12-24 DIAGNOSIS — M79.7 FIBROMYALGIA: ICD-10-CM

## 2022-12-24 DIAGNOSIS — R55 SYNCOPE AND COLLAPSE: ICD-10-CM

## 2022-12-24 DIAGNOSIS — M48.061 DEGENERATIVE LUMBAR SPINAL STENOSIS: ICD-10-CM

## 2022-12-24 DIAGNOSIS — G25.0 BENIGN ESSENTIAL TREMOR SYNDROME: ICD-10-CM

## 2022-12-24 DIAGNOSIS — M96.1 CERVICAL POST-LAMINECTOMY SYNDROME: ICD-10-CM

## 2022-12-24 DIAGNOSIS — E11.42 DIABETIC PERIPHERAL NEUROPATHY ASSOCIATED WITH TYPE 2 DIABETES MELLITUS (HCC): ICD-10-CM

## 2022-12-24 DIAGNOSIS — E55.9 VITAMIN D DEFICIENCY: ICD-10-CM

## 2022-12-24 DIAGNOSIS — I67.89 CEREBRAL MICROVASCULAR DISEASE: ICD-10-CM

## 2022-12-24 DIAGNOSIS — E53.8 B12 DEFICIENCY: ICD-10-CM

## 2022-12-24 DIAGNOSIS — R27.0 ATAXIA: ICD-10-CM

## 2022-12-24 DIAGNOSIS — I65.23 STENOSIS OF BOTH INTERNAL CAROTID ARTERIES: ICD-10-CM

## 2022-12-24 DIAGNOSIS — G25.0 BENIGN FAMILIAL TREMOR: ICD-10-CM

## 2022-12-24 DIAGNOSIS — R41.82 ALTERED MENTAL STATUS, UNSPECIFIED ALTERED MENTAL STATUS TYPE: ICD-10-CM

## 2022-12-24 RX ORDER — PRIMIDONE 50 MG/1
TABLET ORAL
Qty: 150 TABLET | Refills: 0 | Status: SHIPPED | OUTPATIENT
Start: 2022-12-24

## 2023-01-25 DIAGNOSIS — G25.0 BENIGN ESSENTIAL TREMOR SYNDROME: ICD-10-CM

## 2023-01-25 DIAGNOSIS — M96.1 CERVICAL POST-LAMINECTOMY SYNDROME: ICD-10-CM

## 2023-01-25 DIAGNOSIS — R41.82 ALTERED MENTAL STATUS, UNSPECIFIED ALTERED MENTAL STATUS TYPE: ICD-10-CM

## 2023-01-25 DIAGNOSIS — M47.22 CERVICAL RADICULOPATHY DUE TO DEGENERATIVE JOINT DISEASE OF SPINE: ICD-10-CM

## 2023-01-25 DIAGNOSIS — I67.89 CEREBRAL MICROVASCULAR DISEASE: ICD-10-CM

## 2023-01-25 DIAGNOSIS — M48.061 DEGENERATIVE LUMBAR SPINAL STENOSIS: ICD-10-CM

## 2023-01-25 DIAGNOSIS — G25.0 BENIGN FAMILIAL TREMOR: ICD-10-CM

## 2023-01-25 DIAGNOSIS — M79.7 FIBROMYALGIA: ICD-10-CM

## 2023-01-25 DIAGNOSIS — E55.9 VITAMIN D DEFICIENCY: ICD-10-CM

## 2023-01-25 DIAGNOSIS — R27.0 ATAXIA: ICD-10-CM

## 2023-01-25 DIAGNOSIS — E53.8 B12 DEFICIENCY: ICD-10-CM

## 2023-01-25 DIAGNOSIS — I65.23 STENOSIS OF BOTH INTERNAL CAROTID ARTERIES: ICD-10-CM

## 2023-01-25 DIAGNOSIS — R55 SYNCOPE AND COLLAPSE: ICD-10-CM

## 2023-01-25 DIAGNOSIS — E11.42 DIABETIC PERIPHERAL NEUROPATHY ASSOCIATED WITH TYPE 2 DIABETES MELLITUS (HCC): ICD-10-CM

## 2023-01-25 RX ORDER — PRIMIDONE 50 MG/1
TABLET ORAL
Qty: 150 TABLET | Refills: 0 | Status: SHIPPED | OUTPATIENT
Start: 2023-01-25

## 2023-02-21 DIAGNOSIS — M47.22 CERVICAL RADICULOPATHY DUE TO DEGENERATIVE JOINT DISEASE OF SPINE: ICD-10-CM

## 2023-02-21 DIAGNOSIS — M79.7 FIBROMYALGIA: ICD-10-CM

## 2023-02-21 DIAGNOSIS — G25.0 BENIGN ESSENTIAL TREMOR SYNDROME: ICD-10-CM

## 2023-02-21 DIAGNOSIS — R27.0 ATAXIA: ICD-10-CM

## 2023-02-21 DIAGNOSIS — M96.1 CERVICAL POST-LAMINECTOMY SYNDROME: ICD-10-CM

## 2023-02-21 DIAGNOSIS — G25.0 BENIGN FAMILIAL TREMOR: ICD-10-CM

## 2023-02-21 DIAGNOSIS — R41.82 ALTERED MENTAL STATUS, UNSPECIFIED ALTERED MENTAL STATUS TYPE: ICD-10-CM

## 2023-02-21 DIAGNOSIS — R55 SYNCOPE AND COLLAPSE: ICD-10-CM

## 2023-02-21 DIAGNOSIS — E11.42 DIABETIC PERIPHERAL NEUROPATHY ASSOCIATED WITH TYPE 2 DIABETES MELLITUS (HCC): ICD-10-CM

## 2023-02-21 DIAGNOSIS — I65.23 STENOSIS OF BOTH INTERNAL CAROTID ARTERIES: ICD-10-CM

## 2023-02-21 DIAGNOSIS — I67.89 CEREBRAL MICROVASCULAR DISEASE: ICD-10-CM

## 2023-02-21 DIAGNOSIS — E55.9 VITAMIN D DEFICIENCY: ICD-10-CM

## 2023-02-21 DIAGNOSIS — E53.8 B12 DEFICIENCY: ICD-10-CM

## 2023-02-21 DIAGNOSIS — M48.061 DEGENERATIVE LUMBAR SPINAL STENOSIS: ICD-10-CM

## 2023-02-21 RX ORDER — PRIMIDONE 50 MG/1
TABLET ORAL
Qty: 150 TABLET | Refills: 0 | Status: SHIPPED | OUTPATIENT
Start: 2023-02-21

## 2023-03-21 DIAGNOSIS — E11.42 DIABETIC PERIPHERAL NEUROPATHY ASSOCIATED WITH TYPE 2 DIABETES MELLITUS (HCC): ICD-10-CM

## 2023-03-21 DIAGNOSIS — M47.22 CERVICAL RADICULOPATHY DUE TO DEGENERATIVE JOINT DISEASE OF SPINE: ICD-10-CM

## 2023-03-21 DIAGNOSIS — G25.0 BENIGN FAMILIAL TREMOR: ICD-10-CM

## 2023-03-21 DIAGNOSIS — R55 SYNCOPE AND COLLAPSE: ICD-10-CM

## 2023-03-21 DIAGNOSIS — R27.0 ATAXIA: ICD-10-CM

## 2023-03-21 DIAGNOSIS — E53.8 B12 DEFICIENCY: ICD-10-CM

## 2023-03-21 DIAGNOSIS — M96.1 CERVICAL POST-LAMINECTOMY SYNDROME: ICD-10-CM

## 2023-03-21 DIAGNOSIS — I67.89 CEREBRAL MICROVASCULAR DISEASE: ICD-10-CM

## 2023-03-21 DIAGNOSIS — M79.7 FIBROMYALGIA: ICD-10-CM

## 2023-03-21 DIAGNOSIS — M48.061 DEGENERATIVE LUMBAR SPINAL STENOSIS: ICD-10-CM

## 2023-03-21 DIAGNOSIS — E55.9 VITAMIN D DEFICIENCY: ICD-10-CM

## 2023-03-21 DIAGNOSIS — R41.82 ALTERED MENTAL STATUS, UNSPECIFIED ALTERED MENTAL STATUS TYPE: ICD-10-CM

## 2023-03-21 DIAGNOSIS — I65.23 STENOSIS OF BOTH INTERNAL CAROTID ARTERIES: ICD-10-CM

## 2023-03-21 DIAGNOSIS — G25.0 BENIGN ESSENTIAL TREMOR SYNDROME: ICD-10-CM

## 2023-03-21 RX ORDER — PRIMIDONE 50 MG/1
TABLET ORAL
Qty: 150 TABLET | Refills: 0 | Status: SHIPPED | OUTPATIENT
Start: 2023-03-21

## 2023-04-18 DIAGNOSIS — M96.1 CERVICAL POST-LAMINECTOMY SYNDROME: ICD-10-CM

## 2023-04-18 DIAGNOSIS — R27.0 ATAXIA: ICD-10-CM

## 2023-04-18 DIAGNOSIS — E11.42 DIABETIC PERIPHERAL NEUROPATHY ASSOCIATED WITH TYPE 2 DIABETES MELLITUS (HCC): ICD-10-CM

## 2023-04-18 DIAGNOSIS — M47.22 CERVICAL RADICULOPATHY DUE TO DEGENERATIVE JOINT DISEASE OF SPINE: ICD-10-CM

## 2023-04-18 DIAGNOSIS — E53.8 B12 DEFICIENCY: ICD-10-CM

## 2023-04-18 DIAGNOSIS — I65.23 STENOSIS OF BOTH INTERNAL CAROTID ARTERIES: ICD-10-CM

## 2023-04-18 DIAGNOSIS — R55 SYNCOPE AND COLLAPSE: ICD-10-CM

## 2023-04-18 DIAGNOSIS — R41.82 ALTERED MENTAL STATUS, UNSPECIFIED ALTERED MENTAL STATUS TYPE: ICD-10-CM

## 2023-04-18 DIAGNOSIS — M79.7 FIBROMYALGIA: ICD-10-CM

## 2023-04-18 DIAGNOSIS — M48.061 DEGENERATIVE LUMBAR SPINAL STENOSIS: ICD-10-CM

## 2023-04-18 DIAGNOSIS — G25.0 BENIGN FAMILIAL TREMOR: ICD-10-CM

## 2023-04-18 DIAGNOSIS — E55.9 VITAMIN D DEFICIENCY: ICD-10-CM

## 2023-04-18 DIAGNOSIS — G25.0 BENIGN ESSENTIAL TREMOR SYNDROME: ICD-10-CM

## 2023-04-18 DIAGNOSIS — I67.89 CEREBRAL MICROVASCULAR DISEASE: ICD-10-CM

## 2023-04-18 RX ORDER — PRIMIDONE 50 MG/1
TABLET ORAL
Qty: 150 TABLET | Refills: 0 | Status: SHIPPED | OUTPATIENT
Start: 2023-04-18

## 2023-05-17 RX ORDER — PRIMIDONE 50 MG/1
TABLET ORAL
Qty: 150 TABLET | Refills: 0 | Status: SHIPPED | OUTPATIENT
Start: 2023-05-17

## 2023-06-14 RX ORDER — PRIMIDONE 50 MG/1
TABLET ORAL
Qty: 150 TABLET | Refills: 0 | OUTPATIENT
Start: 2023-06-14

## 2023-06-14 NOTE — TELEPHONE ENCOUNTER
CANNOT FILL MEDS. Last seen 10-3-2018. Was advised 5- per Dr. Nan Brumfield, no further refills until seen in office.     Call PCP for refills

## 2023-10-31 NOTE — PROCEDURES
This study consisted of pulsed wave Doppler examination, Color-flow imaging, and Duplex imaging of both the right and left carotid systems, and both vertebral arteries.        Imaging of both right and left carotid systems showed mild mixed plaquing at the bifurcations and proximal and distal internal and external carotid arteries bilaterally, with stenosis in the range of 15-49 % only and with no flow abnormalities identified, except for the proximal left internal carotid artery which showed stenosis in the range of 50-79%, but probably closer to 50% by flow velocity measurements. .        Both vertebral arteries showed normal antegrade flow.         Clinical correlation recommended  
Pt BIBEMS complaining of chest pain and sob worse with movement. PT admits to smoking marijuana earlier today.

## 2024-02-29 NOTE — PROGRESS NOTES
Problem: Falls - Risk of  Goal: *Absence of Falls  Document Pal[place] Fall Risk and appropriate interventions in the flowsheet.    Outcome: Progressing Towards Goal  Fall Risk Interventions:  Mobility Interventions: Bed/chair exit alarm, Patient to call before getting OOB    Mentation Interventions: Bed/chair exit alarm    Medication Interventions: Patient to call before getting OOB    Elimination Interventions: Bed/chair exit alarm, Call light in reach, Patient to call for help with toileting needs    History of Falls Interventions: Door open when patient unattended Impaired functional mobility.

## (undated) DEVICE — REM POLYHESIVE ADULT PATIENT RETURN ELECTRODE: Brand: VALLEYLAB

## (undated) DEVICE — SURGICAL PROCEDURE KIT GEN LAPAROSCOPY LF

## (undated) DEVICE — HANDLE LT SNAP ON ULT DURABLE LENS FOR TRUMPF ALC DISPOSABLE

## (undated) DEVICE — TOWEL SURG W17XL27IN STD BLU COT NONFENESTRATED PREWASHED

## (undated) DEVICE — INFECTION CONTROL KIT SYS

## (undated) DEVICE — I.V. DRAIN SPONGES: Brand: DERMACEA

## (undated) DEVICE — SUTURE SZ 0 27IN 5/8 CIR UR-6  TAPER PT VIOLET ABSRB VICRYL J603H

## (undated) DEVICE — GAUZE SPONGES,12 PLY: Brand: CURITY

## (undated) DEVICE — SUTURE VCRL SZ 3-0 L27IN ABSRB UD L26MM SH 1/2 CIR J416H

## (undated) DEVICE — (D)PREP SKN CHLRAPRP APPL 26ML -- CONVERT TO ITEM 371833

## (undated) DEVICE — NEEDLE SPNL 22GA L3.5IN BLK HUB S STL REG WALL FIT STYL W/

## (undated) DEVICE — TIP SUCT BLU PLAS BLB W/O CTRL VENT YANK

## (undated) DEVICE — 3M™ IOBAN™ 2 ANTIMICROBIAL INCISE DRAPE 6648EZ: Brand: IOBAN™ 2

## (undated) DEVICE — SPONGE TONSIL MED X RAY DETECTABLE STRL LTX FREE

## (undated) DEVICE — DEVON™ KNEE AND BODY STRAP 60" X 3" (1.5 M X 7.6 CM): Brand: DEVON

## (undated) DEVICE — Z CONVERTED USE 2271148 CONNECTOR TBNG POLYPR 5IN1 TOUGH SHATTERPROOF FOR 5-11MM TB

## (undated) DEVICE — Device

## (undated) DEVICE — DRAPE,REIN 53X77,STERILE: Brand: MEDLINE

## (undated) DEVICE — SUTURE VCRL SZ 2-0 L27IN ABSRB UD L26MM SH 1/2 CIR J417H

## (undated) DEVICE — CONNECTOR TBNG SUCTION 3/8X3/8X3/8 IN

## (undated) DEVICE — KENDALL SCD EXPRESS SLEEVES, KNEE LENGTH, MEDIUM: Brand: KENDALL SCD

## (undated) DEVICE — 1200 GUARD II KIT W/5MM TUBE W/O VAC TUBE: Brand: GUARDIAN

## (undated) DEVICE — CATHETER THOR 28FR L23CM DIA9.3MM POLYVI CHL TAPR CONN TIP

## (undated) DEVICE — SUTURE PERMA-HAND 0 L18IN NONABSORBABLE BLK CT-1 L36MM 1/2 C021D

## (undated) DEVICE — SYR 10ML LUER LOK 1/5ML GRAD --

## (undated) DEVICE — PAD 05IN BASE 3IN PEAK M DENS CONVOLUTED FOAM

## (undated) DEVICE — SUTURE VCRL SZ 4-0 L27IN ABSRB UD L19MM PS-2 3/8 CIR PRIM J426H

## (undated) DEVICE — TELFA NON-ADHERENT ABSORBENT DRESSING: Brand: TELFA

## (undated) DEVICE — GOWN,SIRUS,NONRNF,SETINSLV,2XL,18/CS: Brand: MEDLINE

## (undated) DEVICE — MEDI-VAC NON-CONDUCTIVE SUCTION TUBING: Brand: CARDINAL HEALTH

## (undated) DEVICE — BLADE ELECTRODE: Brand: EDGE

## (undated) DEVICE — NEEDLE HYPO 22GA L1.5IN BLK S STL HUB POLYPR SHLD REG BVL

## (undated) DEVICE — SOLUTION IRRIG 1000ML H2O STRL BLT

## (undated) DEVICE — MAGNETIC DRAPE: Brand: DEVON

## (undated) DEVICE — SOLUTION IV 1000ML 0.9% SOD CHL

## (undated) DEVICE — STERILE POLYISOPRENE POWDER-FREE SURGICAL GLOVES WITH EMOLLIENT COATING: Brand: PROTEXIS

## (undated) DEVICE — CATH FOL TY IC BAG 16FR 2000ML -- CONVERT TO ITEM 363158

## (undated) DEVICE — APPLICATOR BNDG 1MM ADH PREMIERPRO EXOFIN